# Patient Record
Sex: FEMALE | Race: WHITE | NOT HISPANIC OR LATINO | Employment: OTHER | URBAN - NONMETROPOLITAN AREA
[De-identification: names, ages, dates, MRNs, and addresses within clinical notes are randomized per-mention and may not be internally consistent; named-entity substitution may affect disease eponyms.]

---

## 2017-01-03 ENCOUNTER — HOSPITAL ENCOUNTER (OUTPATIENT)
Dept: PHYSICAL THERAPY | Facility: HOSPITAL | Age: 67
Setting detail: THERAPIES SERIES
End: 2017-01-03
Attending: INTERNAL MEDICINE
Payer: MEDICARE

## 2017-01-03 ENCOUNTER — HOSPITAL ENCOUNTER (OUTPATIENT)
Dept: PHYSICAL THERAPY | Facility: HOSPITAL | Age: 67
Setting detail: THERAPIES SERIES
End: 2017-01-03
Attending: PHYSICIAN ASSISTANT
Payer: MEDICARE

## 2017-01-03 PROCEDURE — 97035 APP MDLTY 1+ULTRASOUND EA 15: CPT | Mod: GP

## 2017-01-03 PROCEDURE — 40000718 ZZHC STATISTIC PT DEPARTMENT ORTHO VISIT

## 2017-01-03 PROCEDURE — 97110 THERAPEUTIC EXERCISES: CPT | Mod: GP

## 2017-01-03 PROCEDURE — 97140 MANUAL THERAPY 1/> REGIONS: CPT | Mod: GP

## 2017-01-03 PROCEDURE — 97010 HOT OR COLD PACKS THERAPY: CPT | Mod: GP

## 2017-01-05 ENCOUNTER — MYC MEDICAL ADVICE (OUTPATIENT)
Dept: FAMILY MEDICINE | Facility: OTHER | Age: 67
End: 2017-01-05

## 2017-01-05 ENCOUNTER — HOSPITAL ENCOUNTER (OUTPATIENT)
Dept: PHYSICAL THERAPY | Facility: HOSPITAL | Age: 67
Setting detail: THERAPIES SERIES
End: 2017-01-05
Attending: PHYSICIAN ASSISTANT
Payer: MEDICARE

## 2017-01-05 DIAGNOSIS — G62.9 PERIPHERAL POLYNEUROPATHY: Primary | ICD-10-CM

## 2017-01-05 PROCEDURE — 97110 THERAPEUTIC EXERCISES: CPT | Mod: GP

## 2017-01-05 PROCEDURE — G8978 MOBILITY CURRENT STATUS: HCPCS | Mod: GP,CI

## 2017-01-05 PROCEDURE — G8980 MOBILITY D/C STATUS: HCPCS | Mod: GP,CI

## 2017-01-05 PROCEDURE — 40000718 ZZHC STATISTIC PT DEPARTMENT ORTHO VISIT

## 2017-01-05 PROCEDURE — G8979 MOBILITY GOAL STATUS: HCPCS | Mod: GP,CI

## 2017-01-06 RX ORDER — GABAPENTIN 300 MG/1
CAPSULE ORAL
Qty: 210 CAPSULE | Refills: 3 | Status: SHIPPED | OUTPATIENT
Start: 2017-01-06 | End: 2017-04-17

## 2017-01-16 DIAGNOSIS — M05.79 RHEUMATOID ARTHRITIS INVOLVING MULTIPLE SITES WITH POSITIVE RHEUMATOID FACTOR (H): Primary | ICD-10-CM

## 2017-01-16 RX ORDER — HYDROCODONE BITARTRATE AND ACETAMINOPHEN 7.5; 325 MG/1; MG/1
TABLET ORAL
Qty: 60 TABLET | Refills: 0 | Status: SHIPPED | OUTPATIENT
Start: 2017-01-16 | End: 2017-09-01

## 2017-01-16 NOTE — TELEPHONE ENCOUNTER
Norco      Last Written Prescription Date:  8/26/2016  Last Fill Quantity: 60,   # refills: 0  Last Office Visit with [facility], [facility] or [facility] prescribing provider: 12/6/2016  Future Office visit:    Next 5 appointments (look out 90 days)     Jan 17, 2017 10:00 AM   (Arrive by 9:45 AM)   Return Visit with Abelardo Dewitt PA-C    ORTHOPEDICS ([facility])    [address] [facility] [address] [phone]                   Routing refill request to provider for review/approval because:  Drug not on the [facility], [facility] or [facility] refill protocol or controlled substance

## 2017-01-17 ENCOUNTER — OFFICE VISIT (OUTPATIENT)
Dept: ORTHOPEDICS | Facility: OTHER | Age: 67
End: 2017-01-17
Attending: PHYSICIAN ASSISTANT
Payer: COMMERCIAL

## 2017-01-17 VITALS
WEIGHT: 210 LBS | BODY MASS INDEX: 33.75 KG/M2 | DIASTOLIC BLOOD PRESSURE: 74 MMHG | HEIGHT: 66 IN | TEMPERATURE: 97.2 F | SYSTOLIC BLOOD PRESSURE: 116 MMHG | HEART RATE: 88 BPM

## 2017-01-17 DIAGNOSIS — M17.11 PRIMARY OSTEOARTHRITIS OF RIGHT KNEE: ICD-10-CM

## 2017-01-17 DIAGNOSIS — M05.79 RHEUMATOID ARTHRITIS INVOLVING MULTIPLE SITES WITH POSITIVE RHEUMATOID FACTOR (H): ICD-10-CM

## 2017-01-17 DIAGNOSIS — M25.561 CHRONIC PAIN OF RIGHT KNEE: Primary | ICD-10-CM

## 2017-01-17 DIAGNOSIS — G89.29 CHRONIC PAIN OF RIGHT KNEE: Primary | ICD-10-CM

## 2017-01-17 PROCEDURE — 99213 OFFICE O/P EST LOW 20 MIN: CPT | Performed by: PHYSICIAN ASSISTANT

## 2017-01-17 PROCEDURE — 99212 OFFICE O/P EST SF 10 MIN: CPT

## 2017-01-17 ASSESSMENT — PAIN SCALES - GENERAL: PAINLEVEL: NO PAIN (0)

## 2017-01-17 NOTE — PROGRESS NOTES
Chief complaint: Recheck right knee    Subjective: Melvi is a 66-year-old female with a history of RA who returns today for recheck of her right knee.  She was seen on 12/6/16 and her history is well-documented in the note from that date.  She decided on conservative measures at her last appointment including physical therapy.  She has underwent several sessions, her most recent almost a couple weeks back now.  She is noting good improvement overall but she continues to have intermittent pain in the right knee that is activity driven.  Her pain is primarily medial and some lateral, worse with prolonged periods of walking such as when she is shopping.  She will get some clicking as well, once her leg gets fatigued which she describes as well.  She otherwise denies mechanical symptoms such as catching or locking.  She will wear a brace on occasion which she feels helps with increased activity.  She otherwise denies new injuries or events.  She denies associated swelling or warmth in the right knee.    Past medical, past surgical, social history, family history, medications, and allergies reviewed and updated as appropriate today in EPIC.    ROS:  See HPI    Current Outpatient Prescriptions   Medication Sig Dispense Refill     HYDROcodone-acetaminophen (NORCO) 7.5-325 MG per tablet TAKE 1 TABLET BY MOUTH EVERY 4 TO 6 HOURS AS NEEDED FOR PAIN 60 tablet 0     gabapentin (NEURONTIN) 300 MG capsule 900mg in the morning, 600mg at noon, and 600mg in the PM by mouth. 210 capsule 3     citalopram (CELEXA) 40 MG tablet TAKE 1 TABLET BY MOUTH DAILY 90 tablet 1     RESTASIS 0.05 % ophthalmic emulsion USE 1 DROP IN BOTH EYES 2 TIMES A DAY 60 each 0     ALLEGRA-D ALLERGY & CONGESTION  MG per tablet TAKE 1 TABLET BY MOUTH TWICE DAILY 30 tablet 0     ALBUTEROL 108 (90 BASE) MCG/ACT inhaler INHALE 2 PUFFS INTO THE LUNGS EVERY 6 HOURS AS NEEDED FOR SHORTNESS OF BREATH OR WHEEZING 8.5 g 5     traZODone (DESYREL) 50 MG tablet  "TAKE 2-3 TABLETS BY MOUTH DAILY AT BEDTIME 270 tablet 2     SM GAS RELIEF ANTIFLATUENT 180 MG CAPS TAKE ONE CAPSULE BY MOUTH AS NEEDED AFTER A MEAL. MAX 2 CAPS/DAY 60 capsule 5     pantoprazole (PROTONIX) 40 MG enteric coated tablet Take 1 tablet (40 mg) by mouth daily 90 tablet 3     DICLOFENAC PO Take 100 mg by mouth       sucralfate (CARAFATE) 1 GM tablet Take by mouth daily       Garlic 400 MG TBEC        polyvinyl alcohol-povidone (REFRESH) 1.4-0.6 % ophthalmic solution 1-2 drops as needed       vitamin E 400 UNIT capsule Take 1 tablet by mouth daily       hydroxychloroquine (PLAQUENIL) 200 MG tablet Take 200 mg by mouth 2 times daily.       folic acid (FOLVITE) 1 MG tablet Take 1 mg by mouth 3 times daily.       METHOTREXATE SODIUM, PF, IJ Inject 0.8 mLs as directed once a week        MULTIPLE VITAMIN PO Take  by mouth 2 times daily.       Ascorbic Acid (VITAMIN C) 500 MG CAPS Take 2 tablets by mouth 2 times daily.       Calcium-Vitamin D-Vitamin K (CALCIUM + D) 500-1000-40 MG-UNT-MCG CHEW Take 3 tablets by mouth daily        ALFALFA PO Take 5 tablets by mouth 2 times daily.       MILK THISTLE PO Take 1,000 mg by mouth daily.       fish oil-omega-3 fatty acids (OMEGA-3) 1000 MG capsule Take 1 g by mouth daily.       Cholecalciferol (VITAMIN D) 1000 UNITS capsule Take 1 capsule by mouth daily.         Objective:   /74 mmHg  Pulse 88  Temp(Src) 97.2  F (36.2  C) (Tympanic)  Ht 5' 6.25\" (1.683 m)  Wt 210 lb (95.255 kg)  BMI 33.63 kg/m2  Healthy-appearing 66-year-old female in no acute distress.  She is alert and oriented ×3.  She is pleasant and cooperative.  She ambulates into the room today with a normal gait.  On inspection, no gross deformity involving the right knee.  Skin remains clean dry and intact throughout the right lower extremity.  No appreciable edema or effusion today.  Skin is normothermic to touch.  On palpation, she is minimally tender along the medial and lateral joint lines today.  " She is otherwise nontender.  No soft tissue masses palpable.  She demonstrates full active extension with excellent quad tone and strength today.  She has bilaterally equal flexion to approximately 125.  No ligamentous laxity or instability with ligamentous stressing today.  Negative Kim's.  Compartments soft and nontender throughout right lower extremity.  Distal neurovascular status grossly intact.    Imaging studies: None new.    Assessment: Intermittent right knee pain, improving with conservative measures including physical therapy.  She has underlying RA as well as radiographic evidence of underlying wear and tear degenerative changes.  She has history of patellar contusion as well.    Plan: I had a discussion with the patient today in regards to her progress thus far as well as future treatment.  She is very happy with her progress made in physical therapy.  She feels there is further benefit to be had with additional physical therapy.  I felt this was reasonable.  A new referral was placed for her to undergo a few more sessions at which time she may transition to a solid home exercise program to perform on a maintenance basis.  We did again discuss corticosteroid injection today however she would like to hold off if at all possible.  We agreed that as long as she continues to make steady progress, she may follow up as needed.  However, should her symptoms persist or worsen, she will return at that time.  She appears to understand and is in agreement with this plan.  All questions were answered.    Abelardo Dewitt PA-C

## 2017-01-17 NOTE — MR AVS SNAPSHOT
After Visit Summary   1/17/2017    Melvi Cleveland    MRN: [id number]           Patient Information     Date Of Birth          1950        Visit Information        Provider Department      1/17/2017 10:00 AM Abelardo Dewitt PA-C  ORTHOPEDICS        Today's Diagnoses     Chronic pain of right knee    -  1     Primary osteoarthritis of right knee         RA (rheumatoid arthritis) (H)           Care Instructions    Continue PT.  Transition to HEP when ready.  Follow up as needed.        Follow-ups after your visit        Additional Services     PHYSICAL THERAPY REFERRAL       *This therapy referral will be filtered to a centralized scheduling office at [facility] and the patient will receive a call to schedule an appointment at a [facility] location most convenient for them. *     [facility] provides Physical Therapy evaluation and treatment and many specialty services across the [facility] system.  If requesting a specialty program, please choose from the list below.    If you have not heard from the scheduling office within 2 business days, please call [phone] for all locations, with the exception of [place], please call [phone].  Treatment: Evaluation & Treatment  Special Instructions/Modalities: Continue current cares.  2 visits/wk x 4 weeks or until PT feels patient ready to transition to HEP.  Call with questions or concerns.  Special Programs: None    Please be aware that coverage of these services is subject to the terms and limitations of your health insurance plan.  Call member services at your health plan with any benefit or coverage questions.      **Note to Provider:  If you are referring outside of [facility] for the therapy appointment, please list the name of the location in the  special instructions  above, print the referral and give to the patient to schedule the appointment.                  Follow-up notes from your care team   "   Return if symptoms worsen or fail to improve.      Who to contact     If you have questions or need follow up information about today's clinic visit or your schedule please contact  ORTHOPED[facility] directly at [phone].  Normal or non-critical lab and imaging results will be communicated to you by MyChart, letter or phone within 4 business days after the clinic has received the results. If you do not hear from us within 7 days, please contact the clinic through MyChart or phone. If you have a critical or abnormal lab result, we will notify you by phone as soon as possible.  Submit refill requests through [employer] or call your pharmacy and they will forward the refill request to us. Please allow 3 business days for your refill to be completed.          Additional Information About Your Visit        [employer] Information     [employer] gives you secure access to your electronic health record. If you see a primary care provider, you can also send messages to your care team and make appointments. If you have questions, please call your primary care clinic.  If you do not have a primary care provider, please call [phone] and they will assist you.        Care EveryWhere ID     This is your Care EveryWhere ID. This could be used by other organizations to access your [place] medical records  [id number]        Your Vitals Were     Pulse Temperature Height BMI (Body Mass Index)          88 97.2  F (36.2  C) (Tympanic) 5' 6.25\" (1.683 m) 33.63 kg/m2         Blood Pressure from Last 3 Encounters:   01/17/17 116/74   12/06/16 124/74   11/23/16 118/62    Weight from Last 3 Encounters:   01/17/17 210 lb (95.255 kg)   12/06/16 208 lb (94.348 kg)   11/23/16 208 lb (94.348 kg)              We Performed the Following     PHYSICAL THERAPY REFERRAL        Primary Care Provider Office Phone # Fax #    Jimbo Martinez [phone]       [facility] [address] [place] [address]        Thank you!     Thank " you for choosing  ORTH[facility]EDICS  for your care. Our goal is always to provide you with excellent care. Hearing back from our patients is one way we can continue to improve our services. Please take a few minutes to complete the written survey that you may receive in the mail after your visit with us. Thank you!             Your Updated Medication List - Protect others around you: Learn how to safely use, store and throw away your medicines at www.disposemymeds.org.          This list is accurate as of: 1/17/17 10:07 AM.  Always use your most recent med list.                   Brand Name Dispense Instructions for use    albuterol 108 (90 BASE) MCG/ACT Inhaler   Generic drug:  albuterol     8.5 g    INHALE 2 PUFFS INTO THE LUNGS EVERY 6 HOURS AS NEEDED FOR SHORTNESS OF BREATH OR WHEEZING       ALFALFA PO      Take 5 tablets by mouth 2 times daily.       ALLEGRA-D ALLERGY & CONGESTION  MG per 12 hr tablet   Generic drug:  fexofenadine-pseudoePHEDrine     30 tablet    TAKE 1 TABLET BY MOUTH TWICE DAILY       CALCIUM + D 500-1000-40 MG-UNT-MCG Chew   Generic drug:  Calcium-Vitamin D-Vitamin K      Take 3 tablets by mouth daily       citalopram 40 MG tablet    celeXA    90 tablet    TAKE 1 TABLET BY MOUTH DAILY       DICLOFENAC PO      Take 100 mg by mouth       fish oil-omega-3 fatty acids 1000 MG capsule      Take 1 g by mouth daily.       folic acid 1 MG tablet    FOLVITE     Take 1 mg by mouth 3 times daily.       gabapentin 300 MG capsule    NEURONTIN    210 capsule    900mg in the morning, 600mg at noon, and 600mg in the PM by mouth.       Garlic 400 MG Tbec          HYDROcodone-acetaminophen 7.5-325 MG per tablet    NORCO    60 tablet    TAKE 1 TABLET BY MOUTH EVERY 4 TO 6 HOURS AS NEEDED FOR PAIN       METHOTREXATE SODIUM (PF) IJ      Inject 0.8 mLs as directed once a week       MILK THISTLE PO      Take 1,000 mg by mouth daily.       MULTIPLE VITAMIN PO      Take  by mouth 2 times daily.       pantoprazole  40 MG EC tablet    PROTONIX    90 tablet    Take 1 tablet (40 mg) by mouth daily       PLAQUENIL 200 MG tablet   Generic drug:  hydroxychloroquine      Take 200 mg by mouth 2 times daily.       REFRESH 1.4-0.6 % ophthalmic solution   Generic drug:  polyvinyl alcohol-povidone      1-2 drops as needed       RESTASIS 0.05 % ophthalmic emulsion   Generic drug:  cycloSPORINE     60 each    USE 1 DROP IN BOTH EYES 2 TIMES A DAY       SM GAS RELIEF ANTIFLATUENT 180 MG Caps   Generic drug:  Simethicone     60 capsule    TAKE ONE CAPSULE BY MOUTH AS NEEDED AFTER A MEAL. MAX 2 CAPS/DAY       sucralfate 1 GM tablet    CARAFATE     Take by mouth daily       traZODone 50 MG tablet    DESYREL    270 tablet    TAKE 2-3 TABLETS BY MOUTH DAILY AT BEDTIME       Vitamin C 500 MG Caps      Take 2 tablets by mouth 2 times daily.       vitamin D 1000 UNITS capsule      Take 1 capsule by mouth daily.       vitamin E 400 UNIT capsule      Take 1 tablet by mouth daily

## 2017-01-19 DIAGNOSIS — R09.81 NASAL CONGESTION: Primary | ICD-10-CM

## 2017-01-20 RX ORDER — FEXOFENADINE HYDROCHLORIDE AND PSEUDOEPHEDRINE HYDROCHLORIDE 60; 120 MG/1; MG/1
TABLET, FILM COATED, EXTENDED RELEASE ORAL
Qty: 30 TABLET | Refills: 0 | Status: SHIPPED | OUTPATIENT
Start: 2017-01-20 | End: 2017-02-23

## 2017-01-24 ENCOUNTER — HOSPITAL ENCOUNTER (OUTPATIENT)
Dept: PHYSICAL THERAPY | Facility: HOSPITAL | Age: 67
Setting detail: THERAPIES SERIES
End: 2017-01-24
Attending: PHYSICIAN ASSISTANT
Payer: MEDICARE

## 2017-01-24 PROCEDURE — 40000718 ZZHC STATISTIC PT DEPARTMENT ORTHO VISIT

## 2017-01-24 PROCEDURE — 97110 THERAPEUTIC EXERCISES: CPT | Mod: GP

## 2017-01-25 ENCOUNTER — OFFICE VISIT (OUTPATIENT)
Dept: FAMILY MEDICINE | Facility: OTHER | Age: 67
End: 2017-01-25
Attending: FAMILY MEDICINE
Payer: COMMERCIAL

## 2017-01-25 VITALS
WEIGHT: 210 LBS | DIASTOLIC BLOOD PRESSURE: 64 MMHG | OXYGEN SATURATION: 93 % | BODY MASS INDEX: 33.63 KG/M2 | TEMPERATURE: 97.8 F | HEART RATE: 68 BPM | SYSTOLIC BLOOD PRESSURE: 118 MMHG

## 2017-01-25 DIAGNOSIS — J01.01 ACUTE RECURRENT MAXILLARY SINUSITIS: Primary | ICD-10-CM

## 2017-01-25 PROCEDURE — 99212 OFFICE O/P EST SF 10 MIN: CPT

## 2017-01-25 PROCEDURE — 99213 OFFICE O/P EST LOW 20 MIN: CPT | Performed by: FAMILY MEDICINE

## 2017-01-25 RX ORDER — CEFPROZIL 500 MG/1
500 TABLET, FILM COATED ORAL 2 TIMES DAILY
Qty: 20 TABLET | Refills: 0 | Status: SHIPPED | OUTPATIENT
Start: 2017-01-25 | End: 2017-05-09

## 2017-01-25 NOTE — PROGRESS NOTES
SUBJECTIVE:  Melvi Cleveland, 66 year old, female is seen with nasal congestion cough. Present over the last week.  She notes bilateral maxillary pain and pressure.    Denies fever, shaking, chills, nausea, vomiting, or diarrhea.  No household contacts are ill.      Current Outpatient Prescriptions   Medication Sig Dispense Refill     ALLEGRA-D ALLERGY & CONGESTION  MG per 12 hr tablet TAKE 1 TABLET BY MOUTH TWICE DAILY 30 tablet 0     HYDROcodone-acetaminophen (NORCO) 7.5-325 MG per tablet TAKE 1 TABLET BY MOUTH EVERY 4 TO 6 HOURS AS NEEDED FOR PAIN 60 tablet 0     gabapentin (NEURONTIN) 300 MG capsule 900mg in the morning, 600mg at noon, and 600mg in the PM by mouth. 210 capsule 3     citalopram (CELEXA) 40 MG tablet TAKE 1 TABLET BY MOUTH DAILY 90 tablet 1     RESTASIS 0.05 % ophthalmic emulsion USE 1 DROP IN BOTH EYES 2 TIMES A DAY 60 each 0     ALBUTEROL 108 (90 BASE) MCG/ACT inhaler INHALE 2 PUFFS INTO THE LUNGS EVERY 6 HOURS AS NEEDED FOR SHORTNESS OF BREATH OR WHEEZING 8.5 g 5     traZODone (DESYREL) 50 MG tablet TAKE 2-3 TABLETS BY MOUTH DAILY AT BEDTIME 270 tablet 2     SM GAS RELIEF ANTIFLATUENT 180 MG CAPS TAKE ONE CAPSULE BY MOUTH AS NEEDED AFTER A MEAL. MAX 2 CAPS/DAY 60 capsule 5     pantoprazole (PROTONIX) 40 MG enteric coated tablet Take 1 tablet (40 mg) by mouth daily 90 tablet 3     DICLOFENAC PO Take 100 mg by mouth       sucralfate (CARAFATE) 1 GM tablet Take by mouth daily       Garlic 400 MG TBEC        polyvinyl alcohol-povidone (REFRESH) 1.4-0.6 % ophthalmic solution 1-2 drops as needed       vitamin E 400 UNIT capsule Take 1 tablet by mouth daily       hydroxychloroquine (PLAQUENIL) 200 MG tablet Take 200 mg by mouth 2 times daily.       folic acid (FOLVITE) 1 MG tablet Take 1 mg by mouth 3 times daily.       METHOTREXATE SODIUM, PF, IJ Inject 0.8 mLs as directed once a week        MULTIPLE VITAMIN PO Take  by mouth 2 times daily.       Ascorbic Acid (VITAMIN C) 500 MG CAPS Take 2  tablets by mouth 2 times daily.       Calcium-Vitamin D-Vitamin K (CALCIUM + D) 500-1000-40 MG-UNT-MCG CHEW Take 3 tablets by mouth daily        ALFALFA PO Take 5 tablets by mouth 2 times daily.       MILK THISTLE PO Take 1,000 mg by mouth daily.       fish oil-omega-3 fatty acids (OMEGA-3) 1000 MG capsule Take 1 g by mouth daily.       Cholecalciferol (VITAMIN D) 1000 UNITS capsule Take 1 capsule by mouth daily.          Allergies   Allergen Reactions     Adhesive Tape      Benzoin Compound      Tin-Co-Javier     Seasonal Allergies        Past Medical History   Diagnosis Date     Urethral stricture 4/30/2008     Rheumatoid arthritis(714.0) 12/13/1999     Chronic/Recurrent Neck Pain 7/5/2005     Chronic/Recurrent Back Pain 12/13/2000     Allergic rhinitis, seasonal 3/1/2011     Fibrocystic Breast Disease 3/1/2011     Crohn's Disease 3/1/2011     Dyslipidemia 3/1/2011     Sjogrens Syndrome 3/1/2011     Peripheral Neuriopathy 3/1/2011     Wilson's neuroma 1/1/2011     CTS (carpal tunnel syndrome) 1/1/2011     Mixed hyperlipidemia 1/1/2011     Parotiditis 5/9/2011     Seborrhea capitis 10/6/2011     Past Surgical History   Procedure Laterality Date     Cholecystectomy  2004     Back surgery  05/1995     back surgery     Biopsy       breast bx X2     Egd with biopsy  2010, 2011     GERD     Colonoscopy  11/15/2004     screening     Laminectomy       L4 L5 laminectomy; back pain     Biopsy breast       LT     Colonoscopy  9/24/2014     Family History   Problem Relation Age of Onset     DIABETES Mother      Rheumatoid Arthritis Mother      Unknown/Adopted Father      cause of death unknown     Rheumatoid Arthritis Father      DIABETES Sister      Myocardial Infarction Sister      cause of death     Lupus Sister      cause of death     C.A.D. Maternal Grandmother      CANCER Maternal Grandfather      CEREBROVASCULAR DISEASE Maternal Grandmother      DIABETES Maternal Grandmother      Other - See Comments Mother       fibromyalgia     Hypertension Brother      Osteoarthritis Mother      Other - See Comments Brother      sleep apnea     Other - See Comments Sister      sleep apnea     Thyroid Disease Mother      thyroid disorder     Thyroid Disease Maternal Grandmother      thyrodi disorder; goitor removed     Social History     Social History     Marital Status:      Spouse Name: N/A     Number of Children: N/A     Years of Education: N/A     Occupational History      [employer]     part time     Social History Main Topics     Smoking status: Former Smoker     Types: Cigarettes     Quit date: 03/28/1997     Smokeless tobacco: Never Used      Comment: no passive exposure     Alcohol Use: No      Comment: former. quit 1984     Drug Use: No     Sexual Activity: Not on file     Other Topics Concern     Parent/Sibling W/ Cabg, Mi Or Angioplasty Before 65f 55m? Yes     Blood Transfusions Yes     Caffeine Concern Yes     chocolate rare     Seat Belt Yes     Social History Narrative         Review Of Systems  Constitutional, HEENT, cardiovascular, pulmonary, gi and gu systems are negative, except as otherwise noted.    OBJECTIVE:  Vitals: B/P: 118/64, T: 97.8, P: 68, R: Data Unavailable    Exam:  Physical Exam   Constitutional: She is oriented to person, place, and time. She appears well-developed and well-nourished. No distress.   HENT:   Head: Normocephalic.   Right Ear: Hearing, tympanic membrane, external ear and ear canal normal.   Left Ear: Hearing, tympanic membrane, external ear and ear canal normal.   Nose: Right sinus exhibits maxillary sinus tenderness. Right sinus exhibits no frontal sinus tenderness. Left sinus exhibits maxillary sinus tenderness. Left sinus exhibits no frontal sinus tenderness.   Mouth/Throat: Uvula is midline and oropharynx is clear and moist. No oropharyngeal exudate or posterior oropharyngeal erythema.   Eyes: Conjunctivae are normal.   Neck: Neck supple.   Pulmonary/Chest: Effort normal and breath  sounds normal.   Lymphadenopathy:     She has no cervical adenopathy.   Neurological: She is alert and oriented to person, place, and time.   Skin: Skin is warm and dry.   Psychiatric: She has a normal mood and affect.     Other exam not repeated    Labs:  Orders Only on 12/23/2016   Component Date Value Ref Range Status     Cyclic Citrullinated Peptide Antib* 12/23/2016 7* <7 U/mL Final    Comment: Equivocal. It is recommended that samples with equivocal results be redrawn   6-8   weeks later and retested with this assay.       Rheumatoid Factor 12/23/2016 <20  <20 IU/mL Final     WBC 12/23/2016 4.9  4.0 - 11.0 10e9/L Final     RBC Count 12/23/2016 4.31  3.8 - 5.2 10e12/L Final     Hemoglobin 12/23/2016 12.6  11.7 - 15.7 g/dL Final     Hematocrit 12/23/2016 38.4  35.0 - 47.0 % Final     MCV 12/23/2016 89  78 - 100 fl Final     MCH 12/23/2016 29.2  26.5 - 33.0 pg Final     MCHC 12/23/2016 32.8  31.5 - 36.5 g/dL Final     RDW 12/23/2016 14.0  10.0 - 15.0 % Final     Platelet Count 12/23/2016 270  150 - 450 10e9/L Final     Diff Method 12/23/2016 Automated Method   Final     % Neutrophils 12/23/2016 49.6   Final     % Lymphocytes 12/23/2016 35.3   Final     % Monocytes 12/23/2016 8.8   Final     % Eosinophils 12/23/2016 5.1   Final     % Basophils 12/23/2016 1.0   Final     % Immature Granulocytes 12/23/2016 0.2   Final     Nucleated RBCs 12/23/2016 0  0 /100 Final     Absolute Neutrophil 12/23/2016 2.4  1.6 - 8.3 10e9/L Final     Absolute Lymphocytes 12/23/2016 1.7  0.8 - 5.3 10e9/L Final     Absolute Monocytes 12/23/2016 0.4  0.0 - 1.3 10e9/L Final     Absolute Eosinophils 12/23/2016 0.3  0.0 - 0.7 10e9/L Final     Absolute Basophils 12/23/2016 0.1  0.0 - 0.2 10e9/L Final     Abs Immature Granulocytes 12/23/2016 0.0  0 - 0.4 10e9/L Final     Absolute Nucleated RBC 12/23/2016 0.0   Final     ALT 12/23/2016 40  0 - 50 U/L Final     Creatinine 12/23/2016 0.91  0.52 - 1.04 mg/dL Final     GFR Estimate 12/23/2016 62  >60  mL/min/1.7m2 Final    Non  GFR Calc     GFR Estimate If Black 12/23/2016 76  >60 mL/min/1.7m2 Final    African American GFR Calc     Albumin 12/23/2016 3.1* 3.4 - 5.0 g/dL Final     Sed Rate 12/23/2016 18  0 - 30 mm/h Final     CRP Inflammation 12/23/2016 3.7  0.0 - 8.0 mg/L Final       ASSESSMENT/PLAN:  Acute recurrent maxillary sinusitis  Cefprozil twice daily.  Continue antihistamines and decongestants.  Saline rinses.  Follow up with persistent symptoms.  - cefPROZIL (CEFZIL) 500 MG tablet; Take 1 tablet (500 mg) by mouth 2 times daily            Jimbo Martinez MD

## 2017-01-25 NOTE — NURSING NOTE
"Chief Complaint   Patient presents with     Sinus Problem     sinus pain and pressure for 1 week, pressure in ears, productive cough. No fevers noted.        Initial /64 mmHg  Pulse 68  Temp(Src) 97.8  F (36.6  C) (Tympanic)  Wt 210 lb (95.255 kg)  SpO2 93% Estimated body mass index is 33.63 kg/(m^2) as calculated from the following:    Height as of 1/17/17: 5' 6.25\" (1.683 m).    Weight as of this encounter: 210 lb (95.255 kg).  BP completed using cuff size: raleigh Schwarz      "

## 2017-02-01 ENCOUNTER — HOSPITAL ENCOUNTER (OUTPATIENT)
Dept: PHYSICAL THERAPY | Facility: HOSPITAL | Age: 67
Setting detail: THERAPIES SERIES
End: 2017-02-01
Attending: FAMILY MEDICINE
Payer: MEDICARE

## 2017-02-01 PROCEDURE — 97140 MANUAL THERAPY 1/> REGIONS: CPT | Mod: GP

## 2017-02-01 PROCEDURE — 40000718 ZZHC STATISTIC PT DEPARTMENT ORTHO VISIT

## 2017-02-01 PROCEDURE — 97110 THERAPEUTIC EXERCISES: CPT | Mod: GP

## 2017-02-07 ENCOUNTER — HOSPITAL ENCOUNTER (OUTPATIENT)
Dept: PHYSICAL THERAPY | Facility: HOSPITAL | Age: 67
Setting detail: THERAPIES SERIES
End: 2017-02-07
Attending: FAMILY MEDICINE
Payer: MEDICARE

## 2017-02-07 PROCEDURE — 97110 THERAPEUTIC EXERCISES: CPT | Mod: GP

## 2017-02-07 PROCEDURE — 40000718 ZZHC STATISTIC PT DEPARTMENT ORTHO VISIT

## 2017-02-09 ENCOUNTER — HOSPITAL ENCOUNTER (OUTPATIENT)
Dept: PHYSICAL THERAPY | Facility: HOSPITAL | Age: 67
Setting detail: THERAPIES SERIES
End: 2017-02-09
Attending: PHYSICIAN ASSISTANT
Payer: MEDICARE

## 2017-02-09 PROCEDURE — 97110 THERAPEUTIC EXERCISES: CPT | Mod: GP

## 2017-02-09 PROCEDURE — 40000718 ZZHC STATISTIC PT DEPARTMENT ORTHO VISIT

## 2017-02-13 DIAGNOSIS — F51.01 PRIMARY INSOMNIA: ICD-10-CM

## 2017-02-13 RX ORDER — TRAZODONE HYDROCHLORIDE 50 MG/1
TABLET, FILM COATED ORAL
Qty: 270 TABLET | Refills: 1 | Status: SHIPPED | OUTPATIENT
Start: 2017-02-13 | End: 2017-08-23

## 2017-02-15 ENCOUNTER — HOSPITAL ENCOUNTER (OUTPATIENT)
Dept: PHYSICAL THERAPY | Facility: HOSPITAL | Age: 67
Setting detail: THERAPIES SERIES
End: 2017-02-15
Attending: FAMILY MEDICINE
Payer: MEDICARE

## 2017-02-15 PROCEDURE — 97110 THERAPEUTIC EXERCISES: CPT | Mod: GP

## 2017-02-15 PROCEDURE — G8980 MOBILITY D/C STATUS: HCPCS | Mod: GP,CI

## 2017-02-15 PROCEDURE — G8978 MOBILITY CURRENT STATUS: HCPCS | Mod: GP,CI

## 2017-02-15 PROCEDURE — 40000718 ZZHC STATISTIC PT DEPARTMENT ORTHO VISIT

## 2017-02-15 PROCEDURE — G8979 MOBILITY GOAL STATUS: HCPCS | Mod: GP,CI

## 2017-02-15 PROCEDURE — 40000268 ZZH STATISTIC NO CHARGES

## 2017-03-10 DIAGNOSIS — F41.0 PANIC DISORDER WITHOUT AGORAPHOBIA: ICD-10-CM

## 2017-03-10 DIAGNOSIS — Z79.899 ENCOUNTER FOR LONG-TERM (CURRENT) USE OF MEDICATIONS: Primary | ICD-10-CM

## 2017-03-10 LAB
ALBUMIN SERPL-MCNC: 3.3 G/DL (ref 3.4–5)
ALT SERPL W P-5'-P-CCNC: 40 U/L (ref 0–50)
BASOPHILS # BLD AUTO: 0 10E9/L (ref 0–0.2)
BASOPHILS NFR BLD AUTO: 0.8 %
CREAT SERPL-MCNC: 1.13 MG/DL (ref 0.52–1.04)
CRP SERPL-MCNC: <2.9 MG/L (ref 0–8)
DIFFERENTIAL METHOD BLD: NORMAL
EOSINOPHIL # BLD AUTO: 0.3 10E9/L (ref 0–0.7)
EOSINOPHIL NFR BLD AUTO: 5.1 %
ERYTHROCYTE [DISTWIDTH] IN BLOOD BY AUTOMATED COUNT: 13.6 % (ref 10–15)
ERYTHROCYTE [SEDIMENTATION RATE] IN BLOOD BY WESTERGREN METHOD: 15 MM/H (ref 0–30)
GFR SERPL CREATININE-BSD FRML MDRD: 48 ML/MIN/1.7M2
HCT VFR BLD AUTO: 37.6 % (ref 35–47)
HGB BLD-MCNC: 12.2 G/DL (ref 11.7–15.7)
IMM GRANULOCYTES # BLD: 0 10E9/L (ref 0–0.4)
IMM GRANULOCYTES NFR BLD: 0 %
LYMPHOCYTES # BLD AUTO: 2.1 10E9/L (ref 0.8–5.3)
LYMPHOCYTES NFR BLD AUTO: 41.4 %
MCH RBC QN AUTO: 28.5 PG (ref 26.5–33)
MCHC RBC AUTO-ENTMCNC: 32.4 G/DL (ref 31.5–36.5)
MCV RBC AUTO: 88 FL (ref 78–100)
MONOCYTES # BLD AUTO: 0.5 10E9/L (ref 0–1.3)
MONOCYTES NFR BLD AUTO: 9.5 %
NEUTROPHILS # BLD AUTO: 2.2 10E9/L (ref 1.6–8.3)
NEUTROPHILS NFR BLD AUTO: 43.2 %
NRBC # BLD AUTO: 0 10*3/UL
NRBC BLD AUTO-RTO: 0 /100
PLATELET # BLD AUTO: 232 10E9/L (ref 150–450)
RBC # BLD AUTO: 4.28 10E12/L (ref 3.8–5.2)
WBC # BLD AUTO: 5.1 10E9/L (ref 4–11)

## 2017-03-10 PROCEDURE — 85025 COMPLETE CBC W/AUTO DIFF WBC: CPT | Performed by: NURSE PRACTITIONER

## 2017-03-10 PROCEDURE — 86140 C-REACTIVE PROTEIN: CPT | Performed by: NURSE PRACTITIONER

## 2017-03-10 PROCEDURE — 84460 ALANINE AMINO (ALT) (SGPT): CPT | Performed by: NURSE PRACTITIONER

## 2017-03-10 PROCEDURE — 82040 ASSAY OF SERUM ALBUMIN: CPT | Performed by: NURSE PRACTITIONER

## 2017-03-10 PROCEDURE — 85652 RBC SED RATE AUTOMATED: CPT | Performed by: NURSE PRACTITIONER

## 2017-03-10 PROCEDURE — 82565 ASSAY OF CREATININE: CPT | Performed by: NURSE PRACTITIONER

## 2017-03-10 PROCEDURE — 36415 COLL VENOUS BLD VENIPUNCTURE: CPT | Performed by: NURSE PRACTITIONER

## 2017-03-13 RX ORDER — CITALOPRAM HYDROBROMIDE 40 MG/1
TABLET ORAL
Qty: 90 TABLET | Refills: 1 | Status: SHIPPED | OUTPATIENT
Start: 2017-03-13 | End: 2017-05-09

## 2017-03-28 DIAGNOSIS — R92.1 BREAST CALCIFICATION, RIGHT: Primary | ICD-10-CM

## 2017-04-06 ENCOUNTER — TRANSFERRED RECORDS (OUTPATIENT)
Dept: HEALTH INFORMATION MANAGEMENT | Facility: HOSPITAL | Age: 67
End: 2017-04-06

## 2017-04-11 DIAGNOSIS — Z12.31 ENCOUNTER FOR SCREENING MAMMOGRAM FOR BREAST CANCER: Primary | ICD-10-CM

## 2017-04-17 DIAGNOSIS — G62.9 PERIPHERAL POLYNEUROPATHY: ICD-10-CM

## 2017-04-17 DIAGNOSIS — R09.81 NASAL CONGESTION: ICD-10-CM

## 2017-04-18 RX ORDER — GABAPENTIN 300 MG/1
CAPSULE ORAL
Qty: 210 CAPSULE | Refills: 0 | Status: SHIPPED | OUTPATIENT
Start: 2017-04-18 | End: 2017-06-26

## 2017-04-18 RX ORDER — FEXOFENADINE HYDROCHLORIDE AND PSEUDOEPHEDRINE HYDROCHLORIDE 60; 120 MG/1; MG/1
TABLET, FILM COATED, EXTENDED RELEASE ORAL
Qty: 30 TABLET | Refills: 0 | Status: SHIPPED | OUTPATIENT
Start: 2017-04-18 | End: 2017-05-31

## 2017-04-18 NOTE — TELEPHONE ENCOUNTER
Last office visit 01/25/17.  Gabapentin last filled 01/05/17 #210 with 3 refills. Medication pended.

## 2017-04-19 DIAGNOSIS — R92.8 ABNORMAL MAMMOGRAM: Primary | ICD-10-CM

## 2017-04-19 PROCEDURE — G0206 DX MAMMO INCL CAD UNI: HCPCS | Mod: TC,RT

## 2017-04-19 PROCEDURE — G0279 TOMOSYNTHESIS, MAMMO: HCPCS | Mod: TC,RT

## 2017-04-20 ENCOUNTER — TELEPHONE (OUTPATIENT)
Dept: MAMMOGRAPHY | Facility: OTHER | Age: 67
End: 2017-04-20

## 2017-04-20 DIAGNOSIS — R92.1 BREAST CALCIFICATION, RIGHT: Primary | ICD-10-CM

## 2017-04-20 NOTE — TELEPHONE ENCOUNTER
Dr. Amin gives the ok to schedule patient for stereotactic breast biopsy.  Patient education performed on what to expect with breast biopsy.  Appointment given of 4/24/17 to arrive at 0800.

## 2017-04-24 ENCOUNTER — HOSPITAL ENCOUNTER (OUTPATIENT)
Dept: MAMMOGRAPHY | Facility: HOSPITAL | Age: 67
Discharge: HOME | End: 2017-04-24
Attending: SURGERY | Admitting: SURGERY
Payer: MEDICARE

## 2017-04-24 ENCOUNTER — RESULTS ONLY (OUTPATIENT)
Dept: SURGERY | Facility: OTHER | Age: 67
End: 2017-04-24

## 2017-04-24 ENCOUNTER — HOSPITAL ENCOUNTER (OUTPATIENT)
Dept: INTERVENTIONAL RADIOLOGY/VASCULAR | Facility: HOSPITAL | Age: 67
End: 2017-04-24
Attending: SURGERY
Payer: MEDICARE

## 2017-04-24 PROCEDURE — 25000125 ZZHC RX 250

## 2017-04-24 PROCEDURE — 88305 TISSUE EXAM BY PATHOLOGIST: CPT | Mod: TC | Performed by: SURGERY

## 2017-04-24 PROCEDURE — 19081 BX BREAST 1ST LESION STRTCTC: CPT | Mod: TC,RT | Performed by: RADIOLOGY

## 2017-04-24 PROCEDURE — 19081 BX BREAST 1ST LESION STRTCTC: CPT | Mod: TC,RT

## 2017-04-24 PROCEDURE — 40000272 ZZH STATISTIC POST PROCEDURE PLACEMENT MAMMOGRAMS: Mod: TC,RT | Performed by: RADIOLOGY

## 2017-04-24 RX ORDER — LIDOCAINE HYDROCHLORIDE AND EPINEPHRINE 10; 10 MG/ML; UG/ML
20 INJECTION, SOLUTION INFILTRATION; PERINEURAL ONCE
Status: COMPLETED | OUTPATIENT
Start: 2017-04-24 | End: 2017-04-24

## 2017-04-24 RX ORDER — LIDOCAINE HYDROCHLORIDE 10 MG/ML
INJECTION, SOLUTION EPIDURAL; INFILTRATION; INTRACAUDAL; PERINEURAL
Status: COMPLETED
Start: 2017-04-24 | End: 2017-04-24

## 2017-04-24 RX ORDER — LIDOCAINE HYDROCHLORIDE 10 MG/ML
20 INJECTION, SOLUTION EPIDURAL; INFILTRATION; INTRACAUDAL; PERINEURAL ONCE
Status: COMPLETED | OUTPATIENT
Start: 2017-04-24 | End: 2017-04-24

## 2017-04-24 RX ORDER — LIDOCAINE HYDROCHLORIDE AND EPINEPHRINE 10; 10 MG/ML; UG/ML
INJECTION, SOLUTION INFILTRATION; PERINEURAL
Status: COMPLETED
Start: 2017-04-24 | End: 2017-04-24

## 2017-04-24 RX ADMIN — LIDOCAINE HYDROCHLORIDE 30 MG: 10 INJECTION, SOLUTION EPIDURAL; INFILTRATION; INTRACAUDAL; PERINEURAL at 09:05

## 2017-04-24 RX ADMIN — LIDOCAINE HYDROCHLORIDE,EPINEPHRINE BITARTRATE 20 ML: 10; .01 INJECTION, SOLUTION INFILTRATION; PERINEURAL at 09:05

## 2017-04-24 RX ADMIN — LIDOCAINE HYDROCHLORIDE AND EPINEPHRINE 20 ML: 10; 10 INJECTION, SOLUTION INFILTRATION; PERINEURAL at 09:05

## 2017-04-24 NOTE — IP AVS SNAPSHOT
MRN:[id number]                      After Visit Summary   4/24/2017    Melvi Cleveland    MRN: [id number]           Visit Information        Provider Department      4/24/2017  8:30 AM Radiologist, Need Interventional [facility]           Review of your medicines      UNREVIEWED medicines. Ask your doctor about these medicines        Dose / Directions    albuterol 108 (90 BASE) MCG/ACT Inhaler   Used for:  Influenza-like symptoms   Generic drug:  albuterol        INHALE 2 PUFFS INTO THE LUNGS EVERY 6 HOURS AS NEEDED FOR SHORTNESS OF BREATH OR WHEEZING   Quantity:  8.5 g   Refills:  5       ALFALFA PO        Dose:  5 tablet   Take 5 tablets by mouth 2 times daily.   Refills:  0       ALLEGRA-D ALLERGY & CONGESTION  MG per 12 hr tablet   Used for:  Nasal congestion   Generic drug:  fexofenadine-pseudoePHEDrine        TAKE 1 TABLET BY MOUTH TWICE DAILY   Quantity:  30 tablet   Refills:  0       CALCIUM + D 500-1000-40 MG-UNT-MCG Chew   Generic drug:  Calcium-Vitamin D-Vitamin K        Dose:  3 tablet   Take 3 tablets by mouth daily   Refills:  0       cefPROZIL 500 MG tablet   Commonly known as:  CEFZIL   Used for:  Acute recurrent maxillary sinusitis        Dose:  500 mg   Take 1 tablet (500 mg) by mouth 2 times daily   Quantity:  20 tablet   Refills:  0       citalopram 40 MG tablet   Commonly known as:  celeXA   Used for:  Panic disorder without agoraphobia        TAKE 1 TABLET BY MOUTH DAILY   Quantity:  90 tablet   Refills:  1       DICLOFENAC PO        Dose:  100 mg   Take 100 mg by mouth   Refills:  0       fish oil-omega-3 fatty acids 1000 MG capsule        Dose:  1 g   Take 1 g by mouth daily.   Refills:  0       folic acid 1 MG tablet   Commonly known as:  FOLVITE        Dose:  1 mg   Take 1 mg by mouth 3 times daily.   Refills:  0       gabapentin 300 MG capsule   Commonly known as:  NEURONTIN   Used for:  Peripheral polyneuropathy (H)        TAKE 3 CAPSULES BY MOUTH  IN THE MORNING, 2 CAPSULES AT NOON AND 2 CAPSULES IN THE EVENING   Quantity:  210 capsule   Refills:  0       Garlic 400 MG Tbec        Refills:  0       HYDROcodone-acetaminophen 7.5-325 MG per tablet   Commonly known as:  NORCO   Used for:  Rheumatoid arthritis involving multiple sites with positive rheumatoid factor (H)        TAKE 1 TABLET BY MOUTH EVERY 4 TO 6 HOURS AS NEEDED FOR PAIN   Quantity:  60 tablet   Refills:  0       METHOTREXATE SODIUM (PF) IJ        Dose:  0.8 mL   Inject 0.8 mLs as directed once a week   Refills:  0       MILK THISTLE PO        Dose:  1000 mg   Take 1,000 mg by mouth daily.   Refills:  0       MULTIPLE VITAMIN PO        Take  by mouth 2 times daily.   Refills:  0       pantoprazole 40 MG EC tablet   Commonly known as:  PROTONIX   Used for:  Diagnosis unknown        Dose:  40 mg   Take 1 tablet (40 mg) by mouth daily   Quantity:  90 tablet   Refills:  3       PLAQUENIL 200 MG tablet   Generic drug:  hydroxychloroquine        Dose:  200 mg   Take 200 mg by mouth 2 times daily.   Refills:  0       REFRESH 1.4-0.6 % ophthalmic solution   Generic drug:  polyvinyl alcohol-povidone        Dose:  1-2 drop   1-2 drops as needed   Refills:  0       RESTASIS 0.05 % ophthalmic emulsion   Used for:  Bilateral dry eyes   Generic drug:  cycloSPORINE        USE 1 DROP IN BOTH EYES 2 TIMES A DAY   Quantity:  60 each   Refills:  0       SM GAS RELIEF ANTIFLATUENT 180 MG Caps   Used for:  Gas   Generic drug:  Simethicone        TAKE ONE CAPSULE BY MOUTH AS NEEDED AFTER A MEAL. MAX 2 CAPS/DAY   Quantity:  60 capsule   Refills:  5       sucralfate 1 GM tablet   Commonly known as:  CARAFATE        Take by mouth daily   Refills:  0       traZODone 50 MG tablet   Commonly known as:  DESYREL   Used for:  Primary insomnia        TAKE 2-3 TABLETS BY MOUTH DAILY AT BEDTIME   Quantity:  270 tablet   Refills:  1       Vitamin C 500 MG Caps        Dose:  2 tablet   Take 2 tablets by mouth 2 times daily.    Refills:  0       vitamin D 1000 UNITS capsule        Dose:  1 capsule   Take 1 capsule by mouth daily.   Refills:  0       vitamin E 400 UNIT capsule        Dose:  1 tablet   Take 1 tablet by mouth daily   Refills:  0                Protect others around you: Learn how to safely use, store and throw away your medicines at www.disposemymeds.org.         Follow-ups after your visit        Your next 10 appointments already scheduled     Apr 24, 2017  8:30 AM CDT   MAMMOGRAM with [facility] STEROEOTACTIC BREAST BIOPSY   [facility] ([facility] )    [address] [place] [address]   [phone]           Do not wear any body powder, lotions, deodorant or perfume the day of the exam. Bring a list of all medications, especially hormones.  If your last mammogram was not done at [facility], please bring your mammogram films. We will need the name of your MD/PA to send a copy of your report.  You may register in either the clinic or hospital, but the mammogram will done at the [facility] located in the Hospital.            Apr 24, 2017  8:30 AM CDT   Radiology with Need Interventional Radiologist   [facility] ([facility] )    [address] [address]   [phone]               Care Instructions        Further instructions from your care team         DISCHARGE INSTRUCTIONS FOR  BREAST BIOPSY    BREAST BIOPSY  A needle was used to locate the breast tissue. Tissue was removed to check the cells and be examined by a Pathologist. This will help your doctor plan any further treatment or follow-up. Your doctor will tell you the results of the tests in 3 to 5 days.    Follow these instructions:    Climb stairs slowly and use the hand rail. Avoid extra trips. No running or jumping.    No pushing, pulling or straining (vacuuming, shoveling, sweeping etc.)    You may shower tomorrow, pat the are dry around the tegaderm dressing.    Wear a bra at all times until your breast  is fully healed.    Apply ice to the breast during the first few hours following the procedure to relieve swelling and bruising.    Steri strips stay in place for 7-10 days or until they fall off. Do not pull them off.    May remove pressure dressing after 24 hours.    Call your doctor if you have:    increased bleeding or drainage from your incision.    swelling, redness or opening of your incision.    foul smell from your incision or dressing.    red streaks from your incision.    chills or fever over 101 degrees (by mouth).    pain not helped by your pain medication.    trouble or pain with breathing.    any new problems or concerns.     Additional Information About Your Visit        [employer] Information     The [employer] gives you secure access to your electronic health record. If you see a primary care provider, you can also send messages to your care team and make appointments. If you have questions, please call your primary care clinic.  If you do not have a primary care provider, please call [phone] and they will assist you.        Care EveryWhere ID     This is your Care EveryWhere ID. This could be used by other organizations to access your [facility] medical records  [id number]         Primary Care Provider Office Phone # Fax #    Jimbo Martinez [phone]      Thank you!     Thank you for choosing [facility] for your care. Our goal is always to provide you with excellent care. Hearing back from our patients is one way we can continue to improve our services. Please take a few minutes to complete the written survey that you may receive in the mail after you visit with us. Thank you!             Medication List: This is a list of all your medications and when to take them. Check marks below indicate your daily home schedule. Keep this list as a reference.      Medications           Morning Afternoon Evening Bedtime As Needed    albuterol 108 (90 BASE) MCG/ACT Inhaler   INHALE 2 PUFFS INTO  THE LUNGS EVERY 6 HOURS AS NEEDED FOR SHORTNESS OF BREATH OR WHEEZING   Generic drug:  albuterol                                ALFALFA PO   Take 5 tablets by mouth 2 times daily.                                ALLEGRA-D ALLERGY & CONGESTION  MG per 12 hr tablet   TAKE 1 TABLET BY MOUTH TWICE DAILY   Generic drug:  fexofenadine-pseudoePHEDrine                                CALCIUM + D 500-1000-40 MG-UNT-MCG Chew   Take 3 tablets by mouth daily   Generic drug:  Calcium-Vitamin D-Vitamin K                                cefPROZIL 500 MG tablet   Commonly known as:  CEFZIL   Take 1 tablet (500 mg) by mouth 2 times daily                                citalopram 40 MG tablet   Commonly known as:  celeXA   TAKE 1 TABLET BY MOUTH DAILY                                DICLOFENAC PO   Take 100 mg by mouth                                fish oil-omega-3 fatty acids 1000 MG capsule   Take 1 g by mouth daily.                                folic acid 1 MG tablet   Commonly known as:  FOLVITE   Take 1 mg by mouth 3 times daily.                                gabapentin 300 MG capsule   Commonly known as:  NEURONTIN   TAKE 3 CAPSULES BY MOUTH IN THE MORNING, 2 CAPSULES AT NOON AND 2 CAPSULES IN THE EVENING                                Garlic 400 MG Tbec                                HYDROcodone-acetaminophen 7.5-325 MG per tablet   Commonly known as:  NORCO   TAKE 1 TABLET BY MOUTH EVERY 4 TO 6 HOURS AS NEEDED FOR PAIN                                METHOTREXATE SODIUM (PF) IJ   Inject 0.8 mLs as directed once a week                                MILK THISTLE PO   Take 1,000 mg by mouth daily.                                MULTIPLE VITAMIN PO   Take  by mouth 2 times daily.                                pantoprazole 40 MG EC tablet   Commonly known as:  PROTONIX   Take 1 tablet (40 mg) by mouth daily                                PLAQUENIL 200 MG tablet   Take 200 mg by mouth 2 times daily.   Generic drug:   hydroxychloroquine                                REFRESH 1.4-0.6 % ophthalmic solution   1-2 drops as needed   Generic drug:  polyvinyl alcohol-povidone                                RESTASIS 0.05 % ophthalmic emulsion   USE 1 DROP IN BOTH EYES 2 TIMES A DAY   Generic drug:  cycloSPORINE                                SM GAS RELIEF ANTIFLATUENT 180 MG Caps   TAKE ONE CAPSULE BY MOUTH AS NEEDED AFTER A MEAL. MAX 2 CAPS/DAY   Generic drug:  Simethicone                                sucralfate 1 GM tablet   Commonly known as:  CARAFATE   Take by mouth daily                                traZODone 50 MG tablet   Commonly known as:  DESYREL   TAKE 2-3 TABLETS BY MOUTH DAILY AT BEDTIME                                Vitamin C 500 MG Caps   Take 2 tablets by mouth 2 times daily.                                vitamin D 1000 UNITS capsule   Take 1 capsule by mouth daily.                                vitamin E 400 UNIT capsule   Take 1 tablet by mouth daily

## 2017-04-24 NOTE — DISCHARGE INSTRUCTIONS
DISCHARGE INSTRUCTIONS FOR  BREAST BIOPSY    BREAST BIOPSY  A needle was used to locate the breast tissue. Tissue was removed to check the cells and be examined by a Pathologist. This will help your doctor plan any further treatment or follow-up. Your doctor will tell you the results of the tests in 3 to 5 days.    Follow these instructions:    Climb stairs slowly and use the hand rail. Avoid extra trips. No running or jumping.    No pushing, pulling or straining (vacuuming, shoveling, sweeping etc.)    You may shower tomorrow, pat the are dry around the tegaderm dressing.    Wear a bra at all times until your breast is fully healed.    Apply ice to the breast during the first few hours following the procedure to relieve swelling and bruising.    Steri strips stay in place for 7-10 days or until they fall off. Do not pull them off.    May remove pressure dressing after 24 hours.    Call your doctor if you have:    increased bleeding or drainage from your incision.    swelling, redness or opening of your incision.    foul smell from your incision or dressing.    red streaks from your incision.    chills or fever over 101 degrees (by mouth).    pain not helped by your pain medication.    trouble or pain with breathing.    any new problems or concerns.

## 2017-04-24 NOTE — PROGRESS NOTES
Patient here for stereotactic right breast biopsy.  Procedure explained by myself and by radiologist and all questions answered.  Consent signed.  Pt to biopsy room.  Time out done and biopsy performed.  Clip placed after biopsy.  Clip lot number [id number] .  Post clip mammogram performed.  Pressure held to site until bleeding completely stopped.  Steristrips and pressure dressing applied to site.  Ice pack with instructions on use given to patient.  Verbal and written discharge instructions given to patient.  Patient states understanding of all discharge instructions.  Pt was discharged to home in stable condition and without evidence of bleeding.  Franny MURPHY

## 2017-04-24 NOTE — IP AVS SNAPSHOT
[facility]    [address] [place] [address]    Phone:  [phone]    Fax:  [phone]                                       After Visit Summary   4/24/2017    Melvi Cleevland    MRN: [id number]           After Visit Summary Signature Page     I have received my discharge instructions, and my questions have been answered. I have discussed any challenges I see with this plan with the nurse or doctor.    ..........................................................................................................................................  Patient/Patient Representative Signature      ..........................................................................................................................................  Patient Representative Print Name and Relationship to Patient    ..................................................               ................................................  Date                                            Time    ..........................................................................................................................................  Reviewed by Signature/Title    ...................................................              ..............................................  Date                                                            Time

## 2017-04-25 ENCOUNTER — TELEPHONE (OUTPATIENT)
Dept: MAMMOGRAPHY | Facility: OTHER | Age: 67
End: 2017-04-25

## 2017-04-25 LAB — COPATH REPORT: NORMAL

## 2017-04-26 ENCOUNTER — TELEPHONE (OUTPATIENT)
Dept: SURGERY | Facility: OTHER | Age: 67
End: 2017-04-26

## 2017-04-26 NOTE — TELEPHONE ENCOUNTER
Dr. Amin gives the ok to tell patient her results are benign, but that she would like to see the patient in clinic to discuss results and follow up care.  Pt notified and told that Dr. Amin's office should be calling on Friday with a clinic date and time.

## 2017-05-09 ENCOUNTER — OFFICE VISIT (OUTPATIENT)
Dept: SURGERY | Facility: OTHER | Age: 67
End: 2017-05-09
Attending: SURGERY
Payer: COMMERCIAL

## 2017-05-09 VITALS
TEMPERATURE: 98.6 F | OXYGEN SATURATION: 97 % | HEART RATE: 81 BPM | WEIGHT: 210 LBS | HEIGHT: 66 IN | BODY MASS INDEX: 33.75 KG/M2 | SYSTOLIC BLOOD PRESSURE: 122 MMHG | DIASTOLIC BLOOD PRESSURE: 70 MMHG

## 2017-05-09 DIAGNOSIS — Z98.890 STATUS POST BREAST BIOPSY: Primary | ICD-10-CM

## 2017-05-09 DIAGNOSIS — N60.91 ATYPICAL DUCTAL HYPERPLASIA OF RIGHT BREAST: ICD-10-CM

## 2017-05-09 PROCEDURE — 99203 OFFICE O/P NEW LOW 30 MIN: CPT | Performed by: SURGERY

## 2017-05-09 PROCEDURE — 99212 OFFICE O/P EST SF 10 MIN: CPT

## 2017-05-09 RX ORDER — FLUTICASONE PROPIONATE 50 MCG
1 SPRAY, SUSPENSION (ML) NASAL
Status: ON HOLD | COMMUNITY
End: 2019-05-28

## 2017-05-09 RX ORDER — CITALOPRAM HYDROBROMIDE 40 MG/1
40 TABLET ORAL
COMMUNITY
End: 2017-09-29

## 2017-05-09 RX ORDER — DICLOFENAC SODIUM 75 MG/1
100 TABLET, DELAYED RELEASE ORAL
COMMUNITY
End: 2017-05-11

## 2017-05-09 RX ORDER — FEXOFENADINE HCL 60 MG/1
60 TABLET, FILM COATED ORAL
COMMUNITY
End: 2017-05-11

## 2017-05-09 ASSESSMENT — PAIN SCALES - GENERAL: PAINLEVEL: NO PAIN (0)

## 2017-05-09 NOTE — MR AVS SNAPSHOT
After Visit Summary   5/9/2017    Melvi Cleveland    MRN: [id number]           Patient Information     Date Of Birth          1950        Visit Information        Provider Department      5/9/2017 1:00 PM [facility]        Today's Diagnoses     Status post breast biopsy    -  1    Atypical ductal hyperplasia of right breast          Care Instructions    Thank you for allowing Dr. Amin and our surgical team to participate in your care.  If you have a scheduling or an appointment question please contact Savannah, our Health Unit Coordinator at her direct line [phone].   ALL nursing questions or concerns can be directed to your surgical nurse at: [phone]-Cara        Follow-ups after your visit        Who to contact     If you have questions or need follow up information about today's clinic visit or your schedule please [facility] at [phone].  Normal or non-critical lab and imaging results will be communicated to you by MyChart, letter or phone within 4 business days after the clinic has received the results. If you do not hear from us within 7 days, please contact the clinic through [employer] or phone. If you have a critical or abnormal lab result, we will notify you by phone as soon as possible.  Submit refill requests through [employer] or call your pharmacy and they will forward the refill request to us. Please allow 3 business days for your refill to be completed.          Additional Information About Your Visit        MyChart Information     [employer] gives you secure access to your electronic health record. If you see a primary care provider, you can also send messages to your care team and make appointments. If you have questions, please call your primary care clinic.  If you do not have a primary care provider, please call [phone] and they will assist you.        Care EveryWhere ID     This is your Care  "EveryWhere ID. This could be used by other organizations to access your [facility] medical records  [id number]        Your Vitals Were     Pulse Temperature Height Pulse Oximetry BMI (Body Mass Index)       81 98.6  F (37  C) (Tympanic) 5' 6\" (1.676 m) 97% 33.89 kg/m2        Blood Pressure from Last 3 Encounters:   05/09/17 122/70   01/25/17 118/64   01/17/17 116/74    Weight from Last 3 Encounters:   05/09/17 210 lb (95.3 kg)   01/25/17 210 lb (95.3 kg)   01/17/17 210 lb (95.3 kg)              Today, you had the following     No orders found for display         Today's Medication Changes          These changes are accurate as of: 5/9/17  2:17 PM.  If you have any questions, ask your nurse or doctor.               These medicines have changed or have updated prescriptions.        Dose/Directions    ALLEGRA-D ALLERGY & CONGESTION  MG per 12 hr tablet   This may have changed:  See the new instructions.   Used for:  Nasal congestion   Generic drug:  fexofenadine-pseudoePHEDrine        TAKE 1 TABLET BY MOUTH TWICE DAILY   Quantity:  30 tablet   Refills:  0         Stop taking these medicines if you haven't already. Please contact your care team if you have questions.     cefPROZIL 500 MG tablet   Commonly known as:  CEFZIL   Stopped by:  Jeni Amin MD                    Primary Care Provider Office Phone # Fax #    Jimbo Martinez [phone]       [address]  [place] [address]        Thank you!     Thank you for choosing [facility]  for your care. Our goal is always to provide you with excellent care. Hearing back from our patients is one way we can continue to improve our services. Please take a few minutes to complete the written survey that you may receive in the mail after your visit with us. Thank you!             Your Updated Medication List - Protect others around you: Learn how to safely use, store and throw away your medicines at " www.disposemymeds.org.          This list is accurate as of: 5/9/17  2:17 PM.  Always use your most recent med list.                   Brand Name Dispense Instructions for use    albuterol 108 (90 BASE) MCG/ACT Inhaler   Generic drug:  albuterol     8.5 g    INHALE 2 PUFFS INTO THE LUNGS EVERY 6 HOURS AS NEEDED FOR SHORTNESS OF BREATH OR WHEEZING       ALFALFA PO      Take 5 tablets by mouth 2 times daily.       ALLEGRA-D ALLERGY & CONGESTION  MG per 12 hr tablet   Generic drug:  fexofenadine-pseudoePHEDrine     30 tablet    TAKE 1 TABLET BY MOUTH TWICE DAILY       CALCIUM + D 500-1000-40 MG-UNT-MCG Chew   Generic drug:  Calcium-Vitamin D-Vitamin K      Take 3 tablets by mouth daily       citalopram 40 MG tablet    celeXA     Take 40 mg by mouth       diclofenac 75 MG EC tablet    VOLTAREN     Take 100 mg by mouth       DICLOFENAC PO      Take 100 mg by mouth       fexofenadine 60 MG tablet    ALLEGRA     Take 60 mg by mouth       fish oil-omega-3 fatty acids 1000 MG capsule      Take 1 g by mouth daily.       fluticasone 50 MCG/ACT spray    FLONASE     1 spray       folic acid 1 MG tablet    FOLVITE     Take 1 mg by mouth 3 times daily.       gabapentin 300 MG capsule    NEURONTIN    210 capsule    TAKE 3 CAPSULES BY MOUTH IN THE MORNING, 2 CAPSULES AT NOON AND 2 CAPSULES IN THE EVENING       Garlic 400 MG Tbec          HYDROcodone-acetaminophen 7.5-325 MG per tablet    NORCO    60 tablet    TAKE 1 TABLET BY MOUTH EVERY 4 TO 6 HOURS AS NEEDED FOR PAIN       ketotifen 0.025 % Soln ophthalmic solution    ZADITOR/REFRESH ANTI-ITCH     1 drop       METHOTREXATE SODIUM (PF) IJ      Inject 0.8 mLs as directed once a week       MILK THISTLE PO      Take 1,000 mg by mouth daily.       MULTIPLE VITAMIN PO      Take  by mouth 2 times daily.       pantoprazole 40 MG EC tablet    PROTONIX    90 tablet    Take 1 tablet (40 mg) by mouth daily       PLAQUENIL 200 MG tablet   Generic drug:  hydroxychloroquine      Take 200  mg by mouth 2 times daily.       REFRESH 1.4-0.6 % ophthalmic solution   Generic drug:  polyvinyl alcohol-povidone      1-2 drops as needed       RESTASIS 0.05 % ophthalmic emulsion   Generic drug:  cycloSPORINE     60 each    USE 1 DROP IN BOTH EYES 2 TIMES A DAY       SM GAS RELIEF ANTIFLATUENT 180 MG Caps   Generic drug:  Simethicone     60 capsule    TAKE ONE CAPSULE BY MOUTH AS NEEDED AFTER A MEAL. MAX 2 CAPS/DAY       sucralfate 1 GM tablet    CARAFATE     Take by mouth daily       traZODone 50 MG tablet    DESYREL    270 tablet    TAKE 2-3 TABLETS BY MOUTH DAILY AT BEDTIME       Vitamin C 500 MG Caps      Take 2 tablets by mouth 2 times daily.       vitamin D 1000 UNITS capsule      Take 1 capsule by mouth daily.

## 2017-05-09 NOTE — PATIENT INSTRUCTIONS
Thank you for allowing Dr. Amin and our surgical team to participate in your care.  If you have a scheduling or an appointment question please contact Savannah, our Health Unit Coordinator at her direct line [phone].   ALL nursing questions or concerns can be directed to your surgical nurse at: [phone]

## 2017-05-09 NOTE — PROGRESS NOTES
[facility] Surgery Consultation    CC:  Atypical ductal hyperplasia, right breast    HPI:  This 67 year old year old female is seen at the request of Dr. Martinez for evaluation and recommendations regarding stereotactic biopsy proven atypical ductal hyperplasia, right breast (4-24-17).  She performs self examination and has noted no change.  She denies change in appearance of the breasts, nipple inversion or discharge.  She has palpated the areas of concern in the left breast; the first biopsy was 40 years ago.  She was told the findings were benign fibrocystic change without atypia.     RISK FACTORS FOR BREAST NEOPLASIA:  Her menarche was at age 14 and menopause at 52.  She has one child age 45 and did not breast feed.  There is no family history of breast ovary pancreas or colon malignancy.   She has had one stereotactic biopsy on the left and three open biopsies on the left with benign findings.    HABITS:    Social History   Substance Use Topics     Smoking status: Former Smoker     Types: Cigarettes     Quit date: 3/28/1997     Smokeless tobacco: Never Used      Comment: no passive exposure     Alcohol use No      Comment: former. quit 1984       Family History   Problem Relation Age of Onset     DIABETES Mother      Rheumatoid Arthritis Mother      Other - See Comments Mother      fibromyalgia     Osteoarthritis Mother      Thyroid Disease Mother      thyroid disorder     Unknown/Adopted Father      cause of death unknown     Rheumatoid Arthritis Father      DIABETES Sister      Myocardial Infarction Sister      cause of death     Lupus Sister      cause of death     C.A.D. Maternal Grandmother      CEREBROVASCULAR DISEASE Maternal Grandmother      DIABETES Maternal Grandmother      Thyroid Disease Maternal Grandmother      thyrodi disorder; goitor removed     CANCER Maternal Grandfather      Hypertension Brother      Other - See Comments Brother      sleep apnea     Other - See Comments Sister      sleep  "apnea       REVIEW OF SYSTEMS:  Ten point review of systems negative except those mentioned in the HPI.     OBJECTIVE:    /70 (BP Location: Left arm, Patient Position: Chair, Cuff Size: Adult Regular)  Pulse 81  Temp 98.6  F (37  C) (Tympanic)  Ht 5' 6\" (1.676 m)  Wt 210 lb (95.3 kg)  SpO2 97%  BMI 33.89 kg/m2    GENERAL: Generally appears well, in no distress with appropriate affect.  HEENT:   Sclerae anicteric - No cervical, supra/infraclavciular lymphadenopathy, no thyroid masses  Respiratory:  Lungs clear to ausculation bilaterally with good air excursion  Cardiovascular:  Regular Rate and Rhythm with no murmurs gallops or rubs, normal   Breast Examination:  In the seated position with the arms elevated, there is no contour distortion, skin change or nipple abnormality.    In the supine position, the right breast shows a homogeneous parenchyma.  Focused examination in all quadrants shows no suggestion of mass, density, region of tenderness or palpable abnormality.  There is no palpable correlate in the region of mammographic suspicion and biopsy proven ADH although post biopsy inflammatory change persists and reduces sensitivity of exam..  The subareolar region, axillary tail and right axilla are similarly without finding.    The left breast shows a similar homogeneous parenchyma devoid of abnormality.   Focused examination in all quadrants shows no region of discrete or suspicious mass, asymmetric density or tenderness.  The subareolar region, axillary tail and left axilla are without finding.      :  deferred  Extremities:  Extremities normal. No deformities, edema, or skin discoloration.  Skin:  no suspicious lesions or rashes  Neurological: grossly intact    Psych:  Alert, oriented, affect appropriate with normal decision making ability.    IMPRESSION:  ADH, right breast - wire localized excision indicated.    The nuances of breast disease were discussed with disease with an exhaustive " "discussion defining the continuum of breast processes from atypical hyperplasia through ductal carcinoma in situ to invasive malignancy.  The association of microcalcifications with DCIS (~20%) was reviewed along with management options.  The need of wire localized biopsy for atypical ductal hyperplasia was discussed with this medical decision making related to complete histopathologic evaluation of the region in question.  Segmental resection \"lumpectomy\" with post operative radiation along with definitive mastectomy in treatment of DCIS with survival outlined.  The technique of wire localized excision was described in detail with risk/benefit and technical aspects outlined along with recuperative and cosmetic expectations reviewed.    PLAN:  Wire localized excision, ADH right breast.  General anesthesia will be required.  She has asthma that is associated with environmental allergies.  Scheduling will be based on patients choice with resolution of post biopsy change required.    Jeni Amin MD, FACS    5/9/2017  1:50 PM    cc:  Dr. Martinez  "

## 2017-05-09 NOTE — NURSING NOTE
"Chief Complaint   Patient presents with     RECHECK     Follow up-Stereotactic right breast biopsy 4/24/17.         Initial /70 (BP Location: Left arm, Patient Position: Chair, Cuff Size: Adult Regular)  Pulse 81  Temp 98.6  F (37  C) (Tympanic)  Ht 5' 6\" (1.676 m)  Wt 210 lb (95.3 kg)  SpO2 97%  BMI 33.89 kg/m2 Estimated body mass index is 33.89 kg/(m^2) as calculated from the following:    Height as of this encounter: 5' 6\" (1.676 m).    Weight as of this encounter: 210 lb (95.3 kg).  Medication Reconciliation: complete   JIGAR BARBOSA      "

## 2017-05-11 ENCOUNTER — OFFICE VISIT (OUTPATIENT)
Dept: FAMILY MEDICINE | Facility: OTHER | Age: 67
End: 2017-05-11
Attending: FAMILY MEDICINE
Payer: COMMERCIAL

## 2017-05-11 VITALS
WEIGHT: 206.2 LBS | SYSTOLIC BLOOD PRESSURE: 112 MMHG | DIASTOLIC BLOOD PRESSURE: 78 MMHG | HEART RATE: 72 BPM | BODY MASS INDEX: 33.28 KG/M2 | TEMPERATURE: 97.6 F | OXYGEN SATURATION: 94 %

## 2017-05-11 DIAGNOSIS — H57.13 EYE PAIN, BILATERAL: Primary | ICD-10-CM

## 2017-05-11 PROCEDURE — 99212 OFFICE O/P EST SF 10 MIN: CPT

## 2017-05-11 PROCEDURE — 99213 OFFICE O/P EST LOW 20 MIN: CPT | Performed by: FAMILY MEDICINE

## 2017-05-11 RX ORDER — DIMENHYDRINATE 50 MG
2 TABLET ORAL DAILY
COMMUNITY

## 2017-05-11 ASSESSMENT — PAIN SCALES - GENERAL: PAINLEVEL: NO PAIN (0)

## 2017-05-11 NOTE — MR AVS SNAPSHOT
After Visit Summary   5/11/2017    Melvi Cleveland    MRN: [id number]           Patient Information     Date Of Birth          1950        Visit Information        Provider Department      5/11/2017 2:45 PM Aishwarya Harrison MD [facility]        Today's Diagnoses     Eye pain, bilateral    -  1      Care Instructions    Continue allergy medications and eye drops.  Continue cool compresses and anti-inflammatory.  Recommend eye exam - slit lamp - to rule out other causes, such as uveitis.        Follow-ups after your visit        Your next 10 appointments already scheduled     Jun 07, 2017  9:00 AM CDT   (Arrive by 8:45 AM)   Return Visit with Jeni Amin MD   [facility] ([facility] [place] [facility])    [address] [address]   [phone]            Jun 07, 2017  9:40 AM CDT   (Arrive by 9:25 AM)   Pre-Op physical with Jimbo Martinez MD   [facility] ([facility] [place] [facility])    [address]  [place] [address]   [phone]              Who to contact     If you have questions or need follow up information about today's clinic visit or your schedule please contact [facility] directly at [phone].  Normal or non-critical lab and imaging results will be communicated to you by MyChart, letter or phone within 4 business days after the clinic has received the results. If you do not hear from us within 7 days, please contact the clinic through [facility] or phone. If you have a critical or abnormal lab result, we will notify you by phone as soon as possible.  Submit refill requests through [employer] or call your pharmacy and they will forward the refill request to us. Please allow 3 business days for your refill to be completed.          Additional Information About Your Visit        [employer] Information     [facility] gives you secure access to your electronic health record. If you see a primary care provider, you can also send messages  to your care team and make appointments. If you have questions, please call your primary care clinic.  If you do not have a primary care provider, please call [phone] and they will assist you.        Care EveryWhere ID     This is your Care EveryWhere ID. This could be used by other organizations to access your [facility] medical records  [id number]        Your Vitals Were     Pulse Temperature Pulse Oximetry BMI (Body Mass Index)          72 97.6  F (36.4  C) 94% 33.28 kg/m2         Blood Pressure from Last 3 Encounters:   05/11/17 112/78   05/09/17 122/70   01/25/17 118/64    Weight from Last 3 Encounters:   05/11/17 206 lb 3.2 oz (93.5 kg)   05/09/17 210 lb (95.3 kg)   01/25/17 210 lb (95.3 kg)              Today, you had the following     No orders found for display         Today's Medication Changes          These changes are accurate as of: 5/11/17  3:03 PM.  If you have any questions, ask your nurse or doctor.               These medicines have changed or have updated prescriptions.        Dose/Directions    ALLEGRA-D ALLERGY & CONGESTION  MG per 12 hr tablet   This may have changed:  See the new instructions.   Used for:  Nasal congestion   Generic drug:  fexofenadine-pseudoePHEDrine        TAKE 1 TABLET BY MOUTH TWICE DAILY   Quantity:  30 tablet   Refills:  0       pantoprazole 40 MG EC tablet   Commonly known as:  PROTONIX   This may have changed:  when to take this   Used for:  Diagnosis unknown        Dose:  40 mg   Take 1 tablet (40 mg) by mouth daily   Quantity:  90 tablet   Refills:  3         Stop taking these medicines if you haven't already. Please contact your care team if you have questions.     diclofenac 75 MG EC tablet   Commonly known as:  VOLTAREN   Stopped by:  Aishwarya Harrison MD           fexofenadine 60 MG tablet   Commonly known as:  ALLEGRA   Stopped by:  Aishwarya Harrison MD           sucralfate 1 GM tablet   Commonly known as:  CARAFATE   Stopped by:  Hunter  Aishwarya BARBOSA MD                    Primary Care Provider Office Phone # Fax #    Jimbo Martinez [phone]       [facility] [address] [place] [address]        Thank you!     Thank you for choosing [facility]  for your care. Our goal is always to provide you with excellent care. Hearing back from our patients is one way we can continue to improve our services. Please take a few minutes to complete the written survey that you may receive in the mail after your visit with us. Thank you!             Your Updated Medication List - Protect others around you: Learn how to safely use, store and throw away your medicines at www.disposemymeds.org.          This list is accurate as of: 5/11/17  3:03 PM.  Always use your most recent med list.                   Brand Name Dispense Instructions for use    albuterol 108 (90 BASE) MCG/ACT Inhaler   Generic drug:  albuterol     8.5 g    INHALE 2 PUFFS INTO THE LUNGS EVERY 6 HOURS AS NEEDED FOR SHORTNESS OF BREATH OR WHEEZING       ALFALFA PO      Take 5 tablets by mouth 2 times daily.       ALLEGRA-D ALLERGY & CONGESTION  MG per 12 hr tablet   Generic drug:  fexofenadine-pseudoePHEDrine     30 tablet    TAKE 1 TABLET BY MOUTH TWICE DAILY       CALCIUM + D 500-1000-40 MG-UNT-MCG Chew   Generic drug:  Calcium-Vitamin D-Vitamin K      Take 3 tablets by mouth daily       citalopram 40 MG tablet    celeXA     Take 40 mg by mouth       DICLOFENAC PO      Take 100 mg by mouth daily       fish oil-omega-3 fatty acids 1000 MG capsule      Take 1 g by mouth daily.       flax seed oil 1000 MG capsule      Take 2 capsules by mouth daily       fluticasone 50 MCG/ACT spray    FLONASE     1 spray Reported on 5/11/2017       folic acid 1 MG tablet    FOLVITE     Take 1 mg by mouth 3 times daily.       gabapentin 300 MG capsule    NEURONTIN    210 capsule    TAKE 3 CAPSULES BY MOUTH IN THE MORNING, 2 CAPSULES AT NOON AND 2 CAPSULES IN THE EVENING        Garlic 400 MG Tbec          HYDROcodone-acetaminophen 7.5-325 MG per tablet    NORCO    60 tablet    TAKE 1 TABLET BY MOUTH EVERY 4 TO 6 HOURS AS NEEDED FOR PAIN       ketotifen 0.025 % Soln ophthalmic solution    ZADITOR/REFRESH ANTI-ITCH     1 drop       METHOTREXATE SODIUM (PF) IJ      Inject 0.8 mLs as directed once a week       MILK THISTLE PO      Take 1,000 mg by mouth daily.       MULTIPLE VITAMIN PO      Take  by mouth 2 times daily.       pantoprazole 40 MG EC tablet    PROTONIX    90 tablet    Take 1 tablet (40 mg) by mouth daily       PLAQUENIL 200 MG tablet   Generic drug:  hydroxychloroquine      Take 200 mg by mouth 2 times daily.       REFRESH 1.4-0.6 % ophthalmic solution   Generic drug:  polyvinyl alcohol-povidone      1-2 drops as needed       RESTASIS 0.05 % ophthalmic emulsion   Generic drug:  cycloSPORINE     60 each    USE 1 DROP IN BOTH EYES 2 TIMES A DAY       SM GAS RELIEF ANTIFLATUENT 180 MG Caps   Generic drug:  Simethicone     60 capsule    TAKE ONE CAPSULE BY MOUTH AS NEEDED AFTER A MEAL. MAX 2 CAPS/DAY       traZODone 50 MG tablet    DESYREL    270 tablet    TAKE 2-3 TABLETS BY MOUTH DAILY AT BEDTIME       Vitamin C 500 MG Caps      Take 2 tablets by mouth 2 times daily.       vitamin D 1000 UNITS capsule      Take 1 capsule by mouth daily.

## 2017-05-11 NOTE — PATIENT INSTRUCTIONS
Continue allergy medications and eye drops.  Continue cool compresses and anti-inflammatory.  Recommend eye exam - slit lamp - to rule out other causes, such as uveitis.

## 2017-05-11 NOTE — NURSING NOTE
"Chief Complaint   Patient presents with     Allergies     states that both of her eyes have been burning since candle sale a week ago.  Has been using 3 different types of eye drops without relief, sinus congestion noted as well.       Initial /78  Pulse 72  Temp 97.6  F (36.4  C)  Wt 206 lb 3.2 oz (93.5 kg)  SpO2 94%  BMI 33.28 kg/m2 Estimated body mass index is 33.28 kg/(m^2) as calculated from the following:    Height as of 5/9/17: 5' 6\" (1.676 m).    Weight as of this encounter: 206 lb 3.2 oz (93.5 kg).  Medication Reconciliation: complete     Shantell Tejeda      "

## 2017-05-12 NOTE — PROGRESS NOTES
SUBJECTIVE:  Melvi is a 67 year old female who comes in today for eye concerns over past week.  Relates that it started after volunteering at a tian with scented candles, essential oils.  Symptoms worse in the evening, burning sensation.  Some light sensitivity.  No discharge, watering, mattering.  Had eye exam 1 month ago for new glasses.  Is on chronic allergy medications - Allegra D daily, Flonase, and has tried 3 different eye drops - Refresh, Alaway, Restasis.  She has been helping out at a home that has some mold issues, other dust, etc.  She does have rheumatoid arthritis.  She is on Plaquenil.    Current Outpatient Prescriptions   Medication     Flaxseed, Linseed, (FLAX SEED OIL) 1000 MG capsule     ketotifen (ZADITOR/REFRESH ANTI-ITCH) 0.025 % SOLN ophthalmic solution     citalopram (CELEXA) 40 MG tablet     ALLEGRA-D ALLERGY & CONGESTION  MG per 12 hr tablet     gabapentin (NEURONTIN) 300 MG capsule     traZODone (DESYREL) 50 MG tablet     HYDROcodone-acetaminophen (NORCO) 7.5-325 MG per tablet     RESTASIS 0.05 % ophthalmic emulsion     ALBUTEROL 108 (90 BASE) MCG/ACT inhaler     SM GAS RELIEF ANTIFLATUENT 180 MG CAPS     pantoprazole (PROTONIX) 40 MG enteric coated tablet     DICLOFENAC PO     Garlic 400 MG TBEC     polyvinyl alcohol-povidone (REFRESH) 1.4-0.6 % ophthalmic solution     hydroxychloroquine (PLAQUENIL) 200 MG tablet     folic acid (FOLVITE) 1 MG tablet     METHOTREXATE SODIUM, PF, IJ     MULTIPLE VITAMIN PO     Ascorbic Acid (VITAMIN C) 500 MG CAPS     Calcium-Vitamin D-Vitamin K (CALCIUM + D) 500-1000-40 MG-UNT-MCG CHEW     ALFALFA PO     MILK THISTLE PO     fish oil-omega-3 fatty acids (OMEGA-3) 1000 MG capsule     Cholecalciferol (VITAMIN D) 1000 UNITS capsule     fluticasone (FLONASE) 50 MCG/ACT spray     No current facility-administered medications for this visit.         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     Parotiditis 5/9/2011     Peripheral Neuriopathy 3/1/2011     Rheumatoid arthritis(714.0) 12/13/1999     Seborrhea capitis 10/6/2011     Sjogrens Syndrome 3/1/2011     Urethral stricture 4/30/2008     Past Surgical History:   Procedure Laterality Date     BACK SURGERY  05/1995    back surgery     BIOPSY      breast bx X2     BIOPSY BREAST      LT     CHOLECYSTECTOMY  2004     COLONOSCOPY  11/15/2004    screening     COLONOSCOPY  9/24/2014     EGD with biopsy  2010, 2011    GERD     laminectomy      L4 L5 laminectomy; back pain     Social History     Social History     Marital status:      Spouse name: N/A     Number of children: N/A     Years of education: N/A     Occupational History      [employer]     part time     Social History Main Topics     Smoking status: Former Smoker     Types: Cigarettes     Quit date: 3/28/1997     Smokeless tobacco: Never Used      Comment: no passive exposure     Alcohol use No      Comment: former. quit 1984     Drug use: No     Sexual activity: Not on file     Other Topics Concern     Parent/Sibling W/ Cabg, Mi Or Angioplasty Before 65f 55m? Yes     Blood Transfusions Yes     Caffeine Concern Yes     chocolate rare     Seat Belt Yes     Social History Narrative       ROS:  General: negative for, fever, chills  Skin: negative for, rash  Eyes: positive for as above, eye pain, photophobia  ENT: positive for history of chronic sinusitis, but no active congestion, sore throat  Resp: No shortness of breath and No cough  CV: negative for and chest pain  Musculoskeletal: positive for  arthritis    OBJECTIVE:  Vitals:    05/11/17 1439   BP: 112/78   Pulse: 72   Temp: 97.6  F (36.4  C)   SpO2: 94%   Weight: 206 lb 3.2 oz (93.5 kg)     GENERAL APPEARANCE: healthy, alert and no distress  EYES: Eyes grossly normal to inspection and no significant edema, redness or rash; PERRL, EOMI; fundoscopic exam unremarkable  HENT: ear canals and TM's normal and nose and mouth without ulcers or lesions  NECK: no adenopathy, no asymmetry, masses, or scars and thyroid normal to palpation  RESP: lungs clear to auscultation - no rales, rhonchi or wheezes  CV: regular rates and rhythm, normal S1 S2, no S3 or S4 and no murmur, click or rub -  SKIN: no suspicious lesions or rashes  PSYCH: mentation appears normal. and affect normal/bright    ASSESSMENT/ORDERS:    ICD-10-CM    1. Eye pain, bilateral H57.13      PLAN:  Unclear etiology.  She is certainly covered with majority of treatment options at this time, including her NSAIDs, eye lubrication, allergy treatments.    Given history of RA, some concern for other condition, such as uveitis.  Recommend an eye exam with ophthalmology - slit lamp.  She will call Dr. Parra's office today.    Patient Instructions   Continue allergy medications and eye drops.  Continue cool compresses and anti-inflammatory.  Recommend eye exam - slit lamp - to rule out other causes, such as uveitis.        Aishwarya Greco

## 2017-05-21 RX ORDER — CEFAZOLIN SODIUM 2 G/100ML
2 INJECTION, SOLUTION INTRAVENOUS
Status: CANCELLED | OUTPATIENT
Start: 2017-05-21

## 2017-05-30 DIAGNOSIS — N60.91 ATYPICAL DUCTAL HYPERPLASIA OF RIGHT BREAST: Primary | ICD-10-CM

## 2017-05-31 ENCOUNTER — OFFICE VISIT (OUTPATIENT)
Dept: SURGERY | Facility: OTHER | Age: 67
End: 2017-05-31
Attending: SURGERY
Payer: COMMERCIAL

## 2017-05-31 VITALS
HEART RATE: 85 BPM | WEIGHT: 210 LBS | TEMPERATURE: 98.1 F | BODY MASS INDEX: 33.75 KG/M2 | HEIGHT: 66 IN | DIASTOLIC BLOOD PRESSURE: 64 MMHG | SYSTOLIC BLOOD PRESSURE: 122 MMHG | OXYGEN SATURATION: 93 %

## 2017-05-31 DIAGNOSIS — R09.81 NASAL CONGESTION: ICD-10-CM

## 2017-05-31 DIAGNOSIS — N60.91 ATYPICAL DUCTAL HYPERPLASIA OF RIGHT BREAST: Primary | ICD-10-CM

## 2017-05-31 PROCEDURE — 99214 OFFICE O/P EST MOD 30 MIN: CPT | Performed by: SURGERY

## 2017-05-31 RX ORDER — CEFAZOLIN SODIUM 2 G/100ML
2 INJECTION, SOLUTION INTRAVENOUS
Status: CANCELLED | OUTPATIENT
Start: 2017-05-31

## 2017-05-31 ASSESSMENT — PAIN SCALES - GENERAL: PAINLEVEL: NO PAIN (0)

## 2017-05-31 NOTE — PROGRESS NOTES
[facility] Surgery Preop    CC:  ADH, Right breast    HPI:  This 67 year old year old female returns in preparation for wire localized excision of ADH, central right breast.  An area of clustered microcalcifications was noted on screening mammography.  Stereotactic biopsy revealed ADH and excision is indicated.  Risk/benefits, cosmetic and recuperative expectations reviewed.  She voices understanding and is prepared to proceed.    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     Parotiditis 5/9/2011     Peripheral Neuriopathy 3/1/2011     Rheumatoid arthritis(714.0) 12/13/1999     Seborrhea capitis 10/6/2011     Sjogrens Syndrome 3/1/2011     Urethral stricture 4/30/2008     Past Surgical History:   Procedure Laterality Date     BACK SURGERY  05/1995    back surgery     BIOPSY      breast bx X2     BIOPSY BREAST      LT     CHOLECYSTECTOMY  2004     COLONOSCOPY  11/15/2004    screening     COLONOSCOPY  9/24/2014     EGD with biopsy  2010, 2011    GERD     laminectomy      L4 L5 laminectomy; back pain     Current Outpatient Prescriptions   Medication     Probiotic Product (SOLUBLE FIBER/PROBIOTICS PO)     Probiotic Product (SOLUBLE FIBER/PROBIOTICS PO)     Flaxseed, Linseed, (FLAX SEED OIL) 1000 MG capsule     fluticasone (FLONASE) 50 MCG/ACT spray     ketotifen (ZADITOR/REFRESH ANTI-ITCH) 0.025 % SOLN ophthalmic solution     citalopram (CELEXA) 40 MG tablet     ALLEGRA-D ALLERGY & CONGESTION  MG per 12 hr tablet     gabapentin (NEURONTIN) 300 MG capsule     traZODone (DESYREL) 50 MG tablet     HYDROcodone-acetaminophen (NORCO) 7.5-325 MG per tablet     RESTASIS 0.05 % ophthalmic emulsion      ALBUTEROL 108 (90 BASE) MCG/ACT inhaler     SM GAS RELIEF ANTIFLATUENT 180 MG CAPS     pantoprazole (PROTONIX) 40 MG enteric coated tablet     DICLOFENAC PO     Garlic 400 MG TBEC     polyvinyl alcohol-povidone (REFRESH) 1.4-0.6 % ophthalmic solution     hydroxychloroquine (PLAQUENIL) 200 MG tablet     folic acid (FOLVITE) 1 MG tablet     METHOTREXATE SODIUM, PF, IJ     MULTIPLE VITAMIN PO     Ascorbic Acid (VITAMIN C) 500 MG CAPS     Calcium-Vitamin D-Vitamin K (CALCIUM + D) 500-1000-40 MG-UNT-MCG CHEW     ALFALFA PO     MILK THISTLE PO     fish oil-omega-3 fatty acids (OMEGA-3) 1000 MG capsule     Cholecalciferol (VITAMIN D) 1000 UNITS capsule     No current facility-administered medications for this visit.      Allergies   Allergen Reactions     Adhesive Tape      Benzoin Compound      Tin-Co-Javier     Seasonal Allergies      HABITS:    Social History   Substance Use Topics     Smoking status: Former Smoker     Types: Cigarettes     Quit date: 3/28/1997     Smokeless tobacco: Never Used      Comment: no passive exposure     Alcohol use No      Comment: former. quit 1984       Family History   Problem Relation Age of Onset     DIABETES Mother      Rheumatoid Arthritis Mother      Other - See Comments Mother      fibromyalgia     Osteoarthritis Mother      Thyroid Disease Mother      thyroid disorder     Unknown/Adopted Father      cause of death unknown     Rheumatoid Arthritis Father      DIABETES Sister      Myocardial Infarction Sister      cause of death     Lupus Sister      cause of death     C.A.D. Maternal Grandmother      CEREBROVASCULAR DISEASE Maternal Grandmother      DIABETES Maternal Grandmother      Thyroid Disease Maternal Grandmother      thyrodi disorder; goitor removed     CANCER Maternal Grandfather      Hypertension Brother      Other - See Comments Brother      sleep apnea     Other - See Comments Sister      sleep apnea       REVIEW OF SYSTEMS:  Ten point review of systems negative except  "those mentioned in the HPI.     OBJECTIVE:  /64 (BP Location: Right arm, Patient Position: Chair, Cuff Size: Adult Regular)  Pulse 85  Temp 98.1  F (36.7  C) (Tympanic)  Ht 5' 6\" (1.676 m)  Wt 210 lb (95.3 kg)  SpO2 93%  BMI 33.89 kg/m2    GENERAL: Generally appears well, in no distress with appropriate affect.  HEENT:   Sclerae anicteric - No cervical, supra/infraclavciular lymphadenopathy, no thyroid masses  Respiratory:  Lungs clear to ausculation bilaterally with good air excursion  Cardiovascular:  Regular Rate and Rhythm with no murmurs gallops or rubs, normal   Right breast - healed biopsy site superior region - lesion central.  Griselda-areolar incision planned.  :  deferred  Extremities:  Extremities normal. No deformities, edema, or skin discoloration.  Skin:  no suspicious lesions or rashes  Neurological: grossly intact    Psych:  Alert, oriented, affect appropriate with normal decision making ability.    IMPRESSION:  ADH, central right breast for excision, wire localized.       PLAN:  Will proceed.    Jeni Amin MD, FACS    5/31/2017  1:34 PM    cc:  Dr. Martinez  "

## 2017-05-31 NOTE — PATIENT INSTRUCTIONS
Thank you for allowing Dr. Amin and our surgical team to participate in your care.  If you have a scheduling or an appointment question please contact our Health Unit Coordinator, Savannah,  at her direct line [phone].   ALL nursing questions or concerns can be directed to your surgical nurse at: [phone] -Cara    You are scheduled for a: Wire localized excision right breast mass  Your procedure date is: June 12, 2017    Pre op - Dr Martinez June 7, 2017   Arrival time is 9:25am    Nothing to eat or drink after midnight on Sunday, June 11, 2017.    HOW TO PREPARE-            You need a friend or family member available to drive you home AND stay with you for 24 hours after you leave the hospital. You will not be allowed to drive yourself. IF you need to take a taxi or the bus you MUST have a responsible person to ride with you. YOUR PROCEDURE WILL BE CANCELLED IF YOU DO NOT HAVE A RESPONSIBLE ADULT TO DRIVE YOU HOME.       You CANNOT have anything to eat or drink after midnight the night before your surgery, ncluding water and coffee. Your stomach needs to be completely empty. Do NOT chew gum, suck on hard candy, or smoke. You can brush your teeth the morning of surgery.       You need to call our Surgery Education Nurses 1-2 weeks prior to your surgery date at  [phone] or toll free [phone]. Please have you medication and allergy lists ready.      Stop your aspirin or other NSAIDs(Ibuprofen, Motrin, Aleve, Celebrex, Naproxen, etc...) 7 days before your surgery.      Hospital admitting will call you the day before your surgery with your arrival time. If you are scheduled on a Monday admitting will call you the Friday before.      Please call your primary care physician if you should become ill within 24 hours of scheduled surgery. (ex.vomiting, diarrhea, fever)          You will need to wash the night before AND the morning of you procedure with the supplied Hibiclens. Wash your Surgical area  with your bare hands, apply friction and rinse. KEEP IT AWAY FROM YOUR EYES, EARS, NOSE AND MOUTH.

## 2017-05-31 NOTE — NURSING NOTE
"Chief Complaint   Patient presents with     RECHECK     Patient here to update her consent for wire localized excision right breast mass scheduled for June 12, 2017.  Pre-op with Dr Martinez is scheduled for 6-7-17.       Initial /64 (BP Location: Right arm, Patient Position: Chair, Cuff Size: Adult Regular)  Pulse 85  Temp 98.1  F (36.7  C) (Tympanic)  Ht 5' 6\" (1.676 m)  Wt 210 lb (95.3 kg)  SpO2 93%  BMI 33.89 kg/m2 Estimated body mass index is 33.89 kg/(m^2) as calculated from the following:    Height as of this encounter: 5' 6\" (1.676 m).    Weight as of this encounter: 210 lb (95.3 kg).  Medication Reconciliation: complete   JIGAR BARBOSA      "

## 2017-05-31 NOTE — MR AVS SNAPSHOT
After Visit Summary   5/31/2017    Melvi Cleveland    MRN: [id number]           Patient Information     Date Of Birth          1950        Visit Information        Provider Department      5/31/2017 1:30 PM Jeni Amin MD [facility]        Today's Diagnoses     Atypical ductal hyperplasia of right breast    -  1      Care Instructions    Thank you for allowing Dr. Amin and our surgical team to participate in your care.  If you have a scheduling or an appointment question please contact our Health Unit Coordinator, Savannah,  at her direct line [phone].   ALL nursing questions or concerns can be directed to your surgical nurse at: [phone] -Cara    You are scheduled for a: Wire localized excision right breast mass  Your procedure date is: June 12, 2017    Pre op - Dr Martinez June 7, 2017   Arrival time is 9:25am    Nothing to eat or drink after midnight on Sunday, June 11, 2017.    HOW TO PREPARE-            You need a friend or family member available to drive you home AND stay with you for 24 hours after you leave the hospital. You will not be allowed to drive yourself. IF you need to take a taxi or the bus you MUST have a responsible person to ride with you. YOUR PROCEDURE WILL BE CANCELLED IF YOU DO NOT HAVE A RESPONSIBLE ADULT TO DRIVE YOU HOME.       You CANNOT have anything to eat or drink after midnight the night before your surgery, ncluding water and coffee. Your stomach needs to be completely empty. Do NOT chew gum, suck on hard candy, or smoke. You can brush your teeth the morning of surgery.       You need to call our Surgery Education Nurses 1-2 weeks prior to your surgery date at  [phone] or toll free [phone]. Please have you medication and allergy lists ready.      Stop your aspirin or other NSAIDs(Ibuprofen, Motrin, Aleve, Celebrex, Naproxen, etc...) 7 days before your surgery.      Hospital admitting will call you the day before your  surgery with your arrival time. If you are scheduled on a Monday admitting will call you the Friday before.      Please call your primary care physician if you should become ill within 24 hours of scheduled surgery. (ex.vomiting, diarrhea, fever)          You will need to wash the night before AND the morning of you procedure with the supplied Hibiclens. Wash your Surgical area with your bare hands, apply friction and rinse. KEEP IT AWAY FROM YOUR EYES, EARS, NOSE AND MOUTH.             Follow-ups after your visit        Your next 10 appointments already scheduled     Jun 07, 2017  9:40 AM CDT   (Arrive by 9:25 AM)   Pre-Op physical with Jimbo Martinez MD   [facility] ([facility])    [address] [address]   [phone]            Jun 12, 2017  8:00 AM CDT   Radiology with Need Interventional Radiologist   [facility] ([facility] )    [address] [address]   [phone]            Jun 12, 2017  8:30 AM CDT   MAMMOGRAM with [facility] STEROEOTACTIC BREAST BIOPSY   [facility] ([facility] )    [address] [address]   [phone]           Do not wear any body powder, lotions, deodorant or perfume the day of the exam. Bring a list of all medications, especially hormones.  If your last mammogram was not done at [facility], please bring your mammogram films. We will need the name of your MD/PA to send a copy of your report.  You may register in either the clinic or hospital, but the mammogram will done at [facility] located in the Hospital.            Jun 12, 2017   Procedure with Jeni Amin MD   [facility] ([facility] )    [address] [id number] [phone]              Who to contact     If you have questions or need follow up information about today's clinic visit or your schedule please contact [facility] directly at [phone].  Normal  "or non-critical lab and imaging results will be communicated to you by [employer], letter or phone within 4 business days after the clinic has received the results. If you do not hear from us within 7 days, please contact the clinic through [employer] or phone. If you have a critical or abnormal lab result, we will notify you by phone as soon as possible.  Submit refill requests through [employer] or call your pharmacy and they will forward the refill request to us. Please allow 3 business days for your refill to be completed.          Additional Information About Your Visit        [employer] Information     [employer] gives you secure access to your electronic health record. If you see a primary care provider, you can also send messages to your care team and make appointments. If you have questions, please call your primary care clinic.  If you do not have a primary care provider, please call [phone] and they will assist you.        Care EveryWhere ID     This is your Care EveryWhere ID. This could be used by other organizations to access your [place] medical records  [id number]        Your Vitals Were     Pulse Temperature Height Pulse Oximetry BMI (Body Mass Index)       85 98.1  F (36.7  C) (Tympanic) 5' 6\" (1.676 m) 93% 33.89 kg/m2        Blood Pressure from Last 3 Encounters:   05/31/17 122/64   05/11/17 112/78   05/09/17 122/70    Weight from Last 3 Encounters:   05/31/17 210 lb (95.3 kg)   05/11/17 206 lb 3.2 oz (93.5 kg)   05/09/17 210 lb (95.3 kg)              Today, you had the following     No orders found for display         Today's Medication Changes          These changes are accurate as of: 5/31/17  1:52 PM.  If you have any questions, ask your nurse or doctor.               These medicines have changed or have updated prescriptions.        Dose/Directions    ALLEGRA-D ALLERGY & CONGESTION  MG per 12 hr tablet   This may have changed:  See the new instructions.   Used for:  Nasal congestion   Generic " drug:  fexofenadine-pseudoePHEDrine        TAKE 1 TABLET BY MOUTH TWICE DAILY   Quantity:  30 tablet   Refills:  0       pantoprazole 40 MG EC tablet   Commonly known as:  PROTONIX   This may have changed:  when to take this   Used for:  Diagnosis unknown        Dose:  40 mg   Take 1 tablet (40 mg) by mouth daily   Quantity:  90 tablet   Refills:  3                Primary Care Provider Office Phone # Fax #    Jimbo Martinez [phone]       [facility] [address]  [place] [address]        Thank you!     Thank you for choosing [facility]  for your care. Our goal is always to provide you with excellent care. Hearing back from our patients is one way we can continue to improve our services. Please take a few minutes to complete the written survey that you may receive in the mail after your visit with us. Thank you!             Your Updated Medication List - Protect others around you: Learn how to safely use, store and throw away your medicines at www.disposemymeds.org.          This list is accurate as of: 5/31/17  1:52 PM.  Always use your most recent med list.                   Brand Name Dispense Instructions for use    albuterol 108 (90 BASE) MCG/ACT Inhaler   Generic drug:  albuterol     8.5 g    INHALE 2 PUFFS INTO THE LUNGS EVERY 6 HOURS AS NEEDED FOR SHORTNESS OF BREATH OR WHEEZING       ALFALFA PO      Take 5 tablets by mouth 2 times daily.       ALLEGRA-D ALLERGY & CONGESTION  MG per 12 hr tablet   Generic drug:  fexofenadine-pseudoePHEDrine     30 tablet    TAKE 1 TABLET BY MOUTH TWICE DAILY       CALCIUM + D 500-1000-40 MG-UNT-MCG Chew   Generic drug:  Calcium-Vitamin D-Vitamin K      Take 3 tablets by mouth daily       citalopram 40 MG tablet    celeXA     Take 40 mg by mouth       DICLOFENAC PO      Take 100 mg by mouth daily       fish oil-omega-3 fatty acids 1000 MG capsule      Take 1 g by mouth daily.       flax seed oil 1000 MG capsule      Take 2  capsules by mouth daily       fluticasone 50 MCG/ACT spray    FLONASE     1 spray Reported on 5/11/2017       folic acid 1 MG tablet    FOLVITE     Take 1 mg by mouth 3 times daily.       gabapentin 300 MG capsule    NEURONTIN    210 capsule    TAKE 3 CAPSULES BY MOUTH IN THE MORNING, 2 CAPSULES AT NOON AND 2 CAPSULES IN THE EVENING       Garlic 400 MG Tbec          HYDROcodone-acetaminophen 7.5-325 MG per tablet    NORCO    60 tablet    TAKE 1 TABLET BY MOUTH EVERY 4 TO 6 HOURS AS NEEDED FOR PAIN       ketotifen 0.025 % Soln ophthalmic solution    ZADITOR/REFRESH ANTI-ITCH     1 drop       METHOTREXATE SODIUM (PF) IJ      Inject 0.8 mLs as directed once a week       MILK THISTLE PO      Take 1,000 mg by mouth daily.       MULTIPLE VITAMIN PO      Take  by mouth 2 times daily.       pantoprazole 40 MG EC tablet    PROTONIX    90 tablet    Take 1 tablet (40 mg) by mouth daily       PLAQUENIL 200 MG tablet   Generic drug:  hydroxychloroquine      Take 200 mg by mouth 2 times daily.       REFRESH 1.4-0.6 % ophthalmic solution   Generic drug:  polyvinyl alcohol-povidone      1-2 drops as needed       RESTASIS 0.05 % ophthalmic emulsion   Generic drug:  cycloSPORINE     60 each    USE 1 DROP IN BOTH EYES 2 TIMES A DAY       SM GAS RELIEF ANTIFLATUENT 180 MG Caps   Generic drug:  Simethicone     60 capsule    TAKE ONE CAPSULE BY MOUTH AS NEEDED AFTER A MEAL. MAX 2 CAPS/DAY       * SOLUBLE FIBER/PROBIOTICS PO      Take 1 tablet by mouth       * SOLUBLE FIBER/PROBIOTICS PO      Take 1 Tablespoonful by mouth daily       traZODone 50 MG tablet    DESYREL    270 tablet    TAKE 2-3 TABLETS BY MOUTH DAILY AT BEDTIME       Vitamin C 500 MG Caps      Take 2 tablets by mouth 2 times daily.       vitamin D 1000 UNITS capsule      Take 1 capsule by mouth daily.       * Notice:  This list has 2 medication(s) that are the same as other medications prescribed for you. Read the directions carefully, and ask your doctor or other care  provider to review them with you.

## 2017-06-01 RX ORDER — FEXOFENADINE HYDROCHLORIDE AND PSEUDOEPHEDRINE HYDROCHLORIDE 60; 120 MG/1; MG/1
TABLET, FILM COATED, EXTENDED RELEASE ORAL
Qty: 30 TABLET | Refills: 0 | Status: SHIPPED | OUTPATIENT
Start: 2017-06-01 | End: 2017-06-26

## 2017-06-03 DIAGNOSIS — H04.123 BILATERAL DRY EYES: ICD-10-CM

## 2017-06-06 RX ORDER — CYCLOSPORINE 0.5 MG/ML
EMULSION OPHTHALMIC
Qty: 60 EACH | Refills: 0 | Status: SHIPPED | OUTPATIENT
Start: 2017-06-06 | End: 2017-10-11

## 2017-06-06 NOTE — TELEPHONE ENCOUNTER
[facility]      Last Written Prescription Date: 12/12/16  Last Fill Quantity: 60,  # refills: 0   Last Office Visit with G, [facility] or [facility] prescribing provider: 1/25/17                                         Next 5 appointments (look out 90 days)     Jun 07, 2017  9:40 AM CDT   (Arrive by 9:25 AM)   Pre-Op physical with Jimbo Martinez MD   [facility] ([facility])    [facility] [place] [address]   [phone]

## 2017-06-07 ENCOUNTER — OFFICE VISIT (OUTPATIENT)
Dept: FAMILY MEDICINE | Facility: OTHER | Age: 67
End: 2017-06-07
Attending: FAMILY MEDICINE
Payer: COMMERCIAL

## 2017-06-07 VITALS
TEMPERATURE: 97 F | DIASTOLIC BLOOD PRESSURE: 64 MMHG | SYSTOLIC BLOOD PRESSURE: 116 MMHG | RESPIRATION RATE: 20 BRPM | WEIGHT: 210 LBS | HEART RATE: 80 BPM | BODY MASS INDEX: 33.89 KG/M2 | OXYGEN SATURATION: 92 %

## 2017-06-07 DIAGNOSIS — Z01.818 PREOP GENERAL PHYSICAL EXAM: Primary | ICD-10-CM

## 2017-06-07 LAB
ANION GAP SERPL CALCULATED.3IONS-SCNC: 5 MMOL/L (ref 3–14)
BASOPHILS # BLD AUTO: 0 10E9/L (ref 0–0.2)
BASOPHILS NFR BLD AUTO: 0.7 %
BUN SERPL-MCNC: 19 MG/DL (ref 7–30)
CALCIUM SERPL-MCNC: 8.6 MG/DL (ref 8.5–10.1)
CHLORIDE SERPL-SCNC: 107 MMOL/L (ref 94–109)
CO2 SERPL-SCNC: 30 MMOL/L (ref 20–32)
CREAT SERPL-MCNC: 1.06 MG/DL (ref 0.52–1.04)
DIFFERENTIAL METHOD BLD: NORMAL
EOSINOPHIL # BLD AUTO: 0.2 10E9/L (ref 0–0.7)
EOSINOPHIL NFR BLD AUTO: 4.2 %
ERYTHROCYTE [DISTWIDTH] IN BLOOD BY AUTOMATED COUNT: 14.5 % (ref 10–15)
GFR SERPL CREATININE-BSD FRML MDRD: 52 ML/MIN/1.7M2
GLUCOSE SERPL-MCNC: 85 MG/DL (ref 70–99)
HCT VFR BLD AUTO: 37.4 % (ref 35–47)
HGB BLD-MCNC: 12.4 G/DL (ref 11.7–15.7)
IMM GRANULOCYTES # BLD: 0 10E9/L (ref 0–0.4)
IMM GRANULOCYTES NFR BLD: 0.2 %
LYMPHOCYTES # BLD AUTO: 1.4 10E9/L (ref 0.8–5.3)
LYMPHOCYTES NFR BLD AUTO: 30.4 %
MCH RBC QN AUTO: 29.1 PG (ref 26.5–33)
MCHC RBC AUTO-ENTMCNC: 33.2 G/DL (ref 31.5–36.5)
MCV RBC AUTO: 88 FL (ref 78–100)
MONOCYTES # BLD AUTO: 0.5 10E9/L (ref 0–1.3)
MONOCYTES NFR BLD AUTO: 11.6 %
NEUTROPHILS # BLD AUTO: 2.4 10E9/L (ref 1.6–8.3)
NEUTROPHILS NFR BLD AUTO: 52.9 %
NRBC # BLD AUTO: 0 10*3/UL
NRBC BLD AUTO-RTO: 0 /100
PLATELET # BLD AUTO: 251 10E9/L (ref 150–450)
POTASSIUM SERPL-SCNC: 4 MMOL/L (ref 3.4–5.3)
RBC # BLD AUTO: 4.26 10E12/L (ref 3.8–5.2)
SODIUM SERPL-SCNC: 142 MMOL/L (ref 133–144)
WBC # BLD AUTO: 4.5 10E9/L (ref 4–11)

## 2017-06-07 PROCEDURE — 99212 OFFICE O/P EST SF 10 MIN: CPT

## 2017-06-07 PROCEDURE — 80048 BASIC METABOLIC PNL TOTAL CA: CPT | Mod: ZL | Performed by: FAMILY MEDICINE

## 2017-06-07 PROCEDURE — 85025 COMPLETE CBC W/AUTO DIFF WBC: CPT | Mod: ZL | Performed by: FAMILY MEDICINE

## 2017-06-07 PROCEDURE — 99214 OFFICE O/P EST MOD 30 MIN: CPT | Mod: 25 | Performed by: FAMILY MEDICINE

## 2017-06-07 PROCEDURE — 93005 ELECTROCARDIOGRAM TRACING: CPT

## 2017-06-07 PROCEDURE — 99213 OFFICE O/P EST LOW 20 MIN: CPT

## 2017-06-07 PROCEDURE — 93010 ELECTROCARDIOGRAM REPORT: CPT | Performed by: INTERNAL MEDICINE

## 2017-06-07 PROCEDURE — 36415 COLL VENOUS BLD VENIPUNCTURE: CPT | Mod: ZL | Performed by: FAMILY MEDICINE

## 2017-06-07 ASSESSMENT — PAIN SCALES - GENERAL: PAINLEVEL: NO PAIN (0)

## 2017-06-07 NOTE — NURSING NOTE
"Chief Complaint   Patient presents with     Pre-Op Exam       Initial /64 (BP Location: Right arm, Patient Position: Chair, Cuff Size: Adult Regular)  Pulse 80  Temp 97  F (36.1  C)  Resp 20  Wt 210 lb (95.3 kg)  SpO2 92%  BMI 33.89 kg/m2 Estimated body mass index is 33.89 kg/(m^2) as calculated from the following:    Height as of 5/31/17: 5' 6\" (1.676 m).    Weight as of this encounter: 210 lb (95.3 kg).  Medication Reconciliation: complete     Adrianna Henriquez      "

## 2017-06-07 NOTE — MR AVS SNAPSHOT
After Visit Summary   6/7/2017    Melvi Cleveland    MRN: [id number]           Patient Information     Date Of Birth          1950        Visit Information        Provider Department      6/7/2017 9:40 AM Jimbo Martinez MD [facility]        Today's Diagnoses     Preop general physical exam    -  1      Care Instructions      Before Your Surgery      Call your surgeon if there is any change in your health. This includes signs of a cold or flu (such as a sore throat, runny nose, cough, rash or fever).    Do not smoke, drink alcohol or take over the counter medicine (unless your surgeon or primary care doctor tells you to) for the 24 hours before and after surgery.    If you take prescribed drugs: Follow your doctor s orders about which medicines to take and which to stop until after surgery.    Eating and drinking prior to surgery: follow the instructions from your surgeon    Take a shower or bath the night before surgery. Use the soap your surgeon gave you to gently clean your skin. If you do not have soap from your surgeon, use your regular soap. Do not shave or scrub the surgery site.  Wear clean pajamas and have clean sheets on your bed.           Follow-ups after your visit        Your next 10 appointments already scheduled     Jun 12, 2017   Procedure with Jeni Amin MD   [facility] ([address] [id number] [phone]              Who to contact     If you have questions or need follow up information about today's clinic visit or your schedule please contact [facility] directly at [phone].  Normal or non-critical lab and imaging results will be communicated to you by MyChart, letter or phone within 4 business days after the clinic has received the results. If you do not hear from us within 7 days, please contact the clinic through MyChart or phone. If you have a critical or abnormal lab  result, we will notify you by phone as soon as possible.  Submit refill requests through [employer] or call your pharmacy and they will forward the refill request to us. Please allow 3 business days for your refill to be completed.          Additional Information About Your Visit        [employer] Information     [employer] gives you secure access to your electronic health record. If you see a primary care provider, you can also send messages to your care team and make appointments. If you have questions, please call your primary care clinic.  If you do not have a primary care provider, please call [phone] and they will assist you.        Care EveryWhere ID     This is your Care EveryWhere ID. This could be used by other organizations to access your [place] medical records  [id number]        Your Vitals Were     Pulse Temperature Respirations Pulse Oximetry BMI (Body Mass Index)       80 97  F (36.1  C) 20 92% 33.89 kg/m2        Blood Pressure from Last 3 Encounters:   06/12/17 115/65   06/07/17 116/64   05/31/17 122/64    Weight from Last 3 Encounters:   06/12/17 210 lb (95.3 kg)   06/07/17 210 lb (95.3 kg)   05/31/17 210 lb (95.3 kg)              We Performed the Following     Basic metabolic panel     CBC with platelets differential     EKG 12-lead complete w/read - (Clinic Performed)     EKG CARDIAC - HIM SCAN          Today's Medication Changes          These changes are accurate as of: 6/7/17 11:59 PM.  If you have any questions, ask your nurse or doctor.               These medicines have changed or have updated prescriptions.        Dose/Directions    pantoprazole 40 MG EC tablet   Commonly known as:  PROTONIX   This may have changed:  when to take this   Used for:  Diagnosis unknown        Dose:  40 mg   Take 1 tablet (40 mg) by mouth daily   Quantity:  90 tablet   Refills:  3       SOLUBLE FIBER/PROBIOTICS PO   This may have changed:  Another medication with the same name was removed. Continue taking this  medication, and follow the directions you see here.   Changed by:  Jeni Amin MD        Dose:  1 tablet   Take 1 tablet by mouth   Refills:  0                Primary Care Provider Office Phone # Fax #    Jimbo Martinez [phone]       [facility] [address] [place] [address]        Thank you!     Thank you for choosing [facility]  for your care. Our goal is always to provide you with excellent care. Hearing back from our patients is one way we can continue to improve our services. Please take a few minutes to complete the written survey that you may receive in the mail after your visit with us. Thank you!             Your Updated Medication List - Protect others around you: Learn how to safely use, store and throw away your medicines at www.disposemymeds.org.          This list is accurate as of: 6/7/17 11:59 PM.  Always use your most recent med list.                   Brand Name Dispense Instructions for use    albuterol 108 (90 BASE) MCG/ACT Inhaler   Generic drug:  albuterol     8.5 g    INHALE 2 PUFFS INTO THE LUNGS EVERY 6 HOURS AS NEEDED FOR SHORTNESS OF BREATH OR WHEEZING       ALFALFA PO      Take 5 tablets by mouth 2 times daily.       ALLEGRA-D ALLERGY & CONGESTION  MG per 12 hr tablet   Generic drug:  fexofenadine-pseudoePHEDrine     30 tablet    TAKE 1 TABLET BY MOUTH TWICE DAILY       CALCIUM + D 500-1000-40 MG-UNT-MCG Chew   Generic drug:  Calcium-Vitamin D-Vitamin K      Take 3 tablets by mouth daily       citalopram 40 MG tablet    celeXA     Take 40 mg by mouth       DICLOFENAC PO      Take 100 mg by mouth daily       fish oil-omega-3 fatty acids 1000 MG capsule      Take 1 g by mouth daily.       flax seed oil 1000 MG capsule      Take 2 capsules by mouth daily       fluticasone 50 MCG/ACT spray    FLONASE     1 spray Reported on 5/11/2017       folic acid 1 MG tablet    FOLVITE     Take 1 mg by mouth 3 times daily.       gabapentin 300 MG  capsule    NEURONTIN    210 capsule    TAKE 3 CAPSULES BY MOUTH IN THE MORNING, 2 CAPSULES AT NOON AND 2 CAPSULES IN THE EVENING       Garlic 400 MG Tbec          HYDROcodone-acetaminophen 7.5-325 MG per tablet    NORCO    60 tablet    TAKE 1 TABLET BY MOUTH EVERY 4 TO 6 HOURS AS NEEDED FOR PAIN       ketotifen 0.025 % Soln ophthalmic solution    ZADITOR/REFRESH ANTI-ITCH     1 drop       METHOTREXATE SODIUM (PF) IJ      Inject 0.8 mLs as directed once a week       MILK THISTLE PO      Take 1,000 mg by mouth daily.       MULTIPLE VITAMIN PO      Take  by mouth 2 times daily.       pantoprazole 40 MG EC tablet    PROTONIX    90 tablet    Take 1 tablet (40 mg) by mouth daily       PLAQUENIL 200 MG tablet   Generic drug:  hydroxychloroquine      Take 200 mg by mouth 2 times daily.       PREBIOTIC PRODUCT PO      Take 1 tablet by mouth daily       REFRESH 1.4-0.6 % ophthalmic solution   Generic drug:  polyvinyl alcohol-povidone      1-2 drops as needed       RESTASIS 0.05 % ophthalmic emulsion   Generic drug:  cycloSPORINE     60 each    USE 1 DROP IN BOTH EYES 2 TIMES A DAY       SM GAS RELIEF ANTIFLATUENT 180 MG Caps   Generic drug:  Simethicone     60 capsule    TAKE ONE CAPSULE BY MOUTH AS NEEDED AFTER A MEAL. MAX 2 CAPS/DAY       SOLUBLE FIBER/PROBIOTICS PO      Take 1 tablet by mouth       traZODone 50 MG tablet    DESYREL    270 tablet    TAKE 2-3 TABLETS BY MOUTH DAILY AT BEDTIME       Vitamin C 500 MG Caps      Take 2 tablets by mouth 2 times daily.       vitamin D 1000 UNITS capsule      Take 1 capsule by mouth daily.

## 2017-06-07 NOTE — PROGRESS NOTES
[facility] [place]  [address]  [place] [address]  [phone]  Dept: [phone]    PRE-OP EVALUATION:  Today's date: 2017    Melvi Cleveland (: 1950) presents for pre-operative evaluation assessment as requested by Dr. Amin.  She requires evaluation and anesthesia risk assessment prior to undergoing surgery/procedure for treatment of breast lump .  Proposed procedure: breast biopsy of right breast    Date of Surgery/ Procedure: 17  Time of Surgery/ Procedure: unknown  Hospital/Surgical Facility: [facility] [place]  Primary Physician: Jimbo Martinez  Type of Anesthesia Anticipated: to be determined    Patient has a Health Care Directive or Living Will:  NO    Preop Questions 2017   1.  Do you have a history of heart attack, stroke, stent, bypass or surgery on an artery in the head, neck, heart or legs? No   2.  Do you ever have any pain or discomfort in your chest? YES    3.  Do you have a history of  Heart Failure? No   4.   Are you troubled by shortness of breath when:  walking on a level surface, or up a slight hill, or at night? YES - not constant   5.  Do you currently have a cold, bronchitis or other respiratory infection? No   6.  Do you have a cough, shortness of breath, or wheezing? No   7.  Do you sometimes get pains in the calves of your legs when you walk? YES-to discuss with provider    8. Do you or anyone in your family have previous history of blood clots? YES - mother   9.  Do you or does anyone in your family have a serious bleeding problem such as prolonged bleeding following surgeries or cuts? No   10. Have you ever had problems with anemia or been told to take iron pills? No   11. Have you had any abnormal blood loss such as black, tarry or bloody stools, or abnormal vaginal bleeding? No   12. Have you ever had a blood transfusion? No   13. Have you or any of your relatives ever had problems with anesthesia? No   14. Do you have sleep apnea, excessive snoring or daytime  drowsiness? YES -  states she snores   15. Do you have any prosthetic heart valves? No   16. Do you have prosthetic joints? No   17. Is there any chance that you may be pregnant? No         HPI:                                                      Brief HPI related to upcoming procedure: Sherrie has a history of abnormal mammogram and has been reviewed by Surgery.  She was seen by Dr Munoz where it was felt the patient would benefit from surgical management.  I was asked to see her for preoperative medical clearance.        See problem list for active medical problems.  Problems all longstanding and stable, except as noted/documented.  See ROS for pertinent symptoms related to these conditions.                                                                                                  .    MEDICAL HISTORY:                                                      Patient Active Problem List    Diagnosis Date Noted     ACP (advance care planning) 09/28/2016     Priority: Medium     Advance Care Planning 9/28/2016: ACP Review of Chart / Resources Provided:  Reviewed chart for advance care plan.  Melvi ASHWIN Cleveland has been provided information and resources to begin or update their advance care plan.  Added by Kathy Galvan             RA (rheumatoid arthritis) (H) 11/25/2015     Priority: Medium     Comprehensive Medical Examination 11/25/2015     Priority: Medium     Seasonal allergic rhinitis 11/25/2015     Priority: Medium     Fibrocystic breast disease (FCBD) 11/25/2015     Priority: Medium     Crohn's disease of large intestine (H) 03/01/2011     Priority: Medium     Dyslipidemia 03/01/2011     Priority: Medium     Sicca syndrome (H) 03/01/2011     Priority: Medium     Peripheral polyneuropathy (H) 03/01/2011     Priority: Medium     Neck pain, chronic 07/05/2005     Priority: Medium     05/2015 MRI:  IMPRESSION:  BROAD-BASED DISK PROTRUSION AT C5-C6 ON THE LEFT, MILDLY  IMPINGING ON THE LEFT C6 NERVE  ROOT       Low back pain, chronic 12/13/2000     Priority: Medium     03/2016 MRI:  MULTILEVEL DEGENERATIVE CHANGES OF THE LUMBAR SPINE,  SIMILAR IN APPEARANCE TO THE PRIOR STUDY.  A RIGHT FORAMINAL DISK  PROTRUSION AT L3-L4 AGAIN IMPINGES THE EXITING RIGHT L3 NERVE ROOT.  CORRELATE FOR A RELATED RADICULOPATHY.        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     Parotiditis 5/9/2011     Peripheral Neuriopathy 3/1/2011     Rheumatoid arthritis(714.0) 12/13/1999     Seborrhea capitis 10/6/2011     Sjogrens Syndrome 3/1/2011     Urethral stricture 4/30/2008     Past Surgical History:   Procedure Laterality Date     BACK SURGERY  05/1995    back surgery     BIOPSY      breast bx X2     BIOPSY BREAST      LT     CHOLECYSTECTOMY  2004     COLONOSCOPY  11/15/2004    screening     COLONOSCOPY  9/24/2014     EGD with biopsy  2010, 2011    GERD     laminectomy      L4 L5 laminectomy; back pain     Current Outpatient Prescriptions   Medication Sig Dispense Refill     PREBIOTIC PRODUCT PO Take 1 tablet by mouth daily       RESTASIS 0.05 % ophthalmic emulsion USE 1 DROP IN BOTH EYES 2 TIMES A DAY 60 each 0     ALLEGRA-D ALLERGY & CONGESTION  MG per 12 hr tablet TAKE 1 TABLET BY MOUTH TWICE DAILY 30 tablet 0     Probiotic Product (SOLUBLE FIBER/PROBIOTICS PO) Take 1 tablet by mouth       Flaxseed, Linseed, (FLAX SEED OIL) 1000 MG capsule Take 2 capsules by mouth daily       fluticasone (FLONASE) 50 MCG/ACT spray 1 spray Reported on 5/11/2017       ketotifen (ZADITOR/REFRESH ANTI-ITCH) 0.025 % SOLN ophthalmic solution 1 drop       citalopram (CELEXA) 40 MG tablet Take 40 mg by mouth       gabapentin  (NEURONTIN) 300 MG capsule TAKE 3 CAPSULES BY MOUTH IN THE MORNING, 2 CAPSULES AT NOON AND 2 CAPSULES IN THE EVENING 210 capsule 0     traZODone (DESYREL) 50 MG tablet TAKE 2-3 TABLETS BY MOUTH DAILY AT BEDTIME 270 tablet 1     HYDROcodone-acetaminophen (NORCO) 7.5-325 MG per tablet TAKE 1 TABLET BY MOUTH EVERY 4 TO 6 HOURS AS NEEDED FOR PAIN 60 tablet 0     ALBUTEROL 108 (90 BASE) MCG/ACT inhaler INHALE 2 PUFFS INTO THE LUNGS EVERY 6 HOURS AS NEEDED FOR SHORTNESS OF BREATH OR WHEEZING 8.5 g 5     SM GAS RELIEF ANTIFLATUENT 180 MG CAPS TAKE ONE CAPSULE BY MOUTH AS NEEDED AFTER A MEAL. MAX 2 CAPS/DAY 60 capsule 5     pantoprazole (PROTONIX) 40 MG enteric coated tablet Take 1 tablet (40 mg) by mouth daily (Patient taking differently: Take 40 mg by mouth 2 times daily ) 90 tablet 3     DICLOFENAC PO Take 100 mg by mouth daily        Garlic 400 MG TBEC        polyvinyl alcohol-povidone (REFRESH) 1.4-0.6 % ophthalmic solution 1-2 drops as needed       hydroxychloroquine (PLAQUENIL) 200 MG tablet Take 200 mg by mouth 2 times daily.       folic acid (FOLVITE) 1 MG tablet Take 1 mg by mouth 3 times daily.       METHOTREXATE SODIUM, PF, IJ Inject 0.8 mLs as directed once a week        MULTIPLE VITAMIN PO Take  by mouth 2 times daily.       Ascorbic Acid (VITAMIN C) 500 MG CAPS Take 2 tablets by mouth 2 times daily.       Calcium-Vitamin D-Vitamin K (CALCIUM + D) 500-1000-40 MG-UNT-MCG CHEW Take 3 tablets by mouth daily        ALFALFA PO Take 5 tablets by mouth 2 times daily.       MILK THISTLE PO Take 1,000 mg by mouth daily.       fish oil-omega-3 fatty acids (OMEGA-3) 1000 MG capsule Take 1 g by mouth daily.       Cholecalciferol (VITAMIN D) 1000 UNITS capsule Take 1 capsule by mouth daily.       OTC products: None, except as noted above    Allergies   Allergen Reactions     Adhesive Tape      Benzoin Compound      Tin-Co-Javier     Seasonal Allergies       Latex Allergy: NO    Social History   Substance Use Topics      Smoking status: Former Smoker     Types: Cigarettes     Quit date: 3/28/1997     Smokeless tobacco: Never Used      Comment: no passive exposure     Alcohol use No      Comment: former. quit 1984     History   Drug Use No       REVIEW OF SYSTEMS:                                                    Constitutional, neuro, ENT, endocrine, pulmonary, cardiac, gastrointestinal, genitourinary, musculoskeletal, integument and psychiatric systems are negative, except as otherwise noted.    EXAM:                                                    /64 (BP Location: Right arm, Patient Position: Chair, Cuff Size: Adult Regular)  Pulse 80  Temp 97  F (36.1  C)  Resp 20  Wt 210 lb (95.3 kg)  SpO2 92%  BMI 33.89 kg/m2    GENERAL APPEARANCE: healthy, alert and no distress     EYES: EOMI, PERRL     HENT: ear canals and TM's normal and nose and mouth without ulcers or lesions     NECK: no adenopathy, no asymmetry, masses, or scars and thyroid normal to palpation     RESP: lungs clear to auscultation - no rales, rhonchi or wheezes     CV: regular rates and rhythm, normal S1 S2, no S3 or S4 and no murmur, click or rub     ABDOMEN:  soft, nontender, no HSM or masses and bowel sounds normal     MS: extremities normal- no gross deformities noted, no evidence of inflammation in joints, FROM in all extremities.     SKIN: no suspicious lesions or rashes     NEURO: Normal strength and tone, sensory exam grossly normal, mentation intact and speech normal     PSYCH: mentation appears normal. and affect normal/bright     LYMPHATICS: No axillary, cervical, or supraclavicular nodes    DIAGNOSTICS:                                                      EKG: appears normal, NSR, normal axis, normal intervals, no acute ST/T changes c/w ischemia, no LVH by voltage criteria, unchanged from previous tracings  Labs Resulted Today:   Results for orders placed or performed in visit on 06/07/17   CBC with platelets differential   Result Value Ref  Range    WBC 4.5 4.0 - 11.0 10e9/L    RBC Count 4.26 3.8 - 5.2 10e12/L    Hemoglobin 12.4 11.7 - 15.7 g/dL    Hematocrit 37.4 35.0 - 47.0 %    MCV 88 78 - 100 fl    MCH 29.1 26.5 - 33.0 pg    MCHC 33.2 31.5 - 36.5 g/dL    RDW 14.5 10.0 - 15.0 %    Platelet Count 251 150 - 450 10e9/L    Diff Method Automated Method     % Neutrophils 52.9 %    % Lymphocytes 30.4 %    % Monocytes 11.6 %    % Eosinophils 4.2 %    % Basophils 0.7 %    % Immature Granulocytes 0.2 %    Nucleated RBCs 0 0 /100    Absolute Neutrophil 2.4 1.6 - 8.3 10e9/L    Absolute Lymphocytes 1.4 0.8 - 5.3 10e9/L    Absolute Monocytes 0.5 0.0 - 1.3 10e9/L    Absolute Eosinophils 0.2 0.0 - 0.7 10e9/L    Absolute Basophils 0.0 0.0 - 0.2 10e9/L    Abs Immature Granulocytes 0.0 0 - 0.4 10e9/L    Absolute Nucleated RBC 0.0    Basic metabolic panel   Result Value Ref Range    Sodium 142 133 - 144 mmol/L    Potassium 4.0 3.4 - 5.3 mmol/L    Chloride 107 94 - 109 mmol/L    Carbon Dioxide 30 20 - 32 mmol/L    Anion Gap 5 3 - 14 mmol/L    Glucose 85 70 - 99 mg/dL    Urea Nitrogen 19 7 - 30 mg/dL    Creatinine 1.06 (H) 0.52 - 1.04 mg/dL    GFR Estimate 52 (L) >60 mL/min/1.7m2    GFR Estimate If Black 63 >60 mL/min/1.7m2    Calcium 8.6 8.5 - 10.1 mg/dL       Recent Labs   Lab Test  03/10/17   1428  12/23/16   1158  11/22/16   0820   11/16/15   0746   HGB  12.2  12.6  12.6   < >  12.9   PLT  232  270  254   < >  234   NA   --    --   143   --   141   POTASSIUM   --    --   4.1   --   4.2   CR  1.13*  0.91  1.05*   < >  1.08*    < > = values in this interval not displayed.        IMPRESSION:                                                    Reason for surgery/procedure: Abnormal mammogram right breast    The proposed surgical procedure is considered LOW risk.    REVISED CARDIAC RISK INDEX  The patient has the following serious cardiovascular risks for perioperative complications such as (MI, PE, VFib and 3  AV Block):  No serious cardiac risks  INTERPRETATION: 0 risks:  Class I (very low risk - 0.4% complication rate)    The patient has the following additional risks for perioperative complications:  No identified additional risks    No diagnosis found.    RECOMMENDATIONS:                                                        APPROVAL GIVEN to proceed with proposed procedure, without further diagnostic evaluation       Signed Electronically by: Jimbo Martinez MD    Copy of this evaluation report is provided to requesting physician.    [facility] Preop Guidelines

## 2017-06-12 ENCOUNTER — HOSPITAL ENCOUNTER (OUTPATIENT)
Dept: MAMMOGRAPHY | Facility: HOSPITAL | Age: 67
End: 2017-06-12
Attending: SURGERY | Admitting: SURGERY
Payer: MEDICARE

## 2017-06-12 ENCOUNTER — ANESTHESIA (OUTPATIENT)
Dept: SURGERY | Facility: HOSPITAL | Age: 67
End: 2017-06-12
Payer: MEDICARE

## 2017-06-12 ENCOUNTER — SURGERY (OUTPATIENT)
Age: 67
End: 2017-06-12

## 2017-06-12 ENCOUNTER — HOSPITAL ENCOUNTER (OUTPATIENT)
Facility: HOSPITAL | Age: 67
Discharge: HOME | End: 2017-06-12
Attending: SURGERY | Admitting: SURGERY
Payer: MEDICARE

## 2017-06-12 ENCOUNTER — APPOINTMENT (OUTPATIENT)
Dept: INTERVENTIONAL RADIOLOGY/VASCULAR | Facility: HOSPITAL | Age: 67
End: 2017-06-12
Attending: FAMILY MEDICINE
Payer: MEDICARE

## 2017-06-12 ENCOUNTER — ANESTHESIA EVENT (OUTPATIENT)
Dept: SURGERY | Facility: HOSPITAL | Age: 67
End: 2017-06-12
Payer: MEDICARE

## 2017-06-12 VITALS
OXYGEN SATURATION: 98 % | BODY MASS INDEX: 33.89 KG/M2 | SYSTOLIC BLOOD PRESSURE: 112 MMHG | TEMPERATURE: 96.9 F | DIASTOLIC BLOOD PRESSURE: 60 MMHG | RESPIRATION RATE: 10 BRPM | WEIGHT: 210 LBS

## 2017-06-12 DIAGNOSIS — D84.9 IMMUNOSUPPRESSION (H): Primary | ICD-10-CM

## 2017-06-12 PROCEDURE — 88307 TISSUE EXAM BY PATHOLOGIST: CPT | Mod: TC | Performed by: SURGERY

## 2017-06-12 PROCEDURE — 36000050 ZZH SURGERY LEVEL 2 1ST 30 MIN: Performed by: SURGERY

## 2017-06-12 PROCEDURE — 25000566 ZZH SEVOFLURANE, EA 15 MIN: Performed by: ANESTHESIOLOGY

## 2017-06-12 PROCEDURE — 25000125 ZZHC RX 250: Performed by: ANESTHESIOLOGY

## 2017-06-12 PROCEDURE — 25000125 ZZHC RX 250: Performed by: NURSE ANESTHETIST, CERTIFIED REGISTERED

## 2017-06-12 PROCEDURE — 71000014 ZZH RECOVERY PHASE 1 LEVEL 2 FIRST HR: Performed by: SURGERY

## 2017-06-12 PROCEDURE — 25000128 H RX IP 250 OP 636: Performed by: SURGERY

## 2017-06-12 PROCEDURE — 19125 EXCISION BREAST LESION: CPT | Performed by: ANESTHESIOLOGY

## 2017-06-12 PROCEDURE — 19125 EXCISION BREAST LESION: CPT | Performed by: NURSE ANESTHETIST, CERTIFIED REGISTERED

## 2017-06-12 PROCEDURE — 25000125 ZZHC RX 250

## 2017-06-12 PROCEDURE — 37000008 ZZH ANESTHESIA TECHNICAL FEE, 1ST 30 MIN: Performed by: SURGERY

## 2017-06-12 PROCEDURE — 36000052 ZZH SURGERY LEVEL 2 EA 15 ADDTL MIN: Performed by: SURGERY

## 2017-06-12 PROCEDURE — 71000015 ZZH RECOVERY PHASE 1 LEVEL 2 EA ADDTL HR: Performed by: SURGERY

## 2017-06-12 PROCEDURE — 19281 PERQ DEVICE BREAST 1ST IMAG: CPT | Mod: TC,RT | Performed by: RADIOLOGY

## 2017-06-12 PROCEDURE — 71000027 ZZH RECOVERY PHASE 2 EACH 15 MINS: Performed by: SURGERY

## 2017-06-12 PROCEDURE — 25000125 ZZHC RX 250: Performed by: SURGERY

## 2017-06-12 PROCEDURE — 19125 EXCISION BREAST LESION: CPT | Mod: RT | Performed by: SURGERY

## 2017-06-12 PROCEDURE — 27110028 ZZH OR GENERAL SUPPLY NON-STERILE: Performed by: SURGERY

## 2017-06-12 PROCEDURE — 25000128 H RX IP 250 OP 636: Performed by: ANESTHESIOLOGY

## 2017-06-12 PROCEDURE — 27210794 ZZH OR GENERAL SUPPLY STERILE: Performed by: SURGERY

## 2017-06-12 PROCEDURE — 25000128 H RX IP 250 OP 636: Performed by: NURSE ANESTHETIST, CERTIFIED REGISTERED

## 2017-06-12 PROCEDURE — 37000009 ZZH ANESTHESIA TECHNICAL FEE, EACH ADDTL 15 MIN: Performed by: SURGERY

## 2017-06-12 PROCEDURE — 27210995 ZZH RX 272: Performed by: SURGERY

## 2017-06-12 PROCEDURE — 40000306 ZZH STATISTIC PRE PROC ASSESS II: Performed by: SURGERY

## 2017-06-12 RX ORDER — PROPOFOL 10 MG/ML
INJECTION, EMULSION INTRAVENOUS PRN
Status: DISCONTINUED | OUTPATIENT
Start: 2017-06-12 | End: 2017-06-12

## 2017-06-12 RX ORDER — SODIUM CHLORIDE, SODIUM LACTATE, POTASSIUM CHLORIDE, CALCIUM CHLORIDE 600; 310; 30; 20 MG/100ML; MG/100ML; MG/100ML; MG/100ML
INJECTION, SOLUTION INTRAVENOUS CONTINUOUS
Status: DISCONTINUED | OUTPATIENT
Start: 2017-06-12 | End: 2017-06-12 | Stop reason: HOSPADM

## 2017-06-12 RX ORDER — PHENYLEPHRINE HCL IN 0.9% NACL 1 MG/10 ML
SYRINGE (ML) INTRAVENOUS PRN
Status: DISCONTINUED | OUTPATIENT
Start: 2017-06-12 | End: 2017-06-12

## 2017-06-12 RX ORDER — SCOLOPAMINE TRANSDERMAL SYSTEM 1 MG/1
1 PATCH, EXTENDED RELEASE TRANSDERMAL ONCE
Status: COMPLETED | OUTPATIENT
Start: 2017-06-12 | End: 2017-06-12

## 2017-06-12 RX ORDER — DEXAMETHASONE SODIUM PHOSPHATE 4 MG/ML
4 INJECTION, SOLUTION INTRA-ARTICULAR; INTRALESIONAL; INTRAMUSCULAR; INTRAVENOUS; SOFT TISSUE EVERY 10 MIN PRN
Status: DISCONTINUED | OUTPATIENT
Start: 2017-06-12 | End: 2017-06-12 | Stop reason: HOSPADM

## 2017-06-12 RX ORDER — MEPERIDINE HYDROCHLORIDE 25 MG/ML
12.5 INJECTION INTRAMUSCULAR; INTRAVENOUS; SUBCUTANEOUS
Status: DISCONTINUED | OUTPATIENT
Start: 2017-06-12 | End: 2017-06-12 | Stop reason: HOSPADM

## 2017-06-12 RX ORDER — CEFAZOLIN SODIUM 2 G/100ML
2 INJECTION, SOLUTION INTRAVENOUS
Status: DISCONTINUED | OUTPATIENT
Start: 2017-06-12 | End: 2017-06-12

## 2017-06-12 RX ORDER — ALBUTEROL SULFATE 0.83 MG/ML
2.5 SOLUTION RESPIRATORY (INHALATION) EVERY 4 HOURS PRN
Status: DISCONTINUED | OUTPATIENT
Start: 2017-06-12 | End: 2017-06-12 | Stop reason: HOSPADM

## 2017-06-12 RX ORDER — LIDOCAINE HYDROCHLORIDE 10 MG/ML
INJECTION, SOLUTION EPIDURAL; INFILTRATION; INTRACAUDAL; PERINEURAL
Status: COMPLETED
Start: 2017-06-12 | End: 2017-06-12

## 2017-06-12 RX ORDER — ONDANSETRON 4 MG/1
4 TABLET, ORALLY DISINTEGRATING ORAL EVERY 30 MIN PRN
Status: DISCONTINUED | OUTPATIENT
Start: 2017-06-12 | End: 2017-06-12 | Stop reason: HOSPADM

## 2017-06-12 RX ORDER — EPHEDRINE SULFATE 50 MG/ML
INJECTION, SOLUTION INTRAMUSCULAR; INTRAVENOUS; SUBCUTANEOUS PRN
Status: DISCONTINUED | OUTPATIENT
Start: 2017-06-12 | End: 2017-06-12

## 2017-06-12 RX ORDER — ONDANSETRON 2 MG/ML
INJECTION INTRAMUSCULAR; INTRAVENOUS PRN
Status: DISCONTINUED | OUTPATIENT
Start: 2017-06-12 | End: 2017-06-12

## 2017-06-12 RX ORDER — HYDRALAZINE HYDROCHLORIDE 20 MG/ML
2.5-5 INJECTION INTRAMUSCULAR; INTRAVENOUS EVERY 10 MIN PRN
Status: DISCONTINUED | OUTPATIENT
Start: 2017-06-12 | End: 2017-06-12 | Stop reason: HOSPADM

## 2017-06-12 RX ORDER — LABETALOL HYDROCHLORIDE 5 MG/ML
10 INJECTION, SOLUTION INTRAVENOUS
Status: DISCONTINUED | OUTPATIENT
Start: 2017-06-12 | End: 2017-06-12 | Stop reason: HOSPADM

## 2017-06-12 RX ORDER — BUPIVACAINE HYDROCHLORIDE AND EPINEPHRINE 2.5; 5 MG/ML; UG/ML
INJECTION, SOLUTION INFILTRATION; PERINEURAL PRN
Status: DISCONTINUED | OUTPATIENT
Start: 2017-06-12 | End: 2017-06-12 | Stop reason: HOSPADM

## 2017-06-12 RX ORDER — ONDANSETRON 2 MG/ML
4 INJECTION INTRAMUSCULAR; INTRAVENOUS EVERY 30 MIN PRN
Status: DISCONTINUED | OUTPATIENT
Start: 2017-06-12 | End: 2017-06-12 | Stop reason: HOSPADM

## 2017-06-12 RX ORDER — LIDOCAINE HYDROCHLORIDE 10 MG/ML
20 INJECTION, SOLUTION EPIDURAL; INFILTRATION; INTRACAUDAL; PERINEURAL ONCE
Status: COMPLETED | OUTPATIENT
Start: 2017-06-12 | End: 2017-06-12

## 2017-06-12 RX ORDER — HYDROMORPHONE HYDROCHLORIDE 1 MG/ML
.3-.5 INJECTION, SOLUTION INTRAMUSCULAR; INTRAVENOUS; SUBCUTANEOUS EVERY 10 MIN PRN
Status: DISCONTINUED | OUTPATIENT
Start: 2017-06-12 | End: 2017-06-12 | Stop reason: HOSPADM

## 2017-06-12 RX ORDER — CEFAZOLIN SODIUM 2 G/100ML
2 INJECTION, SOLUTION INTRAVENOUS
Status: COMPLETED | OUTPATIENT
Start: 2017-06-12 | End: 2017-06-12

## 2017-06-12 RX ORDER — DEXAMETHASONE SODIUM PHOSPHATE 10 MG/ML
INJECTION, SOLUTION INTRAMUSCULAR; INTRAVENOUS PRN
Status: DISCONTINUED | OUTPATIENT
Start: 2017-06-12 | End: 2017-06-12

## 2017-06-12 RX ORDER — NALOXONE HYDROCHLORIDE 0.4 MG/ML
.1-.4 INJECTION, SOLUTION INTRAMUSCULAR; INTRAVENOUS; SUBCUTANEOUS
Status: DISCONTINUED | OUTPATIENT
Start: 2017-06-12 | End: 2017-06-12 | Stop reason: HOSPADM

## 2017-06-12 RX ORDER — LIDOCAINE HYDROCHLORIDE 20 MG/ML
INJECTION, SOLUTION INFILTRATION; PERINEURAL PRN
Status: DISCONTINUED | OUTPATIENT
Start: 2017-06-12 | End: 2017-06-12

## 2017-06-12 RX ORDER — CEFADROXIL 500 MG/1
500 CAPSULE ORAL 2 TIMES DAILY
Qty: 6 CAPSULE | Refills: 0 | Status: SHIPPED | OUTPATIENT
Start: 2017-06-12 | End: 2017-06-20

## 2017-06-12 RX ORDER — FENTANYL CITRATE 50 UG/ML
INJECTION, SOLUTION INTRAMUSCULAR; INTRAVENOUS PRN
Status: DISCONTINUED | OUTPATIENT
Start: 2017-06-12 | End: 2017-06-12

## 2017-06-12 RX ORDER — FENTANYL CITRATE 50 UG/ML
25-50 INJECTION, SOLUTION INTRAMUSCULAR; INTRAVENOUS
Status: DISCONTINUED | OUTPATIENT
Start: 2017-06-12 | End: 2017-06-12 | Stop reason: HOSPADM

## 2017-06-12 RX ORDER — PROMETHAZINE HYDROCHLORIDE 25 MG/ML
12.5 INJECTION, SOLUTION INTRAMUSCULAR; INTRAVENOUS
Status: DISCONTINUED | OUTPATIENT
Start: 2017-06-12 | End: 2017-06-12 | Stop reason: HOSPADM

## 2017-06-12 RX ADMIN — Medication 5 MG: at 09:41

## 2017-06-12 RX ADMIN — SODIUM CHLORIDE, POTASSIUM CHLORIDE, SODIUM LACTATE AND CALCIUM CHLORIDE 1000 ML: 600; 310; 30; 20 INJECTION, SOLUTION INTRAVENOUS at 08:00

## 2017-06-12 RX ADMIN — LIDOCAINE HYDROCHLORIDE 40 MG: 10 INJECTION, SOLUTION EPIDURAL; INFILTRATION; INTRACAUDAL; PERINEURAL at 08:57

## 2017-06-12 RX ADMIN — FENTANYL CITRATE 50 MCG: 50 INJECTION, SOLUTION INTRAMUSCULAR; INTRAVENOUS at 09:19

## 2017-06-12 RX ADMIN — DEXAMETHASONE SODIUM PHOSPHATE 4 MG: 10 INJECTION, SOLUTION INTRAMUSCULAR; INTRAVENOUS at 09:41

## 2017-06-12 RX ADMIN — CEFAZOLIN SODIUM 2 G: 2 INJECTION, SOLUTION INTRAVENOUS at 09:16

## 2017-06-12 RX ADMIN — Medication 50 MCG: at 09:37

## 2017-06-12 RX ADMIN — Medication 100 MCG: at 09:43

## 2017-06-12 RX ADMIN — SCOPALAMINE 1 PATCH: 1 PATCH, EXTENDED RELEASE TRANSDERMAL at 07:58

## 2017-06-12 RX ADMIN — Medication 5 MG: at 09:33

## 2017-06-12 RX ADMIN — Medication 5 MG: at 09:31

## 2017-06-12 RX ADMIN — CEFAZOLIN 500 ML GIVEN: 1 INJECTION, POWDER, FOR SOLUTION INTRAMUSCULAR; INTRAVENOUS at 09:37

## 2017-06-12 RX ADMIN — LIDOCAINE HYDROCHLORIDE 40 MG: 20 INJECTION, SOLUTION INFILTRATION; PERINEURAL at 09:24

## 2017-06-12 RX ADMIN — BUPIVACAINE HYDROCHLORIDE AND EPINEPHRINE BITARTRATE 30 ML: 2.5; .005 INJECTION, SOLUTION INFILTRATION; PERINEURAL at 10:19

## 2017-06-12 RX ADMIN — MIDAZOLAM HYDROCHLORIDE 1 MG: 1 INJECTION, SOLUTION INTRAMUSCULAR; INTRAVENOUS at 09:16

## 2017-06-12 RX ADMIN — ONDANSETRON 4 MG: 2 INJECTION INTRAMUSCULAR; INTRAVENOUS at 09:41

## 2017-06-12 RX ADMIN — PROPOFOL 160 MG: 10 INJECTION, EMULSION INTRAVENOUS at 09:24

## 2017-06-12 RX ADMIN — Medication 5 MG: at 09:38

## 2017-06-12 ASSESSMENT — LIFESTYLE VARIABLES: TOBACCO_USE: 1

## 2017-06-12 NOTE — INTERVAL H&P NOTE
History and physical reviewed. Patient examined. There is no interval change in condition since examined by me 5-31-17.    Jeni Amin MD, FACS    6/12/2017  8:10 AM

## 2017-06-12 NOTE — ANESTHESIA CARE TRANSFER NOTE
Patient: Melvi Cleveland    Procedure(s):  WIRE LOCALIZED EXCISIONAL BIOPSY  RIGHT BREAST MASS - Wound Class: I-Clean    Diagnosis: STATUS POST BREAST CANCER, ATYPICAL DUCTAL HYPERPLASIA/RIGHT  Diagnosis Additional Information: No value filed.    Anesthesia Type:   General, LMA     Note:  Airway :Nasal Cannula  Patient transferred to:PACU        Vitals: (Last set prior to Anesthesia Care Transfer)    CRNA VITALS  6/12/2017 0941 - 6/12/2017 1012      6/12/2017             Resp Rate (set): 8                Electronically Signed By: KENDRA Avalos CRNA  June 12, 2017  10:12 AM

## 2017-06-12 NOTE — OP NOTE
PREOPERATIVE DIAGNOSIS:    Atypical Ductal Hyperplasia, Central right breast.  POSTOPERATIVE DIAGNOSIS:   Atypical Ductal Hyperplasia, Central right breast.  Fibroadenoma, right breast.  PROCEDURE: Wire localized excision, right breast mass.   HISTORY: This 67 year old female developed a right breast mass with abnormal mammographic correlate. An image-guided biopsy showed atypical hyperplasia. Wire localization excision indicated.  Wire placed in diagnostic imaging immediately prior to the procedure.  FINDINGS:  There was a small fibroadenoma that likely accounted for the mammographic finding.  The specimen mammogram confirmed the presence of the clip with at least 1 cm margin.  PROCEDURE: With the patient in the supine position on the operating table, general anesthesia was induced. The right breast, chest wall, axilla and upper arm were prepped and draped sterilely. The requisite timeout pause was then observed during which the patient's correct identity and planned procedure were confirmed and the preoperatively placed ink jp denoting the right side was visualized. With concordance among the operating room staff, the procedure was commenced with a curvilinear incision along the medial border of the areola at the site of introduction of the localization wire. The incision was taken through full thickness skin to subcutaneous tissue and the nipple and areolar complex was elevated off the subcutaneous fat sharply with bleeding again controlled by electrocautery. Dissection was commenced circumferentially anterior to the investing breast fascia to allow complete excision. The wire and surrounding parenchyma was grasped with an Allis clamp and a specimen to and beyond the tip of the wire was excised sharply with bleeding again controlled by electrocautery and suture ligatures of 3-0 Vicryl.  The specimen was removed and margins marked with a short suture superiorly and a long suture laterally. A benign appearing  fibroadenoma was noted close to the margin but its gross appearance was completely benign. Specimen mammogram was obtained which confirmed excision of the region in question and the localization clip previously placed. The wound was irrigated with antibiotic/saline solution and fastidious hemostasis obtained with electrocautery and ties of 3-0 Vicryl. The wound was anesthetized with 0.25% Marcaine with epinephrine to assist in managing postoperative analgesia.   The wound was closed with interrupted 3-0 Vicryl in the subcutaneous tissue, subdermal reapproximation with running 3-0 Vicryl and skin closure with subcuticular 3-0 Vicryl. The patient's allergy to adhesive prompted reinforcement of closure with dermabond.  The estimated blood loss was 1 cc and there were no apparent complications; the procedure was well tolerated and the patient left the operating room in good condition upon confirmation that the final sponge, needle and instrument counts were correct.  The post surgical debriefing was held and acknowledged at completion with the identification and labeling of the specimen reviewed.    SERGIO PAK MD, FACS

## 2017-06-12 NOTE — PROGRESS NOTES
Melvi Cleveland 67 year old    Returned from Breast center  Exam performed: Breast Wire localization right  Tolerated Procedure: well  May resume previous diet: Yes  Pushed to radiologist reading service? Not applicable  Time returned to unit: 0915  Handoff Method: Report given to RN   Was patient held? NO  If yes, by whom? NA and Technologist NAME n/a  6/12/2017 9:18 AM  Paty Howell

## 2017-06-12 NOTE — DISCHARGE INSTRUCTIONS
Thank you for allowing Dr Amin and our surgical team to participate in your care. Please call with any questions or concerns to our direct line at [phone] or [phone]      Leave compression dressing in place x 48 hours.  You may then remove and shower.  Do not directly soap or scrub incision site.  Bra support 24 hours a day for 2-3 weeks will result in the best cosmetic outcome.     Return to clinic in one week for wound check and review of pathology findings.    Your present pain medications should suffice - a short duration post surgical antibiotic course has been given in view of your Methotrexate medication.    Remove the scopolamine patch behind your left ear after 24 hours after application.   After removing the patch, wash your hands and the area behind your ear thoroughly with soap and water.   The patch will still contain some medicine after use.   To avoid accidental contact or ingestion by children or pets, fold the used patch in half with the sticky side together and throw away in the trash out of the reach of children and pets.           Post-Anesthesia Patient Instructions    IMMEDIATELY FOLLOWING SURGERY:  Do not drive or operate machinery for the first twenty four hours after surgery.  Do not make any important decisions for twenty four hours after surgery or while taking narcotic pain medications or sedatives.  If you develop intractable nausea and vomiting or a severe headache please notify your doctor immediately.    FOLLOW-UP:  Please make an appointment with your surgeon as instructed. You do not need to follow up with anesthesia unless specifically instructed to do so.    WOUND CARE INSTRUCTIONS (if applicable):  Keep a dry clean dressing on the anesthesia/puncture wound site if there is drainage.  Once the wound has quit draining you may leave it open to air.  Generally you should leave the bandage intact for twenty four hours unless there is drainage.  If the epidural site drains  for more than 36-48 hours please call the anesthesia department.    QUESTIONS?:  Please feel free to call your physician or the hospital  if you have any questions, and they will be happy to assist you.

## 2017-06-12 NOTE — ANESTHESIA PREPROCEDURE EVALUATION
Anesthesia Evaluation     . Pt has had prior anesthetic.     No history of anesthetic complications          ROS/MED HX    ENT/Pulmonary:     (+)other ENT- Sjogrens/Sicca Syndrome, Parotiditis, tobacco use, Past use Quit 1997 packs/day  asthma , . .    Neurologic:     (+)neuropathy     Cardiovascular:     (+) Dyslipidemia, ----. : . . CASAS, . :. . Previous cardiac testing date:results:date: results:ECG reviewed date:6/7/2017 results:NSR@61, OWN date: results:          METS/Exercise Tolerance:     Hematologic:  - neg hematologic  ROS       Musculoskeletal:   (+) arthritis, , , other musculoskeletal- Cervicalgia w/ C6 Impingement, Chronic LBP s/p L3-5 Laminectomy, Rheumatoid      GI/Hepatic:     (+) GERD Inflammatory bowel disease (Crohn's Disease),       Renal/Genitourinary:     (+) chronic renal disease (Stage 3 (moderate)), type: CRI, Other Renal/ Genitourinary, Urethral stricture      Endo:     (+) Obesity, .      Psychiatric:  - neg psychiatric ROS       Infectious Disease:  - neg infectious disease ROS       Malignancy:   (+) Malignancy History of Breast  Breast CA Active status post.         Other:    (+) H/O Chronic Pain,H/O chronic opiod use ,                    Physical Exam  Normal systems: cardiovascular, pulmonary and dental    Airway   Mallampati: III  TM distance: >3 FB  Neck ROM: full    Dental     Cardiovascular   Rhythm and rate: regular and normal      Pulmonary    breath sounds clear to auscultation                    Anesthesia Plan      History & Physical Review  History and physical reviewed and following examination; no interval change.    ASA Status:  3 .    NPO Status:  > 8 hours    Plan for General and LMA with Intravenous and Propofol induction. Maintenance will be Balanced.    PONV prophylaxis:  Ondansetron (or other 5HT-3), Scopolamine patch and Dexamethasone or Solumedrol       Postoperative Care  Postoperative pain management:  IV analgesics and Oral pain medications.       Consents  Anesthetic plan, risks, benefits and alternatives discussed with:  Patient..                          .

## 2017-06-12 NOTE — H&P (VIEW-ONLY)
[facility] Surgery Preop    CC:  ADH, Right breast    HPI:  This 67 year old year old female returns in preparation for wire localized excision of ADH, central right breast.  An area of clustered microcalcifications was noted on screening mammography.  Stereotactic biopsy revealed ADH and excision is indicated.  Risk/benefits, cosmetic and recuperative expectations reviewed.  She voices understanding and is prepared to proceed.    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     Parotiditis 5/9/2011     Peripheral Neuriopathy 3/1/2011     Rheumatoid arthritis(714.0) 12/13/1999     Seborrhea capitis 10/6/2011     Sjogrens Syndrome 3/1/2011     Urethral stricture 4/30/2008     Past Surgical History:   Procedure Laterality Date     BACK SURGERY  05/1995    back surgery     BIOPSY      breast bx X2     BIOPSY BREAST      LT     CHOLECYSTECTOMY  2004     COLONOSCOPY  11/15/2004    screening     COLONOSCOPY  9/24/2014     EGD with biopsy  2010, 2011    GERD     laminectomy      L4 L5 laminectomy; back pain     Current Outpatient Prescriptions   Medication     Probiotic Product (SOLUBLE FIBER/PROBIOTICS PO)     Probiotic Product (SOLUBLE FIBER/PROBIOTICS PO)     Flaxseed, Linseed, (FLAX SEED OIL) 1000 MG capsule     fluticasone (FLONASE) 50 MCG/ACT spray     ketotifen (ZADITOR/REFRESH ANTI-ITCH) 0.025 % SOLN ophthalmic solution     citalopram (CELEXA) 40 MG tablet     ALLEGRA-D ALLERGY & CONGESTION  MG per 12 hr tablet     gabapentin (NEURONTIN) 300 MG capsule     traZODone (DESYREL) 50 MG tablet     HYDROcodone-acetaminophen (NORCO) 7.5-325 MG per tablet     RESTASIS 0.05 % ophthalmic emulsion      ALBUTEROL 108 (90 BASE) MCG/ACT inhaler     SM GAS RELIEF ANTIFLATUENT 180 MG CAPS     pantoprazole (PROTONIX) 40 MG enteric coated tablet     DICLOFENAC PO     Garlic 400 MG TBEC     polyvinyl alcohol-povidone (REFRESH) 1.4-0.6 % ophthalmic solution     hydroxychloroquine (PLAQUENIL) 200 MG tablet     folic acid (FOLVITE) 1 MG tablet     METHOTREXATE SODIUM, PF, IJ     MULTIPLE VITAMIN PO     Ascorbic Acid (VITAMIN C) 500 MG CAPS     Calcium-Vitamin D-Vitamin K (CALCIUM + D) 500-1000-40 MG-UNT-MCG CHEW     ALFALFA PO     MILK THISTLE PO     fish oil-omega-3 fatty acids (OMEGA-3) 1000 MG capsule     Cholecalciferol (VITAMIN D) 1000 UNITS capsule     No current facility-administered medications for this visit.      Allergies   Allergen Reactions     Adhesive Tape      Benzoin Compound      Tin-Co-Javier     Seasonal Allergies      HABITS:    Social History   Substance Use Topics     Smoking status: Former Smoker     Types: Cigarettes     Quit date: 3/28/1997     Smokeless tobacco: Never Used      Comment: no passive exposure     Alcohol use No      Comment: former. quit 1984       Family History   Problem Relation Age of Onset     DIABETES Mother      Rheumatoid Arthritis Mother      Other - See Comments Mother      fibromyalgia     Osteoarthritis Mother      Thyroid Disease Mother      thyroid disorder     Unknown/Adopted Father      cause of death unknown     Rheumatoid Arthritis Father      DIABETES Sister      Myocardial Infarction Sister      cause of death     Lupus Sister      cause of death     C.A.D. Maternal Grandmother      CEREBROVASCULAR DISEASE Maternal Grandmother      DIABETES Maternal Grandmother      Thyroid Disease Maternal Grandmother      thyrodi disorder; goitor removed     CANCER Maternal Grandfather      Hypertension Brother      Other - See Comments Brother      sleep apnea     Other - See Comments Sister      sleep apnea       REVIEW OF SYSTEMS:  Ten point review of systems negative except  "those mentioned in the HPI.     OBJECTIVE:  /64 (BP Location: Right arm, Patient Position: Chair, Cuff Size: Adult Regular)  Pulse 85  Temp 98.1  F (36.7  C) (Tympanic)  Ht 5' 6\" (1.676 m)  Wt 210 lb (95.3 kg)  SpO2 93%  BMI 33.89 kg/m2    GENERAL: Generally appears well, in no distress with appropriate affect.  HEENT:   Sclerae anicteric - No cervical, supra/infraclavciular lymphadenopathy, no thyroid masses  Respiratory:  Lungs clear to ausculation bilaterally with good air excursion  Cardiovascular:  Regular Rate and Rhythm with no murmurs gallops or rubs, normal   Right breast - healed biopsy site superior region - lesion central.  Griselda-areolar incision planned.  :  deferred  Extremities:  Extremities normal. No deformities, edema, or skin discoloration.  Skin:  no suspicious lesions or rashes  Neurological: grossly intact    Psych:  Alert, oriented, affect appropriate with normal decision making ability.    IMPRESSION:  ADH, central right breast for excision, wire localized.       PLAN:  Will proceed.    Jeni Amin MD, FACS    5/31/2017  1:34 PM    cc:  Dr. Martinez  "

## 2017-06-13 LAB — COPATH REPORT: NORMAL

## 2017-06-13 NOTE — ANESTHESIA POSTPROCEDURE EVALUATION
Patient: Melvi Cleveland    Procedure(s):  WIRE LOCALIZED EXCISIONAL BIOPSY  RIGHT BREAST MASS - Wound Class: I-Clean    Diagnosis:STATUS POST BREAST CANCER, ATYPICAL DUCTAL HYPERPLASIA/RIGHT  Diagnosis Additional Information: No value filed.    Anesthesia Type:  General, LMA    Note:  Anesthesia Post Evaluation    Patient location during evaluation: Phase 2, PACU and Bedside  Patient participation: Able to fully participate in evaluation  Level of consciousness: awake and alert  Pain management: adequate  Airway patency: patent  Cardiovascular status: acceptable  Respiratory status: acceptable  Hydration status: stable  PONV: none     Anesthetic complications: None          Last vitals:  Vitals:    06/12/17 1020 06/12/17 1025 06/12/17 1030   BP: 114/72 117/64 112/60   Resp: 16 (!) 7 (!) 10   Temp:      SpO2: 97% 99% 98%         Electronically Signed By: Jesús Hicks MD  June 13, 2017  10:50 AM

## 2017-06-15 ENCOUNTER — HOSPITAL ENCOUNTER (EMERGENCY)
Facility: HOSPITAL | Age: 67
Discharge: HOME | End: 2017-06-15
Attending: NURSE PRACTITIONER | Admitting: NURSE PRACTITIONER
Payer: MEDICARE

## 2017-06-15 VITALS
SYSTOLIC BLOOD PRESSURE: 150 MMHG | DIASTOLIC BLOOD PRESSURE: 68 MMHG | TEMPERATURE: 97.8 F | HEIGHT: 66 IN | OXYGEN SATURATION: 97 % | RESPIRATION RATE: 16 BRPM

## 2017-06-15 DIAGNOSIS — T78.40XA ALLERGIC REACTION, INITIAL ENCOUNTER: ICD-10-CM

## 2017-06-15 PROCEDURE — 99213 OFFICE O/P EST LOW 20 MIN: CPT | Performed by: NURSE PRACTITIONER

## 2017-06-15 PROCEDURE — 99213 OFFICE O/P EST LOW 20 MIN: CPT

## 2017-06-15 RX ORDER — PREDNISONE 20 MG/1
TABLET ORAL
Qty: 10 TABLET | Refills: 0 | Status: SHIPPED | OUTPATIENT
Start: 2017-06-15 | End: 2017-06-20

## 2017-06-15 ASSESSMENT — ENCOUNTER SYMPTOMS
SHORTNESS OF BREATH: 0
ACTIVITY CHANGE: 0
FATIGUE: 0
COUGH: 0
CHEST TIGHTNESS: 0
NECK PAIN: 0
WHEEZING: 0
STRIDOR: 0
NAUSEA: 0
FEVER: 0
FREQUENCY: 0
CHOKING: 0
DYSURIA: 0
ROS SKIN COMMENTS: RED RASH
CHILLS: 0
DIARRHEA: 0
CONSTIPATION: 0
APPETITE CHANGE: 0

## 2017-06-15 NOTE — ED PROVIDER NOTES
"  History     Chief Complaint   Patient presents with     Rash     \"I think I am allergic to something they used in surgery on Monday\".     The history is provided by the patient. No  was used.     Melvi Cleveland is a 67 year old female who presents with a rash that started on 6/12/17 after a breast biopsy by Dr. Amin. Patient reports that her rash is \"red and itchy\" were she wiped the \"yellow\" stuff off her skin from surgery. She applied cortisone cream to her rash with minimal relief. She reports that her bra has been tight since the breast biopsy.     She reports that she has had breast surgery in the past on her left breast and has never got a skin reaction.     She denies any drainage, redness, or warmth from her surgical incision.     She has not used any new products recently (ie. new shampoo, soaps, laundry detergents). She denies problems with eating or drinking. Denies problems with bowel or bladder. Denies fever, chills or night sweats.     Patient has follow up appointment with Dr. Amin on 6/20/17. Dr. Amin did place her on a course of post op antibiotics (Duricef) patient will be taking the last dose this evening.     I have reviewed the Medications, Allergies, Past Medical and Surgical History, and Social History in the Epic system.      Review of Systems   Constitutional: Negative for activity change, appetite change, chills, fatigue and fever.   Respiratory: Negative for cough, choking, chest tightness, shortness of breath, wheezing and stridor.         Patient states that her throat feels \"horase\" however she denies any problems swallowing.    Cardiovascular: Negative for chest pain.   Gastrointestinal: Negative for constipation, diarrhea and nausea.   Genitourinary: Negative for dysuria and frequency.   Musculoskeletal: Negative for neck pain.   Skin: Positive for rash.        \"Red rash\"       Physical Exam   BP: 138/80  Heart Rate: 69  Temp: 97.8  F (36.6  C)  Resp: " "16  Height: 167.6 cm (5' 6\")  SpO2: 97 %  Physical Exam   Constitutional: She is oriented to person, place, and time. She appears well-developed and well-nourished. No distress.   HENT:   Head: Normocephalic.   Mouth/Throat: Oropharynx is clear and moist. No oropharyngeal exudate.   No swelling to lips, tongue, or oropharynx.    Neck: Normal range of motion. Neck supple.   Cardiovascular: Normal rate, regular rhythm and normal heart sounds.    No murmur heard.  Pulmonary/Chest: Effort normal and breath sounds normal. No respiratory distress. She has no wheezes. She has no rales.   Abdominal: Soft. She exhibits no distension.   Musculoskeletal: Normal range of motion.   Neurological: She is alert and oriented to person, place, and time.   Skin: Skin is warm and dry. Bruising and rash noted. Rash is maculopapular. She is not diaphoretic. There is erythema.        Red maculopapular rash that extends from neck down anterior portion of chest in a diagonal linear format towards the right breast, rash extends around to mid back (see traced margins on the graphical documentation). Patient noted to have incision on right areola, C/D/I, no warmth/redness or drainage. Ecchymosis on right breast at 7 o'clock region.    Psychiatric: She has a normal mood and affect. Her behavior is normal. Judgment and thought content normal.   Nursing note and vitals reviewed.      ED Course     ED Course     Procedures      Assessments & Plan (with Medical Decision Making)     She is currently taking daily Allegra. Will add zantac and prednisone for allergic reaction with report of hoarse voice. She will follow up with any increase in symptoms or concerns.     Discussed plan of care. She verbalized understanding. All questions answered.     I have reviewed the nursing notes.    I have reviewed the findings, diagnosis, plan and need for follow up with the patient.  Patient questions if she had a reaction to the betadine that she was prepped " "with prior to surgery. She reports that her skin was cleansed with a \"yellow\" substance and believes that is what caused this reaction.   Patient discharged in stable condition.     Discharge Medication List as of 6/15/2017  2:15 PM      START taking these medications    Details   predniSONE (DELTASONE) 20 MG tablet Take two tablets (= 40mg) each day for 5 (five) days, Disp-10 tablet, R-0, E-Prescribe             Final diagnoses:   Allergic reaction, initial encounter     Melvi, it seems that you are reacting to a product they placed on your skin for your breast biopsy as it follows that pattern.   Continue Allegra daily.   Take Zantac 150 mg twice a day for 5 days.   Take Prednisone as ordered.   Apply a good lotion to skin daily.  Follow up as scheduled with Dr. Amin. Sooner with any increase in symptoms or concerns.   Return to urgent care or emergency department with any increase in symptoms or concerns.     ZIGGY Phillips  6/15/2017  9:32 AM  URGENT CARE CLINIC       Lisette Kumar NP  06/20/17 0955    "

## 2017-06-15 NOTE — ED NOTES
Pt presents today with significant other for c/o a rash to right breast and surrounding areas since her surgery on Monday, and has been getting worse since then.

## 2017-06-15 NOTE — PROGRESS NOTES
Benign results given, 6 month post biopsy mammogram ordered, DI schedulers notified to call patient in December to schedule.  Patient reports an itchy rash and hives around the biopsy area, under breasts, on neck and portion of upper arm.  No fever or other symptoms.  She noticed the symptoms yesterday and they have worsened over time.  Patient reports blotting the area to clean and using cortisone which has helped with the itching.  She has not used Benadryl.  Dr. Amin notified.  She states patient should see her PCP, but Dr. Martinez is out of the office today, so she said patient is to go to the urgent care.  Patient agreeable with this plan.

## 2017-06-15 NOTE — ED AVS SNAPSHOT
[facility]    [address] [address]    Phone:  [phone]                                       Melvi Cleveland   MRN: [id number]    Department:  [facility]   Date of Visit:  6/15/2017           After Visit Summary Signature Page     I have received my discharge instructions, and my questions have been answered. I have discussed any challenges I see with this plan with the nurse or doctor.    ..........................................................................................................................................  Patient/Patient Representative Signature      ..........................................................................................................................................  Patient Representative Print Name and Relationship to Patient    ..................................................               ................................................  Date                                            Time    ..........................................................................................................................................  Reviewed by Signature/Title    ...................................................              ..............................................  Date                                                            Time

## 2017-06-15 NOTE — ED AVS SNAPSHOT
[facility]    [address]    [facility] [place] [address]    Phone:  [phone]                                       Melvi Cleveland   MRN: [id number]    Department:  [facility]   Date of Visit:  6/15/2017           Patient Information     Date Of Birth          1950        Your diagnoses for this visit were:     Allergic reaction, initial encounter        You were seen by Lisette Kumar NP.      Follow-up Information     Follow up with Jimbo Martinez MD.    Specialty:  Family Practice    Why:  As needed, If symptoms worsen    Contact information:    [facility]  [address]  [place] [address] [phone]          Follow up with [facility].    Specialty:  EMERGENCY MEDICINE    Why:  As needed, If symptoms worsen    Contact information:    [address]  [place] [address] [phone]    Additional information:    From [facility]: Take [address]. Turn left at [address]. Turn left at the first stoplight on [address]. At the first stop sign, take a right onto [address]. Take a left into the parking lot and continue through until you reach the North enterance of the building.       From [place]: Take [address]. Take the [address] ramp towards [place]. Turn left onto [address]. Take a slight right onto [address]. Turn left at the first stoplight on [address]. At the first stop sign, take a right onto [address]. Take a left into the parking lot and continue through until you reach the North enterance of the building.       From [place]: Take [address]. Take a right at [address]. At the first stop sign, take a right onto [address]. Take a left into the parking lot and continue through until you reach the North enterance of the building.         Discharge Instructions       Melvi, it seems that you are reacting to a product they placed on  your skin for your breast biopsy as it follows that pattern.   Continue Allegra daily.   Take Zantac 150 mg twice a day for 5 days.   Take Prednisone as ordered.   Apply a good lotion to skin daily.  Follow up as scheduled with Dr. Amin. Sooner with any increase in symptoms or concerns.   Return to urgent care or emergency department with any increase in symptoms or concerns.         Discharge References/Attachments     ALLERGIC REACTION, OTHER (GENERAL) (ENGLISH)      Future Appointments        Provider Department Dept Phone Center    6/20/2017 11:30 AM Jeni Amin MD [phone] [facility]         Review of your medicines      START taking        Dose / Directions Last dose taken    predniSONE 20 MG tablet   Commonly known as:  DELTASONE   Quantity:  10 tablet        Take two tablets (= 40mg) each day for 5 (five) days   Refills:  0          Our records show that you are taking the medicines listed below. If these are incorrect, please call your family doctor or clinic.        Dose / Directions Last dose taken    albuterol 108 (90 BASE) MCG/ACT Inhaler   Quantity:  8.5 g   Generic drug:  albuterol        INHALE 2 PUFFS INTO THE LUNGS EVERY 6 HOURS AS NEEDED FOR SHORTNESS OF BREATH OR WHEEZING   Refills:  5        ALFALFA PO   Dose:  5 tablet        Take 5 tablets by mouth 2 times daily.   Refills:  0        ALLEGRA-D ALLERGY & CONGESTION  MG per 12 hr tablet   Quantity:  30 tablet   Generic drug:  fexofenadine-pseudoePHEDrine        TAKE 1 TABLET BY MOUTH TWICE DAILY   Refills:  0        CALCIUM + D 500-1000-40 MG-UNT-MCG Chew   Dose:  3 tablet   Generic drug:  Calcium-Vitamin D-Vitamin K        Take 3 tablets by mouth daily   Refills:  0        cefadroxil 500 MG capsule   Commonly known as:  DURICEF   Dose:  500 mg   Quantity:  6 capsule        Take 1 capsule (500 mg) by mouth 2 times daily   Refills:  0        citalopram 40 MG tablet   Commonly known as:  celeXA   Dose:   40 mg        Take 40 mg by mouth   Refills:  0        DICLOFENAC PO   Dose:  100 mg        Take 100 mg by mouth daily   Refills:  0        fish oil-omega-3 fatty acids 1000 MG capsule   Dose:  1 g        Take 1 g by mouth daily.   Refills:  0        flax seed oil 1000 MG capsule   Dose:  2 capsule        Take 2 capsules by mouth daily   Refills:  0        fluticasone 50 MCG/ACT spray   Commonly known as:  FLONASE   Dose:  1 spray        1 spray Reported on 5/11/2017   Refills:  0        folic acid 1 MG tablet   Commonly known as:  FOLVITE   Dose:  1 mg        Take 1 mg by mouth 3 times daily.   Refills:  0        gabapentin 300 MG capsule   Commonly known as:  NEURONTIN   Quantity:  210 capsule        TAKE 3 CAPSULES BY MOUTH IN THE MORNING, 2 CAPSULES AT NOON AND 2 CAPSULES IN THE EVENING   Refills:  0        Garlic 400 MG Tbec        Refills:  0        HYDROcodone-acetaminophen 7.5-325 MG per tablet   Commonly known as:  NORCO   Quantity:  60 tablet        TAKE 1 TABLET BY MOUTH EVERY 4 TO 6 HOURS AS NEEDED FOR PAIN   Refills:  0        ketotifen 0.025 % Soln ophthalmic solution   Commonly known as:  ZADITOR/REFRESH ANTI-ITCH   Dose:  1 drop        1 drop   Refills:  0        METHOTREXATE SODIUM (PF) IJ   Dose:  0.8 mL        Inject 0.8 mLs as directed once a week   Refills:  0        MILK THISTLE PO   Dose:  1000 mg        Take 1,000 mg by mouth daily.   Refills:  0        MULTIPLE VITAMIN PO        Take  by mouth 2 times daily.   Refills:  0        pantoprazole 40 MG EC tablet   Commonly known as:  PROTONIX   Dose:  40 mg   Quantity:  90 tablet        Take 1 tablet (40 mg) by mouth daily   Refills:  3        PLAQUENIL 200 MG tablet   Dose:  200 mg   Generic drug:  hydroxychloroquine        Take 200 mg by mouth 2 times daily.   Refills:  0        PREBIOTIC PRODUCT PO   Dose:  1 tablet        Take 1 tablet by mouth daily   Refills:  0        REFRESH 1.4-0.6 % ophthalmic solution   Dose:  1-2 drop   Generic drug:   polyvinyl alcohol-povidone        1-2 drops as needed   Refills:  0        RESTASIS 0.05 % ophthalmic emulsion   Quantity:  60 each   Generic drug:  cycloSPORINE        USE 1 DROP IN BOTH EYES 2 TIMES A DAY   Refills:  0        SM GAS RELIEF ANTIFLATUENT 180 MG Caps   Quantity:  60 capsule   Generic drug:  Simethicone        TAKE ONE CAPSULE BY MOUTH AS NEEDED AFTER A MEAL. MAX 2 CAPS/DAY   Refills:  5        SOLUBLE FIBER/PROBIOTICS PO   Dose:  1 tablet        Take 1 tablet by mouth   Refills:  0        traZODone 50 MG tablet   Commonly known as:  DESYREL   Quantity:  270 tablet        TAKE 2-3 TABLETS BY MOUTH DAILY AT BEDTIME   Refills:  1        Vitamin C 500 MG Caps   Dose:  2 tablet        Take 2 tablets by mouth 2 times daily.   Refills:  0        vitamin D 1000 UNITS capsule   Dose:  1 capsule        Take 1 capsule by mouth daily.   Refills:  0                Prescriptions were sent or printed at these locations (1 Prescription)                   [facility] [place] - [address]   [address] [place] [address]    Telephone:  [phone]   Fax:  [phone]   Hours:                  E-Prescribed (1 of 1)         predniSONE (DELTASONE) 20 MG tablet                Orders Needing Specimen Collection     None      Pending Results     No orders found from 6/13/2017 to 6/16/2017.            Pending Culture Results     No orders found from 6/13/2017 to 6/16/2017.            Thank you for choosing [facility]       Thank you for choosing [facility] for your care. Our goal is always to provide you with excellent care. Hearing back from our patients is one way we can continue to improve our services. Please take a few minutes to complete the written survey that you may receive in the mail after you visit with us. Thank you!        [facility] Information     [facility] gives you secure access to your electronic health record. If you see a primary care provider, you can also send messages to your care  team and make appointments. If you have questions, please call your primary care clinic.  If you do not have a primary care provider, please call [phone] and they will assist you.        Care EveryWhere ID     This is your Care EveryWhere ID. This could be used by other organizations to access your [facility] medical records  [id number]        After Visit Summary       This is your record. Keep this with you and show to your community pharmacist(s) and doctor(s) at your next visit.

## 2017-06-20 ENCOUNTER — OFFICE VISIT (OUTPATIENT)
Dept: SURGERY | Facility: OTHER | Age: 67
End: 2017-06-20
Attending: SURGERY
Payer: MEDICARE

## 2017-06-20 VITALS
TEMPERATURE: 97.4 F | SYSTOLIC BLOOD PRESSURE: 120 MMHG | DIASTOLIC BLOOD PRESSURE: 60 MMHG | OXYGEN SATURATION: 93 % | HEIGHT: 66 IN | HEART RATE: 64 BPM | WEIGHT: 208 LBS | BODY MASS INDEX: 33.43 KG/M2

## 2017-06-20 DIAGNOSIS — Z98.890 STATUS POST BREAST BIOPSY: Primary | ICD-10-CM

## 2017-06-20 PROCEDURE — 99024 POSTOP FOLLOW-UP VISIT: CPT | Performed by: SURGERY

## 2017-06-20 PROCEDURE — 99213 OFFICE O/P EST LOW 20 MIN: CPT

## 2017-06-20 ASSESSMENT — PAIN SCALES - GENERAL: PAINLEVEL: NO PAIN (0)

## 2017-06-20 NOTE — PROGRESS NOTES
"  HPI:  Returns for first post surgical examination following excisional biopsy, right breast without complaint.  Denies wound complication, fever, chills, nausea or vomiting.  ROS:   10 point ROS neg other than the symptoms noted above in the HPI.    Examination:  /60 (BP Location: Left arm, Patient Position: Chair, Cuff Size: Adult Regular)  Pulse 64  Temp 97.4  F (36.3  C) (Tympanic)  Ht 5' 6\" (1.676 m)  Wt 208 lb (94.3 kg)  SpO2 93%  BMI 33.57 kg/m2    Constitutional: healthy, alert and no distress  HEENT:  No obvious masses, lesions,  or abnormalities  Cardiovascular: negative findings: regular rate and rhythm, S1 normal, S2 normal  Respiratory: Good diaphragmatic excursion. Llungs clear to auscultation    Wound healing satisfactorily - she reacted to the elastic in the ace wrap and has cutaneous erythema where the wrap was placed.  The breast wound is healing satisfactorily without evidence of infection.  Satisfactory cosmesis.      SPECIMEN(S):   Breast biopsy with margins, right, central     FINAL DIAGNOSIS:   Right breast, central, reexcision   - Previous biopsy site   - Fibrocystic change with microcalcification     Impression:  Satisfactory course.  Allergy to  Recommendations:  The technical aspects of the procedure and operative findings were reviewed.  female was again reminding in the need to refrain from heavy lifting and strenuous activity for a total of 6 weeks following this procedure.  Recheck one week due to reaction to ace bandage.  Jeni Amin MD, FACS    6/20/2017  11:19 AM             "

## 2017-06-20 NOTE — NURSING NOTE
"Chief Complaint   Patient presents with     Surgical Followup     Wire localized excision of right breast mass 6/12/17.       Initial /60 (BP Location: Left arm, Patient Position: Chair, Cuff Size: Adult Regular)  Pulse 64  Temp 97.4  F (36.3  C) (Tympanic)  Ht 5' 6\" (1.676 m)  Wt 208 lb (94.3 kg)  SpO2 93%  BMI 33.57 kg/m2 Estimated body mass index is 33.57 kg/(m^2) as calculated from the following:    Height as of this encounter: 5' 6\" (1.676 m).    Weight as of this encounter: 208 lb (94.3 kg).  Medication Reconciliation: complete   REAL MEJIA      "

## 2017-06-20 NOTE — MR AVS SNAPSHOT
After Visit Summary   6/20/2017    Melvi Cleveland    MRN: [id number]           Patient Information     Date Of Birth          1950        Visit Information        Provider Department      6/20/2017 11:30 AM Jeni Amin MD [place] [facility]        Today's Diagnoses     Status post breast biopsy    -  1      Care Instructions    Thank you for allowing Dr. Amin and our surgical team to participate in your care.  If you have a scheduling or an appointment question please contact Savannah, our Health Unit Coordinator at her direct line [phone].   ALL nursing questions or concerns can be directed to your surgical nurse at: [phone]-Cara    Warm compresses 3 x daily.  Recheck one week.              Follow-ups after your visit        Your next 10 appointments already scheduled     Jun 26, 2017  1:30 PM CDT   (Arrive by 1:15 PM)   Post Op with Jeni Amin MD   [facility] ([facility] [place] )    [address] [facility] [place] [address]   [phone]              Who to contact     If you have questions or need follow up information about today's clinic visit or your schedule please [facility] at [phone].  Normal or non-critical lab and imaging results will be communicated to you by MyChart, letter or phone within 4 business days after the clinic has received the results. If you do not hear from us within 7 days, please contact the clinic through MyChart or phone. If you have a critical or abnormal lab result, we will notify you by phone as soon as possible.  Submit refill requests through iContact or call your pharmacy and they will forward the refill request to us. Please allow 3 business days for your refill to be completed.          Additional Information About Your Visit        [email or web address] Information     iContact gives you secure access to your electronic health record. If you see a primary care provider, you can also  "send messages to your care team and make appointments. If you have questions, please call your primary care clinic.  If you do not have a primary care provider, please call [phone] and they will assist you.        Care EveryWhere ID     This is your Care EveryWhere ID. This could be used by other organizations to access your [facility] medical records  [id number]        Your Vitals Were     Pulse Temperature Height Pulse Oximetry BMI (Body Mass Index)       64 97.4  F (36.3  C) (Tympanic) 5' 6\" (1.676 m) 93% 33.57 kg/m2        Blood Pressure from Last 3 Encounters:   06/20/17 120/60   06/15/17 150/68   06/12/17 112/60    Weight from Last 3 Encounters:   06/20/17 208 lb (94.3 kg)   06/12/17 210 lb (95.3 kg)   06/07/17 210 lb (95.3 kg)              Today, you had the following     No orders found for display         Today's Medication Changes          These changes are accurate as of: 6/20/17 11:59 PM.  If you have any questions, ask your nurse or doctor.               These medicines have changed or have updated prescriptions.        Dose/Directions    pantoprazole 40 MG EC tablet   Commonly known as:  PROTONIX   This may have changed:  when to take this   Used for:  Diagnosis unknown        Dose:  40 mg   Take 1 tablet (40 mg) by mouth daily   Quantity:  90 tablet   Refills:  3                Primary Care Provider Office Phone # Fax #    Jimbo Martinez [phone]       [address] [address]        Equal Access to Services     BENITEZ OLIVEROS AH: Hadroberta montejoo Sodex, waaxda luqadaha, qaybta kaalmada gwyn, bill soto. So [facility] [phone].    ATENCIÓN: Si habla español, tiene a garza disposición servicios gratuitos de asistencia lingüística. Llame al [phone].    We comply with applicable federal civil rights laws and [place] laws. We do not discriminate on the basis of race, color, national origin, age, disability sex, " sexual orientation or gender identity.            Thank you!     Thank you for choosing [facility]  for your care. Our goal is always to provide you with excellent care. Hearing back from our patients is one way we can continue to improve our services. Please take a few minutes to complete the written survey that you may receive in the mail after your visit with us. Thank you!             Your Updated Medication List - Protect others around you: Learn how to safely use, store and throw away your medicines at www.disposemymeds.org.          This list is accurate as of: 6/20/17 11:59 PM.  Always use your most recent med list.                   Brand Name Dispense Instructions for use Diagnosis    albuterol 108 (90 BASE) MCG/ACT Inhaler   Generic drug:  albuterol     8.5 g    INHALE 2 PUFFS INTO THE LUNGS EVERY 6 HOURS AS NEEDED FOR SHORTNESS OF BREATH OR WHEEZING    Influenza-like symptoms       ALFALFA PO      Take 5 tablets by mouth 2 times daily.        ALLEGRA-D ALLERGY & CONGESTION  MG per 12 hr tablet   Generic drug:  fexofenadine-pseudoePHEDrine     30 tablet    TAKE 1 TABLET BY MOUTH TWICE DAILY    Nasal congestion       CALCIUM + D 500-1000-40 MG-UNT-MCG Chew   Generic drug:  Calcium-Vitamin D-Vitamin K      Take 3 tablets by mouth daily        citalopram 40 MG tablet    celeXA     Take 40 mg by mouth        DICLOFENAC PO      Take 100 mg by mouth daily        fish oil-omega-3 fatty acids 1000 MG capsule      Take 1 g by mouth daily.        flax seed oil 1000 MG capsule      Take 2 capsules by mouth daily        fluticasone 50 MCG/ACT spray    FLONASE     1 spray Reported on 5/11/2017        folic acid 1 MG tablet    FOLVITE     Take 1 mg by mouth 3 times daily.        gabapentin 300 MG capsule    NEURONTIN    210 capsule    TAKE 3 CAPSULES BY MOUTH IN THE MORNING, 2 CAPSULES AT NOON AND 2 CAPSULES IN THE EVENING    Peripheral polyneuropathy (H)       Garlic 400 MG Tbec            HYDROcodone-acetaminophen 7.5-325 MG per tablet    NORCO    60 tablet    TAKE 1 TABLET BY MOUTH EVERY 4 TO 6 HOURS AS NEEDED FOR PAIN    Rheumatoid arthritis involving multiple sites with positive rheumatoid factor (H)       ketotifen 0.025 % Soln ophthalmic solution    ZADITOR/REFRESH ANTI-ITCH     1 drop        METHOTREXATE SODIUM (PF) IJ      Inject 0.8 mLs as directed once a week        MILK THISTLE PO      Take 1,000 mg by mouth daily.        MULTIPLE VITAMIN PO      Take  by mouth 2 times daily.        pantoprazole 40 MG EC tablet    PROTONIX    90 tablet    Take 1 tablet (40 mg) by mouth daily    Diagnosis unknown       PLAQUENIL 200 MG tablet   Generic drug:  hydroxychloroquine      Take 200 mg by mouth 2 times daily.        PREBIOTIC PRODUCT PO      Take 1 tablet by mouth daily        REFRESH 1.4-0.6 % ophthalmic solution   Generic drug:  polyvinyl alcohol-povidone      1-2 drops as needed        RESTASIS 0.05 % ophthalmic emulsion   Generic drug:  cycloSPORINE     60 each    USE 1 DROP IN BOTH EYES 2 TIMES A DAY    Bilateral dry eyes       SM GAS RELIEF ANTIFLATUENT 180 MG Caps   Generic drug:  Simethicone     60 capsule    TAKE ONE CAPSULE BY MOUTH AS NEEDED AFTER A MEAL. MAX 2 CAPS/DAY    Gas       SOLUBLE FIBER/PROBIOTICS PO      Take 1 tablet by mouth        traZODone 50 MG tablet    DESYREL    270 tablet    TAKE 2-3 TABLETS BY MOUTH DAILY AT BEDTIME    Primary insomnia       Vitamin C 500 MG Caps      Take 2 tablets by mouth 2 times daily.        vitamin D 1000 UNITS capsule      Take 1 capsule by mouth daily.

## 2017-06-20 NOTE — PATIENT INSTRUCTIONS
Thank you for allowing Dr. Amin and our surgical team to participate in your care.  If you have a scheduling or an appointment question please contact Savannah, our Health Unit Coordinator at her direct line [phone].   ALL nursing questions or concerns can be directed to your surgical nurse at: [phone]-Cara    Warm compresses 3 x daily.  Recheck one week.

## 2017-06-20 NOTE — DISCHARGE INSTRUCTIONS
Melvi, it seems that you are reacting to a product they placed on your skin for your breast biopsy as it follows that pattern.   Continue Allegra daily.   Take Zantac 150 mg twice a day for 5 days.   Take Prednisone as ordered.   Apply a good lotion to skin daily.  Follow up as scheduled with Dr. Amin. Sooner with any increase in symptoms or concerns.   Return to urgent care or emergency department with any increase in symptoms or concerns.

## 2017-06-21 DIAGNOSIS — Z79.899 MEDICATION MANAGEMENT: Primary | ICD-10-CM

## 2017-06-21 LAB
ALT SERPL W P-5'-P-CCNC: 28 U/L (ref 0–50)
BASOPHILS # BLD AUTO: 0 10E9/L (ref 0–0.2)
BASOPHILS NFR BLD AUTO: 0.6 %
CREAT SERPL-MCNC: 1.13 MG/DL (ref 0.52–1.04)
CRP SERPL-MCNC: <2.9 MG/L (ref 0–8)
DIFFERENTIAL METHOD BLD: NORMAL
EOSINOPHIL # BLD AUTO: 0.2 10E9/L (ref 0–0.7)
EOSINOPHIL NFR BLD AUTO: 3.5 %
ERYTHROCYTE [DISTWIDTH] IN BLOOD BY AUTOMATED COUNT: 14.9 % (ref 10–15)
ERYTHROCYTE [SEDIMENTATION RATE] IN BLOOD BY WESTERGREN METHOD: 11 MM/H (ref 0–30)
GFR SERPL CREATININE-BSD FRML MDRD: 48 ML/MIN/1.7M2
HCT VFR BLD AUTO: 38.6 % (ref 35–47)
HGB BLD-MCNC: 12.7 G/DL (ref 11.7–15.7)
IMM GRANULOCYTES # BLD: 0 10E9/L (ref 0–0.4)
IMM GRANULOCYTES NFR BLD: 0.3 %
LYMPHOCYTES # BLD AUTO: 2.4 10E9/L (ref 0.8–5.3)
LYMPHOCYTES NFR BLD AUTO: 34.7 %
MCH RBC QN AUTO: 29.5 PG (ref 26.5–33)
MCHC RBC AUTO-ENTMCNC: 32.9 G/DL (ref 31.5–36.5)
MCV RBC AUTO: 90 FL (ref 78–100)
MONOCYTES # BLD AUTO: 0.7 10E9/L (ref 0–1.3)
MONOCYTES NFR BLD AUTO: 9.7 %
NEUTROPHILS # BLD AUTO: 3.5 10E9/L (ref 1.6–8.3)
NEUTROPHILS NFR BLD AUTO: 51.2 %
NRBC # BLD AUTO: 0 10*3/UL
NRBC BLD AUTO-RTO: 0 /100
PLATELET # BLD AUTO: 270 10E9/L (ref 150–450)
RBC # BLD AUTO: 4.3 10E12/L (ref 3.8–5.2)
WBC # BLD AUTO: 6.9 10E9/L (ref 4–11)

## 2017-06-21 PROCEDURE — 84460 ALANINE AMINO (ALT) (SGPT): CPT | Mod: ZL | Performed by: NURSE PRACTITIONER

## 2017-06-21 PROCEDURE — 85025 COMPLETE CBC W/AUTO DIFF WBC: CPT | Mod: ZL | Performed by: NURSE PRACTITIONER

## 2017-06-21 PROCEDURE — 36415 COLL VENOUS BLD VENIPUNCTURE: CPT | Mod: ZL | Performed by: NURSE PRACTITIONER

## 2017-06-21 PROCEDURE — 86140 C-REACTIVE PROTEIN: CPT | Mod: ZL | Performed by: NURSE PRACTITIONER

## 2017-06-21 PROCEDURE — 82565 ASSAY OF CREATININE: CPT | Mod: ZL | Performed by: NURSE PRACTITIONER

## 2017-06-21 PROCEDURE — 85652 RBC SED RATE AUTOMATED: CPT | Mod: ZL | Performed by: NURSE PRACTITIONER

## 2017-06-26 ENCOUNTER — OFFICE VISIT (OUTPATIENT)
Dept: SURGERY | Facility: OTHER | Age: 67
End: 2017-06-26
Attending: SURGERY
Payer: MEDICARE

## 2017-06-26 VITALS
TEMPERATURE: 98.6 F | HEIGHT: 66 IN | HEART RATE: 91 BPM | DIASTOLIC BLOOD PRESSURE: 72 MMHG | BODY MASS INDEX: 33.43 KG/M2 | SYSTOLIC BLOOD PRESSURE: 128 MMHG | OXYGEN SATURATION: 94 % | WEIGHT: 208 LBS

## 2017-06-26 DIAGNOSIS — R09.81 NASAL CONGESTION: ICD-10-CM

## 2017-06-26 DIAGNOSIS — G62.9 PERIPHERAL POLYNEUROPATHY: ICD-10-CM

## 2017-06-26 DIAGNOSIS — Z92.25 HISTORY OF IMMUNOSUPPRESSION THERAPY: ICD-10-CM

## 2017-06-26 DIAGNOSIS — Z98.890 S/P BREAST BIOPSY: Primary | ICD-10-CM

## 2017-06-26 PROCEDURE — 99212 OFFICE O/P EST SF 10 MIN: CPT

## 2017-06-26 PROCEDURE — 99024 POSTOP FOLLOW-UP VISIT: CPT | Performed by: SURGERY

## 2017-06-26 RX ORDER — CEFADROXIL 500 MG/1
500 CAPSULE ORAL 2 TIMES DAILY
Qty: 20 CAPSULE | Refills: 0 | Status: SHIPPED | OUTPATIENT
Start: 2017-06-26 | End: 2017-08-08

## 2017-06-26 NOTE — PROGRESS NOTES
"  HPI:  Returns for  post surgical examination following Excisional biopsy right breast ADH having placed a hot water bottle on incision and \"peeled off the glue\".  Erythema resulted along with irritation of the sub-dermal closure.   She denies fever, chills or drainage.    ROS:   10 point ROS neg other than the symptoms noted above in the HPI.    Examination:  /72 (BP Location: Right arm, Patient Position: Chair, Cuff Size: Adult Regular)  Pulse 91  Temp 98.6  F (37  C) (Tympanic)  Ht 5' 6\" (1.676 m)  Wt 208 lb (94.3 kg)  SpO2 94%  BMI 33.57 kg/m2    Constitutional: healthy, alert and no distress  HEENT:  No obvious masses, lesions,  or abnormalities      Area of residual glue removed.  Erythema along area where \"peel glue off\".  Re-dressed with xeroform gauze and 4 x 4s.  Duricef and hold methotrexate advised with daily dressing change.    Impression:  See above.  Recommendations:   Daily shower, dressing change.  Recheck one week.    Jeni Amin MD, FACS    6/26/2017  4:12 PM             "

## 2017-06-26 NOTE — PATIENT INSTRUCTIONS
Thank you for allowing Dr. Amin and our surgical team to participate in your care.  If you have a scheduling or an appointment question please contact Savannah, our Health Unit Coordinator at her direct line [phone].   ALL nursing questions or concerns can be directed to your surgical nurse at: [phone]-Cara    Hold methotrexate this week.    Daily shower - redress area with SINGLE layer xeroform gauze and cover with 4x4s.    Rx for duricef 500 mg one every 12 hours provided.    Skin glue has caused a reaction and you should advise future surgeons of reactivity to it.

## 2017-06-26 NOTE — MR AVS SNAPSHOT
After Visit Summary   6/26/2017    Melvi Cleveland    MRN: [id number]           Patient Information     Date Of Birth          1950        Visit Information        Provider Department      6/26/2017 3:30 PM Jeni Amin MD [place] [facility]        Today's Diagnoses     S/P breast biopsy    -  1    History of immunosuppression therapy          Care Instructions    Thank you for allowing Dr. Amin and our surgical team to participate in your care.  If you have a scheduling or an appointment question please contact Savannah, our Health Unit Coordinator at her direct line [phone].   ALL nursing questions or concerns can be directed to your surgical nurse at: [phone]-Cara    Hold methotrexate this week.    Daily shower - redress area with SINGLE layer xeroform gauze and cover with 4x4s.    Rx for duricef 500 mg one every 12 hours provided.    Skin glue has caused a reaction and you should advise future surgeons of reactivity to it.          Follow-ups after your visit        Your next 10 appointments already scheduled     Jul 05, 2017 10:30 AM CDT   (Arrive by 10:15 AM)   Post Op with Jeni Amin MD   [facility] ([facility] [place] )    [address]  [place] [address]   [phone]              Who to contact     If you have questions or need follow up information about today's clinic visit or your schedule please contact [facility] directly at [phone].  Normal or non-critical lab and imaging results will be communicated to you by MyChart, letter or phone within 4 business days after the clinic has received the results. If you do not hear from us within 7 days, please contact the clinic through MyChart or phone. If you have a critical or abnormal lab result, we will notify you by phone as soon as possible.  Submit refill requests through YapStone or call your pharmacy and they will forward the refill request to us. Please  "allow 3 business days for your refill to be completed.          Additional Information About Your Visit        [email or web address] Information     Typeform gives you secure access to your electronic health record. If you see a primary care provider, you can also send messages to your care team and make appointments. If you have questions, please call your primary care clinic.  If you do not have a primary care provider, please call [phone] and they will assist you.        Care EveryWhere ID     This is your Care EveryWhere ID. This could be used by other organizations to access your [facility] medical records  [id number]        Your Vitals Were     Pulse Temperature Height Pulse Oximetry BMI (Body Mass Index)       91 98.6  F (37  C) (Tympanic) 5' 6\" (1.676 m) 94% 33.57 kg/m2        Blood Pressure from Last 3 Encounters:   06/26/17 128/72   06/20/17 120/60   06/15/17 150/68    Weight from Last 3 Encounters:   06/26/17 208 lb (94.3 kg)   06/20/17 208 lb (94.3 kg)   06/12/17 210 lb (95.3 kg)              Today, you had the following     No orders found for display         Today's Medication Changes          These changes are accurate as of: 6/26/17  4:16 PM.  If you have any questions, ask your nurse or doctor.               Start taking these medicines.        Dose/Directions    cefadroxil 500 MG capsule   Commonly known as:  DURICEF   Used for:  History of immunosuppression therapy, S/P breast biopsy   Started by:  Jeni Amin MD        Dose:  500 mg   Take 1 capsule (500 mg) by mouth 2 times daily   Quantity:  20 capsule   Refills:  0         These medicines have changed or have updated prescriptions.        Dose/Directions    pantoprazole 40 MG EC tablet   Commonly known as:  PROTONIX   This may have changed:  when to take this   Used for:  Diagnosis unknown        Dose:  40 mg   Take 1 tablet (40 mg) by mouth daily   Quantity:  90 tablet   Refills:  3            Where to get your medicines      These medications " were sent to [facility] - [place], [place] - [address]  [address], [place] [address]     Phone:  [phone]     cefadroxil 500 MG capsule                Primary Care Provider Office Phone # Fax #    Jimbo Martinez [phone]       [facility] [address]  [facility] [place] [address]        Equal Access to Services     RILEY OLIVEROS : Hadii aad ku hadasho Soomaali, waaxda luqadaha, qaybta kaalmada adeegyada, waxay idiin hayaan adeeg kharash laclement . So [facility] [phone].    ATENCIÓN: Si habla español, tiene a garza disposición servicios gratuitos de asistencia lingüística. Freddyame al [phone].    We comply with applicable federal civil rights laws and [place] laws. We do not discriminate on the basis of race, color, national origin, age, disability sex, sexual orientation or gender identity.            Thank you!     Thank you for choosing [facility]  for your care. Our goal is always to provide you with excellent care. Hearing back from our patients is one way we can continue to improve our services. Please take a few minutes to complete the written survey that you may receive in the mail after your visit with us. Thank you!             Your Updated Medication List - Protect others around you: Learn how to safely use, store and throw away your medicines at www.disposemymeds.org.          This list is accurate as of: 6/26/17  4:16 PM.  Always use your most recent med list.                   Brand Name Dispense Instructions for use Diagnosis    albuterol 108 (90 BASE) MCG/ACT Inhaler   Generic drug:  albuterol     8.5 g    INHALE 2 PUFFS INTO THE LUNGS EVERY 6 HOURS AS NEEDED FOR SHORTNESS OF BREATH OR WHEEZING    Influenza-like symptoms       ALFALFA PO      Take 5 tablets by mouth 2 times daily.        ALLEGRA-D ALLERGY & CONGESTION  MG per 12 hr tablet   Generic drug:  fexofenadine-pseudoePHEDrine     30 tablet    TAKE 1 TABLET BY MOUTH TWICE DAILY     Nasal congestion       CALCIUM + D 500-1000-40 MG-UNT-MCG Chew   Generic drug:  Calcium-Vitamin D-Vitamin K      Take 3 tablets by mouth daily        cefadroxil 500 MG capsule    DURICEF    20 capsule    Take 1 capsule (500 mg) by mouth 2 times daily    History of immunosuppression therapy, S/P breast biopsy       citalopram 40 MG tablet    celeXA     Take 40 mg by mouth        DICLOFENAC PO      Take 100 mg by mouth daily        fish oil-omega-3 fatty acids 1000 MG capsule      Take 1 g by mouth daily.        flax seed oil 1000 MG capsule      Take 2 capsules by mouth daily        fluticasone 50 MCG/ACT spray    FLONASE     1 spray Reported on 5/11/2017        folic acid 1 MG tablet    FOLVITE     Take 1 mg by mouth 3 times daily.        gabapentin 300 MG capsule    NEURONTIN    210 capsule    TAKE 3 CAPSULES BY MOUTH IN THE MORNING, 2 CAPSULES AT NOON AND 2 CAPSULES IN THE EVENING    Peripheral polyneuropathy (H)       Garlic 400 MG Tbec           HYDROcodone-acetaminophen 7.5-325 MG per tablet    NORCO    60 tablet    TAKE 1 TABLET BY MOUTH EVERY 4 TO 6 HOURS AS NEEDED FOR PAIN    Rheumatoid arthritis involving multiple sites with positive rheumatoid factor (H)       ketotifen 0.025 % Soln ophthalmic solution    ZADITOR/REFRESH ANTI-ITCH     1 drop        METHOTREXATE SODIUM (PF) IJ      Inject 0.8 mLs as directed once a week        MILK THISTLE PO      Take 1,000 mg by mouth daily.        MULTIPLE VITAMIN PO      Take  by mouth 2 times daily.        pantoprazole 40 MG EC tablet    PROTONIX    90 tablet    Take 1 tablet (40 mg) by mouth daily    Diagnosis unknown       PLAQUENIL 200 MG tablet   Generic drug:  hydroxychloroquine      Take 200 mg by mouth 2 times daily.        PREBIOTIC PRODUCT PO      Take 1 tablet by mouth daily        REFRESH 1.4-0.6 % ophthalmic solution   Generic drug:  polyvinyl alcohol-povidone      1-2 drops as needed        RESTASIS 0.05 % ophthalmic emulsion   Generic drug:  cycloSPORINE      60 each    USE 1 DROP IN BOTH EYES 2 TIMES A DAY    Bilateral dry eyes       SM GAS RELIEF ANTIFLATUENT 180 MG Caps   Generic drug:  Simethicone     60 capsule    TAKE ONE CAPSULE BY MOUTH AS NEEDED AFTER A MEAL. MAX 2 CAPS/DAY    Gas       SOLUBLE FIBER/PROBIOTICS PO      Take 1 tablet by mouth        traZODone 50 MG tablet    DESYREL    270 tablet    TAKE 2-3 TABLETS BY MOUTH DAILY AT BEDTIME    Primary insomnia       Vitamin C 500 MG Caps      Take 2 tablets by mouth 2 times daily.        vitamin D 1000 UNITS capsule      Take 1 capsule by mouth daily.

## 2017-06-26 NOTE — NURSING NOTE
"Chief Complaint   Patient presents with     Surgical Followup     status post wire localized excision of right breast mass 6/12/17.  Draining a little bit.         Initial /72 (BP Location: Right arm, Patient Position: Chair, Cuff Size: Adult Regular)  Pulse 91  Temp 98.6  F (37  C) (Tympanic)  Ht 5' 6\" (1.676 m)  Wt 208 lb (94.3 kg)  SpO2 94%  BMI 33.57 kg/m2 Estimated body mass index is 33.57 kg/(m^2) as calculated from the following:    Height as of this encounter: 5' 6\" (1.676 m).    Weight as of this encounter: 208 lb (94.3 kg).  Medication Reconciliation: selina BARBOSA      "

## 2017-06-27 RX ORDER — GABAPENTIN 300 MG/1
CAPSULE ORAL
Qty: 210 CAPSULE | Refills: 0 | Status: SHIPPED | OUTPATIENT
Start: 2017-06-27 | End: 2017-08-01

## 2017-06-27 RX ORDER — FEXOFENADINE HYDROCHLORIDE AND PSEUDOEPHEDRINE HYDROCHLORIDE 60; 120 MG/1; MG/1
TABLET, FILM COATED, EXTENDED RELEASE ORAL
Qty: 30 TABLET | Refills: 0 | Status: SHIPPED | OUTPATIENT
Start: 2017-06-27 | End: 2017-07-21

## 2017-06-27 NOTE — TELEPHONE ENCOUNTER
Allegra      Last Written Prescription Date: 06/01/2017  Last Fill Quantity: 30,  # refills: 0  Last Office Visit with [facility], [facility] or [facility] prescribing provider: 6/07/2017

## 2017-06-27 NOTE — TELEPHONE ENCOUNTER
Gabapentin      Last Written Prescription Date: 5/24/2017  Last Quantity: 210, # refills: 0  Last Office Visit with [facility], [facility] or [facility] prescribing provider: 6/07/2017       Creatinine   Date Value Ref Range Status   06/21/2017 1.13 (H) 0.52 - 1.04 mg/dL Final     Lab Results   Component Value Date    AST 25 11/22/2016     Lab Results   Component Value Date    ALT 28 06/21/2017     BP Readings from Last 3 Encounters:   06/26/17 128/72   06/20/17 120/60   06/15/17 150/68

## 2017-06-28 ENCOUNTER — OFFICE VISIT (OUTPATIENT)
Dept: SURGERY | Facility: OTHER | Age: 67
End: 2017-06-28
Attending: SURGERY
Payer: MEDICARE

## 2017-06-28 VITALS
SYSTOLIC BLOOD PRESSURE: 130 MMHG | WEIGHT: 208 LBS | HEART RATE: 89 BPM | BODY MASS INDEX: 33.43 KG/M2 | TEMPERATURE: 98.3 F | HEIGHT: 66 IN | DIASTOLIC BLOOD PRESSURE: 72 MMHG | OXYGEN SATURATION: 94 %

## 2017-06-28 DIAGNOSIS — Z98.890 STATUS POST BREAST BIOPSY: Primary | ICD-10-CM

## 2017-06-28 PROCEDURE — 99212 OFFICE O/P EST SF 10 MIN: CPT

## 2017-06-28 PROCEDURE — 99024 POSTOP FOLLOW-UP VISIT: CPT | Performed by: SURGERY

## 2017-06-28 ASSESSMENT — PAIN SCALES - GENERAL: PAINLEVEL: MILD PAIN (2)

## 2017-06-28 NOTE — PROGRESS NOTES
"HPI  Returns following dressing instructions post breast biopsy.  Patient is immunosuppressed with methotrexate    ROS  10 point ROS neg other than the symptoms noted above in the HPI.      Physical Exam    /72 (BP Location: Right arm, Patient Position: Chair, Cuff Size: Adult Regular)  Pulse 89  Temp 98.3  F (36.8  C) (Tympanic)  Ht 5' 6\" (1.676 m)  Wt 208 lb (94.3 kg)  SpO2 94%  BMI 33.57 kg/m2    The region of xeroform suggests sensitivity to this as well.  Will DC and have her simply shower and change dressings daily.      Return one week.  Continue to hold methotrexate until wound healing complete.    Jeni Amin MD, FACS        "

## 2017-06-28 NOTE — MR AVS SNAPSHOT
After Visit Summary   6/28/2017    Melvi Cleveland    MRN: [id number]           Patient Information     Date Of Birth          1950        Visit Information        Provider Department      6/28/2017 1:30 PM Jeni Amin MD [facility]        Today's Diagnoses     Status post breast biopsy    -  1      Care Instructions    Thank you for allowing Dr. Amin and our surgical team to participate in your care.  If you have a scheduling or an appointment question please contact Savannah, our Health Unit Coordinator at her direct line [phone].   ALL nursing questions or concerns can be directed to your surgical nurse at: [phone]-Cara      Discontinue xeroform use -  Warm compress region 20 minutes 3x daily.    If redness, drainage or worsening - Levaquin Rx.  Return 7/5/17          Follow-ups after your visit        Your next 10 appointments already scheduled     Jul 05, 2017 10:30 AM CDT   (Arrive by 10:15 AM)   Post Op with Jeni Amin MD   [facility] ([facility] [place] )    [address]  [place] [address]   [phone]              Who to contact     If you have questions or need follow up information about today's clinic visit or your schedule please [facility] directly at [phone].  Normal or non-critical lab and imaging results will be communicated to you by MyChart, letter or phone within 4 business days after the clinic has received the results. If you do not hear from us within 7 days, please contact the clinic through MyChart or phone. If you have a critical or abnormal lab result, we will notify you by phone as soon as possible.  Submit refill requests through [employer] or call your pharmacy and they will forward the refill request to us. Please allow 3 business days for your refill to be completed.          Additional Information About Your Visit        [employer] Information     [employer] gives you secure  "access to your electronic health record. If you see a primary care provider, you can also send messages to your care team and make appointments. If you have questions, please call your primary care clinic.  If you do not have a primary care provider, please call [phone] and they will assist you.        Care EveryWhere ID     This is your Care EveryWhere ID. This could be used by other organizations to access your [place] medical records  [id number]        Your Vitals Were     Pulse Temperature Height Pulse Oximetry BMI (Body Mass Index)       89 98.3  F (36.8  C) (Tympanic) 5' 6\" (1.676 m) 94% 33.57 kg/m2        Blood Pressure from Last 3 Encounters:   06/28/17 130/72   06/26/17 128/72   06/20/17 120/60    Weight from Last 3 Encounters:   06/28/17 208 lb (94.3 kg)   06/26/17 208 lb (94.3 kg)   06/20/17 208 lb (94.3 kg)              Today, you had the following     No orders found for display         Today's Medication Changes          These changes are accurate as of: 6/28/17  1:52 PM.  If you have any questions, ask your nurse or doctor.               These medicines have changed or have updated prescriptions.        Dose/Directions    pantoprazole 40 MG EC tablet   Commonly known as:  PROTONIX   This may have changed:  when to take this   Used for:  Diagnosis unknown        Dose:  40 mg   Take 1 tablet (40 mg) by mouth daily   Quantity:  90 tablet   Refills:  3                Primary Care Provider Office Phone # Fax #    Jimbo Martinez [phone]       [address] [facility] [address]        Equal Access to Services     [facility] AH: Hadii dawna corley hadashshameka Sodex, waaxda luqadaha, qaybta kaalmabill garcia. So [facility] [phone].    ATENCIÓN: Si habla español, tiene a garza disposición servicios gratuitos de asistencia lingüística. Llame al [phone].    We comply with applicable federal civil rights laws and [place] laws. " We do not discriminate on the basis of race, color, national origin, age, disability sex, sexual orientation or gender identity.            Thank you!     Thank you for choosing [facility]  for your [facility]. Our goal is always to provide you with excellent care. Hearing back from our patients is one way we can continue to improve our services. Please take a few minutes to complete the written survey that you may receive in the mail after your visit with us. Thank you!             Your Updated Medication List - Protect others around you: Learn how to safely use, store and throw away your medicines at www.disposemymeds.org.          This list is accurate as of: 6/28/17  1:52 PM.  Always use your most recent med list.                   Brand Name Dispense Instructions for use Diagnosis    albuterol 108 (90 BASE) MCG/ACT Inhaler   Generic drug:  albuterol     8.5 g    INHALE 2 PUFFS INTO THE LUNGS EVERY 6 HOURS AS NEEDED FOR SHORTNESS OF BREATH OR WHEEZING    Influenza-like symptoms       ALFALFA PO      Take 5 tablets by mouth 2 times daily.        ALLEGRA-D ALLERGY & CONGESTION  MG per 12 hr tablet   Generic drug:  fexofenadine-pseudoePHEDrine     30 tablet    TAKE 1 TABLET BY MOUTH TWICE DAILY    Nasal congestion       CALCIUM + D 500-1000-40 MG-UNT-MCG Chew   Generic drug:  Calcium-Vitamin D-Vitamin K      Take 3 tablets by mouth daily        cefadroxil 500 MG capsule    DURICEF    20 capsule    Take 1 capsule (500 mg) by mouth 2 times daily    History of immunosuppression therapy, S/P breast biopsy       citalopram 40 MG tablet    celeXA     Take 40 mg by mouth        DICLOFENAC PO      Take 100 mg by mouth daily        fish oil-omega-3 fatty acids 1000 MG capsule      Take 1 g by mouth daily.        flax seed oil 1000 MG capsule      Take 2 capsules by mouth daily        fluticasone 50 MCG/ACT spray    FLONASE     1 spray Reported on 5/11/2017        folic acid 1 MG tablet    FOLVITE     Take 1 mg  by mouth 3 times daily.        gabapentin 300 MG capsule    NEURONTIN    210 capsule    TAKE 3 CAPSULES BY MOUTH IN THE MORNING, 2 CAPSULES AT NOON AND 2 CAPSULES IN THE EVENING    Peripheral polyneuropathy (H)       Garlic 400 MG Tbec           HYDROcodone-acetaminophen 7.5-325 MG per tablet    NORCO    60 tablet    TAKE 1 TABLET BY MOUTH EVERY 4 TO 6 HOURS AS NEEDED FOR PAIN    Rheumatoid arthritis involving multiple sites with positive rheumatoid factor (H)       ketotifen 0.025 % Soln ophthalmic solution    ZADITOR/REFRESH ANTI-ITCH     1 drop        METHOTREXATE SODIUM (PF) IJ      Inject 0.8 mLs as directed once a week        MILK THISTLE PO      Take 1,000 mg by mouth daily.        MULTIPLE VITAMIN PO      Take  by mouth 2 times daily.        pantoprazole 40 MG EC tablet    PROTONIX    90 tablet    Take 1 tablet (40 mg) by mouth daily    Diagnosis unknown       PLAQUENIL 200 MG tablet   Generic drug:  hydroxychloroquine      Take 200 mg by mouth 2 times daily.        PREBIOTIC PRODUCT PO      Take 1 tablet by mouth daily        REFRESH 1.4-0.6 % ophthalmic solution   Generic drug:  polyvinyl alcohol-povidone      1-2 drops as needed        RESTASIS 0.05 % ophthalmic emulsion   Generic drug:  cycloSPORINE     60 each    USE 1 DROP IN BOTH EYES 2 TIMES A DAY    Bilateral dry eyes       SM GAS RELIEF ANTIFLATUENT 180 MG Caps   Generic drug:  Simethicone     60 capsule    TAKE ONE CAPSULE BY MOUTH AS NEEDED AFTER A MEAL. MAX 2 CAPS/DAY    Gas       SOLUBLE FIBER/PROBIOTICS PO      Take 1 tablet by mouth        traZODone 50 MG tablet    DESYREL    270 tablet    TAKE 2-3 TABLETS BY MOUTH DAILY AT BEDTIME    Primary insomnia       Vitamin C 500 MG Caps      Take 2 tablets by mouth 2 times daily.        vitamin D 1000 UNITS capsule      Take 1 capsule by mouth daily.

## 2017-06-28 NOTE — NURSING NOTE
"Chief Complaint   Patient presents with     Surgical Followup     s/p-wire localized excision right breast mass 6/12/17-Site became red, swollen and painful, started yesterday, but got worse today.         Initial /72 (BP Location: Right arm, Patient Position: Chair, Cuff Size: Adult Regular)  Pulse 89  Temp 98.3  F (36.8  C) (Tympanic)  Ht 5' 6\" (1.676 m)  Wt 208 lb (94.3 kg)  SpO2 94%  BMI 33.57 kg/m2 Estimated body mass index is 33.57 kg/(m^2) as calculated from the following:    Height as of this encounter: 5' 6\" (1.676 m).    Weight as of this encounter: 208 lb (94.3 kg).  Medication Reconciliation: complete   JIGAR BARBOSA      "

## 2017-06-28 NOTE — PATIENT INSTRUCTIONS
Thank you for allowing Dr. Amin and our surgical team to participate in your care.  If you have a scheduling or an appointment question please contact Savannah, our Health Unit Coordinator at her direct line [phone].   ALL nursing questions or concerns can be directed to your surgical nurse at: [phone]-Cara      Discontinue xeroform use -  Warm compress region 20 minutes 3x daily.    If redness, drainage or worsening - Levaquin Rx.  Return 7/5/17

## 2017-07-05 ENCOUNTER — OFFICE VISIT (OUTPATIENT)
Dept: SURGERY | Facility: OTHER | Age: 67
End: 2017-07-05
Attending: SURGERY
Payer: MEDICARE

## 2017-07-05 VITALS
WEIGHT: 208 LBS | TEMPERATURE: 98.1 F | HEIGHT: 66 IN | SYSTOLIC BLOOD PRESSURE: 102 MMHG | BODY MASS INDEX: 33.43 KG/M2 | HEART RATE: 82 BPM | OXYGEN SATURATION: 93 % | DIASTOLIC BLOOD PRESSURE: 70 MMHG

## 2017-07-05 DIAGNOSIS — Z98.890 S/P BREAST BIOPSY, RIGHT: Primary | ICD-10-CM

## 2017-07-05 PROCEDURE — 99212 OFFICE O/P EST SF 10 MIN: CPT

## 2017-07-05 PROCEDURE — 99024 POSTOP FOLLOW-UP VISIT: CPT | Performed by: SURGERY

## 2017-07-05 ASSESSMENT — PAIN SCALES - GENERAL: PAINLEVEL: MILD PAIN (2)

## 2017-07-05 NOTE — MR AVS SNAPSHOT
After Visit Summary   7/5/2017    Melvi Cleveland    MRN: [id number]           Patient Information     Date Of Birth          1950        Visit Information        Provider Department      7/5/2017 10:30 AM Jeni Amin MD [facility]        Today's Diagnoses     S/P breast biopsy, right    -  1      Care Instructions    Thank you for allowing Dr. Amin and our surgical team to participate in your care. Please call with any scheduling questions or concerns to Savannah at [phone] or for nursing questions  Cara [phone]..      Continue daily showering and dressing as demonstrated.  If you have further questions, concerns or wish to have surgical site further evaluated, please do not hesitate to call.      Plan for your bilateral screening mammogram in October and this will represent a near 6-month post biopsy documentation of scarring.          Follow-ups after your visit        Who to contact     If you have questions or need follow up information about today's clinic visit or your schedule please contact [facility] directly at [phone].  Normal or non-critical lab and imaging results will be communicated to you by AR LLChart, letter or phone within 4 business days after the clinic has received the results. If you do not hear from us within 7 days, please contact the clinic through AR LLChart or phone. If you have a critical or abnormal lab result, we will notify you by phone as soon as possible.  Submit refill requests through [employer] or call your pharmacy and they will forward the refill request to us. Please allow 3 business days for your refill to be completed.          Additional Information About Your Visit        AR LLCharStyl[employer] Media Information     [employer] gives you secure access to your electronic health record. If you see a primary care provider, you can also send messages to your care team and make appointments. If you have questions, please call your primary  "care clinic.  If you do not have a primary care provider, please call [phone] and they will assist you.        Care EveryWhere ID     This is your Care EveryWhere ID. This could be used by other organizations to access your [place] medical records  [id number]        Your Vitals Were     Pulse Temperature Height Pulse Oximetry BMI (Body Mass Index)       82 98.1  F (36.7  C) (Tympanic) 5' 6\" (1.676 m) 93% 33.57 kg/m2        Blood Pressure from Last 3 Encounters:   07/05/17 102/70   06/28/17 130/72   06/26/17 128/72    Weight from Last 3 Encounters:   07/05/17 208 lb (94.3 kg)   06/28/17 208 lb (94.3 kg)   06/26/17 208 lb (94.3 kg)              Today, you had the following     No orders found for display         Today's Medication Changes          These changes are accurate as of: 7/5/17 10:47 AM.  If you have any questions, ask your nurse or doctor.               These medicines have changed or have updated prescriptions.        Dose/Directions    pantoprazole 40 MG EC tablet   Commonly known as:  PROTONIX   This may have changed:  when to take this   Used for:  Diagnosis unknown        Dose:  40 mg   Take 1 tablet (40 mg) by mouth daily   Quantity:  90 tablet   Refills:  3                Primary Care Provider Office Phone # Fax #    Jimbo Martinez [phone]       [address] [address]        Equal Access to Services     RILEY OLIVEROS AH: Kenna montejoo Sodex, waaxda luqadaha, qaybta kaalmada gwyn, bill soto. So [facility] [phone].    ATENCIÓN: Si habla español, tiene a garza disposición servicios gratuitos de asistencia lingüística. Llame al [phone].    We comply with applicable federal civil rights laws and [place] laws. We do not discriminate on the basis of race, color, national origin, age, disability sex, sexual orientation or gender identity.            Thank you!     Thank you for choosing [facility] [place]  for your care. Our goal is always to provide you with excellent care. Hearing back from our patients is one way we can continue to improve our services. Please take a few minutes to complete the written survey that you may receive in the mail after your visit with us. Thank you!             Your Updated Medication List - Protect others around you: Learn how to safely use, store and throw away your medicines at www.disposemymeds.org.          This list is accurate as of: 7/5/17 10:47 AM.  Always use your most recent med list.                   Brand Name Dispense Instructions for use Diagnosis    albuterol 108 (90 BASE) MCG/ACT Inhaler   Generic drug:  albuterol     8.5 g    INHALE 2 PUFFS INTO THE LUNGS EVERY 6 HOURS AS NEEDED FOR SHORTNESS OF BREATH OR WHEEZING    Influenza-like symptoms       ALFALFA PO      Take 5 tablets by mouth 2 times daily.        ALLEGRA-D ALLERGY & CONGESTION  MG per 12 hr tablet   Generic drug:  fexofenadine-pseudoePHEDrine     30 tablet    TAKE 1 TABLET BY MOUTH TWICE DAILY    Nasal congestion       CALCIUM + D 500-1000-40 MG-UNT-MCG Chew   Generic drug:  Calcium-Vitamin D-Vitamin K      Take 3 tablets by mouth daily        cefadroxil 500 MG capsule    DURICEF    20 capsule    Take 1 capsule (500 mg) by mouth 2 times daily    History of immunosuppression therapy, S/P breast biopsy       citalopram 40 MG tablet    celeXA     Take 40 mg by mouth        DICLOFENAC PO      Take 100 mg by mouth daily        fish oil-omega-3 fatty acids 1000 MG capsule      Take 1 g by mouth daily.        flax seed oil 1000 MG capsule      Take 2 capsules by mouth daily        fluticasone 50 MCG/ACT spray    FLONASE     1 spray Reported on 5/11/2017        folic acid 1 MG tablet    FOLVITE     Take 1 mg by mouth 3 times daily.        gabapentin 300 MG capsule    NEURONTIN    210 capsule    TAKE 3 CAPSULES BY MOUTH IN THE MORNING, 2 CAPSULES AT NOON AND 2 CAPSULES IN THE EVENING    Peripheral  polyneuropathy (H)       Garlic 400 MG Tbec           HYDROcodone-acetaminophen 7.5-325 MG per tablet    NORCO    60 tablet    TAKE 1 TABLET BY MOUTH EVERY 4 TO 6 HOURS AS NEEDED FOR PAIN    Rheumatoid arthritis involving multiple sites with positive rheumatoid factor (H)       ketotifen 0.025 % Soln ophthalmic solution    ZADITOR/REFRESH ANTI-ITCH     1 drop        METHOTREXATE SODIUM (PF) IJ      Inject 0.8 mLs as directed once a week        MILK THISTLE PO      Take 1,000 mg by mouth daily.        MULTIPLE VITAMIN PO      Take  by mouth 2 times daily.        pantoprazole 40 MG EC tablet    PROTONIX    90 tablet    Take 1 tablet (40 mg) by mouth daily    Diagnosis unknown       PLAQUENIL 200 MG tablet   Generic drug:  hydroxychloroquine      Take 200 mg by mouth 2 times daily.        PREBIOTIC PRODUCT PO      Take 1 tablet by mouth daily        REFRESH 1.4-0.6 % ophthalmic solution   Generic drug:  polyvinyl alcohol-povidone      1-2 drops as needed        RESTASIS 0.05 % ophthalmic emulsion   Generic drug:  cycloSPORINE     60 each    USE 1 DROP IN BOTH EYES 2 TIMES A DAY    Bilateral dry eyes       SM GAS RELIEF ANTIFLATUENT 180 MG Caps   Generic drug:  Simethicone     60 capsule    TAKE ONE CAPSULE BY MOUTH AS NEEDED AFTER A MEAL. MAX 2 CAPS/DAY    Gas       SOLUBLE FIBER/PROBIOTICS PO      Take 1 tablet by mouth        traZODone 50 MG tablet    DESYREL    270 tablet    TAKE 2-3 TABLETS BY MOUTH DAILY AT BEDTIME    Primary insomnia       Vitamin C 500 MG Caps      Take 2 tablets by mouth 2 times daily.        vitamin D 1000 UNITS capsule      Take 1 capsule by mouth daily.

## 2017-07-05 NOTE — NURSING NOTE
"Chief Complaint   Patient presents with     Surgical Followup     s/p wire localized excision, right breast mass, 6/12/17       Initial /70  Pulse 82  Temp 98.1  F (36.7  C) (Tympanic)  Ht 5' 6\" (1.676 m)  Wt 208 lb (94.3 kg)  SpO2 93%  BMI 33.57 kg/m2 Estimated body mass index is 33.57 kg/(m^2) as calculated from the following:    Height as of this encounter: 5' 6\" (1.676 m).    Weight as of this encounter: 208 lb (94.3 kg).  Medication Reconciliation: complete     Sandie Neff      "

## 2017-07-05 NOTE — PROGRESS NOTES
"  HPI:  Returns for post surgical examination following excision ADH right breast having developed skin reaction from the glue and adhesive tape.  This has been managed conservatively and she notes near resolution. without complaint.  Denies wound complication, fever, chills, nausea or vomiting.  ROS:   10 point ROS neg other than the symptoms noted above in the HPI.    Examination:  /70  Pulse 82  Temp 98.1  F (36.7  C) (Tympanic)  Ht 5' 6\" (1.676 m)  Wt 208 lb (94.3 kg)  SpO2 93%  BMI 33.57 kg/m2    Constitutional: healthy, alert and no distress  HEENT:  No obvious masses, lesions,  or abnormalities  Wound healing nicely with resolution of contact dermatitis from allergy to tape and glue.    Impression:  Satisfactory course.  Recommendations:  Continue dressing until healed which we expect within a matter of days.  Reviewed timing of her mammography.  Yearly screening is due at the end of October and this will serve to document post biopsy scarring.  Jeni Amin MD, FACS    7/5/2017  10:44 AM             "

## 2017-07-05 NOTE — PATIENT INSTRUCTIONS
Thank you for allowing Dr. Amin and our surgical team to participate in your care. Please call with any scheduling questions or concerns to Savannah at [phone] or for nursing questions  Cara [phone]..      Continue daily showering and dressing as demonstrated.  If you have further questions, concerns or wish to have surgical site further evaluated, please do not hesitate to call.      Plan for your bilateral screening mammogram in October and this will represent a near 6-month post biopsy documentation of scarring.

## 2017-07-08 ENCOUNTER — HEALTH MAINTENANCE LETTER (OUTPATIENT)
Age: 67
End: 2017-07-08

## 2017-07-21 DIAGNOSIS — R09.81 NASAL CONGESTION: ICD-10-CM

## 2017-07-25 RX ORDER — FEXOFENADINE HYDROCHLORIDE AND PSEUDOEPHEDRINE HYDROCHLORIDE 60; 120 MG/1; MG/1
TABLET, FILM COATED, EXTENDED RELEASE ORAL
Qty: 30 TABLET | Refills: 0 | Status: SHIPPED | OUTPATIENT
Start: 2017-07-25 | End: 2017-08-23

## 2017-07-25 NOTE — TELEPHONE ENCOUNTER
Lula KIRBY      Last Written Prescription Date: 6/27/2017  Last Fill Quantity: 30,  # refills: 0   Last Office Visit with [facility], [facility] or [facility] prescribing provider: 6/7/2017

## 2017-08-01 DIAGNOSIS — G62.9 PERIPHERAL POLYNEUROPATHY: ICD-10-CM

## 2017-08-02 RX ORDER — GABAPENTIN 300 MG/1
CAPSULE ORAL
Qty: 210 CAPSULE | Refills: 0 | Status: SHIPPED | OUTPATIENT
Start: 2017-08-02 | End: 2017-08-29

## 2017-08-02 NOTE — TELEPHONE ENCOUNTER
Gabapentin      Last Written Prescription Date:  6.27.17  Last Fill Quantity: #210,   # refills: 0  Last Office Visit with [facility], [facility] [place] or  Health prescribing provider: 6.7.17  Future Office visit:       Routing refill request to provider for review/approval because:  Drug not on the [facility], [facility] [place] or  Health refill protocol or controlled substance

## 2017-08-08 ENCOUNTER — OFFICE VISIT (OUTPATIENT)
Dept: SURGERY | Facility: OTHER | Age: 67
End: 2017-08-08
Attending: SURGERY
Payer: MEDICARE

## 2017-08-08 VITALS
WEIGHT: 208 LBS | DIASTOLIC BLOOD PRESSURE: 70 MMHG | SYSTOLIC BLOOD PRESSURE: 118 MMHG | HEIGHT: 66 IN | OXYGEN SATURATION: 94 % | HEART RATE: 77 BPM | BODY MASS INDEX: 33.43 KG/M2

## 2017-08-08 DIAGNOSIS — Z98.890 S/P BREAST BIOPSY: Primary | ICD-10-CM

## 2017-08-08 PROCEDURE — 99212 OFFICE O/P EST SF 10 MIN: CPT

## 2017-08-08 PROCEDURE — 99024 POSTOP FOLLOW-UP VISIT: CPT | Performed by: SURGERY

## 2017-08-08 ASSESSMENT — PAIN SCALES - GENERAL: PAINLEVEL: NO PAIN (0)

## 2017-08-08 NOTE — PATIENT INSTRUCTIONS
Thank you for allowing Dr. Amin and our surgical team to participate in your care.  If you have a scheduling or an appointment question please contact Savannah, our Health Unit Coordinator at her direct line [phone].   ALL nursing questions or concerns can be directed to your surgical nurse at: [phone]-Cara        You are due for yearly mammography and if you schedule late November - this represents 6 month interval from the operation.

## 2017-08-08 NOTE — NURSING NOTE
"Chief Complaint   Patient presents with     Surgical Followup     s/p- wire localized excision right breast mass 6/12/17.  Incision is healed but breast was swelling, hard and painful.  Symptoms have gotten less now.         Initial /70 (BP Location: Right arm, Patient Position: Chair, Cuff Size: Adult Regular)  Pulse 77  Ht 5' 6\" (1.676 m)  Wt 208 lb (94.3 kg)  SpO2 94%  BMI 33.57 kg/m2 Estimated body mass index is 33.57 kg/(m^2) as calculated from the following:    Height as of this encounter: 5' 6\" (1.676 m).    Weight as of this encounter: 208 lb (94.3 kg).  Medication Reconciliation: selina BARBOSA      "

## 2017-08-08 NOTE — PROGRESS NOTES
"HPI  Nearly 2 months following excision ADH right breast.  She has been doing well but noted discomfort and firm-ness in the region.  She restarted her methotrexate for Crohn's and arthritis.  Warm compresses were applied and her discomfort resolved.   She developed concern and presents for evaluation.  At this time, the discomfort has resolved.  She describes the entire breast having a sharp pain and she felt that it was swollen.  This has now resolved.  She is otherwise well and both the Crohn's and hand arthritic changes are improved.    ROS  10 point ROS neg other than the symptoms noted above in the HPI.    Physical Exam    /70 (BP Location: Right arm, Patient Position: Chair, Cuff Size: Adult Regular)  Pulse 77  Ht 5' 6\" (1.676 m)  Wt 208 lb (94.3 kg)  SpO2 94%  BMI 33.57 kg/m2      Healed medially circumareolar incision, right breast.  Some nipple retraction but cosmetics satisfactory.  There is firmness associated with the healing ridge but no suggestion of infection or untoward circumstance.  Contour of the breast is good.    Reassured.  Imaging will be at one year.    Jeni Amin MD, FACS    8/8/2017  10:20 AM      "

## 2017-08-08 NOTE — MR AVS SNAPSHOT
After Visit Summary   8/8/2017    Melvi Cleveland    MRN: [id number]           Patient Information     Date Of Birth          1950        Visit Information        Provider Department      8/8/2017 10:00 AM [facility]        Today's Diagnoses     S/P breast biopsy    -  1      Care Instructions    Thank you for allowing Dr. Amin and our surgical team to participate in your care.  If you have a scheduling or an appointment question please contact Savannah, our Health Unit Coordinator at her direct line [phone].   ALL nursing questions or concerns can be directed to your surgical nurse at: [phone]-heather        You are due for yearly mammography and if you schedule late November - this represents 6 month interval from the operation.              Follow-ups after your visit        Who to contact     If you have questions or need follow up information about today's clinic visit or your schedule please contact [facility] directly at [phone].  Normal or non-critical lab and imaging results will be communicated to you by [employer], letter or phone within 4 business days after the clinic has received the results. If you do not hear from us within 7 days, please contact the clinic through [employer] or phone. If you have a critical or abnormal lab result, we will notify you by phone as soon as possible.  Submit refill requests through [employer] or call your pharmacy and they will forward the refill request to us. Please allow 3 business days for your refill to be completed.          Additional Information About Your Visit        [employer] Information     [employer] gives you secure access to your electronic health record. If you see a primary care provider, you can also send messages to your care team and make appointments. If you have questions, please call your primary care clinic.  If you do not have a primary care provider, please call [phone]  "and they will assist you.        Care EveryWhere ID     This is your Care EveryWhere ID. This could be used by other organizations to access your [place] medical records  [id number]        Your Vitals Were     Pulse Height Pulse Oximetry BMI (Body Mass Index)          77 5' 6\" (1.676 m) 94% 33.57 kg/m2         Blood Pressure from Last 3 Encounters:   08/08/17 118/70   07/05/17 102/70   06/28/17 130/72    Weight from Last 3 Encounters:   08/08/17 208 lb (94.3 kg)   07/05/17 208 lb (94.3 kg)   06/28/17 208 lb (94.3 kg)              Today, you had the following     No orders found for display         Today's Medication Changes          These changes are accurate as of: 8/8/17 11:14 AM.  If you have any questions, ask your nurse or doctor.               These medicines have changed or have updated prescriptions.        Dose/Directions    pantoprazole 40 MG EC tablet   Commonly known as:  PROTONIX   This may have changed:  when to take this   Used for:  Diagnosis unknown        Dose:  40 mg   Take 1 tablet (40 mg) by mouth daily   Quantity:  90 tablet   Refills:  3         Stop taking these medicines if you haven't already. Please contact your care team if you have questions.     cefadroxil 500 MG capsule   Commonly known as:  DURICEF   Stopped by:  Jeni Amin MD                    Primary Care Provider Office Phone # Fax #    Jimbo Martinez [phone]       [address] [address]        Equal Access to Services     [facility] AH: Hadii dawna ku hadasho Soomaali, waaxda luqadaha, qaybta kaalmada erikegyada, waxzoran sheeba estevez . So [facility] [phone].    ATENCIÓN: Si habla español, tiene a garza disposición servicios gratuitos de asistencia lingüística. Llame al [phone].    We comply with applicable federal civil rights laws and [place] laws. We do not discriminate on the basis of race, color, national origin, age, disability sex, sexual " orientation or gender identity.            Thank you!     Thank you for choosing [facility] [place]  for your care. Our goal is always to provide you with excellent care. Hearing back from our patients is one way we can continue to improve our services. Please take a few minutes to complete the written survey that you may receive in the mail after your visit with us. Thank you!             Your Updated Medication List - Protect others around you: Learn how to safely use, store and throw away your medicines at www.disposemymeds.org.          This list is accurate as of: 8/8/17 11:14 AM.  Always use your most recent med list.                   Brand Name Dispense Instructions for use Diagnosis    albuterol 108 (90 BASE) MCG/ACT Inhaler   Generic drug:  albuterol     8.5 g    INHALE 2 PUFFS INTO THE LUNGS EVERY 6 HOURS AS NEEDED FOR SHORTNESS OF BREATH OR WHEEZING    Influenza-like symptoms       ALFALFA PO      Take 5 tablets by mouth 2 times daily.        ALLEGRA-D ALLERGY & CONGESTION  MG per 12 hr tablet   Generic drug:  fexofenadine-pseudoePHEDrine     30 tablet    TAKE 1 TABLET BY MOUTH TWICE DAILY    Nasal congestion       CALCIUM + D 500-1000-40 MG-UNT-MCG Chew   Generic drug:  Calcium-Vitamin D-Vitamin K      Take 3 tablets by mouth daily        citalopram 40 MG tablet    celeXA     Take 40 mg by mouth        DICLOFENAC PO      Take 100 mg by mouth daily        fish oil-omega-3 fatty acids 1000 MG capsule      Take 1 g by mouth daily.        flax seed oil 1000 MG capsule      Take 2 capsules by mouth daily        fluticasone 50 MCG/ACT spray    FLONASE     1 spray Reported on 5/11/2017        folic acid 1 MG tablet    FOLVITE     Take 1 mg by mouth 3 times daily.        gabapentin 300 MG capsule    NEURONTIN    210 capsule    TAKE 3 CAPSULES BY MOUTH IN THE MORNING, 2 CAPSULES AT NOON AND 2 CAPSULES IN THE EVENING    Peripheral polyneuropathy (H)       Garlic 400 MG Tbec            HYDROcodone-acetaminophen 7.5-325 MG per tablet    NORCO    60 tablet    TAKE 1 TABLET BY MOUTH EVERY 4 TO 6 HOURS AS NEEDED FOR PAIN    Rheumatoid arthritis involving multiple sites with positive rheumatoid factor (H)       ketotifen 0.025 % Soln ophthalmic solution    ZADITOR/REFRESH ANTI-ITCH     1 drop        METHOTREXATE SODIUM (PF) IJ      Inject 0.8 mLs as directed once a week        MILK THISTLE PO      Take 1,000 mg by mouth daily.        MULTIPLE VITAMIN PO      Take  by mouth 2 times daily.        pantoprazole 40 MG EC tablet    PROTONIX    90 tablet    Take 1 tablet (40 mg) by mouth daily    Diagnosis unknown       PLAQUENIL 200 MG tablet   Generic drug:  hydroxychloroquine      Take 200 mg by mouth 2 times daily.        PREBIOTIC PRODUCT PO      Take 1 tablet by mouth daily        REFRESH 1.4-0.6 % ophthalmic solution   Generic drug:  polyvinyl alcohol-povidone      1-2 drops as needed        RESTASIS 0.05 % ophthalmic emulsion   Generic drug:  cycloSPORINE     60 each    USE 1 DROP IN BOTH EYES 2 TIMES A DAY    Bilateral dry eyes       SM GAS RELIEF ANTIFLATUENT 180 MG Caps   Generic drug:  Simethicone     60 capsule    TAKE ONE CAPSULE BY MOUTH AS NEEDED AFTER A MEAL. MAX 2 CAPS/DAY    Gas       SOLUBLE FIBER/PROBIOTICS PO      Take 1 tablet by mouth        traZODone 50 MG tablet    DESYREL    270 tablet    TAKE 2-3 TABLETS BY MOUTH DAILY AT BEDTIME    Primary insomnia       Vitamin C 500 MG Caps      Take 2 tablets by mouth 2 times daily.        vitamin D 1000 UNITS capsule      Take 1 capsule by mouth daily.

## 2017-08-23 DIAGNOSIS — R09.81 NASAL CONGESTION: ICD-10-CM

## 2017-08-23 DIAGNOSIS — F51.01 PRIMARY INSOMNIA: ICD-10-CM

## 2017-08-23 RX ORDER — FEXOFENADINE HYDROCHLORIDE AND PSEUDOEPHEDRINE HYDROCHLORIDE 60; 120 MG/1; MG/1
TABLET, FILM COATED, EXTENDED RELEASE ORAL
Qty: 30 TABLET | Refills: 0 | Status: SHIPPED | OUTPATIENT
Start: 2017-08-23 | End: 2017-09-21

## 2017-08-23 RX ORDER — TRAZODONE HYDROCHLORIDE 50 MG/1
TABLET, FILM COATED ORAL
Qty: 270 TABLET | Refills: 1 | Status: SHIPPED | OUTPATIENT
Start: 2017-08-23 | End: 2018-03-02

## 2017-08-29 DIAGNOSIS — G62.9 PERIPHERAL POLYNEUROPATHY: ICD-10-CM

## 2017-08-30 RX ORDER — GABAPENTIN 300 MG/1
CAPSULE ORAL
Qty: 210 CAPSULE | Refills: 0 | Status: SHIPPED | OUTPATIENT
Start: 2017-08-30 | End: 2017-09-29

## 2017-09-01 ENCOUNTER — INFUSION THERAPY VISIT (OUTPATIENT)
Dept: INFUSION THERAPY | Facility: OTHER | Age: 67
End: 2017-09-01
Attending: FAMILY MEDICINE
Payer: MEDICARE

## 2017-09-01 ENCOUNTER — HOSPITAL ENCOUNTER (OUTPATIENT)
Dept: CT IMAGING | Facility: HOSPITAL | Age: 67
Discharge: HOME | End: 2017-09-01
Attending: FAMILY MEDICINE | Admitting: FAMILY MEDICINE
Payer: MEDICARE

## 2017-09-01 ENCOUNTER — TELEPHONE (OUTPATIENT)
Dept: FAMILY MEDICINE | Facility: OTHER | Age: 67
End: 2017-09-01

## 2017-09-01 ENCOUNTER — HOSPITAL ENCOUNTER (INPATIENT)
Facility: HOSPITAL | Age: 67
LOS: 4 days | Discharge: HOME | DRG: 690 | End: 2017-09-06
Attending: INTERNAL MEDICINE | Admitting: INTERNAL MEDICINE
Payer: MEDICARE

## 2017-09-01 ENCOUNTER — OFFICE VISIT (OUTPATIENT)
Dept: FAMILY MEDICINE | Facility: OTHER | Age: 67
End: 2017-09-01
Attending: FAMILY MEDICINE
Payer: MEDICARE

## 2017-09-01 VITALS
RESPIRATION RATE: 19 BRPM | BODY MASS INDEX: 33.43 KG/M2 | SYSTOLIC BLOOD PRESSURE: 108 MMHG | WEIGHT: 208 LBS | TEMPERATURE: 99.1 F | HEIGHT: 66 IN | DIASTOLIC BLOOD PRESSURE: 68 MMHG | OXYGEN SATURATION: 93 % | HEART RATE: 99 BPM

## 2017-09-01 VITALS
TEMPERATURE: 99.1 F | SYSTOLIC BLOOD PRESSURE: 102 MMHG | HEIGHT: 66 IN | OXYGEN SATURATION: 92 % | HEART RATE: 86 BPM | DIASTOLIC BLOOD PRESSURE: 44 MMHG | WEIGHT: 208 LBS | BODY MASS INDEX: 33.43 KG/M2 | RESPIRATION RATE: 20 BRPM

## 2017-09-01 DIAGNOSIS — G44.211 INTRACTABLE EPISODIC TENSION-TYPE HEADACHE: Primary | ICD-10-CM

## 2017-09-01 DIAGNOSIS — M05.79 RHEUMATOID ARTHRITIS INVOLVING MULTIPLE SITES WITH POSITIVE RHEUMATOID FACTOR (H): ICD-10-CM

## 2017-09-01 DIAGNOSIS — N39.0 COMPLICATED UTI (URINARY TRACT INFECTION): ICD-10-CM

## 2017-09-01 DIAGNOSIS — E86.0 DEHYDRATION: Primary | ICD-10-CM

## 2017-09-01 DIAGNOSIS — N10 ACUTE PYELONEPHRITIS: Primary | ICD-10-CM

## 2017-09-01 DIAGNOSIS — E86.0 DEHYDRATION: ICD-10-CM

## 2017-09-01 LAB
ALBUMIN SERPL-MCNC: 2.3 G/DL (ref 3.4–5)
ALBUMIN UR-MCNC: 100 MG/DL
ALP SERPL-CCNC: 85 U/L (ref 40–150)
ALT SERPL W P-5'-P-CCNC: 38 U/L (ref 0–50)
AMYLASE SERPL-CCNC: 53 U/L (ref 30–110)
ANION GAP SERPL CALCULATED.3IONS-SCNC: 8 MMOL/L (ref 3–14)
ANION GAP SERPL CALCULATED.3IONS-SCNC: 8 MMOL/L (ref 3–14)
APPEARANCE UR: ABNORMAL
AST SERPL W P-5'-P-CCNC: 24 U/L (ref 0–45)
BACTERIA #/AREA URNS HPF: ABNORMAL /HPF
BASOPHILS # BLD AUTO: 0 10E9/L (ref 0–0.2)
BASOPHILS # BLD AUTO: 0 10E9/L (ref 0–0.2)
BASOPHILS NFR BLD AUTO: 0.2 %
BASOPHILS NFR BLD AUTO: 0.2 %
BILIRUB SERPL-MCNC: 0.7 MG/DL (ref 0.2–1.3)
BILIRUB UR QL STRIP: NEGATIVE
BUN SERPL-MCNC: 14 MG/DL (ref 7–30)
BUN SERPL-MCNC: 16 MG/DL (ref 7–30)
CALCIUM SERPL-MCNC: 8.2 MG/DL (ref 8.5–10.1)
CALCIUM SERPL-MCNC: 8.8 MG/DL (ref 8.5–10.1)
CHLORIDE SERPL-SCNC: 105 MMOL/L (ref 94–109)
CHLORIDE SERPL-SCNC: 105 MMOL/L (ref 94–109)
CK SERPL-CCNC: 80 U/L (ref 30–225)
CO2 SERPL-SCNC: 25 MMOL/L (ref 20–32)
CO2 SERPL-SCNC: 26 MMOL/L (ref 20–32)
COLOR UR AUTO: YELLOW
CREAT SERPL-MCNC: 1.1 MG/DL (ref 0.52–1.04)
CREAT SERPL-MCNC: 1.14 MG/DL (ref 0.52–1.04)
CRP SERPL-MCNC: 170 MG/L (ref 0–8)
DIFFERENTIAL METHOD BLD: ABNORMAL
DIFFERENTIAL METHOD BLD: ABNORMAL
EOSINOPHIL # BLD AUTO: 0 10E9/L (ref 0–0.7)
EOSINOPHIL # BLD AUTO: 0 10E9/L (ref 0–0.7)
EOSINOPHIL NFR BLD AUTO: 0 %
EOSINOPHIL NFR BLD AUTO: 0.1 %
ERYTHROCYTE [DISTWIDTH] IN BLOOD BY AUTOMATED COUNT: 14 % (ref 10–15)
ERYTHROCYTE [DISTWIDTH] IN BLOOD BY AUTOMATED COUNT: 14.3 % (ref 10–15)
ERYTHROCYTE [SEDIMENTATION RATE] IN BLOOD BY WESTERGREN METHOD: 46 MM/H (ref 0–30)
GFR SERPL CREATININE-BSD FRML MDRD: 48 ML/MIN/1.7M2
GFR SERPL CREATININE-BSD FRML MDRD: 50 ML/MIN/1.7M2
GLUCOSE SERPL-MCNC: 118 MG/DL (ref 70–99)
GLUCOSE SERPL-MCNC: 120 MG/DL (ref 70–99)
GLUCOSE UR STRIP-MCNC: NEGATIVE MG/DL
HCT VFR BLD AUTO: 32.7 % (ref 35–47)
HCT VFR BLD AUTO: 34.6 % (ref 35–47)
HGB BLD-MCNC: 10.8 G/DL (ref 11.7–15.7)
HGB BLD-MCNC: 11.5 G/DL (ref 11.7–15.7)
HGB UR QL STRIP: ABNORMAL
IMM GRANULOCYTES # BLD: 0.2 10E9/L (ref 0–0.4)
IMM GRANULOCYTES # BLD: 0.5 10E9/L (ref 0–0.4)
IMM GRANULOCYTES NFR BLD: 1.4 %
IMM GRANULOCYTES NFR BLD: 2.6 %
KETONES UR STRIP-MCNC: NEGATIVE MG/DL
LACTATE SERPL-SCNC: 1.3 MMOL/L (ref 0.4–2)
LEUKOCYTE ESTERASE UR QL STRIP: ABNORMAL
LIPASE SERPL-CCNC: 44 U/L (ref 73–393)
LYMPHOCYTES # BLD AUTO: 0.3 10E9/L (ref 0.8–5.3)
LYMPHOCYTES # BLD AUTO: 0.6 10E9/L (ref 0.8–5.3)
LYMPHOCYTES NFR BLD AUTO: 2.4 %
LYMPHOCYTES NFR BLD AUTO: 3.5 %
MCH RBC QN AUTO: 28.7 PG (ref 26.5–33)
MCH RBC QN AUTO: 28.9 PG (ref 26.5–33)
MCHC RBC AUTO-ENTMCNC: 33 G/DL (ref 31.5–36.5)
MCHC RBC AUTO-ENTMCNC: 33.2 G/DL (ref 31.5–36.5)
MCV RBC AUTO: 87 FL (ref 78–100)
MCV RBC AUTO: 87 FL (ref 78–100)
MONOCYTES # BLD AUTO: 0.8 10E9/L (ref 0–1.3)
MONOCYTES # BLD AUTO: 1.3 10E9/L (ref 0–1.3)
MONOCYTES NFR BLD AUTO: 5.8 %
MONOCYTES NFR BLD AUTO: 7.4 %
MUCOUS THREADS #/AREA URNS LPF: PRESENT /LPF
NEUTROPHILS # BLD AUTO: 12.1 10E9/L (ref 1.6–8.3)
NEUTROPHILS # BLD AUTO: 15.1 10E9/L (ref 1.6–8.3)
NEUTROPHILS NFR BLD AUTO: 86.3 %
NEUTROPHILS NFR BLD AUTO: 90.1 %
NITRATE UR QL: POSITIVE
NRBC # BLD AUTO: 0 10*3/UL
NRBC # BLD AUTO: 0 10*3/UL
NRBC BLD AUTO-RTO: 0 /100
NRBC BLD AUTO-RTO: 0 /100
PH UR STRIP: 6 PH (ref 4.7–8)
PLATELET # BLD AUTO: 119 10E9/L (ref 150–450)
PLATELET # BLD AUTO: 141 10E9/L (ref 150–450)
POTASSIUM SERPL-SCNC: 3.9 MMOL/L (ref 3.4–5.3)
POTASSIUM SERPL-SCNC: 4.4 MMOL/L (ref 3.4–5.3)
PROT SERPL-MCNC: 6.5 G/DL (ref 6.8–8.8)
RBC # BLD AUTO: 3.76 10E12/L (ref 3.8–5.2)
RBC # BLD AUTO: 3.98 10E12/L (ref 3.8–5.2)
RBC #/AREA URNS AUTO: 28 /HPF (ref 0–2)
SODIUM SERPL-SCNC: 138 MMOL/L (ref 133–144)
SODIUM SERPL-SCNC: 139 MMOL/L (ref 133–144)
SOURCE: ABNORMAL
SP GR UR STRIP: 1.02 (ref 1–1.03)
UROBILINOGEN UR STRIP-MCNC: NORMAL MG/DL (ref 0–2)
WBC # BLD AUTO: 13.5 10E9/L (ref 4–11)
WBC # BLD AUTO: 17.6 10E9/L (ref 4–11)
WBC #/AREA URNS AUTO: >182 /HPF (ref 0–2)
WBC CLUMPS #/AREA URNS HPF: PRESENT /HPF

## 2017-09-01 PROCEDURE — 96361 HYDRATE IV INFUSION ADD-ON: CPT

## 2017-09-01 PROCEDURE — 85025 COMPLETE CBC W/AUTO DIFF WBC: CPT | Performed by: INTERNAL MEDICINE

## 2017-09-01 PROCEDURE — 99212 OFFICE O/P EST SF 10 MIN: CPT

## 2017-09-01 PROCEDURE — 96366 THER/PROPH/DIAG IV INF ADDON: CPT

## 2017-09-01 PROCEDURE — 85025 COMPLETE CBC W/AUTO DIFF WBC: CPT | Mod: ZL | Performed by: FAMILY MEDICINE

## 2017-09-01 PROCEDURE — 99207 ZZC OFFICE-HOSPITAL ADMIT: CPT | Performed by: FAMILY MEDICINE

## 2017-09-01 PROCEDURE — 70450 CT HEAD/BRAIN W/O DYE: CPT | Mod: TC

## 2017-09-01 PROCEDURE — 36415 COLL VENOUS BLD VENIPUNCTURE: CPT | Mod: ZL | Performed by: FAMILY MEDICINE

## 2017-09-01 PROCEDURE — 25000128 H RX IP 250 OP 636: Performed by: FAMILY MEDICINE

## 2017-09-01 PROCEDURE — 25000128 H RX IP 250 OP 636: Performed by: INTERNAL MEDICINE

## 2017-09-01 PROCEDURE — 81001 URINALYSIS AUTO W/SCOPE: CPT | Performed by: INTERNAL MEDICINE

## 2017-09-01 PROCEDURE — 87088 URINE BACTERIA CULTURE: CPT | Performed by: INTERNAL MEDICINE

## 2017-09-01 PROCEDURE — 96365 THER/PROPH/DIAG IV INF INIT: CPT

## 2017-09-01 PROCEDURE — G0378 HOSPITAL OBSERVATION PER HR: HCPCS

## 2017-09-01 PROCEDURE — 87186 SC STD MICRODIL/AGAR DIL: CPT | Performed by: INTERNAL MEDICINE

## 2017-09-01 PROCEDURE — 86140 C-REACTIVE PROTEIN: CPT | Performed by: INTERNAL MEDICINE

## 2017-09-01 PROCEDURE — 96375 TX/PRO/DX INJ NEW DRUG ADDON: CPT

## 2017-09-01 PROCEDURE — 83605 ASSAY OF LACTIC ACID: CPT | Performed by: INTERNAL MEDICINE

## 2017-09-01 PROCEDURE — 36415 COLL VENOUS BLD VENIPUNCTURE: CPT | Performed by: INTERNAL MEDICINE

## 2017-09-01 PROCEDURE — 82550 ASSAY OF CK (CPK): CPT | Performed by: INTERNAL MEDICINE

## 2017-09-01 PROCEDURE — 83690 ASSAY OF LIPASE: CPT | Performed by: INTERNAL MEDICINE

## 2017-09-01 PROCEDURE — 80053 COMPREHEN METABOLIC PANEL: CPT | Performed by: INTERNAL MEDICINE

## 2017-09-01 PROCEDURE — 80048 BASIC METABOLIC PNL TOTAL CA: CPT | Mod: ZL | Performed by: FAMILY MEDICINE

## 2017-09-01 PROCEDURE — 87086 URINE CULTURE/COLONY COUNT: CPT | Performed by: INTERNAL MEDICINE

## 2017-09-01 PROCEDURE — 87077 CULTURE AEROBIC IDENTIFY: CPT | Performed by: INTERNAL MEDICINE

## 2017-09-01 PROCEDURE — 99285 EMERGENCY DEPT VISIT HI MDM: CPT | Performed by: INTERNAL MEDICINE

## 2017-09-01 PROCEDURE — 25000125 ZZHC RX 250: Performed by: INTERNAL MEDICINE

## 2017-09-01 PROCEDURE — 85652 RBC SED RATE AUTOMATED: CPT | Performed by: INTERNAL MEDICINE

## 2017-09-01 PROCEDURE — 71020 ZZHC CHEST TWO VIEWS, FRONT/LAT: CPT | Mod: TC

## 2017-09-01 PROCEDURE — 99285 EMERGENCY DEPT VISIT HI MDM: CPT | Mod: 25

## 2017-09-01 PROCEDURE — S0028 INJECTION, FAMOTIDINE, 20 MG: HCPCS | Performed by: INTERNAL MEDICINE

## 2017-09-01 PROCEDURE — 82150 ASSAY OF AMYLASE: CPT | Performed by: INTERNAL MEDICINE

## 2017-09-01 PROCEDURE — 99223 1ST HOSP IP/OBS HIGH 75: CPT | Mod: AI | Performed by: INTERNAL MEDICINE

## 2017-09-01 PROCEDURE — 87040 BLOOD CULTURE FOR BACTERIA: CPT | Performed by: INTERNAL MEDICINE

## 2017-09-01 RX ORDER — DOCUSATE SODIUM 100 MG/1
100 CAPSULE, LIQUID FILLED ORAL 2 TIMES DAILY
Status: DISCONTINUED | OUTPATIENT
Start: 2017-09-01 | End: 2017-09-06 | Stop reason: HOSPADM

## 2017-09-01 RX ORDER — SODIUM CHLORIDE 9 MG/ML
INJECTION, SOLUTION INTRAVENOUS CONTINUOUS
Status: DISCONTINUED | OUTPATIENT
Start: 2017-09-01 | End: 2017-09-04

## 2017-09-01 RX ORDER — ONDANSETRON 4 MG/1
4-8 TABLET, ORALLY DISINTEGRATING ORAL EVERY 8 HOURS PRN
Qty: 20 TABLET | Refills: 1 | Status: SHIPPED | OUTPATIENT
Start: 2017-09-01 | End: 2017-11-15

## 2017-09-01 RX ORDER — TRAZODONE HYDROCHLORIDE 50 MG/1
100-150 TABLET, FILM COATED ORAL
Status: DISCONTINUED | OUTPATIENT
Start: 2017-09-01 | End: 2017-09-06 | Stop reason: HOSPADM

## 2017-09-01 RX ORDER — PROCHLORPERAZINE 25 MG
12.5 SUPPOSITORY, RECTAL RECTAL EVERY 12 HOURS PRN
Status: DISCONTINUED | OUTPATIENT
Start: 2017-09-01 | End: 2017-09-06 | Stop reason: HOSPADM

## 2017-09-01 RX ORDER — FLUTICASONE PROPIONATE 50 MCG
1 SPRAY, SUSPENSION (ML) NASAL DAILY
Status: DISCONTINUED | OUTPATIENT
Start: 2017-09-02 | End: 2017-09-06 | Stop reason: HOSPADM

## 2017-09-01 RX ORDER — CEFTRIAXONE SODIUM 1 G/50ML
1 INJECTION, SOLUTION INTRAVENOUS EVERY 24 HOURS
Status: DISCONTINUED | OUTPATIENT
Start: 2017-09-02 | End: 2017-09-04

## 2017-09-01 RX ORDER — ONDANSETRON 2 MG/ML
4 INJECTION INTRAMUSCULAR; INTRAVENOUS EVERY 6 HOURS PRN
Status: DISCONTINUED | OUTPATIENT
Start: 2017-09-01 | End: 2017-09-06 | Stop reason: HOSPADM

## 2017-09-01 RX ORDER — GABAPENTIN 300 MG/1
900 CAPSULE ORAL EVERY MORNING
Status: DISCONTINUED | OUTPATIENT
Start: 2017-09-02 | End: 2017-09-06 | Stop reason: HOSPADM

## 2017-09-01 RX ORDER — PROCHLORPERAZINE MALEATE 5 MG
5 TABLET ORAL EVERY 6 HOURS PRN
Status: DISCONTINUED | OUTPATIENT
Start: 2017-09-01 | End: 2017-09-06 | Stop reason: HOSPADM

## 2017-09-01 RX ORDER — HYDROMORPHONE HYDROCHLORIDE 1 MG/ML
.5-1 INJECTION, SOLUTION INTRAMUSCULAR; INTRAVENOUS; SUBCUTANEOUS
Status: DISCONTINUED | OUTPATIENT
Start: 2017-09-01 | End: 2017-09-06 | Stop reason: HOSPADM

## 2017-09-01 RX ORDER — HYDROCODONE BITARTRATE AND ACETAMINOPHEN 7.5; 325 MG/1; MG/1
1-2 TABLET ORAL EVERY 4 HOURS PRN
Status: DISCONTINUED | OUTPATIENT
Start: 2017-09-01 | End: 2017-09-06 | Stop reason: HOSPADM

## 2017-09-01 RX ORDER — ALBUTEROL SULFATE 90 UG/1
2 AEROSOL, METERED RESPIRATORY (INHALATION) EVERY 4 HOURS PRN
Status: DISCONTINUED | OUTPATIENT
Start: 2017-09-01 | End: 2017-09-06 | Stop reason: HOSPADM

## 2017-09-01 RX ORDER — FOLIC ACID 1 MG/1
1 TABLET ORAL 3 TIMES DAILY
Status: DISCONTINUED | OUTPATIENT
Start: 2017-09-02 | End: 2017-09-06 | Stop reason: HOSPADM

## 2017-09-01 RX ORDER — ONDANSETRON 4 MG/1
4 TABLET, ORALLY DISINTEGRATING ORAL EVERY 6 HOURS PRN
Status: DISCONTINUED | OUTPATIENT
Start: 2017-09-01 | End: 2017-09-06 | Stop reason: HOSPADM

## 2017-09-01 RX ORDER — CITALOPRAM HYDROBROMIDE 40 MG/1
40 TABLET ORAL DAILY
Status: DISCONTINUED | OUTPATIENT
Start: 2017-09-02 | End: 2017-09-06 | Stop reason: HOSPADM

## 2017-09-01 RX ORDER — CEFTRIAXONE SODIUM 1 G/50ML
1 INJECTION, SOLUTION INTRAVENOUS ONCE
Status: COMPLETED | OUTPATIENT
Start: 2017-09-01 | End: 2017-09-01

## 2017-09-01 RX ORDER — METOCLOPRAMIDE HYDROCHLORIDE 5 MG/ML
5 INJECTION INTRAMUSCULAR; INTRAVENOUS ONCE
Status: COMPLETED | OUTPATIENT
Start: 2017-09-01 | End: 2017-09-01

## 2017-09-01 RX ORDER — KETOROLAC TROMETHAMINE 15 MG/ML
15 INJECTION, SOLUTION INTRAMUSCULAR; INTRAVENOUS ONCE
Status: COMPLETED | OUTPATIENT
Start: 2017-09-01 | End: 2017-09-01

## 2017-09-01 RX ORDER — NALOXONE HYDROCHLORIDE 0.4 MG/ML
.1-.4 INJECTION, SOLUTION INTRAMUSCULAR; INTRAVENOUS; SUBCUTANEOUS
Status: DISCONTINUED | OUTPATIENT
Start: 2017-09-01 | End: 2017-09-06 | Stop reason: HOSPADM

## 2017-09-01 RX ORDER — BISACODYL 10 MG
10 SUPPOSITORY, RECTAL RECTAL DAILY PRN
Status: DISCONTINUED | OUTPATIENT
Start: 2017-09-01 | End: 2017-09-06 | Stop reason: HOSPADM

## 2017-09-01 RX ORDER — AMOXICILLIN 250 MG
1-2 CAPSULE ORAL 2 TIMES DAILY PRN
Status: DISCONTINUED | OUTPATIENT
Start: 2017-09-01 | End: 2017-09-06 | Stop reason: HOSPADM

## 2017-09-01 RX ORDER — PANTOPRAZOLE SODIUM 40 MG/1
40 TABLET, DELAYED RELEASE ORAL 2 TIMES DAILY
Status: DISCONTINUED | OUTPATIENT
Start: 2017-09-01 | End: 2017-09-06 | Stop reason: HOSPADM

## 2017-09-01 RX ORDER — CYCLOSPORINE 0.5 MG/ML
1 EMULSION OPHTHALMIC 2 TIMES DAILY
Status: DISCONTINUED | OUTPATIENT
Start: 2017-09-01 | End: 2017-09-04 | Stop reason: CLARIF

## 2017-09-01 RX ORDER — HYDROCODONE BITARTRATE AND ACETAMINOPHEN 7.5; 325 MG/1; MG/1
TABLET ORAL
Qty: 60 TABLET | Refills: 0 | Status: SHIPPED | OUTPATIENT
Start: 2017-09-01 | End: 2017-11-15

## 2017-09-01 RX ORDER — HYDROMORPHONE HYDROCHLORIDE 1 MG/ML
0.5 INJECTION, SOLUTION INTRAMUSCULAR; INTRAVENOUS; SUBCUTANEOUS ONCE
Status: COMPLETED | OUTPATIENT
Start: 2017-09-01 | End: 2017-09-01

## 2017-09-01 RX ORDER — GABAPENTIN 100 MG/1
600 CAPSULE ORAL 2 TIMES DAILY
Status: DISCONTINUED | OUTPATIENT
Start: 2017-09-01 | End: 2017-09-06 | Stop reason: HOSPADM

## 2017-09-01 RX ADMIN — CEFTRIAXONE SODIUM 1 G: 1 INJECTION, SOLUTION INTRAVENOUS at 22:32

## 2017-09-01 RX ADMIN — METOCLOPRAMIDE 5 MG: 5 INJECTION, SOLUTION INTRAMUSCULAR; INTRAVENOUS at 20:45

## 2017-09-01 RX ADMIN — SODIUM CHLORIDE 1000 ML: 9 INJECTION, SOLUTION INTRAVENOUS at 22:32

## 2017-09-01 RX ADMIN — ONDANSETRON 4 MG: 2 INJECTION, SOLUTION INTRAMUSCULAR; INTRAVENOUS at 23:44

## 2017-09-01 RX ADMIN — DEXTROSE AND SODIUM CHLORIDE: 5; 900 INJECTION, SOLUTION INTRAVENOUS at 12:43

## 2017-09-01 RX ADMIN — SODIUM CHLORIDE 1000 ML: 9 INJECTION, SOLUTION INTRAVENOUS at 20:46

## 2017-09-01 RX ADMIN — FAMOTIDINE 20 MG: 10 INJECTION, SOLUTION INTRAVENOUS at 20:45

## 2017-09-01 RX ADMIN — HYDROMORPHONE HYDROCHLORIDE 0.5 MG: 1 INJECTION, SOLUTION INTRAMUSCULAR; INTRAVENOUS; SUBCUTANEOUS at 21:49

## 2017-09-01 RX ADMIN — KETOROLAC TROMETHAMINE 15 MG: 15 INJECTION, SOLUTION INTRAMUSCULAR; INTRAVENOUS at 20:45

## 2017-09-01 ASSESSMENT — ANXIETY QUESTIONNAIRES
7. FEELING AFRAID AS IF SOMETHING AWFUL MIGHT HAPPEN: NOT AT ALL
GAD7 TOTAL SCORE: 2
6. BECOMING EASILY ANNOYED OR IRRITABLE: SEVERAL DAYS
1. FEELING NERVOUS, ANXIOUS, OR ON EDGE: NOT AT ALL
5. BEING SO RESTLESS THAT IT IS HARD TO SIT STILL: NOT AT ALL
2. NOT BEING ABLE TO STOP OR CONTROL WORRYING: NOT AT ALL
IF YOU CHECKED OFF ANY PROBLEMS ON THIS QUESTIONNAIRE, HOW DIFFICULT HAVE THESE PROBLEMS MADE IT FOR YOU TO DO YOUR WORK, TAKE CARE OF THINGS AT HOME, OR GET ALONG WITH OTHER PEOPLE: NOT DIFFICULT AT ALL
3. WORRYING TOO MUCH ABOUT DIFFERENT THINGS: NOT AT ALL

## 2017-09-01 ASSESSMENT — ENCOUNTER SYMPTOMS
NECK PAIN: 0
SHORTNESS OF BREATH: 0
HEADACHES: 0
BACK PAIN: 0
NUMBNESS: 0
FLANK PAIN: 0
SORE THROAT: 0
ABDOMINAL PAIN: 0
NAUSEA: 1
ANAL BLEEDING: 0
LIGHT-HEADEDNESS: 0
DYSURIA: 1
VOMITING: 0
APPETITE CHANGE: 1
ARTHRALGIAS: 0
DIAPHORESIS: 0
HEMATURIA: 0
COUGH: 1
DIZZINESS: 0
ABDOMINAL DISTENTION: 0
NECK STIFFNESS: 0
CHEST TIGHTNESS: 0
BLOOD IN STOOL: 0
WOUND: 0
EYE PAIN: 0
FEVER: 1
COLOR CHANGE: 0
CHILLS: 1
CONFUSION: 0
STRIDOR: 0
VOICE CHANGE: 0
MYALGIAS: 0
DIARRHEA: 0
PALPITATIONS: 0
TROUBLE SWALLOWING: 0
WHEEZING: 0

## 2017-09-01 ASSESSMENT — PATIENT HEALTH QUESTIONNAIRE - PHQ9
SUM OF ALL RESPONSES TO PHQ QUESTIONS 1-9: 15
5. POOR APPETITE OR OVEREATING: SEVERAL DAYS

## 2017-09-01 ASSESSMENT — PAIN DESCRIPTION - DESCRIPTORS: DESCRIPTORS: ACHING

## 2017-09-01 ASSESSMENT — PAIN SCALES - GENERAL: PAINLEVEL: MODERATE PAIN (4)

## 2017-09-01 NOTE — NURSING NOTE
"Chief Complaint   Patient presents with     Flu Symptoms     vomiting for the last 2 days, did have fever last night.        Initial /68  Pulse 99  Temp 99.1  F (37.3  C) (Tympanic)  Resp 19  Ht 5' 6\" (1.676 m)  Wt 208 lb (94.3 kg)  SpO2 93%  BMI 33.57 kg/m2 Estimated body mass index is 33.57 kg/(m^2) as calculated from the following:    Height as of this encounter: 5' 6\" (1.676 m).    Weight as of this encounter: 208 lb (94.3 kg).  Medication Reconciliation: complete   Courtney Schwarz      "

## 2017-09-01 NOTE — PROGRESS NOTES
GIRBAN Valdez LPN per Dr. Martinez for patient to receive 1,000mL of D5W and sodium chloride. Order entered.

## 2017-09-01 NOTE — MR AVS SNAPSHOT
After Visit Summary   9/1/2017    Melvi Cleveland    MRN: [id number]           Patient Information     Date Of Birth          1950        Visit Information        Provider Department      9/1/2017 9:20 AM Jimbo Martinez MD [facility]        Today's Diagnoses     Intractable episodic tension-type headache    -  1    Dehydration        Rheumatoid arthritis involving multiple sites with positive rheumatoid factor (H)          Care Instructions    Take zofran for nausea          Follow-ups after your visit        Follow-up notes from your care team     Return if symptoms worsen or fail to improve.      Who to contact     If you have questions or need follow up information about today's clinic visit or your schedule please contact [facility] directly at [phone].  Normal or non-critical lab and imaging results will be communicated to you by MyChart, letter or phone within 4 business days after the clinic has received the results. If you do not hear from us within 7 days, please contact the clinic through [employer] or phone. If you have a critical or abnormal lab result, we will notify you by phone as soon as possible.  Submit refill requests through [employer] or call your pharmacy and they will forward the refill request to us. Please allow 3 business days for your refill to be completed.          Additional Information About Your Visit        MyChart Information     [employer] gives you secure access to your electronic health record. If you see a primary care provider, you can also send messages to your care team and make appointments. If you have questions, please call your primary care clinic.  If you do not have a primary care provider, please call [phone] and they will assist you.        Care EveryWhere ID     This is your Care EveryWhere ID. This could be used by other organizations to access your [place] medical records  [id number]        Your Vitals Were      "Pulse Temperature Respirations Height Pulse Oximetry BMI (Body Mass Index)    99 99.1  F (37.3  C) (Tympanic) 19 5' 6\" (1.676 m) 93% 33.57 kg/m2       Blood Pressure from Last 3 Encounters:   09/04/17 124/58   09/01/17 102/44   09/01/17 108/68    Weight from Last 3 Encounters:   09/02/17 221 lb 5.5 oz (100.4 kg)   09/01/17 208 lb (94.3 kg)   09/01/17 208 lb (94.3 kg)              We Performed the Following     Basic metabolic panel     CBC with platelets differential     CT Head w/o Contrast          Today's Medication Changes          These changes are accurate as of: 9/1/17  8:11 PM.  If you have any questions, ask your nurse or doctor.               Start taking these medicines.        Dose/Directions    ondansetron 4 MG ODT tab   Commonly known as:  ZOFRAN ODT   Used for:  Intractable episodic tension-type headache   Started by:  Jimbo Martinez MD        Dose:  4-8 mg   Take 1-2 tablets (4-8 mg) by mouth every 8 hours as needed for nausea   Quantity:  20 tablet   Refills:  1         These medicines have changed or have updated prescriptions.        Dose/Directions    HYDROcodone-acetaminophen 7.5-325 MG per tablet   Commonly known as:  NORCO   This may have changed:  See the new instructions.   Used for:  Rheumatoid arthritis involving multiple sites with positive rheumatoid factor (H)   Changed by:  Jimbo Martinez MD        TAKE 1 TABLET BY MOUTH EVERY 4 TO 6 HOURS AS NEEDED FOR PAIN   Quantity:  60 tablet   Refills:  0       pantoprazole 40 MG EC tablet   Commonly known as:  PROTONIX   This may have changed:  when to take this   Used for:  Diagnosis unknown        Dose:  40 mg   Take 1 tablet (40 mg) by mouth daily   Quantity:  90 tablet   Refills:  3            Where to get your medicines      These medications were sent to [facility] [place] - [address]  [address] [address]     Phone:  [phone]     ondansetron 4 MG ODT tab         Some of these will need a " paper prescription and others can be bought over the counter.  Ask your nurse if you have questions.     Bring a paper prescription for each of these medications     HYDROcodone-acetaminophen 7.5-325 MG per tablet                Primary Care Provider Office Phone # Fax #    Jimbo Martinez [phone]       [facility] [address] [place] [address]        Equal Access to Services     [facility] DOMO : Hadii aad ku hadasho Soomaali, waaxda luqadaha, qaybta kaalmada adeegyada, waxay idiin hayaan adeeg kharash laclement ah. So [facility] [phone].    ATENCIÓN: Si habla español, tiene a garza disposición servicios gratuitos de asistencia lingüística. [facility] [phone].    We comply with applicable federal civil rights laws and [place] laws. We do not discriminate on the basis of race, color, national origin, age, disability sex, sexual orientation or gender identity.            Thank you!     Thank you for choosing [facility]  for your care. Our goal is always to provide you with excellent care. Hearing back from our patients is one way we can continue to improve our services. Please take a few minutes to complete the written survey that you may receive in the mail after your visit with us. Thank you!             Your Updated Medication List - Protect others around you: Learn how to safely use, store and throw away your medicines at www.disposemymeds.org.          This list is accurate as of: 9/1/17  8:11 PM.  Always use your most recent med list.                   Brand Name Dispense Instructions for use Diagnosis    ALFALFA PO      Take 5 tablets by mouth 2 times daily.        ALLEGRA-D ALLERGY & CONGESTION  MG per 12 hr tablet   Generic drug:  fexofenadine-pseudoePHEDrine     30 tablet    TAKE 1 TABLET BY MOUTH TWICE DAILY    Nasal congestion       CALCIUM + D 500-1000-40 MG-UNT-MCG Chew   Generic drug:  Calcium-Vitamin D-Vitamin K      Take 3 tablets by mouth daily         citalopram 40 MG tablet    celeXA     Take 40 mg by mouth        DICLOFENAC PO      Take 100 mg by mouth daily        fish oil-omega-3 fatty acids 1000 MG capsule      Take 1 g by mouth daily.        flax seed oil 1000 MG capsule      Take 2 capsules by mouth daily        fluticasone 50 MCG/ACT spray    FLONASE     1 spray Reported on 5/11/2017        folic acid 1 MG tablet    FOLVITE     Take 1 mg by mouth 3 times daily.        gabapentin 300 MG capsule    NEURONTIN    210 capsule    TAKE 3 CAPSULES BY MOUTH IN THE MORNING, 2 CAPSULES AT NOON AND 2 CAPSULES IN THE EVENING    Peripheral polyneuropathy (H)       Garlic 400 MG Tbec           HYDROcodone-acetaminophen 7.5-325 MG per tablet    NORCO    60 tablet    TAKE 1 TABLET BY MOUTH EVERY 4 TO 6 HOURS AS NEEDED FOR PAIN    Rheumatoid arthritis involving multiple sites with positive rheumatoid factor (H)       ketotifen 0.025 % Soln ophthalmic solution    ZADITOR/REFRESH ANTI-ITCH     1 drop        METHOTREXATE SODIUM (PF) IJ      Inject 0.8 mLs as directed once a week        MILK THISTLE PO      Take 1,000 mg by mouth daily.        MULTIPLE VITAMIN PO      Take  by mouth 2 times daily.        ondansetron 4 MG ODT tab    ZOFRAN ODT    20 tablet    Take 1-2 tablets (4-8 mg) by mouth every 8 hours as needed for nausea    Intractable episodic tension-type headache       pantoprazole 40 MG EC tablet    PROTONIX    90 tablet    Take 1 tablet (40 mg) by mouth daily    Diagnosis unknown       PLAQUENIL 200 MG tablet   Generic drug:  hydroxychloroquine      Take 200 mg by mouth 2 times daily.        PREBIOTIC PRODUCT PO      Take 1 tablet by mouth daily        PROAIR  (90 BASE) MCG/ACT Inhaler   Generic drug:  albuterol     8.5 g    INHALE 2 PUFFS INTO THE LUNGS EVERY 6 HOURS AS NEEDED FOR SHORTNESS OF BREATH OR WHEEZING    Influenza-like symptoms       REFRESH 1.4-0.6 % ophthalmic solution   Generic drug:  polyvinyl alcohol-povidone      1-2 drops as needed         RESTASIS 0.05 % ophthalmic emulsion   Generic drug:  cycloSPORINE     60 each    USE 1 DROP IN BOTH EYES 2 TIMES A DAY    Bilateral dry eyes       SM GAS RELIEF ANTIFLATUENT 180 MG Caps   Generic drug:  Simethicone     60 capsule    TAKE ONE CAPSULE BY MOUTH AS NEEDED AFTER A MEAL. MAX 2 CAPS/DAY    Gas       SOLUBLE FIBER/PROBIOTICS PO      Take 1 tablet by mouth        traZODone 50 MG tablet    DESYREL    270 tablet    TAKE 2-3 TABLETS BY MOUTH DAILY AT BEDTIME    Primary insomnia       Vitamin C 500 MG Caps      Take 2 tablets by mouth 2 times daily.        vitamin D 1000 UNITS capsule      Take 1 capsule by mouth daily.

## 2017-09-01 NOTE — MR AVS SNAPSHOT
"              After Visit Summary   9/1/2017    Melvi Cleveland    MRN: [id number]           Patient Information     Date Of Birth          1950        Visit Information        Provider Department      9/1/2017 12:00 PM HC [facility]        Today's Diagnoses     Dehydration    -  1      Care Instructions    If symptoms persist seek medical attention; call [facility] office or go the ED/Urgent Care.          Follow-ups after your visit        Who to contact     If you have questions or need follow up information about today's clinic visit or your schedule please [facility] at [phone].  Normal or non-critical lab and imaging results will be communicated to you by [employer]hart, letter or phone within 4 business days after the clinic has received the results. If you do not hear from us within 7 days, please contact the clinic through [employer]hart or phone. If you have a critical or abnormal lab result, we will notify you by phone as soon as possible.  Submit refill requests through [employer] or call your pharmacy and they will forward the refill request to us. Please allow 3 business days for your refill to be completed.          Additional Information About Your Visit        MyChart Information     [employer] gives you secure access to your electronic health record. If you see a primary care provider, you can also send messages to your care team and make appointments. If you have questions, please call your primary care clinic.  If you do not have a primary care provider, please call [phone] and they will assist you.        Care EveryWhere ID     This is your Care EveryWhere ID. This could be used by other organizations to access your [place] medical records  [id number]        Your Vitals Were     Pulse Temperature Respirations Height Pulse Oximetry BMI (Body Mass Index)    86 99.1  F (37.3  C) (Tympanic) 20 1.676 m (5' 5.98\") 92% 33.59 kg/m2       Blood " Pressure from Last 3 Encounters:   09/01/17 102/44   09/01/17 108/68   08/08/17 118/70    Weight from Last 3 Encounters:   09/01/17 94.3 kg (208 lb)   09/01/17 94.3 kg (208 lb)   08/08/17 94.3 kg (208 lb)              Today, you had the following     No orders found for display         Today's Medication Changes          These changes are accurate as of: 9/1/17  3:42 PM.  If you have any questions, ask your nurse or doctor.               Start taking these medicines.        Dose/Directions    ondansetron 4 MG ODT tab   Commonly known as:  ZOFRAN ODT   Used for:  Intractable episodic tension-type headache   Started by:  Jimbo Martinez MD        Dose:  4-8 mg   Take 1-2 tablets (4-8 mg) by mouth every 8 hours as needed for nausea   Quantity:  20 tablet   Refills:  1         These medicines have changed or have updated prescriptions.        Dose/Directions    HYDROcodone-acetaminophen 7.5-325 MG per tablet   Commonly known as:  NORCO   This may have changed:  See the new instructions.   Used for:  Rheumatoid arthritis involving multiple sites with positive rheumatoid factor (H)   Changed by:  Jimbo Martinez MD        TAKE 1 TABLET BY MOUTH EVERY 4 TO 6 HOURS AS NEEDED FOR PAIN   Quantity:  60 tablet   Refills:  0       pantoprazole 40 MG EC tablet   Commonly known as:  PROTONIX   This may have changed:  when to take this   Used for:  Diagnosis unknown        Dose:  40 mg   Take 1 tablet (40 mg) by mouth daily   Quantity:  90 tablet   Refills:  3            Where to get your medicines      These medications were sent to [facility] [place] - [address]  [address] [address]     Phone:  [phone]     ondansetron 4 MG ODT tab         Some of these will need a paper prescription and others can be bought over the counter.  Ask your nurse if you have questions.     Bring a paper prescription for each of these medications     HYDROcodone-acetaminophen 7.5-325 MG per tablet                 Primary Care Provider Office Phone # Fax #    Jimbo Martinez [phone]       [facility] [address] [facility]  [facility] [address]        Equal Access to Services     MARVINBENITEZ DOMO : Hadii dawna corley gustaboo Sochocoali, waaxda luqadaha, qaybta kaalmada gwyn, bill robledo eriksaad massey laMilanashli soto. So [facility] [phone].    ATENCIÓN: Si habla español, tiene a garza disposición servicios gratuitos de asistencia lingüística. Llame al [phone].    We comply with applicable federal civil rights laws and [place] laws. We do not discriminate on the basis of race, color, national origin, age, disability sex, sexual orientation or gender identity.            Thank you!     Thank you for choosing [facility]  for your care. Our goal is always to provide you with excellent care. Hearing back from our patients is one way we can continue to improve our services. Please take a few minutes to complete the written survey that you may receive in the mail after your visit with us. Thank you!             Your Updated Medication List - Protect others around you: Learn how to safely use, store and throw away your medicines at www.disposemymeds.org.          This list is accurate as of: 9/1/17  3:42 PM.  Always use your most recent med list.                   Brand Name Dispense Instructions for use Diagnosis    ALFALFA PO      Take 5 tablets by mouth 2 times daily.        ALLEGRA-D ALLERGY & CONGESTION  MG per 12 hr tablet   Generic drug:  fexofenadine-pseudoePHEDrine     30 tablet    TAKE 1 TABLET BY MOUTH TWICE DAILY    Nasal congestion       CALCIUM + D 500-1000-40 MG-UNT-MCG Chew   Generic drug:  Calcium-Vitamin D-Vitamin K      Take 3 tablets by mouth daily        citalopram 40 MG tablet    celeXA     Take 40 mg by mouth        DICLOFENAC PO      Take 100 mg by mouth daily        fish oil-omega-3 fatty acids 1000 MG capsule      Take 1 g by mouth daily.        flax seed oil 1000 MG  capsule      Take 2 capsules by mouth daily        fluticasone 50 MCG/ACT spray    FLONASE     1 spray Reported on 5/11/2017        folic acid 1 MG tablet    FOLVITE     Take 1 mg by mouth 3 times daily.        gabapentin 300 MG capsule    NEURONTIN    210 capsule    TAKE 3 CAPSULES BY MOUTH IN THE MORNING, 2 CAPSULES AT NOON AND 2 CAPSULES IN THE EVENING    Peripheral polyneuropathy (H)       Garlic 400 MG Tbec           HYDROcodone-acetaminophen 7.5-325 MG per tablet    NORCO    60 tablet    TAKE 1 TABLET BY MOUTH EVERY 4 TO 6 HOURS AS NEEDED FOR PAIN    Rheumatoid arthritis involving multiple sites with positive rheumatoid factor (H)       ketotifen 0.025 % Soln ophthalmic solution    ZADITOR/REFRESH ANTI-ITCH     1 drop        METHOTREXATE SODIUM (PF) IJ      Inject 0.8 mLs as directed once a week        MILK THISTLE PO      Take 1,000 mg by mouth daily.        MULTIPLE VITAMIN PO      Take  by mouth 2 times daily.        ondansetron 4 MG ODT tab    ZOFRAN ODT    20 tablet    Take 1-2 tablets (4-8 mg) by mouth every 8 hours as needed for nausea    Intractable episodic tension-type headache       pantoprazole 40 MG EC tablet    PROTONIX    90 tablet    Take 1 tablet (40 mg) by mouth daily    Diagnosis unknown       PLAQUENIL 200 MG tablet   Generic drug:  hydroxychloroquine      Take 200 mg by mouth 2 times daily.        PREBIOTIC PRODUCT PO      Take 1 tablet by mouth daily        PROAIR  (90 BASE) MCG/ACT Inhaler   Generic drug:  albuterol     8.5 g    INHALE 2 PUFFS INTO THE LUNGS EVERY 6 HOURS AS NEEDED FOR SHORTNESS OF BREATH OR WHEEZING    Influenza-like symptoms       REFRESH 1.4-0.6 % ophthalmic solution   Generic drug:  polyvinyl alcohol-povidone      1-2 drops as needed        RESTASIS 0.05 % ophthalmic emulsion   Generic drug:  cycloSPORINE     60 each    USE 1 DROP IN BOTH EYES 2 TIMES A DAY    Bilateral dry eyes       SM GAS RELIEF ANTIFLATUENT 180 MG Caps   Generic drug:  Simethicone     60  capsule    TAKE ONE CAPSULE BY MOUTH AS NEEDED AFTER A MEAL. MAX 2 CAPS/DAY    Gas       SOLUBLE FIBER/PROBIOTICS PO      Take 1 tablet by mouth        traZODone 50 MG tablet    DESYREL    270 tablet    TAKE 2-3 TABLETS BY MOUTH DAILY AT BEDTIME    Primary insomnia       Vitamin C 500 MG Caps      Take 2 tablets by mouth 2 times daily.        vitamin D 1000 UNITS capsule      Take 1 capsule by mouth daily.

## 2017-09-01 NOTE — IP AVS SNAPSHOT
[facility]    [address] [place] [address]    Phone:  [phone]    Fax:  [phone]                                       After Visit Summary   9/1/2017    Melvi Cleveland    MRN: [id number]           After Visit Summary Signature Page     I have received my discharge instructions, and my questions have been answered. I have discussed any challenges I see with this plan with the nurse or doctor.    ..........................................................................................................................................  Patient/Patient Representative Signature      ..........................................................................................................................................  Patient Representative Print Name and Relationship to Patient    ..................................................               ................................................  Date                                            Time    ..........................................................................................................................................  Reviewed by Signature/Title    ...................................................              ..............................................  Date                                                            Time

## 2017-09-01 NOTE — PROGRESS NOTES
Patient is a 68 y/o here accompanied by  today for infusion of D5/NS 1000mL per order of Dr. Martinez. Patient c/o intermittent N/V/D for the past two days, stating she is unable to eat or drink anything. Patient also c/o H/A for the past 5 days. VSS. Patient meets parameters for today's infusion. Patient identified with two identifiers, order verified, and verbal consent for today's infusion obtained from patient.      24 gauge angio cath inserted into left hand. Immediate blood return noted. IV secured with sterile, transparent dressing and tape. Patient tolerated well, denies pain or discomfort at this time.  Flushes easily without resistance, no signs or symptoms of infiltration or infection.   Patient denies questions or concerns regarding infusion and/or medication(s) being administered.      IV pump verified with dose, drug, and rate of administration with MAR. Infusion administered per protocol.  Patient tolerated infusion well, no signs or symptoms of adverse reaction noted.  Patient denies pain nor discomfort. Patient had one emesis during infusion,  gave 2 tabs of Zofran prescribed by Michelle.     IV removed, catheter intact.  Site clean, dry and intact.  No signs or symptoms of infiltration or infection.  Covered with a sterile bandage, slight pressure applied for 30 seconds.  Pt instructed to leave bandage intact for a minimum of one hour, and to call with questions or concerns.  Copy of appointments, discharge instructions, and after visit summary (AVS) provided to patient.  Patient states understanding, discharged ambulatory.

## 2017-09-01 NOTE — IP AVS SNAPSHOT
MRN:[id number]                      After Visit Summary   9/1/2017    Melvi Cleveland    MRN: [id number]           Thank you!     Thank you for choosing [facility] for your care. Our goal is always to provide you with excellent care. Hearing back from our patients is one way we can continue to improve our services. Please take a few minutes to complete the written survey that you may receive in the mail after you visit with us. Thank you!        Patient Information     Date Of Birth          1950        Designated Caregiver       Most Recent Value    Caregiver    Will someone help with your care after discharge? yes    Name of designated caregiver Issa    Phone number of caregiver [phone]    Caregiver address [address]      About your hospital stay     You were admitted on:  September 1, 2017 You last received care in the:  [facility]    You were discharged on:  September 6, 2017       Who to Call     For medical emergencies, please call 911.  For non-urgent questions about your medical care, please call your primary care provider or clinic, [phone]          Attending Provider     Provider Specialty    John Gar MD Emergency Medicine    Beulah Colunga MD Internal Medicine    Venkata Teresa MD Internal Medicine       Primary Care Provider Office Phone # Fax #    Jimbo Martinez [phone]      After Care Instructions     Activity       Your activity upon discharge: activity as tolerated            Diet       Follow this diet upon discharge: Orders Placed This Encounter      Regular Diet Adult            Discharge Instructions       Don't take the methotrexate or plaquenil until sees Dr Martinez in follow up.                  Follow-up Appointments     Follow-up and recommended labs and tests        Follow up with primary care provider, Jimbo Martinez, within 7 days for hospital follow- up.  No follow up labs or test are needed.                   Further instructions from your care team       Dr. Martinez's office will contact you to schedule an appointment within 7 days.  If you have not heard back from them by 9/7/2017 please call the clinic at [phone] to schedule an appointment.          What to expect when you have contrast    During your exam, we will inject  contrast  into your vein or artery. (Contrast is a clear liquid with iodine in it. It shows up on X-rays.)    You may feel warm or hot. You may have a metal taste in your mouth and a slight upset stomach. You may also feel pressure near the kidneys and bladder. These effects will last about 1 to 3 minutes.    Please tell us if you have:    Sneezing     Itching    Hives     Swelling in the face    A hoarse voice    Breathing problems    Other new symptoms    Serious problems are rare.  They may include:    Irregular heartbeat     Seizures    Kidney failure              Tissue damage    Shock      Death    If you have any problems during the exam, we  will treat them right away.    When you get home    Call your hospital if you have any new symptoms in the next 2 days, like hives or swelling. (Phone numbers are at the bottom of this page.) Or call your family doctor.     If you have wheezing or trouble breathing, call 911.    Self-care  -Drink at least 4 extra glasses of water today.   This reduces the stress on your kidneys.  -Keep taking your regular medicines.    The contrast will pass out of your body in your  Urine(pee). This will happen in the next 24 hours. You  will not feel this. Your urine will not  change color.    If you have kidney problems or take metformin    Drink 4 to 8 large glasses of water for the next  2 days, if you are not on a fluid restriction.    ?If you take metformin (Glucophage or Glucovance) for diabetes, keep taking it.      ?Your kidney function tests are abnormal.  If you take Metformin, do not take it for 48 hours. Please go to your clinic for a blood test  "within 3 days after your exam before the restarting this medicine.     (Note to provider:please give patient prescription for lab tests.)    ?Special instructions: Drink at least 4 extra glasses of water today.   This reduces the stress on your kidneys.    I have read and understand the above information.    Patient Sign Here:______________________________________Date:________Time:______    Staff Sign Here:________________________________________Date:_______Time:______      Radiology Departments:     ?[facility]: [phone] ?[facility]: [phone]     ?[facility]: [phone] ?[facility]:[phone]      ?[facility]: [phone]  ?[facility]: [phone]  ?[facility]:[phone]    ?[facility] [place] [facility]:[phone]  ?[facility] [place]:[phone]    Pending Results     Date and Time Order Name Status Description    9/1/2017 2028 Blood culture Preliminary             Statement of Approval     Ordered          09/06/17 1142  I have reviewed and agree with all the recommendations and orders detailed in this document.  EFFECTIVE NOW     Approved and electronically signed by:  Venkata Teresa MD             Admission Information     Date & Time Provider Department Dept. Phone    9/1/2017 Venkata Teresa MD [facility] [phone]      Your Vitals Were     Blood Pressure Pulse Temperature Respirations Height Weight    146/63 (BP Location: Left arm) 78 98.6  F (37  C) (Tympanic) 20 1.676 m (5' 6\") 100.4 kg (221 lb 5.5 oz)    Pulse Oximetry BMI (Body Mass Index)                94% 35.73 kg/m2          MyChart Information     [facility] gives you secure access to your electronic health record. If you see a primary care provider, you can also send messages to your care team and make appointments. If you have questions, please call your primary care clinic.  If you do not have a primary care provider, please call [phone] and they will assist you.        Care EveryWhere ID     This is your Care EveryWhere ID. This could be " used by other organizations to access your [place] medical records  [id number]        Equal Access to Services     RILEY OLIVEROS : Hadii dawna Holland, carmela palma, yomijorge luis habeverlylexy pascal, bill mathisannachang soto. So [facility] [phone].    ATENCIÓN: Si habla español, tiene a garza disposición servicios gratuitos de asistencia lingüística. Llame al [phone].    We comply with applicable federal civil rights laws and [place] laws. We do not discriminate on the basis of race, color, national origin, age, disability sex, sexual orientation or gender identity.               Review of your medicines      START taking        Dose / Directions    ciprofloxacin 500 MG tablet   Commonly known as:  CIPRO   Indication:  Pyelonephritis        Dose:  500 mg   Take 1 tablet (500 mg) by mouth 2 times daily for 5 days   Quantity:  10 tablet   Refills:  0         CONTINUE these medicines which may have CHANGED, or have new prescriptions. If we are uncertain of the size of tablets/capsules you have at home, strength may be listed as something that might have changed.        Dose / Directions    pantoprazole 40 MG EC tablet   Commonly known as:  PROTONIX   This may have changed:  when to take this   Used for:  Diagnosis unknown        Dose:  40 mg   Take 1 tablet (40 mg) by mouth daily   Quantity:  90 tablet   Refills:  3         CONTINUE these medicines which have NOT CHANGED        Dose / Directions    ALFALFA PO        Dose:  5 tablet   Take 5 tablets by mouth 2 times daily.   Refills:  0       ALLEGRA-D ALLERGY & CONGESTION  MG per 12 hr tablet   Used for:  Nasal congestion   Generic drug:  fexofenadine-pseudoePHEDrine        TAKE 1 TABLET BY MOUTH TWICE DAILY   Quantity:  30 tablet   Refills:  0       CALCIUM + D 500-1000-40 MG-UNT-MCG Chew   Generic drug:  Calcium-Vitamin D-Vitamin K        Dose:  3 tablet   Take 3 tablets by mouth daily   Refills:  0       citalopram 40 MG tablet    Commonly known as:  celeXA        Dose:  40 mg   Take 40 mg by mouth   Refills:  0       DICLOFENAC PO        Dose:  100 mg   Take 100 mg by mouth daily   Refills:  0       fish oil-omega-3 fatty acids 1000 MG capsule        Dose:  1 g   Take 1 g by mouth daily.   Refills:  0       flax seed oil 1000 MG capsule        Dose:  2 capsule   Take 2 capsules by mouth daily   Refills:  0       fluticasone 50 MCG/ACT spray   Commonly known as:  FLONASE        Dose:  1 spray   1 spray Reported on 5/11/2017   Refills:  0       folic acid 1 MG tablet   Commonly known as:  FOLVITE        Dose:  1 mg   Take 1 mg by mouth 3 times daily.   Refills:  0       gabapentin 300 MG capsule   Commonly known as:  NEURONTIN   Used for:  Peripheral polyneuropathy (H)        TAKE 3 CAPSULES BY MOUTH IN THE MORNING, 2 CAPSULES AT NOON AND 2 CAPSULES IN THE EVENING   Quantity:  210 capsule   Refills:  0       Garlic 400 MG Tbec        Refills:  0       HYDROcodone-acetaminophen 7.5-325 MG per tablet   Commonly known as:  NORCO   Used for:  Rheumatoid arthritis involving multiple sites with positive rheumatoid factor (H)        TAKE 1 TABLET BY MOUTH EVERY 4 TO 6 HOURS AS NEEDED FOR PAIN   Quantity:  60 tablet   Refills:  0       ketotifen 0.025 % Soln ophthalmic solution   Commonly known as:  ZADITOR/REFRESH ANTI-ITCH        Dose:  1 drop   1 drop   Refills:  0       MILK THISTLE PO        Dose:  1000 mg   Take 1,000 mg by mouth daily.   Refills:  0       MULTIPLE VITAMIN PO        Take  by mouth 2 times daily.   Refills:  0       ondansetron 4 MG ODT tab   Commonly known as:  ZOFRAN ODT   Used for:  Intractable episodic tension-type headache        Dose:  4-8 mg   Take 1-2 tablets (4-8 mg) by mouth every 8 hours as needed for nausea   Quantity:  20 tablet   Refills:  1       PREBIOTIC PRODUCT PO        Dose:  1 tablet   Take 1 tablet by mouth daily   Refills:  0       PROAIR  (90 BASE) MCG/ACT Inhaler   Used for:  Influenza-like  symptoms   Generic drug:  albuterol        INHALE 2 PUFFS INTO THE LUNGS EVERY 6 HOURS AS NEEDED FOR SHORTNESS OF BREATH OR WHEEZING   Quantity:  8.5 g   Refills:  5       REFRESH 1.4-0.6 % ophthalmic solution   Generic drug:  polyvinyl alcohol-povidone        Dose:  1-2 drop   1-2 drops as needed   Refills:  0       RESTASIS 0.05 % ophthalmic emulsion   Used for:  Bilateral dry eyes   Generic drug:  cycloSPORINE        USE 1 DROP IN BOTH EYES 2 TIMES A DAY   Quantity:  60 each   Refills:  0       SM GAS RELIEF ANTIFLATUENT 180 MG Caps   Used for:  Gas   Generic drug:  Simethicone        TAKE ONE CAPSULE BY MOUTH AS NEEDED AFTER A MEAL. MAX 2 CAPS/DAY   Quantity:  60 capsule   Refills:  5       SOLUBLE FIBER/PROBIOTICS PO        Dose:  1 tablet   Take 1 tablet by mouth   Refills:  0       traZODone 50 MG tablet   Commonly known as:  DESYREL   Used for:  Primary insomnia        TAKE 2-3 TABLETS BY MOUTH DAILY AT BEDTIME   Quantity:  270 tablet   Refills:  1       Vitamin C 500 MG Caps        Dose:  2 tablet   Take 2 tablets by mouth 2 times daily.   Refills:  0       vitamin D 1000 UNITS capsule        Dose:  1 capsule   Take 1 capsule by mouth daily.   Refills:  0         STOP taking     METHOTREXATE SODIUM (PF) IJ           PLAQUENIL 200 MG tablet   Generic drug:  hydroxychloroquine                Where to get your medicines      These medications were sent to [facility] [place] - [address]  [address] [address]     Phone:  [phone]     ciprofloxacin 500 MG tablet                Protect others around you: Learn how to safely use, store and throw away your medicines at www.disposemymeds.org.             Medication List: This is a list of all your medications and when to take them. Check marks below indicate your daily home schedule. Keep this list as a reference.      Medications           Morning Afternoon Evening Bedtime As Needed    ALFALFA PO   Take 5 tablets by mouth  2 times daily.                                ALLEGRA-D ALLERGY & CONGESTION  MG per 12 hr tablet   TAKE 1 TABLET BY MOUTH TWICE DAILY   Generic drug:  fexofenadine-pseudoePHEDrine                                CALCIUM + D 500-1000-40 MG-UNT-MCG Chew   Take 3 tablets by mouth daily   Generic drug:  Calcium-Vitamin D-Vitamin K                                ciprofloxacin 500 MG tablet   Commonly known as:  CIPRO   Take 1 tablet (500 mg) by mouth 2 times daily for 5 days   Last time this was given:  500 mg on 9/6/2017  9:23 AM                                citalopram 40 MG tablet   Commonly known as:  celeXA   Take 40 mg by mouth   Last time this was given:  40 mg on 9/6/2017  9:17 AM                                DICLOFENAC PO   Take 100 mg by mouth daily                                fish oil-omega-3 fatty acids 1000 MG capsule   Take 1 g by mouth daily.                                flax seed oil 1000 MG capsule   Take 2 capsules by mouth daily                                fluticasone 50 MCG/ACT spray   Commonly known as:  FLONASE   1 spray Reported on 5/11/2017   Last time this was given:  1 spray on 9/6/2017  9:16 AM                                folic acid 1 MG tablet   Commonly known as:  FOLVITE   Take 1 mg by mouth 3 times daily.   Last time this was given:  1 mg on 9/6/2017  9:17 AM                                gabapentin 300 MG capsule   Commonly known as:  NEURONTIN   TAKE 3 CAPSULES BY MOUTH IN THE MORNING, 2 CAPSULES AT NOON AND 2 CAPSULES IN THE EVENING   Last time this was given:  900 mg on 9/6/2017  9:17 AM                                Garlic 400 MG Tbec                                HYDROcodone-acetaminophen 7.5-325 MG per tablet   Commonly known as:  NORCO   TAKE 1 TABLET BY MOUTH EVERY 4 TO 6 HOURS AS NEEDED FOR PAIN   Last time this was given:  2 tablets on 9/6/2017  9:17 AM                                ketotifen 0.025 % Soln ophthalmic solution   Commonly known as:   ZADITOR/REFRESH ANTI-ITCH   1 drop                                MILK THISTLE PO   Take 1,000 mg by mouth daily.                                MULTIPLE VITAMIN PO   Take  by mouth 2 times daily.                                ondansetron 4 MG ODT tab   Commonly known as:  ZOFRAN ODT   Take 1-2 tablets (4-8 mg) by mouth every 8 hours as needed for nausea                                pantoprazole 40 MG EC tablet   Commonly known as:  PROTONIX   Take 1 tablet (40 mg) by mouth daily   Last time this was given:  40 mg on 9/6/2017  6:43 AM                                PREBIOTIC PRODUCT PO   Take 1 tablet by mouth daily                                PROAIR  (90 BASE) MCG/ACT Inhaler   INHALE 2 PUFFS INTO THE LUNGS EVERY 6 HOURS AS NEEDED FOR SHORTNESS OF BREATH OR WHEEZING   Generic drug:  albuterol                                REFRESH 1.4-0.6 % ophthalmic solution   1-2 drops as needed   Generic drug:  polyvinyl alcohol-povidone                                RESTASIS 0.05 % ophthalmic emulsion   USE 1 DROP IN BOTH EYES 2 TIMES A DAY   Generic drug:  cycloSPORINE                                SM GAS RELIEF ANTIFLATUENT 180 MG Caps   TAKE ONE CAPSULE BY MOUTH AS NEEDED AFTER A MEAL. MAX 2 CAPS/DAY   Generic drug:  Simethicone                                SOLUBLE FIBER/PROBIOTICS PO   Take 1 tablet by mouth                                traZODone 50 MG tablet   Commonly known as:  DESYREL   TAKE 2-3 TABLETS BY MOUTH DAILY AT BEDTIME   Last time this was given:  150 mg on 9/2/2017 12:04 AM                                Vitamin C 500 MG Caps   Take 2 tablets by mouth 2 times daily.                                vitamin D 1000 UNITS capsule   Take 1 capsule by mouth daily.                                          More Information        Urinary Tract Infections in Women    Urinary tract infections (UTIs) are most often caused by bacteria (germs). These bacteria enter the urinary tract. The bacteria may  come from outside the body. Or they may travel from the skin outside the rectum or vagina into the urethra. Female anatomy makes it easy for bacteria from the bowel to enter a woman s urinary tract, which is the most common source of UTI. This means women develop UTIs more often than men. Pain in or around the urinary tract is a common UTI symptom. But the only way to know for sure if you have a UTI for the healthcare provider to test your urine. The two tests that may be done are the urinalysis and urine culture.  Types of UTIs    Cystitis: A bladder infection (cystitis) is the most common UTI in women. You may have urgent or frequent urination. You may also have pain, burning when you urinate, and bloody urine.    Urethritis: This is an inflamed urethra, which is the tube that carries urine from the bladder to outside the body. You may have lower stomach or back pain. You may also have urgent or frequent urination.    Pyelonephritis: This is a kidney infection. If not treated, it can be serious and damage your kidneys. In severe cases, you may be hospitalized. You may have a fever and lower back pain.  Medicines to treat a UTI  Most UTIs are treated with antibiotics. These kill the bacteria. The length of time you need to take them depends on the type of infection. It may be as short as 3 days. If you have repeated UTIs, a low-dose antibiotic may be needed for several months. Take antibiotics exactly as directed. Don t stop taking them until all of the medicine is gone. If you stop taking the antibiotic too soon, the infection may not go away, and you may develop a resistance to the antibiotic. This can make it much harder to treat.  Lifestyle changes to treat and prevent UTIs  The lifestyle changes below will help get rid of your UTI. They may also help prevent future UTIs.    Drink plenty of fluids. This includes water, juice, or other caffeine-free drinks. Fluids help flush bacteria out of your body.    Empty  your bladder. Always empty your bladder when you feel the urge to urinate. And always urinate before going to sleep. Urine that stays in your bladder can lead to infection. Try to urinate before and after sex as well.    Practice good personal hygiene. Wipe yourself from front to back after using the toilet. This helps keep bacteria from getting into the urethra.    Use condoms during sex. These help prevent UTIs caused by sexually transmitted bacteria. Also, avoid using spermicides during sex. These can increase the risk of UTIs. Choose other forms of birth control instead. For women who tend to get UTIs after sex, a low-dose of a preventive antibiotic may be used. Be sure to discuss this option with your healthcare provider.    Follow up with your healthcare provider as directed. He or she may test to make sure the infection has cleared. If needed, more treatment may be started.  Date Last Reviewed: 1/1/2017 2000-2017 [employer] [facility]. [address] [place] [address]. All rights reserved. This information is not intended as a substitute for professional medical care. Always follow your healthcare professional's instructions.                Ciprofloxacin tablets  What is this medicine?  CIPROFLOXACIN (sip mindi FLOX a sin) is a quinolone antibiotic. It is used to treat certain kinds of bacterial infections. It will not work for colds, flu, or other viral infections.  How should I use this medicine?  Take this medicine by mouth with a glass of water. Follow the directions on the prescription label. Take your medicine at regular intervals. Do not take your medicine more often than directed. Take all of your medicine as directed even if you think your are better. Do not skip doses or stop your medicine early.  You can take this medicine with food or on an empty stomach. It can be taken with a meal that contains dairy or calcium, but do not take it alone with a dairy product, like milk or yogurt or  calcium-fortified juice.  A special MedGuide will be given to you by the pharmacist with each prescription and refill. Be sure to read this information carefully each time.  Talk to your pediatrician regarding the use of this medicine in children. Special care may be needed.  What side effects may I notice from receiving this medicine?  Side effects that you should report to your doctor or health care professional as soon as possible:    allergic reactions like skin rash or hives, swelling of the face, lips, or tongue    anxious    confusion    depressed mood    diarrhea    fast, irregular heartbeat    hallucination, loss of contact with reality    joint, muscle, or tendon pain or swelling    pain, tingling, numbness in the hands or feet    suicidal thoughts or other mood changes    sunburn    unusually weak or tired  Side effects that usually do not require medical attention (report to your doctor or health care professional if they continue or are bothersome):    dry mouth    headache    nausea    trouble sleeping  What may interact with this medicine?  Do not take this medicine with any of the following medications:    cisapride    droperidol    terfenadine    tizanidine  This medicine may also interact with the following medications:    antacids    birth control pills    caffeine    cyclosporin    didanosine (ddI) buffered tablets or powder    medicines for diabetes    medicines for inflammation like ibuprofen, naproxen    methotrexate    multivitamins    omeprazole    phenytoin    probenecid    sucralfate    theophylline    warfarin  What if I miss a dose?  If you miss a dose, take it as soon as you can. If it is almost time for your next dose, take only that dose. Do not take double or extra doses.  Where should I keep my medicine?  Keep out of the reach of children.  Store at room temperature below 30 degrees C (86 degrees F). Keep container tightly closed. Throw away any unused medicine after the expiration  date.  What should I tell my health care provider before I take this medicine?  They need to know if you have any of these conditions:    bone problems    cerebral disease    history of low levels of potassium in the blood    joint problems    irregular heartbeat    kidney disease    myasthenia gravis    seizures    tendon problems    tingling of the fingers or toes, or other nerve disorder    an unusual or allergic reaction to ciprofloxacin, other antibiotics or medicines, foods, dyes, or preservatives    pregnant or trying to get pregnant    breast-feeding  What should I watch for while using this medicine?  Tell your doctor or health care professional if your symptoms do not improve.  Do not treat diarrhea with over the counter products. Contact your doctor if you have diarrhea that lasts more than 2 days or if it is severe and watery.  You may get drowsy or dizzy. Do not drive, use machinery, or do anything that needs mental alertness until you know how this medicine affects you. Do not stand or sit up quickly, especially if you are an older patient. This reduces the risk of dizzy or fainting spells.  This medicine can make you more sensitive to the sun. Keep out of the sun. If you cannot avoid being in the sun, wear protective clothing and use sunscreen. Do not use sun lamps or tanning beds/booths.  Avoid antacids, aluminum, calcium, iron, magnesium, and zinc products for 6 hours before and 2 hours after taking a dose of this medicine.  NOTE:This sheet is a summary. It may not cover all possible information. If you have questions about this medicine, talk to your doctor, pharmacist, or health care provider. Copyright  2017 [employer]

## 2017-09-02 LAB
ANION GAP SERPL CALCULATED.3IONS-SCNC: 7 MMOL/L (ref 3–14)
BASOPHILS # BLD AUTO: 0 10E9/L (ref 0–0.2)
BASOPHILS NFR BLD AUTO: 0.2 %
BUN SERPL-MCNC: 18 MG/DL (ref 7–30)
CALCIUM SERPL-MCNC: 7.6 MG/DL (ref 8.5–10.1)
CHLORIDE SERPL-SCNC: 105 MMOL/L (ref 94–109)
CO2 SERPL-SCNC: 25 MMOL/L (ref 20–32)
CREAT SERPL-MCNC: 1.34 MG/DL (ref 0.52–1.04)
DIFFERENTIAL METHOD BLD: ABNORMAL
EOSINOPHIL # BLD AUTO: 0 10E9/L (ref 0–0.7)
EOSINOPHIL NFR BLD AUTO: 0.3 %
ERYTHROCYTE [DISTWIDTH] IN BLOOD BY AUTOMATED COUNT: 14.5 % (ref 10–15)
GFR SERPL CREATININE-BSD FRML MDRD: 40 ML/MIN/1.7M2
GLUCOSE SERPL-MCNC: 115 MG/DL (ref 70–99)
HCT VFR BLD AUTO: 30.7 % (ref 35–47)
HGB BLD-MCNC: 9.8 G/DL (ref 11.7–15.7)
IMM GRANULOCYTES # BLD: 0.2 10E9/L (ref 0–0.4)
IMM GRANULOCYTES NFR BLD: 2 %
LYMPHOCYTES # BLD AUTO: 1.4 10E9/L (ref 0.8–5.3)
LYMPHOCYTES NFR BLD AUTO: 11.7 %
MCH RBC QN AUTO: 28.5 PG (ref 26.5–33)
MCHC RBC AUTO-ENTMCNC: 31.9 G/DL (ref 31.5–36.5)
MCV RBC AUTO: 89 FL (ref 78–100)
MONOCYTES # BLD AUTO: 0.6 10E9/L (ref 0–1.3)
MONOCYTES NFR BLD AUTO: 5.1 %
NEUTROPHILS # BLD AUTO: 9.6 10E9/L (ref 1.6–8.3)
NEUTROPHILS NFR BLD AUTO: 80.7 %
NRBC # BLD AUTO: 0 10*3/UL
NRBC BLD AUTO-RTO: 0 /100
PLATELET # BLD AUTO: 99 10E9/L (ref 150–450)
POTASSIUM SERPL-SCNC: 3.9 MMOL/L (ref 3.4–5.3)
RBC # BLD AUTO: 3.44 10E12/L (ref 3.8–5.2)
SODIUM SERPL-SCNC: 137 MMOL/L (ref 133–144)
WBC # BLD AUTO: 11.9 10E9/L (ref 4–11)

## 2017-09-02 PROCEDURE — 74176 CT ABD & PELVIS W/O CONTRAST: CPT | Mod: TC

## 2017-09-02 PROCEDURE — G0378 HOSPITAL OBSERVATION PER HR: HCPCS

## 2017-09-02 PROCEDURE — A9270 NON-COVERED ITEM OR SERVICE: HCPCS | Mod: GY | Performed by: INTERNAL MEDICINE

## 2017-09-02 PROCEDURE — 99232 SBSQ HOSP IP/OBS MODERATE 35: CPT | Performed by: INTERNAL MEDICINE

## 2017-09-02 PROCEDURE — 71020 ZZHC CHEST TWO VIEWS, FRONT/LAT: CPT | Mod: TC

## 2017-09-02 PROCEDURE — 25000132 ZZH RX MED GY IP 250 OP 250 PS 637: Mod: GY | Performed by: INTERNAL MEDICINE

## 2017-09-02 PROCEDURE — 36415 COLL VENOUS BLD VENIPUNCTURE: CPT | Performed by: INTERNAL MEDICINE

## 2017-09-02 PROCEDURE — 25000128 H RX IP 250 OP 636: Performed by: INTERNAL MEDICINE

## 2017-09-02 PROCEDURE — 40000786 ZZHCL STATISTIC ACTIVE MRSA SURVEILLANCE CULTURE: Performed by: INTERNAL MEDICINE

## 2017-09-02 PROCEDURE — 12000000 ZZH R&B MED SURG/OB

## 2017-09-02 PROCEDURE — 80048 BASIC METABOLIC PNL TOTAL CA: CPT | Performed by: INTERNAL MEDICINE

## 2017-09-02 PROCEDURE — 40000275 ZZH STATISTIC RCP TIME EA 10 MIN

## 2017-09-02 PROCEDURE — 85025 COMPLETE CBC W/AUTO DIFF WBC: CPT | Performed by: INTERNAL MEDICINE

## 2017-09-02 RX ORDER — CIPROFLOXACIN 2 MG/ML
400 INJECTION, SOLUTION INTRAVENOUS EVERY 12 HOURS
Status: DISCONTINUED | OUTPATIENT
Start: 2017-09-02 | End: 2017-09-05

## 2017-09-02 RX ADMIN — PANTOPRAZOLE SODIUM 40 MG: 40 TABLET, DELAYED RELEASE ORAL at 08:54

## 2017-09-02 RX ADMIN — FOLIC ACID 1 MG: 1 TABLET ORAL at 12:52

## 2017-09-02 RX ADMIN — DOCUSATE SODIUM 100 MG: 100 CAPSULE, LIQUID FILLED ORAL at 00:04

## 2017-09-02 RX ADMIN — DOCUSATE SODIUM 100 MG: 100 CAPSULE, LIQUID FILLED ORAL at 08:54

## 2017-09-02 RX ADMIN — GABAPENTIN 600 MG: 100 CAPSULE ORAL at 20:50

## 2017-09-02 RX ADMIN — SODIUM CHLORIDE, PRESERVATIVE FREE: 5 INJECTION INTRAVENOUS at 00:24

## 2017-09-02 RX ADMIN — CEFTRIAXONE SODIUM 1 G: 1 INJECTION, SOLUTION INTRAVENOUS at 21:55

## 2017-09-02 RX ADMIN — PANTOPRAZOLE SODIUM 40 MG: 40 TABLET, DELAYED RELEASE ORAL at 20:50

## 2017-09-02 RX ADMIN — HYDROCODONE BITARTRATE AND ACETAMINOPHEN 2 TABLET: 7.5; 325 TABLET ORAL at 15:43

## 2017-09-02 RX ADMIN — CIPROFLOXACIN 400 MG: 2 INJECTION, SOLUTION INTRAVENOUS at 20:48

## 2017-09-02 RX ADMIN — TRAZODONE HYDROCHLORIDE 150 MG: 50 TABLET ORAL at 00:04

## 2017-09-02 RX ADMIN — ONDANSETRON 4 MG: 2 INJECTION, SOLUTION INTRAMUSCULAR; INTRAVENOUS at 19:43

## 2017-09-02 RX ADMIN — GABAPENTIN 900 MG: 300 CAPSULE ORAL at 08:53

## 2017-09-02 RX ADMIN — SODIUM CHLORIDE, PRESERVATIVE FREE: 5 INJECTION INTRAVENOUS at 19:41

## 2017-09-02 RX ADMIN — HYDROMORPHONE HYDROCHLORIDE 0.5 MG: 1 INJECTION, SOLUTION INTRAMUSCULAR; INTRAVENOUS; SUBCUTANEOUS at 00:04

## 2017-09-02 RX ADMIN — HYDROCODONE BITARTRATE AND ACETAMINOPHEN 2 TABLET: 7.5; 325 TABLET ORAL at 19:42

## 2017-09-02 RX ADMIN — CITALOPRAM HYDROBROMIDE 40 MG: 40 TABLET ORAL at 08:54

## 2017-09-02 RX ADMIN — GABAPENTIN 600 MG: 100 CAPSULE ORAL at 12:50

## 2017-09-02 RX ADMIN — GABAPENTIN 600 MG: 100 CAPSULE ORAL at 00:03

## 2017-09-02 RX ADMIN — PANTOPRAZOLE SODIUM 40 MG: 40 TABLET, DELAYED RELEASE ORAL at 00:04

## 2017-09-02 RX ADMIN — HYDROCODONE BITARTRATE AND ACETAMINOPHEN 1 TABLET: 7.5; 325 TABLET ORAL at 08:58

## 2017-09-02 RX ADMIN — FOLIC ACID 1 MG: 1 TABLET ORAL at 20:50

## 2017-09-02 RX ADMIN — FOLIC ACID 1 MG: 1 TABLET ORAL at 08:53

## 2017-09-02 RX ADMIN — DOCUSATE SODIUM 100 MG: 100 CAPSULE, LIQUID FILLED ORAL at 20:50

## 2017-09-02 RX ADMIN — CIPROFLOXACIN 400 MG: 2 INJECTION, SOLUTION INTRAVENOUS at 09:43

## 2017-09-02 RX ADMIN — HYDROCODONE BITARTRATE AND ACETAMINOPHEN 1 TABLET: 7.5; 325 TABLET ORAL at 07:38

## 2017-09-02 ASSESSMENT — ANXIETY QUESTIONNAIRES: GAD7 TOTAL SCORE: 2

## 2017-09-02 ASSESSMENT — PAIN DESCRIPTION - DESCRIPTORS
DESCRIPTORS: ACHING
DESCRIPTORS: ACHING
DESCRIPTORS: SHARP
DESCRIPTORS: DULL;SHARP

## 2017-09-02 NOTE — PLAN OF CARE
"[facility] Observation Note:  Patient is registered to observation and is in 3116 /3116-1 at approximately 2320 via bed accompanied by transport tech from emergency room . Report received from Kaylan in SBAR format at 2256 via telephone. Patient transferred to bed via self.. Patient is alert and oriented X 3, reports pain; rates at 6 on 0-10 scale.  Patient oriented to room, unit, hourly rounding, and plan of care. Explained the admission packet including \"What is Observation Status\" and patient handbook with patient bill of rights brochure. Will continue to monitor and document as needed.  Nursing Observation criteria listed below was met:    Health care directives status obtained and documented: Yes    Care Everywhere authorization completed No      MRSA swab completed for patient 65 years and older: Yes      If initial lactic acid >2.0, repeat lactic acid drawn within one hour of arrival to unit: NA. If no, state reason:       Patient identifies a surrogate decision maker: Yes If yes,    Vaccination assessment and education completed: Yes   Vaccinations received prior to hospitalization: Pneumovax yes  Influenza(seasonal)  NO   Vaccination(s) ordered: patient declines      Skin issues/needs documented:No    Isolation Patient: no Education given and documented, correct sign in place and documentation row added to PCS:  No      Fall Prevention: Observation fall risk completed:  Yes Education given and documented, sticker and magnet in place: Yes      General Care Plan initiated with observation goal(s): Yes    Education (including assessment) Documented: Yes    New medication information given to patient and documented: Yes    Patient elects to use own medications from home during hospitalization:  No If yes, a MD order was obtained to use Medications from Home:  No and home medications were sent to Pharmacy for verification for use during hospitalization: No    Patient has discharge needs (If yes, please " explain): No

## 2017-09-02 NOTE — PLAN OF CARE
"Problem: Goal Outcome Summary  Goal: Goal Outcome Summary  -diagnostic tests and consults completed and resulted   -vital signs normal or at patient baseline   Nurse to notify provider when observation goals have been met and patient is ready for discharge.   Outcome: No Change  Reason for hospital stay:  UTI     Most recent vitals: BP (!) 104/48 (BP Location: Left arm)  Pulse 72  Temp 100.8  F (38.2  C) (Tympanic)  Resp 16  Ht 1.676 m (5' 6\")  Wt 100.4 kg (221 lb 5.5 oz)  SpO2 92%  BMI 35.73 kg/m2  Pain Management:  C/o pain from headache, neck, back, groin, and leg on the right side. Administered Norco 1 tab w/out relief, gave 1 more tab per order with decrease following sleep  Orientation:  A&O  Cardiac:  HR reg, BP initally 108/53, now soft 104/48 with report of dizziness that does not clear  Respiratory:  LS clear, denies sob, infrequent nonproductive cough. O2 sats 95% RA, following ambulation to BR O2 sats 84%, placed on 2LPM with sats increased to 92%  GI:  Urinates w/out difficulty, denies pain and no blood in urine noted this shift  :  BS active. Denied nausea most of shift, following afternoon meal of yogurt c/o nausea that resolved w/out medication  Diet: Reg, encouraging oral intake  Skin Issues:  Intact, Hx of BLE numbness reported  IVF:  NS at 100mL/hr  ABX:  Cipro this shift and Rocephin q24  Mobility:  Stand by assist with dizziness. Ambulated to BR. Planned to ambulate in hallway with Pt c/o nausea  Safety:  Call light with in reach and bed alarm active     Comments: Low grade temp 99.7-100.8, cool cloth and limited blankets used. CT of the abdomin/pelvis preformed this shift with no findings of any kidney stones     Face to face report given with opportunity to observe patient.    Report given to KATHERINE Delgado   9/2/2017  3:04 PM                 "

## 2017-09-02 NOTE — ED NOTES
"Called to pt room to assist with repositioning.  Pt reports \"back pain is too bad lying on cot\".   "

## 2017-09-02 NOTE — H&P
"[facility]    History and Physical  Hospitalist       Date of Admission:  9/1/2017  Date of Service (when I saw the patient): 09/01/17    Assessment & Plan   Melvi Cleveland is a 67 year old female who presents with intermittent fever, dysuria and \"pain all over.\"    1. UTI- Pain isn't reminiscent of RA flare. Rx with IV Rocephin empirically. Supportive mgmt.   2. Dehydration- Clinically. IVFs.   3. CKD- Cr fluctuates and is more or less at baseline. Recheck in AM.   4. Leucocytosis- Due to UTI. Follow in AM.   5. Chronic neck and back pain- Continue outpt regimen.   6. RA- Hold any immunosupressant meds. At baseline.   7. GERD- Outpt PPI.     Principal Problem:    Urinary tract infection  Active Problems:    Neck pain, chronic    Low back pain, chronic    Crohn's disease of large intestine (H)    Dyslipidemia    Sicca syndrome (H)    Peripheral polyneuropathy (H)    RA (rheumatoid arthritis) (H)    DVT Prophylaxis: Heparin SQ  Code Status: Full Code    Disposition: Expected discharge in 1-2 days once WBC normal, afebrile and clinically at baseline.    Beulah Dozierokhar    Primary Care Physician   Jimbo Martinez    Chief Complaint   Melvi Cleveland is a 67 year old female who presents with intermittent fever, dysuria and \"pain all over.\"    History is obtained from the patient    History of Present Illness   Melvi Cleveland is a 67 year old female who presents with intermittent fever, dysuria and \"pain all over.\" She's had these symptoms for a week. In the last two days she hasn't been able to take anything PO on account of nausea and emesis. She went to her clinic doctor who obtained a CTH which was unremarkable and with no resolution of symptoms, she came to the ED. She was diagnosed with a UTI and given Ceftriaxone. Pain controlled with IV meds.     Her current pain \"all over\" is more of a full body ache not like her RA flare which is limited to her joints. When I saw her, after the  Pain med, she was " feeling a lot better. No other complaints except feeling weak.     Past Medical History    I have reviewed this patient's medical history and updated it with pertinent information if needed.   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     Parotiditis 5/9/2011     Peripheral Neuriopathy 3/1/2011     Rheumatoid arthritis(714.0) 12/13/1999     Seborrhea capitis 10/6/2011     Sjogrens Syndrome 3/1/2011     Urethral stricture 4/30/2008       Past Surgical History   I have reviewed this patient's surgical history and updated it with pertinent information if needed.  Past Surgical History:   Procedure Laterality Date     BACK SURGERY  05/1995    back surgery     BIOPSY      breast bx X2     BIOPSY BREAST      LT     BIOPSY BREAST NEEDLE LOCALIZATION Right 6/12/2017    Procedure: BIOPSY BREAST NEEDLE LOCALIZATION;  WIRE LOCALIZED EXCISIONAL BIOPSY  RIGHT BREAST MASS;  Surgeon: Jeni Amin MD;  Location: [facility] OR     CHOLECYSTECTOMY  2004     COLONOSCOPY  11/15/2004    screening     COLONOSCOPY  9/24/2014     EGD with biopsy  2010, 2011    GERD     laminectomy      L4 L5 laminectomy; back pain       Prior to Admission Medications   Prior to Admission Medications   Prescriptions Last Dose Informant Patient Reported? Taking?   ALBUTEROL 108 (90 BASE) MCG/ACT inhaler Past Week at Unknown time  No Yes   Sig: INHALE 2 PUFFS INTO THE LUNGS EVERY 6 HOURS AS NEEDED FOR SHORTNESS OF BREATH OR WHEEZING   ALFALFA PO   Yes No   Sig: Take 5 tablets by mouth 2 times daily.   ALLEGRA-D ALLERGY & CONGESTION  MG per 12 hr tablet 9/1/2017 at Unknown time  No Yes   Sig: TAKE 1 TABLET BY MOUTH TWICE DAILY   Ascorbic  Acid (VITAMIN C) 500 MG CAPS 2017 at Unknown time  Yes Yes   Sig: Take 2 tablets by mouth 2 times daily.   Calcium-Vitamin D-Vitamin K (CALCIUM + D) 500-1000-40 MG-UNT-MCG CHEW 2017 at Unknown time  Yes Yes   Sig: Take 3 tablets by mouth daily    Cholecalciferol (VITAMIN D) 1000 UNITS capsule 2017 at Unknown time  Yes Yes   Sig: Take 1 capsule by mouth daily.   DICLOFENAC PO 2017 at Unknown time  Yes Yes   Sig: Take 100 mg by mouth daily    Flaxseed, Linseed, (FLAX SEED OIL) 1000 MG capsule 2017 at Unknown time  Yes Yes   Sig: Take 2 capsules by mouth daily   Garlic 400 MG TBEC 2017 at Unknown time  Yes Yes   HYDROcodone-acetaminophen (NORCO) 7.5-325 MG per tablet 2017 at Unknown time  No Yes   Sig: TAKE 1 TABLET BY MOUTH EVERY 4 TO 6 HOURS AS NEEDED FOR PAIN   METHOTREXATE SODIUM, PF, IJ Past Week at Unknown time  Yes Yes   Sig: Inject 0.8 mLs as directed once a week    MILK THISTLE PO 2017 at Unknown time  Yes Yes   Sig: Take 1,000 mg by mouth daily.   MULTIPLE VITAMIN PO 2017 at Unknown time  Yes Yes   Sig: Take  by mouth 2 times daily.   PREBIOTIC PRODUCT PO 2017 at Unknown time  Yes Yes   Sig: Take 1 tablet by mouth daily   Probiotic Product (SOLUBLE FIBER/PROBIOTICS PO) 2017 at Unknown time  Yes Yes   Sig: Take 1 tablet by mouth   RESTASIS 0.05 % ophthalmic emulsion 2017 at Unknown time  No Yes   Sig: USE 1 DROP IN BOTH EYES 2 TIMES A DAY   SM GAS RELIEF ANTIFLATUENT 180 MG CAPS 2017 at Unknown time  No Yes   Sig: TAKE ONE CAPSULE BY MOUTH AS NEEDED AFTER A MEAL. MAX 2 CAPS/DAY   citalopram (CELEXA) 40 MG tablet 2017 at Unknown time  Yes Yes   Sig: Take 40 mg by mouth   fish oil-omega-3 fatty acids (OMEGA-3) 1000 MG capsule 2017 at Unknown time  Yes Yes   Sig: Take 1 g by mouth daily.   fluticasone (FLONASE) 50 MCG/ACT spray 2017 at Unknown time  Yes Yes   Si spray Reported on 2017   folic acid (FOLVITE) 1 MG tablet 2017 at Unknown  time  Yes Yes   Sig: Take 1 mg by mouth 3 times daily.   gabapentin (NEURONTIN) 300 MG capsule 2017 at Unknown time  No Yes   Sig: TAKE 3 CAPSULES BY MOUTH IN THE MORNING, 2 CAPSULES AT NOON AND 2 CAPSULES IN THE EVENING   hydroxychloroquine (PLAQUENIL) 200 MG tablet 2017 at Unknown time  Yes Yes   Sig: Take 200 mg by mouth 2 times daily.   ketotifen (ZADITOR/REFRESH ANTI-ITCH) 0.025 % SOLN ophthalmic solution Unknown at Unknown time  Yes No   Si drop   ondansetron (ZOFRAN ODT) 4 MG ODT tab Past Week at Unknown time  No Yes   Sig: Take 1-2 tablets (4-8 mg) by mouth every 8 hours as needed for nausea   pantoprazole (PROTONIX) 40 MG enteric coated tablet 2017 at Unknown time  No Yes   Sig: Take 1 tablet (40 mg) by mouth daily   Patient taking differently: Take 40 mg by mouth 2 times daily    polyvinyl alcohol-povidone (REFRESH) 1.4-0.6 % ophthalmic solution 2017 at Unknown time  Yes Yes   Si-2 drops as needed   traZODone (DESYREL) 50 MG tablet 2017 at Unknown time  No Yes   Sig: TAKE 2-3 TABLETS BY MOUTH DAILY AT BEDTIME      Facility-Administered Medications: None     Allergies   Allergies   Allergen Reactions     Adhesive Tape      Benzoin Compound      Tin-Co-Javier     Seasonal Allergies        Social History   I have reviewed this patient's social history and updated it with pertinent information if needed. Melvi CHRISTOPHER [place]  reports that she quit smoking about 20 years ago. Her smoking use included Cigarettes. She has never used smokeless tobacco. She reports that she does not drink alcohol or use illicit drugs.    Family History   I have reviewed this patient's family history and updated it with pertinent information if needed.   Family History   Problem Relation Age of Onset     DIABETES Mother      Rheumatoid Arthritis Mother      Other - See Comments Mother      fibromyalgia     Osteoarthritis Mother      Thyroid Disease Mother      thyroid disorder     Unknown/Adopted Father       cause of death unknown     Rheumatoid Arthritis Father      DIABETES Sister      Myocardial Infarction Sister      cause of death     Lupus Sister      cause of death     C.A.D. Maternal Grandmother      CEREBROVASCULAR DISEASE Maternal Grandmother      DIABETES Maternal Grandmother      Thyroid Disease Maternal Grandmother      thyrodi disorder; goitor removed     CANCER Maternal Grandfather      Hypertension Brother      Other - See Comments Brother      sleep apnea     Other - See Comments Sister      sleep apnea       Review of Systems   The 10 point Review of Systems is negative other than noted in the HPI or here.     Physical Exam   Temp: 99.4  F (37.4  C) Temp src: Tympanic BP: 105/64 Pulse: 91 Heart Rate: 82 Resp: 18 SpO2: 95 % O2 Device: None (Room air)    Vital Signs with Ranges  Temp:  [99.1  F (37.3  C)-99.4  F (37.4  C)] 99.4  F (37.4  C)  Pulse:  [] 91  Heart Rate:  [74-96] 82  Resp:  [18-20] 18  BP: ()/(44-69) 105/64  SpO2:  [91 %-96 %] 95 %  212 lbs 0 oz    Constitutional: In NAD  Eyes: Unremarkable.  HEENT: Dry MM.  Respiratory: CTA B.  Cardiovascular: S1 + S2.   GI: NT, ND, no VM, BS +  Skin: No rashes or lesions.   Neurologic: Grossly non-focal.  Psychiatric: Normal affect.     Data   Data reviewed today:  I personally reviewed all labs.    Recent Labs  Lab 09/01/17 2046 09/01/17  1000   WBC 17.6* 13.5*   HGB 10.8* 11.5*   MCV 87 87   * 141*    139   POTASSIUM 4.4 3.9   CHLORIDE 105 105   CO2 25 26   BUN 16 14   CR 1.14* 1.10*   ANIONGAP 8 8   KATHLEEN 8.2* 8.8   * 118*   ALBUMIN 2.3*  --    PROTTOTAL 6.5*  --    BILITOTAL 0.7  --    ALKPHOS 85  --    ALT 38  --    AST 24  --    LIPASE 44*  --        Lactic Acid   Date Value Ref Range Status   09/01/2017 1.3 0.4 - 2.0 mmol/L Final       Recent Results (from the past 24 hour(s))   CT Head w/o Contrast    Narrative    CT SCAN OF BRAIN WITHOUT CONTRAST    REPORT:  The ventricular system is normal in size. There are  no  masses, ventricular shifts, or extracerebral collections.  Brainstem  and cerebellum appear normal. Paranasal sinuses are clear.    IMPRESSION: NORMAL BRAIN.  Exam Date: Sep 01, 2017 11:22:00 AM  Author: SUE CANCINO  This report is final and signed       Beulah Colunga MD

## 2017-09-02 NOTE — PLAN OF CARE
1500: pt awakens to name. Vitals and assessment as charted on flowsheet. md notified of changes to assessment ( see provider notification note). Pt rates pain 4-5/10 headache. Declines meds at this time.  1543: pt states pain is 5-6/10 headache. Medicated pt with norco. Pt stated one pill didn't help and requested 2 to be given. Denies nausea at this time but did take dionisio crackers with medication.  at bedside. Pt encouaged to try and ambulate tonight if she is able. Pt verbalized understanding. Did talk with pt about the importance of taking deep breaths and coughing to help open airways. Encouraged 10 breathes every hour while awake. Pt stated she will try and ambulate when she gets up to bathroom.  1700: pt up in chair for dinner. Visiting with family  1800: ambulated hallway approx 250 feet. With staff. Stated got nauseated. But at rest nausea relieved.  1940: vitals as charted on flowsheet. Pt rates pain 6/10 headache and nausea. Medicated with pain meds and zofran see mar.  2100: pt states headache only a twinge. Rates 1/10. And nausea gone. Pt then up to ambulate to bathroom to void and then brushed teeth then back to bed offers no complaints at this time.  2200:  Pt asleep resp even and unlabored.

## 2017-09-02 NOTE — PROVIDER NOTIFICATION
Dr. dickerson notified of pt complaints of dizziness with sitting and standing. orthos done ( see flowsheet)-md aware. md also aware of crackles in lungs and 1-2+ edema. And pt now requiring oxygen at 2 lpm via n.c.. No new orders at this time.

## 2017-09-02 NOTE — ED PROVIDER NOTES
History     Chief Complaint   Patient presents with     Headache     for one week     Neck Pain     was seen in pcp today, CT and blood work was done, IV fluids given. pt was told to come back in if she is feeling worse     Nausea & Vomiting     The history is provided by the patient.     Melvi Cleveland is a 67 year old female who came for generalized body ache, including head, neck and back for 1 week, subjective feeling of fever off and on , chills, nausea vomiting and also some mild urinary symptoms including discomfort during urination. Was in PMD office this AM, CT head and labs was done , discharged home.     I have reviewed the Medications, Allergies, Past Medical and Surgical History, and Social History in the Epic system.        Review of Systems   Constitutional: Positive for appetite change, chills and fever. Negative for diaphoresis.   HENT: Negative for ear pain, sore throat, trouble swallowing and voice change.    Eyes: Negative for pain and visual disturbance.   Respiratory: Positive for cough. Negative for chest tightness, shortness of breath, wheezing and stridor.    Cardiovascular: Negative for chest pain, palpitations and leg swelling.   Gastrointestinal: Positive for nausea. Negative for abdominal distention, abdominal pain, anal bleeding, blood in stool, diarrhea and vomiting.   Genitourinary: Positive for dysuria. Negative for decreased urine volume, flank pain and hematuria.   Musculoskeletal: Negative for arthralgias, back pain, gait problem, myalgias, neck pain and neck stiffness.   Skin: Negative for color change, pallor, rash and wound.   Neurological: Negative for dizziness, syncope, light-headedness, numbness and headaches.   Psychiatric/Behavioral: Negative for confusion and suicidal ideas.       Physical Exam   BP: 99/53  Pulse: 91  Temp: 99.4  F (37.4  C)  Resp: 18  Weight: 96.2 kg (212 lb)  SpO2: 93 %  Physical Exam   Constitutional: She is oriented to person, place, and time.  She appears well-developed and well-nourished.   HENT:   Head: Normocephalic and atraumatic.   Mouth/Throat: No oropharyngeal exudate.   Eyes: Conjunctivae are normal. Pupils are equal, round, and reactive to light.   Neck: Normal range of motion. Neck supple. No JVD present. No tracheal deviation present. No thyromegaly present.   Cardiovascular: Normal rate, regular rhythm, normal heart sounds and intact distal pulses.  Exam reveals no gallop and no friction rub.    No murmur heard.  Pulmonary/Chest: Effort normal and breath sounds normal. No stridor. No respiratory distress. She has no wheezes. She has no rales. She exhibits no tenderness.   Abdominal: Soft. Bowel sounds are normal. She exhibits no distension and no mass. There is no tenderness. There is no rebound and no guarding.   Musculoskeletal: Normal range of motion. She exhibits no edema or tenderness.   Lymphadenopathy:     She has no cervical adenopathy.   Neurological: She is alert and oriented to person, place, and time.   Skin: Skin is warm and dry. No rash noted. No erythema. No pallor.   Psychiatric: Her behavior is normal.   Nursing note and vitals reviewed.      ED Course     ED Course     Procedures                 Labs Ordered and Resulted from Time of ED Arrival Up to the Time of Departure from the ED   ROUTINE UA WITH MICROSCOPIC REFLEX TO CULTURE - Abnormal; Notable for the following:        Result Value    Blood Urine Moderate (*)     Protein Albumin Urine 100 (*)     Nitrite Urine Positive (*)     Leukocyte Esterase Urine Large (*)     WBC Urine >182 (*)     RBC Urine 28 (*)     WBC Clumps Present (*)     Bacteria Urine None (*)     Mucous Urine Present (*)     All other components within normal limits   CBC WITH PLATELETS DIFFERENTIAL - Abnormal; Notable for the following:     WBC 17.6 (*)     RBC Count 3.76 (*)     Hemoglobin 10.8 (*)     Hematocrit 32.7 (*)     Platelet Count 119 (*)     Absolute Neutrophil 15.1 (*)     Absolute  Lymphocytes 0.6 (*)     Abs Immature Granulocytes 0.5 (*)     All other components within normal limits   LIPASE - Abnormal; Notable for the following:     Lipase 44 (*)     All other components within normal limits   CRP INFLAMMATION - Abnormal; Notable for the following:     CRP Inflammation 170.0 (*)     All other components within normal limits   ERYTHROCYTE SEDIMENTATION RATE AUTO - Abnormal; Notable for the following:     Sed Rate 46 (*)     All other components within normal limits   COMPREHENSIVE METABOLIC PANEL - Abnormal; Notable for the following:     Glucose 120 (*)     Creatinine 1.14 (*)     GFR Estimate 48 (*)     GFR Estimate If Black 58 (*)     Calcium 8.2 (*)     Albumin 2.3 (*)     Protein Total 6.5 (*)     All other components within normal limits   CK TOTAL   AMYLASE   LACTIC ACID   URINE CULTURE AEROBIC BACTERIAL   BLOOD CULTURE   BLOOD CULTURE   URINE CULTURE AEROBIC BACTERIAL       Assessments & Plan (with Medical Decision Making)   Generalized bodyache, nause vomiting, fever  Dysuria + suprapubic discomfort  Sepsis work up done  WBC, CRP ,  is elevating compared to AM  UA : positive for inflammator and infectious parameters  1 gr ceftriaxone give  Complicated UTI in potentially immunocompromised patient( on methotrexate for RA)  Need admission for IVF, IV Abx  Spoke to Dr Chambers, accepted for admission  I have reviewed the nursing notes.    I have reviewed the findings, diagnosis, plan and need for follow up with the patient.      New Prescriptions    No medications on file       Final diagnoses:   Complicated UTI (urinary tract infection)       9/1/2017   [facility]     John Gar MD  09/01/17 3961

## 2017-09-02 NOTE — ED NOTES
Pt up to bathroom, voided small amount.  Specimen sent.  Pt had small amount of blood noted on toilet paper when wiping.

## 2017-09-02 NOTE — ED NOTES
"Pt comes in with  today after week long illness of headache, n/v, back pain, and fevers.  Pt was in to see primary MD today with complete work up of symptoms.  Pt had lab work along with head CT.  Pt also received 1000 cc of d5/ns today. Pt via wheelchair to room 5, pt assisted to cot with assist of 2 staff.  Pt had recent \"breast surgery\".  Pt has lump removed, no cancer.  Pt reports chronic back pain.  Pt did take 2 norco prior to arrival with no change in back or headache pain.    "

## 2017-09-02 NOTE — PLAN OF CARE
Problem: Goal Outcome Summary  Goal: Goal Outcome Summary  -diagnostic tests and consults completed and resulted   -vital signs normal or at patient baseline   Nurse to notify provider when observation goals have been met and patient is ready for discharge.   Outcome: No Change  Patient alert/oriented, c/o back/generalized pain requesting IV dilaudid,Prn medication  effective. C/o nausea prn IV zofran given with effect. Up to bathroom SBA. IFV infusing without difficulty. o2 sats low to mid 90's on RA.

## 2017-09-02 NOTE — ED NOTES
Pt to be admitted to acute care unit, pt and family verbalized understanding and in agreement with plan.

## 2017-09-02 NOTE — PHARMACY
[facility]    Pharmacy      Antimicrobial Stewardship Note     Current antimicrobial therapy:  Anti-infectives (Future)    Start     Dose/Rate Route Frequency Ordered Stop    09/02/17 2200  cefTRIAXone in d5w (ROCEPHIN) intermittent infusion 1 g      1 g  over 30 Minutes Intravenous EVERY 24 HOURS 09/01/17 2322 09/02/17 0915  ciprofloxacin (CIPRO) infusion 400 mg      400 mg  200 mL/hr over 1 Hours Intravenous EVERY 12 HOURS 09/02/17 0834            Indication: UTI     Pertinent labs:  Creatinine   Creatinine   Date Value Ref Range Status   09/02/2017 1.34 (H) 0.52 - 1.04 mg/dL Final   09/01/2017 1.14 (H) 0.52 - 1.04 mg/dL Final   09/01/2017 1.10 (H) 0.52 - 1.04 mg/dL Final     WBC   WBC   Date Value Ref Range Status   09/02/2017 11.9 (H) 4.0 - 11.0 10e9/L Final   09/01/2017 17.6 (H) 4.0 - 11.0 10e9/L Final   09/01/2017 13.5 (H) 4.0 - 11.0 10e9/L Final     ANC No components found for: ANC     Culture Results: Gram negative rods. Sensitivities pending.     Recommendations/Interventions:  1. Ciprofloxacin added to Rocephin today.  Monitor response and consider discontinuing the Rocephin based on response.    Idalia Henriquez [facility]  September 2, 2017

## 2017-09-02 NOTE — PROVIDER NOTIFICATION
DATE:  9/2/2017   TIME OF RECEIPT FROM LAB:  3403  LAB TEST:  Blood Culture  LAB VALUE:  1 bottle positive for Gram Negative Rods  RESULTS GIVEN WITH READ-BACK TO (PROVIDER):  Dr. Teresa  TIME LAB VALUE REPORTED TO PROVIDER:   0912

## 2017-09-03 LAB
ALBUMIN SERPL-MCNC: 2.1 G/DL (ref 3.4–5)
ALP SERPL-CCNC: 76 U/L (ref 40–150)
ALT SERPL W P-5'-P-CCNC: 27 U/L (ref 0–50)
ANION GAP SERPL CALCULATED.3IONS-SCNC: 6 MMOL/L (ref 3–14)
AST SERPL W P-5'-P-CCNC: 15 U/L (ref 0–45)
BACTERIA SPEC CULT: ABNORMAL
BACTERIA SPEC CULT: NORMAL
BASOPHILS # BLD AUTO: 0 10E9/L (ref 0–0.2)
BASOPHILS NFR BLD AUTO: 0.2 %
BILIRUB SERPL-MCNC: 0.3 MG/DL (ref 0.2–1.3)
BUN SERPL-MCNC: 15 MG/DL (ref 7–30)
CALCIUM SERPL-MCNC: 8 MG/DL (ref 8.5–10.1)
CHLORIDE SERPL-SCNC: 107 MMOL/L (ref 94–109)
CO2 SERPL-SCNC: 24 MMOL/L (ref 20–32)
CREAT SERPL-MCNC: 1.16 MG/DL (ref 0.52–1.04)
D DIMER PPP DDU-MCNC: 405 NG/ML D-DU (ref 0–300)
DIFFERENTIAL METHOD BLD: ABNORMAL
EOSINOPHIL # BLD AUTO: 0.2 10E9/L (ref 0–0.7)
EOSINOPHIL NFR BLD AUTO: 1.7 %
ERYTHROCYTE [DISTWIDTH] IN BLOOD BY AUTOMATED COUNT: 14.4 % (ref 10–15)
GFR SERPL CREATININE-BSD FRML MDRD: 47 ML/MIN/1.7M2
GLUCOSE SERPL-MCNC: 100 MG/DL (ref 70–99)
HCT VFR BLD AUTO: 30.7 % (ref 35–47)
HGB BLD-MCNC: 9.9 G/DL (ref 11.7–15.7)
IMM GRANULOCYTES # BLD: 0.2 10E9/L (ref 0–0.4)
IMM GRANULOCYTES NFR BLD: 1.4 %
LYMPHOCYTES # BLD AUTO: 1.1 10E9/L (ref 0.8–5.3)
LYMPHOCYTES NFR BLD AUTO: 8.5 %
MCH RBC QN AUTO: 28.4 PG (ref 26.5–33)
MCHC RBC AUTO-ENTMCNC: 32.2 G/DL (ref 31.5–36.5)
MCV RBC AUTO: 88 FL (ref 78–100)
MONOCYTES # BLD AUTO: 1.5 10E9/L (ref 0–1.3)
MONOCYTES NFR BLD AUTO: 11.1 %
NEUTROPHILS # BLD AUTO: 10.3 10E9/L (ref 1.6–8.3)
NEUTROPHILS NFR BLD AUTO: 77.1 %
NRBC # BLD AUTO: 0 10*3/UL
NRBC BLD AUTO-RTO: 0 /100
PLATELET # BLD AUTO: 116 10E9/L (ref 150–450)
POTASSIUM SERPL-SCNC: 4.4 MMOL/L (ref 3.4–5.3)
PROT SERPL-MCNC: 6.2 G/DL (ref 6.8–8.8)
RBC # BLD AUTO: 3.49 10E12/L (ref 3.8–5.2)
SODIUM SERPL-SCNC: 137 MMOL/L (ref 133–144)
SPECIMEN SOURCE: ABNORMAL
SPECIMEN SOURCE: NORMAL
WBC # BLD AUTO: 13.3 10E9/L (ref 4–11)

## 2017-09-03 PROCEDURE — 93970 EXTREMITY STUDY: CPT | Mod: TC

## 2017-09-03 PROCEDURE — 40000275 ZZH STATISTIC RCP TIME EA 10 MIN

## 2017-09-03 PROCEDURE — 36415 COLL VENOUS BLD VENIPUNCTURE: CPT | Performed by: INTERNAL MEDICINE

## 2017-09-03 PROCEDURE — 85025 COMPLETE CBC W/AUTO DIFF WBC: CPT | Performed by: INTERNAL MEDICINE

## 2017-09-03 PROCEDURE — 99232 SBSQ HOSP IP/OBS MODERATE 35: CPT | Performed by: INTERNAL MEDICINE

## 2017-09-03 PROCEDURE — 25000132 ZZH RX MED GY IP 250 OP 250 PS 637: Mod: GY | Performed by: INTERNAL MEDICINE

## 2017-09-03 PROCEDURE — 25000128 H RX IP 250 OP 636: Performed by: INTERNAL MEDICINE

## 2017-09-03 PROCEDURE — 80053 COMPREHEN METABOLIC PANEL: CPT | Performed by: INTERNAL MEDICINE

## 2017-09-03 PROCEDURE — 85379 FIBRIN DEGRADATION QUANT: CPT | Performed by: INTERNAL MEDICINE

## 2017-09-03 PROCEDURE — A9270 NON-COVERED ITEM OR SERVICE: HCPCS | Mod: GY | Performed by: INTERNAL MEDICINE

## 2017-09-03 PROCEDURE — 12000000 ZZH R&B MED SURG/OB

## 2017-09-03 RX ADMIN — GABAPENTIN 600 MG: 100 CAPSULE ORAL at 20:20

## 2017-09-03 RX ADMIN — HYDROCODONE BITARTRATE AND ACETAMINOPHEN 2 TABLET: 7.5; 325 TABLET ORAL at 20:21

## 2017-09-03 RX ADMIN — DOCUSATE SODIUM 100 MG: 100 CAPSULE, LIQUID FILLED ORAL at 09:21

## 2017-09-03 RX ADMIN — PANTOPRAZOLE SODIUM 40 MG: 40 TABLET, DELAYED RELEASE ORAL at 09:21

## 2017-09-03 RX ADMIN — HYDROCODONE BITARTRATE AND ACETAMINOPHEN 2 TABLET: 7.5; 325 TABLET ORAL at 06:15

## 2017-09-03 RX ADMIN — CIPROFLOXACIN 400 MG: 2 INJECTION, SOLUTION INTRAVENOUS at 21:21

## 2017-09-03 RX ADMIN — GABAPENTIN 600 MG: 100 CAPSULE ORAL at 13:39

## 2017-09-03 RX ADMIN — CITALOPRAM HYDROBROMIDE 40 MG: 40 TABLET ORAL at 09:21

## 2017-09-03 RX ADMIN — FLUTICASONE PROPIONATE 1 SPRAY: 50 SPRAY, METERED NASAL at 09:20

## 2017-09-03 RX ADMIN — HYDROCODONE BITARTRATE AND ACETAMINOPHEN 2 TABLET: 7.5; 325 TABLET ORAL at 13:41

## 2017-09-03 RX ADMIN — PANTOPRAZOLE SODIUM 40 MG: 40 TABLET, DELAYED RELEASE ORAL at 20:21

## 2017-09-03 RX ADMIN — SODIUM CHLORIDE, PRESERVATIVE FREE: 5 INJECTION INTRAVENOUS at 06:16

## 2017-09-03 RX ADMIN — FOLIC ACID 1 MG: 1 TABLET ORAL at 09:21

## 2017-09-03 RX ADMIN — FOLIC ACID 1 MG: 1 TABLET ORAL at 20:21

## 2017-09-03 RX ADMIN — FOLIC ACID 1 MG: 1 TABLET ORAL at 13:39

## 2017-09-03 RX ADMIN — DOCUSATE SODIUM 100 MG: 100 CAPSULE, LIQUID FILLED ORAL at 20:20

## 2017-09-03 RX ADMIN — CEFTRIAXONE SODIUM 1 G: 1 INJECTION, SOLUTION INTRAVENOUS at 22:23

## 2017-09-03 RX ADMIN — CIPROFLOXACIN 400 MG: 2 INJECTION, SOLUTION INTRAVENOUS at 09:27

## 2017-09-03 RX ADMIN — DEXTRAN 70, AND HYPROMELLOSE 2910 2 DROP: 1; 3 SOLUTION/ DROPS OPHTHALMIC at 09:16

## 2017-09-03 RX ADMIN — GABAPENTIN 900 MG: 300 CAPSULE ORAL at 09:21

## 2017-09-03 ASSESSMENT — PAIN DESCRIPTION - DESCRIPTORS: DESCRIPTORS: HEADACHE

## 2017-09-03 NOTE — PLAN OF CARE
Problem: Goal Outcome Summary  Goal: Goal Outcome Summary  -diagnostic tests and consults completed and resulted   -vital signs normal or at patient baseline   Nurse to notify provider when observation goals have been met and patient is ready for discharge.   Outcome: No Change  Patient alert/oriented, up to bathroom SBA. o2 sats low to mid 90's on 2 LPM nasal cannula. o2 sats drop to low 80's on RA, patient having CASAS, lung sounds having faint crackles in the bases. Using IS reaching approx 1000. IFV infusing without difficulty. C/o headache declining pain meds at this time.     Problem: Urinary Tract Infection (Adult)  Goal: Signs and Symptoms of Listed Potential Problems Will be Absent or Manageable (Urinary Tract Infection)  Signs and symptoms of listed potential problems will be absent or manageable by discharge/transition of care (reference Urinary Tract Infection (Adult) CPG).   Outcome: No Change    09/03/17 4558   Urinary Tract Infection   Problems Assessed (Urinary Tract Infection) pain
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
## 2017-09-03 NOTE — PLAN OF CARE
Problem: Patient Goal: Social Work Focus  Goal: 1. Patient Goal: Social Work Focus  CM will remain available for discharge planning.

## 2017-09-03 NOTE — PLAN OF CARE
Patient A&O. States she is tired but generally feels better today. VSS. Afebrile. HR 80s. /53. RR 20, non labored at rest, CASAS noted. O2 sats 95% on 2L at rest, weaned to 1L, sats 88-91% on 1L. Pt ambulated to and from restroom on room air, O2 sats 77% after returning to bed, sats recover quickly with rest and oxygen replacement. Lung sounds diminished through out left lung, clear to upper right, fine crackle to right base. Infrequent dry cough. Pt reports headache continues, rated 5/10, treated with PRN Norco with adequate relief per pt report. NS infusing at 50ml/hr. Continues to receive IV antibiotics. Up to void often, voiding adequate amounts of clear yellow urine. Ambulated in hallway x2 this shift and tolerated well.  at bedside. Venous US of bilateral lower extremities performed this evening.

## 2017-09-03 NOTE — PROGRESS NOTES
[facility]    Hospitalist Progress Note    Date of Service (when I saw the patient): 09/03/2017    Assessment & Plan   Melvi Cleveland is a 67 year old female who was admitted on 9/1/2017.      1. UTI:  Patient with definite UTI with pyuria and leukocytosis no fevers,  lower back discomfort.  Was given ceftriaxone in ER.      Urine culture so far is growing non lactose fermenting Gm- rods   Nonlactose-fermenting organisms include Pseudomonas, Proteus, Salmonella, Shigella, and Citrobacter.  Will add cipro until cultures come back with ID/Sens     Reviewed CT scan and radiology report shows no stones. Right kidney with some perinephric stranding and mild enlargement of ureter.   Could have passed stone also consistent with possible pyelonephritis.   Her blood cultures are also growing out Gm- rods also .   No real fevers.  WBC about same.          2. Dehydration/CKD: She had nausea vomiting for a while pre hospital.  Has gotten IVF.  Her creatinine had bumped up a bit from admission. With IVF her BP is okay running about 100 systolic.  No signs of sepsis at this time.  Some nausea still, but taking in liquids well, solids not so much. Urine output has picked up a lot overnight.  Her repeat labs show  Bun Cr  16/1.14.     3.  Anemia/thrombocytopenia:  Both did drop down but are stable today. No signs of bleeding  likely due to infection issue.     4. Crohn's Disease:  Stable currently  She says has been acting up a bit lately but nothing really bad for her.     5. Chronic neck and back pain: Her back pain is better now and is back to what her usual pain is like.     6. RA:  Hold any immunosupressant meds. At baseline.      7. GERD- Continue Protonix, no real symptoms at this time.     8.  Peripheral polyneuropathy:  Continue her gabapentin. States if doesn't take this she gets into trouble with pain.    9.  Hypoxia:  Patient fine on 2 liters but does drop down significantly on room air.  Some crackle at  base which is constant  Chest X-rays are unchanged some  bibasilar infiltrates.  Not coughing much due to her headache.  Started IS and told her needs to cough and deep breath. Able to ambulate fair.  Always concern re possible PE  Has chron edema and calf pain  Not phylicia different than baseline per patient.  Might all just be atelectasis.  Will check D dimer knowing that with infection jeison be up likely as is acute phase reactant also jeison check ultrasound of legs  If a negative and renal function improve the might  do CTA to completely r/o.    D dimer was at 402,  Mild elevation,  Waiting for the venous ultrasound.  Her JVD is up a bit on exam today.  Cutting back on her IVF  For now.    DVT Prophylaxis: compression  Code Status: Full Code    Disposition: Expected discharge in 1-3 days once infection is under control.    Venkata Teresa    Interval History   Feels somewhat better.  Still headache  Especially when coughs.  When up gets dizzy no vertigo able to go for walks  passing some flatus  Voiding a lot more.  No bleeding .  No fevers  BP stable sats are okay on 2 liters but drop to mid 80's on room air.  No real cough   Some SOB with exertion none at rest.  Back pain is better      -Data reviewed today: I reviewed all new labs and imaging results over the last 24 hours. I personally reviewed no images or EKG's today.    Physical Exam   Temp: 98.3  F (36.8  C) Temp src: Tympanic BP: 107/53 Pulse: 88 Heart Rate: 78 Resp: 16 SpO2: 92 % O2 Device: Nasal cannula Oxygen Delivery: 2 LPM  Vitals:    09/01/17 2002 09/02/17 0008   Weight: 96.2 kg (212 lb) 100.4 kg (221 lb 5.5 oz)     Vital Signs with Ranges  Temp:  [98.3  F (36.8  C)-100.8  F (38.2  C)] 98.3  F (36.8  C)  Pulse:  [72-88] 88  Heart Rate:  [] 78  Resp:  [16-18] 16  BP: (101-118)/(47-53) 107/53  SpO2:  [84 %-95 %] 92 %  I/O last 3 completed shifts:  In: 3202 [P.O.:840; I.V.:2362]  Out: 575 [Urine:575]    Peripheral IV 09/01/17 Right (Active)   Site  Assessment WDL 9/3/2017  4:31 AM   Line Status Infusing;Checked every 1-2 hour 9/3/2017  4:31 AM   Phlebitis Scale 0-->no symptoms 9/3/2017  4:31 AM   Infiltration Scale 0 9/3/2017  4:31 AM   Number of days:2       Incision/Surgical Site 06/12/17 Right Breast (Active)   Number of days:83     No line/device    Constitutional: Alert and oriented x3. No distress    HEENT: NC/AT, PERRL, EOMI, mouth moist, neck supple, no LN.     Cardiovascular: RRR. no Murmur, no  rubs, or gallops.  JVD seems up to 5 cm.  Bruits no.  Pulses 2+    Respiratory:   At bases has crackles and some mild decreased Breath sounds. No wheezes.    Abdomen: Soft, NTND, no organomegaly. Bowel sounds present    Extremities: Warm/dry. 2+ edema same as on admit.     Neuro:   Non focal, cranial nerves intact, Moves all extremities.    Medications     NaCl 100 mL/hr at 09/03/17 0616       ciprofloxacin  400 mg Intravenous Q12H     citalopram  40 mg Oral Daily     fluticasone  1 spray Both Nostrils Daily     folic acid  1 mg Oral TID     gabapentin  600 mg Oral BID     pantoprazole  40 mg Oral BID     cycloSPORINE  1 drop Both Eyes BID     cefTRIAXone  1 g Intravenous Q24H     docusate sodium  100 mg Oral BID     gabapentin  900 mg Oral QAM       Data     Recent Labs  Lab 09/03/17  0556 09/02/17  0547 09/01/17  2046   WBC 13.3* 11.9* 17.6*   HGB 9.9* 9.8* 10.8*   MCV 88 89 87   * 99* 119*    137 138   POTASSIUM 4.4 3.9 4.4   CHLORIDE 107 105 105   CO2 24 25 25   BUN 15 18 16   CR 1.16* 1.34* 1.14*   ANIONGAP 6 7 8   KATHLEEN 8.0* 7.6* 8.2*   * 115* 120*   ALBUMIN 2.1*  --  2.3*   PROTTOTAL 6.2*  --  6.5*   BILITOTAL 0.3  --  0.7   ALKPHOS 76  --  85   ALT 27  --  38   AST 15  --  24   LIPASE  --   --  44*     Lactic Acid   Date Value Ref Range Status   09/01/2017 1.3 0.4 - 2.0 mmol/L Final       Recent Results (from the past 24 hour(s))   CT Abdomen Pelvis w/o Contrast    Narrative    ABDOMEN AND PELVIS CT    HISTORY:  Pain, evaluate for  renal stones.    COMPARISON:  None.    TECHNIQUE:  CT images of the abdomen and pelvis were obtained without  IV contrast.  Sagittal and coronal reformatted images were reviewed.    FINDINGS:  There are small pleural effusions and airspace opacities in  the lung bases bilaterally.    The noncontrast appearance of the liver, spleen, pancreas and adrenal  glands is intact.  There has been prior cholecystectomy.  There is  mild prominence of the right renal collecting system compared to the  left.  There is, however, no obstructing stone.  There is mild  perinephric stranding, right greater than left.  The kidneys are  otherwise intact.    The bowel is normal in caliber.  No bowel wall thickening.  The  appendix is normal.    No abdominal aortic aneurysm.  Mild scattered atherosclerotic plaque  is present.  No lymphadenopathy or free air.    There are degenerative changes in the spine.  No suspicious osseous  lesions.        Continued on page 2...            IMPRESSION:  1.  NO RENAL CALCULI.  THERE IS MILD RIGHT PERINEPHRIC STRANDING AND  PROMINENCE OF THE RIGHT RENAL COLLECTING SYSTEM WHEN COMPARED TO THE  LEFT.  THESE FINDINGS MAY BE DUE TO A RECENTLY PASSED STONE OR  PYELONEPHRITIS.  SUGGEST CORRELATION WITH PHYSICAL EXAMINATION AND  LABORATORY FINDINGS.    2.  SMALL PLEURAL EFFUSIONS WITH ASSOCIATED ATELECTASIS OR INFILTRATE  AT THE LUNG BASES.  Exam Date: Sep 02, 2017 08:34:19 AM  Author: ENRIQUE BORGES  This report is preliminary and transcribed

## 2017-09-03 NOTE — PLAN OF CARE
Face to face report given with opportunity to observe patient.  Report given to Joie ESCOBAR RN.    Leoncio Molina  9/3/2017, 7:02 AM

## 2017-09-03 NOTE — PLAN OF CARE
Problem: Goal Outcome Summary  Goal: Goal Outcome Summary  -diagnostic tests and consults completed and resulted   -vital signs normal or at patient baseline   Nurse to notify provider when observation goals have been met and patient is ready for discharge.   Outcome: No Change  1500: pt awakens to name. Vitals and assessment as charted on flowsheet. md notified of changes to assessment ( see provider notification note). Pt rates pain 4-5/10 headache. Declines meds at this time.  1543: pt states pain is 5-6/10 headache. Medicated pt with norco. Pt stated one pill didn't help and requested 2 to be given. Denies nausea at this time but did take dionisio crackers with medication.  at bedside. Pt encouaged to try and ambulate tonight if she is able. Pt verbalized understanding. Did talk with pt about the importance of taking deep breaths and coughing to help open airways. Encouraged 10 breathes every hour while awake. Pt stated she will try and ambulate when she gets up to bathroom.  1700: pt up in chair for dinner. Visiting with family  1800: ambulated hallway approx 250 feet. With staff. Stated got nauseated. But at rest nausea relieved.  1940: vitals as charted on flowsheet. Pt rates pain 6/10 headache and nausea. Medicated with pain meds and zofran see mar.  2100: pt states headache only a twinge. Rates 1/10. And nausea gone. Pt then up to ambulate to bathroom to void and then brushed teeth then back to bed offers no complaints at this time.  2200:  Pt asleep resp even and unlabored.   Face to face report given with opportunity to observe patient.    Report given to liliya hays   9/2/2017  11:30 PM

## 2017-09-03 NOTE — PROVIDER NOTIFICATION
DATE:  9/2/2017   TIME OF RECEIPT FROM LAB:  6253  LAB TEST:  Blood culture bottle 2  LAB VALUE:  Gram - rods  RESULTS GIVEN WITH READ-BACK TO (PROVIDER):  Dr. Colunga  TIME LAB VALUE REPORTED TO PROVIDER:   9574

## 2017-09-03 NOTE — PHARMACY
[facility]    Pharmacy      Antimicrobial Stewardship Note     Current antimicrobial therapy:  Anti-infectives (Future)    Start     Dose/Rate Route Frequency Ordered Stop    09/02/17 2200  cefTRIAXone in d5w (ROCEPHIN) intermittent infusion 1 g      1 g  over 30 Minutes Intravenous EVERY 24 HOURS 09/01/17 2322 09/02/17 0915  ciprofloxacin (CIPRO) infusion 400 mg      400 mg  200 mL/hr over 1 Hours Intravenous EVERY 12 HOURS 09/02/17 0834            Indication: UTI, sepsis     Pertinent labs:  Creatinine   Creatinine   Date Value Ref Range Status   09/03/2017 1.16 (H) 0.52 - 1.04 mg/dL Final   09/02/2017 1.34 (H) 0.52 - 1.04 mg/dL Final   09/01/2017 1.14 (H) 0.52 - 1.04 mg/dL Final     WBC   WBC   Date Value Ref Range Status   09/03/2017 13.3 (H) 4.0 - 11.0 10e9/L Final   09/02/2017 11.9 (H) 4.0 - 11.0 10e9/L Final   09/01/2017 17.6 (H) 4.0 - 11.0 10e9/L Final     ANC No components found for: ANC     Culture Results: E. Coli pansensitive     Recommendations/Interventions:  1. Still on both Rocephin and Ciprofloxacin. Consider discontinuing one. WBC increased today, so reasonable to even switch to a different agent all together.    Idalia Henriquez [facility]  September 3, 2017

## 2017-09-03 NOTE — PLAN OF CARE
Pt states she is feeling better today. Pt more awake and conversive. Dizziness better. Pt up to bathroom then ambulated hallway with staff. Oxygen at 2 lpm. Pt then down for ultrasound and back to room, now eating dinner.  1600: pt up ambulated hallway with oxygen. Tolerated well stated she felt better.  1730: pt ate dinner offers no complaints.  at bedside  2000: vitals as charted on flowsheet. Pt states headache is coming back rates 3/10. Pt in bed without oxygen on ( forgot to put back on after last run to bathroom. Oxygen 85-88% on room air. Oxygen placed at 2 lpm and now 92%. Medicated pt with morco ( see mar) pt resting in bed at this time.  2100: headache still there but getting better. Pt up to bathroom to void. Pt states she has some dizziness getting up . Steady on feet. Brushed teeth in bathroom. Hasn't had a bowel movement, senna or mom offered but refused by patient. Passing flatus.  2230pt headache better at a 2/10. Up to b athroom to void offers no compltinas. pneumoboots have been on while in bed

## 2017-09-03 NOTE — PLAN OF CARE
Problem: Patient Goal: Social Work Focus  Goal: 1. Patient Goal: Social Work Focus  Assessment completed see flow sheet.     Sherrie is sitting up in bed.  Her PCP is Dr Martinez, she goes to the [facility] for her dental work and sees Dr Parra for eye care. She also sees Opal Meneses for her RA and Dr Jiménez at [facility] for Gastro. She does not have a POA or a healthcare directive, she has information at home already. She uses [facility] at [place]. She is not a .     Sherrie lives at home with her , cat and grandson, before school and after school. She performs her own self cares, manages her medication and appointment schedules. Most recently she has been using a walker for ambulation at home. She states she feels safe at home and her home is well maintained. She does the shopping and the food prep, she drives and has reliable transportation. She works 2 hr/day at the [employer] in the [employer] program.     Pain does wake her up from her sleep, she states that she will take a nap after school to catch up and feels she gets adequate rest. She has a diagnosis of depression, takes medication which is helpful. She quit smoking 20 years ago, she does not consume alcohol. Her taqueria is important to her, she does not want taqueria support at this time. Her support system is her , her daughter in law Suzanne and her work friends. Stressors/worries include her granddaughter being in a bad relationship with a 1 year old child involved also. Her granddaughter currently sees a counselor. She enjoys working and volunteering.    Sherrie plans on going home at discharge. No needs currently identified. Will remain available.    LOC: alert     Others present: Patient     Dx: UTI     Lives with: Lives With: spouse     Living at:  home     Equipment used:  walker     Support System:  , children, friends      Primary PCP: Jimbo Martinez     POA/Guardian: no     Health Care Directive: NO     Pharmacy:  [facility]      :  none     Homecare/County Services:   none      Adequate Resources for needs (housing, utilities, food/med): YES     Meds and appointments management: YES     Work: YES     Transportation: YES       ADLs: independent     Falls: No     Able to Return to Prior Living Arrangements: YES     : NO      Goals: To go home     Barriers: none     Needs: Demonstrates Competency     JESUS MANUEL: Average     ED Visits: 1

## 2017-09-03 NOTE — PLAN OF CARE
Face to face report given with opportunity to observe patient.    Report given to Holly L., RN Annelyse J. Stromberg   9/3/2017  3:45 PM

## 2017-09-04 LAB
ANION GAP SERPL CALCULATED.3IONS-SCNC: 6 MMOL/L (ref 3–14)
BACTERIA SPEC CULT: ABNORMAL
BASOPHILS # BLD AUTO: 0 10E9/L (ref 0–0.2)
BASOPHILS NFR BLD AUTO: 0.2 %
BUN SERPL-MCNC: 11 MG/DL (ref 7–30)
CALCIUM SERPL-MCNC: 8.1 MG/DL (ref 8.5–10.1)
CHLORIDE SERPL-SCNC: 108 MMOL/L (ref 94–109)
CO2 SERPL-SCNC: 25 MMOL/L (ref 20–32)
CREAT SERPL-MCNC: 1.06 MG/DL (ref 0.52–1.04)
DIFFERENTIAL METHOD BLD: ABNORMAL
EOSINOPHIL # BLD AUTO: 0.2 10E9/L (ref 0–0.7)
EOSINOPHIL NFR BLD AUTO: 2 %
ERYTHROCYTE [DISTWIDTH] IN BLOOD BY AUTOMATED COUNT: 13.9 % (ref 10–15)
GFR SERPL CREATININE-BSD FRML MDRD: 52 ML/MIN/1.7M2
GLUCOSE SERPL-MCNC: 101 MG/DL (ref 70–99)
HCT VFR BLD AUTO: 29.2 % (ref 35–47)
HGB BLD-MCNC: 9.5 G/DL (ref 11.7–15.7)
IMM GRANULOCYTES # BLD: 0.1 10E9/L (ref 0–0.4)
IMM GRANULOCYTES NFR BLD: 0.8 %
LYMPHOCYTES # BLD AUTO: 0.8 10E9/L (ref 0.8–5.3)
LYMPHOCYTES NFR BLD AUTO: 8 %
Lab: ABNORMAL
MCH RBC QN AUTO: 28.4 PG (ref 26.5–33)
MCHC RBC AUTO-ENTMCNC: 32.5 G/DL (ref 31.5–36.5)
MCV RBC AUTO: 87 FL (ref 78–100)
MONOCYTES # BLD AUTO: 1 10E9/L (ref 0–1.3)
MONOCYTES NFR BLD AUTO: 10 %
NEUTROPHILS # BLD AUTO: 7.7 10E9/L (ref 1.6–8.3)
NEUTROPHILS NFR BLD AUTO: 79 %
NRBC # BLD AUTO: 0 10*3/UL
NRBC BLD AUTO-RTO: 0 /100
PLATELET # BLD AUTO: 125 10E9/L (ref 150–450)
POTASSIUM SERPL-SCNC: 4 MMOL/L (ref 3.4–5.3)
RBC # BLD AUTO: 3.35 10E12/L (ref 3.8–5.2)
SODIUM SERPL-SCNC: 139 MMOL/L (ref 133–144)
SPECIMEN SOURCE: ABNORMAL
WBC # BLD AUTO: 9.8 10E9/L (ref 4–11)

## 2017-09-04 PROCEDURE — 25000125 ZZHC RX 250: Performed by: INTERNAL MEDICINE

## 2017-09-04 PROCEDURE — 12000000 ZZH R&B MED SURG/OB

## 2017-09-04 PROCEDURE — 25000132 ZZH RX MED GY IP 250 OP 250 PS 637: Mod: GY | Performed by: INTERNAL MEDICINE

## 2017-09-04 PROCEDURE — 71275 CT ANGIOGRAPHY CHEST: CPT | Mod: TC

## 2017-09-04 PROCEDURE — 94640 AIRWAY INHALATION TREATMENT: CPT | Mod: 76

## 2017-09-04 PROCEDURE — 80048 BASIC METABOLIC PNL TOTAL CA: CPT | Performed by: INTERNAL MEDICINE

## 2017-09-04 PROCEDURE — 99232 SBSQ HOSP IP/OBS MODERATE 35: CPT | Performed by: INTERNAL MEDICINE

## 2017-09-04 PROCEDURE — A9270 NON-COVERED ITEM OR SERVICE: HCPCS | Mod: GY | Performed by: INTERNAL MEDICINE

## 2017-09-04 PROCEDURE — 85025 COMPLETE CBC W/AUTO DIFF WBC: CPT | Performed by: INTERNAL MEDICINE

## 2017-09-04 PROCEDURE — 94640 AIRWAY INHALATION TREATMENT: CPT

## 2017-09-04 PROCEDURE — 36415 COLL VENOUS BLD VENIPUNCTURE: CPT | Performed by: INTERNAL MEDICINE

## 2017-09-04 PROCEDURE — 40000275 ZZH STATISTIC RCP TIME EA 10 MIN

## 2017-09-04 PROCEDURE — 25000128 H RX IP 250 OP 636: Performed by: INTERNAL MEDICINE

## 2017-09-04 RX ORDER — IOPAMIDOL 755 MG/ML
75 INJECTION, SOLUTION INTRAVASCULAR ONCE
Status: COMPLETED | OUTPATIENT
Start: 2017-09-04 | End: 2017-09-04

## 2017-09-04 RX ORDER — ACETAMINOPHEN 325 MG/1
650 TABLET ORAL EVERY 4 HOURS PRN
Status: DISCONTINUED | OUTPATIENT
Start: 2017-09-04 | End: 2017-09-06 | Stop reason: HOSPADM

## 2017-09-04 RX ORDER — IPRATROPIUM BROMIDE AND ALBUTEROL SULFATE 2.5; .5 MG/3ML; MG/3ML
3 SOLUTION RESPIRATORY (INHALATION)
Status: DISCONTINUED | OUTPATIENT
Start: 2017-09-04 | End: 2017-09-06 | Stop reason: HOSPADM

## 2017-09-04 RX ADMIN — FOLIC ACID 1 MG: 1 TABLET ORAL at 21:15

## 2017-09-04 RX ADMIN — PANTOPRAZOLE SODIUM 40 MG: 40 TABLET, DELAYED RELEASE ORAL at 07:51

## 2017-09-04 RX ADMIN — GABAPENTIN 600 MG: 100 CAPSULE ORAL at 12:16

## 2017-09-04 RX ADMIN — GABAPENTIN 900 MG: 300 CAPSULE ORAL at 09:00

## 2017-09-04 RX ADMIN — ACETAMINOPHEN 650 MG: 325 TABLET, FILM COATED ORAL at 10:45

## 2017-09-04 RX ADMIN — DOCUSATE SODIUM 100 MG: 100 CAPSULE, LIQUID FILLED ORAL at 09:00

## 2017-09-04 RX ADMIN — FOLIC ACID 1 MG: 1 TABLET ORAL at 09:00

## 2017-09-04 RX ADMIN — IPRATROPIUM BROMIDE AND ALBUTEROL SULFATE 3 ML: .5; 3 SOLUTION RESPIRATORY (INHALATION) at 09:03

## 2017-09-04 RX ADMIN — IOPAMIDOL 75 ML: 755 INJECTION, SOLUTION INTRAVASCULAR at 16:56

## 2017-09-04 RX ADMIN — IPRATROPIUM BROMIDE AND ALBUTEROL SULFATE 3 ML: .5; 3 SOLUTION RESPIRATORY (INHALATION) at 19:49

## 2017-09-04 RX ADMIN — DOCUSATE SODIUM 100 MG: 100 CAPSULE, LIQUID FILLED ORAL at 21:15

## 2017-09-04 RX ADMIN — HYDROCODONE BITARTRATE AND ACETAMINOPHEN 2 TABLET: 7.5; 325 TABLET ORAL at 05:32

## 2017-09-04 RX ADMIN — CIPROFLOXACIN 400 MG: 2 INJECTION, SOLUTION INTRAVENOUS at 21:15

## 2017-09-04 RX ADMIN — IPRATROPIUM BROMIDE AND ALBUTEROL SULFATE 3 ML: .5; 3 SOLUTION RESPIRATORY (INHALATION) at 11:50

## 2017-09-04 RX ADMIN — GABAPENTIN 600 MG: 100 CAPSULE ORAL at 21:15

## 2017-09-04 RX ADMIN — CITALOPRAM HYDROBROMIDE 40 MG: 40 TABLET ORAL at 09:00

## 2017-09-04 RX ADMIN — PANTOPRAZOLE SODIUM 40 MG: 40 TABLET, DELAYED RELEASE ORAL at 21:15

## 2017-09-04 RX ADMIN — DEXTRAN 70, AND HYPROMELLOSE 2910 1 DROP: 1; 3 SOLUTION/ DROPS OPHTHALMIC at 09:02

## 2017-09-04 RX ADMIN — CIPROFLOXACIN 400 MG: 2 INJECTION, SOLUTION INTRAVENOUS at 09:03

## 2017-09-04 RX ADMIN — IPRATROPIUM BROMIDE AND ALBUTEROL SULFATE 3 ML: .5; 3 SOLUTION RESPIRATORY (INHALATION) at 15:51

## 2017-09-04 RX ADMIN — FOLIC ACID 1 MG: 1 TABLET ORAL at 14:30

## 2017-09-04 RX ADMIN — HYDROCODONE BITARTRATE AND ACETAMINOPHEN 1 TABLET: 7.5; 325 TABLET ORAL at 21:21

## 2017-09-04 ASSESSMENT — PAIN DESCRIPTION - DESCRIPTORS
DESCRIPTORS: HEADACHE

## 2017-09-04 NOTE — PLAN OF CARE
Problem: Goal Outcome Summary  Goal: Goal Outcome Summary  -diagnostic tests and consults completed and resulted   -vital signs normal or at patient baseline   Nurse to notify provider when observation goals have been met and patient is ready for discharge.   Outcome: No Change  Patient A&O, makes needs known. SBA to bathroom. Patient remains on 2 LPM O2 via NC with Sats 93-95%. Patient ambulated in hallways once this shift. Patient was removing O2 to ambulate to the bathroom and putting O2 back on after, checked Sats after patient ambulated to bathroom and they were 79-82% RA, O2 back on 2 LPM via NC and Sats returned to 92-93%. Patient reports headaches off and on throughout the shift. PRN Tylenol 650 mg given once instead of Norco per patient request and patient reported decrease in pain. Patient also napped and had relief from headache afterwards. Patient reported headaches after ambulating and doing Incentive Spirometer. Lungs diminished with fine crackles in the bases. IV Saline locked. SCD's on when patient in bed, except at end of shift patient requested them off for a while. Patient was up in chair for meals. Call light within reach, makes needs known.      Face to face report given with opportunity to observe patient.     Report given to Dalila Garnett   9/4/2017  3:20 PM

## 2017-09-04 NOTE — PLAN OF CARE
Problem: Patient Goal: Social Work Focus  Goal: 1. Patient Goal: Social Work Focus  Met briefly with Sherrie, feeling better. CM will remain available for discharge planning.

## 2017-09-04 NOTE — PHARMACY
"The following home medications were NOT continued on inpatient admission per \"Discontinuation of nonessential home medications during hospitalization\" policy: Restasis    If a therapeutic holiday is deemed inappropriate per the prescriber, please notify the pharmacist regarding the medication order.    The pharmacist is available to answer any questions and/or concerns the patient may have regarding discontinuation of non-essential medications.    Please ensure that these medications are restarted as needed upon discharge via the medication reconciliation discharge process and included on the discharge medication reconciliation report.    Thank you,  Idalia Henriquez    "

## 2017-09-04 NOTE — PROGRESS NOTES
[facility]    Hospitalist Progress Note    Date of Service (when I saw the patient): 09/04/2017    Assessment & Plan   Melvi Cleveland is a 67 year old female who was admitted on 9/1/2017.         1. UTI:  Patient with definite UTI with pyuria and leukocytosis no fevers,  lower back discomfort.  Was given ceftriaxone in ER.       Urine culture so far is growing non lactose fermenting Gm- rods   Nonlactose-fermenting organisms include Pseudomonas, Proteus, Salmonella, Shigella, and Citrobacter.  Will add cipro until cultures come back with ID/Sens      Reviewed CT scan and radiology report shows no stones. Right kidney with some perinephric stranding and mild enlargement of ureter.   Could have passed stone also consistent with possible pyelonephritis.   Her blood cultures are also growing out Gm- rods also .   No real fevers.  WBC about same.       Her cultures are growing e Coli and is sensitive to everything.   Will stop the ceftriaxone and use just Cipro  Can change over to oral at DC. No fevers  WBC is down further.  Feel that infection is under control.          2. Dehydration/CKD: She had nausea vomiting for a while pre hospital.  Has gotten IVF.  Her creatinine had bumped up a bit from admission. With IVF her BP is okay running about 100 systolic.  No signs of sepsis at this time.  Some nausea still, but taking in liquids well, solids better today . Urine output has picked up a lot overnight.  Her repeat labs show  Bun Cr  11/1.06.  Resolved will iv lock her at this time.      3.  Anemia/thrombocytopenia:   No signs of bleeding  likely due to infection issue. Remain stable  Plts climbing up .     4. Crohn's Disease:  Stable currently  She says has been acting up a bit lately but nothing really bad for her.  No stools here  Is passing flatus.     5. Chronic neck and back pain: Her back pain is better now and is back to what her usual pain is like.  Continues with headache.   Has had these before   No signs of any neuro issues with this  and did have CT of head earlier.      6. RA:  Hold any immunosupressant meds. At baseline.       7. GERD- Continue Protonix, no real symptoms at this time.      8.  Peripheral polyneuropathy:  Continue her gabapentin. States if doesn't take this she gets into trouble with pain.     9.  Hypoxia:  Patient fine on 2 liters but does drop down significantly on room air.  Some crackle at bases which is constant  Chest X-rays are unchanged some  bibasilar infiltrates.  Not coughing much due to her headache.  Started IS and told her needs to cough and deep breath. Able to ambulate fair.  Always concern re possible PE  Has chronic edema and calf pain  Not any different than baseline per patient.  Might all just be atelectasis.  D dimer was minimally elevated.  Legs negative for DVT.  Is smoker in past and has an inhaler at [facility] for sob. Uses sit occasionally  Will try some nebs  Feel need to get up and move more and work on pulmonary toilet. Will do this today she states hope to get off of oxygen soon.  Humidity to O2 as has very dry nose    DVT Prophylaxis: Pneumatic Compression Devices  Code Status: Full Code    Disposition: Expected discharge in 1-2 days once pulmonary status stable.    Venkata Teresa    Interval History    Feels an looks better this am  Only persistent pain is her headache which is what she has had in past nothing new with it.  breathing Okay c/o of very dry nose  Some bleeding  With O2  No humidity currently.  Some cough  Not up much   passing flatus no stool      -Data reviewed today: I reviewed all new labs and imaging results over the last 24 hours. I personally reviewed no images or EKG's today.    Physical Exam   Temp: 97.9  F (36.6  C) Temp src: Tympanic BP: 117/51 Pulse: 78 Heart Rate: 67 Resp: 16 SpO2: 94 % O2 Device: Nasal cannula Oxygen Delivery: 2 LPM  Vitals:    09/01/17 2002 09/02/17 0008   Weight: 96.2 kg (212 lb) 100.4 kg (221 lb 5.5 oz)     Vital  Signs with Ranges  Temp:  [97.9  F (36.6  C)-100  F (37.8  C)] 97.9  F (36.6  C)  Pulse:  [78] 78  Heart Rate:  [67-84] 67  Resp:  [16-20] 16  BP: (106-124)/(48-58) 117/51  SpO2:  [77 %-95 %] 94 %  I/O last 3 completed shifts:  In: 2313 [P.O.:480; I.V.:1833]  Out: 4425 [Urine:4425]    Peripheral IV 09/01/17 Right (Active)   Site Assessment WDL 9/4/2017  7:59 AM   Line Status Infusing 9/4/2017  7:59 AM   Phlebitis Scale 0-->no symptoms 9/4/2017  7:59 AM   Infiltration Scale 0 9/4/2017  7:59 AM   Number of days:3       Incision/Surgical Site 06/12/17 Right Breast (Active)   Number of days:84     No line/device    Constitutional: Alert and oriented x3. No distress    HEENT: NC/AT, PERRL, EOMI, mouth moist, neck supple, no LN.     Cardiovascular: RRR. no Murmur, no  rubs, or gallops.  JVD 4-5.  Bruits no.  Pulses 2+    Respiratory:  Has bibasilar crackles      Abdomen: Soft, NTND, no organomegaly. Bowel sounds present    Extremities: Warm/dry. 1-2+  edema    Neuro:   Non focal, cranial nerves intact, Moves all extremities.    Medications        ipratropium - albuterol 0.5 mg/2.5 mg/3 mL  3 mL Nebulization 4x daily     ciprofloxacin  400 mg Intravenous Q12H     citalopram  40 mg Oral Daily     fluticasone  1 spray Both Nostrils Daily     folic acid  1 mg Oral TID     gabapentin  600 mg Oral BID     pantoprazole  40 mg Oral BID     cycloSPORINE  1 drop Both Eyes BID     docusate sodium  100 mg Oral BID     gabapentin  900 mg Oral QAM       Data     Recent Labs  Lab 09/04/17  0549 09/03/17  0556 09/02/17  0547 09/01/17 2046   WBC 9.8 13.3* 11.9* 17.6*   HGB 9.5* 9.9* 9.8* 10.8*   MCV 87 88 89 87   * 116* 99* 119*    137 137 138   POTASSIUM 4.0 4.4 3.9 4.4   CHLORIDE 108 107 105 105   CO2 25 24 25 25   BUN 11 15 18 16   CR 1.06* 1.16* 1.34* 1.14*   ANIONGAP 6 6 7 8   KATHLEEN 8.1* 8.0* 7.6* 8.2*   * 100* 115* 120*   ALBUMIN  --  2.1*  --  2.3*   PROTTOTAL  --  6.2*  --  6.5*   BILITOTAL  --  0.3  --  0.7    ALKPHOS  --  76  --  85   ALT  --  27  --  38   AST  --  15  --  24   LIPASE  --   --   --  44*     Lactic Acid   Date Value Ref Range Status   09/01/2017 1.3 0.4 - 2.0 mmol/L Final       No results found for this or any previous visit (from the past 24 hour(s)).

## 2017-09-04 NOTE — PLAN OF CARE
Problem: Goal Outcome Summary  Goal: Goal Outcome Summary  -diagnostic tests and consults completed and resulted   -vital signs normal or at patient baseline   Nurse to notify provider when observation goals have been met and patient is ready for discharge.   Outcome: No Change  Patient c/o headache, Prn norco given with effect at this time. Up to bathroom SBA. o2 sats low 90's on 2 LPM nasal cannula, having CASAS. Lung sounds diminished. IFV infusing without difficulty.     Problem: Urinary Tract Infection (Adult)  Goal: Signs and Symptoms of Listed Potential Problems Will be Absent or Manageable (Urinary Tract Infection)  Signs and symptoms of listed potential problems will be absent or manageable by discharge/transition of care (reference Urinary Tract Infection (Adult) CPG).   Outcome: No Change    09/04/17 0443   Urinary Tract Infection   Problems Assessed (Urinary Tract Infection) fluid and electrolyte imbalance;pain

## 2017-09-04 NOTE — PHARMACY
[facility]    Pharmacy      Antimicrobial Stewardship Note     Current antimicrobial therapy:  Anti-infectives (Future)    Start     Dose/Rate Route Frequency Ordered Stop    09/02/17 0915  ciprofloxacin (CIPRO) infusion 400 mg      400 mg  200 mL/hr over 1 Hours Intravenous EVERY 12 HOURS 09/02/17 0834            Indication: UTI, sepsis     Pertinent labs:  Creatinine   Creatinine   Date Value Ref Range Status   09/04/2017 1.06 (H) 0.52 - 1.04 mg/dL Final   09/03/2017 1.16 (H) 0.52 - 1.04 mg/dL Final   09/02/2017 1.34 (H) 0.52 - 1.04 mg/dL Final     WBC   WBC   Date Value Ref Range Status   09/04/2017 9.8 4.0 - 11.0 10e9/L Final   09/03/2017 13.3 (H) 4.0 - 11.0 10e9/L Final   09/02/2017 11.9 (H) 4.0 - 11.0 10e9/L Final     ANC No components found for: ANC     Culture Results:   Component Results      Component Collected Lab     Specimen Description 09/01/2017  9:49 PM HI     Midstream Urine     Culture Micro (Abnormal) 09/01/2017  9:49 PM HI     >100,000 colonies/mL   Escherichia coli          Culture & Susceptibility      ESCHERICHIA COLI      Antibiotic Interpretation Sensitivity Unit Method Status     AMPICILLIN Sensitive <=2 ug/mL EZEKIEL Final     AMPICILLIN/SULBACTAM Sensitive <=2 ug/mL EZEKIEL Final     Amoxicillin/Clav Sensitive <=2 ug/mL EZEKIEL Final     CEFAZOLIN Sensitive <=4 ug/mL EZEKIEL Final     Comment: Cefazolin EZEKIEL breakpoints are for the treatment of uncomplicated urinary tract   infections.  For the treatment of systemic infections, please contact the   laboratory for additional testing.     CEFEPIME Sensitive <=1 ug/mL EZEKIEL Final     CEFTAZIDIME Sensitive <=1 ug/mL EZEKIEL Final     CEFTRIAXONE Sensitive <=1 ug/mL EZEKIEL Final     CIPROFLOXACIN Sensitive <=0.25 ug/mL EZEKIEL Final     GENTAMICIN Sensitive <=1 ug/mL EZEKIEL Final     LEVOFLOXACIN Sensitive <=0.12 ug/mL EZEKIEL Final     NITROFURANTOIN Sensitive <=16 ug/mL EZEKIEL Final     Piperacillin/Tazo Sensitive <=4 ug/mL EZEKIEL Final     TOBRAMYCIN Sensitive <=1 ug/mL  EZEKIEL Final     Trimethoprim/Sulfa Sensitive <=1/19 ug/mL EZEKIEL Final             Exam Information      Exam Date Exam Time Accession # Results      9/1/17  8:46 PM [id number]        Component Results      Component Collected Lab     Specimen Description 09/01/2017  8:46 PM HI     Blood     Special Requests 09/01/2017  8:46 PM HI     Left Arm     Culture Micro (Abnormal) 09/01/2017  8:46 PM HI     2 of 2 bottles   Escherichia coli        Culture Micro 09/01/2017  8:46 PM HI     Critical Value called to and read back by   AnnaLee Stromberg at 1615 on 9/2/2017 by Ernie Marie   Gram stain bottle one called        Culture Micro 09/01/2017  8:46 PM HI     Critical Value called to and read back by   Leoncio Molina RN at 2222 on 9/2/2017 by Ernie Marie   2nd bottle called        Culture Micro 09/01/2017  8:46 PM HI     Gram stain review consistent with reported results.   Delio Orville 09/03/17 0610          Culture & Susceptibility      ESCHERICHIA COLI      Antibiotic Interpretation Sensitivity Unit Method Status     AMPICILLIN Sensitive <=2 ug/mL EZEKIEL Final     AMPICILLIN/SULBACTAM Sensitive <=2 ug/mL EZEKIEL Final     CEFEPIME Sensitive <=1 ug/mL EZEKIEL Final     CEFTAZIDIME Sensitive <=1 ug/mL EZEKIEL Final     CEFTRIAXONE Sensitive <=1 ug/mL EZEKIEL Final     CIPROFLOXACIN Sensitive <=0.25 ug/mL EZEKIEL Final     GENTAMICIN Sensitive <=1 ug/mL EZEKIEL Final     IMIPENEM Sensitive <=0.25 ug/mL EZEKIEL Final     LEVOFLOXACIN Sensitive <=0.12 ug/mL EZEKIEL Final     Piperacillin/Tazo Sensitive <=4 ug/mL EZEKIEL Final     TOBRAMYCIN Sensitive <=1 ug/mL EZEKIEL Final     Trimethoprim/Sulfa Sensitive <=1/19 ug/mL EZEKIEL Final                        Recommendations/Interventions:  1. None.  Patient on ciprofloxacin, which will cover E. Coli in both blood and urine. Consider switching to oral ciprofloxacin.    Idalia Henriquez RPH  September 4, 2017

## 2017-09-04 NOTE — PLAN OF CARE
Problem: Goal Outcome Summary  Goal: Goal Outcome Summary  -diagnostic tests and consults completed and resulted   -vital signs normal or at patient baseline   Nurse to notify provider when observation goals have been met and patient is ready for discharge.   Outcome: No Change  Pt states she is feeling better today. Pt more awake and conversive. Dizziness better. Pt up to bathroom then ambulated hallway with staff. Oxygen at 2 lpm. Pt then down for ultrasound and back to room, now eating dinner.  1600: pt up ambulated hallway with oxygen. Tolerated well stated she felt better.  1730: pt ate dinner offers no complaints.  at bedside  2000: vitals as charted on flowsheet. Pt states headache is coming back rates 3/10. Pt in bed without oxygen on ( forgot to put back on after last run to bathroom. Oxygen 85-88% on room air. Oxygen placed at 2 lpm and now 92%. Medicated pt with morco ( see mar) pt resting in bed at this time.  2100: headache still there but getting better. Pt up to bathroom to void. Pt states she has some dizziness getting up . Steady on feet. Brushed teeth in bathroom. Hasn't had a bowel movement, senna or mom offered but refused by patient. Passing flatus.  2230pt headache better at a 2/10. Up to b athroom to void offers no compltinas. pneumoboots have been on while in bed                Problem: Urinary Tract Infection (Adult)  Goal: Signs and Symptoms of Listed Potential Problems Will be Absent or Manageable (Urinary Tract Infection)  Signs and symptoms of listed potential problems will be absent or manageable by discharge/transition of care (reference Urinary Tract Infection (Adult) CPG).     09/03/17 1600   Urinary Tract Infection   Problems Assessed (Urinary Tract Infection) pain   Problems Present (Urinary Tract Infection) infection progression         Face to face report given with opportunity to observe patient.    Report given to liliya hays   9/3/2017  10:48 PM

## 2017-09-04 NOTE — PLAN OF CARE
Face to face report given with opportunity to observe patient.  Report given to KATHERINE Harley.    Leoncio Molina  9/4/2017, 7:08 AM

## 2017-09-05 LAB
ANION GAP SERPL CALCULATED.3IONS-SCNC: 6 MMOL/L (ref 3–14)
BASOPHILS # BLD AUTO: 0 10E9/L (ref 0–0.2)
BASOPHILS NFR BLD AUTO: 0.1 %
BUN SERPL-MCNC: 8 MG/DL (ref 7–30)
CALCIUM SERPL-MCNC: 8.2 MG/DL (ref 8.5–10.1)
CHLORIDE SERPL-SCNC: 107 MMOL/L (ref 94–109)
CO2 SERPL-SCNC: 27 MMOL/L (ref 20–32)
CREAT SERPL-MCNC: 1 MG/DL (ref 0.52–1.04)
DIFFERENTIAL METHOD BLD: ABNORMAL
EOSINOPHIL # BLD AUTO: 0.2 10E9/L (ref 0–0.7)
EOSINOPHIL NFR BLD AUTO: 2.9 %
ERYTHROCYTE [DISTWIDTH] IN BLOOD BY AUTOMATED COUNT: 14.2 % (ref 10–15)
GFR SERPL CREATININE-BSD FRML MDRD: 56 ML/MIN/1.7M2
GLUCOSE SERPL-MCNC: 103 MG/DL (ref 70–99)
HCT VFR BLD AUTO: 28.4 % (ref 35–47)
HGB BLD-MCNC: 9.3 G/DL (ref 11.7–15.7)
IMM GRANULOCYTES # BLD: 0.1 10E9/L (ref 0–0.4)
IMM GRANULOCYTES NFR BLD: 0.9 %
LYMPHOCYTES # BLD AUTO: 1 10E9/L (ref 0.8–5.3)
LYMPHOCYTES NFR BLD AUTO: 14.9 %
MCH RBC QN AUTO: 28.4 PG (ref 26.5–33)
MCHC RBC AUTO-ENTMCNC: 32.7 G/DL (ref 31.5–36.5)
MCV RBC AUTO: 87 FL (ref 78–100)
MONOCYTES # BLD AUTO: 0.9 10E9/L (ref 0–1.3)
MONOCYTES NFR BLD AUTO: 12.4 %
NEUTROPHILS # BLD AUTO: 4.8 10E9/L (ref 1.6–8.3)
NEUTROPHILS NFR BLD AUTO: 68.8 %
NRBC # BLD AUTO: 0 10*3/UL
NRBC BLD AUTO-RTO: 0 /100
PLATELET # BLD AUTO: 142 10E9/L (ref 150–450)
POTASSIUM SERPL-SCNC: 3.9 MMOL/L (ref 3.4–5.3)
RBC # BLD AUTO: 3.28 10E12/L (ref 3.8–5.2)
SODIUM SERPL-SCNC: 140 MMOL/L (ref 133–144)
WBC # BLD AUTO: 6.9 10E9/L (ref 4–11)

## 2017-09-05 PROCEDURE — 25000132 ZZH RX MED GY IP 250 OP 250 PS 637: Mod: GY | Performed by: INTERNAL MEDICINE

## 2017-09-05 PROCEDURE — 94640 AIRWAY INHALATION TREATMENT: CPT

## 2017-09-05 PROCEDURE — 12000000 ZZH R&B MED SURG/OB

## 2017-09-05 PROCEDURE — 25000125 ZZHC RX 250: Performed by: INTERNAL MEDICINE

## 2017-09-05 PROCEDURE — 85025 COMPLETE CBC W/AUTO DIFF WBC: CPT | Performed by: INTERNAL MEDICINE

## 2017-09-05 PROCEDURE — 80048 BASIC METABOLIC PNL TOTAL CA: CPT | Performed by: INTERNAL MEDICINE

## 2017-09-05 PROCEDURE — 36415 COLL VENOUS BLD VENIPUNCTURE: CPT | Performed by: INTERNAL MEDICINE

## 2017-09-05 PROCEDURE — 99232 SBSQ HOSP IP/OBS MODERATE 35: CPT | Performed by: INTERNAL MEDICINE

## 2017-09-05 PROCEDURE — A9270 NON-COVERED ITEM OR SERVICE: HCPCS | Mod: GY | Performed by: INTERNAL MEDICINE

## 2017-09-05 PROCEDURE — 94640 AIRWAY INHALATION TREATMENT: CPT | Mod: 76

## 2017-09-05 RX ORDER — CIPROFLOXACIN 500 MG/1
500 TABLET, FILM COATED ORAL EVERY 12 HOURS SCHEDULED
Status: DISCONTINUED | OUTPATIENT
Start: 2017-09-05 | End: 2017-09-06 | Stop reason: HOSPADM

## 2017-09-05 RX ADMIN — IPRATROPIUM BROMIDE AND ALBUTEROL SULFATE 3 ML: .5; 3 SOLUTION RESPIRATORY (INHALATION) at 15:38

## 2017-09-05 RX ADMIN — HYDROCODONE BITARTRATE AND ACETAMINOPHEN 2 TABLET: 7.5; 325 TABLET ORAL at 10:07

## 2017-09-05 RX ADMIN — FOLIC ACID 1 MG: 1 TABLET ORAL at 08:41

## 2017-09-05 RX ADMIN — HYDROCODONE BITARTRATE AND ACETAMINOPHEN 2 TABLET: 7.5; 325 TABLET ORAL at 20:35

## 2017-09-05 RX ADMIN — PANTOPRAZOLE SODIUM 40 MG: 40 TABLET, DELAYED RELEASE ORAL at 20:34

## 2017-09-05 RX ADMIN — CIPROFLOXACIN HYDROCHLORIDE 500 MG: 500 TABLET, FILM COATED ORAL at 20:34

## 2017-09-05 RX ADMIN — IPRATROPIUM BROMIDE AND ALBUTEROL SULFATE 3 ML: .5; 3 SOLUTION RESPIRATORY (INHALATION) at 19:24

## 2017-09-05 RX ADMIN — GABAPENTIN 600 MG: 100 CAPSULE ORAL at 20:33

## 2017-09-05 RX ADMIN — FLUTICASONE PROPIONATE 1 SPRAY: 50 SPRAY, METERED NASAL at 08:40

## 2017-09-05 RX ADMIN — IPRATROPIUM BROMIDE AND ALBUTEROL SULFATE 3 ML: .5; 3 SOLUTION RESPIRATORY (INHALATION) at 12:07

## 2017-09-05 RX ADMIN — CITALOPRAM HYDROBROMIDE 40 MG: 40 TABLET ORAL at 08:40

## 2017-09-05 RX ADMIN — CIPROFLOXACIN HYDROCHLORIDE 500 MG: 500 TABLET, FILM COATED ORAL at 10:04

## 2017-09-05 RX ADMIN — FOLIC ACID 1 MG: 1 TABLET ORAL at 20:34

## 2017-09-05 RX ADMIN — GABAPENTIN 900 MG: 300 CAPSULE ORAL at 08:40

## 2017-09-05 RX ADMIN — GABAPENTIN 600 MG: 100 CAPSULE ORAL at 13:41

## 2017-09-05 RX ADMIN — FOLIC ACID 1 MG: 1 TABLET ORAL at 13:41

## 2017-09-05 RX ADMIN — PANTOPRAZOLE SODIUM 40 MG: 40 TABLET, DELAYED RELEASE ORAL at 06:34

## 2017-09-05 RX ADMIN — IPRATROPIUM BROMIDE AND ALBUTEROL SULFATE 3 ML: .5; 3 SOLUTION RESPIRATORY (INHALATION) at 08:21

## 2017-09-05 ASSESSMENT — PAIN DESCRIPTION - DESCRIPTORS
DESCRIPTORS: HEADACHE
DESCRIPTORS: HEADACHE

## 2017-09-05 NOTE — PLAN OF CARE
Problem: Goal Outcome Summary  Goal: Goal Outcome Summary  -diagnostic tests and consults completed and resulted   -vital signs normal or at patient baseline   Nurse to notify provider when observation goals have been met and patient is ready for discharge.   Outcome: No Change  Patient is alert & oriented, makes needs known. IV flushes without difficulty. Vital signs stable, low grade temp.  Does have fine crackles in bilateral lower lung bases.  Eating and sleeping with no problem today. Requested and received 2 Norco at 1007 for complaints of headache, with good result. Plan is to wean from oxygen today for potential discharge tomorrow,  Did take shower today and states felt great after it.  IV antibiotics switched to oral today.  Spouse present in room most of the day.  Pleasant and cooperative with all cares.    Face to face report given with opportunity to observe patient.    Report given to KATHERINE Martins   9/5/2017  3:05 PM

## 2017-09-05 NOTE — PLAN OF CARE
Problem: Patient Goal: Rt Focus  Goal: 1. Patient Goal: RT Focus  Pt given nebs as ordered this shift.  Pt remains on 1 lpm n/c.  BS clear /diminished.

## 2017-09-05 NOTE — PLAN OF CARE
Problem: Goal Outcome Summary  Goal: Goal Outcome Summary  -diagnostic tests and consults completed and resulted   -vital signs normal or at patient baseline   Nurse to notify provider when observation goals have been met and patient is ready for discharge.   Outcome: Improving  VSS. Afebrile. O2 at 1l NC. Continues on IV Cipro. Up to BR with SBA. Steady on feet. Req and Med X1 this shift with Lortab for headache. To xray at 1650 for diagnostic.     Face to face report given with opportunity to observe patient.    Report given to Pj Sylvester   9/4/2017  11:09 PM

## 2017-09-05 NOTE — PROGRESS NOTES
[facility]    Hospitalist Progress Note    Date of Service (when I saw the patient): 09/05/2017    Assessment & Plan   Melvi Cleveland is a 67 year old female who was admitted on 9/1/2017.      1. UTI:  Patient with definite UTI with pyuria and leukocytosis no fevers,  lower back discomfort.  Was given ceftriaxone in ER.       Urine culture so far is growing non lactose fermenting Gm- rods   Nonlactose-fermenting organisms include Pseudomonas, Proteus, Salmonella, Shigella, and Citrobacter.  Will add cipro until cultures come back with ID/Sens      Reviewed CT scan and radiology report shows no stones. Right kidney with some perinephric stranding and mild enlargement of ureter.   Could have passed stone also consistent with possible pyelonephritis.   Her blood cultures are also growing out Gm- rods also .   No real fevers.  WBC about same.       Her cultures are growing e Coli and is sensitive to everything.   Will stop the ceftriaxone and use just Cipro  Can change over to oral at DC. No fevers  WBC is down further.  Feel that infection is under control. Continues to do well will change to oral Cipro at this time.       2. Dehydration/CKD: She had nausea vomiting for a while pre hospital.  Has gotten IVF.  Her creatinine had bumped up a bit from admission. With IVF her BP is okay running about 100 systolic.  No signs of sepsis at this time.  Some nausea still, but taking in liquids well, solids better today . Urine output has picked up a lot overnight.  Her repeat labs show  Bun Cr  8/1.0.  Resolved  iv locked  at this time.      3.  Anemia/thrombocytopenia:   No signs of bleeding  likely due to infection issue. Remain stable at 9.3  Plts  up to 124K.      4. Crohn's Disease:  Stable currently  She says has been acting up a bit lately but nothing really bad for her. Had stool.      5. Chronic neck and back pain: Her back pain is better now and is back to what her usual pain is like.  Continues with  headache though not as bad.   Has had these before  No signs of any neuro issues with this  and did have CT of head earlier.      6. RA:  Hold any immunosupressant meds. At baseline.       7. GERD- Continue Protonix, no real symptoms at this time.      8.  Peripheral polyneuropathy:  Continue her gabapentin. States if doesn't take this she gets into trouble with pain.      9.  Hypoxia:  Is down to 1 liter at this time but still drops down on Room air.  Did CTA and has no  PE.  No major infiltrates  Some interstitial issues.  Will continue to push her on pulmonary toilet ambulation.  She might very well have some underlying lung disease  Was smoker for 30 years.  Have empirically given her nebs with some improvement.    DVT Prophylaxis: Pneumatic Compression Devices  Code Status: Full Code    Disposition: Expected discharge in 1-2 days once pulmonary status stable.    Venkata Teresa    Interval History   Patient is alert  Feeling better  still with minor headache  Tired  still somewhat dyspneic with exertion.  No fevers  Did have a stool.    -Data reviewed today: I reviewed all new labs and imaging results over the last 24 hours. I personally reviewed no images or EKG's today.    Physical Exam   Temp: 99.5  F (37.5  C) Temp src: Tympanic BP: 130/61   Heart Rate: 77 Resp: 18 SpO2: 95 % O2 Device: Nasal cannula Oxygen Delivery: 1/2 LPM  Vitals:    09/01/17 2002 09/02/17 0008   Weight: 96.2 kg (212 lb) 100.4 kg (221 lb 5.5 oz)     Vital Signs with Ranges  Temp:  [98.8  F (37.1  C)-100.2  F (37.9  C)] 99.5  F (37.5  C)  Heart Rate:  [76-92] 77  Resp:  [16-18] 18  BP: (122-130)/(56-61) 130/61  SpO2:  [80 %-96 %] 95 %  I/O last 3 completed shifts:  In: 1636 [P.O.:1280; I.V.:356]  Out: 3625 [Urine:3625]    Peripheral IV 09/01/17 Right (Active)   Site Assessment WDL 9/5/2017 12:00 AM   Line Status Saline locked 9/5/2017 12:00 AM   Phlebitis Scale 0-->no symptoms 9/5/2017 12:00 AM   Infiltration Scale 0 9/5/2017 12:00 AM    Number of days:4       Incision/Surgical Site 06/12/17 Right Breast (Active)   Number of days:85     No line/device    Constitutional: Alert and oriented x3. No distress    HEENT: NC/AT, PERRL, EOMI, mouth moist, neck supple, no LN.     Cardiovascular: RRR. no Murmur, no  rubs, or gallops.  JVD 4-5.  Bruits no.  Pulses 2+    Respiratory: Clear to auscultation anterior.  Some crackles at abscess but less   No wheezing.    Abdomen: Soft, NTND, no organomegaly. Bowel sounds present    Extremities: Warm/dry. 1+ edema less than yesterday    Neuro:   Non focal, cranial nerves intact, Moves all extremities.    Medications        ciprofloxacin  500 mg Oral Q12H MIKE     ipratropium - albuterol 0.5 mg/2.5 mg/3 mL  3 mL Nebulization 4x daily     sodium chloride (PF)  3 mL Intracatheter Q8H     citalopram  40 mg Oral Daily     fluticasone  1 spray Both Nostrils Daily     folic acid  1 mg Oral TID     gabapentin  600 mg Oral BID     pantoprazole  40 mg Oral BID     docusate sodium  100 mg Oral BID     gabapentin  900 mg Oral QAM       Data     Recent Labs  Lab 09/05/17  0534 09/04/17  0549 09/03/17  0556  09/01/17  2046   WBC 6.9 9.8 13.3*  < > 17.6*   HGB 9.3* 9.5* 9.9*  < > 10.8*   MCV 87 87 88  < > 87   * 125* 116*  < > 119*    139 137  < > 138   POTASSIUM 3.9 4.0 4.4  < > 4.4   CHLORIDE 107 108 107  < > 105   CO2 27 25 24  < > 25   BUN 8 11 15  < > 16   CR 1.00 1.06* 1.16*  < > 1.14*   ANIONGAP 6 6 6  < > 8   KATHLEEN 8.2* 8.1* 8.0*  < > 8.2*   * 101* 100*  < > 120*   ALBUMIN  --   --  2.1*  --  2.3*   PROTTOTAL  --   --  6.2*  --  6.5*   BILITOTAL  --   --  0.3  --  0.7   ALKPHOS  --   --  76  --  85   ALT  --   --  27  --  38   AST  --   --  15  --  24   LIPASE  --   --   --   --  44*   < > = values in this interval not displayed.  Lactic Acid   Date Value Ref Range Status   09/01/2017 1.3 0.4 - 2.0 mmol/L Final       Recent Results (from the past 24 hour(s))   CTA Angiogram Chest w/o & w Contrast     Narrative    CT ANGIOGRAM OF CHEST    REPORT:  CT angiogram of the chest was performed with sagittal and  coronal reconstructions.   There are no pulmonary emboli seen. There  is volume loss seen in both lung bases.  There is a mild mosaic  attenuation pattern.  This is non-specific.  In light of the fact that  the heart is borderline enlarged, it is possible this represents mild  pulmonary edema.  Atelectasis or early pneumonia could produce this  appearance.  There is no pleural effusion. There is fatty infiltration  of the liver.  The spleen and pancreas appear normal.    IMPRESSION:  1.  NO PULMONARY EMBOLI.    2.  BORDERLINE HEART SIZE.    3.  MOSAIC ATTENUATION PATTERN IN THE LUNGS POSSIBLY ON THE BASIS OF  ATELECTASIS, HOWEVER, PULMONARY EDEMA COULD PRODUCE THIS APPEARANCE.  Exam Date: Sep 04, 2017 05:03:00 PM  Author: SUE CANCINO  This report is preliminary and transcribed

## 2017-09-05 NOTE — PLAN OF CARE
Problem: Goal Outcome Summary  Goal: Goal Outcome Summary  -diagnostic tests and consults completed and resulted   -vital signs normal or at patient baseline   Nurse to notify provider when observation goals have been met and patient is ready for discharge.   Outcome: Improving  VSS, pt remains on 1L of O2, O2 sat remain 94-95% however pt desats to 78-82% almost immediately after removing O2 with any little movement, and is very out of breath after going to the restroom. Pt refuses gait belt or walker in the room, and at least once during shift refused to wait for extra O2 tubing so that she wouldn't have to remove O2 to go to restroom. Pt has NOT used the call light this shift before getting up, she just gets up and the bed alarm or chair alarm goes off and we get here prior to her moving. At the beginning of my shift pt was found sitting in the dark in the reclined, shoes and pants on, pt stated she wanted to go for a walk, and when asked why she was up she stated she couldn't sleep and figured to get going, I explained to the pt that she needs to call, and she stated that she thought since her alarm wasn't on that she didn't need to call anymore. Pt has complained of a headache 2/10 all shift across the forehead right to left.     Problem: Urinary Tract Infection (Adult)  Goal: Signs and Symptoms of Listed Potential Problems Will be Absent or Manageable (Urinary Tract Infection)  Signs and symptoms of listed potential problems will be absent or manageable by discharge/transition of care (reference Urinary Tract Infection (Adult) CPG).   Outcome: Improving  Pt states she is still urinating much more frequently than normal, but not as bad as when she first came in.     Face to face report given with opportunity to observe patient.    Report given to KATHERINE Guido RN  9/5/2017  7:36 AM

## 2017-09-05 NOTE — PHARMACY
[facility]    Pharmacy      Antimicrobial Stewardship Note     Current antimicrobial therapy:  Anti-infectives (Future)    Start     Dose/Rate Route Frequency Ordered Stop    09/05/17 0945  ciprofloxacin (CIPRO) tablet 500 mg      500 mg Oral EVERY 12 HOURS SCHEDULED 09/05/17 0939            Indication: complicated UTI     Pertinent labs:  Creatinine   Creatinine   Date Value Ref Range Status   09/05/2017 1.00 0.52 - 1.04 mg/dL Final   09/04/2017 1.06 (H) 0.52 - 1.04 mg/dL Final   09/03/2017 1.16 (H) 0.52 - 1.04 mg/dL Final     WBC   WBC   Date Value Ref Range Status   09/05/2017 6.9 4.0 - 11.0 10e9/L Final   09/04/2017 9.8 4.0 - 11.0 10e9/L Final   09/03/2017 13.3 (H) 4.0 - 11.0 10e9/L Final     ANC No components found for: ANC     Culture Results: see previous note     Recommendations/Interventions:  1. Ciprofloxacin changed to oral, therapy appropriate    Corazon Pizano RPH  September 5, 2017

## 2017-09-06 ENCOUNTER — TELEPHONE (OUTPATIENT)
Dept: FAMILY MEDICINE | Facility: OTHER | Age: 67
End: 2017-09-06

## 2017-09-06 VITALS
HEIGHT: 66 IN | OXYGEN SATURATION: 94 % | HEART RATE: 78 BPM | BODY MASS INDEX: 35.57 KG/M2 | TEMPERATURE: 98.6 F | DIASTOLIC BLOOD PRESSURE: 63 MMHG | WEIGHT: 221.34 LBS | RESPIRATION RATE: 20 BRPM | SYSTOLIC BLOOD PRESSURE: 146 MMHG

## 2017-09-06 PROBLEM — N10 ACUTE PYELONEPHRITIS: Status: ACTIVE | Noted: 2017-09-06

## 2017-09-06 PROBLEM — B96.20 BACTEREMIA DUE TO ESCHERICHIA COLI: Status: ACTIVE | Noted: 2017-09-06

## 2017-09-06 PROBLEM — R78.81 BACTEREMIA DUE TO ESCHERICHIA COLI: Status: ACTIVE | Noted: 2017-09-06

## 2017-09-06 LAB
ANION GAP SERPL CALCULATED.3IONS-SCNC: 7 MMOL/L (ref 3–14)
BASOPHILS # BLD AUTO: 0 10E9/L (ref 0–0.2)
BASOPHILS NFR BLD AUTO: 0.5 %
BUN SERPL-MCNC: 9 MG/DL (ref 7–30)
CALCIUM SERPL-MCNC: 8.3 MG/DL (ref 8.5–10.1)
CHLORIDE SERPL-SCNC: 104 MMOL/L (ref 94–109)
CO2 SERPL-SCNC: 29 MMOL/L (ref 20–32)
CREAT SERPL-MCNC: 0.94 MG/DL (ref 0.52–1.04)
DIFFERENTIAL METHOD BLD: ABNORMAL
EOSINOPHIL # BLD AUTO: 0.4 10E9/L (ref 0–0.7)
EOSINOPHIL NFR BLD AUTO: 5.2 %
ERYTHROCYTE [DISTWIDTH] IN BLOOD BY AUTOMATED COUNT: 14.1 % (ref 10–15)
GFR SERPL CREATININE-BSD FRML MDRD: 60 ML/MIN/1.7M2
GLUCOSE SERPL-MCNC: 94 MG/DL (ref 70–99)
HCT VFR BLD AUTO: 30.3 % (ref 35–47)
HGB BLD-MCNC: 10.1 G/DL (ref 11.7–15.7)
IMM GRANULOCYTES # BLD: 0.1 10E9/L (ref 0–0.4)
IMM GRANULOCYTES NFR BLD: 1.4 %
LYMPHOCYTES # BLD AUTO: 1.6 10E9/L (ref 0.8–5.3)
LYMPHOCYTES NFR BLD AUTO: 20.3 %
MCH RBC QN AUTO: 28.5 PG (ref 26.5–33)
MCHC RBC AUTO-ENTMCNC: 33.3 G/DL (ref 31.5–36.5)
MCV RBC AUTO: 85 FL (ref 78–100)
MONOCYTES # BLD AUTO: 0.9 10E9/L (ref 0–1.3)
MONOCYTES NFR BLD AUTO: 11.1 %
NEUTROPHILS # BLD AUTO: 4.8 10E9/L (ref 1.6–8.3)
NEUTROPHILS NFR BLD AUTO: 61.5 %
NRBC # BLD AUTO: 0 10*3/UL
NRBC BLD AUTO-RTO: 0 /100
PLATELET # BLD AUTO: 224 10E9/L (ref 150–450)
POTASSIUM SERPL-SCNC: 3.7 MMOL/L (ref 3.4–5.3)
RBC # BLD AUTO: 3.55 10E12/L (ref 3.8–5.2)
SODIUM SERPL-SCNC: 140 MMOL/L (ref 133–144)
WBC # BLD AUTO: 7.7 10E9/L (ref 4–11)

## 2017-09-06 PROCEDURE — 99238 HOSP IP/OBS DSCHRG MGMT 30/<: CPT | Performed by: INTERNAL MEDICINE

## 2017-09-06 PROCEDURE — 25000132 ZZH RX MED GY IP 250 OP 250 PS 637: Mod: GY | Performed by: INTERNAL MEDICINE

## 2017-09-06 PROCEDURE — 36415 COLL VENOUS BLD VENIPUNCTURE: CPT | Performed by: INTERNAL MEDICINE

## 2017-09-06 PROCEDURE — 85025 COMPLETE CBC W/AUTO DIFF WBC: CPT | Performed by: INTERNAL MEDICINE

## 2017-09-06 PROCEDURE — 40000275 ZZH STATISTIC RCP TIME EA 10 MIN

## 2017-09-06 PROCEDURE — A9270 NON-COVERED ITEM OR SERVICE: HCPCS | Mod: GY | Performed by: INTERNAL MEDICINE

## 2017-09-06 PROCEDURE — 80048 BASIC METABOLIC PNL TOTAL CA: CPT | Performed by: INTERNAL MEDICINE

## 2017-09-06 RX ORDER — CIPROFLOXACIN 500 MG/1
500 TABLET, FILM COATED ORAL 2 TIMES DAILY
Qty: 10 TABLET | Refills: 0 | Status: SHIPPED | OUTPATIENT
Start: 2017-09-06 | End: 2017-09-11

## 2017-09-06 RX ADMIN — HYDROCODONE BITARTRATE AND ACETAMINOPHEN 2 TABLET: 7.5; 325 TABLET ORAL at 09:17

## 2017-09-06 RX ADMIN — FOLIC ACID 1 MG: 1 TABLET ORAL at 09:17

## 2017-09-06 RX ADMIN — CIPROFLOXACIN HYDROCHLORIDE 500 MG: 500 TABLET, FILM COATED ORAL at 09:23

## 2017-09-06 RX ADMIN — FLUTICASONE PROPIONATE 1 SPRAY: 50 SPRAY, METERED NASAL at 09:16

## 2017-09-06 RX ADMIN — GABAPENTIN 900 MG: 300 CAPSULE ORAL at 09:17

## 2017-09-06 RX ADMIN — CITALOPRAM HYDROBROMIDE 40 MG: 40 TABLET ORAL at 09:17

## 2017-09-06 RX ADMIN — PANTOPRAZOLE SODIUM 40 MG: 40 TABLET, DELAYED RELEASE ORAL at 06:43

## 2017-09-06 ASSESSMENT — PAIN DESCRIPTION - DESCRIPTORS: DESCRIPTORS: BURNING

## 2017-09-06 NOTE — TELEPHONE ENCOUNTER
11:56 AM    Reason for Call: OVERBOOK    Patient is having the following symptoms: Hospital follow up UTI for 1 weeks.    The patient is requesting an appointment for Needs with in 7 days with Michelle.    Was an appointment offered for this call? No  If yes : Appointment type              Date    Preferred method for responding to this message: Telephone Call  Please call the pt to schedule thank you  What is your phone number ?[phone]    If we cannot reach you directly, may we leave a detailed response at the number you provided? Yes    Can this message wait until your PCP/provider returns, if unavailable today?     Princess Vera

## 2017-09-06 NOTE — PHARMACY
[facility]    Pharmacy      Antimicrobial Stewardship Note     Current antimicrobial therapy:  Anti-infectives (Future)    Start     Dose/Rate Route Frequency Ordered Stop    09/06/17 0000  ciprofloxacin (CIPRO) 500 MG tablet      500 mg Oral 2 TIMES DAILY 09/06/17 1021 09/11/17 2359    09/05/17 0945  ciprofloxacin (CIPRO) tablet 500 mg      500 mg Oral EVERY 12 HOURS SCHEDULED 09/05/17 0939            Indication: pyelonephritis     Pertinent labs:  Creatinine   Creatinine   Date Value Ref Range Status   09/06/2017 0.94 0.52 - 1.04 mg/dL Final   09/05/2017 1.00 0.52 - 1.04 mg/dL Final   09/04/2017 1.06 (H) 0.52 - 1.04 mg/dL Final     WBC   WBC   Date Value Ref Range Status   09/06/2017 7.7 4.0 - 11.0 10e9/L Final   09/05/2017 6.9 4.0 - 11.0 10e9/L Final   09/04/2017 9.8 4.0 - 11.0 10e9/L Final     ANC No components found for: ANC     Culture Results:   Procedure Component Value Units Date/Time        Blood culture [[id number]] Collected: 09/01/17 2043       Order Status: Completed Specimen: Blood Updated: 09/06/17 0616        Specimen Description Blood        Special Requests Right Arm        Culture Micro No growth after 5 days       Blood culture [[id number]] (Abnormal)  Collected: 09/01/17 2046       Order Status: Completed Specimen: Blood Updated: 09/04/17 0637        Specimen Description Blood        Special Requests Left Arm        Culture Micro 2 of 2 bottles   Escherichia coli    (A)         Critical Value called to and read back by   AnnaLee Stromberg at 1615 on 9/2/2017 by Ernie Marie   Gram stain bottle one called            Critical Value called to and read back by   Leoncio Molina RN at 2222 on 9/2/2017 by Ernie Marie   2nd bottle called            Gram stain review consistent with reported results.   Delio Jacinto 09/03/17 0610          Urine Culture Aerobic Bacterial [[id number]] (Abnormal)  Collected: 09/01/17 2149       Order Status: Completed Specimen: Midstream Urine Updated: 09/03/17  0750        Specimen Description Midstream Urine        Culture Micro >100,000 colonies/mL   Escherichia coli    (A)       Active MRSA Surveillance Culture [[id number]] Collected: 09/02/17 0505       Order Status: Completed Specimen: Nares from Nose Updated: 09/03/17 0648        Specimen Description Nares        Culture Micro No MRSA isolated       Urine Culture Aerobic Bacterial [[id number]] Collected: 09/01/17 2223       Order Status: Sent Specimen: Urine         Recommendations/Interventions:  1. No recommendations at this time. Current antimicrobial therapy is appropriate.     Sandrita Parrish  September 6, 2017

## 2017-09-06 NOTE — TELEPHONE ENCOUNTER
Please schedule patient for date/time: Dr johansen is full next Monday, then out the rest of the week next week. Can see on 9/19/17 any spot, he has openings.     Have patient go to ER/Urgent Care Center. Urgent Care hours are 9:30 am to 8 pm, open 7 days a week. No.    Provider will call patient.No.    Other:

## 2017-09-06 NOTE — DISCHARGE INSTRUCTIONS
Dr. Martinez's office will contact you to schedule an appointment within 7 days.  If you have not heard back from them by 9/7/2017 please call the clinic at [phone] to schedule an appointment.          What to expect when you have contrast    During your exam, we will inject  contrast  into your vein or artery. (Contrast is a clear liquid with iodine in it. It shows up on X-rays.)    You may feel warm or hot. You may have a metal taste in your mouth and a slight upset stomach. You may also feel pressure near the kidneys and bladder. These effects will last about 1 to 3 minutes.    Please tell us if you have:    Sneezing     Itching    Hives     Swelling in the face    A hoarse voice    Breathing problems    Other new symptoms    Serious problems are rare.  They may include:    Irregular heartbeat     Seizures    Kidney failure              Tissue damage    Shock      Death    If you have any problems during the exam, we  will treat them right away.    When you get home    Call your hospital if you have any new symptoms in the next 2 days, like hives or swelling. (Phone numbers are at the bottom of this page.) Or call your family doctor.     If you have wheezing or trouble breathing, call 911.    Self-care  -Drink at least 4 extra glasses of water today.   This reduces the stress on your kidneys.  -Keep taking your regular medicines.    The contrast will pass out of your body in your  Urine(pee). This will happen in the next 24 hours. You  will not feel this. Your urine will not  change color.    If you have kidney problems or take metformin    Drink 4 to 8 large glasses of water for the next  2 days, if you are not on a fluid restriction.    ?If you take metformin (Glucophage or Glucovance) for diabetes, keep taking it.      ?Your kidney function tests are abnormal.  If you take Metformin, do not take it for 48 hours. Please go to your clinic for a blood test within 3 days after your exam before the restarting  this medicine.     (Note to provider:please give patient prescription for lab tests.)    ?Special instructions: Drink at least 4 extra glasses of water today.   This reduces the stress on your kidneys.    I have read and understand the above information.    Patient Sign Here:______________________________________Date:________Time:______    Staff Sign Here:________________________________________Date:_______Time:______      Radiology Departments:     ?[facility]: [phone] ?[place]: [phone]     ?[place]: [phone] ?[facility]:[phone]      ?[facility]: [phone]  ?[place]: [phone]  ?[place]:[phone]    ?[facility]:[phone]  ?[facility]:[phone]

## 2017-09-06 NOTE — PLAN OF CARE
Problem: Goal Outcome Summary  Goal: Goal Outcome Summary  -diagnostic tests and consults completed and resulted   -vital signs normal or at patient baseline   Nurse to notify provider when observation goals have been met and patient is ready for discharge.   Outcome: Improving  Pt alert and orientated.  Ate well for supper. Walk in hallway with stand by assist.  Refused colace- had loose bm's today.  Pain med given for H/A and pain back of neck at HS.  Resting in bed. Uses call light appropriately.   bed alarm on.    Problem: Urinary Tract Infection (Adult)  Goal: Signs and Symptoms of Listed Potential Problems Will be Absent or Manageable (Urinary Tract Infection)  Signs and symptoms of listed potential problems will be absent or manageable by discharge/transition of care (reference Urinary Tract Infection (Adult) CPG).   Outcome: Improving  Temp-99.0, voiding OK

## 2017-09-06 NOTE — PLAN OF CARE
Face to face report given with opportunity to observe patient.    Report given to elena Lane   9/5/2017  11:52 PM

## 2017-09-06 NOTE — PLAN OF CARE
Problem: Patient Goal: Rt Focus  Goal: 1. Patient Goal: RT Focus  Pt given nebs as ordered.  BS clear.  Pt remains on RA with SpO2 92%.

## 2017-09-06 NOTE — PLAN OF CARE
Problem: Discharge Planning  Goal: Discharge Planning (Adult, OB, Behavioral, Peds)  Outcome: Adequate for Discharge Date Met:  09/06/17  Patient discharged at 1:00 PM via wheel chair accompanied by spouse and staff. Prescriptions sent to patients preferred pharmacy. All belongings sent with patient.      Discharge instructions reviewed with pt and spouse. Listed belongings gathered and returned to patient.      Patient discharged to home.   Report called to NA     Core Measures and Vaccines  Core Measures applicable during stay: No.   Pneumonia and Influenza given prior to discharge, if indicated: N/A     Surgical Patient   Surgical Procedures during stay: no  Did patient receive discharge instruction on wound care and recognition of infection symptoms? N/A     MISC  Follow up appointment made:  No  Home and hospital aquired medications returned to patient: Yes  Patient reports pain was well managed at discharge: Yes

## 2017-09-06 NOTE — PLAN OF CARE
Problem: Patient Goal: Social Work Focus  Goal: 1. Patient Goal: Social Work Focus  Outcome: Adequate for Discharge Date Met:  09/06/17  Name: Melvi Cleveland     MRN#: [id number]     Reason for Hospitalization: Complicated UTI (urinary tract infection) [N39.0]     JESUS MANUEL: average     Discharge Date: 9/6/2017     Patient / Family response to discharge plan: agreeable     Follow-Up Appt: No future appointments.     Other Providers (Care Coordinator, County Services, PCA services etc): No     Discharge Disposition: home; checked in with Sherrie and her , Issa.  Issa to bring Sherrie home.  She denies questions or concerns.       Cara Francis

## 2017-09-06 NOTE — PLAN OF CARE
"Problem: Goal Outcome Summary  Goal: Goal Outcome Summary  -diagnostic tests and consults completed and resulted   -vital signs normal or at patient baseline   Nurse to notify provider when observation goals have been met and patient is ready for discharge.   Outcome: Improving  VSS, pt remains without O2, O2 sat have remained above 92% on RA throughout my shift. Pt calls when needed, makes needs known. Pt did however mention to this writer that yesterday morning, did not elaborate on what part of the morning and would not when questioned, that she had a \"halucination\" pt stated that she felt like she was in a giant ball, and swirling all around her was events and things she had seen previously to her admission, things from tv and real life, and she couldn't move during this episode. Pt has stated that she has had very little sleep during her stay, and that she thinks it possibly could have been a dream. Pt is alert and orientated x 4, steady on her feet, stand by assist. Lungs have crackles in the bases and are diminished at the apexes.         Face to face report given with opportunity to observe patient.     Report given to KATHERINE Moreno RN   9/6/2017  8:05 AM             "

## 2017-09-07 ENCOUNTER — MYC MEDICAL ADVICE (OUTPATIENT)
Dept: FAMILY MEDICINE | Facility: OTHER | Age: 67
End: 2017-09-07

## 2017-09-07 ENCOUNTER — TELEPHONE (OUTPATIENT)
Dept: CASE MANAGEMENT | Facility: HOSPITAL | Age: 67
End: 2017-09-07

## 2017-09-07 LAB
BACTERIA SPEC CULT: NORMAL
Lab: NORMAL
SPECIMEN SOURCE: NORMAL

## 2017-09-07 NOTE — DISCHARGE SUMMARY
DATE OF ADMISSION:  09/01/2017.      DATE OF DISCHARGE:   09/06/2017.      DISCHARGE DIAGNOSES:   1.  Pyelonephritis.   2.  Bacteremia E. coli   3.  Mild dehydration.   4.  Chronic neck and lower back pain.   5.  Rheumatoid arthritis.   6.  GERD.   7.  Transient hypoxia due to atelectasis.      PROCEDURES:  None.      COMPLICATIONS:  None.      HOSPITAL COURSE:  Melvi Cleveland is a 67-year-old female who has a history of some rheumatoid arthritis and Crohn's disease.  She has been on methotrexate and Plaquenil.  Recently had some breast biopsy performed, had a little wound infection and had been offered methotrexate and Plaquenil for about a month, but has been back on it for about a month or so.  She came into the emergency room with complaints of some intermittent fevers at home, some mild dysuria just kind of aching all over, headache and back pain.  She had been seen by her clinic doctor prior who had done a CT of her head which was unremarkable.  Her symptoms continued and she came to the ER for evaluation.  In the ER, she had a low-grade fever of 99, was hemodynamically stable, pressure is 105.  Sats 95% on room air.  Her urine did show significant pyuria with greater than 182 white cells, 28 red cells, large amount of leukocyte esterase.  Bacteria, none were seen.  Blood cultures were also performed.  She was placed on IV Rocephin and admitted to the hospital.  The issues were dealt with as follows:   1.  Pyelonephritis:  The patient complained of this vague kind of lower back pain and dysuria.  She definitely had pyuria.  She was given Rocephin in the ER.  Blood cultures performed.  It turned out her cultures both in her blood and her urine did grow out E. coli.  She had Cipro, actually had it, as there was some question initially that it was going to be a non-lactose fermenting organism but that just panned out to be just the E. coli.  We kept her on both then switched over to just IV Cipro and she did very  well slowly.  Her lower back discomfort gradually improved.  Her labs showed her white count gradually came down from a value of 12 down to a value of 7.7 at the time of her discharge.  Her dysuria resolved also.  She was switched over to oral ciprofloxacin.  We did do a CT scan just to assess and make sure she did not have any perinephric abscess, stones, etc.  There were no stones.  There was perhaps some mildly enlarged renal collecting system on the right and perhaps a little perinephric stranding consistent with possible pyelonephritis which would make sense given her current presentation and lab evaluation.  So probably had pyelonephritis, this with subsequent E. coli bacteremia also.  At the time of her discharge, she feels markedly better.  Our plans are to continue with the Cipro for another 5 days.   2.  Renal insufficiency:  She came in a little on the dry side.  Creatinine was 1.34.  By the time of her discharge creatinine was down to 0.94 with a BUN of 9, markedly improved.     3.  Headache:  She did have a headache which she chronically has and it just seemed to be worse with her cough with her fever, etc., and it gradually is back down to her current baseline.  She had a negative CT prior to her admission:   4.  Hypoxia:  She did develop fair amount of hypoxia during her hospital stay.  We were concerned about various issues, maybe some pneumonia, but never really had any purulent sputum.  We did end up doing a CT scan of her chest which showed actually no PE and nothing significant.  She did have some previous bibasilar atelectasis, but we worked on her hard with some nebs and incentive spirometry and getting her up and coughing and deep breathing and by the time of her discharge, she is now down to room air, sats were 93-95%.   5.  Rheumatoid arthritis and Crohn's:  This has been stable.  We told she needs to stay off the methotrexate and the Plaquenil until she sees Dr. Martinez, then with  consideration of restarting those agents.      DISCHARGE LABORATORY DATA:  So at discharge, her labs are sodium 140, potassium 3.7, chloride 104, CO2 is 29, BUN is 9, creatinine is 0.94, glucose 94.  White count 7700, hemoglobin is 10.1, platelet count is 224,000.        Her vital signs at discharge show her blood pressure was 146/60, temperature is 98.6.  Sats are 94% on room air, heart rate 64 and regular.        DISCHARGE MEDICATIONS:   1.  Cipro 500 mg p.o. b.i.d. for 5 days.   2.  Protonix 40 mg daily.   3.  She takes multiple over-the-counter medications, alfalfa, Calcium and vitamin D, fish oil, flaxseed.     4.  She will continue Citalopram 40 mg daily.   5.  Fluticasone spray 1 spray in each nostril daily.   6.  Folic acid 1 mg 3 times a day.   7.  Gabapentin 300 mg 3 capsules in the morning, 2 capsules at noon and 2 in the evening.   8.  Multivitamin daily.   9.  Milk thistle, garlic.     10.  Zofran ODT 4 mg every 8 hours p.r.n. nausea.   11.  She takes a probiotic.     12.  She has a ProAir inhaler 2 puffs every 6 hours p.r.n. shortness of breath.    13.  Restasis eyedrops.    14.  Trazodone 50 mg 2-3 at bedtime as needed.      She is going to hold her methotrexate and her Plaquenil as noted above until she sees Dr. Martinez.  She will follow up with Dr. Martinez within the next 7-10 days.      ACTIVITY:  As tolerated.      DIET:  As tolerated.         FRANSISCA DONALD MD             D: 2017 15:26   T: 2017 20:52   MT:       Name:     TK URIAS   MRN:      [id number]        Account:        [id number]   :      1950           Admit Date:                                       Discharge Date: 2017      Document: [id number]       cc: Jimbo Martinez MD

## 2017-09-07 NOTE — TELEPHONE ENCOUNTER
Melvi Cleveland, was discharged to home on 9/6/17   from [facility]. I spoke today with Melvi  Regarding her  discharge.   She  indicates she  receive(d) sufficient information upon discharge. Medications were reviewed in full on discharge, including: Medications to be started; medications to be stopped; medications to be continued from preadmission and any side effects. Prescriptions sent to preferred pharmacy  to be filled. Medications are being taken as prescribed.   She  indicates she has  an appointment scheduled for Sept 19  and has  transportation available. She was  able to confirm her  appointment time and has  it written down.  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 I referred her to her PCP.  Per their request, the following employee (s) can be recognized for their outstanding services delivered:  Care was wonderful. People were amazing. Loved Dr. Teresa  Suggestions to improve service: nothing indicated   She was  was informed she  may receive a survey in the mail from the hospital. Asked if she  would kindly complete the survey in order for [facility] to know if services fully met patient needs.

## 2017-09-07 NOTE — LETTER
[facility] [place]  [address] [facility] [address]  Phone: [phone]    September 11, 2017        Melvi Cleveland  [address] [address]          To whom it may concern:    RE: Melvi Cleveland    Patient was hospitalized due for urinary tract infection and possible sepsis.  She was discharged and have recommended that she not work through 30 Sep 2017    Please contact me for questions or concerns.      Sincerely,        Jimbo Martinez MD

## 2017-09-13 ENCOUNTER — OFFICE VISIT (OUTPATIENT)
Dept: FAMILY MEDICINE | Facility: OTHER | Age: 67
End: 2017-09-13
Attending: FAMILY MEDICINE
Payer: MEDICARE

## 2017-09-13 VITALS
HEART RATE: 70 BPM | TEMPERATURE: 97.5 F | OXYGEN SATURATION: 98 % | WEIGHT: 210 LBS | SYSTOLIC BLOOD PRESSURE: 116 MMHG | RESPIRATION RATE: 18 BRPM | HEIGHT: 66 IN | BODY MASS INDEX: 33.75 KG/M2 | DIASTOLIC BLOOD PRESSURE: 64 MMHG

## 2017-09-13 DIAGNOSIS — R30.0 DYSURIA: Primary | ICD-10-CM

## 2017-09-13 LAB
ALBUMIN UR-MCNC: NEGATIVE MG/DL
APPEARANCE UR: CLEAR
BACTERIA #/AREA URNS HPF: ABNORMAL /HPF
BASOPHILS # BLD AUTO: 0 10E9/L (ref 0–0.2)
BASOPHILS NFR BLD AUTO: 0.4 %
BILIRUB UR QL STRIP: NEGATIVE
COLOR UR AUTO: YELLOW
DIFFERENTIAL METHOD BLD: ABNORMAL
EOSINOPHIL # BLD AUTO: 0.2 10E9/L (ref 0–0.7)
EOSINOPHIL NFR BLD AUTO: 1.9 %
ERYTHROCYTE [DISTWIDTH] IN BLOOD BY AUTOMATED COUNT: 14.3 % (ref 10–15)
GLUCOSE UR STRIP-MCNC: NEGATIVE MG/DL
HCT VFR BLD AUTO: 35.4 % (ref 35–47)
HGB BLD-MCNC: 11.5 G/DL (ref 11.7–15.7)
HGB UR QL STRIP: NEGATIVE
HYALINE CASTS #/AREA URNS LPF: 1 /LPF
IMM GRANULOCYTES # BLD: 0.1 10E9/L (ref 0–0.4)
IMM GRANULOCYTES NFR BLD: 0.6 %
KETONES UR STRIP-MCNC: NEGATIVE MG/DL
LEUKOCYTE ESTERASE UR QL STRIP: ABNORMAL
LYMPHOCYTES # BLD AUTO: 1.8 10E9/L (ref 0.8–5.3)
LYMPHOCYTES NFR BLD AUTO: 20.3 %
MCH RBC QN AUTO: 27.8 PG (ref 26.5–33)
MCHC RBC AUTO-ENTMCNC: 32.5 G/DL (ref 31.5–36.5)
MCV RBC AUTO: 86 FL (ref 78–100)
MONOCYTES # BLD AUTO: 0.8 10E9/L (ref 0–1.3)
MONOCYTES NFR BLD AUTO: 8.5 %
MUCOUS THREADS #/AREA URNS LPF: PRESENT /LPF
NEUTROPHILS # BLD AUTO: 6.1 10E9/L (ref 1.6–8.3)
NEUTROPHILS NFR BLD AUTO: 68.3 %
NITRATE UR QL: NEGATIVE
NRBC # BLD AUTO: 0 10*3/UL
NRBC BLD AUTO-RTO: 0 /100
PH UR STRIP: 6 PH (ref 4.7–8)
PLATELET # BLD AUTO: 417 10E9/L (ref 150–450)
RBC # BLD AUTO: 4.13 10E12/L (ref 3.8–5.2)
RBC #/AREA URNS AUTO: 2 /HPF (ref 0–2)
SOURCE: ABNORMAL
SP GR UR STRIP: 1.01 (ref 1–1.03)
TRANS CELLS #/AREA URNS HPF: <1 /HPF (ref 0–1)
UROBILINOGEN UR STRIP-MCNC: NORMAL MG/DL (ref 0–2)
WBC # BLD AUTO: 8.9 10E9/L (ref 4–11)
WBC #/AREA URNS AUTO: 7 /HPF (ref 0–2)

## 2017-09-13 PROCEDURE — 81001 URINALYSIS AUTO W/SCOPE: CPT | Mod: ZL | Performed by: NURSE PRACTITIONER

## 2017-09-13 PROCEDURE — 85025 COMPLETE CBC W/AUTO DIFF WBC: CPT | Mod: ZL | Performed by: NURSE PRACTITIONER

## 2017-09-13 PROCEDURE — 99213 OFFICE O/P EST LOW 20 MIN: CPT | Performed by: NURSE PRACTITIONER

## 2017-09-13 PROCEDURE — 99212 OFFICE O/P EST SF 10 MIN: CPT

## 2017-09-13 PROCEDURE — 36415 COLL VENOUS BLD VENIPUNCTURE: CPT | Mod: ZL | Performed by: NURSE PRACTITIONER

## 2017-09-13 ASSESSMENT — ANXIETY QUESTIONNAIRES
4. TROUBLE RELAXING: SEVERAL DAYS
3. WORRYING TOO MUCH ABOUT DIFFERENT THINGS: NOT AT ALL
7. FEELING AFRAID AS IF SOMETHING AWFUL MIGHT HAPPEN: SEVERAL DAYS
6. BECOMING EASILY ANNOYED OR IRRITABLE: NOT AT ALL
IF YOU CHECKED OFF ANY PROBLEMS ON THIS QUESTIONNAIRE, HOW DIFFICULT HAVE THESE PROBLEMS MADE IT FOR YOU TO DO YOUR WORK, TAKE CARE OF THINGS AT HOME, OR GET ALONG WITH OTHER PEOPLE: NOT DIFFICULT AT ALL
5. BEING SO RESTLESS THAT IT IS HARD TO SIT STILL: NOT AT ALL
GAD7 TOTAL SCORE: 3
1. FEELING NERVOUS, ANXIOUS, OR ON EDGE: NOT AT ALL
2. NOT BEING ABLE TO STOP OR CONTROL WORRYING: SEVERAL DAYS

## 2017-09-13 ASSESSMENT — PATIENT HEALTH QUESTIONNAIRE - PHQ9: SUM OF ALL RESPONSES TO PHQ QUESTIONS 1-9: 12

## 2017-09-13 ASSESSMENT — PAIN SCALES - GENERAL: PAINLEVEL: EXTREME PAIN (8)

## 2017-09-13 NOTE — PROGRESS NOTES
SUBJECTIVE:   Melvi Cleveland is a 67 year old female who presents to clinic today for the following health issues:      URINARY TRACT SYMPTOMS      Duration: 2 weeks    Description  frequency and abdominal pain    Intensity:  severe    Accompanying signs and symptoms:  Fever/chills: YES  Flank pain YES  Nausea and vomiting: YES  Vaginal symptoms: none  Abdominal/Pelvic Pain: YES    History  History of frequent UTI's: yes  History of kidney stones: no   Sexually Active: YES  Possibility of pregnancy: No    Precipitating or alleviating factors: None    Therapies tried and outcome: course of antibiotics - finished and still not better, increase fluid intake, ibuprofen and Tylenol   Outcome: unchanged, no improvement            Hospital Follow-up Visit:    Hospital/Nursing Home/IP Rehab Facility: [facility]  Date of Admission: 9/1/2017  Date of Discharge: 9/6/2017  Reason(s) for Admission: UTI            Problems taking medications regularly:  None       Medication changes since discharge: completed cipro        Problems adhering to non-medication therapy:  None    Summary of hospitalization:  [facility] discharge summary reviewed  Diagnostic Tests/Treatments reviewed.  Follow up needed: none  Other Healthcare Providers Involved in Patient s Care:         None  Update since discharge: feels the same as when she discharged from the hosptial - but starting to eat food today, she is able to sleep, doing more     Post Discharge Medication Reconciliation: discharge medications reconciled, continue medications without change.  Plan of care communicated with patient and family     Coding guidelines for this visit:  Type of Medical   Decision Making Face-to-Face Visit       within 7 Days of discharge Face-to-Face Visit        within 14 days of discharge   Moderate Complexity [id number]   High Complexity [id number]          Abdominal pain - consistent since 9/1/2017 - history is vague due to recent  illness - she cannot remember all events. Melvi has had frequent nausea and vomiting last while in the hospital. She continues to have nausea and gas at times. Melvi has a history of Crohn's disease and follow with GI at [facility] Dr Jiménez and his associate Sandie. Plan is for a colonoscopy in the near future, but it has not been scheduled at this time. CT abdomen and pelvis done while in the hospital    IMPRESSION:  1.  NO RENAL CALCULI.  THERE IS MILD RIGHT PERINEPHRIC STRANDING AND  PROMINENCE OF THE RIGHT RENAL COLLECTING SYSTEM WHEN COMPARED TO THE  LEFT.  THESE FINDINGS MAY BE DUE TO A RECENTLY PASSED STONE OR  PYELONEPHRITIS.  SUGGEST CORRELATION WITH PHYSICAL EXAMINATION AND  LABORATORY FINDINGS.     2.  SMALL PLEURAL EFFUSIONS WITH ASSOCIATED ATELECTASIS OR INFILTRATE  AT THE LUNG BASES.  Exam Date: Sep 02, 2017 08:34:19 AM  Author: ENRIQUE BORGES        Problem list and histories reviewed & adjusted, as indicated.  Additional history: as documented    Patient Active Problem List   Diagnosis     Neck pain, chronic     Low back pain, chronic     Crohn's disease of large intestine (H)     Dyslipidemia     Sicca syndrome (H)     Peripheral polyneuropathy (H)     RA (rheumatoid arthritis) (H)     Comprehensive Medical Examination     Seasonal allergic rhinitis     Fibrocystic breast disease (FCBD)     ACP (advance care planning)     Urinary tract infection     UTI (urinary tract infection)     Acute pyelonephritis     Bacteremia due to Escherichia coli     Past Surgical History:   Procedure Laterality Date     BACK SURGERY  05/1995    back surgery     BIOPSY      breast bx X2     BIOPSY BREAST      LT     BIOPSY BREAST NEEDLE LOCALIZATION Right 6/12/2017    Procedure: BIOPSY BREAST NEEDLE LOCALIZATION;  WIRE LOCALIZED EXCISIONAL BIOPSY  RIGHT BREAST MASS;  Surgeon: Jeni Amin MD;  Location: [facility] OR     CHOLECYSTECTOMY  2004     COLONOSCOPY  11/15/2004    screening     COLONOSCOPY  9/24/2014     EGD  with biopsy  2010, 2011    GERD     laminectomy      L4 L5 laminectomy; back pain       Social History   Substance Use Topics     Smoking status: Former Smoker     Types: Cigarettes     Quit date: 3/28/1997     Smokeless tobacco: Never Used      Comment: no passive exposure     Alcohol use No      Comment: former. quit 1984     Family History   Problem Relation Age of Onset     DIABETES Mother      Rheumatoid Arthritis Mother      Other - See Comments Mother      fibromyalgia     Osteoarthritis Mother      Thyroid Disease Mother      thyroid disorder     Unknown/Adopted Father      cause of death unknown     Rheumatoid Arthritis Father      DIABETES Sister      Myocardial Infarction Sister      cause of death     Lupus Sister      cause of death     C.A.D. Maternal Grandmother      CEREBROVASCULAR DISEASE Maternal Grandmother      DIABETES Maternal Grandmother      Thyroid Disease Maternal Grandmother      thyrodi disorder; goitor removed     CANCER Maternal Grandfather      Hypertension Brother      Other - See Comments Brother      sleep apnea     Other - See Comments Sister      sleep apnea         Current Outpatient Prescriptions   Medication Sig Dispense Refill     ondansetron (ZOFRAN ODT) 4 MG ODT tab Take 1-2 tablets (4-8 mg) by mouth every 8 hours as needed for nausea 20 tablet 1     HYDROcodone-acetaminophen (NORCO) 7.5-325 MG per tablet TAKE 1 TABLET BY MOUTH EVERY 4 TO 6 HOURS AS NEEDED FOR PAIN 60 tablet 0     gabapentin (NEURONTIN) 300 MG capsule TAKE 3 CAPSULES BY MOUTH IN THE MORNING, 2 CAPSULES AT NOON AND 2 CAPSULES IN THE EVENING 210 capsule 0     traZODone (DESYREL) 50 MG tablet TAKE 2-3 TABLETS BY MOUTH DAILY AT BEDTIME 270 tablet 1     ALLEGRA-D ALLERGY & CONGESTION  MG per 12 hr tablet TAKE 1 TABLET BY MOUTH TWICE DAILY 30 tablet 0     PREBIOTIC PRODUCT PO Take 1 tablet by mouth daily       RESTASIS 0.05 % ophthalmic emulsion USE 1 DROP IN BOTH EYES 2 TIMES A DAY 60 each 0     Probiotic  Product (SOLUBLE FIBER/PROBIOTICS PO) Take 1 tablet by mouth       Flaxseed, Linseed, (FLAX SEED OIL) 1000 MG capsule Take 2 capsules by mouth daily       fluticasone (FLONASE) 50 MCG/ACT spray 1 spray Reported on 5/11/2017       ketotifen (ZADITOR/REFRESH ANTI-ITCH) 0.025 % SOLN ophthalmic solution 1 drop       citalopram (CELEXA) 40 MG tablet Take 40 mg by mouth       ALBUTEROL 108 (90 BASE) MCG/ACT inhaler INHALE 2 PUFFS INTO THE LUNGS EVERY 6 HOURS AS NEEDED FOR SHORTNESS OF BREATH OR WHEEZING 8.5 g 5     SM GAS RELIEF ANTIFLATUENT 180 MG CAPS TAKE ONE CAPSULE BY MOUTH AS NEEDED AFTER A MEAL. MAX 2 CAPS/DAY 60 capsule 5     pantoprazole (PROTONIX) 40 MG enteric coated tablet Take 1 tablet (40 mg) by mouth daily (Patient taking differently: Take 40 mg by mouth 2 times daily ) 90 tablet 3     DICLOFENAC PO Take 100 mg by mouth daily        Garlic 400 MG TBEC        polyvinyl alcohol-povidone (REFRESH) 1.4-0.6 % ophthalmic solution 1-2 drops as needed       folic acid (FOLVITE) 1 MG tablet Take 1 mg by mouth 3 times daily.       MULTIPLE VITAMIN PO Take  by mouth 2 times daily.       Ascorbic Acid (VITAMIN C) 500 MG CAPS Take 2 tablets by mouth 2 times daily.       Calcium-Vitamin D-Vitamin K (CALCIUM + D) 500-1000-40 MG-UNT-MCG CHEW Take 3 tablets by mouth daily        ALFALFA PO Take 5 tablets by mouth 2 times daily.       MILK THISTLE PO Take 1,000 mg by mouth daily.       fish oil-omega-3 fatty acids (OMEGA-3) 1000 MG capsule Take 1 g by mouth daily.       Cholecalciferol (VITAMIN D) 1000 UNITS capsule Take 1 capsule by mouth daily.       Allergies   Allergen Reactions     Adhesive Tape      Benzoin Compound      Tin-Co-Javier     Seasonal Allergies          Reviewed and updated as needed this visit by clinical staffTobacco  Allergies  Meds  Med Hx  Surg Hx  Fam Hx  Soc Hx      Reviewed and updated as needed this visit by Provider         ROS:  C: NEGATIVE for fever, chills, change in  "weight  INTEGUMENTARY/SKIN: NEGATIVE for worrisome rashes, moles or lesions  RESP:cough and shortness of breath from medications per her normal - slightly more short of breath, but is able to increase physical activity   CV: NEGATIVE for chest pain, palpitations or peripheral edema  GI: abdominal pain across the top of the abdomen, diarrhea, gas or bloating, Hx IBD and nausea  : denies dysuria     OBJECTIVE:     /64 (BP Location: Left arm, Patient Position: Sitting, Cuff Size: Adult Large)  Pulse 70  Temp 97.5  F (36.4  C) (Tympanic)  Resp 18  Ht 5' 6\" (1.676 m)  Wt 210 lb (95.3 kg)  SpO2 98%  BMI 33.89 kg/m2  Body mass index is 33.89 kg/(m^2).   GENERAL: healthy, alert and no distress  HENT: normal cephalic/atraumatic, nose and mouth without ulcers or lesions, oropharynx clear and oral mucous membranes moist  NECK: no adenopathy, no asymmetry, masses, or scars and thyroid normal to palpation  RESP: lungs clear to auscultation - no rales, rhonchi or wheezes  CV: regular rate and rhythm, normal S1 S2, no S3 or S4, no murmur, click or rub, no peripheral edema and peripheral pulses strong  ABDOMEN: soft, nontender, no hepatosplenomegaly, no masses and bowel sounds normal  SKIN: no suspicious lesions or rashes  PSYCH: affect normal/bright and mild forgetfulness of some events since she 9/1/17  LYMPH: normal ant/post cervical, supraclavicular nodes    Diagnostic Test Results:  Results for orders placed or performed in visit on 09/13/17 (from the past 24 hour(s))   UA reflex to Microscopic and Culture - [place]   Result Value Ref Range    Color Urine Yellow     Appearance Urine Clear     Glucose Urine Negative NEG^Negative mg/dL    Bilirubin Urine Negative NEG^Negative    Ketones Urine Negative NEG^Negative mg/dL    Specific Gravity Urine 1.007 1.003 - 1.035    Blood Urine Negative NEG^Negative    pH Urine 6.0 4.7 - 8.0 pH    Protein Albumin Urine Negative NEG^Negative mg/dL    Urobilinogen mg/dL Normal 0.0 " - 2.0 mg/dL    Nitrite Urine Negative NEG^Negative    Leukocyte Esterase Urine Small (A) NEG^Negative    Source Midstream Urine     RBC Urine 2 0 - 2 /HPF    WBC Urine 7 (H) 0 - 2 /HPF    Bacteria Urine Few (A) NEG^Negative /HPF    Transitional Epi <1 0 - 1 /HPF    Mucous Urine Present (A) NEG^Negative /LPF    Hyaline Casts 1 (A) OTO2^O - 2 /LPF   CBC with platelets and differential   Result Value Ref Range    WBC 8.9 4.0 - 11.0 10e9/L    RBC Count 4.13 3.8 - 5.2 10e12/L    Hemoglobin 11.5 (L) 11.7 - 15.7 g/dL    Hematocrit 35.4 35.0 - 47.0 %    MCV 86 78 - 100 fl    MCH 27.8 26.5 - 33.0 pg    MCHC 32.5 31.5 - 36.5 g/dL    RDW 14.3 10.0 - 15.0 %    Platelet Count 417 150 - 450 10e9/L    Diff Method Automated Method     % Neutrophils 68.3 %    % Lymphocytes 20.3 %    % Monocytes 8.5 %    % Eosinophils 1.9 %    % Basophils 0.4 %    % Immature Granulocytes 0.6 %    Nucleated RBCs 0 0 /100    Absolute Neutrophil 6.1 1.6 - 8.3 10e9/L    Absolute Lymphocytes 1.8 0.8 - 5.3 10e9/L    Absolute Monocytes 0.8 0.0 - 1.3 10e9/L    Absolute Eosinophils 0.2 0.0 - 0.7 10e9/L    Absolute Basophils 0.0 0.0 - 0.2 10e9/L    Abs Immature Granulocytes 0.1 0 - 0.4 10e9/L    Absolute Nucleated RBC 0.0        ASSESSMENT:       PLAN:   ASSESSMENT / PLAN:  (R30.0) Dysuria  (primary encounter diagnosis)  Comment:   Plan:  UA reflex to Microscopic and Culture - [place],         CBC with platelets and differential  Melvi presented today stating she did not think she was doing any better than when she left the hospital. She is actually more active, starting to eat food and having decreased symptoms. A work note was provided for Melvi to not return to work until cleared by her doctor.     She is complaining of epigastric or upper Abdominal discomfort. CT scan was done while she was hospitalized. She states she sees Dr Jiménez Gastroenterologist and his associate Sandie NP/PA at [facility] in [place]. She is supposed to be having a colonoscopy  for her crohn's disease. At this time she is waiting for a return call from there. Melvi does not have any abdominal pain on palpation and normal bowel sounds. No further test or treatments done at this time. She will continue with the twice a day Protonix and eat a bland diet for now.              Keep scheduled appointment with Dr Martinez           See Patient Instructions    KENDRA Browning [facility]

## 2017-09-13 NOTE — NURSING NOTE
"Chief Complaint   Patient presents with     Hospital F/U       Initial There were no vitals taken for this visit. Estimated body mass index is 35.73 kg/(m^2) as calculated from the following:    Height as of 9/2/17: 5' 6\" (1.676 m).    Weight as of 9/2/17: 221 lb 5.5 oz (100.4 kg).  Medication Reconciliation: complete   Susy Moss  Chief Complaint   Patient presents with     Hospital F/U       Initial /64 (BP Location: Left arm, Patient Position: Sitting, Cuff Size: Adult Large)  Pulse 70  Temp 97.5  F (36.4  C) (Tympanic)  Resp 18  Ht 5' 6\" (1.676 m)  Wt 210 lb (95.3 kg)  SpO2 98%  BMI 33.89 kg/m2 Estimated body mass index is 33.89 kg/(m^2) as calculated from the following:    Height as of this encounter: 5' 6\" (1.676 m).    Weight as of this encounter: 210 lb (95.3 kg).  Medication Reconciliation: complete   Susy Moss      "

## 2017-09-13 NOTE — LETTER
[facility] [place]  [address] [facility] [address] [facility] [address]  Phone: [phone]    September 13, 2017        Melvi Cleveland  [address] [address]          To whom it may concern:    RE: Melvi Ogden was in the hospital starting 9/1/17.  She was seen in the Clinic today and should not return to work until cleared by her Doctor.    Please contact me for questions or concerns.      Sincerely,        KENDRA Browning CNP

## 2017-09-13 NOTE — MR AVS SNAPSHOT
"              After Visit Summary   9/13/2017    Melvi Cleveland    MRN: [id number]           Patient Information     Date Of Birth          1950        Visit Information        Provider Department      9/13/2017 2:40 PM Susana Aldridge APRN [facility]        Today's Diagnoses     Dysuria    -  1      Care Instructions      [place] Diet  Your healthcare provider may recommend a bland diet if you have an upset stomach. It consists of foods that are mild and easy to digest. It is better to eat small frequent meals rather than 3 large meals a day.    Beverages  OK: Fruit juices, non-caffeinated teas and coffee, non-carbonated frances  Avoid: Carbonated beverage, caffeinated tea and coffee, all alcoholic beverages  Bread  OK: Refined white, wheat or rye bread, dionisio or soda crackers, Jayne toast, plain rolls, bagels  Avoid: Whole-grain bread  Cereal  OK: Refined cereals: cooked or ready to eat  Avoid: Whole-grain cereals and granola, or those containing bran, seeds or nuts  Desserts  OK: Peanut butter and all others except those to \"avoid\"  Avoid: Chocolate, cocoa, coconut, popcorn, nuts, seeds, jam, marmalade  Fruits  OK: Canned, cooked, frozen or fresh fruits without seeds or tough skin  Avoid: Olives, skin and seeds of fruit  Meats  OK: All fresh or preserved meat, fish and fowl  Avoid: Any that are prepared with those spices to \"avoid\"  Cheese and eggs  OK: Eggs, cottage cheese, cream cheese, other cheeses  Avoid: All cheeses made with those spices to \"avoid\"  Potatoes and pasta  OK: Potato, rice, macaroni, noodles, spaghetti  Avoid: None  Soups  OK: All soups without heavy seasoning  Avoid: Soups made with those spices to \"avoid\"  Vegetables  OK: Canned, cooked, fresh or frozen mildly flavored vegetables without seeds, skins or coarse fiber  Avoid: Vegetables prepared with those spices to \"avoid\"; skin and seeds of vegetables and those with coarse fiber  Spices  OK: Salt, lemon and " lime juice, vinegar, all extracts, allison, cinnamon, thyme, mace, allspice, paprika  Avoid: Chili powder, cloves, pepper, seed spices, garlic, gravy pickles, highly seasoned salad dressings  Date Last Reviewed: 11/20/2015 2000-2017 [employer]. [address] [place] [address]. All rights reserved. This information is not intended as a substitute for professional medical care. Always follow your healthcare professional's instructions.    [place] diet  Stay hydrated  Follow up with your GI specialist                 Follow-ups after your visit        Follow-up notes from your care team     Return for keep appointment with Dr Kincaid.      Your next 10 appointments already scheduled     Sep 19, 2017 10:40 AM CDT   (Arrive by 10:25 AM)   SHORT with Jimbo Martinez MD   [facility] ([facility] )    [address]  [facility] [address]   [phone]              Who to contact     If you have questions or need follow up information about today's clinic visit or your schedule please contact [facility] directly at [phone].  Normal or non-critical lab and imaging results will be communicated to you by MyChart, letter or phone within 4 business days after the clinic has received the results. If you do not hear from us within 7 days, please contact the clinic through MyChart or phone. If you have a critical or abnormal lab result, we will notify you by phone as soon as possible.  Submit refill requests through [employer] or call your pharmacy and they will forward the refill request to us. Please allow 3 business days for your refill to be completed.          Additional Information About Your Visit        MyChart Information     [employer] gives you secure access to your electronic health record. If you see a primary care provider, you can also send messages to your care team and make appointments. If you have questions, please call your primary care  "clinic.  If you do not have a primary care provider, please call [phone] and they will assist you.        Care EveryWhere ID     This is your Care EveryWhere ID. This could be used by other organizations to access your [place] medical records  [id number]        Your Vitals Were     Pulse Temperature Respirations Height Pulse Oximetry BMI (Body Mass Index)    70 97.5  F (36.4  C) (Tympanic) 18 5' 6\" (1.676 m) 98% 33.89 kg/m2       Blood Pressure from Last 3 Encounters:   09/13/17 116/64   09/06/17 146/63   09/01/17 102/44    Weight from Last 3 Encounters:   09/13/17 210 lb (95.3 kg)   09/02/17 221 lb 5.5 oz (100.4 kg)   09/01/17 208 lb (94.3 kg)              We Performed the Following     CBC with platelets and differential     UA reflex to Microscopic and Culture - [place]          Today's Medication Changes          These changes are accurate as of: 9/13/17  3:39 PM.  If you have any questions, ask your nurse or doctor.               These medicines have changed or have updated prescriptions.        Dose/Directions    pantoprazole 40 MG EC tablet   Commonly known as:  PROTONIX   This may have changed:  when to take this   Used for:  Diagnosis unknown        Dose:  40 mg   Take 1 tablet (40 mg) by mouth daily   Quantity:  90 tablet   Refills:  3                Primary Care Provider Office Phone # Fax #    Jimbo Martinez [phone]       [facility] [address]  [place] [address]        Equal Access to Services     [facility] DOMO AH: Hadii dawna ku hadasho Sochocoali, waaxda luqadaha, qaybta kaalmada erikegyada, bill soto. So [facility] [phone].    ATENCIÓN: Si habla fatmataañol, tiene a garza disposición servicios gratuitos de asistencia lingüística. Llame al [phone].    We comply with applicable federal civil rights laws and [place] laws. We do not discriminate on the basis of race, color, national origin, age, disability sex, sexual orientation or gender " identity.            Thank you!     Thank you for choosing [facility]  for your care. Our goal is always to provide you with excellent care. Hearing back from our patients is one way we can continue to improve our services. Please take a few minutes to complete the written survey that you may receive in the mail after your visit with us. Thank you!             Your Updated Medication List - Protect others around you: Learn how to safely use, store and throw away your medicines at www.disposemymeds.org.          This list is accurate as of: 9/13/17  3:39 PM.  Always use your most recent med list.                   Brand Name Dispense Instructions for use Diagnosis    ALFALFA PO      Take 5 tablets by mouth 2 times daily.        ALLEGRA-D ALLERGY & CONGESTION  MG per 12 hr tablet   Generic drug:  fexofenadine-pseudoePHEDrine     30 tablet    TAKE 1 TABLET BY MOUTH TWICE DAILY    Nasal congestion       CALCIUM + D 500-1000-40 MG-UNT-MCG Chew   Generic drug:  Calcium-Vitamin D-Vitamin K      Take 3 tablets by mouth daily        citalopram 40 MG tablet    celeXA     Take 40 mg by mouth        DICLOFENAC PO      Take 100 mg by mouth daily        fish oil-omega-3 fatty acids 1000 MG capsule      Take 1 g by mouth daily.        flax seed oil 1000 MG capsule      Take 2 capsules by mouth daily        fluticasone 50 MCG/ACT spray    FLONASE     1 spray Reported on 5/11/2017        folic acid 1 MG tablet    FOLVITE     Take 1 mg by mouth 3 times daily.        gabapentin 300 MG capsule    NEURONTIN    210 capsule    TAKE 3 CAPSULES BY MOUTH IN THE MORNING, 2 CAPSULES AT NOON AND 2 CAPSULES IN THE EVENING    Peripheral polyneuropathy (H)       Garlic 400 MG Tbec           HYDROcodone-acetaminophen 7.5-325 MG per tablet    NORCO    60 tablet    TAKE 1 TABLET BY MOUTH EVERY 4 TO 6 HOURS AS NEEDED FOR PAIN    Rheumatoid arthritis involving multiple sites with positive rheumatoid factor (H)       ketotifen 0.025  % Soln ophthalmic solution    ZADITOR/REFRESH ANTI-ITCH     1 drop        MILK THISTLE PO      Take 1,000 mg by mouth daily.        MULTIPLE VITAMIN PO      Take  by mouth 2 times daily.        ondansetron 4 MG ODT tab    ZOFRAN ODT    20 tablet    Take 1-2 tablets (4-8 mg) by mouth every 8 hours as needed for nausea    Intractable episodic tension-type headache       pantoprazole 40 MG EC tablet    PROTONIX    90 tablet    Take 1 tablet (40 mg) by mouth daily    Diagnosis unknown       PREBIOTIC PRODUCT PO      Take 1 tablet by mouth daily        PROAIR  (90 BASE) MCG/ACT Inhaler   Generic drug:  albuterol     8.5 g    INHALE 2 PUFFS INTO THE LUNGS EVERY 6 HOURS AS NEEDED FOR SHORTNESS OF BREATH OR WHEEZING    Influenza-like symptoms       REFRESH 1.4-0.6 % ophthalmic solution   Generic drug:  polyvinyl alcohol-povidone      1-2 drops as needed        RESTASIS 0.05 % ophthalmic emulsion   Generic drug:  cycloSPORINE     60 each    USE 1 DROP IN BOTH EYES 2 TIMES A DAY    Bilateral dry eyes       SM GAS RELIEF ANTIFLATUENT 180 MG Caps   Generic drug:  Simethicone     60 capsule    TAKE ONE CAPSULE BY MOUTH AS NEEDED AFTER A MEAL. MAX 2 CAPS/DAY    Gas       SOLUBLE FIBER/PROBIOTICS PO      Take 1 tablet by mouth        traZODone 50 MG tablet    DESYREL    270 tablet    TAKE 2-3 TABLETS BY MOUTH DAILY AT BEDTIME    Primary insomnia       Vitamin C 500 MG Caps      Take 2 tablets by mouth 2 times daily.        vitamin D 1000 UNITS capsule      Take 1 capsule by mouth daily.

## 2017-09-13 NOTE — PATIENT INSTRUCTIONS
"  [place] Diet  Your healthcare provider may recommend a bland diet if you have an upset stomach. It consists of foods that are mild and easy to digest. It is better to eat small frequent meals rather than 3 large meals a day.    Beverages  OK: Fruit juices, non-caffeinated teas and coffee, non-carbonated frances  Avoid: Carbonated beverage, caffeinated tea and coffee, all alcoholic beverages  Bread  OK: Refined white, wheat or rye bread, dionisio or soda crackers, Jayne toast, plain rolls, bagels  Avoid: Whole-grain bread  Cereal  OK: Refined cereals: cooked or ready to eat  Avoid: Whole-grain cereals and granola, or those containing bran, seeds or nuts  Desserts  OK: Peanut butter and all others except those to \"avoid\"  Avoid: Chocolate, cocoa, coconut, popcorn, nuts, seeds, jam, marmalade  Fruits  OK: Canned, cooked, frozen or fresh fruits without seeds or tough skin  Avoid: Olives, skin and seeds of fruit  Meats  OK: All fresh or preserved meat, fish and fowl  Avoid: Any that are prepared with those spices to \"avoid\"  Cheese and eggs  OK: Eggs, cottage cheese, cream cheese, other cheeses  Avoid: All cheeses made with those spices to \"avoid\"  Potatoes and pasta  OK: Potato, rice, macaroni, noodles, spaghetti  Avoid: None  Soups  OK: All soups without heavy seasoning  Avoid: Soups made with those spices to \"avoid\"  Vegetables  OK: Canned, cooked, fresh or frozen mildly flavored vegetables without seeds, skins or coarse fiber  Avoid: Vegetables prepared with those spices to \"avoid\"; skin and seeds of vegetables and those with coarse fiber  Spices  OK: Salt, lemon and lime juice, vinegar, all extracts, allison, cinnamon, thyme, mace, allspice, paprika  Avoid: Chili powder, cloves, pepper, seed spices, garlic, gravy pickles, highly seasoned salad dressings  Date Last Reviewed: 11/20/2015 2000-2017 [employer]. [address] [place] [address], [place] [address]. All rights reserved. This information is not intended as " a substitute for professional medical care. Always follow your healthcare professional's instructions.    [place] diet  Stay hydrated  Follow up with your GI specialist

## 2017-09-14 ASSESSMENT — ANXIETY QUESTIONNAIRES: GAD7 TOTAL SCORE: 3

## 2017-09-19 ENCOUNTER — OFFICE VISIT (OUTPATIENT)
Dept: FAMILY MEDICINE | Facility: OTHER | Age: 67
End: 2017-09-19
Attending: FAMILY MEDICINE
Payer: COMMERCIAL

## 2017-09-19 VITALS
BODY MASS INDEX: 32.95 KG/M2 | DIASTOLIC BLOOD PRESSURE: 72 MMHG | HEART RATE: 86 BPM | RESPIRATION RATE: 18 BRPM | WEIGHT: 205 LBS | OXYGEN SATURATION: 94 % | HEIGHT: 66 IN | SYSTOLIC BLOOD PRESSURE: 126 MMHG | TEMPERATURE: 98.4 F

## 2017-09-19 DIAGNOSIS — N10 ACUTE PYELONEPHRITIS: ICD-10-CM

## 2017-09-19 DIAGNOSIS — Z23 NEED FOR PROPHYLACTIC VACCINATION AND INOCULATION AGAINST INFLUENZA: Primary | ICD-10-CM

## 2017-09-19 PROCEDURE — 99212 OFFICE O/P EST SF 10 MIN: CPT | Mod: 25

## 2017-09-19 PROCEDURE — G0008 ADMIN INFLUENZA VIRUS VAC: HCPCS | Performed by: FAMILY MEDICINE

## 2017-09-19 PROCEDURE — 99213 OFFICE O/P EST LOW 20 MIN: CPT | Performed by: FAMILY MEDICINE

## 2017-09-19 ASSESSMENT — ANXIETY QUESTIONNAIRES
1. FEELING NERVOUS, ANXIOUS, OR ON EDGE: NOT AT ALL
7. FEELING AFRAID AS IF SOMETHING AWFUL MIGHT HAPPEN: NOT AT ALL
4. TROUBLE RELAXING: NOT AT ALL
5. BEING SO RESTLESS THAT IT IS HARD TO SIT STILL: NOT AT ALL
3. WORRYING TOO MUCH ABOUT DIFFERENT THINGS: NOT AT ALL
6. BECOMING EASILY ANNOYED OR IRRITABLE: NOT AT ALL
2. NOT BEING ABLE TO STOP OR CONTROL WORRYING: NOT AT ALL
GAD7 TOTAL SCORE: 0

## 2017-09-19 ASSESSMENT — PAIN SCALES - GENERAL: PAINLEVEL: NO PAIN (0)

## 2017-09-19 ASSESSMENT — PATIENT HEALTH QUESTIONNAIRE - PHQ9: SUM OF ALL RESPONSES TO PHQ QUESTIONS 1-9: 7

## 2017-09-19 NOTE — NURSING NOTE
"Chief Complaint   Patient presents with     Hospital F/U     Flu Shot       Initial /72 (BP Location: Left arm, Patient Position: Sitting, Cuff Size: Adult Regular)  Pulse 86  Temp 98.4  F (36.9  C) (Tympanic)  Resp 18  Ht 5' 6\" (1.676 m)  Wt 205 lb (93 kg)  SpO2 94%  BMI 33.09 kg/m2 Estimated body mass index is 33.09 kg/(m^2) as calculated from the following:    Height as of this encounter: 5' 6\" (1.676 m).    Weight as of this encounter: 205 lb (93 kg).  Medication Reconciliation: complete   Devika Gutierres LPN          "

## 2017-09-19 NOTE — MR AVS SNAPSHOT
After Visit Summary   9/19/2017    Melvi Cleveland    MRN: [id number]           Patient Information     Date Of Birth          1950        Visit Information        Provider Department      9/19/2017 10:40 AM Jimbo Martinez MD [facility]        Today's Diagnoses     Need for prophylactic vaccination and inoculation against influenza    -  1       Follow-ups after your visit        Your next 10 appointments already scheduled     Sep 29, 2017 10:40 AM CDT   (Arrive by 10:25 AM)   SHORT with Jimbo Martinez MD   [facility] [place] ([facility] [place] )    [address]  [place] [address]   [phone]            Nov 15, 2017  9:00 AM CST   (Arrive by 8:45 AM)   PHYSICAL with Jimbo Martinez MD   [facility] [place] ([facility] [place] )    [address]  [place] [address]   [phone]              Who to contact     If you have questions or need follow up information about today's clinic visit or your schedule please contact [facility] directly at [phone].  Normal or non-critical lab and imaging results will be communicated to you by [employer], letter or phone within 4 business days after the clinic has received the results. If you do not hear from us within 7 days, please contact the clinic through [employer] or phone. If you have a critical or abnormal lab result, we will notify you by phone as soon as possible.  Submit refill requests through [employer] or call your pharmacy and they will forward the refill request to us. Please allow 3 business days for your refill to be completed.          Additional Information About Your Visit        [employer] Information     [employer] gives you secure access to your electronic health record. If you see a primary care provider, you can also send messages to your care team and make appointments. If you have questions, please call your primary care clinic.  If you do not have a primary care  "provider, please call [phone] and they will assist you.        Care EveryWhere ID     This is your Care EveryWhere ID. This could be used by other organizations to access your [facility] medical records  [id number]        Your Vitals Were     Pulse Temperature Respirations Height Pulse Oximetry BMI (Body Mass Index)    86 98.4  F (36.9  C) (Tympanic) 18 5' 6\" (1.676 m) 94% 33.09 kg/m2       Blood Pressure from Last 3 Encounters:   09/19/17 126/72   09/13/17 116/64   09/06/17 146/63    Weight from Last 3 Encounters:   09/19/17 205 lb (93 kg)   09/13/17 210 lb (95.3 kg)   09/02/17 221 lb 5.5 oz (100.4 kg)              Today, you had the following     No orders found for display         Today's Medication Changes          These changes are accurate as of: 9/19/17 11:27 AM.  If you have any questions, ask your nurse or doctor.               These medicines have changed or have updated prescriptions.        Dose/Directions    pantoprazole 40 MG EC tablet   Commonly known as:  PROTONIX   This may have changed:  when to take this   Used for:  Diagnosis unknown        Dose:  40 mg   Take 1 tablet (40 mg) by mouth daily   Quantity:  90 tablet   Refills:  3                Primary Care Provider Office Phone # Fax #    Jimbo Martinez [phone]       [facility] [address] [place] [address]        Equal Access to Services     [facility] DOMO AH: Hadii dawna corley hadbrisao Sochocoali, waaxda luqadaha, qaybta kaalmada gwyn, bill soto. So [facility] [phone].    ATENCIÓN: Si habla español, tiene a garza disposición servicios gratuitos de asistencia lingüística. Llame al [phone].    We comply with applicable federal civil rights laws and [place] laws. We do not discriminate on the basis of race, color, national origin, age, disability sex, sexual orientation or gender identity.            Thank you!     Thank you for choosing [facility]]  for your care. Our " goal is always to provide you with excellent care. Hearing back from our patients is one way we can continue to improve our services. Please take a few minutes to complete the written survey that you may receive in the mail after your visit with us. Thank you!             Your Updated Medication List - Protect others around you: Learn how to safely use, store and throw away your medicines at www.disposemymeds.org.          This list is accurate as of: 9/19/17 11:27 AM.  Always use your most recent med list.                   Brand Name Dispense Instructions for use Diagnosis    ALFALFA PO      Take 5 tablets by mouth 2 times daily.        ALLEGRA-D ALLERGY & CONGESTION  MG per 12 hr tablet   Generic drug:  fexofenadine-pseudoePHEDrine     30 tablet    TAKE 1 TABLET BY MOUTH TWICE DAILY    Nasal congestion       CALCIUM + D 500-1000-40 MG-UNT-MCG Chew   Generic drug:  Calcium-Vitamin D-Vitamin K      Take 3 tablets by mouth daily        citalopram 40 MG tablet    celeXA     Take 40 mg by mouth        DICLOFENAC PO      Take 100 mg by mouth daily        fish oil-omega-3 fatty acids 1000 MG capsule      Take 1 g by mouth daily.        flax seed oil 1000 MG capsule      Take 2 capsules by mouth daily        fluticasone 50 MCG/ACT spray    FLONASE     1 spray Reported on 5/11/2017        folic acid 1 MG tablet    FOLVITE     Take 1 mg by mouth 3 times daily.        gabapentin 300 MG capsule    NEURONTIN    210 capsule    TAKE 3 CAPSULES BY MOUTH IN THE MORNING, 2 CAPSULES AT NOON AND 2 CAPSULES IN THE EVENING    Peripheral polyneuropathy (H)       Garlic 400 MG Tbec           HYDROcodone-acetaminophen 7.5-325 MG per tablet    NORCO    60 tablet    TAKE 1 TABLET BY MOUTH EVERY 4 TO 6 HOURS AS NEEDED FOR PAIN    Rheumatoid arthritis involving multiple sites with positive rheumatoid factor (H)       ketotifen 0.025 % Soln ophthalmic solution    ZADITOR/REFRESH ANTI-ITCH     1 drop        MILK THISTLE PO      Take  1,000 mg by mouth daily.        MULTIPLE VITAMIN PO      Take  by mouth 2 times daily.        ondansetron 4 MG ODT tab    ZOFRAN ODT    20 tablet    Take 1-2 tablets (4-8 mg) by mouth every 8 hours as needed for nausea    Intractable episodic tension-type headache       pantoprazole 40 MG EC tablet    PROTONIX    90 tablet    Take 1 tablet (40 mg) by mouth daily    Diagnosis unknown       PREBIOTIC PRODUCT PO      Take 1 tablet by mouth daily        PROAIR  (90 BASE) MCG/ACT Inhaler   Generic drug:  albuterol     8.5 g    INHALE 2 PUFFS INTO THE LUNGS EVERY 6 HOURS AS NEEDED FOR SHORTNESS OF BREATH OR WHEEZING    Influenza-like symptoms       REFRESH 1.4-0.6 % ophthalmic solution   Generic drug:  polyvinyl alcohol-povidone      1-2 drops as needed        RESTASIS 0.05 % ophthalmic emulsion   Generic drug:  cycloSPORINE     60 each    USE 1 DROP IN BOTH EYES 2 TIMES A DAY    Bilateral dry eyes       SM GAS RELIEF ANTIFLATUENT 180 MG Caps   Generic drug:  Simethicone     60 capsule    TAKE ONE CAPSULE BY MOUTH AS NEEDED AFTER A MEAL. MAX 2 CAPS/DAY    Gas       SOLUBLE FIBER/PROBIOTICS PO      Take 1 tablet by mouth        traZODone 50 MG tablet    DESYREL    270 tablet    TAKE 2-3 TABLETS BY MOUTH DAILY AT BEDTIME    Primary insomnia       Vitamin C 500 MG Caps      Take 2 tablets by mouth 2 times daily.        vitamin D 1000 UNITS capsule      Take 1 capsule by mouth daily.

## 2017-09-19 NOTE — PROGRESS NOTES
SUBJECTIVE:  Melvi Cleveland, 67 year old, female is seen in hospital follow up. She was admitted after developing fever, headache, nausea, and vomiting and was found to have UTI as well as pyelonephritis. This was complicated by bacteremia with E coli and mild dehydration.  Sherrie developed hypoxia which was felt to be secondary to atelectasis which resolved spontaneously.  She ws treated with IV ciprofloxacin as well as rocephin and her symptoms gradually improved.  Sherrie was discharged on 5 days of ciprofloxacin which she has completed.  She notes persistent weakness and fatigue.    No fever, flank pain, nausea, vomiting, dysuria, hematuria, pneumaturia, nocturia, frequency, urgency, or incontinence.      Current Outpatient Prescriptions   Medication Sig Dispense Refill     ondansetron (ZOFRAN ODT) 4 MG ODT tab Take 1-2 tablets (4-8 mg) by mouth every 8 hours as needed for nausea 20 tablet 1     HYDROcodone-acetaminophen (NORCO) 7.5-325 MG per tablet TAKE 1 TABLET BY MOUTH EVERY 4 TO 6 HOURS AS NEEDED FOR PAIN 60 tablet 0     gabapentin (NEURONTIN) 300 MG capsule TAKE 3 CAPSULES BY MOUTH IN THE MORNING, 2 CAPSULES AT NOON AND 2 CAPSULES IN THE EVENING 210 capsule 0     traZODone (DESYREL) 50 MG tablet TAKE 2-3 TABLETS BY MOUTH DAILY AT BEDTIME 270 tablet 1     ALLEGRA-D ALLERGY & CONGESTION  MG per 12 hr tablet TAKE 1 TABLET BY MOUTH TWICE DAILY 30 tablet 0     PREBIOTIC PRODUCT PO Take 1 tablet by mouth daily       RESTASIS 0.05 % ophthalmic emulsion USE 1 DROP IN BOTH EYES 2 TIMES A DAY 60 each 0     Probiotic Product (SOLUBLE FIBER/PROBIOTICS PO) Take 1 tablet by mouth       Flaxseed, Linseed, (FLAX SEED OIL) 1000 MG capsule Take 2 capsules by mouth daily       fluticasone (FLONASE) 50 MCG/ACT spray 1 spray Reported on 5/11/2017       ketotifen (ZADITOR/REFRESH ANTI-ITCH) 0.025 % SOLN ophthalmic solution 1 drop       citalopram (CELEXA) 40 MG tablet Take 40 mg by mouth       ALBUTEROL 108 (90 BASE) MCG/ACT  inhaler INHALE 2 PUFFS INTO THE LUNGS EVERY 6 HOURS AS NEEDED FOR SHORTNESS OF BREATH OR WHEEZING 8.5 g 5     SM GAS RELIEF ANTIFLATUENT 180 MG CAPS TAKE ONE CAPSULE BY MOUTH AS NEEDED AFTER A MEAL. MAX 2 CAPS/DAY 60 capsule 5     pantoprazole (PROTONIX) 40 MG enteric coated tablet Take 1 tablet (40 mg) by mouth daily (Patient taking differently: Take 40 mg by mouth 2 times daily ) 90 tablet 3     DICLOFENAC PO Take 100 mg by mouth daily        Garlic 400 MG TBEC        polyvinyl alcohol-povidone (REFRESH) 1.4-0.6 % ophthalmic solution 1-2 drops as needed       folic acid (FOLVITE) 1 MG tablet Take 1 mg by mouth 3 times daily.       MULTIPLE VITAMIN PO Take  by mouth 2 times daily.       Ascorbic Acid (VITAMIN C) 500 MG CAPS Take 2 tablets by mouth 2 times daily.       Calcium-Vitamin D-Vitamin K (CALCIUM + D) 500-1000-40 MG-UNT-MCG CHEW Take 3 tablets by mouth daily        ALFALFA PO Take 5 tablets by mouth 2 times daily.       MILK THISTLE PO Take 1,000 mg by mouth daily.       fish oil-omega-3 fatty acids (OMEGA-3) 1000 MG capsule Take 1 g by mouth daily.       Cholecalciferol (VITAMIN D) 1000 UNITS capsule Take 1 capsule by mouth daily.          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     Parotiditis 5/9/2011     Peripheral Neuriopathy 3/1/2011     Rheumatoid arthritis(714.0) 12/13/1999     Seborrhea capitis 10/6/2011     Sjogrens Syndrome 3/1/2011     Urethral stricture 4/30/2008     Past Surgical History:   Procedure Laterality Date     BACK SURGERY  05/1995    back surgery      BIOPSY      breast bx X2     BIOPSY BREAST      LT     BIOPSY BREAST NEEDLE LOCALIZATION Right 6/12/2017    Procedure: BIOPSY BREAST NEEDLE LOCALIZATION;  WIRE LOCALIZED EXCISIONAL BIOPSY  RIGHT BREAST MASS;  Surgeon: Jeni Amin MD;  Location: [facility]     CHOLECYSTECTOMY  2004     COLONOSCOPY  11/15/2004    screening     COLONOSCOPY  9/24/2014     EGD with biopsy  2010, 2011    GERD     laminectomy      L4 L5 laminectomy; back pain     Family History   Problem Relation Age of Onset     DIABETES Mother      Rheumatoid Arthritis Mother      Other - See Comments Mother      fibromyalgia     Osteoarthritis Mother      Thyroid Disease Mother      thyroid disorder     Unknown/Adopted Father      cause of death unknown     Rheumatoid Arthritis Father      DIABETES Sister      Myocardial Infarction Sister      cause of death     Lupus Sister      cause of death     C.A.D. Maternal Grandmother      CEREBROVASCULAR DISEASE Maternal Grandmother      DIABETES Maternal Grandmother      Thyroid Disease Maternal Grandmother      thyrodi disorder; goitor removed     CANCER Maternal Grandfather      Hypertension Brother      Other - See Comments Brother      sleep apnea     Other - See Comments Sister      sleep apnea     Social History     Social History     Marital status:      Spouse name: N/A     Number of children: N/A     Years of education: N/A     Occupational History      [employer]     part time     Social History Main Topics     Smoking status: Former Smoker     Types: Cigarettes     Quit date: 3/28/1997     Smokeless tobacco: Never Used      Comment: no passive exposure     Alcohol use No      Comment: former. quit 1984     Drug use: No     Sexual activity: Not on file     Other Topics Concern     Parent/Sibling W/ Cabg, Mi Or Angioplasty Before 65f 55m? Yes     Blood Transfusions Yes     Caffeine Concern Yes     chocolate rare     Seat Belt Yes     Social History Narrative         Review Of  "Systems  Constitutional, HEENT, cardiovascular, pulmonary, gi and gu systems are negative, except as otherwise noted.      OBJECTIVE:/72 (BP Location: Left arm, Patient Position: Sitting, Cuff Size: Adult Regular)  Pulse 86  Temp 98.4  F (36.9  C) (Tympanic)  Resp 18  Ht 5' 6\" (1.676 m)  Wt 205 lb (93 kg)  SpO2 94%  BMI 33.09 kg/m2      Exam:  Physical Exam   Constitutional: She is oriented to person, place, and time. She appears well-developed and well-nourished. No distress.   HENT:   Head: Atraumatic.   Right Ear: External ear normal.   Left Ear: External ear normal.   Oropharynx somewhat dry   Eyes: Conjunctivae are normal. Pupils are equal, round, and reactive to light.   Neck: Neck supple.   Abdominal: Soft. Bowel sounds are normal. She exhibits no distension. There is no tenderness.   Lymphadenopathy:     She has no cervical adenopathy.   Neurological: She is alert and oriented to person, place, and time.   Skin: Skin is warm and dry. No rash noted.   Psychiatric: She has a normal mood and affect.     Other exam not repeated    Labs:  Office Visit on 09/13/2017   Component Date Value Ref Range Status     Color Urine 09/13/2017 Yellow   Final     Appearance Urine 09/13/2017 Clear   Final     Glucose Urine 09/13/2017 Negative  NEG^Negative mg/dL Final     Bilirubin Urine 09/13/2017 Negative  NEG^Negative Final     Ketones Urine 09/13/2017 Negative  NEG^Negative mg/dL Final     Specific Gravity Urine 09/13/2017 1.007  1.003 - 1.035 Final     Blood Urine 09/13/2017 Negative  NEG^Negative Final     pH Urine 09/13/2017 6.0  4.7 - 8.0 pH Final     Protein Albumin Urine 09/13/2017 Negative  NEG^Negative mg/dL Final     Urobilinogen mg/dL 09/13/2017 Normal  0.0 - 2.0 mg/dL Final     Nitrite Urine 09/13/2017 Negative  NEG^Negative Final     Leukocyte Esterase Urine 09/13/2017 Small* NEG^Negative Final     Source 09/13/2017 Midstream Urine   Final     RBC Urine 09/13/2017 2  0 - 2 /HPF Final     WBC Urine " 09/13/2017 7* 0 - 2 /HPF Final     Bacteria Urine 09/13/2017 Few* NEG^Negative /HPF Final     Transitional Epi 09/13/2017 <1  0 - 1 /HPF Final     Mucous Urine 09/13/2017 Present* NEG^Negative /LPF Final     Hyaline Casts 09/13/2017 1* OTO2^O - 2 /LPF Final     WBC 09/13/2017 8.9  4.0 - 11.0 10e9/L Final     RBC Count 09/13/2017 4.13  3.8 - 5.2 10e12/L Final     Hemoglobin 09/13/2017 11.5* 11.7 - 15.7 g/dL Final     Hematocrit 09/13/2017 35.4  35.0 - 47.0 % Final     MCV 09/13/2017 86  78 - 100 fl Final     MCH 09/13/2017 27.8  26.5 - 33.0 pg Final     MCHC 09/13/2017 32.5  31.5 - 36.5 g/dL Final     RDW 09/13/2017 14.3  10.0 - 15.0 % Final     Platelet Count 09/13/2017 417  150 - 450 10e9/L Final     Diff Method 09/13/2017 Automated Method   Final     % Neutrophils 09/13/2017 68.3  % Final     % Lymphocytes 09/13/2017 20.3  % Final     % Monocytes 09/13/2017 8.5  % Final     % Eosinophils 09/13/2017 1.9  % Final     % Basophils 09/13/2017 0.4  % Final     % Immature Granulocytes 09/13/2017 0.6  % Final     Nucleated RBCs 09/13/2017 0  0 /100 Final     Absolute Neutrophil 09/13/2017 6.1  1.6 - 8.3 10e9/L Final     Absolute Lymphocytes 09/13/2017 1.8  0.8 - 5.3 10e9/L Final     Absolute Monocytes 09/13/2017 0.8  0.0 - 1.3 10e9/L Final     Absolute Eosinophils 09/13/2017 0.2  0.0 - 0.7 10e9/L Final     Absolute Basophils 09/13/2017 0.0  0.0 - 0.2 10e9/L Final     Abs Immature Granulocytes 09/13/2017 0.1  0 - 0.4 10e9/L Final     Absolute Nucleated RBC 09/13/2017 0.0   Final       ASSESSMENT/PLAN:  Acute pyelonephritis  Hospital records reviewed in detail.  Natural history discussed.  She will continue fluids and other medications.  Follow up 10 days.    Need for prophylactic vaccination and inoculation against influenza  Vaccine given.  - HC FLU VACCINE, INCREASED ANTIGEN, PRESV FREE  - Vaccine Administration, Initial [[id number]]            Jimbo Martinez MD

## 2017-09-19 NOTE — PROGRESS NOTES
Injectable Influenza Immunization Documentation    1.  Is the person to be vaccinated sick today? no    2. Does the person to be vaccinated have an allergy to a component   of the vaccine? no    3. Has the person to be vaccinated ever had a serious reaction   to influenza vaccine in the past? no    4. Has the person to be vaccinated ever had Guillain-Barré syndrome? no    Form completed by Devika Gutierres LPN

## 2017-09-20 ASSESSMENT — ANXIETY QUESTIONNAIRES: GAD7 TOTAL SCORE: 0

## 2017-09-21 DIAGNOSIS — R09.81 NASAL CONGESTION: ICD-10-CM

## 2017-09-21 RX ORDER — FEXOFENADINE HYDROCHLORIDE AND PSEUDOEPHEDRINE HYDROCHLORIDE 60; 120 MG/1; MG/1
TABLET, FILM COATED, EXTENDED RELEASE ORAL
Qty: 30 TABLET | Refills: 0 | Status: SHIPPED | OUTPATIENT
Start: 2017-09-21 | End: 2017-11-27

## 2017-09-23 PROCEDURE — 90662 IIV NO PRSV INCREASED AG IM: CPT | Performed by: FAMILY MEDICINE

## 2017-09-27 DIAGNOSIS — F41.0 PANIC DISORDER WITHOUT AGORAPHOBIA: ICD-10-CM

## 2017-09-27 DIAGNOSIS — G62.9 PERIPHERAL POLYNEUROPATHY: ICD-10-CM

## 2017-09-28 RX ORDER — CITALOPRAM HYDROBROMIDE 40 MG/1
TABLET ORAL
Qty: 90 TABLET | Refills: 0 | Status: SHIPPED | OUTPATIENT
Start: 2017-09-28 | End: 2018-03-26

## 2017-09-28 RX ORDER — GABAPENTIN 300 MG/1
CAPSULE ORAL
Qty: 210 CAPSULE | Refills: 0 | Status: SHIPPED | OUTPATIENT
Start: 2017-09-28 | End: 2017-10-30

## 2017-09-28 NOTE — TELEPHONE ENCOUNTER
Gabapentin  Last office visit: 9/19/17  Last refill: 8/30/17 #210, 0 R.  PCP [facility]  Medications pended.  Thank you.

## 2017-09-29 ENCOUNTER — OFFICE VISIT (OUTPATIENT)
Dept: FAMILY MEDICINE | Facility: OTHER | Age: 67
End: 2017-09-29
Attending: FAMILY MEDICINE
Payer: MEDICARE

## 2017-09-29 VITALS
HEIGHT: 66 IN | OXYGEN SATURATION: 93 % | SYSTOLIC BLOOD PRESSURE: 112 MMHG | DIASTOLIC BLOOD PRESSURE: 70 MMHG | TEMPERATURE: 97.8 F | BODY MASS INDEX: 32.95 KG/M2 | HEART RATE: 86 BPM | WEIGHT: 205 LBS

## 2017-09-29 DIAGNOSIS — R78.81 BACTEREMIA DUE TO ESCHERICHIA COLI: ICD-10-CM

## 2017-09-29 DIAGNOSIS — Z79.899 MEDICATION MANAGEMENT: Primary | ICD-10-CM

## 2017-09-29 DIAGNOSIS — Z23 NEED FOR PROPHYLACTIC VACCINATION AND INOCULATION AGAINST INFLUENZA: Primary | ICD-10-CM

## 2017-09-29 DIAGNOSIS — N10 ACUTE PYELONEPHRITIS: ICD-10-CM

## 2017-09-29 DIAGNOSIS — M85.80 OSTEOPENIA, UNSPECIFIED LOCATION: ICD-10-CM

## 2017-09-29 DIAGNOSIS — Z78.0 ASYMPTOMATIC MENOPAUSAL STATE: ICD-10-CM

## 2017-09-29 DIAGNOSIS — B96.20 BACTEREMIA DUE TO ESCHERICHIA COLI: ICD-10-CM

## 2017-09-29 LAB
ALBUMIN SERPL-MCNC: 3.1 G/DL (ref 3.4–5)
ALT SERPL W P-5'-P-CCNC: 28 U/L (ref 0–50)
BASOPHILS # BLD AUTO: 0 10E9/L (ref 0–0.2)
BASOPHILS NFR BLD AUTO: 0.8 %
CREAT SERPL-MCNC: 1.06 MG/DL (ref 0.52–1.04)
CRP SERPL-MCNC: 3 MG/L (ref 0–8)
DIFFERENTIAL METHOD BLD: NORMAL
EOSINOPHIL # BLD AUTO: 0.4 10E9/L (ref 0–0.7)
EOSINOPHIL NFR BLD AUTO: 7.2 %
ERYTHROCYTE [DISTWIDTH] IN BLOOD BY AUTOMATED COUNT: 13.6 % (ref 10–15)
ERYTHROCYTE [SEDIMENTATION RATE] IN BLOOD BY WESTERGREN METHOD: 21 MM/H (ref 0–30)
GFR SERPL CREATININE-BSD FRML MDRD: 52 ML/MIN/1.7M2
HCT VFR BLD AUTO: 37.9 % (ref 35–47)
HGB BLD-MCNC: 12.2 G/DL (ref 11.7–15.7)
IMM GRANULOCYTES # BLD: 0 10E9/L (ref 0–0.4)
IMM GRANULOCYTES NFR BLD: 0 %
LYMPHOCYTES # BLD AUTO: 1.9 10E9/L (ref 0.8–5.3)
LYMPHOCYTES NFR BLD AUTO: 38.5 %
MCH RBC QN AUTO: 27.6 PG (ref 26.5–33)
MCHC RBC AUTO-ENTMCNC: 32.2 G/DL (ref 31.5–36.5)
MCV RBC AUTO: 86 FL (ref 78–100)
MONOCYTES # BLD AUTO: 0.4 10E9/L (ref 0–1.3)
MONOCYTES NFR BLD AUTO: 7.4 %
NEUTROPHILS # BLD AUTO: 2.3 10E9/L (ref 1.6–8.3)
NEUTROPHILS NFR BLD AUTO: 46.1 %
NRBC # BLD AUTO: 0 10*3/UL
NRBC BLD AUTO-RTO: 0 /100
PLATELET # BLD AUTO: 200 10E9/L (ref 150–450)
RBC # BLD AUTO: 4.42 10E12/L (ref 3.8–5.2)
WBC # BLD AUTO: 4.9 10E9/L (ref 4–11)

## 2017-09-29 PROCEDURE — 82040 ASSAY OF SERUM ALBUMIN: CPT | Mod: ZL | Performed by: NURSE PRACTITIONER

## 2017-09-29 PROCEDURE — 85025 COMPLETE CBC W/AUTO DIFF WBC: CPT | Mod: ZL | Performed by: NURSE PRACTITIONER

## 2017-09-29 PROCEDURE — 99212 OFFICE O/P EST SF 10 MIN: CPT

## 2017-09-29 PROCEDURE — 86140 C-REACTIVE PROTEIN: CPT | Mod: ZL | Performed by: NURSE PRACTITIONER

## 2017-09-29 PROCEDURE — 36415 COLL VENOUS BLD VENIPUNCTURE: CPT | Mod: ZL | Performed by: NURSE PRACTITIONER

## 2017-09-29 PROCEDURE — 90662 IIV NO PRSV INCREASED AG IM: CPT | Performed by: FAMILY MEDICINE

## 2017-09-29 PROCEDURE — 82565 ASSAY OF CREATININE: CPT | Mod: ZL | Performed by: NURSE PRACTITIONER

## 2017-09-29 PROCEDURE — 90471 IMMUNIZATION ADMIN: CPT | Performed by: FAMILY MEDICINE

## 2017-09-29 PROCEDURE — 99214 OFFICE O/P EST MOD 30 MIN: CPT | Performed by: FAMILY MEDICINE

## 2017-09-29 PROCEDURE — 85652 RBC SED RATE AUTOMATED: CPT | Mod: ZL | Performed by: NURSE PRACTITIONER

## 2017-09-29 PROCEDURE — 84460 ALANINE AMINO (ALT) (SGPT): CPT | Mod: ZL | Performed by: NURSE PRACTITIONER

## 2017-09-29 ASSESSMENT — ANXIETY QUESTIONNAIRES
4. TROUBLE RELAXING: NOT AT ALL
2. NOT BEING ABLE TO STOP OR CONTROL WORRYING: NOT AT ALL
5. BEING SO RESTLESS THAT IT IS HARD TO SIT STILL: NOT AT ALL
7. FEELING AFRAID AS IF SOMETHING AWFUL MIGHT HAPPEN: NOT AT ALL
6. BECOMING EASILY ANNOYED OR IRRITABLE: NOT AT ALL
3. WORRYING TOO MUCH ABOUT DIFFERENT THINGS: NOT AT ALL
GAD7 TOTAL SCORE: 0
1. FEELING NERVOUS, ANXIOUS, OR ON EDGE: NOT AT ALL

## 2017-09-29 ASSESSMENT — PAIN SCALES - GENERAL: PAINLEVEL: NO PAIN (0)

## 2017-09-29 ASSESSMENT — PATIENT HEALTH QUESTIONNAIRE - PHQ9: SUM OF ALL RESPONSES TO PHQ QUESTIONS 1-9: 3

## 2017-09-29 NOTE — PROGRESS NOTES
SUBJECTIVE:  Melvi Cleveland, 67 year old, female is seen with the following medical problems.    Pyelonephritis.  She was hospitalized with fever and was found to have pyelonephritis. She was treated with IV ciprofloxacin as well rocephin.  Her symptoms are improved.    Sepsis.  Sherrie was noted to have E coli bacteremia.  Cultures are reviewed.    Osteopenia.  She had previous heel ultrasound which showed a T score -1.0.  She has a history of rheumatoid arthritis and is postmenopausal.  Discussed DEXA scanning.    No fever, flank pain, nausea, vomiting, dysuria, hematuria, pneumaturia, nocturia, frequency, urgency, or incontinence.    Current Outpatient Prescriptions   Medication Sig Dispense Refill     gabapentin (NEURONTIN) 300 MG capsule TAKE 3 CAPSULES BY MOUTH IN THE MORNING, 2 CAPSULES AT NOON AND 2 CAPSULES IN THE EVENING 210 capsule 0     citalopram (CELEXA) 40 MG tablet TAKE 1 TABLET BY MOUTH DAILY 90 tablet 0     ALLEGRA-D ALLERGY & CONGESTION  MG per 12 hr tablet TAKE 1 TABLET BY MOUTH TWICE DAILY 30 tablet 0     HYDROcodone-acetaminophen (NORCO) 7.5-325 MG per tablet TAKE 1 TABLET BY MOUTH EVERY 4 TO 6 HOURS AS NEEDED FOR PAIN 60 tablet 0     traZODone (DESYREL) 50 MG tablet TAKE 2-3 TABLETS BY MOUTH DAILY AT BEDTIME 270 tablet 1     PREBIOTIC PRODUCT PO Take 1 tablet by mouth daily       RESTASIS 0.05 % ophthalmic emulsion USE 1 DROP IN BOTH EYES 2 TIMES A DAY 60 each 0     Probiotic Product (SOLUBLE FIBER/PROBIOTICS PO) Take 1 tablet by mouth       Flaxseed, Linseed, (FLAX SEED OIL) 1000 MG capsule Take 2 capsules by mouth daily       fluticasone (FLONASE) 50 MCG/ACT spray 1 spray Reported on 5/11/2017       ketotifen (ZADITOR/REFRESH ANTI-ITCH) 0.025 % SOLN ophthalmic solution 1 drop       ALBUTEROL 108 (90 BASE) MCG/ACT inhaler INHALE 2 PUFFS INTO THE LUNGS EVERY 6 HOURS AS NEEDED FOR SHORTNESS OF BREATH OR WHEEZING 8.5 g 5     SM GAS RELIEF ANTIFLATUENT 180 MG CAPS TAKE ONE CAPSULE BY MOUTH  AS NEEDED AFTER A MEAL. MAX 2 CAPS/DAY 60 capsule 5     pantoprazole (PROTONIX) 40 MG enteric coated tablet Take 1 tablet (40 mg) by mouth daily (Patient taking differently: Take 40 mg by mouth 2 times daily ) 90 tablet 3     DICLOFENAC PO Take 100 mg by mouth daily        Garlic 400 MG TBEC        polyvinyl alcohol-povidone (REFRESH) 1.4-0.6 % ophthalmic solution 1-2 drops as needed       folic acid (FOLVITE) 1 MG tablet Take 1 mg by mouth 3 times daily.       MULTIPLE VITAMIN PO Take  by mouth 2 times daily.       Ascorbic Acid (VITAMIN C) 500 MG CAPS Take 2 tablets by mouth 2 times daily.       Calcium-Vitamin D-Vitamin K (CALCIUM + D) 500-1000-40 MG-UNT-MCG CHEW Take 3 tablets by mouth daily        ALFALFA PO Take 5 tablets by mouth 2 times daily.       MILK THISTLE PO Take 1,000 mg by mouth daily.       fish oil-omega-3 fatty acids (OMEGA-3) 1000 MG capsule Take 1 g by mouth daily.       Cholecalciferol (VITAMIN D) 1000 UNITS capsule Take 1 capsule by mouth daily.       ondansetron (ZOFRAN ODT) 4 MG ODT tab Take 1-2 tablets (4-8 mg) by mouth every 8 hours as needed for nausea (Patient not taking: Reported on 9/29/2017) 20 tablet 1     [DISCONTINUED] gabapentin (NEURONTIN) 300 MG capsule TAKE 3 CAPSULES BY MOUTH IN THE MORNING, 2 CAPSULES AT NOON AND 2 CAPSULES IN THE EVENING 210 capsule 0     [DISCONTINUED] citalopram (CELEXA) 40 MG tablet Take 40 mg by mouth          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     Parotiditis 5/9/2011     Peripheral  Neuriopathy 3/1/2011     Rheumatoid arthritis(714.0) 12/13/1999     Seborrhea capitis 10/6/2011     Sjogrens Syndrome 3/1/2011     Urethral stricture 4/30/2008     Past Surgical History:   Procedure Laterality Date     BACK SURGERY  05/1995    back surgery     BIOPSY      breast bx X2     BIOPSY BREAST      LT     BIOPSY BREAST NEEDLE LOCALIZATION Right 6/12/2017    Procedure: BIOPSY BREAST NEEDLE LOCALIZATION;  WIRE LOCALIZED EXCISIONAL BIOPSY  RIGHT BREAST MASS;  Surgeon: Jeni Amin MD;  Location: [facility]     CHOLECYSTECTOMY  2004     COLONOSCOPY  11/15/2004    screening     COLONOSCOPY  9/24/2014     EGD with biopsy  2010, 2011    GERD     laminectomy      L4 L5 laminectomy; back pain     Family History   Problem Relation Age of Onset     DIABETES Mother      Rheumatoid Arthritis Mother      Other - See Comments Mother      fibromyalgia     Osteoarthritis Mother      Thyroid Disease Mother      thyroid disorder     Unknown/Adopted Father      cause of death unknown     Rheumatoid Arthritis Father      DIABETES Sister      Myocardial Infarction Sister      cause of death     Lupus Sister      cause of death     C.A.D. Maternal Grandmother      CEREBROVASCULAR DISEASE Maternal Grandmother      DIABETES Maternal Grandmother      Thyroid Disease Maternal Grandmother      thyrodi disorder; goitor removed     CANCER Maternal Grandfather      Hypertension Brother      Other - See Comments Brother      sleep apnea     Other - See Comments Sister      sleep apnea     Social History     Social History     Marital status:      Spouse name: N/A     Number of children: N/A     Years of education: N/A     Occupational History      [employer]     part time     Social History Main Topics     Smoking status: Former Smoker     Types: Cigarettes     Quit date: 3/28/1997     Smokeless tobacco: Never Used      Comment: no passive exposure     Alcohol use No      Comment: former. quit 1984     Drug use: No     Sexual  "activity: Not on file     Other Topics Concern     Parent/Sibling W/ Cabg, Mi Or Angioplasty Before 65f 55m? Yes     Blood Transfusions Yes     Caffeine Concern Yes     chocolate rare     Seat Belt Yes     Social History Narrative         Review Of Systems  Constitutional, HEENT, cardiovascular, pulmonary, gi and gu systems are negative, except as otherwise noted.      OBJECTIVE:/70 (BP Location: Left arm, Patient Position: Sitting, Cuff Size: Adult Regular)  Pulse 86  Temp 97.8  F (36.6  C) (Tympanic)  Ht 5' 6\" (1.676 m)  Wt 205 lb (93 kg)  SpO2 93%  BMI 33.09 kg/m2      Exam:  Physical Exam   Constitutional: She is oriented to person, place, and time. She appears well-developed and well-nourished. No distress.   Abdominal: Soft. Bowel sounds are normal.   Neurological: She is alert and oriented to person, place, and time.   Psychiatric: She has a normal mood and affect.     Other exam not repeated    Labs:  Orders Only on 09/29/2017   Component Date Value Ref Range Status     WBC 09/29/2017 4.9  4.0 - 11.0 10e9/L Final     RBC Count 09/29/2017 4.42  3.8 - 5.2 10e12/L Final     Hemoglobin 09/29/2017 12.2  11.7 - 15.7 g/dL Final     Hematocrit 09/29/2017 37.9  35.0 - 47.0 % Final     MCV 09/29/2017 86  78 - 100 fl Final     MCH 09/29/2017 27.6  26.5 - 33.0 pg Final     MCHC 09/29/2017 32.2  31.5 - 36.5 g/dL Final     RDW 09/29/2017 13.6  10.0 - 15.0 % Final     Platelet Count 09/29/2017 200  150 - 450 10e9/L Final     Diff Method 09/29/2017 Automated Method   Final     % Neutrophils 09/29/2017 46.1  % Final     % Lymphocytes 09/29/2017 38.5  % Final     % Monocytes 09/29/2017 7.4  % Final     % Eosinophils 09/29/2017 7.2  % Final     % Basophils 09/29/2017 0.8  % Final     % Immature Granulocytes 09/29/2017 0.0  % Final     Nucleated RBCs 09/29/2017 0  0 /100 Final     Absolute Neutrophil 09/29/2017 2.3  1.6 - 8.3 10e9/L Final     Absolute Lymphocytes 09/29/2017 1.9  0.8 - 5.3 10e9/L Final     Absolute " Monocytes 09/29/2017 0.4  0.0 - 1.3 10e9/L Final     Absolute Eosinophils 09/29/2017 0.4  0.0 - 0.7 10e9/L Final     Absolute Basophils 09/29/2017 0.0  0.0 - 0.2 10e9/L Final     Abs Immature Granulocytes 09/29/2017 0.0  0 - 0.4 10e9/L Final     Absolute Nucleated RBC 09/29/2017 0.0   Final     ALT 09/29/2017 28  0 - 50 U/L Final     Creatinine 09/29/2017 1.06* 0.52 - 1.04 mg/dL Final     GFR Estimate 09/29/2017 52* >60 mL/min/1.7m2 Final    Non  GFR Calc     GFR Estimate If Black 09/29/2017 63  >60 mL/min/1.7m2 Final    African American GFR Calc     Albumin 09/29/2017 3.1* 3.4 - 5.0 g/dL Final     Sed Rate 09/29/2017 21  0 - 30 mm/h Final     CRP Inflammation 09/29/2017 3.0  0.0 - 8.0 mg/L Final       ASSESSMENT/PLAN:  Acute pyelonephritis  Resolved.  Return to work given.    Bacteremia due to Escherichia coli  As above.    Osteopenia, unspecified location  Previous heel ultrasound is reviewed.  Discussed risk factors.    Asymptomatic menopausal state  DEXA scheduled  - DX Hip/Pelvis/Spine    Need for prophylactic vaccination and inoculation against influenza  Vaccine given.  - HC FLU VACCINE, INCREASED ANTIGEN, PRESV FREE  - Vaccine Administration, Initial [[id number]]            Jimbo Martinez MD

## 2017-09-29 NOTE — NURSING NOTE
"Chief Complaint   Patient presents with     RECHECK     has been sick and need ok to go back to work     Flu Shot       Initial /70 (BP Location: Left arm, Patient Position: Sitting, Cuff Size: Adult Regular)  Pulse 86  Temp 97.8  F (36.6  C) (Tympanic)  Ht 5' 6\" (1.676 m)  Wt 205 lb (93 kg)  SpO2 93%  BMI 33.09 kg/m2 Estimated body mass index is 33.09 kg/(m^2) as calculated from the following:    Height as of this encounter: 5' 6\" (1.676 m).    Weight as of this encounter: 205 lb (93 kg).  Medication Reconciliation: complete   Devika Gutierres LPN      "

## 2017-09-29 NOTE — MR AVS SNAPSHOT
After Visit Summary   9/29/2017    Melvi Cleveland    MRN: [id number]           Patient Information     Date Of Birth          1950        Visit Information        Provider Department      9/29/2017 10:40 AM Jimbo Martinez MD [facility]        Today's Diagnoses     Need for prophylactic vaccination and inoculation against influenza    -  1    Acute pyelonephritis        Bacteremia due to Escherichia coli        Osteopenia, unspecified location        Asymptomatic menopausal state          Care Instructions    Return to work given.  Radiology will call to schedule DEXA          Follow-ups after your visit        Follow-up notes from your care team     Return if symptoms worsen or fail to improve.      Your next 10 appointments already scheduled     Nov 15, 2017  9:00 AM CST   (Arrive by 8:45 AM)   PHYSICAL with Jimbo Martinez MD   [facility] ([facility] )    [address]  [place] [address]   [phone]              Who to contact     If you have questions or need follow up information about today's clinic visit or your schedule please contact [facility] directly at [phone].  Normal or non-critical lab and imaging results will be communicated to you by MyChart, letter or phone within 4 business days after the clinic has received the results. If you do not hear from us within 7 days, please contact the clinic through MyChart or phone. If you have a critical or abnormal lab result, we will notify you by phone as soon as possible.  Submit refill requests through TwtBks or call your pharmacy and they will forward the refill request to us. Please allow 3 business days for your refill to be completed.          Additional Information About Your Visit        MyChart Information     TwtBks gives you secure access to your electronic health record. If you see a primary care provider, you can also send messages to your care team  "and make appointments. If you have questions, please call your primary care clinic.  If you do not have a primary care provider, please call [phone] and they will assist you.        Care EveryWhere ID     This is your Care EveryWhere ID. This could be used by other organizations to access your [place] medical records  [id number]        Your Vitals Were     Pulse Temperature Height Pulse Oximetry BMI (Body Mass Index)       86 97.8  F (36.6  C) (Tympanic) 5' 6\" (1.676 m) 93% 33.09 kg/m2        Blood Pressure from Last 3 Encounters:   09/29/17 112/70   09/19/17 126/72   09/13/17 116/64    Weight from Last 3 Encounters:   09/29/17 205 lb (93 kg)   09/19/17 205 lb (93 kg)   09/13/17 210 lb (95.3 kg)              We Performed the Following     DX Hip/Pelvis/Spine     HC FLU VACCINE, INCREASED ANTIGEN, PRESV FREE     Vaccine Administration, Initial [91601]          Today's Medication Changes          These changes are accurate as of: 9/29/17 11:59 PM.  If you have any questions, ask your nurse or doctor.               These medicines have changed or have updated prescriptions.        Dose/Directions    pantoprazole 40 MG EC tablet   Commonly known as:  PROTONIX   This may have changed:  when to take this   Used for:  Diagnosis unknown        Dose:  40 mg   Take 1 tablet (40 mg) by mouth daily   Quantity:  90 tablet   Refills:  3                Primary Care Provider Office Phone # Fax #    Jimbo Martinez [phone]       [address] [address]        Equal Access to Services     [facility] AH: Hadii dawna corley hadbyron Sodex, waaxda luqadaha, qaybta kaalmada bill pascal. So [facility] [phone].    ATENCIÓN: Si habla español, tiene a garza disposición servicios gratuitos de asistencia lingüística. Llame al [phone].    We comply with applicable federal civil rights laws and [place] laws. We do not discriminate on the basis of " race, color, national origin, age, disability, sex, sexual orientation, or gender identity.            Thank you!     [facility] you for choosing [facility]  for your [facility]. Our goal is always to provide you with excellent care. Hearing back from our patients is one way we can continue to improve our services. Please take a few minutes to complete the written survey that you may receive in the mail after your visit with us. Thank you!             Your Updated Medication List - Protect others around you: Learn how to safely use, store and throw away your medicines at www.disposemymeds.org.          This list is accurate as of: 9/29/17 11:59 PM.  Always use your most recent med list.                   Brand Name Dispense Instructions for use Diagnosis    ALFALFA PO      Take 5 tablets by mouth 2 times daily.        ALLEGRA-D ALLERGY & CONGESTION  MG per 12 hr tablet   Generic drug:  fexofenadine-pseudoePHEDrine     30 tablet    TAKE 1 TABLET BY MOUTH TWICE DAILY    Nasal congestion       CALCIUM + D 500-1000-40 MG-UNT-MCG Chew   Generic drug:  Calcium-Vitamin D-Vitamin K      Take 3 tablets by mouth daily        citalopram 40 MG tablet    celeXA    90 tablet    TAKE 1 TABLET BY MOUTH DAILY    Panic disorder without agoraphobia       DICLOFENAC PO      Take 100 mg by mouth daily        fish oil-omega-3 fatty acids 1000 MG capsule      Take 1 g by mouth daily.        flax seed oil 1000 MG capsule      Take 2 capsules by mouth daily        fluticasone 50 MCG/ACT spray    FLONASE     1 spray Reported on 5/11/2017        folic acid 1 MG tablet    FOLVITE     Take 1 mg by mouth 3 times daily.        gabapentin 300 MG capsule    NEURONTIN    210 capsule    TAKE 3 CAPSULES BY MOUTH IN THE MORNING, 2 CAPSULES AT NOON AND 2 CAPSULES IN THE EVENING    Peripheral polyneuropathy (H)       Garlic 400 MG Tbec           HYDROcodone-acetaminophen 7.5-325 MG per tablet    NORCO    60 tablet    TAKE 1 TABLET BY MOUTH EVERY  4 TO 6 HOURS AS NEEDED FOR PAIN    Rheumatoid arthritis involving multiple sites with positive rheumatoid factor (H)       ketotifen 0.025 % Soln ophthalmic solution    ZADITOR/REFRESH ANTI-ITCH     1 drop        MILK THISTLE PO      Take 1,000 mg by mouth daily.        MULTIPLE VITAMIN PO      Take  by mouth 2 times daily.        ondansetron 4 MG ODT tab    ZOFRAN ODT    20 tablet    Take 1-2 tablets (4-8 mg) by mouth every 8 hours as needed for nausea    Intractable episodic tension-type headache       pantoprazole 40 MG EC tablet    PROTONIX    90 tablet    Take 1 tablet (40 mg) by mouth daily    Diagnosis unknown       PREBIOTIC PRODUCT PO      Take 1 tablet by mouth daily        PROAIR  (90 BASE) MCG/ACT Inhaler   Generic drug:  albuterol     8.5 g    INHALE 2 PUFFS INTO THE LUNGS EVERY 6 HOURS AS NEEDED FOR SHORTNESS OF BREATH OR WHEEZING    Influenza-like symptoms       REFRESH 1.4-0.6 % ophthalmic solution   Generic drug:  polyvinyl alcohol-povidone      1-2 drops as needed        RESTASIS 0.05 % ophthalmic emulsion   Generic drug:  cycloSPORINE     60 each    USE 1 DROP IN BOTH EYES 2 TIMES A DAY    Bilateral dry eyes       SM GAS RELIEF ANTIFLATUENT 180 MG Caps   Generic drug:  Simethicone     60 capsule    TAKE ONE CAPSULE BY MOUTH AS NEEDED AFTER A MEAL. MAX 2 CAPS/DAY    Gas       SOLUBLE FIBER/PROBIOTICS PO      Take 1 tablet by mouth        traZODone 50 MG tablet    DESYREL    270 tablet    TAKE 2-3 TABLETS BY MOUTH DAILY AT BEDTIME    Primary insomnia       Vitamin C 500 MG Caps      Take 2 tablets by mouth 2 times daily.        vitamin D 1000 UNITS capsule      Take 1 capsule by mouth daily.

## 2017-09-29 NOTE — LETTER
[facility] [place]  [address]  [facility] [address]  Phone: [phone]    September 29, 2017        Melvi Cleveland  [address] [address]          To whom it may concern:    RE: Melvi Cleveland    Patient was seen and treated today at our clinic.  Sherrie was previously hospitalized with pyelonephritis.  She may return to work 2 October 2017 without restrictions.    Please contact me for questions or concerns.      Sincerely,        Jimbo Martinez MD

## 2017-09-30 ASSESSMENT — ANXIETY QUESTIONNAIRES: GAD7 TOTAL SCORE: 0

## 2017-10-11 ENCOUNTER — MYC MEDICAL ADVICE (OUTPATIENT)
Dept: FAMILY MEDICINE | Facility: OTHER | Age: 67
End: 2017-10-11

## 2017-10-11 DIAGNOSIS — H04.123 BILATERAL DRY EYES: ICD-10-CM

## 2017-10-11 NOTE — TELEPHONE ENCOUNTER
Pt called back and was given message below. I called over to radiology and they said her last bone scan was 12/28/16 and she can't get one until Dec. Pt was confused as she thought provider ordered a different kind of bone scan? Please call her back at [phone]

## 2017-10-11 NOTE — TELEPHONE ENCOUNTER
All I see was a dexa scan ordered for hips, pelvis and spine. Is this a different one from the norm? Please advise

## 2017-10-11 NOTE — TELEPHONE ENCOUNTER
Patient called back and I explained that she had a scan in Dec of 2016 and she does not recall this one. After I explained the same scan had been ordered and she would be called in Dec for this she understood.  Devika Gutierres

## 2017-10-13 RX ORDER — CYCLOSPORINE 0.5 MG/ML
EMULSION OPHTHALMIC
Qty: 60 EACH | Refills: 0 | Status: SHIPPED | OUTPATIENT
Start: 2017-10-13 | End: 2017-11-15

## 2017-10-15 ENCOUNTER — MYC MEDICAL ADVICE (OUTPATIENT)
Dept: FAMILY MEDICINE | Facility: OTHER | Age: 67
End: 2017-10-15

## 2017-10-18 ENCOUNTER — MEDICAL CORRESPONDENCE (OUTPATIENT)
Dept: HEALTH INFORMATION MANAGEMENT | Facility: HOSPITAL | Age: 67
End: 2017-10-18

## 2017-10-18 DIAGNOSIS — Z79.899 HIGH RISK MEDICATIONS (NOT ANTICOAGULANTS) LONG-TERM USE: Primary | ICD-10-CM

## 2017-10-18 DIAGNOSIS — Z79.899 DRUG THERAPY: ICD-10-CM

## 2017-10-18 LAB
ALT SERPL W P-5'-P-CCNC: 33 U/L (ref 0–50)
CREAT SERPL-MCNC: 1.04 MG/DL (ref 0.52–1.04)
GFR SERPL CREATININE-BSD FRML MDRD: 53 ML/MIN/1.7M2

## 2017-10-18 PROCEDURE — 82040 ASSAY OF SERUM ALBUMIN: CPT | Mod: ZL | Performed by: NURSE PRACTITIONER

## 2017-10-18 PROCEDURE — 36415 COLL VENOUS BLD VENIPUNCTURE: CPT | Mod: ZL | Performed by: NURSE PRACTITIONER

## 2017-10-18 PROCEDURE — 82565 ASSAY OF CREATININE: CPT | Mod: ZL | Performed by: NURSE PRACTITIONER

## 2017-10-18 PROCEDURE — 84460 ALANINE AMINO (ALT) (SGPT): CPT | Mod: ZL | Performed by: NURSE PRACTITIONER

## 2017-10-20 LAB — ALBUMIN SERPL-MCNC: 3.4 G/DL (ref 3.4–5)

## 2017-10-30 DIAGNOSIS — G62.9 PERIPHERAL POLYNEUROPATHY: ICD-10-CM

## 2017-10-30 NOTE — TELEPHONE ENCOUNTER
Gabapentin      Last Written Prescription Date: 09/28/17  Last Quantity: 210, # refills: 0  Last Office Visit with G, P or [facility] prescribing provider: 09/29/17  Next 5 appointments (look out 90 days)     Nov 15, 2017  9:00 AM CST   (Arrive by 8:45 AM)   PHYSICAL with Jimbo Martinez MD   [facility] ([facility] )    [address]  [place] [address]   [phone]                   Creatinine   Date Value Ref Range Status   10/18/2017 1.04 0.52 - 1.04 mg/dL Final     Lab Results   Component Value Date    AST 15 09/03/2017     Lab Results   Component Value Date    ALT 33 10/18/2017     BP Readings from Last 3 Encounters:   09/29/17 112/70   09/19/17 126/72   09/13/17 116/64

## 2017-10-31 RX ORDER — GABAPENTIN 300 MG/1
CAPSULE ORAL
Qty: 210 CAPSULE | Refills: 0 | Status: SHIPPED | OUTPATIENT
Start: 2017-10-31 | End: 2017-11-15

## 2017-11-01 ENCOUNTER — TRANSFERRED RECORDS (OUTPATIENT)
Dept: HEALTH INFORMATION MANAGEMENT | Facility: HOSPITAL | Age: 67
End: 2017-11-01

## 2017-11-15 ENCOUNTER — OFFICE VISIT (OUTPATIENT)
Dept: FAMILY MEDICINE | Facility: OTHER | Age: 67
End: 2017-11-15
Attending: FAMILY MEDICINE
Payer: COMMERCIAL

## 2017-11-15 VITALS
WEIGHT: 203.5 LBS | TEMPERATURE: 97.1 F | RESPIRATION RATE: 18 BRPM | BODY MASS INDEX: 32.71 KG/M2 | DIASTOLIC BLOOD PRESSURE: 64 MMHG | HEIGHT: 66 IN | SYSTOLIC BLOOD PRESSURE: 118 MMHG | OXYGEN SATURATION: 94 % | HEART RATE: 82 BPM

## 2017-11-15 DIAGNOSIS — G62.9 PERIPHERAL POLYNEUROPATHY: ICD-10-CM

## 2017-11-15 DIAGNOSIS — Z00.00 ROUTINE GENERAL MEDICAL EXAMINATION AT A HEALTH CARE FACILITY: Primary | ICD-10-CM

## 2017-11-15 DIAGNOSIS — H04.123 BILATERAL DRY EYES: ICD-10-CM

## 2017-11-15 DIAGNOSIS — E78.2 MIXED HYPERLIPIDEMIA: ICD-10-CM

## 2017-11-15 DIAGNOSIS — M05.79 RHEUMATOID ARTHRITIS INVOLVING MULTIPLE SITES WITH POSITIVE RHEUMATOID FACTOR (H): ICD-10-CM

## 2017-11-15 LAB
ALBUMIN SERPL-MCNC: 3.2 G/DL (ref 3.4–5)
ALBUMIN UR-MCNC: NEGATIVE MG/DL
ALP SERPL-CCNC: 66 U/L (ref 40–150)
ALT SERPL W P-5'-P-CCNC: 35 U/L (ref 0–50)
ANION GAP SERPL CALCULATED.3IONS-SCNC: 6 MMOL/L (ref 3–14)
APPEARANCE UR: CLEAR
AST SERPL W P-5'-P-CCNC: 27 U/L (ref 0–45)
BACTERIA #/AREA URNS HPF: ABNORMAL /HPF
BASOPHILS # BLD AUTO: 0 10E9/L (ref 0–0.2)
BASOPHILS NFR BLD AUTO: 0.9 %
BILIRUB SERPL-MCNC: 0.4 MG/DL (ref 0.2–1.3)
BILIRUB UR QL STRIP: NEGATIVE
BUN SERPL-MCNC: 14 MG/DL (ref 7–30)
CALCIUM SERPL-MCNC: 9.1 MG/DL (ref 8.5–10.1)
CHLORIDE SERPL-SCNC: 104 MMOL/L (ref 94–109)
CHOLEST SERPL-MCNC: 203 MG/DL
CO2 SERPL-SCNC: 29 MMOL/L (ref 20–32)
COLOR UR AUTO: ABNORMAL
CREAT SERPL-MCNC: 1.11 MG/DL (ref 0.52–1.04)
DIFFERENTIAL METHOD BLD: NORMAL
EOSINOPHIL # BLD AUTO: 0.2 10E9/L (ref 0–0.7)
EOSINOPHIL NFR BLD AUTO: 3.5 %
ERYTHROCYTE [DISTWIDTH] IN BLOOD BY AUTOMATED COUNT: 14.6 % (ref 10–15)
GFR SERPL CREATININE-BSD FRML MDRD: 49 ML/MIN/1.7M2
GLUCOSE SERPL-MCNC: 91 MG/DL (ref 70–99)
GLUCOSE UR STRIP-MCNC: NEGATIVE MG/DL
HCT VFR BLD AUTO: 38 % (ref 35–47)
HDLC SERPL-MCNC: 71 MG/DL
HGB BLD-MCNC: 12.5 G/DL (ref 11.7–15.7)
HGB UR QL STRIP: ABNORMAL
IMM GRANULOCYTES # BLD: 0 10E9/L (ref 0–0.4)
IMM GRANULOCYTES NFR BLD: 0.2 %
KETONES UR STRIP-MCNC: NEGATIVE MG/DL
LDLC SERPL CALC-MCNC: 107 MG/DL
LEUKOCYTE ESTERASE UR QL STRIP: NEGATIVE
LYMPHOCYTES # BLD AUTO: 1.7 10E9/L (ref 0.8–5.3)
LYMPHOCYTES NFR BLD AUTO: 35.9 %
MCH RBC QN AUTO: 28 PG (ref 26.5–33)
MCHC RBC AUTO-ENTMCNC: 32.9 G/DL (ref 31.5–36.5)
MCV RBC AUTO: 85 FL (ref 78–100)
MONOCYTES # BLD AUTO: 0.4 10E9/L (ref 0–1.3)
MONOCYTES NFR BLD AUTO: 8.9 %
MUCOUS THREADS #/AREA URNS LPF: PRESENT /LPF
NEUTROPHILS # BLD AUTO: 2.4 10E9/L (ref 1.6–8.3)
NEUTROPHILS NFR BLD AUTO: 50.6 %
NITRATE UR QL: NEGATIVE
NONHDLC SERPL-MCNC: 132 MG/DL
NRBC # BLD AUTO: 0 10*3/UL
NRBC BLD AUTO-RTO: 0 /100
PH UR STRIP: 8 PH (ref 4.7–8)
PLATELET # BLD AUTO: 257 10E9/L (ref 150–450)
POTASSIUM SERPL-SCNC: 4.3 MMOL/L (ref 3.4–5.3)
PROT SERPL-MCNC: 7.3 G/DL (ref 6.8–8.8)
RBC # BLD AUTO: 4.46 10E12/L (ref 3.8–5.2)
RBC #/AREA URNS AUTO: 81 /HPF (ref 0–2)
SODIUM SERPL-SCNC: 139 MMOL/L (ref 133–144)
SOURCE: ABNORMAL
SP GR UR STRIP: 1.01 (ref 1–1.03)
TRIGL SERPL-MCNC: 125 MG/DL
TSH SERPL DL<=0.005 MIU/L-ACNC: 2.23 MU/L (ref 0.4–4)
UROBILINOGEN UR STRIP-MCNC: NORMAL MG/DL (ref 0–2)
WBC # BLD AUTO: 4.6 10E9/L (ref 4–11)
WBC #/AREA URNS AUTO: <1 /HPF (ref 0–2)

## 2017-11-15 PROCEDURE — 99397 PER PM REEVAL EST PAT 65+ YR: CPT | Performed by: FAMILY MEDICINE

## 2017-11-15 PROCEDURE — 80061 LIPID PANEL: CPT | Mod: ZL | Performed by: FAMILY MEDICINE

## 2017-11-15 PROCEDURE — 36415 COLL VENOUS BLD VENIPUNCTURE: CPT | Mod: ZL | Performed by: FAMILY MEDICINE

## 2017-11-15 PROCEDURE — 85025 COMPLETE CBC W/AUTO DIFF WBC: CPT | Mod: ZL | Performed by: FAMILY MEDICINE

## 2017-11-15 PROCEDURE — 84443 ASSAY THYROID STIM HORMONE: CPT | Mod: ZL | Performed by: FAMILY MEDICINE

## 2017-11-15 PROCEDURE — 81001 URINALYSIS AUTO W/SCOPE: CPT | Mod: ZL | Performed by: FAMILY MEDICINE

## 2017-11-15 PROCEDURE — 80053 COMPREHEN METABOLIC PANEL: CPT | Mod: ZL | Performed by: FAMILY MEDICINE

## 2017-11-15 RX ORDER — HYDROCODONE BITARTRATE AND ACETAMINOPHEN 7.5; 325 MG/1; MG/1
TABLET ORAL
Qty: 60 TABLET | Refills: 0 | Status: SHIPPED | OUTPATIENT
Start: 2017-11-15 | End: 2018-03-26

## 2017-11-15 ASSESSMENT — ANXIETY QUESTIONNAIRES
5. BEING SO RESTLESS THAT IT IS HARD TO SIT STILL: NOT AT ALL
6. BECOMING EASILY ANNOYED OR IRRITABLE: SEVERAL DAYS
IF YOU CHECKED OFF ANY PROBLEMS ON THIS QUESTIONNAIRE, HOW DIFFICULT HAVE THESE PROBLEMS MADE IT FOR YOU TO DO YOUR WORK, TAKE CARE OF THINGS AT HOME, OR GET ALONG WITH OTHER PEOPLE: SOMEWHAT DIFFICULT
1. FEELING NERVOUS, ANXIOUS, OR ON EDGE: NOT AT ALL
7. FEELING AFRAID AS IF SOMETHING AWFUL MIGHT HAPPEN: MORE THAN HALF THE DAYS
4. TROUBLE RELAXING: SEVERAL DAYS
GAD7 TOTAL SCORE: 8
2. NOT BEING ABLE TO STOP OR CONTROL WORRYING: MORE THAN HALF THE DAYS
3. WORRYING TOO MUCH ABOUT DIFFERENT THINGS: MORE THAN HALF THE DAYS

## 2017-11-15 ASSESSMENT — PAIN SCALES - GENERAL: PAINLEVEL: NO PAIN (0)

## 2017-11-15 ASSESSMENT — PATIENT HEALTH QUESTIONNAIRE - PHQ9: SUM OF ALL RESPONSES TO PHQ QUESTIONS 1-9: 8

## 2017-11-15 NOTE — MR AVS SNAPSHOT
After Visit Summary   11/15/2017    Melvi Cleveland    MRN: [id number]           Patient Information     Date Of Birth          1950        Visit Information        Provider Department      11/15/2017 9:00 AM Jimbo Martinez MD [facility]        Today's Diagnoses     Comprehensive Medical Examination    -  1    Dyslipidemia        Rheumatoid arthritis involving multiple sites with positive rheumatoid factor (H)        Peripheral polyneuropathy          Care Instructions      Preventive Health Recommendations  Female Ages 65 +    Yearly exam:     See your health care provider every year in order to  o Review health changes.   o Discuss preventive care.    o Review your medicines if your doctor has prescribed any.      You no longer need a yearly Pap test unless you've had an abnormal Pap test in the past 10 years. If you have vaginal symptoms, such as bleeding or discharge, be sure to talk with your provider about a Pap test.      Every 1 to 2 years, have a mammogram.  If you are over 69, talk with your health care provider about whether or not you want to continue having screening mammograms.      Every 10 years, have a colonoscopy. Or, have a yearly FIT test (stool test). These exams will check for colon cancer.       Have a cholesterol test every 5 years, or more often if your doctor advises it.       Have a diabetes test (fasting glucose) every three years. If you are at risk for diabetes, you should have this test more often.       At age 65, have a bone density scan (DEXA) to check for osteoporosis (brittle bone disease).    Shots:    Get a flu shot each year.    Get a tetanus shot every 10 years.    Talk to your doctor about your pneumonia vaccines. There are now two you should receive - Pneumovax (PPSV 23) and Prevnar (PCV 13).    Talk to your doctor about the shingles vaccine.    Talk to your doctor about the hepatitis B vaccine.    Nutrition:     Eat at least 5 servings of  fruits and vegetables each day.      Eat whole-grain bread, whole-wheat pasta and brown rice instead of white grains and rice.      Talk to your provider about Calcium and Vitamin D.     Lifestyle    Exercise at least 150 minutes a week (30 minutes a day, 5 days a week). This will help you control your weight and prevent disease.      Limit alcohol to one drink per day.      No smoking.       Wear sunscreen to prevent skin cancer.       See your dentist twice a year for an exam and cleaning.      See your eye doctor every 1 to 2 years to screen for conditions such as glaucoma, macular degeneration, cataracts, etc           Follow-ups after your visit        Follow-up notes from your care team     Return in about 6 months (around 5/15/2018) for Follow Up Visit.      Who to contact     If you have questions or need follow up information about today's clinic visit or your schedule please contact [facility] directly at [phone].  Normal or non-critical lab and imaging results will be communicated to you by [facility] [employer], letter or phone within 4 business days after the clinic has received the results. If you do not hear from us within 7 days, please contact the clinic through [facility] [employer] or phone. If you have a critical or abnormal lab result, we will notify you by phone as soon as possible.  Submit refill requests through BitWine or call your pharmacy and they will forward the refill request to us. Please allow 3 business days for your refill to be completed.          Additional Information About Your Visit        [employer] Information     BitWine gives you secure access to your electronic health record. If you see a primary care provider, you can also send messages to your care team and make appointments. If you have questions, please call your primary care clinic.  If you do not have a primary care provider, please call [phone] and they will assist you.        Care EveryWhere ID     This is your Care  "EveryWhere ID. This could be used by other organizations to access your [place] medical records  [id number]        Your Vitals Were     Pulse Temperature Respirations Height Pulse Oximetry BMI (Body Mass Index)    82 97.1  F (36.2  C) (Tympanic) 18 5' 6\" (1.676 m) 94% 32.85 kg/m2       Blood Pressure from Last 3 Encounters:   11/20/17 128/64   11/15/17 118/64   09/29/17 112/70    Weight from Last 3 Encounters:   11/20/17 204 lb (92.5 kg)   11/15/17 203 lb 8 oz (92.3 kg)   09/29/17 205 lb (93 kg)              We Performed the Following     CBC with platelets differential     Comprehensive metabolic panel     Lipid Profile     TSH with free T4 reflex     UA reflex to Microscopic and Culture          Today's Medication Changes          These changes are accurate as of: 11/15/17 11:59 PM.  If you have any questions, ask your nurse or doctor.               These medicines have changed or have updated prescriptions.        Dose/Directions    pantoprazole 40 MG EC tablet   Commonly known as:  PROTONIX   This may have changed:  when to take this   Used for:  Diagnosis unknown        Dose:  40 mg   Take 1 tablet (40 mg) by mouth daily   Quantity:  90 tablet   Refills:  3            Where to get your medicines      [facility] [place] - [address]  [address] [address]     Phone:  [phone]     gabapentin 300 MG capsule    RESTASIS 0.05 % ophthalmic emulsion         Some of these will need a paper prescription and others can be bought over the counter.  Ask your nurse if you have questions.     Bring a paper prescription for each of these medications     HYDROcodone-acetaminophen 7.5-325 MG per tablet                Primary Care Provider Office Phone # Fax #    Jimbo Martinez [phone]       [facility] [address] [address]        Equal Access to Services     RILEY OLIVEROS AH: carmela Walters " powercyn bill august. So [facility] [phone].    ATENCIÓN: Si enrique fan, tiene a garza disposición servicios gratuitos de asistencia lingüística. Freddy al [phone].    We comply with applicable federal civil rights laws and [place] laws. We do not discriminate on the basis of race, color, national origin, age, disability, sex, sexual orientation, or gender identity.            Thank you!     Thank you for choosing [facility]  for your care. Our goal is always to provide you with excellent care. Hearing back from our patients is one way we can continue to improve our services. Please take a few minutes to complete the written survey that you may receive in the mail after your visit with us. Thank you!             Your Updated Medication List - Protect others around you: Learn how to safely use, store and throw away your medicines at www.disposemymeds.org.          This list is accurate as of: 11/15/17 11:59 PM.  Always use your most recent med list.                   Brand Name Dispense Instructions for use Diagnosis    ALFALFA PO      Take 5 tablets by mouth 2 times daily.        ALLEGRA-D ALLERGY & CONGESTION  MG per 12 hr tablet   Generic drug:  fexofenadine-pseudoePHEDrine     30 tablet    TAKE 1 TABLET BY MOUTH TWICE DAILY    Nasal congestion       CALCIUM + D 500-1000-40 MG-UNT-MCG Chew   Generic drug:  Calcium-Vitamin D-Vitamin K      Take 3 tablets by mouth daily        citalopram 40 MG tablet    celeXA    90 tablet    TAKE 1 TABLET BY MOUTH DAILY    Panic disorder without agoraphobia       DICLOFENAC PO      Take 100 mg by mouth daily        fish oil-omega-3 fatty acids 1000 MG capsule      Take 1 g by mouth daily.        flax seed oil 1000 MG capsule      Take 2 capsules by mouth daily        fluticasone 50 MCG/ACT spray    FLONASE     1 spray Reported on 5/11/2017        folic acid 1 MG tablet    FOLVITE     Take 1 mg by  mouth 3 times daily.        gabapentin 300 MG capsule    NEURONTIN    210 capsule    TAKE 3 CAPSULES BY MOUTH IN THE MORNING, 2 CAPSULES AT NOON AND 2 CAPSULES IN THE EVENING    Peripheral polyneuropathy       Garlic 400 MG Tbec           HYDROcodone-acetaminophen 7.5-325 MG per tablet    NORCO    60 tablet    TAKE 1 TABLET BY MOUTH EVERY 4 TO 6 HOURS AS NEEDED FOR PAIN    Rheumatoid arthritis involving multiple sites with positive rheumatoid factor (H)       ketotifen 0.025 % Soln ophthalmic solution    ZADITOR/REFRESH ANTI-ITCH     1 drop        MILK THISTLE PO      Take 1,000 mg by mouth daily.        MULTIPLE VITAMIN PO      Take  by mouth 2 times daily.        pantoprazole 40 MG EC tablet    PROTONIX    90 tablet    Take 1 tablet (40 mg) by mouth daily    Diagnosis unknown       PREBIOTIC PRODUCT PO      Take 1 tablet by mouth daily        PROAIR  (90 BASE) MCG/ACT Inhaler   Generic drug:  albuterol     8.5 g    INHALE 2 PUFFS INTO THE LUNGS EVERY 6 HOURS AS NEEDED FOR SHORTNESS OF BREATH OR WHEEZING    Influenza-like symptoms       REFRESH 1.4-0.6 % ophthalmic solution   Generic drug:  polyvinyl alcohol-povidone      1-2 drops as needed        RESTASIS 0.05 % ophthalmic emulsion   Generic drug:  cycloSPORINE     60 each    USE 1 DROP IN BOTH EYES 2 TIMES A DAY    Bilateral dry eyes       SM GAS RELIEF ANTIFLATUENT 180 MG Caps   Generic drug:  Simethicone     60 capsule    TAKE ONE CAPSULE BY MOUTH AS NEEDED AFTER A MEAL. MAX 2 CAPS/DAY    Gas       SOLUBLE FIBER/PROBIOTICS PO      Take 1 tablet by mouth        traZODone 50 MG tablet    DESYREL    270 tablet    TAKE 2-3 TABLETS BY MOUTH DAILY AT BEDTIME    Primary insomnia       Vitamin C 500 MG Caps      Take 2 tablets by mouth 2 times daily.        vitamin D 1000 UNITS capsule      Take 1 capsule by mouth daily.

## 2017-11-15 NOTE — NURSING NOTE
"Chief Complaint   Patient presents with     Physical       Initial /64 (BP Location: Left arm, Patient Position: Sitting, Cuff Size: Adult Regular)  Pulse 82  Temp 97.1  F (36.2  C) (Tympanic)  Resp 18  Ht 5' 6\" (1.676 m)  Wt 203 lb 8 oz (92.3 kg)  SpO2 94%  BMI 32.85 kg/m2 Estimated body mass index is 32.85 kg/(m^2) as calculated from the following:    Height as of this encounter: 5' 6\" (1.676 m).    Weight as of this encounter: 203 lb 8 oz (92.3 kg).  Medication Reconciliation: complete   Devika Gutierres LPN      "

## 2017-11-15 NOTE — PROGRESS NOTES
SUBJECTIVE:   Melvi Cleveland is a 67 year old female who presents for Preventive Visit.    Are you in the first 12 months of your Medicare Part B coverage?  No    Healthy Habits:    Do you get at least three servings of calcium containing foods daily (dairy, green leafy vegetables, etc.)? no, taking calcium and/or vitamin D supplement: yes - calcium and vitamin d    Amount of exercise or daily activities, outside of work: 5 day(s) per week    Problems taking medications regularly No    Medication side effects: No    Have you had an eye exam in the past two years? yes    Do you see a dentist twice per year? yes    Do you have sleep apnea, excessive snoring or daytime drowsiness?yes, some daytime drowsiness    COGNITIVE SCREEN  1) Repeat 3 items (Banana, Sunrise, Chair)      2) Clock draw:   NORMAL  3) 3 item recall: Recalls 3 objects  Results: 3 items recalled: COGNITIVE IMPAIRMENT LESS LIKELY    Mini-CogTM Copyright S Kajal. Licensed by the author for use in [facility]; reprinted with permission (jc@[email or web address]). All rights reserved.          Reviewed and updated as needed this visit by clinical staffTobacco  Allergies  Meds  Med Hx  Surg Hx  Fam Hx  Soc Hx        Reviewed and updated as needed this visit by Provider        Social History   Substance Use Topics     Smoking status: Former Smoker     Types: Cigarettes     Quit date: 3/28/1997     Smokeless tobacco: Never Used      Comment: no passive exposure     Alcohol use No      Comment: former. quit 1984       The patient does not drink >3 drinks per day nor >7 drinks per week.     Today's PHQ-2 Score:   PHQ-2 ( 1999 [employer]) 11/12/2013 3/28/2013   Q1: Little interest or pleasure in doing things 0 0   Q2: Feeling down, depressed or hopeless 0 0   PHQ-2 Score 0 0         Do you feel safe in your environment - Yes    Do you have a Health Care Directive?: Yes: Advance Directive has been received and scanned.    Current providers sharing in care  for this patient include: Patient Care Team:  Jimbo Martinez MD as PCP - General      Hearing impairment: No    Ability to successfully perform activities of daily living: Yes, no assistance needed     Fall risk:  Fallen 2 or more times in the past year?: No  Any fall with injury in the past year?: No      Home safety:  none identified  click delete button to remove this line now    The following health maintenance items are reviewed in Epic and correct as of today:Health Maintenance   Topic Date Due     HEPATITIS C SCREENING  03/02/1970     PAP Q3 YR  11/08/2014     PNEUMOCOCCAL (1 of 2 - PCV13) 03/02/2017     HEATH QUESTIONNAIRE 1 MONTH  10/29/2017     PHQ-9 Q1 MONTH  10/29/2017     FALL RISK ASSESSMENT  09/29/2018     MAMMO SCREEN Q2 YR (SYSTEM ASSIGNED)  04/19/2019     TETANUS IMMUNIZATION (SYSTEM ASSIGNED)  11/04/2019     ADVANCE DIRECTIVE PLANNING Q5 YRS  09/28/2021     LIPID SCREEN Q5 YR FEMALE (SYSTEM ASSIGNED)  11/22/2021     COLON CANCER SCREEN (SYSTEM ASSIGNED)  09/24/2024     INFLUENZA VACCINE (SYSTEM ASSIGNED)  Completed     DEXA SCAN SCREENING (SYSTEM ASSIGNED)  Completed     Patient Active Problem List   Diagnosis     Neck pain, chronic     Low back pain, chronic     Crohn's disease of large intestine (H)     Dyslipidemia     Sicca syndrome (H)     Peripheral polyneuropathy     RA (rheumatoid arthritis) (H)     Comprehensive Medical Examination     Seasonal allergic rhinitis     Fibrocystic breast disease (FCBD)     ACP (advance care planning)     Urinary tract infection     UTI (urinary tract infection)     Acute pyelonephritis     Bacteremia due to Escherichia coli     Past Surgical History:   Procedure Laterality Date     BACK SURGERY  05/1995    back surgery     BIOPSY      breast bx X2     BIOPSY BREAST      LT     BIOPSY BREAST NEEDLE LOCALIZATION Right 6/12/2017    Procedure: BIOPSY BREAST NEEDLE LOCALIZATION;  WIRE LOCALIZED EXCISIONAL BIOPSY  RIGHT BREAST MASS;  Surgeon: Jeni Amin,  MD;  Location: [facility]     CHOLECYSTECTOMY  2004     COLONOSCOPY  11/15/2004    screening     COLONOSCOPY  9/24/2014     EGD with biopsy  2010, 2011    GERD     laminectomy      L4 L5 laminectomy; back pain       Social History   Substance Use Topics     Smoking status: Former Smoker     Types: Cigarettes     Quit date: 3/28/1997     Smokeless tobacco: Never Used      Comment: no passive exposure     Alcohol use No      Comment: former. quit 1984     Family History   Problem Relation Age of Onset     DIABETES Mother      Rheumatoid Arthritis Mother      Other - See Comments Mother      fibromyalgia     Osteoarthritis Mother      Thyroid Disease Mother      thyroid disorder     Unknown/Adopted Father      cause of death unknown     Rheumatoid Arthritis Father      DIABETES Sister      Myocardial Infarction Sister      cause of death     Lupus Sister      cause of death     C.A.D. Maternal Grandmother      CEREBROVASCULAR DISEASE Maternal Grandmother      DIABETES Maternal Grandmother      Thyroid Disease Maternal Grandmother      thyrodi disorder; goitor removed     CANCER Maternal Grandfather      Hypertension Brother      Other - See Comments Brother      sleep apnea     Other - See Comments Sister      sleep apnea         Current Outpatient Prescriptions   Medication Sig Dispense Refill     gabapentin (NEURONTIN) 300 MG capsule TAKE 3 CAPSULES BY MOUTH IN THE MORNING, 2 CAPSULES AT NOON AND 2 CAPSULES IN THE EVENING 210 capsule 0     RESTASIS 0.05 % ophthalmic emulsion USE 1 DROP IN BOTH EYES 2 TIMES A DAY 60 each 0     citalopram (CELEXA) 40 MG tablet TAKE 1 TABLET BY MOUTH DAILY 90 tablet 0     ALLEGRA-D ALLERGY & CONGESTION  MG per 12 hr tablet TAKE 1 TABLET BY MOUTH TWICE DAILY 30 tablet 0     HYDROcodone-acetaminophen (NORCO) 7.5-325 MG per tablet TAKE 1 TABLET BY MOUTH EVERY 4 TO 6 HOURS AS NEEDED FOR PAIN 60 tablet 0     traZODone (DESYREL) 50 MG tablet TAKE 2-3 TABLETS BY MOUTH DAILY AT BEDTIME 270  tablet 1     PREBIOTIC PRODUCT PO Take 1 tablet by mouth daily       Probiotic Product (SOLUBLE FIBER/PROBIOTICS PO) Take 1 tablet by mouth       Flaxseed, Linseed, (FLAX SEED OIL) 1000 MG capsule Take 2 capsules by mouth daily       fluticasone (FLONASE) 50 MCG/ACT spray 1 spray Reported on 5/11/2017       ketotifen (ZADITOR/REFRESH ANTI-ITCH) 0.025 % SOLN ophthalmic solution 1 drop       ALBUTEROL 108 (90 BASE) MCG/ACT inhaler INHALE 2 PUFFS INTO THE LUNGS EVERY 6 HOURS AS NEEDED FOR SHORTNESS OF BREATH OR WHEEZING 8.5 g 5     SM GAS RELIEF ANTIFLATUENT 180 MG CAPS TAKE ONE CAPSULE BY MOUTH AS NEEDED AFTER A MEAL. MAX 2 CAPS/DAY 60 capsule 5     pantoprazole (PROTONIX) 40 MG enteric coated tablet Take 1 tablet (40 mg) by mouth daily (Patient taking differently: Take 40 mg by mouth 2 times daily ) 90 tablet 3     DICLOFENAC PO Take 100 mg by mouth daily        Garlic 400 MG TBEC        polyvinyl alcohol-povidone (REFRESH) 1.4-0.6 % ophthalmic solution 1-2 drops as needed       folic acid (FOLVITE) 1 MG tablet Take 1 mg by mouth 3 times daily.       MULTIPLE VITAMIN PO Take  by mouth 2 times daily.       Ascorbic Acid (VITAMIN C) 500 MG CAPS Take 2 tablets by mouth 2 times daily.       Calcium-Vitamin D-Vitamin K (CALCIUM + D) 500-1000-40 MG-UNT-MCG CHEW Take 3 tablets by mouth daily        ALFALFA PO Take 5 tablets by mouth 2 times daily.       MILK THISTLE PO Take 1,000 mg by mouth daily.       fish oil-omega-3 fatty acids (OMEGA-3) 1000 MG capsule Take 1 g by mouth daily.       Cholecalciferol (VITAMIN D) 1000 UNITS capsule Take 1 capsule by mouth daily.       Allergies   Allergen Reactions     Adhesive Tape      Glue and nicotine patches     Benzoin Compound      Tin-Co-Javier     Seasonal Allergies          Mammogram Screening: Patient over age 50, mutual decision to screen reflected in health maintenance.      ROS:  Constitutional, HEENT, cardiovascular, pulmonary, GI, , musculoskeletal, neuro, skin, endocrine  "and psych systems are negative, except as otherwise noted.      OBJECTIVE:   /64 (BP Location: Left arm, Patient Position: Sitting, Cuff Size: Adult Regular)  Pulse 82  Temp 97.1  F (36.2  C) (Tympanic)  Resp 18  Ht 5' 6\" (1.676 m)  Wt 203 lb 8 oz (92.3 kg)  SpO2 94%  BMI 32.85 kg/m2 Estimated body mass index is 32.85 kg/(m^2) as calculated from the following:    Height as of this encounter: 5' 6\" (1.676 m).    Weight as of this encounter: 203 lb 8 oz (92.3 kg).  EXAM:   GENERAL APPEARANCE: healthy, alert and no distress  EYES: Eyes grossly normal to inspection, PERRL and conjunctivae and sclerae normal  HENT: ear canals and TM's normal, nose and mouth without ulcers or lesions, oropharynx clear and oral mucous membranes moist  NECK: no adenopathy, no asymmetry, masses, or scars and thyroid normal to palpation  RESP: lungs clear to auscultation - no rales, rhonchi or wheezes  BREAST: normal without masses, tenderness or nipple discharge and no palpable axillary masses or adenopathy  CV: regular rate and rhythm, normal S1 S2, no S3 or S4, no murmur, click or rub, no peripheral edema and peripheral pulses strong  ABDOMEN: soft, nontender, no hepatosplenomegaly, no masses and bowel sounds normal  MS: no musculoskeletal defects are noted and gait is age appropriate without ataxia  SKIN: no suspicious lesions or rashes  NEURO: Normal strength and tone, sensory exam grossly normal, mentation intact and speech normal  PSYCH: mentation appears normal and affect normal/bright    ASSESSMENT / PLAN:   1. Comprehensive Medical Examination  General medical exam completed.  Breast exam performed.  Pap sent. Mammogram reviewed. Screening labs drawn.  Colonoscopy and immunizations reviewed.  Discussed the importance of monthly breast self exam, aspirin use, and osteoporosis prevention . Follow up 1 year.    - CBC with platelets differential  - Lipid Profile  - Comprehensive metabolic panel  - TSH with free T4 " "reflex    2. Dyslipidemia  Fasting labs reviewed.  Goals discussed.  Discussed the importance of diet, exercise, and weight reduction.  Follow up 12 months.      3. Rheumatoid arthritis involving multiple sites with positive rheumatoid factor (H)  Continue pain control  - HYDROcodone-acetaminophen (NORCO) 7.5-325 MG per tablet; TAKE 1 TABLET BY MOUTH EVERY 4 TO 6 HOURS AS NEEDED FOR PAIN  Dispense: 60 tablet; Refill: 0    4. Peripheral polyneuropathy  Stable      End of Life Planning:  Patient currently has an advanced directive: Yes.  Practitioner is supportive of decision.    COUNSELING:  Reviewed preventive health counseling, as reflected in patient instructions  Special attention given to:       Regular exercise       Healthy diet/nutrition      Estimated body mass index is 32.85 kg/(m^2) as calculated from the following:    Height as of this encounter: 5' 6\" (1.676 m).    Weight as of this encounter: 203 lb 8 oz (92.3 kg).  Weight management plan: Discussed healthy diet and exercise guidelines and patient will follow up in 12 months in clinic to re-evaluate.   reports that she quit smoking about 20 years ago. Her smoking use included Cigarettes. She has never used smokeless tobacco.        Appropriate preventive services were discussed with this patient, including applicable screening as appropriate for cardiovascular disease, diabetes, osteopenia/osteoporosis, and glaucoma.  As appropriate for age/gender, discussed screening for colorectal cancer, prostate cancer, breast cancer, and cervical cancer. Checklist reviewing preventive services available has been given to the patient.    Reviewed patients plan of care and provided an AVS. The Basic Care Plan (routine screening as documented in Health Maintenance) for Melvi meets the Care Plan requirement. This Care Plan has been established and reviewed with the Patient.    Counseling Resources:  ATP IV Guidelines  Pooled Cohorts Equation Calculator  Breast Cancer " Risk Calculator  FRAX Risk Assessment  ICSI Preventive Guidelines  Dietary Guidelines for Americans, 2010  IVFXPERT's MyPlate  ASA Prophylaxis  Lung CA Screening    Jimbo Martinez MD  [facility]

## 2017-11-16 ASSESSMENT — ANXIETY QUESTIONNAIRES: GAD7 TOTAL SCORE: 8

## 2017-11-16 NOTE — TELEPHONE ENCOUNTER
Gabapentin      Last Written Prescription Date: 10/31/2017  Last Fill Quantity: 210,  # refills: 0   Last Office Visit with [facility], [facility] or [facility] prescribing provider: 11/15/2017              RESTASIS 0.05% EYE EMULSION  Last Written Prescription Date: 10/13/2017  Last Fill Quantity: 60,  # refills: 0   Last Office Visit with [facility], [facility] or [facility] prescribing provider: 11/15/2017                          Next 5 appointments (look out 90 days)     Nov 20, 2017  8:40 AM CST   (Arrive by 8:25 AM)   SHORT with Jimbo Martinez MD   [facility] [place] ([facility] [place] )    [address] [place] [address]   [phone]

## 2017-11-17 RX ORDER — CYCLOSPORINE 0.5 MG/ML
EMULSION OPHTHALMIC
Qty: 60 EACH | Refills: 0 | Status: SHIPPED | OUTPATIENT
Start: 2017-11-17 | End: 2018-09-25

## 2017-11-17 RX ORDER — GABAPENTIN 300 MG/1
CAPSULE ORAL
Qty: 210 CAPSULE | Refills: 0 | Status: SHIPPED | OUTPATIENT
Start: 2017-11-17 | End: 2018-01-04

## 2017-11-20 ENCOUNTER — OFFICE VISIT (OUTPATIENT)
Dept: FAMILY MEDICINE | Facility: OTHER | Age: 67
End: 2017-11-20
Attending: FAMILY MEDICINE
Payer: COMMERCIAL

## 2017-11-20 VITALS
TEMPERATURE: 97.5 F | HEIGHT: 66 IN | BODY MASS INDEX: 32.78 KG/M2 | DIASTOLIC BLOOD PRESSURE: 64 MMHG | OXYGEN SATURATION: 90 % | HEART RATE: 91 BPM | WEIGHT: 204 LBS | SYSTOLIC BLOOD PRESSURE: 128 MMHG

## 2017-11-20 DIAGNOSIS — R31.21 ASYMPTOMATIC MICROSCOPIC HEMATURIA: Primary | ICD-10-CM

## 2017-11-20 LAB
ALBUMIN UR-MCNC: NEGATIVE MG/DL
APPEARANCE UR: CLEAR
BILIRUB UR QL STRIP: NEGATIVE
COLOR UR AUTO: NORMAL
GLUCOSE UR STRIP-MCNC: NEGATIVE MG/DL
HGB UR QL STRIP: NEGATIVE
KETONES UR STRIP-MCNC: NEGATIVE MG/DL
LEUKOCYTE ESTERASE UR QL STRIP: NEGATIVE
NITRATE UR QL: NEGATIVE
PH UR STRIP: 6.5 PH (ref 4.7–8)
SOURCE: NORMAL
SP GR UR STRIP: 1 (ref 1–1.03)
UROBILINOGEN UR STRIP-MCNC: NORMAL MG/DL (ref 0–2)

## 2017-11-20 PROCEDURE — 99212 OFFICE O/P EST SF 10 MIN: CPT | Performed by: FAMILY MEDICINE

## 2017-11-20 PROCEDURE — 99212 OFFICE O/P EST SF 10 MIN: CPT

## 2017-11-20 PROCEDURE — 81003 URINALYSIS AUTO W/O SCOPE: CPT | Mod: ZL | Performed by: FAMILY MEDICINE

## 2017-11-20 ASSESSMENT — ANXIETY QUESTIONNAIRES
6. BECOMING EASILY ANNOYED OR IRRITABLE: NOT AT ALL
GAD7 TOTAL SCORE: 2
7. FEELING AFRAID AS IF SOMETHING AWFUL MIGHT HAPPEN: NOT AT ALL
5. BEING SO RESTLESS THAT IT IS HARD TO SIT STILL: NOT AT ALL
2. NOT BEING ABLE TO STOP OR CONTROL WORRYING: SEVERAL DAYS
1. FEELING NERVOUS, ANXIOUS, OR ON EDGE: NOT AT ALL
IF YOU CHECKED OFF ANY PROBLEMS ON THIS QUESTIONNAIRE, HOW DIFFICULT HAVE THESE PROBLEMS MADE IT FOR YOU TO DO YOUR WORK, TAKE CARE OF THINGS AT HOME, OR GET ALONG WITH OTHER PEOPLE: NOT DIFFICULT AT ALL
3. WORRYING TOO MUCH ABOUT DIFFERENT THINGS: SEVERAL DAYS

## 2017-11-20 ASSESSMENT — PAIN SCALES - GENERAL: PAINLEVEL: NO PAIN (0)

## 2017-11-20 ASSESSMENT — PATIENT HEALTH QUESTIONNAIRE - PHQ9
5. POOR APPETITE OR OVEREATING: NOT AT ALL
SUM OF ALL RESPONSES TO PHQ QUESTIONS 1-9: 3

## 2017-11-20 NOTE — PROGRESS NOTES
SUBJECTIVE:  Melvi Cleveland, 67 year old, female is seen with the following medical problems.    Microscopic hematuria. Sherrie has a history of microhematuria on 2 different occasions.  This was associated with using the clinic supplied wipes.  She notes no symptoms.  Her UA is to be repeated with her own wipes.    Current Outpatient Prescriptions   Medication Sig Dispense Refill     gabapentin (NEURONTIN) 300 MG capsule TAKE 3 CAPSULES BY MOUTH IN THE MORNING, 2 CAPSULES AT NOON AND 2 CAPSULES IN THE EVENING 210 capsule 0     RESTASIS 0.05 % ophthalmic emulsion USE 1 DROP IN BOTH EYES 2 TIMES A DAY 60 each 0     HYDROcodone-acetaminophen (NORCO) 7.5-325 MG per tablet TAKE 1 TABLET BY MOUTH EVERY 4 TO 6 HOURS AS NEEDED FOR PAIN 60 tablet 0     citalopram (CELEXA) 40 MG tablet TAKE 1 TABLET BY MOUTH DAILY 90 tablet 0     ALLEGRA-D ALLERGY & CONGESTION  MG per 12 hr tablet TAKE 1 TABLET BY MOUTH TWICE DAILY 30 tablet 0     traZODone (DESYREL) 50 MG tablet TAKE 2-3 TABLETS BY MOUTH DAILY AT BEDTIME 270 tablet 1     PREBIOTIC PRODUCT PO Take 1 tablet by mouth daily       Probiotic Product (SOLUBLE FIBER/PROBIOTICS PO) Take 1 tablet by mouth       Flaxseed, Linseed, (FLAX SEED OIL) 1000 MG capsule Take 2 capsules by mouth daily       fluticasone (FLONASE) 50 MCG/ACT spray 1 spray Reported on 5/11/2017       ketotifen (ZADITOR/REFRESH ANTI-ITCH) 0.025 % SOLN ophthalmic solution 1 drop       ALBUTEROL 108 (90 BASE) MCG/ACT inhaler INHALE 2 PUFFS INTO THE LUNGS EVERY 6 HOURS AS NEEDED FOR SHORTNESS OF BREATH OR WHEEZING 8.5 g 5     SM GAS RELIEF ANTIFLATUENT 180 MG CAPS TAKE ONE CAPSULE BY MOUTH AS NEEDED AFTER A MEAL. MAX 2 CAPS/DAY 60 capsule 5     pantoprazole (PROTONIX) 40 MG enteric coated tablet Take 1 tablet (40 mg) by mouth daily (Patient taking differently: Take 40 mg by mouth 2 times daily ) 90 tablet 3     DICLOFENAC PO Take 100 mg by mouth daily        Garlic 400 MG TBEC        polyvinyl alcohol-povidone  (REFRESH) 1.4-0.6 % ophthalmic solution 1-2 drops as needed       folic acid (FOLVITE) 1 MG tablet Take 1 mg by mouth 3 times daily.       MULTIPLE VITAMIN PO Take  by mouth 2 times daily.       Ascorbic Acid (VITAMIN C) 500 MG CAPS Take 2 tablets by mouth 2 times daily.       Calcium-Vitamin D-Vitamin K (CALCIUM + D) 500-1000-40 MG-UNT-MCG CHEW Take 3 tablets by mouth daily        ALFALFA PO Take 5 tablets by mouth 2 times daily.       MILK THISTLE PO Take 1,000 mg by mouth daily.       fish oil-omega-3 fatty acids (OMEGA-3) 1000 MG capsule Take 1 g by mouth daily.       Cholecalciferol (VITAMIN D) 1000 UNITS capsule Take 1 capsule by mouth daily.          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     Parotiditis 5/9/2011     Peripheral Neuriopathy 3/1/2011     Rheumatoid arthritis(714.0) 12/13/1999     Seborrhea capitis 10/6/2011     Sjogrens Syndrome 3/1/2011     Urethral stricture 4/30/2008     Past Surgical History:   Procedure Laterality Date     BACK SURGERY  05/1995    back surgery     BIOPSY      breast bx X2     BIOPSY BREAST      LT     BIOPSY BREAST NEEDLE LOCALIZATION Right 6/12/2017    Procedure: BIOPSY BREAST NEEDLE LOCALIZATION;  WIRE LOCALIZED EXCISIONAL BIOPSY  RIGHT BREAST MASS;  Surgeon: Jeni Amin MD;  Location: [facility]     CHOLECYSTECTOMY  2004     COLONOSCOPY  11/15/2004    screening     COLONOSCOPY  9/24/2014     EGD with biopsy  2010, 2011    GERD     laminectomy      L4 L5 laminectomy; back pain     Family History   Problem  "Relation Age of Onset     DIABETES Mother      Rheumatoid Arthritis Mother      Other - See Comments Mother      fibromyalgia     Osteoarthritis Mother      Thyroid Disease Mother      thyroid disorder     Unknown/Adopted Father      cause of death unknown     Rheumatoid Arthritis Father      DIABETES Sister      Myocardial Infarction Sister      cause of death     Lupus Sister      cause of death     C.A.D. Maternal Grandmother      CEREBROVASCULAR DISEASE Maternal Grandmother      DIABETES Maternal Grandmother      Thyroid Disease Maternal Grandmother      thyrodi disorder; goitor removed     CANCER Maternal Grandfather      Hypertension Brother      Other - See Comments Brother      sleep apnea     Other - See Comments Sister      sleep apnea     Social History     Social History     Marital status:      Spouse name: N/A     Number of children: N/A     Years of education: N/A     Occupational History      [employer]     part time     Social History Main Topics     Smoking status: Former Smoker     Types: Cigarettes     Quit date: 3/28/1997     Smokeless tobacco: Never Used      Comment: no passive exposure     Alcohol use No      Comment: former. quit 1984     Drug use: No     Sexual activity: Not on file     Other Topics Concern     Parent/Sibling W/ Cabg, Mi Or Angioplasty Before 65f 55m? Yes     Blood Transfusions Yes     Caffeine Concern Yes     chocolate rare     Seat Belt Yes     Social History Narrative         Review Of Systems  Constitutional, HEENT, cardiovascular, pulmonary, gi and gu systems are negative, except as otherwise noted.      OBJECTIVE:/64  Pulse 91  Temp 97.5  F (36.4  C)  Ht 5' 6\" (1.676 m)  Wt 204 lb (92.5 kg)  SpO2 90%  BMI 32.93 kg/m2      Exam:  Physical Exam   Constitutional: She is oriented to person, place, and time. She appears well-developed and well-nourished. No distress.   Neurological: She is alert and oriented to person, place, and time.   Psychiatric: She " has a normal mood and affect.     Other exam not repeated    Labs:  Office Visit on 11/15/2017   Component Date Value Ref Range Status     WBC 11/15/2017 4.6  4.0 - 11.0 10e9/L Final     RBC Count 11/15/2017 4.46  3.8 - 5.2 10e12/L Final     Hemoglobin 11/15/2017 12.5  11.7 - 15.7 g/dL Final     Hematocrit 11/15/2017 38.0  35.0 - 47.0 % Final     MCV 11/15/2017 85  78 - 100 fl Final     MCH 11/15/2017 28.0  26.5 - 33.0 pg Final     MCHC 11/15/2017 32.9  31.5 - 36.5 g/dL Final     RDW 11/15/2017 14.6  10.0 - 15.0 % Final     Platelet Count 11/15/2017 257  150 - 450 10e9/L Final     Diff Method 11/15/2017 Automated Method   Final     % Neutrophils 11/15/2017 50.6  % Final     % Lymphocytes 11/15/2017 35.9  % Final     % Monocytes 11/15/2017 8.9  % Final     % Eosinophils 11/15/2017 3.5  % Final     % Basophils 11/15/2017 0.9  % Final     % Immature Granulocytes 11/15/2017 0.2  % Final     Nucleated RBCs 11/15/2017 0  0 /100 Final     Absolute Neutrophil 11/15/2017 2.4  1.6 - 8.3 10e9/L Final     Absolute Lymphocytes 11/15/2017 1.7  0.8 - 5.3 10e9/L Final     Absolute Monocytes 11/15/2017 0.4  0.0 - 1.3 10e9/L Final     Absolute Eosinophils 11/15/2017 0.2  0.0 - 0.7 10e9/L Final     Absolute Basophils 11/15/2017 0.0  0.0 - 0.2 10e9/L Final     Abs Immature Granulocytes 11/15/2017 0.0  0 - 0.4 10e9/L Final     Absolute Nucleated RBC 11/15/2017 0.0   Final     Cholesterol 11/15/2017 203* <200 mg/dL Final    Desirable:       <200 mg/dl     Triglycerides 11/15/2017 125  <150 mg/dL Final    Fasting specimen     HDL Cholesterol 11/15/2017 71  >49 mg/dL Final     LDL Cholesterol Calculated 11/15/2017 107* <100 mg/dL Final    Comment: Above desirable:  100-129 mg/dl  Borderline High:  130-159 mg/dL  High:             160-189 mg/dL  Very high:       >189 mg/dl       Non HDL Cholesterol 11/15/2017 132* <130 mg/dL Final    Comment: Above Desirable:  130-159 mg/dl  Borderline high:  160-189 mg/dl  High:             190-219  mg/dl  Very high:       >219 mg/dl       Sodium 11/15/2017 139  133 - 144 mmol/L Final     Potassium 11/15/2017 4.3  3.4 - 5.3 mmol/L Final     Chloride 11/15/2017 104  94 - 109 mmol/L Final     Carbon Dioxide 11/15/2017 29  20 - 32 mmol/L Final     Anion Gap 11/15/2017 6  3 - 14 mmol/L Final     Glucose 11/15/2017 91  70 - 99 mg/dL Final    Fasting specimen     Urea Nitrogen 11/15/2017 14  7 - 30 mg/dL Final     Creatinine 11/15/2017 1.11* 0.52 - 1.04 mg/dL Final     GFR Estimate 11/15/2017 49* >60 mL/min/1.7m2 Final    Non  GFR Calc     GFR Estimate If Black 11/15/2017 60* >60 mL/min/1.7m2 Final    African American GFR Calc     Calcium 11/15/2017 9.1  8.5 - 10.1 mg/dL Final     Bilirubin Total 11/15/2017 0.4  0.2 - 1.3 mg/dL Final     Albumin 11/15/2017 3.2* 3.4 - 5.0 g/dL Final     Protein Total 11/15/2017 7.3  6.8 - 8.8 g/dL Final     Alkaline Phosphatase 11/15/2017 66  40 - 150 U/L Final     ALT 11/15/2017 35  0 - 50 U/L Final     AST 11/15/2017 27  0 - 45 U/L Final     TSH 11/15/2017 2.23  0.40 - 4.00 mU/L Final     Color Urine 11/15/2017 Light Yellow   Final     Appearance Urine 11/15/2017 Clear   Final     Glucose Urine 11/15/2017 Negative  NEG^Negative mg/dL Final     Bilirubin Urine 11/15/2017 Negative  NEG^Negative Final     Ketones Urine 11/15/2017 Negative  NEG^Negative mg/dL Final     Specific Gravity Urine 11/15/2017 1.007  1.003 - 1.035 Final     Blood Urine 11/15/2017 Moderate* NEG^Negative Final     pH Urine 11/15/2017 8.0  4.7 - 8.0 pH Final     Protein Albumin Urine 11/15/2017 Negative  NEG^Negative mg/dL Final     Urobilinogen mg/dL 11/15/2017 Normal  0.0 - 2.0 mg/dL Final     Nitrite Urine 11/15/2017 Negative  NEG^Negative Final     Leukocyte Esterase Urine 11/15/2017 Negative  NEG^Negative Final     Source 11/15/2017 Midstream Urine   Final     RBC Urine 11/15/2017 81* 0 - 2 /HPF Final     WBC Urine 11/15/2017 <1  0 - 2 /HPF Final     Bacteria Urine 11/15/2017 None*  NEG^Negative /HPF Final     Mucous Urine 11/15/2017 Present* NEG^Negative /LPF Final       ASSESSMENT/PLAN:  Asymptomatic microscopic hematuria  Repeat UA taken and negative.  Suspect irritant wipe.  Sherrie is reassured.  Will follow.  - UA reflex to Microscopic and Culture; Future  - UA reflex to Microscopic and Culture            Jimbo Martinez MD

## 2017-11-20 NOTE — NURSING NOTE
"Chief Complaint   Patient presents with     Abnormal Labs       Initial /64  Pulse 91  Temp 97.5  F (36.4  C)  Ht 5' 6\" (1.676 m)  Wt 204 lb (92.5 kg)  SpO2 90%  BMI 32.93 kg/m2 Estimated body mass index is 32.93 kg/(m^2) as calculated from the following:    Height as of this encounter: 5' 6\" (1.676 m).    Weight as of this encounter: 204 lb (92.5 kg).  Medication Reconciliation: complete   MISAEL MAZARIEGOS LPN  "

## 2017-11-21 ASSESSMENT — ANXIETY QUESTIONNAIRES: GAD7 TOTAL SCORE: 2

## 2017-11-22 ENCOUNTER — TRANSFERRED RECORDS (OUTPATIENT)
Dept: HEALTH INFORMATION MANAGEMENT | Facility: HOSPITAL | Age: 67
End: 2017-11-22

## 2017-11-22 PROBLEM — R78.81 BACTEREMIA DUE TO ESCHERICHIA COLI: Status: RESOLVED | Noted: 2017-09-06 | Resolved: 2017-11-22

## 2017-11-22 PROBLEM — N39.0 URINARY TRACT INFECTION: Status: RESOLVED | Noted: 2017-09-01 | Resolved: 2017-11-22

## 2017-11-22 PROBLEM — N10 ACUTE PYELONEPHRITIS: Status: RESOLVED | Noted: 2017-09-06 | Resolved: 2017-11-22

## 2017-11-22 PROBLEM — N39.0 UTI (URINARY TRACT INFECTION): Status: RESOLVED | Noted: 2017-09-01 | Resolved: 2017-11-22

## 2017-11-22 PROBLEM — B96.20 BACTEREMIA DUE TO ESCHERICHIA COLI: Status: RESOLVED | Noted: 2017-09-06 | Resolved: 2017-11-22

## 2017-11-27 DIAGNOSIS — R09.81 NASAL CONGESTION: ICD-10-CM

## 2017-11-29 NOTE — TELEPHONE ENCOUNTER
Allegra-D 12 HR      Last Written Prescription Date: 09/21/17  Last Fill Quantity: 30,  # refills: 0   Last Office Visit with [facility], [facility] or [facility] prescribing provider: 11/20/17

## 2017-11-30 RX ORDER — FEXOFENADINE HYDROCHLORIDE AND PSEUDOEPHEDRINE HYDROCHLORIDE 60; 120 MG/1; MG/1
TABLET, FILM COATED, EXTENDED RELEASE ORAL
Qty: 30 TABLET | Refills: 0 | Status: SHIPPED | OUTPATIENT
Start: 2017-11-30 | End: 2018-01-25

## 2017-12-12 ENCOUNTER — RADIANT APPOINTMENT (OUTPATIENT)
Dept: MAMMOGRAPHY | Facility: OTHER | Age: 67
End: 2017-12-12
Attending: SURGERY
Payer: MEDICARE

## 2017-12-12 DIAGNOSIS — Z98.890 H/O RIGHT BREAST BIOPSY: ICD-10-CM

## 2017-12-12 DIAGNOSIS — R92.1 BREAST CALCIFICATION, RIGHT: ICD-10-CM

## 2017-12-12 PROCEDURE — G0204 DX MAMMO INCL CAD BI: HCPCS | Mod: TC

## 2018-01-02 ENCOUNTER — TRANSFERRED RECORDS (OUTPATIENT)
Dept: HEALTH INFORMATION MANAGEMENT | Facility: HOSPITAL | Age: 68
End: 2018-01-02

## 2018-01-04 DIAGNOSIS — G62.9 PERIPHERAL POLYNEUROPATHY: ICD-10-CM

## 2018-01-04 NOTE — TELEPHONE ENCOUNTER
gabapentin      Last Written Prescription Date: 11/17/17  Last Fill Quantity: 210,  # refills: 0   Last Office Visit with [facility], [facility] or [facility] prescribing provider: 11/20/17                                         Next 5 appointments (look out 90 days)     Jan 08, 2018 10:20 AM CST   (Arrive by 10:05 AM)   Pre-Op physical with Jimbo Martinez MD   [facility] [place] ([facility] [place] )    [address] [place] [address]   [phone]

## 2018-01-08 ENCOUNTER — OFFICE VISIT (OUTPATIENT)
Dept: FAMILY MEDICINE | Facility: OTHER | Age: 68
End: 2018-01-08
Attending: FAMILY MEDICINE
Payer: MEDICARE

## 2018-01-08 VITALS
WEIGHT: 203 LBS | RESPIRATION RATE: 18 BRPM | SYSTOLIC BLOOD PRESSURE: 122 MMHG | TEMPERATURE: 97.8 F | HEART RATE: 87 BPM | BODY MASS INDEX: 32.62 KG/M2 | OXYGEN SATURATION: 93 % | DIASTOLIC BLOOD PRESSURE: 70 MMHG | HEIGHT: 66 IN

## 2018-01-08 DIAGNOSIS — Z79.899 MEDICATION MANAGEMENT: Primary | ICD-10-CM

## 2018-01-08 DIAGNOSIS — Z01.818 PREOP GENERAL PHYSICAL EXAM: Primary | ICD-10-CM

## 2018-01-08 LAB
ALBUMIN SERPL-MCNC: 3.2 G/DL (ref 3.4–5)
ALT SERPL W P-5'-P-CCNC: 29 U/L (ref 0–50)
BASOPHILS # BLD AUTO: 0.1 10E9/L (ref 0–0.2)
BASOPHILS NFR BLD AUTO: 0.9 %
CREAT SERPL-MCNC: 1.02 MG/DL (ref 0.52–1.04)
CRP SERPL-MCNC: <2.9 MG/L (ref 0–8)
DIFFERENTIAL METHOD BLD: NORMAL
EOSINOPHIL # BLD AUTO: 0.2 10E9/L (ref 0–0.7)
EOSINOPHIL NFR BLD AUTO: 3.9 %
ERYTHROCYTE [DISTWIDTH] IN BLOOD BY AUTOMATED COUNT: 14.9 % (ref 10–15)
ERYTHROCYTE [SEDIMENTATION RATE] IN BLOOD BY WESTERGREN METHOD: 18 MM/H (ref 0–30)
GFR SERPL CREATININE-BSD FRML MDRD: 54 ML/MIN/1.7M2
HCT VFR BLD AUTO: 37.4 % (ref 35–47)
HGB BLD-MCNC: 12.2 G/DL (ref 11.7–15.7)
IMM GRANULOCYTES # BLD: 0 10E9/L (ref 0–0.4)
IMM GRANULOCYTES NFR BLD: 0.2 %
LYMPHOCYTES # BLD AUTO: 1.8 10E9/L (ref 0.8–5.3)
LYMPHOCYTES NFR BLD AUTO: 31.8 %
MCH RBC QN AUTO: 28 PG (ref 26.5–33)
MCHC RBC AUTO-ENTMCNC: 32.6 G/DL (ref 31.5–36.5)
MCV RBC AUTO: 86 FL (ref 78–100)
MONOCYTES # BLD AUTO: 0.4 10E9/L (ref 0–1.3)
MONOCYTES NFR BLD AUTO: 7.7 %
NEUTROPHILS # BLD AUTO: 3.1 10E9/L (ref 1.6–8.3)
NEUTROPHILS NFR BLD AUTO: 55.5 %
NRBC # BLD AUTO: 0 10*3/UL
NRBC BLD AUTO-RTO: 0 /100
PLATELET # BLD AUTO: 247 10E9/L (ref 150–450)
RBC # BLD AUTO: 4.35 10E12/L (ref 3.8–5.2)
WBC # BLD AUTO: 5.6 10E9/L (ref 4–11)

## 2018-01-08 PROCEDURE — 85652 RBC SED RATE AUTOMATED: CPT | Mod: ZL | Performed by: NURSE PRACTITIONER

## 2018-01-08 PROCEDURE — G0463 HOSPITAL OUTPT CLINIC VISIT: HCPCS

## 2018-01-08 PROCEDURE — 93005 ELECTROCARDIOGRAM TRACING: CPT

## 2018-01-08 PROCEDURE — 82040 ASSAY OF SERUM ALBUMIN: CPT | Mod: ZL | Performed by: NURSE PRACTITIONER

## 2018-01-08 PROCEDURE — 36415 COLL VENOUS BLD VENIPUNCTURE: CPT | Mod: ZL | Performed by: NURSE PRACTITIONER

## 2018-01-08 PROCEDURE — 84460 ALANINE AMINO (ALT) (SGPT): CPT | Mod: ZL | Performed by: NURSE PRACTITIONER

## 2018-01-08 PROCEDURE — 86140 C-REACTIVE PROTEIN: CPT | Mod: ZL | Performed by: NURSE PRACTITIONER

## 2018-01-08 PROCEDURE — 85025 COMPLETE CBC W/AUTO DIFF WBC: CPT | Mod: ZL | Performed by: NURSE PRACTITIONER

## 2018-01-08 PROCEDURE — 99214 OFFICE O/P EST MOD 30 MIN: CPT | Mod: 25 | Performed by: FAMILY MEDICINE

## 2018-01-08 PROCEDURE — 93010 ELECTROCARDIOGRAM REPORT: CPT | Performed by: INTERNAL MEDICINE

## 2018-01-08 PROCEDURE — 82565 ASSAY OF CREATININE: CPT | Mod: ZL | Performed by: NURSE PRACTITIONER

## 2018-01-08 RX ORDER — GABAPENTIN 300 MG/1
CAPSULE ORAL
Qty: 210 CAPSULE | Refills: 0 | Status: SHIPPED | OUTPATIENT
Start: 2018-01-08 | End: 2018-01-31

## 2018-01-08 ASSESSMENT — ANXIETY QUESTIONNAIRES
3. WORRYING TOO MUCH ABOUT DIFFERENT THINGS: NOT AT ALL
1. FEELING NERVOUS, ANXIOUS, OR ON EDGE: NOT AT ALL
5. BEING SO RESTLESS THAT IT IS HARD TO SIT STILL: NOT AT ALL
4. TROUBLE RELAXING: SEVERAL DAYS
2. NOT BEING ABLE TO STOP OR CONTROL WORRYING: SEVERAL DAYS
GAD7 TOTAL SCORE: 2
IF YOU CHECKED OFF ANY PROBLEMS ON THIS QUESTIONNAIRE, HOW DIFFICULT HAVE THESE PROBLEMS MADE IT FOR YOU TO DO YOUR WORK, TAKE CARE OF THINGS AT HOME, OR GET ALONG WITH OTHER PEOPLE: NOT DIFFICULT AT ALL
7. FEELING AFRAID AS IF SOMETHING AWFUL MIGHT HAPPEN: NOT AT ALL
6. BECOMING EASILY ANNOYED OR IRRITABLE: NOT AT ALL

## 2018-01-08 ASSESSMENT — PATIENT HEALTH QUESTIONNAIRE - PHQ9: SUM OF ALL RESPONSES TO PHQ QUESTIONS 1-9: 3

## 2018-01-08 ASSESSMENT — PAIN SCALES - GENERAL: PAINLEVEL: NO PAIN (0)

## 2018-01-08 NOTE — MR AVS SNAPSHOT
After Visit Summary   1/8/2018    Melvi Cleveland    MRN: [id number]           Patient Information     Date Of Birth          1950        Visit Information        Provider Department      1/8/2018 10:20 AM Jimbo Martinez MD [facility]        Today's Diagnoses     Preop general physical exam    -  1      Care Instructions      Before Your Surgery      Call your surgeon if there is any change in your health. This includes signs of a cold or flu (such as a sore throat, runny nose, cough, rash or fever).    Do not smoke, drink alcohol or take over the counter medicine (unless your surgeon or primary care doctor tells you to) for the 24 hours before and after surgery.    If you take prescribed drugs: Follow your doctor s orders about which medicines to take and which to stop until after surgery.    Eating and drinking prior to surgery: follow the instructions from your surgeon    Take a shower or bath the night before surgery. Use the soap your surgeon gave you to gently clean your skin. If you do not have soap from your surgeon, use your regular soap. Do not shave or scrub the surgery site.  Wear clean pajamas and have clean sheets on your bed.           Follow-ups after your visit        Your next 10 appointments already scheduled     Jan 16, 2018   Procedure with Haider Carlos MD   [facility] ([address] [address] [phone]              Who to contact     If you have questions or need follow up information about today's clinic visit or your schedule please contact [facility] directly at [phone].  Normal or non-critical lab and imaging results will be communicated to you by MyChart, letter or phone within 4 business days after the clinic has received the results. If you do not hear from us within 7 days, please contact the clinic through MyChart or phone. If you have a critical or abnormal lab result,  "we will notify you by phone as soon as possible.  Submit refill requests through [employer] [facility] or call your pharmacy and they will forward the refill request to us. Please allow 3 business days for your refill to be completed.          Additional Information About Your Visit        emo2 Inchart Information     [employer] [facility] gives you secure access to your electronic health record. If you see a primary care provider, you can also send messages to your care team and make appointments. If you have questions, please call your primary care clinic.  If you do not have a primary care provider, please call [phone] and they will assist you.        Care EveryWhere ID     This is your Care EveryWhere ID. This could be used by other organizations to access your [place] medical records  [id number]        Your Vitals Were     Pulse Temperature Respirations Height Pulse Oximetry BMI (Body Mass Index)    87 97.8  F (36.6  C) (Tympanic) 18 5' 6\" (1.676 m) 93% 32.77 kg/m2       Blood Pressure from Last 3 Encounters:   01/08/18 122/70   11/20/17 128/64   11/15/17 118/64    Weight from Last 3 Encounters:   01/08/18 203 lb (92.1 kg)   11/20/17 204 lb (92.5 kg)   11/15/17 203 lb 8 oz (92.3 kg)              We Performed the Following     EKG 12-lead complete w/read - (Clinic Performed)          Today's Medication Changes          These changes are accurate as of: 1/8/18 11:59 PM.  If you have any questions, ask your nurse or doctor.               These medicines have changed or have updated prescriptions.        Dose/Directions    pantoprazole 40 MG EC tablet   Commonly known as:  PROTONIX   This may have changed:  when to take this   Used for:  Diagnosis unknown        Dose:  40 mg   Take 1 tablet (40 mg) by mouth daily   Quantity:  90 tablet   Refills:  3                Primary Care Provider Office Phone # Fax #    Jimbo Martinez [phone]       [facility] [address] [place] [address]        Equal Access to " Services     [facility]: Hadii dawna corley gustaboshameka Sonnyali, waaxda luqadaha, qaybta kaalmada erikchristinelexy, bill estevez . So [facility] [phone].    ATENCIÓN: Si minalla meng, tiene a garza disposición servicios gratuitos de asistencia lingüística. Llame al [phone].    We comply with applicable federal civil rights laws and [place] laws. We do not discriminate on the basis of race, color, national origin, age, disability, sex, sexual orientation, or gender identity.            Thank you!     [facility]  [facility]. Our goal is always to provide you with excellent care. Hearing back from our patients is one way we can continue to improve our services. Please take a few minutes to complete the written survey that you may receive in the mail after your visit with us. Thank you!             Your Updated Medication List - Protect others around you: Learn how to safely use, store and throw away your medicines at www.disposemymeds.org.          This list is accurate as of: 1/8/18 11:59 PM.  Always use your most recent med list.                   Brand Name Dispense Instructions for use Diagnosis    ALFALFA PO      Take 5 tablets by mouth 2 times daily.        ALLEGRA-D ALLERGY & CONGESTION  MG per 12 hr tablet   Generic drug:  fexofenadine-pseudoePHEDrine     30 tablet    TAKE 1 TABLET BY MOUTH TWICE DAILY    Nasal congestion       CALCIUM + D 500-1000-40 MG-UNT-MCG Chew   Generic drug:  Calcium-Vitamin D-Vitamin K      Take 3 tablets by mouth daily        citalopram 40 MG tablet    celeXA    90 tablet    TAKE 1 TABLET BY MOUTH DAILY    Panic disorder without agoraphobia       DICLOFENAC PO      Take 100 mg by mouth daily        fish oil-omega-3 fatty acids 1000 MG capsule      Take 1 g by mouth daily.        flax seed oil 1000 MG capsule      Take 2 capsules by mouth daily        fluticasone 50 MCG/ACT spray    FLONASE     1 spray Reported on  5/11/2017        folic acid 1 MG tablet    FOLVITE     Take 1 mg by mouth 3 times daily.        gabapentin 300 MG capsule    NEURONTIN    210 capsule    TAKE 3 CAPSULES BY MOUTH IN THE MORNING, 2 CAPSULES AT NOON AND 2 CAPSULES IN THE EVENING    Peripheral polyneuropathy       Garlic 400 MG Tbec           HYDROcodone-acetaminophen 7.5-325 MG per tablet    NORCO    60 tablet    TAKE 1 TABLET BY MOUTH EVERY 4 TO 6 HOURS AS NEEDED FOR PAIN    Rheumatoid arthritis involving multiple sites with positive rheumatoid factor (H)       ketotifen 0.025 % Soln ophthalmic solution    ZADITOR/REFRESH ANTI-ITCH     1 drop        MILK THISTLE PO      Take 1,000 mg by mouth daily.        MULTIPLE VITAMIN PO      Take  by mouth 2 times daily.        pantoprazole 40 MG EC tablet    PROTONIX    90 tablet    Take 1 tablet (40 mg) by mouth daily    Diagnosis unknown       PREBIOTIC PRODUCT PO      Take 1 tablet by mouth daily        PROAIR  (90 BASE) MCG/ACT Inhaler   Generic drug:  albuterol     8.5 g    INHALE 2 PUFFS INTO THE LUNGS EVERY 6 HOURS AS NEEDED FOR SHORTNESS OF BREATH OR WHEEZING    Influenza-like symptoms       REFRESH 1.4-0.6 % ophthalmic solution   Generic drug:  polyvinyl alcohol-povidone      1-2 drops as needed        RESTASIS 0.05 % ophthalmic emulsion   Generic drug:  cycloSPORINE     60 each    USE 1 DROP IN BOTH EYES 2 TIMES A DAY    Bilateral dry eyes       SM GAS RELIEF ANTIFLATUENT 180 MG Caps   Generic drug:  Simethicone     60 capsule    TAKE ONE CAPSULE BY MOUTH AS NEEDED AFTER A MEAL. MAX 2 CAPS/DAY    Gas       SOLUBLE FIBER/PROBIOTICS PO      Take 1 tablet by mouth        traZODone 50 MG tablet    DESYREL    270 tablet    TAKE 2-3 TABLETS BY MOUTH DAILY AT BEDTIME    Primary insomnia       Vitamin C 500 MG Caps      Take 2 tablets by mouth 2 times daily.        vitamin D 1000 UNITS capsule      Take 1 capsule by mouth daily.

## 2018-01-08 NOTE — NURSING NOTE
"Chief Complaint   Patient presents with     Pre-Op Exam       Initial /70 (BP Location: Right arm, Patient Position: Sitting, Cuff Size: Adult Regular)  Pulse 87  Temp 97.8  F (36.6  C) (Tympanic)  Resp 18  Ht 5' 6\" (1.676 m)  Wt 203 lb (92.1 kg)  SpO2 93%  BMI 32.77 kg/m2 Estimated body mass index is 32.77 kg/(m^2) as calculated from the following:    Height as of this encounter: 5' 6\" (1.676 m).    Weight as of this encounter: 203 lb (92.1 kg).  Medication Reconciliation: complete   Devika Gutierres LPN      "

## 2018-01-08 NOTE — PROGRESS NOTES
[facility]  [address]  [place] [address]  [phone]  Dept: [phone]    PRE-OP EVALUATION:  Today's date: 2018    Melvi Cleveland (: 1950) presents for pre-operative evaluation assessment as requested by Dr. Carlos.  She requires evaluation and anesthesia risk assessment prior to undergoing surgery/procedure for treatment of wrist pain .  Proposed procedure: right carpal tunnel release    Date of Surgery/ Procedure: 2018  Time of Surgery/ Procedure: [facility]  Hospital/Surgical Facility: [facility]  Primary Physician: Jimbo Martinez  Type of Anesthesia Anticipated: to be determined    Patient has a Health Care Directive or Living Will:  NO    1. NO - Do you have a history of heart attack, stroke, stent, bypass or surgery on an artery in the head, neck, heart or legs?  2. YES - DO YOU EVER HAVE ANY PAIN OR DISCOMFORT IN YOUR CHEST?   3. NO - Do you have a history of  Heart Failure?  4. YES - ARE YOUR TROUBLED BY SHORTNESS OF BREATH WHEN WALKING ON THE LEVEL, UP A SLIGHT HILL OR AT NIGHT? When walking up a slight hill  5. NO - Do you currently have a cold, bronchitis or other respiratory infection?  6. YES - DO YOU HAVE A COUGH, SHORTNESS OF BREATH OR WHEEZING?  Has shortness of breath all the time  7. YES - DO YOU SOMETIMES GET PAINS IN THE CALVES OF YOUR LEGS WHEN YOU WALK? RA  8. YES - DO YOU OR ANYONE IN YOUR FAMILY HAVE PREVIOUS HISTORY OF BLOOD CLOTS? Mother   9. NO - Do you or does anyone in your family have a serious bleeding problem such as prolonged bleeding following surgeries or cuts?  10. NO - Have you ever had problems with anemia or been told to take iron pills?  11. NO - Have you had any abnormal blood loss such as black, tarry or bloody stools, or abnormal vaginal bleeding?  12. NO - Have you ever had a blood transfusion?  13. NO - Have you or any of your relatives ever had problems with anesthesia?  14. YES - DO YOU HAVE SLEEP APNEA, EXCESSIVE SNORING OR DAYTIME  DROWSINESS? Has daytime drowsiness  15. NO - Do you have any prosthetic heart valves?  16. NO - Do you have prosthetic joints?  17. NO - Is there any chance that you may be pregnant?        HPI:                                                      Brief HPI related to upcoming procedure: Sherrie has a history of right carpal tunnel syndrome and has failed conservative management.  She was seen by Orthopedics where it was felt the patient would benefit from surgical management.  I was asked to see her for preoperative medical clearance.        See problem list for active medical problems.  Problems all longstanding and stable, except as noted/documented.  See ROS for pertinent symptoms related to these conditions.                                                                                                  .    MEDICAL HISTORY:                                                    Patient Active Problem List    Diagnosis Date Noted     ACP (advance care planning) 09/28/2016     Priority: Medium     Advance Care Planning 9/28/2016: ACP Review of Chart / Resources Provided:  Reviewed chart for advance care plan.  Melvi ASHWIN Riveraon has been provided information and resources to begin or update their advance care plan.  Added by Kathy Galvan             RA (rheumatoid arthritis) (H) 11/25/2015     Priority: Medium     Comprehensive Medical Examination 11/25/2015     Priority: Medium     Seasonal allergic rhinitis 11/25/2015     Priority: Medium     Fibrocystic breast disease (FCBD) 11/25/2015     Priority: Medium     Crohn's disease of large intestine (H) 03/01/2011     Priority: Medium     Dyslipidemia 03/01/2011     Priority: Medium     Sicca syndrome (H) 03/01/2011     Priority: Medium     Peripheral polyneuropathy 03/01/2011     Priority: Medium     Neck pain, chronic 07/05/2005     Priority: Medium     05/2015 MRI:  IMPRESSION:  BROAD-BASED DISK PROTRUSION AT C5-C6 ON THE LEFT, MILDLY  IMPINGING ON THE LEFT C6  NERVE ROOT       Low back pain, chronic 12/13/2000     Priority: Medium     03/2016 MRI:  MULTILEVEL DEGENERATIVE CHANGES OF THE LUMBAR SPINE,  SIMILAR IN APPEARANCE TO THE PRIOR STUDY.  A RIGHT FORAMINAL DISK  PROTRUSION AT L3-L4 AGAIN IMPINGES THE EXITING RIGHT L3 NERVE ROOT.  CORRELATE FOR A RELATED RADICULOPATHY.        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     Parotiditis 5/9/2011     Peripheral Neuriopathy 3/1/2011     Rheumatoid arthritis(714.0) 12/13/1999     Seborrhea capitis 10/6/2011     Sjogrens Syndrome 3/1/2011     Urethral stricture 4/30/2008     Past Surgical History:   Procedure Laterality Date     BACK SURGERY  05/1995    back surgery     BIOPSY      breast bx X2     BIOPSY BREAST      LT     BIOPSY BREAST NEEDLE LOCALIZATION Right 6/12/2017    Procedure: BIOPSY BREAST NEEDLE LOCALIZATION;  WIRE LOCALIZED EXCISIONAL BIOPSY  RIGHT BREAST MASS;  Surgeon: Jeni Amin MD;  Location: [facility]     CHOLECYSTECTOMY  2004     COLONOSCOPY  11/15/2004    screening     COLONOSCOPY  9/24/2014     EGD with biopsy  2010, 2011    GERD     laminectomy      L4 L5 laminectomy; back pain     Current Outpatient Prescriptions   Medication Sig Dispense Refill     gabapentin (NEURONTIN) 300 MG capsule TAKE 3 CAPSULES BY MOUTH IN THE MORNING, 2 CAPSULES AT NOON AND 2 CAPSULES IN THE EVENING 210 capsule 0     ALLEGRA-D ALLERGY & CONGESTION  MG per 12 hr tablet TAKE 1 TABLET BY MOUTH TWICE DAILY 30 tablet 0     RESTASIS 0.05 % ophthalmic emulsion USE 1 DROP IN BOTH EYES 2 TIMES A DAY 60 each 0     HYDROcodone-acetaminophen (NORCO) 7.5-325 MG per tablet TAKE 1 TABLET BY MOUTH EVERY 4 TO 6 HOURS  AS NEEDED FOR PAIN 60 tablet 0     citalopram (CELEXA) 40 MG tablet TAKE 1 TABLET BY MOUTH DAILY 90 tablet 0     traZODone (DESYREL) 50 MG tablet TAKE 2-3 TABLETS BY MOUTH DAILY AT BEDTIME 270 tablet 1     PREBIOTIC PRODUCT PO Take 1 tablet by mouth daily       Probiotic Product (SOLUBLE FIBER/PROBIOTICS PO) Take 1 tablet by mouth       Flaxseed, Linseed, (FLAX SEED OIL) 1000 MG capsule Take 2 capsules by mouth daily       fluticasone (FLONASE) 50 MCG/ACT spray 1 spray Reported on 5/11/2017       ketotifen (ZADITOR/REFRESH ANTI-ITCH) 0.025 % SOLN ophthalmic solution 1 drop       ALBUTEROL 108 (90 BASE) MCG/ACT inhaler INHALE 2 PUFFS INTO THE LUNGS EVERY 6 HOURS AS NEEDED FOR SHORTNESS OF BREATH OR WHEEZING 8.5 g 5     SM GAS RELIEF ANTIFLATUENT 180 MG CAPS TAKE ONE CAPSULE BY MOUTH AS NEEDED AFTER A MEAL. MAX 2 CAPS/DAY 60 capsule 5     pantoprazole (PROTONIX) 40 MG enteric coated tablet Take 1 tablet (40 mg) by mouth daily (Patient taking differently: Take 40 mg by mouth 2 times daily ) 90 tablet 3     DICLOFENAC PO Take 100 mg by mouth daily        Garlic 400 MG TBEC        polyvinyl alcohol-povidone (REFRESH) 1.4-0.6 % ophthalmic solution 1-2 drops as needed       folic acid (FOLVITE) 1 MG tablet Take 1 mg by mouth 3 times daily.       MULTIPLE VITAMIN PO Take  by mouth 2 times daily.       Ascorbic Acid (VITAMIN C) 500 MG CAPS Take 2 tablets by mouth 2 times daily.       Calcium-Vitamin D-Vitamin K (CALCIUM + D) 500-1000-40 MG-UNT-MCG CHEW Take 3 tablets by mouth daily        ALFALFA PO Take 5 tablets by mouth 2 times daily.       MILK THISTLE PO Take 1,000 mg by mouth daily.       fish oil-omega-3 fatty acids (OMEGA-3) 1000 MG capsule Take 1 g by mouth daily.       Cholecalciferol (VITAMIN D) 1000 UNITS capsule Take 1 capsule by mouth daily.       OTC products: None, except as noted above    Allergies   Allergen Reactions     Adhesive Tape      Glue and nicotine patches     Benzoin Compound      Tin-Co-Javier      "Seasonal Allergies       Latex Allergy: NO    Social History   Substance Use Topics     Smoking status: Former Smoker     Types: Cigarettes     Quit date: 3/28/1997     Smokeless tobacco: Never Used      Comment: no passive exposure     Alcohol use No      Comment: former. quit 1984     History   Drug Use No       REVIEW OF SYSTEMS:                                                    Constitutional, neuro, ENT, endocrine, pulmonary, cardiac, gastrointestinal, genitourinary, musculoskeletal, integument and psychiatric systems are negative, except as otherwise noted.      EXAM:                                                    /70 (BP Location: Right arm, Patient Position: Sitting, Cuff Size: Adult Regular)  Pulse 87  Temp 97.8  F (36.6  C) (Tympanic)  Resp 18  Ht 5' 6\" (1.676 m)  Wt 203 lb (92.1 kg)  SpO2 93%  BMI 32.77 kg/m2    GENERAL APPEARANCE: healthy, alert and no distress     EYES: EOMI, PERRL     HENT: ear canals and TM's normal and nose and mouth without ulcers or lesions     NECK: no adenopathy, no asymmetry, masses, or scars and thyroid normal to palpation     RESP: lungs clear to auscultation - no rales, rhonchi or wheezes     CV: regular rates and rhythm, normal S1 S2, no S3 or S4 and no murmur, click or rub     ABDOMEN:  soft, nontender, no HSM or masses and bowel sounds normal     MS: extremities normal- no gross deformities noted, no evidence of inflammation in joints, FROM in all extremities.     SKIN: no suspicious lesions or rashes     NEURO: Normal strength and tone, sensory exam grossly normal, mentation intact and speech normal     PSYCH: mentation appears normal. and affect normal/bright     LYMPHATICS: No axillary, cervical, or supraclavicular nodes    DIAGNOSTICS:                                                      EKG: appears normal, NSR, normal axis, normal intervals, no acute ST/T changes c/w ischemia, no LVH by voltage criteria, unchanged from previous tracings  Labs " Resulted Today:   Results for orders placed or performed in visit on 01/08/18   ALT   Result Value Ref Range    ALT 29 0 - 50 U/L   Albumin level   Result Value Ref Range    Albumin 3.2 (L) 3.4 - 5.0 g/dL   CRP, inflammation   Result Value Ref Range    CRP Inflammation <2.9 0.0 - 8.0 mg/L   CBC with platelets and differential   Result Value Ref Range    WBC 5.6 4.0 - 11.0 10e9/L    RBC Count 4.35 3.8 - 5.2 10e12/L    Hemoglobin 12.2 11.7 - 15.7 g/dL    Hematocrit 37.4 35.0 - 47.0 %    MCV 86 78 - 100 fl    MCH 28.0 26.5 - 33.0 pg    MCHC 32.6 31.5 - 36.5 g/dL    RDW 14.9 10.0 - 15.0 %    Platelet Count 247 150 - 450 10e9/L    Diff Method Automated Method     % Neutrophils 55.5 %    % Lymphocytes 31.8 %    % Monocytes 7.7 %    % Eosinophils 3.9 %    % Basophils 0.9 %    % Immature Granulocytes 0.2 %    Nucleated RBCs 0 0 /100    Absolute Neutrophil 3.1 1.6 - 8.3 10e9/L    Absolute Lymphocytes 1.8 0.8 - 5.3 10e9/L    Absolute Monocytes 0.4 0.0 - 1.3 10e9/L    Absolute Eosinophils 0.2 0.0 - 0.7 10e9/L    Absolute Basophils 0.1 0.0 - 0.2 10e9/L    Abs Immature Granulocytes 0.0 0 - 0.4 10e9/L    Absolute Nucleated RBC 0.0    Creatinine   Result Value Ref Range    Creatinine 1.02 0.52 - 1.04 mg/dL    GFR Estimate 54 (L) >60 mL/min/1.7m2    GFR Estimate If Black 66 >60 mL/min/1.7m2   ESR: Erythrocyte sedimentation rate   Result Value Ref Range    Sed Rate 18 0 - 30 mm/h       Recent Labs   Lab Test  01/08/18   1005  11/15/17   0912   09/06/17   0600   HGB  12.2  12.5   < >  10.1*   PLT  247  257   < >  224   NA   --   139   --   140   POTASSIUM   --   4.3   --   3.7   CR  1.02  1.11*   < >  0.94    < > = values in this interval not displayed.        IMPRESSION:                                                    Reason for surgery/procedure: Right carpal tunnel syndrome    The proposed surgical procedure is considered INTERMEDIATE risk.    REVISED CARDIAC RISK INDEX  The patient has the following serious cardiovascular  risks for perioperative complications such as (MI, PE, VFib and 3  AV Block):  No serious cardiac risks  INTERPRETATION: 0 risks: Class I (very low risk - 0.4% complication rate)    The patient has the following additional risks for perioperative complications:  No identified additional risks    No diagnosis found.    RECOMMENDATIONS:                                                        APPROVAL GIVEN to proceed with proposed procedure, without further diagnostic evaluation       Signed Electronically by: Jimbo Martinez MD    Copy of this evaluation report is provided to requesting physician.    [facility] Preop Guidelines

## 2018-01-09 ASSESSMENT — ANXIETY QUESTIONNAIRES: GAD7 TOTAL SCORE: 2

## 2018-01-11 ENCOUNTER — TELEPHONE (OUTPATIENT)
Dept: FAMILY MEDICINE | Facility: OTHER | Age: 68
End: 2018-01-11

## 2018-01-12 NOTE — H&P (VIEW-ONLY)
[facility] [place]  [address]  [place] [address]  [phone]  Dept: [phone]    PRE-OP EVALUATION:  Today's date: 2018    Melvi Cleveland (: 1950) presents for pre-operative evaluation assessment as requested by Dr. Carlos.  She requires evaluation and anesthesia risk assessment prior to undergoing surgery/procedure for treatment of wrist pain .  Proposed procedure: right carpal tunnel release    Date of Surgery/ Procedure: 2018  Time of Surgery/ Procedure: [facility]  Hospital[place]Surgical [place] [facility] [place]  Primary Physician: Jimbo Martinez  Type of Anesthesia Anticipated: to be determined    Patient has a Health Care Directive or Living Will:  NO    1. NO - Do you have a history of heart attack, stroke, stent, bypass or surgery on an artery in the head, neck, heart or legs?  2. YES - DO YOU EVER HAVE ANY PAIN OR DISCOMFORT IN YOUR CHEST?   3. NO - Do you have a history of  Heart Failure?  4. YES - ARE YOUR TROUBLED BY SHORTNESS OF BREATH WHEN WALKING ON THE LEVEL, UP A SLIGHT HILL OR AT NIGHT? When walking up a slight hill  5. NO - Do you currently have a cold, bronchitis or other respiratory infection?  6. YES - DO YOU HAVE A COUGH, SHORTNESS OF BREATH OR WHEEZING?  Has shortness of breath all the time  7. YES - DO YOU SOMETIMES GET PAINS IN THE CALVES OF YOUR LEGS WHEN YOU WALK? RA  8. YES - DO YOU OR ANYONE IN YOUR FAMILY HAVE PREVIOUS HISTORY OF BLOOD CLOTS? Mother   9. NO - Do you or does anyone in your family have a serious bleeding problem such as prolonged bleeding following surgeries or cuts?  10. NO - Have you ever had problems with anemia or been told to take iron pills?  11. NO - Have you had any abnormal blood loss such as black, tarry or bloody stools, or abnormal vaginal bleeding?  12. NO - Have you ever had a blood transfusion?  13. NO - Have you or any of your relatives ever had problems with anesthesia?  14. YES - DO YOU HAVE SLEEP APNEA, EXCESSIVE SNORING OR DAYTIME  DROWSINESS? Has daytime drowsiness  15. NO - Do you have any prosthetic heart valves?  16. NO - Do you have prosthetic joints?  17. NO - Is there any chance that you may be pregnant?        HPI:                                                      Brief HPI related to upcoming procedure: Sherrie has a history of right carpal tunnel syndrome and has failed conservative management.  She was seen by Orthopedics where it was felt the patient would benefit from surgical management.  I was asked to see her for preoperative medical clearance.        See problem list for active medical problems.  Problems all longstanding and stable, except as noted/documented.  See ROS for pertinent symptoms related to these conditions.                                                                                                  .    MEDICAL HISTORY:                                                    Patient Active Problem List    Diagnosis Date Noted     ACP (advance care planning) 09/28/2016     Priority: Medium     Advance Care Planning 9/28/2016: ACP Review of Chart / Resources Provided:  Reviewed chart for advance care plan.  Melvi ASHWIN Riveraon has been provided information and resources to begin or update their advance care plan.  Added by Kathy Galvan             RA (rheumatoid arthritis) (H) 11/25/2015     Priority: Medium     Comprehensive Medical Examination 11/25/2015     Priority: Medium     Seasonal allergic rhinitis 11/25/2015     Priority: Medium     Fibrocystic breast disease (FCBD) 11/25/2015     Priority: Medium     Crohn's disease of large intestine (H) 03/01/2011     Priority: Medium     Dyslipidemia 03/01/2011     Priority: Medium     Sicca syndrome (H) 03/01/2011     Priority: Medium     Peripheral polyneuropathy 03/01/2011     Priority: Medium     Neck pain, chronic 07/05/2005     Priority: Medium     05/2015 MRI:  IMPRESSION:  BROAD-BASED DISK PROTRUSION AT C5-C6 ON THE LEFT, MILDLY  IMPINGING ON THE LEFT C6  NERVE ROOT       Low back pain, chronic 12/13/2000     Priority: Medium     03/2016 MRI:  MULTILEVEL DEGENERATIVE CHANGES OF THE LUMBAR SPINE,  SIMILAR IN APPEARANCE TO THE PRIOR STUDY.  A RIGHT FORAMINAL DISK  PROTRUSION AT L3-L4 AGAIN IMPINGES THE EXITING RIGHT L3 NERVE ROOT.  CORRELATE FOR A RELATED RADICULOPATHY.        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     Parotiditis 5/9/2011     Peripheral Neuriopathy 3/1/2011     Rheumatoid arthritis(714.0) 12/13/1999     Seborrhea capitis 10/6/2011     Sjogrens Syndrome 3/1/2011     Urethral stricture 4/30/2008     Past Surgical History:   Procedure Laterality Date     BACK SURGERY  05/1995    back surgery     BIOPSY      breast bx X2     BIOPSY BREAST      LT     BIOPSY BREAST NEEDLE LOCALIZATION Right 6/12/2017    Procedure: BIOPSY BREAST NEEDLE LOCALIZATION;  WIRE LOCALIZED EXCISIONAL BIOPSY  RIGHT BREAST MASS;  Surgeon: Jeni Amin MD;  Location: [facility] OR     CHOLECYSTECTOMY  2004     COLONOSCOPY  11/15/2004    screening     COLONOSCOPY  9/24/2014     EGD with biopsy  2010, 2011    GERD     laminectomy      L4 L5 laminectomy; back pain     Current Outpatient Prescriptions   Medication Sig Dispense Refill     gabapentin (NEURONTIN) 300 MG capsule TAKE 3 CAPSULES BY MOUTH IN THE MORNING, 2 CAPSULES AT NOON AND 2 CAPSULES IN THE EVENING 210 capsule 0     ALLEGRA-D ALLERGY & CONGESTION  MG per 12 hr tablet TAKE 1 TABLET BY MOUTH TWICE DAILY 30 tablet 0     RESTASIS 0.05 % ophthalmic emulsion USE 1 DROP IN BOTH EYES 2 TIMES A DAY 60 each 0     HYDROcodone-acetaminophen (NORCO) 7.5-325 MG per tablet TAKE 1 TABLET BY MOUTH EVERY 4 TO 6 HOURS  AS NEEDED FOR PAIN 60 tablet 0     citalopram (CELEXA) 40 MG tablet TAKE 1 TABLET BY MOUTH DAILY 90 tablet 0     traZODone (DESYREL) 50 MG tablet TAKE 2-3 TABLETS BY MOUTH DAILY AT BEDTIME 270 tablet 1     PREBIOTIC PRODUCT PO Take 1 tablet by mouth daily       Probiotic Product (SOLUBLE FIBER/PROBIOTICS PO) Take 1 tablet by mouth       Flaxseed, Linseed, (FLAX SEED OIL) 1000 MG capsule Take 2 capsules by mouth daily       fluticasone (FLONASE) 50 MCG/ACT spray 1 spray Reported on 5/11/2017       ketotifen (ZADITOR/REFRESH ANTI-ITCH) 0.025 % SOLN ophthalmic solution 1 drop       ALBUTEROL 108 (90 BASE) MCG/ACT inhaler INHALE 2 PUFFS INTO THE LUNGS EVERY 6 HOURS AS NEEDED FOR SHORTNESS OF BREATH OR WHEEZING 8.5 g 5     SM GAS RELIEF ANTIFLATUENT 180 MG CAPS TAKE ONE CAPSULE BY MOUTH AS NEEDED AFTER A MEAL. MAX 2 CAPS/DAY 60 capsule 5     pantoprazole (PROTONIX) 40 MG enteric coated tablet Take 1 tablet (40 mg) by mouth daily (Patient taking differently: Take 40 mg by mouth 2 times daily ) 90 tablet 3     DICLOFENAC PO Take 100 mg by mouth daily        Garlic 400 MG TBEC        polyvinyl alcohol-povidone (REFRESH) 1.4-0.6 % ophthalmic solution 1-2 drops as needed       folic acid (FOLVITE) 1 MG tablet Take 1 mg by mouth 3 times daily.       MULTIPLE VITAMIN PO Take  by mouth 2 times daily.       Ascorbic Acid (VITAMIN C) 500 MG CAPS Take 2 tablets by mouth 2 times daily.       Calcium-Vitamin D-Vitamin K (CALCIUM + D) 500-1000-40 MG-UNT-MCG CHEW Take 3 tablets by mouth daily        ALFALFA PO Take 5 tablets by mouth 2 times daily.       MILK THISTLE PO Take 1,000 mg by mouth daily.       fish oil-omega-3 fatty acids (OMEGA-3) 1000 MG capsule Take 1 g by mouth daily.       Cholecalciferol (VITAMIN D) 1000 UNITS capsule Take 1 capsule by mouth daily.       OTC products: None, except as noted above    Allergies   Allergen Reactions     Adhesive Tape      Glue and nicotine patches     Benzoin Compound      Tin-Co-Javier      "Seasonal Allergies       Latex Allergy: NO    Social History   Substance Use Topics     Smoking status: Former Smoker     Types: Cigarettes     Quit date: 3/28/1997     Smokeless tobacco: Never Used      Comment: no passive exposure     Alcohol use No      Comment: former. quit 1984     History   Drug Use No       REVIEW OF SYSTEMS:                                                    Constitutional, neuro, ENT, endocrine, pulmonary, cardiac, gastrointestinal, genitourinary, musculoskeletal, integument and psychiatric systems are negative, except as otherwise noted.      EXAM:                                                    /70 (BP Location: Right arm, Patient Position: Sitting, Cuff Size: Adult Regular)  Pulse 87  Temp 97.8  F (36.6  C) (Tympanic)  Resp 18  Ht 5' 6\" (1.676 m)  Wt 203 lb (92.1 kg)  SpO2 93%  BMI 32.77 kg/m2    GENERAL APPEARANCE: healthy, alert and no distress     EYES: EOMI, PERRL     HENT: ear canals and TM's normal and nose and mouth without ulcers or lesions     NECK: no adenopathy, no asymmetry, masses, or scars and thyroid normal to palpation     RESP: lungs clear to auscultation - no rales, rhonchi or wheezes     CV: regular rates and rhythm, normal S1 S2, no S3 or S4 and no murmur, click or rub     ABDOMEN:  soft, nontender, no HSM or masses and bowel sounds normal     MS: extremities normal- no gross deformities noted, no evidence of inflammation in joints, FROM in all extremities.     SKIN: no suspicious lesions or rashes     NEURO: Normal strength and tone, sensory exam grossly normal, mentation intact and speech normal     PSYCH: mentation appears normal. and affect normal/bright     LYMPHATICS: No axillary, cervical, or supraclavicular nodes    DIAGNOSTICS:                                                      EKG: appears normal, NSR, normal axis, normal intervals, no acute ST/T changes c/w ischemia, no LVH by voltage criteria, unchanged from previous tracings  Labs " Resulted Today:   Results for orders placed or performed in visit on 01/08/18   ALT   Result Value Ref [facility]    ALT 29 0 - 50 U/L   Albumin level   Result Value Ref Range    Albumin 3.2 (L) 3.4 - 5.0 g/dL   CRP, inflammation   Result Value Ref Range    CRP Inflammation <2.9 0.0 - 8.0 mg/L   CBC with platelets and differential   Result Value Ref Range    WBC 5.6 4.0 - 11.0 10e9/L    RBC Count 4.35 3.8 - 5.2 10e12/L    Hemoglobin 12.2 11.7 - 15.7 g/dL    Hematocrit 37.4 35.0 - 47.0 %    MCV 86 78 - 100 fl    MCH 28.0 26.5 - 33.0 pg    MCHC 32.6 31.5 - 36.5 g/dL    RDW 14.9 10.0 - 15.0 %    Platelet Count 247 150 - 450 10e9/L    Diff Method Automated Method     % Neutrophils 55.5 %    % Lymphocytes 31.8 %    % Monocytes 7.7 %    % Eosinophils 3.9 %    % Basophils 0.9 %    % Immature Granulocytes 0.2 %    Nucleated RBCs 0 0 /100    Absolute Neutrophil 3.1 1.6 - 8.3 10e9/L    Absolute Lymphocytes 1.8 0.8 - 5.3 10e9/L    Absolute Monocytes 0.4 0.0 - 1.3 10e9/L    Absolute Eosinophils 0.2 0.0 - 0.7 10e9/L    Absolute Basophils 0.1 0.0 - 0.2 10e9/L    Abs Immature Granulocytes 0.0 0 - 0.4 10e9/L    Absolute Nucleated RBC 0.0    Creatinine   Result Value Ref Range    Creatinine 1.02 0.52 - 1.04 mg/dL    GFR Estimate 54 (L) >60 mL/min/1.7m2    GFR Estimate If Black 66 >60 mL/min/1.7m2   ESR: Erythrocyte sedimentation rate   Result Value Ref Range    Sed Rate 18 0 - 30 mm/h       Recent Labs   Lab Test  01/08/18   1005  11/15/17   0912   09/06/17   0600   HGB  12.2  12.5   < >  10.1*   PLT  247  257   < >  224   NA   --   139   --   140   POTASSIUM   --   4.3   --   3.7   CR  1.02  1.11*   < >  0.94    < > = values in this interval not displayed.        IMPRESSION:                                                    Reason for surgery/procedure: Right carpal tunnel syndrome    The proposed surgical procedure is considered INTERMEDIATE risk.    REVISED CARDIAC RISK INDEX  The patient has the following serious cardiovascular  risks for perioperative complications such as (MI, PE, VFib and 3  AV Block):  No serious cardiac risks  INTERPRETATION: 0 risks: Class I (very low risk - 0.4% complication rate)    The patient has the following additional risks for perioperative complications:  No identified additional risks    No diagnosis found.    RECOMMENDATIONS:                                                        APPROVAL GIVEN to proceed with proposed procedure, without further diagnostic evaluation       Signed Electronically by: Jimbo Martinez MD    Copy of this evaluation report is provided to requesting physician.    [place] Preop Guidelines

## 2018-01-15 ENCOUNTER — ANESTHESIA EVENT (OUTPATIENT)
Dept: SURGERY | Facility: HOSPITAL | Age: 68
End: 2018-01-15
Payer: MEDICARE

## 2018-01-15 NOTE — ANESTHESIA PREPROCEDURE EVALUATION
Anesthesia Evaluation     . Pt has had prior anesthetic. Type: General    No history of anesthetic complications          ROS/MED HX    ENT/Pulmonary: Comment: Sjogrens Syndrome/Sicca syndrome    (+)RON risk factors snores loudly, daytime somnolence, allergic rhinitis, tobacco use (quit 26 years ago), Past use , recent URI resolved last had symptoms over a week ago: . .    Neurologic: Comment: Peripheral neuniopathy    (+)neuropathy - bilateral hands and feet, migraines (several years since last migraine),     Cardiovascular:     (+) Dyslipidemia, ----. : . . . :. . Previous cardiac testing date:results:date: results:ECG reviewed date:1- results:appears normal, NSR, normal axis, normal intervals, no acute ST/T changes c/w ischemia, no LVH by voltage criteria, unchanged from previous tracings date: results:          METS/Exercise Tolerance: Comment: SOB when walking up a slight hill >4 METS   Hematologic:  - neg hematologic  ROS       Musculoskeletal: Comment: Chronic recurrent neck pain  Wilson's neuroma  (+) arthritis (RA and OA), , , -       GI/Hepatic: Comment: Chrohn's Disease    (+) GERD Asymptomatic on medication,       Renal/Genitourinary:     (+) Other Renal/ Genitourinary, urethral stricture      Endo:     (+) Obesity, .      Psychiatric:     (+) psychiatric history depression      Infectious Disease:  - neg infectious disease ROS       Malignancy:   (+)   Fibrocystic breat disease        Other: Comment: Norco, 3 a day for the last week   (+) H/O Chronic Pain,H/O chronic opiod use ,                    Physical Exam  Normal systems: cardiovascular, pulmonary and dental    Airway   Mallampati: III  TM distance: >3 FB  Neck ROM: full    Dental     Cardiovascular   Rhythm and rate: regular and normal      Pulmonary    breath sounds clear to auscultation(+) decreased breath sounds (in bases)                       Anesthesia Plan      History & Physical Review  History and physical reviewed and following  examination; no interval change.    ASA Status:  3 .    NPO Status:  > 8 hours    Plan for MAC with Intravenous and Propofol induction. Maintenance will be TIVA.  Reason for MAC:  Difficulty with conscious sedation (QS) and Extreme anxiety (QS)  PONV prophylaxis:  Ondansetron (or other 5HT-3) and Dexamethasone or Solumedrol  Risks and benefits of MAC anesthetic discussed including dental damage, aspiration, loss of airway, conversion to general anesthetic, CV complications, MI, stroke, death. Pt wishes to proceed.       Postoperative Care  Postoperative pain management:  IV analgesics and Oral pain medications.      Consents  Anesthetic plan, risks, benefits and alternatives discussed with:  Patient.  Use of blood products discussed: Yes.   Use of blood products discussed with Patient.  Consented to blood products.  .                          .

## 2018-01-16 ENCOUNTER — SURGERY (OUTPATIENT)
Age: 68
End: 2018-01-16

## 2018-01-16 ENCOUNTER — ANESTHESIA (OUTPATIENT)
Dept: SURGERY | Facility: HOSPITAL | Age: 68
End: 2018-01-16
Payer: MEDICARE

## 2018-01-16 ENCOUNTER — HOSPITAL ENCOUNTER (OUTPATIENT)
Facility: HOSPITAL | Age: 68
Discharge: HOME | End: 2018-01-16
Attending: ORTHOPAEDIC SURGERY | Admitting: ORTHOPAEDIC SURGERY
Payer: MEDICARE

## 2018-01-16 VITALS
HEIGHT: 66 IN | OXYGEN SATURATION: 92 % | RESPIRATION RATE: 16 BRPM | SYSTOLIC BLOOD PRESSURE: 94 MMHG | DIASTOLIC BLOOD PRESSURE: 53 MMHG | BODY MASS INDEX: 33.11 KG/M2 | TEMPERATURE: 96.9 F | WEIGHT: 206 LBS

## 2018-01-16 DIAGNOSIS — G56.01 CARPAL TUNNEL SYNDROME ON RIGHT: Primary | ICD-10-CM

## 2018-01-16 PROCEDURE — 64721 CARPAL TUNNEL SURGERY: CPT | Performed by: NURSE ANESTHETIST, CERTIFIED REGISTERED

## 2018-01-16 PROCEDURE — 27210794 ZZH OR GENERAL SUPPLY STERILE: Performed by: ORTHOPAEDIC SURGERY

## 2018-01-16 PROCEDURE — 25000128 H RX IP 250 OP 636: Performed by: NURSE ANESTHETIST, CERTIFIED REGISTERED

## 2018-01-16 PROCEDURE — 25000128 H RX IP 250 OP 636: Performed by: ORTHOPAEDIC SURGERY

## 2018-01-16 PROCEDURE — 25000125 ZZHC RX 250: Performed by: ORTHOPAEDIC SURGERY

## 2018-01-16 PROCEDURE — 36000050 ZZH SURGERY LEVEL 2 1ST 30 MIN: Performed by: ORTHOPAEDIC SURGERY

## 2018-01-16 PROCEDURE — 40000305 ZZH STATISTIC PRE PROC ASSESS I: Performed by: ORTHOPAEDIC SURGERY

## 2018-01-16 PROCEDURE — 25000125 ZZHC RX 250: Performed by: NURSE ANESTHETIST, CERTIFIED REGISTERED

## 2018-01-16 PROCEDURE — 37000008 ZZH ANESTHESIA TECHNICAL FEE, 1ST 30 MIN: Performed by: ORTHOPAEDIC SURGERY

## 2018-01-16 PROCEDURE — 71000027 ZZH RECOVERY PHASE 2 EACH 15 MINS: Performed by: ORTHOPAEDIC SURGERY

## 2018-01-16 RX ORDER — MEPERIDINE HYDROCHLORIDE 25 MG/ML
12.5 INJECTION INTRAMUSCULAR; INTRAVENOUS; SUBCUTANEOUS
Status: DISCONTINUED | OUTPATIENT
Start: 2018-01-16 | End: 2018-01-16 | Stop reason: HOSPADM

## 2018-01-16 RX ORDER — HYDROCODONE BITARTRATE AND ACETAMINOPHEN 5; 325 MG/1; MG/1
1 TABLET ORAL
Status: DISCONTINUED | OUTPATIENT
Start: 2018-01-16 | End: 2018-01-16 | Stop reason: HOSPADM

## 2018-01-16 RX ORDER — HYDROCODONE BITARTRATE AND ACETAMINOPHEN 5; 325 MG/1; MG/1
1-2 TABLET ORAL EVERY 4 HOURS PRN
Qty: 10 TABLET | Refills: 0 | Status: SHIPPED | OUTPATIENT
Start: 2018-01-16 | End: 2018-02-26

## 2018-01-16 RX ORDER — ONDANSETRON 2 MG/ML
4 INJECTION INTRAMUSCULAR; INTRAVENOUS EVERY 30 MIN PRN
Status: DISCONTINUED | OUTPATIENT
Start: 2018-01-16 | End: 2018-01-16 | Stop reason: HOSPADM

## 2018-01-16 RX ORDER — ONDANSETRON 4 MG/1
4 TABLET, ORALLY DISINTEGRATING ORAL EVERY 30 MIN PRN
Status: DISCONTINUED | OUTPATIENT
Start: 2018-01-16 | End: 2018-01-16 | Stop reason: HOSPADM

## 2018-01-16 RX ORDER — SODIUM CHLORIDE, SODIUM LACTATE, POTASSIUM CHLORIDE, CALCIUM CHLORIDE 600; 310; 30; 20 MG/100ML; MG/100ML; MG/100ML; MG/100ML
INJECTION, SOLUTION INTRAVENOUS CONTINUOUS
Status: DISCONTINUED | OUTPATIENT
Start: 2018-01-16 | End: 2018-01-16 | Stop reason: HOSPADM

## 2018-01-16 RX ORDER — FENTANYL CITRATE 50 UG/ML
25-50 INJECTION, SOLUTION INTRAMUSCULAR; INTRAVENOUS EVERY 5 MIN PRN
Status: DISCONTINUED | OUTPATIENT
Start: 2018-01-16 | End: 2018-01-16 | Stop reason: HOSPADM

## 2018-01-16 RX ORDER — PROPOFOL 10 MG/ML
INJECTION, EMULSION INTRAVENOUS PRN
Status: DISCONTINUED | OUTPATIENT
Start: 2018-01-16 | End: 2018-01-16

## 2018-01-16 RX ORDER — LABETALOL HYDROCHLORIDE 5 MG/ML
10 INJECTION, SOLUTION INTRAVENOUS
Status: DISCONTINUED | OUTPATIENT
Start: 2018-01-16 | End: 2018-01-16 | Stop reason: HOSPADM

## 2018-01-16 RX ORDER — CEFAZOLIN SODIUM 2 G/100ML
2 INJECTION, SOLUTION INTRAVENOUS
Status: COMPLETED | OUTPATIENT
Start: 2018-01-16 | End: 2018-01-16

## 2018-01-16 RX ORDER — NALOXONE HYDROCHLORIDE 0.4 MG/ML
.1-.4 INJECTION, SOLUTION INTRAMUSCULAR; INTRAVENOUS; SUBCUTANEOUS
Status: DISCONTINUED | OUTPATIENT
Start: 2018-01-16 | End: 2018-01-16 | Stop reason: HOSPADM

## 2018-01-16 RX ORDER — FENTANYL CITRATE 50 UG/ML
INJECTION, SOLUTION INTRAMUSCULAR; INTRAVENOUS PRN
Status: DISCONTINUED | OUTPATIENT
Start: 2018-01-16 | End: 2018-01-16

## 2018-01-16 RX ORDER — LIDOCAINE HYDROCHLORIDE 20 MG/ML
INJECTION, SOLUTION INFILTRATION; PERINEURAL PRN
Status: DISCONTINUED | OUTPATIENT
Start: 2018-01-16 | End: 2018-01-16

## 2018-01-16 RX ADMIN — PROPOFOL 20 MG: 10 INJECTION, EMULSION INTRAVENOUS at 08:14

## 2018-01-16 RX ADMIN — SODIUM CHLORIDE, POTASSIUM CHLORIDE, SODIUM LACTATE AND CALCIUM CHLORIDE: 600; 310; 30; 20 INJECTION, SOLUTION INTRAVENOUS at 07:33

## 2018-01-16 RX ADMIN — LIDOCAINE HYDROCHLORIDE 8 ML: 10 INJECTION, SOLUTION EPIDURAL; INFILTRATION; INTRACAUDAL; PERINEURAL at 08:33

## 2018-01-16 RX ADMIN — PROPOFOL 20 MG: 10 INJECTION, EMULSION INTRAVENOUS at 08:21

## 2018-01-16 RX ADMIN — FENTANYL CITRATE 50 MCG: 50 INJECTION, SOLUTION INTRAMUSCULAR; INTRAVENOUS at 08:14

## 2018-01-16 RX ADMIN — CEFAZOLIN SODIUM 2 G: 2 INJECTION, SOLUTION INTRAVENOUS at 08:11

## 2018-01-16 RX ADMIN — MIDAZOLAM 1 MG: 1 INJECTION INTRAMUSCULAR; INTRAVENOUS at 08:11

## 2018-01-16 RX ADMIN — LIDOCAINE HYDROCHLORIDE 40 MG: 20 INJECTION, SOLUTION INFILTRATION; PERINEURAL at 08:14

## 2018-01-16 ASSESSMENT — LIFESTYLE VARIABLES: TOBACCO_USE: 1

## 2018-01-16 NOTE — DISCHARGE INSTRUCTIONS
POST OPERATIVE PATIENT INFORMATION  Carpal Tunnel Surgery / Median Never Decompression    DIET  No restrictions.  If you become nauseated, try fluids such as tea, lemon-lime pop, or soup.  CARE OF OPERATIVE SITE  Keep the bandage over your incision dry and in place until you see your doctor.  To minimize swelling during the 24-48 hours after surgery, keep the hand elevated in a sling when you are up and around or on a pillow when you are in bed or sitting in a chair.  Check your fingers for circulation, sensation and motion every hour the day of surgery and every 4 hours the second day after surgery.  Fingers should be pink, warm, and have  feeling .  HAND EXERCISES  To prevent stiffness, make a fist and release is slowly several times every hour for the first 48 hours after surgery.  Touch your little finger to your thumb.  Do NOT stretch the palm of your hand.  DISCOMFORT  Pain and tingling may disappear almost immediately after surgery or diminish gradually for several weeks or months.  Your doctor may give you a prescription for pain or you may use aspirin, Tylenol, or the pain medication you took before surgery.  Normal sensation returns slowly - often taking up to 1 year.  In some people, the middle finger remains numb even longer.  ACTIVITY  Rest and relax for at least 12 hours after surgery.  Avoid lifting heavy objects for at least 3 weeks.  Your doctor will give you specific instructions and time frame.  Wear a sling when you work or play.  Carry small objects in a shoulder bag instead of in your hand.  CONTACT YOUR DOCTOR  Contact your doctor if any of the following conditions occur:    Your fingers become cold, blue, or swollen.    Your pain pills do not relieve the pain.    You develop a fever of 100 degrees or higher. (A low grade fever below 100 degrees may occur as a normal body response to the surgical procedure.)    If you are uncertain about any of the above items or have any questions,  please contact the [facility] at [phone].  After hours, please contact the Emergency Room at [phone].     Post-Anesthesia Patient Instructions    IMMEDIATELY FOLLOWING SURGERY:  Do not drive or operate machinery for the first twenty four hours after surgery.  Do not make any important decisions for twenty four hours after surgery or while taking narcotic pain medications or sedatives.  If you develop intractable nausea and vomiting or a severe headache please notify your doctor immediately.    FOLLOW-UP:  Please make an appointment with your surgeon as instructed. You do not need to follow up with anesthesia unless specifically instructed to do so.    WOUND CARE INSTRUCTIONS (if applicable):  Keep a dry clean dressing on the anesthesia/puncture wound site if there is drainage.  Once the wound has quit draining you may leave it open to air.  Generally you should leave the bandage intact for twenty four hours unless there is drainage.  If the epidural site drains for more than 36-48 hours please call the anesthesia department.    QUESTIONS?:  Please feel free to call your physician or the hospital  if you have any questions, and they will be happy to assist you.

## 2018-01-16 NOTE — PHARMACY
[facility]    Pharmacy      Antimicrobial Stewardship Note     Current antimicrobial therapy:  Anti-infectives (Future)    Start     Dose/Rate Route Frequency Ordered Stop    01/16/18 0722  ceFAZolin (ANCEF) intermittent infusion 2 g in 100 mL dextrose PRE-MIX      2 g  over 30 Minutes Intravenous PRE-OP/PRE-PROCEDURE 01/16/18 0722            Indication: Perioperative Pharmacoprophylaxis     Pertinent labs:  Creatinine   Creatinine   Date Value Ref Range Status   01/08/2018 1.02 0.52 - 1.04 mg/dL Final   11/15/2017 1.11 (H) 0.52 - 1.04 mg/dL Final   10/18/2017 1.04 0.52 - 1.04 mg/dL Final     WBC   WBC   Date Value Ref Range Status   01/08/2018 5.6 4.0 - 11.0 10e9/L Final   11/15/2017 4.6 4.0 - 11.0 10e9/L Final   09/29/2017 4.9 4.0 - 11.0 10e9/L Final     Procalcitonin No results found for: PCAL  CRP   CRP Inflammation   Date Value Ref Range Status   01/08/2018 <2.9 0.0 - 8.0 mg/L Final   09/29/2017 3.0 0.0 - 8.0 mg/L Final   09/01/2017 170.0 (H) 0.0 - 8.0 mg/L Final       Culture Results: N/A     Recommendations/Interventions:  1. No recommendations at this time.    Romeo Arellano [facility]  January 16, 2018

## 2018-01-16 NOTE — OR NURSING
Discharge instructions discussed with Pt. Eating, drinking, and tolerating well. Denies pain. Some numbness present in operative hand due to block. Dressed with assistance of . Pain medication filled at [facility] and sent home with Pt. Escorted to ER entrance via wheelchair. Claire 20.

## 2018-01-16 NOTE — BRIEF OP NOTE
[facility]  Orthopedics Brief Operative Note    Pre-operative diagnosis: RIGHT CARPAL TUNNEL SYNDROME   Post-operative diagnosis Same as Above   Procedure: Procedure(s):  RIGHT CARPAL TUNNEL RELEASE - Wound Class: I-Clean   Surgeon: Haider Carlos MD, MD   Assistants(s): None   Anesthesia: Combined MAC with Local    Estimated blood loss: Minimal    Total IV fluids: (See anesthesia record)   Blood transfusion: No transfusion was given during surgery   Total urine output: (See anesthesia record)   Drains: None   Specimens: None   Implants: None   Findings: Thickened TCL; satisfactory CTR   Complications: None   Condition: Stable   Comments: See dictated operative report for full details      Dictation Number: [id number]

## 2018-01-16 NOTE — ANESTHESIA CARE TRANSFER NOTE
Patient: Melvi Cleveland    Procedure(s):  RIGHT CARPAL TUNNEL RELEASE - Wound Class: I-Clean    Diagnosis: RIGHT CARPAL TUNNEL SYNDROME  Diagnosis Additional Information: No value filed.    Anesthesia Type:   MAC     Note:  Airway :Nasal Cannula  Patient transferred to:Phase II  Handoff Report: Identifed the Patient, Identified the Reponsible Provider, Reviewed the pertinent medical history, Discussed the surgical course, Reviewed Intra-OP anesthesia mangement and issues during anesthesia, Set expectations for post-procedure period and Allowed opportunity for questions and acknowledgement of understanding      Vitals: (Last set prior to Anesthesia Care Transfer)    CRNA VITALS  1/16/2018 0805 - 1/16/2018 0838      1/16/2018             Resp Rate (observed): (!)  1    Resp Rate (set): 8                Electronically Signed By: KENDRA Avalos CRNA  January 16, 2018  8:38 AM

## 2018-01-16 NOTE — OP NOTE
PREOPERATIVE DIAGNOSIS:  Right carpal tunnel syndrome.      POSTOPERATIVE DIAGNOSIS:  Right carpal tunnel syndrome.      PROCEDURE:  Right open carpal tunnel release.      SURGEON:  Haider Carlos MD      ASSISTANT:  None.      FINDINGS:   1.  Thickened transverse carpal ligament.   2.  Satisfactory open carpal tunnel release with incontinuity of median nerve and no carpal tunnel lesions or masses.      ESTIMATED BLOOD LOSS:  2 mL      SPECIMENS:  None.      DRAINS:  None.      COMPLICATIONS:  None.      ANESTHESIA:  Sedation with local.      IMPLANTS:  None.      INDICATIONS:  The patient is a 67-year-old female with EMG and clinical evidence of carpal tunnel syndrome.  She elected to proceed with right open carpal tunnel release.  We discussed the risks and benefits of the procedure.  She obtained preoperative clearance from her primary care physician.      DESCRIPTION OF PROCEDURE:  The patient was seen in the preoperative area.  Surgical site was marked.  Questions were answered.  She was taken to the operating room, placed under sedation.  Her right upper extremity was prepped and draped in a sterile fashion with ChloraPrep.  A timeout was called identifying correct patient and surgical site.  I then anesthetized the skin and subcutaneous tissue of her right carpal tunnel.  I then exsanguinated the limb and inflated the tourniquet to 250 mmHg.  I made a 2 cm incision overlying the carpal tunnel.  Dissection was carried down to the palmar fascia, to the transverse carpal ligament.  It was incised in the mid portion.  I then carried the dissection distally and then proximally until complete release was obtained.  Tourniquet was deflated.  Hemostasis was achieved.  There were no carpal tunnel lesions or masses.  The wound was irrigated and closed with 4-0 nylon suture in interrupted fashion.  Sterile dressing was applied.  She was awakened and transferred to PACU in stable condition.      POSTOPERATIVE PLAN:  She  will be in her dressing for 4 days.  She can wash her hand.  She will follow up in about a week for wound check, suture removal and assessment of her symptoms.  I gave her Norco for pain control.         DALY ASIF MD             D: 2018 08:39   T: 2018 09:22   MT: VICTOR HUGO      Name:     TK URIAS   MRN:      [id number]        Account:        [id number]   :      1950           Procedure Date: 2018      Document: [id number]

## 2018-01-16 NOTE — IP AVS SNAPSHOT
MRN:[id number]                      After Visit Summary   1/16/2018    Melvi Cleveland    MRN: [id number]           Thank you!     Thank you for choosing [facility] for your care. Our goal is always to provide you with excellent care. Hearing back from our patients is one way we can continue to improve our services. Please take a few minutes to complete the written survey that you may receive in the mail after you visit with us. Thank you!        Patient Information     Date Of Birth          1950        About your hospital stay     You were admitted on:  January 16, 2018 You last received care in the:  [facility] Preop/Phase II    You were discharged on:  January 16, 2018       Who to Call     For medical emergencies, please call 911.  For non-urgent questions about your medical care, please call your primary care provider or clinic, [phone]  For questions related to your surgery, please call your surgery clinic        Attending Provider     Provider Specialty    Haider Carlos MD Orthopedics       Primary Care Provider Office Phone # Fax #    Jimbo Martinez [phone]      After Care Instructions     Diet Instructions       Resume pre-procedure diet            Discharge Instructions       Patient to follow up with appointment in 1 weeks            Do not - immerse incision in water until sutures removed       Do not immerse incision in water until sutures removed            Dressing       Keep dressing clean and dry.  Dressing / incisional care as instructed by RN and or Surgeon  Remove POD #5            No lifting        No lifting over 2 lbs and no strenuous physical activity for 1 weeks            Shower       No shower for 24 hours post procedure. May shower Postoperative Day (POD)  5                  Further instructions from your care team             POST OPERATIVE PATIENT INFORMATION  Carpal Tunnel Surgery / Median Never Decompression    DIET  No restrictions.  If  you become nauseated, try fluids such as tea, lemon-lime pop, or soup.  CARE OF OPERATIVE SITE  Keep the bandage over your incision dry and in place until you see your doctor.  To minimize swelling during the 24-48 hours after surgery, keep the hand elevated in a sling when you are up and around or on a pillow when you are in bed or sitting in a chair.  Check your fingers for circulation, sensation and motion every hour the day of surgery and every 4 hours the second day after surgery.  Fingers should be pink, warm, and have  feeling .  HAND EXERCISES  To prevent stiffness, make a fist and release is slowly several times every hour for the first 48 hours after surgery.  Touch your little finger to your thumb.  Do NOT stretch the palm of your hand.  DISCOMFORT  Pain and tingling may disappear almost immediately after surgery or diminish gradually for several weeks or months.  Your doctor may give you a prescription for pain or you may use aspirin, Tylenol, or the pain medication you took before surgery.  Normal sensation returns slowly - often taking up to 1 year.  In some people, the middle finger remains numb even longer.  ACTIVITY  Rest and relax for at least 12 hours after surgery.  Avoid lifting heavy objects for at least 3 weeks.  Your doctor will give you specific instructions and time frame.  Wear a sling when you work or play.  Carry small objects in a shoulder bag instead of in your hand.  CONTACT YOUR DOCTOR  Contact your doctor if any of the following conditions occur:    Your fingers become cold, blue, or swollen.    Your pain pills do not relieve the pain.    You develop a fever of 100 degrees or higher. (A low grade fever below 100 degrees may occur as a normal body response to the surgical procedure.)    If you are uncertain about any of the above items or have any questions, please contact the [facility] at [phone].  After hours, please contact the Emergency Room at [phone].  "    Post-Anesthesia Patient Instructions    IMMEDIATELY FOLLOWING SURGERY:  Do not drive or operate machinery for the first twenty four hours after surgery.  Do not make any important decisions for twenty four hours after surgery or while taking narcotic pain medications or sedatives.  If you develop intractable nausea and vomiting or a severe headache please notify your doctor immediately.    FOLLOW-UP:  Please make an appointment with your surgeon as instructed. You do not need to follow up with anesthesia unless specifically instructed to do so.    WOUND CARE INSTRUCTIONS (if applicable):  Keep a dry clean dressing on the anesthesia/puncture wound site if there is drainage.  Once the wound has quit draining you may leave it open to air.  Generally you should leave the bandage intact for twenty four hours unless there is drainage.  If the epidural site drains for more than 36-48 hours please call the anesthesia department.    QUESTIONS?:  Please feel free to call your physician or the hospital  if you have any questions, and they will be happy to assist you.       Pending Results     No orders found from 1/14/2018 to 1/17/2018.            Admission Information     Date & Time Provider Department Dept. Phone    1/16/2018 Haider Carlos MD [facility] Preop/Phase [phone]      Your Vitals Were     Blood Pressure Temperature Respirations Height Weight Pulse Oximetry    99/62 96.9  F (36.1  C) (Oral) 16 1.676 m (5' 6\") 93.4 kg (206 lb) 92%    BMI (Body Mass Index)                   33.25 kg/m2           MyChart Information     [employer] gives you secure access to your electronic health record. If you see a primary care provider, you can also send messages to your care team and make appointments. If you have questions, please call your primary care clinic.  If you do not have a primary care provider, please call [phone] and they will assist you.        Care EveryWhere ID     This is your Care EveryWhere ID. " This could be used by other organizations to access your [facility] medical records  [id number]        Equal Access to Services     RILEY OLIVEROS : Hadii dawna Holland, carmela palma, jerelroxanna martinmalexy pascal, bill mathisannachang soto. So [facility] [phone].    ATENCIÓN: Si habla español, tiene a garza disposición servicios gratuitos de asistencia lingüística. Llame al [phone].    We comply with applicable federal civil rights laws and [place] laws. We do not discriminate on the basis of race, color, national origin, age, disability, sex, sexual orientation, or gender identity.               Review of your medicines      CONTINUE these medicines which may have CHANGED, or have new prescriptions. If we are uncertain of the size of tablets/capsules you have at home, strength may be listed as something that might have changed.        Dose / Directions    * HYDROcodone-acetaminophen 7.5-325 MG per tablet   Commonly known as:  NORCO   This may have changed:  Another medication with the same name was added. Make sure you understand how and when to take each.   Used for:  Rheumatoid arthritis involving multiple sites with positive rheumatoid factor (H)        TAKE 1 TABLET BY MOUTH EVERY 4 TO 6 HOURS AS NEEDED FOR PAIN   Quantity:  60 tablet   Refills:  0       * HYDROcodone-acetaminophen 5-325 MG per tablet   Commonly known as:  NORCO   This may have changed:  You were already taking a medication with the same name, and this prescription was added. Make sure you understand how and when to take each.   Used for:  Carpal tunnel syndrome on right        Dose:  1-2 tablet   Take 1-2 tablets by mouth every 4 hours as needed for other (Moderate to Severe Pain)   Quantity:  10 tablet   Refills:  0       pantoprazole 40 MG EC tablet   Commonly known as:  PROTONIX   This may have changed:  when to take this   Used for:  Diagnosis unknown        Dose:  40 mg   Take 1 tablet (40 mg) by mouth daily    Quantity:  90 tablet   Refills:  3       * Notice:  This list has 2 medication(s) that are the same as other medications prescribed for you. Read the directions carefully, and ask your doctor or other care provider to review them with you.      CONTINUE these medicines which have NOT CHANGED        Dose / Directions    ALFALFA PO        Dose:  5 tablet   Take 5 tablets by mouth 2 times daily.   Refills:  0       ALLEGRA-D ALLERGY & CONGESTION  MG per 12 hr tablet   Used for:  Nasal congestion   Generic drug:  fexofenadine-pseudoePHEDrine        TAKE 1 TABLET BY MOUTH TWICE DAILY   Quantity:  30 tablet   Refills:  0       CALCIUM + D 500-1000-40 MG-UNT-MCG Chew   Generic drug:  Calcium-Vitamin D-Vitamin K        Dose:  3 tablet   Take 3 tablets by mouth daily   Refills:  0       citalopram 40 MG tablet   Commonly known as:  celeXA   Used for:  Panic disorder without agoraphobia        TAKE 1 TABLET BY MOUTH DAILY   Quantity:  90 tablet   Refills:  0       DICLOFENAC PO        Dose:  100 mg   Take 100 mg by mouth daily   Refills:  0       fish oil-omega-3 fatty acids 1000 MG capsule        Dose:  1 g   Take 1 g by mouth daily.   Refills:  0       flax seed oil 1000 MG capsule        Dose:  2 capsule   Take 2 capsules by mouth daily   Refills:  0       fluticasone 50 MCG/ACT spray   Commonly known as:  FLONASE        Dose:  1 spray   1 spray Reported on 5/11/2017   Refills:  0       folic acid 1 MG tablet   Commonly known as:  FOLVITE        Dose:  1 mg   Take 1 mg by mouth 3 times daily.   Refills:  0       gabapentin 300 MG capsule   Commonly known as:  NEURONTIN   Used for:  Peripheral polyneuropathy        TAKE 3 CAPSULES BY MOUTH IN THE MORNING, 2 CAPSULES AT NOON AND 2 CAPSULES IN THE EVENING   Quantity:  210 capsule   Refills:  0       Garlic 400 MG Tbec        Refills:  0       ketotifen 0.025 % Soln ophthalmic solution   Commonly known as:  ZADITOR/REFRESH ANTI-ITCH        Dose:  1 drop   1 drop    Refills:  0       MILK THISTLE PO        Dose:  1000 mg   Take 1,000 mg by mouth daily.   Refills:  0       MULTIPLE VITAMIN PO        Take  by mouth 2 times daily.   Refills:  0       PREBIOTIC PRODUCT PO        Dose:  1 tablet   Take 1 tablet by mouth daily   Refills:  0       PROAIR  (90 BASE) MCG/ACT Inhaler   Used for:  Influenza-like symptoms   Generic drug:  albuterol        INHALE 2 PUFFS INTO THE LUNGS EVERY 6 HOURS AS NEEDED FOR SHORTNESS OF BREATH OR WHEEZING   Quantity:  8.5 g   Refills:  5       REFRESH 1.4-0.6 % ophthalmic solution   Generic drug:  polyvinyl alcohol-povidone        Dose:  1-2 drop   1-2 drops as needed   Refills:  0       RESTASIS 0.05 % ophthalmic emulsion   Used for:  Bilateral dry eyes   Generic drug:  cycloSPORINE        USE 1 DROP IN BOTH EYES 2 TIMES A DAY   Quantity:  60 each   Refills:  0       SM GAS RELIEF ANTIFLATUENT 180 MG Caps   Used for:  Gas   Generic drug:  Simethicone        TAKE ONE CAPSULE BY MOUTH AS NEEDED AFTER A MEAL. MAX 2 CAPS/DAY   Quantity:  60 capsule   Refills:  5       SOLUBLE FIBER/PROBIOTICS PO        Dose:  1 tablet   Take 1 tablet by mouth   Refills:  0       traZODone 50 MG tablet   Commonly known as:  DESYREL   Used for:  Primary insomnia        TAKE 2-3 TABLETS BY MOUTH DAILY AT BEDTIME   Quantity:  270 tablet   Refills:  1       Vitamin C 500 MG Caps        Dose:  2 tablet   Take 2 tablets by mouth 2 times daily.   Refills:  0       vitamin D 1000 UNITS capsule        Dose:  1 capsule   Take 1 capsule by mouth daily.   Refills:  0            Where to get your medicines      Some of these will need a paper prescription and others can be bought over the counter. Ask your nurse if you have questions.     Bring a paper prescription for each of these medications     HYDROcodone-acetaminophen 5-325 MG per tablet                Protect others around you: Learn how to safely use, store and throw away your medicines at www.disposemymeds.org.              Medication List: This is a list of all your medications and when to take them. Check marks below indicate your daily home schedule. Keep this list as a reference.      Medications           Morning Afternoon Evening Bedtime As Needed    ALFALFA PO   Take 5 tablets by mouth 2 times daily.                                ALLEGRA-D ALLERGY & CONGESTION  MG per 12 hr tablet   TAKE 1 TABLET BY MOUTH TWICE DAILY   Generic drug:  fexofenadine-pseudoePHEDrine                                CALCIUM + D 500-1000-40 MG-UNT-MCG Chew   Take 3 tablets by mouth daily   Generic drug:  Calcium-Vitamin D-Vitamin K                                citalopram 40 MG tablet   Commonly known as:  celeXA   TAKE 1 TABLET BY MOUTH DAILY                                DICLOFENAC PO   Take 100 mg by mouth daily                                fish oil-omega-3 fatty acids 1000 MG capsule   Take 1 g by mouth daily.                                flax seed oil 1000 MG capsule   Take 2 capsules by mouth daily                                fluticasone 50 MCG/ACT spray   Commonly known as:  FLONASE   1 spray Reported on 5/11/2017                                folic acid 1 MG tablet   Commonly known as:  FOLVITE   Take 1 mg by mouth 3 times daily.                                gabapentin 300 MG capsule   Commonly known as:  NEURONTIN   TAKE 3 CAPSULES BY MOUTH IN THE MORNING, 2 CAPSULES AT NOON AND 2 CAPSULES IN THE EVENING                                Garlic 400 MG Tbec                                * HYDROcodone-acetaminophen 7.5-325 MG per tablet   Commonly known as:  NORCO   TAKE 1 TABLET BY MOUTH EVERY 4 TO 6 HOURS AS NEEDED FOR PAIN                                * HYDROcodone-acetaminophen 5-325 MG per tablet   Commonly known as:  NORCO   Take 1-2 tablets by mouth every 4 hours as needed for other (Moderate to Severe Pain)                                ketotifen 0.025 % Soln ophthalmic solution   Commonly known as:   ZADITOR/REFRESH ANTI-ITCH   1 drop                                MILK THISTLE PO   Take 1,000 mg by mouth daily.                                MULTIPLE VITAMIN PO   Take  by mouth 2 times daily.                                pantoprazole 40 MG EC tablet   Commonly known as:  PROTONIX   Take 1 tablet (40 mg) by mouth daily                                PREBIOTIC PRODUCT PO   Take 1 tablet by mouth daily                                PROAIR  (90 BASE) MCG/ACT Inhaler   INHALE 2 PUFFS INTO THE LUNGS EVERY 6 HOURS AS NEEDED FOR SHORTNESS OF BREATH OR WHEEZING   Generic drug:  albuterol                                REFRESH 1.4-0.6 % ophthalmic solution   1-2 drops as needed   Generic drug:  polyvinyl alcohol-povidone                                RESTASIS 0.05 % ophthalmic emulsion   USE 1 DROP IN BOTH EYES 2 TIMES A DAY   Generic drug:  cycloSPORINE                                SM GAS RELIEF ANTIFLATUENT 180 MG Caps   TAKE ONE CAPSULE BY MOUTH AS NEEDED AFTER A MEAL. MAX 2 CAPS/DAY   Generic drug:  Simethicone                                SOLUBLE FIBER/PROBIOTICS PO   Take 1 tablet by mouth                                traZODone 50 MG tablet   Commonly known as:  DESYREL   TAKE 2-3 TABLETS BY MOUTH DAILY AT BEDTIME                                Vitamin C 500 MG Caps   Take 2 tablets by mouth 2 times daily.                                vitamin D 1000 UNITS capsule   Take 1 capsule by mouth daily.                                * Notice:  This list has 2 medication(s) that are the same as other medications prescribed for you. Read the directions carefully, and ask your doctor or other care provider to review them with you.

## 2018-01-24 ENCOUNTER — TRANSFERRED RECORDS (OUTPATIENT)
Dept: HEALTH INFORMATION MANAGEMENT | Facility: HOSPITAL | Age: 68
End: 2018-01-24

## 2018-01-25 DIAGNOSIS — R09.81 NASAL CONGESTION: ICD-10-CM

## 2018-01-25 RX ORDER — FEXOFENADINE HYDROCHLORIDE AND PSEUDOEPHEDRINE HYDROCHLORIDE 60; 120 MG/1; MG/1
TABLET, FILM COATED, EXTENDED RELEASE ORAL
Qty: 30 TABLET | Refills: 0 | Status: SHIPPED | OUTPATIENT
Start: 2018-01-25 | End: 2018-02-21

## 2018-01-31 DIAGNOSIS — G62.9 PERIPHERAL POLYNEUROPATHY: ICD-10-CM

## 2018-01-31 RX ORDER — GABAPENTIN 300 MG/1
CAPSULE ORAL
Qty: 210 CAPSULE | Refills: 0 | Status: SHIPPED | OUTPATIENT
Start: 2018-01-31 | End: 2018-03-02

## 2018-02-21 DIAGNOSIS — R09.81 NASAL CONGESTION: ICD-10-CM

## 2018-02-21 RX ORDER — FEXOFENADINE HYDROCHLORIDE AND PSEUDOEPHEDRINE HYDROCHLORIDE 60; 120 MG/1; MG/1
TABLET, FILM COATED, EXTENDED RELEASE ORAL
Qty: 30 TABLET | Refills: 0 | Status: SHIPPED | OUTPATIENT
Start: 2018-02-21 | End: 2018-03-26

## 2018-02-21 NOTE — TELEPHONE ENCOUNTER
Allegra  - D      Future Office visit:   none    Routing refill request to provider for review/approval because:  Drug not on the [facility], P or  Health refill protocol or controlled substance.  Thank you, Tania Martinez, TAYLOR for addressing this refill for Dr. Jennifer Guerra who is out of the office this afternoon.

## 2018-02-26 ENCOUNTER — OFFICE VISIT (OUTPATIENT)
Dept: FAMILY MEDICINE | Facility: OTHER | Age: 68
End: 2018-02-26
Attending: FAMILY MEDICINE
Payer: COMMERCIAL

## 2018-02-26 VITALS
WEIGHT: 205 LBS | SYSTOLIC BLOOD PRESSURE: 110 MMHG | TEMPERATURE: 99 F | RESPIRATION RATE: 20 BRPM | OXYGEN SATURATION: 96 % | HEART RATE: 89 BPM | HEIGHT: 66 IN | BODY MASS INDEX: 32.95 KG/M2 | DIASTOLIC BLOOD PRESSURE: 68 MMHG

## 2018-02-26 DIAGNOSIS — J01.01 ACUTE RECURRENT MAXILLARY SINUSITIS: Primary | ICD-10-CM

## 2018-02-26 PROCEDURE — 99213 OFFICE O/P EST LOW 20 MIN: CPT | Performed by: FAMILY MEDICINE

## 2018-02-26 PROCEDURE — G0463 HOSPITAL OUTPT CLINIC VISIT: HCPCS

## 2018-02-26 RX ORDER — LEVOFLOXACIN 500 MG/1
500 TABLET, FILM COATED ORAL DAILY
Qty: 10 TABLET | Refills: 0 | Status: SHIPPED | OUTPATIENT
Start: 2018-02-26 | End: 2018-06-12

## 2018-02-26 ASSESSMENT — ANXIETY QUESTIONNAIRES
GAD7 TOTAL SCORE: 0
6. BECOMING EASILY ANNOYED OR IRRITABLE: NOT AT ALL
7. FEELING AFRAID AS IF SOMETHING AWFUL MIGHT HAPPEN: NOT AT ALL
4. TROUBLE RELAXING: NOT AT ALL
1. FEELING NERVOUS, ANXIOUS, OR ON EDGE: NOT AT ALL
2. NOT BEING ABLE TO STOP OR CONTROL WORRYING: NOT AT ALL
5. BEING SO RESTLESS THAT IT IS HARD TO SIT STILL: NOT AT ALL
3. WORRYING TOO MUCH ABOUT DIFFERENT THINGS: NOT AT ALL

## 2018-02-26 ASSESSMENT — PAIN SCALES - GENERAL: PAINLEVEL: MILD PAIN (3)

## 2018-02-26 NOTE — MR AVS SNAPSHOT
After Visit Summary   2/26/2018    Melvi Cleveland    MRN: [id number]           Patient Information     Date Of Birth          1950        Visit Information        Provider Department      2/26/2018 10:00 AM Jimbo Martinez MD [facility]        Today's Diagnoses     Acute recurrent maxillary sinusitis    -  1      Care Instructions    Take levofloxacin once daily          Follow-ups after your visit        Follow-up notes from your care team     Return if symptoms worsen or fail to improve.      Who to contact     If you have questions or need follow up information about today's clinic visit or your schedule please contact [facility] directly at [phone].  Normal or non-critical lab and imaging results will be communicated to you by MyChart, letter or phone within 4 business days after the clinic has received the results. If you do not hear from us within 7 days, please contact the clinic through [employer] or phone. If you have a critical or abnormal lab result, we will notify you by phone as soon as possible.  Submit refill requests through [employer] or call your pharmacy and they will forward the refill request to us. Please allow 3 business days for your refill to be completed.          Additional Information About Your Visit        MyChart Information     [employer] gives you secure access to your electronic health record. If you see a primary care provider, you can also send messages to your care team and make appointments. If you have questions, please call your primary care clinic.  If you do not have a primary care provider, please call [phone] and they will assist you.        Care EveryWhere ID     This is your Care EveryWhere ID. This could be used by other organizations to access your [place] medical records  [id number]        Your Vitals Were     Pulse Temperature Respirations Height Pulse Oximetry BMI (Body Mass Index)    89 99  F (37.2  C)  "(Tympanic) 20 5' 6\" (1.676 m) 96% 33.09 kg/m2       Blood Pressure from Last 3 Encounters:   02/26/18 110/68   01/16/18 94/53   01/08/18 122/70    Weight from Last 3 Encounters:   02/26/18 205 lb (93 kg)   01/16/18 206 lb (93.4 kg)   01/08/18 203 lb (92.1 kg)              Today, you had the following     No orders found for display         Today's Medication Changes          These changes are accurate as of 2/26/18 11:59 PM.  If you have any questions, ask your nurse or doctor.               Start taking these medicines.        Dose/Directions    levofloxacin 500 MG tablet   Commonly known as:  LEVAQUIN   Used for:  Acute recurrent maxillary sinusitis   Started by:  Jimbo Martinez MD        Dose:  500 mg   Take 1 tablet (500 mg) by mouth daily   Quantity:  10 tablet   Refills:  0         These medicines have changed or have updated prescriptions.        Dose/Directions    pantoprazole 40 MG EC tablet   Commonly known as:  PROTONIX   This may have changed:  when to take this   Used for:  Diagnosis unknown        Dose:  40 mg   Take 1 tablet (40 mg) by mouth daily   Quantity:  90 tablet   Refills:  3            Where to get your medicines      These medications were sent to [facility] [place] [id number] [facility]  [address] [place] [address]     Phone:  [phone]     levofloxacin 500 MG tablet                Primary Care Provider Office Phone # Fax #    Jimbo Martinez [phone] [address]  [place] [address]        Equal Access to Services     [facility] DOMO AH: Hadii dawna corley hadbrisao Sodex, waaxda luqadaha, qaybta kaalmada adeegyada, bill soto. So [facility] [phone].    ATENCIÓN: Si habla español, tiene a garza disposición servicios gratuitos de asistencia lingüística. Llame al [phone].    We comply with applicable federal civil rights laws and [place] laws. We do not discriminate on the basis of race, color, national " origin, age, disability, sex, sexual orientation, or gender identity.            Thank you!     Thank you for choosing [facility]  for your care. Our goal is always to provide you with excellent care. Hearing back from our patients is one way we can continue to improve our services. Please take a few minutes to complete the written survey that you may receive in the mail after your visit with us. Thank you!             Your Updated Medication List - Protect others around you: Learn how to safely use, store and throw away your medicines at www.disposemymeds.org.          This list is accurate as of 2/26/18 11:59 PM.  Always use your most recent med list.                   Brand Name Dispense Instructions for use Diagnosis    ALFALFA PO      Take 5 tablets by mouth 2 times daily.        ALLEGRA-D ALLERGY & CONGESTION  MG per 12 hr tablet   Generic drug:  fexofenadine-pseudoePHEDrine     30 tablet    TAKE 1 TABLET BY MOUTH TWICE DAILY    Nasal congestion       CALCIUM + D 500-1000-40 MG-UNT-MCG Chew   Generic drug:  Calcium-Vitamin D-Vitamin K      Take 3 tablets by mouth daily        citalopram 40 MG tablet    celeXA    90 tablet    TAKE 1 TABLET BY MOUTH DAILY    Panic disorder without agoraphobia       DICLOFENAC PO      Take 100 mg by mouth daily        fish oil-omega-3 fatty acids 1000 MG capsule      Take 1 g by mouth daily.        flax seed oil 1000 MG capsule      Take 2 capsules by mouth daily        fluticasone 50 MCG/ACT spray    FLONASE     1 spray Reported on 5/11/2017        folic acid 1 MG tablet    FOLVITE     Take 1 mg by mouth 3 times daily.        gabapentin 300 MG capsule    NEURONTIN    210 capsule    TAKE 3 CAPSULES BY MOUTH IN THE MORNING, 2 CAPSULES AT NOON AND 2 CAPSULES IN THE EVENING    Peripheral polyneuropathy       Garlic 400 MG Tbec           HYDROcodone-acetaminophen 7.5-325 MG per tablet    NORCO    60 tablet    TAKE 1 TABLET BY MOUTH EVERY 4 TO 6 HOURS AS NEEDED FOR  PAIN    Rheumatoid arthritis involving multiple sites with positive rheumatoid factor (H)       ketotifen 0.025 % Soln ophthalmic solution    ZADITOR/REFRESH ANTI-ITCH     1 drop        levofloxacin 500 MG tablet    LEVAQUIN    10 tablet    Take 1 tablet (500 mg) by mouth daily    Acute recurrent maxillary sinusitis       MILK THISTLE PO      Take 1,000 mg by mouth daily.        MULTIPLE VITAMIN PO      Take  by mouth 2 times daily.        pantoprazole 40 MG EC tablet    PROTONIX    90 tablet    Take 1 tablet (40 mg) by mouth daily    Diagnosis unknown       PREBIOTIC PRODUCT PO      Take 1 tablet by mouth daily        PROAIR  (90 BASE) MCG/ACT Inhaler   Generic drug:  albuterol     8.5 g    INHALE 2 PUFFS INTO THE LUNGS EVERY 6 HOURS AS NEEDED FOR SHORTNESS OF BREATH OR WHEEZING    Influenza-like symptoms       REFRESH 1.4-0.6 % ophthalmic solution   Generic drug:  polyvinyl alcohol-povidone      1-2 drops as needed        RESTASIS 0.05 % ophthalmic emulsion   Generic drug:  cycloSPORINE     60 each    USE 1 DROP IN BOTH EYES 2 TIMES A DAY    Bilateral dry eyes       SM GAS RELIEF ANTIFLATUENT 180 MG Caps   Generic drug:  Simethicone     60 capsule    TAKE ONE CAPSULE BY MOUTH AS NEEDED AFTER A MEAL. MAX 2 CAPS/DAY    Gas       SOLUBLE FIBER/PROBIOTICS PO      Take 1 tablet by mouth        traZODone 50 MG tablet    DESYREL    270 tablet    TAKE 2-3 TABLETS BY MOUTH DAILY AT BEDTIME    Primary insomnia       Vitamin C 500 MG Caps      Take 2 tablets by mouth 2 times daily.        vitamin D 1000 UNITS capsule      Take 1 capsule by mouth daily.

## 2018-02-26 NOTE — NURSING NOTE
"Chief Complaint   Patient presents with     Sinus Problem       Initial /68 (BP Location: Left arm, Patient Position: Sitting, Cuff Size: Adult Regular)  Pulse 89  Temp 99  F (37.2  C) (Tympanic)  Resp 20  Ht 5' 6\" (1.676 m)  Wt 205 lb (93 kg)  SpO2 96%  BMI 33.09 kg/m2 Estimated body mass index is 33.09 kg/(m^2) as calculated from the following:    Height as of this encounter: 5' 6\" (1.676 m).    Weight as of this encounter: 205 lb (93 kg).  Medication Reconciliation: complete   Devika Gutierres LPN      "

## 2018-02-26 NOTE — PROGRESS NOTES
SUBJECTIVE:  Melvi Cleveland, 67 year old, female is seen with the following medical problems.    URI.  Sherrie has had upper respiratory congestion, cough, and sinus pain and pressure.  Some chest tightness.  She also notes bilateral ear pain.    Denies fever, shaking, chills, nausea, vomiting, or diarrhea.  No household contacts are ill.    Current Outpatient Prescriptions   Medication Sig Dispense Refill     ALLEGRA-D ALLERGY & CONGESTION  MG per 12 hr tablet TAKE 1 TABLET BY MOUTH TWICE DAILY 30 tablet 0     gabapentin (NEURONTIN) 300 MG capsule TAKE 3 CAPSULES BY MOUTH IN THE MORNING, 2 CAPSULES AT NOON AND 2 CAPSULES IN THE EVENING 210 capsule 0     RESTASIS 0.05 % ophthalmic emulsion USE 1 DROP IN BOTH EYES 2 TIMES A DAY 60 each 0     HYDROcodone-acetaminophen (NORCO) 7.5-325 MG per tablet TAKE 1 TABLET BY MOUTH EVERY 4 TO 6 HOURS AS NEEDED FOR PAIN 60 tablet 0     citalopram (CELEXA) 40 MG tablet TAKE 1 TABLET BY MOUTH DAILY 90 tablet 0     traZODone (DESYREL) 50 MG tablet TAKE 2-3 TABLETS BY MOUTH DAILY AT BEDTIME 270 tablet 1     PREBIOTIC PRODUCT PO Take 1 tablet by mouth daily       Probiotic Product (SOLUBLE FIBER/PROBIOTICS PO) Take 1 tablet by mouth       Flaxseed, Linseed, (FLAX SEED OIL) 1000 MG capsule Take 2 capsules by mouth daily       fluticasone (FLONASE) 50 MCG/ACT spray 1 spray Reported on 5/11/2017       ketotifen (ZADITOR/REFRESH ANTI-ITCH) 0.025 % SOLN ophthalmic solution 1 drop       ALBUTEROL 108 (90 BASE) MCG/ACT inhaler INHALE 2 PUFFS INTO THE LUNGS EVERY 6 HOURS AS NEEDED FOR SHORTNESS OF BREATH OR WHEEZING 8.5 g 5     SM GAS RELIEF ANTIFLATUENT 180 MG CAPS TAKE ONE CAPSULE BY MOUTH AS NEEDED AFTER A MEAL. MAX 2 CAPS/DAY 60 capsule 5     pantoprazole (PROTONIX) 40 MG enteric coated tablet Take 1 tablet (40 mg) by mouth daily (Patient taking differently: Take 40 mg by mouth 2 times daily ) 90 tablet 3     DICLOFENAC PO Take 100 mg by mouth daily        Garlic 400 MG TBEC         polyvinyl alcohol-povidone (REFRESH) 1.4-0.6 % ophthalmic solution 1-2 drops as needed       folic acid (FOLVITE) 1 MG tablet Take 1 mg by mouth 3 times daily.       MULTIPLE VITAMIN PO Take  by mouth 2 times daily.       Ascorbic Acid (VITAMIN C) 500 MG CAPS Take 2 tablets by mouth 2 times daily.       Calcium-Vitamin D-Vitamin K (CALCIUM + D) 500-1000-40 MG-UNT-MCG CHEW Take 3 tablets by mouth daily        ALFALFA PO Take 5 tablets by mouth 2 times daily.       MILK THISTLE PO Take 1,000 mg by mouth daily.       fish oil-omega-3 fatty acids (OMEGA-3) 1000 MG capsule Take 1 g by mouth daily.       Cholecalciferol (VITAMIN D) 1000 UNITS capsule Take 1 capsule by mouth daily.          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     Parotiditis 5/9/2011     Peripheral Neuriopathy 3/1/2011     Rheumatoid arthritis(714.0) 12/13/1999     Seborrhea capitis 10/6/2011     Sjogrens Syndrome 3/1/2011     Urethral stricture 4/30/2008     Past Surgical History:   Procedure Laterality Date     BACK SURGERY  05/1995    back surgery     BIOPSY      breast bx X2     BIOPSY BREAST      LT     BIOPSY BREAST NEEDLE LOCALIZATION Right 6/12/2017    Procedure: BIOPSY BREAST NEEDLE LOCALIZATION;  WIRE LOCALIZED EXCISIONAL BIOPSY  RIGHT BREAST MASS;  Surgeon: Jeni Amin MD;  Location: [facility] OR     CHOLECYSTECTOMY  2004     COLONOSCOPY  11/15/2004    screening     COLONOSCOPY  9/24/2014     EGD with biopsy  2010, 2011    GERD     laminectomy      L4 L5 laminectomy; back pain  "    RELEASE CARPAL TUNNEL Right 1/16/2018    Procedure: RELEASE CARPAL TUNNEL;  RIGHT CARPAL TUNNEL RELEASE;  Surgeon: Haider Carlos MD;  Location: [facility] OR     Family History   Problem Relation Age of Onset     DIABETES Mother      Rheumatoid Arthritis Mother      Other - See Comments Mother      fibromyalgia     Osteoarthritis Mother      Thyroid Disease Mother      thyroid disorder     Unknown/Adopted Father      cause of death unknown     Rheumatoid Arthritis Father      DIABETES Sister      Myocardial Infarction Sister      cause of death     Lupus Sister      cause of death     C.A.D. Maternal Grandmother      CEREBROVASCULAR DISEASE Maternal Grandmother      DIABETES Maternal Grandmother      Thyroid Disease Maternal Grandmother      thyrodi disorder; goitor removed     CANCER Maternal Grandfather      Hypertension Brother      Other - See Comments Brother      sleep apnea     Other - See Comments Sister      sleep apnea     Social History     Social History     Marital status:      Spouse name: N/A     Number of children: N/A     Years of education: N/A     Occupational History      [employer]     part time     Social History Main Topics     Smoking status: Former Smoker     Types: Cigarettes     Quit date: 3/28/1997     Smokeless tobacco: Never Used      Comment: no passive exposure     Alcohol use No      Comment: former. quit 1984     Drug use: No     Sexual activity: Not on file     Other Topics Concern     Parent/Sibling W/ Cabg, Mi Or Angioplasty Before 65f 55m? Yes     Blood Transfusions Yes     Caffeine Concern Yes     chocolate rare     Seat Belt Yes     Social History Narrative         Review Of Systems  Constitutional, HEENT, cardiovascular, pulmonary, gi and gu systems are negative, except as otherwise noted.    OBJECTIVE:  /68 (BP Location: Left arm, Patient Position: Sitting, Cuff Size: Adult Regular)  Pulse 89  Temp 99  F (37.2  C) (Tympanic)  Resp 20  Ht 5' 6\" (1.676 m)  Wt 205 " lb (93 kg)  SpO2 96%  BMI 33.09 kg/m2      Exam:  Physical Exam   Constitutional: She is oriented to person, place, and time. She appears well-developed and well-nourished. No distress.   HENT:   Head: Normocephalic.   Right Ear: Hearing, tympanic membrane, external ear and ear canal normal.   Left Ear: Hearing, tympanic membrane, external ear and ear canal normal.   Nose: Right sinus exhibits maxillary sinus tenderness. Right sinus exhibits no frontal sinus tenderness. Left sinus exhibits maxillary sinus tenderness. Left sinus exhibits no frontal sinus tenderness.   Mouth/Throat: Uvula is midline and oropharynx is clear and moist. No oropharyngeal exudate or posterior oropharyngeal erythema.   Eyes: Conjunctivae are normal.   Neck: Neck supple.   Pulmonary/Chest: Effort normal and breath sounds normal.   Lymphadenopathy:     She has no cervical adenopathy.   Neurological: She is alert and oriented to person, place, and time.   Skin: Skin is warm and dry.   Psychiatric: She has a normal mood and affect.     Other exam not repeated    Labs:  Orders Only on 01/08/2018   Component Date Value Ref Range Status     ALT 01/08/2018 29  0 - 50 U/L Final     Albumin 01/08/2018 3.2* 3.4 - 5.0 g/dL Final     CRP Inflammation 01/08/2018 <2.9  0.0 - 8.0 mg/L Final     WBC 01/08/2018 5.6  4.0 - 11.0 10e9/L Final     RBC Count 01/08/2018 4.35  3.8 - 5.2 10e12/L Final     Hemoglobin 01/08/2018 12.2  11.7 - 15.7 g/dL Final     Hematocrit 01/08/2018 37.4  35.0 - 47.0 % Final     MCV 01/08/2018 86  78 - 100 fl Final     MCH 01/08/2018 28.0  26.5 - 33.0 pg Final     MCHC 01/08/2018 32.6  31.5 - 36.5 g/dL Final     RDW 01/08/2018 14.9  10.0 - 15.0 % Final     Platelet Count 01/08/2018 247  150 - 450 10e9/L Final     Diff Method 01/08/2018 Automated Method   Final     % Neutrophils 01/08/2018 55.5  % Final     % Lymphocytes 01/08/2018 31.8  % Final     % Monocytes 01/08/2018 7.7  % Final     % Eosinophils 01/08/2018 3.9  % Final     %  Basophils 01/08/2018 0.9  % Final     % Immature Granulocytes 01/08/2018 0.2  % Final     Nucleated RBCs 01/08/2018 0  0 /100 Final     Absolute Neutrophil 01/08/2018 3.1  1.6 - 8.3 10e9/L Final     Absolute Lymphocytes 01/08/2018 1.8  0.8 - 5.3 10e9/L Final     Absolute Monocytes 01/08/2018 0.4  0.0 - 1.3 10e9/L Final     Absolute Eosinophils 01/08/2018 0.2  0.0 - 0.7 10e9/L Final     Absolute Basophils 01/08/2018 0.1  0.0 - 0.2 10e9/L Final     Abs Immature Granulocytes 01/08/2018 0.0  0 - 0.4 10e9/L Final     Absolute Nucleated RBC 01/08/2018 0.0   Final     Creatinine 01/08/2018 1.02  0.52 - 1.04 mg/dL Final     GFR Estimate 01/08/2018 54* >60 mL/min/1.7m2 Final    Non  GFR Calc     GFR Estimate If Black 01/08/2018 66  >60 mL/min/1.7m2 Final    African American GFR Calc     Sed Rate 01/08/2018 18  0 - 30 mm/h Final       ASSESSMENT/PLAN:  Acute recurrent maxillary sinusitis  Levaquin as written.  Continue antihistamines and nasal saline.  Follow up with persistent symptoms.  - levofloxacin (LEVAQUIN) 500 MG tablet; Take 1 tablet (500 mg) by mouth daily            Jimbo Martinez MD

## 2018-02-27 ASSESSMENT — PATIENT HEALTH QUESTIONNAIRE - PHQ9: SUM OF ALL RESPONSES TO PHQ QUESTIONS 1-9: 4

## 2018-02-27 ASSESSMENT — ANXIETY QUESTIONNAIRES: GAD7 TOTAL SCORE: 0

## 2018-03-02 DIAGNOSIS — F51.01 PRIMARY INSOMNIA: ICD-10-CM

## 2018-03-02 DIAGNOSIS — G62.9 PERIPHERAL POLYNEUROPATHY: ICD-10-CM

## 2018-03-02 RX ORDER — GABAPENTIN 300 MG/1
CAPSULE ORAL
Qty: 210 CAPSULE | Refills: 0 | Status: SHIPPED | OUTPATIENT
Start: 2018-03-02 | End: 2018-04-02

## 2018-03-02 NOTE — TELEPHONE ENCOUNTER
Gabapentin      Last Written Prescription Date:  1/31/18  Last Fill Quantity: 210,   # refills: 0  Last Office Visit: 2/26/18  Future Office visit:       Routing refill request to provider for review/approval because:  Drug not on the [facility] or [facility] refill protocol or controlled substance

## 2018-03-05 RX ORDER — TRAZODONE HYDROCHLORIDE 50 MG/1
TABLET, FILM COATED ORAL
Qty: 270 TABLET | Refills: 1 | Status: SHIPPED | OUTPATIENT
Start: 2018-03-05 | End: 2018-11-29

## 2018-03-05 NOTE — TELEPHONE ENCOUNTER
Trazodone       Last Written Prescription Date:  8/23/2017  Last Fill Quantity: 270,   # refills: 1  Last Office Visit: 2/26/2018  Future Office visit:

## 2018-03-26 DIAGNOSIS — M05.79 RHEUMATOID ARTHRITIS INVOLVING MULTIPLE SITES WITH POSITIVE RHEUMATOID FACTOR (H): ICD-10-CM

## 2018-03-26 DIAGNOSIS — R09.81 NASAL CONGESTION: ICD-10-CM

## 2018-03-26 DIAGNOSIS — F41.0 PANIC DISORDER WITHOUT AGORAPHOBIA: ICD-10-CM

## 2018-03-27 RX ORDER — FEXOFENADINE HYDROCHLORIDE AND PSEUDOEPHEDRINE HYDROCHLORIDE 60; 120 MG/1; MG/1
TABLET, FILM COATED, EXTENDED RELEASE ORAL
Qty: 30 TABLET | Refills: 0 | Status: SHIPPED | OUTPATIENT
Start: 2018-03-27 | End: 2018-04-26

## 2018-03-27 RX ORDER — CITALOPRAM HYDROBROMIDE 40 MG/1
TABLET ORAL
Qty: 90 TABLET | Refills: 1 | Status: SHIPPED | OUTPATIENT
Start: 2018-03-27 | End: 2018-09-25

## 2018-03-27 NOTE — TELEPHONE ENCOUNTER
allegra      Last Written Prescription Date:  2/21/18  Last Fill Quantity: 30,   # refills: 0  Last Office Visit: 2/26/18  Future Office visit: none

## 2018-03-27 NOTE — TELEPHONE ENCOUNTER
Controlled Substance Refill Request for Norco  Problem List Complete:  No     PROVIDER TO CONSIDER COMPLETION OF PROBLEM LIST AND OVERVIEW/CONTROLLED SUBSTANCE AGREEMENT    Last Written Prescription Date:  11.15.17  Last Fill Quantity: 60,   # refills: 0    Last Office Visit with [facility] primary care provider: 2.26.18    Future Office visit:     Controlled substance agreement on file: No.     Processing:  Staff will hand deliver Rx to on-site pharmacy

## 2018-03-28 RX ORDER — HYDROCODONE BITARTRATE AND ACETAMINOPHEN 7.5; 325 MG/1; MG/1
TABLET ORAL
Qty: 60 TABLET | Refills: 0 | Status: SHIPPED | OUTPATIENT
Start: 2018-03-28 | End: 2018-08-13

## 2018-04-02 DIAGNOSIS — G62.9 PERIPHERAL POLYNEUROPATHY: ICD-10-CM

## 2018-04-03 RX ORDER — GABAPENTIN 300 MG/1
CAPSULE ORAL
Qty: 210 CAPSULE | Refills: 0 | Status: SHIPPED | OUTPATIENT
Start: 2018-04-03 | End: 2018-05-05

## 2018-04-12 DIAGNOSIS — Z79.899 ENCOUNTER FOR LONG-TERM (CURRENT) USE OF MEDICATIONS: Primary | ICD-10-CM

## 2018-04-12 LAB
ALBUMIN SERPL-MCNC: 3.2 G/DL (ref 3.4–5)
ALT SERPL W P-5'-P-CCNC: 35 U/L (ref 0–50)
BASOPHILS # BLD AUTO: 0 10E9/L (ref 0–0.2)
BASOPHILS NFR BLD AUTO: 0.9 %
CREAT SERPL-MCNC: 1.24 MG/DL (ref 0.52–1.04)
CRP SERPL-MCNC: <2.9 MG/L (ref 0–8)
DIFFERENTIAL METHOD BLD: NORMAL
EOSINOPHIL # BLD AUTO: 0.2 10E9/L (ref 0–0.7)
EOSINOPHIL NFR BLD AUTO: 4.2 %
ERYTHROCYTE [DISTWIDTH] IN BLOOD BY AUTOMATED COUNT: 14.3 % (ref 10–15)
ERYTHROCYTE [SEDIMENTATION RATE] IN BLOOD BY WESTERGREN METHOD: 14 MM/H (ref 0–30)
GFR SERPL CREATININE-BSD FRML MDRD: 43 ML/MIN/1.7M2
HCT VFR BLD AUTO: 36.9 % (ref 35–47)
HGB BLD-MCNC: 12.1 G/DL (ref 11.7–15.7)
IMM GRANULOCYTES # BLD: 0 10E9/L (ref 0–0.4)
IMM GRANULOCYTES NFR BLD: 0 %
LYMPHOCYTES # BLD AUTO: 1.8 10E9/L (ref 0.8–5.3)
LYMPHOCYTES NFR BLD AUTO: 40.8 %
MCH RBC QN AUTO: 28.8 PG (ref 26.5–33)
MCHC RBC AUTO-ENTMCNC: 32.8 G/DL (ref 31.5–36.5)
MCV RBC AUTO: 88 FL (ref 78–100)
MONOCYTES # BLD AUTO: 0.4 10E9/L (ref 0–1.3)
MONOCYTES NFR BLD AUTO: 10 %
NEUTROPHILS # BLD AUTO: 1.9 10E9/L (ref 1.6–8.3)
NEUTROPHILS NFR BLD AUTO: 44.1 %
NRBC # BLD AUTO: 0 10*3/UL
NRBC BLD AUTO-RTO: 0 /100
PLATELET # BLD AUTO: 212 10E9/L (ref 150–450)
RBC # BLD AUTO: 4.2 10E12/L (ref 3.8–5.2)
WBC # BLD AUTO: 4.3 10E9/L (ref 4–11)

## 2018-04-12 PROCEDURE — 36415 COLL VENOUS BLD VENIPUNCTURE: CPT | Mod: ZL | Performed by: NURSE PRACTITIONER

## 2018-04-12 PROCEDURE — 82565 ASSAY OF CREATININE: CPT | Mod: ZL | Performed by: NURSE PRACTITIONER

## 2018-04-12 PROCEDURE — 86140 C-REACTIVE PROTEIN: CPT | Mod: ZL | Performed by: NURSE PRACTITIONER

## 2018-04-12 PROCEDURE — 84460 ALANINE AMINO (ALT) (SGPT): CPT | Mod: ZL | Performed by: NURSE PRACTITIONER

## 2018-04-12 PROCEDURE — 85652 RBC SED RATE AUTOMATED: CPT | Mod: ZL | Performed by: NURSE PRACTITIONER

## 2018-04-12 PROCEDURE — 82040 ASSAY OF SERUM ALBUMIN: CPT | Mod: ZL | Performed by: NURSE PRACTITIONER

## 2018-04-12 PROCEDURE — 85025 COMPLETE CBC W/AUTO DIFF WBC: CPT | Mod: ZL | Performed by: NURSE PRACTITIONER

## 2018-04-26 DIAGNOSIS — R09.81 NASAL CONGESTION: ICD-10-CM

## 2018-04-26 RX ORDER — FEXOFENADINE HYDROCHLORIDE AND PSEUDOEPHEDRINE HYDROCHLORIDE 60; 120 MG/1; MG/1
TABLET, FILM COATED, EXTENDED RELEASE ORAL
Qty: 30 TABLET | Refills: 0 | Status: SHIPPED | OUTPATIENT
Start: 2018-04-26 | End: 2018-05-08

## 2018-04-26 NOTE — TELEPHONE ENCOUNTER
ALLEGRA-D ALLERGY & CONGESTION  MG per 12 hr tablet  Last Written Prescription Date:  3/27/18  Last Fill Quantity: 30,   # refills: 0  Last Office Visit: 2/26/18  Future Office visit:

## 2018-05-05 DIAGNOSIS — G62.9 PERIPHERAL POLYNEUROPATHY: ICD-10-CM

## 2018-05-07 RX ORDER — GABAPENTIN 300 MG/1
CAPSULE ORAL
Qty: 210 CAPSULE | Refills: 0 | Status: SHIPPED | OUTPATIENT
Start: 2018-05-07 | End: 2018-05-30

## 2018-05-07 NOTE — TELEPHONE ENCOUNTER
Gabapentin  Last Written Prescription Date:  4/3/18  Last Fill Qty:  210, # Refills:  0  Last Office Visit:  2/26/18

## 2018-05-08 ENCOUNTER — TELEPHONE (OUTPATIENT)
Dept: FAMILY MEDICINE | Facility: OTHER | Age: 68
End: 2018-05-08

## 2018-05-08 DIAGNOSIS — R09.81 NASAL CONGESTION: ICD-10-CM

## 2018-05-08 RX ORDER — FEXOFENADINE HYDROCHLORIDE AND PSEUDOEPHEDRINE HYDROCHLORIDE 60; 120 MG/1; MG/1
TABLET, FILM COATED, EXTENDED RELEASE ORAL
Qty: 30 TABLET | Refills: 0 | Status: SHIPPED | OUTPATIENT
Start: 2018-05-08 | End: 2018-05-30

## 2018-05-08 NOTE — TELEPHONE ENCOUNTER
12:22 PM    Reason for Call: OVERBOOK    Patient is having the following symptoms: upper respritory for 1 weeks.    The patient is requesting an appointment for 05/09/18 with .    Was an appointment offered for this call? No  If yes : Appointment type              Date    Preferred method for responding to this message: Telephone Call  What is your phone number ?[phone]    If we cannot reach you directly, may we leave a detailed response at the number you provided? Yes    Can this message wait until your PCP/provider returns, if unavailable today? Not applicable, PCP is in     [facility]

## 2018-05-08 NOTE — TELEPHONE ENCOUNTER
Lula KIRBY      Last Written Prescription Date:  4/26/18  Last Fill Quantity: 30,   # refills: 0  Last Office Visit: 2/26/18 with Dr. Martinez  Future Office visit:    Next 5 appointments (look out 90 days)     May 09, 2018 10:00 AM CDT   (Arrive by 9:45 AM)   SHORT with Jimbo Martinez MD   [facility]    [address]  [place] [address]   [phone]                   Routing refill request to provider for review/approval because:  Drug not on the [facility], [facility] or [facility] refill protocol or controlled substance

## 2018-05-08 NOTE — TELEPHONE ENCOUNTER
Please schedule patient for date/time: Please call and schedule for 10 am 05/09    Have patient go to ER/Urgent Care Center. Urgent Care hours are 9:30 am to 8 pm, open 7 days a week. No.    Provider will call patient.No.    Other:

## 2018-05-09 ENCOUNTER — OFFICE VISIT (OUTPATIENT)
Dept: FAMILY MEDICINE | Facility: OTHER | Age: 68
End: 2018-05-09
Attending: FAMILY MEDICINE
Payer: COMMERCIAL

## 2018-05-09 VITALS
DIASTOLIC BLOOD PRESSURE: 70 MMHG | BODY MASS INDEX: 32.95 KG/M2 | TEMPERATURE: 97.1 F | OXYGEN SATURATION: 94 % | WEIGHT: 205 LBS | HEART RATE: 83 BPM | HEIGHT: 66 IN | SYSTOLIC BLOOD PRESSURE: 120 MMHG

## 2018-05-09 DIAGNOSIS — J20.9 ACUTE BRONCHITIS, UNSPECIFIED ORGANISM: Primary | ICD-10-CM

## 2018-05-09 PROCEDURE — G0463 HOSPITAL OUTPT CLINIC VISIT: HCPCS

## 2018-05-09 PROCEDURE — 99213 OFFICE O/P EST LOW 20 MIN: CPT | Performed by: FAMILY MEDICINE

## 2018-05-09 RX ORDER — AZITHROMYCIN 250 MG/1
TABLET, FILM COATED ORAL
Qty: 6 TABLET | Refills: 0 | Status: SHIPPED | OUTPATIENT
Start: 2018-05-09 | End: 2018-06-12

## 2018-05-09 RX ORDER — PREDNISONE 20 MG/1
20 TABLET ORAL 2 TIMES DAILY
Qty: 10 TABLET | Refills: 0 | Status: SHIPPED | OUTPATIENT
Start: 2018-05-09 | End: 2018-06-12

## 2018-05-09 ASSESSMENT — ANXIETY QUESTIONNAIRES
3. WORRYING TOO MUCH ABOUT DIFFERENT THINGS: NOT AT ALL
6. BECOMING EASILY ANNOYED OR IRRITABLE: SEVERAL DAYS
GAD7 TOTAL SCORE: 2
5. BEING SO RESTLESS THAT IT IS HARD TO SIT STILL: SEVERAL DAYS
2. NOT BEING ABLE TO STOP OR CONTROL WORRYING: NOT AT ALL
IF YOU CHECKED OFF ANY PROBLEMS ON THIS QUESTIONNAIRE, HOW DIFFICULT HAVE THESE PROBLEMS MADE IT FOR YOU TO DO YOUR WORK, TAKE CARE OF THINGS AT HOME, OR GET ALONG WITH OTHER PEOPLE: NOT DIFFICULT AT ALL
4. TROUBLE RELAXING: NOT AT ALL
1. FEELING NERVOUS, ANXIOUS, OR ON EDGE: NOT AT ALL
7. FEELING AFRAID AS IF SOMETHING AWFUL MIGHT HAPPEN: NOT AT ALL

## 2018-05-09 ASSESSMENT — PAIN SCALES - GENERAL: PAINLEVEL: MODERATE PAIN (5)

## 2018-05-09 NOTE — PROGRESS NOTES
SUBJECTIVE:   Melvi Cleveland is a 68 year old female who presents to clinic today for the following health issues:      RESPIRATORY SYMPTOMS      Duration: Onset Saturday    Description  nasal congestion, rhinorrhea, sore throat, facial pain/pressure, cough, wheezing, ear pain bilateral, headache, fatigue/malaise, hoarse voice, conjunctival irritation and diarrhea    Severity: moderate    Accompanying signs and symptoms: Blowing nose yellow in color,coughing    And can't bring phlegm up    History (predisposing factors):  Works at school    Precipitating or alleviating factors: None    Therapies tried and outcome:  nasal rinse,allergy med,mucinex and not any better.    Problem list and histories reviewed & adjusted, as indicated.  Additional history: as documented    Patient Active Problem List   Diagnosis     Neck pain, chronic     Low back pain, chronic     Crohn's disease of large intestine (H)     Dyslipidemia     Sicca syndrome (H)     Peripheral polyneuropathy     RA (rheumatoid arthritis) (H)     Comprehensive Medical Examination     Seasonal allergic rhinitis     Fibrocystic breast disease (FCBD)     ACP (advance care planning)     Past Surgical History:   Procedure Laterality Date     BACK SURGERY  05/1995    back surgery     BIOPSY      breast bx X2     BIOPSY BREAST      LT     BIOPSY BREAST NEEDLE LOCALIZATION Right 6/12/2017    Procedure: BIOPSY BREAST NEEDLE LOCALIZATION;  WIRE LOCALIZED EXCISIONAL BIOPSY  RIGHT BREAST MASS;  Surgeon: Jeni Amin MD;  Location: [facility]     CHOLECYSTECTOMY  2004     COLONOSCOPY  11/15/2004    screening     COLONOSCOPY  9/24/2014     EGD with biopsy  2010, 2011    GERD     laminectomy      L4 L5 laminectomy; back pain     RELEASE CARPAL TUNNEL Right 1/16/2018    Procedure: RELEASE CARPAL TUNNEL;  RIGHT CARPAL TUNNEL RELEASE;  Surgeon: Haider Carlos MD;  Location: [facility] OR       Social History   Substance Use Topics     Smoking status: Former Smoker     Types:  Cigarettes     Quit date: 3/28/1997     Smokeless tobacco: Never Used      Comment: no passive exposure     Alcohol use No      Comment: former. quit 1984     Family History   Problem Relation Age of Onset     DIABETES Mother      Rheumatoid Arthritis Mother      Other - See Comments Mother      fibromyalgia     Osteoarthritis Mother      Thyroid Disease Mother      thyroid disorder     Unknown/Adopted Father      cause of death unknown     Rheumatoid Arthritis Father      DIABETES Sister      Myocardial Infarction Sister      cause of death     Lupus Sister      cause of death     C.A.D. Maternal Grandmother      CEREBROVASCULAR DISEASE Maternal Grandmother      DIABETES Maternal Grandmother      Thyroid Disease Maternal Grandmother      thyrodi disorder; goitor removed     CANCER Maternal Grandfather      Hypertension Brother      Other - See Comments Brother      sleep apnea     Other - See Comments Sister      sleep apnea         Current Outpatient Prescriptions   Medication Sig Dispense Refill     ALBUTEROL 108 (90 BASE) MCG/ACT inhaler INHALE 2 PUFFS INTO THE LUNGS EVERY 6 HOURS AS NEEDED FOR SHORTNESS OF BREATH OR WHEEZING 8.5 g 5     ALFALFA PO Take 5 tablets by mouth 2 times daily.       ALLEGRA-D ALLERGY & CONGESTION  MG per 12 hr tablet TAKE 1 TABLET BY MOUTH TWICE DAILY 30 tablet 0     Ascorbic Acid (VITAMIN C) 500 MG CAPS Take 2 tablets by mouth 2 times daily.       Calcium-Vitamin D-Vitamin K (CALCIUM + D) 500-1000-40 MG-UNT-MCG CHEW Take 3 tablets by mouth daily        Cholecalciferol (VITAMIN D) 1000 UNITS capsule Take 1 capsule by mouth daily.       citalopram (CELEXA) 40 MG tablet TAKE 1 TABLET BY MOUTH DAILY 90 tablet 1     DICLOFENAC PO Take 100 mg by mouth daily        fish oil-omega-3 fatty acids (OMEGA-3) 1000 MG capsule Take 1 g by mouth daily.       Flaxseed, Linseed, (FLAX SEED OIL) 1000 MG capsule Take 2 capsules by mouth daily       fluticasone (FLONASE) 50 MCG/ACT spray 1 spray  Reported on 5/11/2017       folic acid (FOLVITE) 1 MG tablet Take 1 mg by mouth 3 times daily.       gabapentin (NEURONTIN) 300 MG capsule TAKE 3 CAPSULES BY MOUTH IN THE MORNING, 2 CAPSULES AT NOON AND 2 CAPSULES IN THE EVENING 210 capsule 0     Garlic 400 MG TBEC        HYDROcodone-acetaminophen (NORCO) 7.5-325 MG per tablet TAKE 1 TABLET BY MOUTH EVERY 4 TO 6 HOURS AS NEEDED FOR PAIN 60 tablet 0     ketotifen (ZADITOR/REFRESH ANTI-ITCH) 0.025 % SOLN ophthalmic solution 1 drop       levofloxacin (LEVAQUIN) 500 MG tablet Take 1 tablet (500 mg) by mouth daily (Patient not taking: Reported on 5/9/2018) 10 tablet 0     MILK THISTLE PO Take 1,000 mg by mouth daily.       MULTIPLE VITAMIN PO Take  by mouth 2 times daily.       pantoprazole (PROTONIX) 40 MG enteric coated tablet Take 1 tablet (40 mg) by mouth daily (Patient taking differently: Take 40 mg by mouth 2 times daily ) 90 tablet 3     polyvinyl alcohol-povidone (REFRESH) 1.4-0.6 % ophthalmic solution 1-2 drops as needed       PREBIOTIC PRODUCT PO Take 1 tablet by mouth daily       Probiotic Product (SOLUBLE FIBER/PROBIOTICS PO) Take 1 tablet by mouth       RESTASIS 0.05 % ophthalmic emulsion USE 1 DROP IN BOTH EYES 2 TIMES A DAY 60 each 0     SM GAS RELIEF ANTIFLATUENT 180 MG CAPS TAKE ONE CAPSULE BY MOUTH AS NEEDED AFTER A MEAL. MAX 2 CAPS/DAY 60 capsule 5     traZODone (DESYREL) 50 MG tablet TAKE 2-3 TABLETS BY MOUTH DAILY AT BEDTIME 270 tablet 1     Allergies   Allergen Reactions     Adhesive Tape      Glue and nicotine patches     Benzoin Compound      Tin-Co-Javier     Seasonal Allergies        Reviewed and updated as needed this visit by clinical staff  Tobacco  Allergies  Meds  Med Hx  Surg Hx  Fam Hx  Soc Hx      Reviewed and updated as needed this visit by Provider         ROS:  Constitutional, HEENT, cardiovascular, pulmonary, gi and gu systems are negative, except as otherwise noted.    OBJECTIVE:     /70  Pulse 83  Temp 97.1  F (36.2  C)  " Ht 5' 6\" (1.676 m)  Wt 205 lb (93 kg)  SpO2 94%  BMI 33.09 kg/m2  Body mass index is 33.09 kg/(m^2).  Physical Exam   Constitutional: She is oriented to person, place, and time. She appears well-developed and well-nourished. No distress.   HENT:   Head: Normocephalic.   Right Ear: Hearing, tympanic membrane, external ear and ear canal normal.   Left Ear: Hearing, tympanic membrane, external ear and ear canal normal.   Nose: Nose normal. Right sinus exhibits no maxillary sinus tenderness and no frontal sinus tenderness. Left sinus exhibits no maxillary sinus tenderness and no frontal sinus tenderness.   Mouth/Throat: Uvula is midline and oropharynx is clear and moist. No oropharyngeal exudate or posterior oropharyngeal erythema.   Eyes: Conjunctivae are normal.   Neck: Neck supple.   Pulmonary/Chest: Effort normal and breath sounds normal.   Lymphadenopathy:     She has no cervical adenopathy.   Neurological: She is alert and oriented to person, place, and time.   Skin: Skin is warm and dry.   Psychiatric: She has a normal mood and affect.         Diagnostic Test Results:  none     ASSESSMENT/PLAN:     Acute bronchitis, unspecified organism  Associated bronchospasm.  Azithromycin (Zithromax) as written.  Prednisone burst.  Continue metered dose inhaler.  Follow up with persistent symptoms.  - azithromycin (ZITHROMAX) 250 MG tablet; Two tablets first day, then one tablet daily for four days.  - predniSONE (DELTASONE) 20 MG tablet; Take 1 tablet (20 mg) by mouth 2 times daily      Jimbo Martinez MD  [facility] [place]  "

## 2018-05-09 NOTE — NURSING NOTE
"Chief Complaint   Patient presents with     URI       Initial /70  Pulse 83  Temp 97.1  F (36.2  C)  Ht 5' 6\" (1.676 m)  Wt 205 lb (93 kg)  SpO2 94%  BMI 33.09 kg/m2 Estimated body mass index is 33.09 kg/(m^2) as calculated from the following:    Height as of this encounter: 5' 6\" (1.676 m).    Weight as of this encounter: 205 lb (93 kg).  Medication Reconciliation: complete    TRELL Miranda    "

## 2018-05-09 NOTE — MR AVS SNAPSHOT
After Visit Summary   5/9/2018    Melvi Cleveland    MRN: [id number]           Patient Information     Date Of Birth          1950        Visit Information        Provider Department      5/9/2018 10:00 AM Jimbo Martinez MD [facility]        Today's Diagnoses     Acute bronchitis, unspecified organism    -  1      Care Instructions    Take azithromycin (Zithromax) as written          Follow-ups after your visit        Follow-up notes from your care team     Return if symptoms worsen or fail to improve.      Who to contact     If you have questions or need follow up information about today's clinic visit or your schedule please contact [facility] directly at [phone].  Normal or non-critical lab and imaging results will be communicated to you by MyChart, letter or phone within 4 business days after the clinic has received the results. If you do not hear from us within 7 days, please contact the clinic through [employer] or phone. If you have a critical or abnormal lab result, we will notify you by phone as soon as possible.  Submit refill requests through [employer] or call your pharmacy and they will forward the refill request to us. Please allow 3 business days for your refill to be completed.          Additional Information About Your Visit        MyChart Information     [employer] gives you secure access to your electronic health record. If you see a primary care provider, you can also send messages to your care team and make appointments. If you have questions, please call your primary care clinic.  If you do not have a primary care provider, please call [phone] and they will assist you.        Care EveryWhere ID     This is your Care EveryWhere ID. This could be used by other organizations to access your [facility] medical records  [id number]        Your Vitals Were     Pulse Temperature Height Pulse Oximetry BMI (Body Mass Index)       83 97.1  F (36.2  C) 5'  "6\" (1.676 m) 94% 33.09 kg/m2        Blood Pressure from Last 3 Encounters:   05/09/18 120/70   02/26/18 110/68   01/16/18 94/53    Weight from Last 3 Encounters:   05/09/18 205 lb (93 kg)   02/26/18 205 lb (93 kg)   01/16/18 206 lb (93.4 kg)              Today, you had the following     No orders found for display         Today's Medication Changes          These changes are accurate as of 5/9/18 11:59 PM.  If you have any questions, ask your nurse or doctor.               Start taking these medicines.        Dose/Directions    azithromycin 250 MG tablet   Commonly known as:  ZITHROMAX   Used for:  Acute bronchitis, unspecified organism   Started by:  Jimbo Martinez MD        Two tablets first day, then one tablet daily for four days.   Quantity:  6 tablet   Refills:  0       predniSONE 20 MG tablet   Commonly known as:  DELTASONE   Used for:  Acute bronchitis, unspecified organism   Started by:  Jimbo Martinez MD        Dose:  20 mg   Take 1 tablet (20 mg) by mouth 2 times daily   Quantity:  10 tablet   Refills:  0         These medicines have changed or have updated prescriptions.        Dose/Directions    pantoprazole 40 MG EC tablet   Commonly known as:  PROTONIX   This may have changed:  when to take this   Used for:  Diagnosis unknown        Dose:  40 mg   Take 1 tablet (40 mg) by mouth daily   Quantity:  90 tablet   Refills:  3            Where to get your medicines      These medications were sent to [facility] [address] [facility] [place] [address]     Phone:  [phone]     azithromycin 250 MG tablet    predniSONE 20 MG tablet                Primary Care Provider Office Phone # Fax #    Jimbo Martinez [phone]       [address] [facility]  [place] [address]        Equal Access to Services     RILEY OLIVEROS AH: Kenna Holland, carmela palma, dulce pascal, bill soto. So velia " [phone].    ATENCIÓN: Si enrique fan, tiene a garza disposición servicios gratuitos de asistencia lingüística. Freddy falcon [phone].    We comply with applicable federal civil rights laws and [place] laws. We do not discriminate on the basis of race, color, national origin, age, disability, sex, sexual orientation, or gender identity.            Thank you!     Thank you for choosing [facility]  for your care. Our goal is always to provide you with excellent care. Hearing back from our patients is one way we can continue to improve our services. Please take a few minutes to complete the written survey that you may receive in the mail after your visit with us. Thank you!             Your Updated Medication List - Protect others around you: Learn how to safely use, store and throw away your medicines at www.disposemymeds.org.          This list is accurate as of 5/9/18 11:59 PM.  Always use your most recent med list.                   Brand Name Dispense Instructions for use Diagnosis    ALFALFA PO      Take 5 tablets by mouth 2 times daily.        ALLEGRA-D ALLERGY & CONGESTION  MG per 12 hr tablet   Generic drug:  fexofenadine-pseudoePHEDrine     30 tablet    TAKE 1 TABLET BY MOUTH TWICE DAILY    Nasal congestion       azithromycin 250 MG tablet    ZITHROMAX    6 tablet    Two tablets first day, then one tablet daily for four days.    Acute bronchitis, unspecified organism       CALCIUM + D 500-1000-40 MG-UNT-MCG Chew   Generic drug:  Calcium-Vitamin D-Vitamin K      Take 3 tablets by mouth daily        citalopram 40 MG tablet    celeXA    90 tablet    TAKE 1 TABLET BY MOUTH DAILY    Panic disorder without agoraphobia       DICLOFENAC PO      Take 100 mg by mouth daily        fish oil-omega-3 fatty acids 1000 MG capsule      Take 1 g by mouth daily.        flax seed oil 1000 MG capsule      Take 2 capsules by mouth daily        fluticasone 50 MCG/ACT spray    FLONASE     1 spray Reported on  5/11/2017        folic acid 1 MG tablet    FOLVITE     Take 1 mg by mouth 3 times daily.        gabapentin 300 MG capsule    NEURONTIN    210 capsule    TAKE 3 CAPSULES BY MOUTH IN THE MORNING, 2 CAPSULES AT NOON AND 2 CAPSULES IN THE EVENING    Peripheral polyneuropathy       Garlic 400 MG Tbec           HYDROcodone-acetaminophen 7.5-325 MG per tablet    NORCO    60 tablet    TAKE 1 TABLET BY MOUTH EVERY 4 TO 6 HOURS AS NEEDED FOR PAIN    Rheumatoid arthritis involving multiple sites with positive rheumatoid factor (H)       ketotifen 0.025 % Soln ophthalmic solution    ZADITOR/REFRESH ANTI-ITCH     1 drop        levofloxacin 500 MG tablet    LEVAQUIN    10 tablet    Take 1 tablet (500 mg) by mouth daily    Acute recurrent maxillary sinusitis       MILK THISTLE PO      Take 1,000 mg by mouth daily.        MULTIPLE VITAMIN PO      Take  by mouth 2 times daily.        pantoprazole 40 MG EC tablet    PROTONIX    90 tablet    Take 1 tablet (40 mg) by mouth daily    Diagnosis unknown       PREBIOTIC PRODUCT PO      Take 1 tablet by mouth daily        predniSONE 20 MG tablet    DELTASONE    10 tablet    Take 1 tablet (20 mg) by mouth 2 times daily    Acute bronchitis, unspecified organism       PROAIR  (90 Base) MCG/ACT Inhaler   Generic drug:  albuterol     8.5 g    INHALE 2 PUFFS INTO THE LUNGS EVERY 6 HOURS AS NEEDED FOR SHORTNESS OF BREATH OR WHEEZING    Influenza-like symptoms       REFRESH 1.4-0.6 % ophthalmic solution   Generic drug:  polyvinyl alcohol-povidone      1-2 drops as needed        RESTASIS 0.05 % ophthalmic emulsion   Generic drug:  cycloSPORINE     60 each    USE 1 DROP IN BOTH EYES 2 TIMES A DAY    Bilateral dry eyes       SM GAS RELIEF ANTIFLATUENT 180 MG Caps   Generic drug:  Simethicone     60 capsule    TAKE ONE CAPSULE BY MOUTH AS NEEDED AFTER A MEAL. MAX 2 CAPS/DAY    Gas       SOLUBLE FIBER/PROBIOTICS PO      Take 1 tablet by mouth        traZODone 50 MG tablet    DESYREL    270 tablet     TAKE 2-3 TABLETS BY MOUTH DAILY AT BEDTIME    Primary insomnia       Vitamin C 500 MG Caps      Take 2 tablets by mouth 2 times daily.        vitamin D 1000 units capsule      Take 1 capsule by mouth daily.

## 2018-05-10 ASSESSMENT — PATIENT HEALTH QUESTIONNAIRE - PHQ9: SUM OF ALL RESPONSES TO PHQ QUESTIONS 1-9: 6

## 2018-05-10 ASSESSMENT — ANXIETY QUESTIONNAIRES: GAD7 TOTAL SCORE: 2

## 2018-05-30 DIAGNOSIS — G62.9 PERIPHERAL POLYNEUROPATHY: ICD-10-CM

## 2018-05-30 DIAGNOSIS — R09.81 NASAL CONGESTION: ICD-10-CM

## 2018-06-01 RX ORDER — FEXOFENADINE HYDROCHLORIDE AND PSEUDOEPHEDRINE HYDROCHLORIDE 60; 120 MG/1; MG/1
TABLET, FILM COATED, EXTENDED RELEASE ORAL
Qty: 30 TABLET | Refills: 0 | Status: SHIPPED | OUTPATIENT
Start: 2018-06-01 | End: 2018-06-25

## 2018-06-01 RX ORDER — GABAPENTIN 300 MG/1
CAPSULE ORAL
Qty: 210 CAPSULE | Refills: 0 | Status: SHIPPED | OUTPATIENT
Start: 2018-06-01 | End: 2018-07-06

## 2018-06-01 NOTE — TELEPHONE ENCOUNTER
[facility]  Last visit: 5/9/18  Last refill: 5/8/18 #30 R-0    gabapentin  Last refill: 5/7/18 #210 R-0

## 2018-06-11 ENCOUNTER — TELEPHONE (OUTPATIENT)
Dept: FAMILY MEDICINE | Facility: OTHER | Age: 68
End: 2018-06-11

## 2018-06-11 NOTE — TELEPHONE ENCOUNTER
9:51 AM    Reason for Call: OVERBOOK    Patient is having the following symptoms: Sinus infection / ears for 4 days    The patient is requesting an appointment for Tuesday with her grandson Sim @ 4:00 with  Dr. Martinez    Was an appointment offered for this call?  No    Preferred method for responding to this message: [phone]    If we cannot reach you directly, may we leave a detailed response at the number you provided?  Yes      ePbbles Mckeon

## 2018-06-12 ENCOUNTER — OFFICE VISIT (OUTPATIENT)
Dept: FAMILY MEDICINE | Facility: OTHER | Age: 68
End: 2018-06-12
Attending: FAMILY MEDICINE
Payer: COMMERCIAL

## 2018-06-12 VITALS
HEART RATE: 79 BPM | DIASTOLIC BLOOD PRESSURE: 63 MMHG | TEMPERATURE: 96.9 F | WEIGHT: 205 LBS | BODY MASS INDEX: 32.95 KG/M2 | HEIGHT: 66 IN | SYSTOLIC BLOOD PRESSURE: 112 MMHG | OXYGEN SATURATION: 94 % | RESPIRATION RATE: 18 BRPM

## 2018-06-12 DIAGNOSIS — J20.9 ACUTE BRONCHITIS, UNSPECIFIED ORGANISM: Primary | ICD-10-CM

## 2018-06-12 PROCEDURE — G0463 HOSPITAL OUTPT CLINIC VISIT: HCPCS

## 2018-06-12 PROCEDURE — 99213 OFFICE O/P EST LOW 20 MIN: CPT | Performed by: FAMILY MEDICINE

## 2018-06-12 RX ORDER — PREDNISONE 20 MG/1
20 TABLET ORAL 2 TIMES DAILY
Qty: 10 TABLET | Refills: 0 | Status: SHIPPED | OUTPATIENT
Start: 2018-06-12 | End: 2018-06-19

## 2018-06-12 RX ORDER — LEVOFLOXACIN 500 MG/1
500 TABLET, FILM COATED ORAL DAILY
Qty: 10 TABLET | Refills: 0 | Status: SHIPPED | OUTPATIENT
Start: 2018-06-12 | End: 2018-08-07

## 2018-06-12 ASSESSMENT — ANXIETY QUESTIONNAIRES
IF YOU CHECKED OFF ANY PROBLEMS ON THIS QUESTIONNAIRE, HOW DIFFICULT HAVE THESE PROBLEMS MADE IT FOR YOU TO DO YOUR WORK, TAKE CARE OF THINGS AT HOME, OR GET ALONG WITH OTHER PEOPLE: NOT DIFFICULT AT ALL
1. FEELING NERVOUS, ANXIOUS, OR ON EDGE: SEVERAL DAYS
6. BECOMING EASILY ANNOYED OR IRRITABLE: SEVERAL DAYS
2. NOT BEING ABLE TO STOP OR CONTROL WORRYING: SEVERAL DAYS
GAD7 TOTAL SCORE: 5
3. WORRYING TOO MUCH ABOUT DIFFERENT THINGS: SEVERAL DAYS
5. BEING SO RESTLESS THAT IT IS HARD TO SIT STILL: NOT AT ALL
7. FEELING AFRAID AS IF SOMETHING AWFUL MIGHT HAPPEN: NOT AT ALL
4. TROUBLE RELAXING: SEVERAL DAYS

## 2018-06-12 ASSESSMENT — PAIN SCALES - GENERAL: PAINLEVEL: MILD PAIN (2)

## 2018-06-12 NOTE — NURSING NOTE
"Chief Complaint   Patient presents with     Sinus Problem       Initial /63 (BP Location: Left arm, Patient Position: Sitting, Cuff Size: Adult Regular)  Pulse 79  Temp 96.9  F (36.1  C) (Tympanic)  Resp 18  Ht 5' 6\" (1.676 m)  Wt 205 lb (93 kg)  SpO2 94%  BMI 33.09 kg/m2 Estimated body mass index is 33.09 kg/(m^2) as calculated from the following:    Height as of this encounter: 5' 6\" (1.676 m).    Weight as of this encounter: 205 lb (93 kg).  Medication Reconciliation: complete    Devika Gutierres LPN    "

## 2018-06-12 NOTE — MR AVS SNAPSHOT
After Visit Summary   6/12/2018    Melvi Cleveland    MRN: [id number]           Patient Information     Date Of Birth          1950        Visit Information        Provider Department      6/12/2018 4:00 PM [facility]        Today's Diagnoses     Acute bronchitis, unspecified organism    -  1      Care Instructions    Take levofloxacin once daily          Follow-ups after your visit        Follow-up notes from your care team     Return if symptoms worsen or fail to improve.      Who to contact     If you have questions or need follow up information about today's clinic visit or your schedule please contact [facility] directly at [phone].  Normal or non-critical lab and imaging results will be communicated to you by MyChart, letter or phone within 4 business days after the clinic has received the results. If you do not hear from us within 7 days, please contact the clinic through [employer] or phone. If you have a critical or abnormal lab result, we will notify you by phone as soon as possible.  Submit refill requests through [employer] or call your pharmacy and they will forward the refill request to us. Please allow 3 business days for your refill to be completed.          Additional Information About Your Visit        MyChart Information     [employer] gives you secure access to your electronic health record. If you see a primary care provider, you can also send messages to your care team and make appointments. If you have questions, please call your primary care clinic.  If you do not have a primary care provider, please call [phone] and they will assist you.        Care EveryWhere ID     This is your Care EveryWhere ID. This could be used by other organizations to access your [facility] medical records  [id number]        Your Vitals Were     Pulse Temperature Respirations Height Pulse Oximetry BMI (Body Mass Index)    79 96.9  F (36.1  C)  "(Tympanic) 18 5' 6\" (1.676 m) 94% 33.09 kg/m2       Blood Pressure from Last 3 Encounters:   06/12/18 112/63   05/09/18 120/70   02/26/18 110/68    Weight from Last 3 Encounters:   06/12/18 205 lb (93 kg)   05/09/18 205 lb (93 kg)   02/26/18 205 lb (93 kg)              Today, you had the following     No orders found for display         Today's Medication Changes          These changes are accurate as of 6/12/18 11:59 PM.  If you have any questions, ask your nurse or doctor.               Start taking these medicines.        Dose/Directions    levofloxacin 500 MG tablet   Commonly known as:  LEVAQUIN   Used for:  Acute bronchitis, unspecified organism   Started by:  Jimbo Martinez MD        Dose:  500 mg   Take 1 tablet (500 mg) by mouth daily   Quantity:  10 tablet   Refills:  0       predniSONE 20 MG tablet   Commonly known as:  DELTASONE   Used for:  Acute bronchitis, unspecified organism   Started by:  Jimbo Martinez MD        Dose:  20 mg   Take 1 tablet (20 mg) by mouth 2 times daily   Quantity:  10 tablet   Refills:  0         These medicines have changed or have updated prescriptions.        Dose/Directions    pantoprazole 40 MG EC tablet   Commonly known as:  PROTONIX   This may have changed:  when to take this   Used for:  Diagnosis unknown        Dose:  40 mg   Take 1 tablet (40 mg) by mouth daily   Quantity:  90 tablet   Refills:  3            Where to get your medicines      These medications were sent to [facility]  [address]  [address] [address]     Phone:  [phone]     levofloxacin 500 MG tablet    predniSONE 20 MG tablet                Primary Care Provider Office Phone # Fax #    Jimbo Martinez [phone]       [address] [facility]  [place] [address]        Equal Access to Services     RILEY OLIVEROS AH: Kenna Holland, waissada luqadaha, qaybta kaalmada gwyn, bill soto. So " [facility] [phone].    ATENCIÓN: Si enrique fan, tiene a garza disposición servicios gratuitos de asistencia lingüística. Freddy falcon [phone].    We comply with applicable federal civil rights laws and [place] laws. We do not discriminate on the basis of race, color, national origin, age, disability, sex, sexual orientation, or gender identity.            Thank you!     Thank you for choosing [facility]  for your care. Our goal is always to provide you with excellent care. Hearing back from our patients is one way we can continue to improve our services. Please take a few minutes to complete the written survey that you may receive in the mail after your visit with us. Thank you!             Your Updated Medication List - Protect others around you: Learn how to safely use, store and throw away your medicines at www.disposemymeds.org.          This list is accurate as of 6/12/18 11:59 PM.  Always use your most recent med list.                   Brand Name Dispense Instructions for use Diagnosis    ALFALFA PO      Take 5 tablets by mouth 2 times daily.        ALLEGRA-D ALLERGY & CONGESTION  MG per 12 hr tablet   Generic drug:  fexofenadine-pseudoePHEDrine     30 tablet    TAKE 1 TABLET BY MOUTH TWICE DAILY    Nasal congestion       CALCIUM + D 500-1000-40 MG-UNT-MCG Chew   Generic drug:  Calcium-Vitamin D-Vitamin K      Take 3 tablets by mouth daily        citalopram 40 MG tablet    celeXA    90 tablet    TAKE 1 TABLET BY MOUTH DAILY    Panic disorder without agoraphobia       DICLOFENAC PO      Take 100 mg by mouth daily        fish oil-omega-3 fatty acids 1000 MG capsule      Take 1 g by mouth daily.        flax seed oil 1000 MG capsule      Take 2 capsules by mouth daily        fluticasone 50 MCG/ACT spray    FLONASE     1 spray Reported on 5/11/2017        folic acid 1 MG tablet    FOLVITE     Take 1 mg by mouth 3 times daily.        gabapentin 300 MG capsule    NEURONTIN    210 capsule    TAKE  3 CAPSULES BY MOUTH IN THE MORNING, 2 CAPSULES AT NOON AND 2 CAPSULES IN THE EVENING    Peripheral polyneuropathy       Garlic 400 MG Tbec           HYDROcodone-acetaminophen 7.5-325 MG per tablet    NORCO    60 tablet    TAKE 1 TABLET BY MOUTH EVERY 4 TO 6 HOURS AS NEEDED FOR PAIN    Rheumatoid arthritis involving multiple sites with positive rheumatoid factor (H)       ketotifen 0.025 % Soln ophthalmic solution    ZADITOR/REFRESH ANTI-ITCH     1 drop        levofloxacin 500 MG tablet    LEVAQUIN    10 tablet    Take 1 tablet (500 mg) by mouth daily    Acute bronchitis, unspecified organism       MILK THISTLE PO      Take 1,000 mg by mouth daily.        MULTIPLE VITAMIN PO      Take  by mouth 2 times daily.        pantoprazole 40 MG EC tablet    PROTONIX    90 tablet    Take 1 tablet (40 mg) by mouth daily    Diagnosis unknown       PREBIOTIC PRODUCT PO      Take 1 tablet by mouth daily        predniSONE 20 MG tablet    DELTASONE    10 tablet    Take 1 tablet (20 mg) by mouth 2 times daily    Acute bronchitis, unspecified organism       PROAIR  (90 Base) MCG/ACT Inhaler   Generic drug:  albuterol     8.5 g    INHALE 2 PUFFS INTO THE LUNGS EVERY 6 HOURS AS NEEDED FOR SHORTNESS OF BREATH OR WHEEZING    Influenza-like symptoms       REFRESH 1.4-0.6 % ophthalmic solution   Generic drug:  polyvinyl alcohol-povidone      1-2 drops as needed        RESTASIS 0.05 % ophthalmic emulsion   Generic drug:  cycloSPORINE     60 each    USE 1 DROP IN BOTH EYES 2 TIMES A DAY    Bilateral dry eyes       SM GAS RELIEF ANTIFLATUENT 180 MG Caps   Generic drug:  Simethicone     60 capsule    TAKE ONE CAPSULE BY MOUTH AS NEEDED AFTER A MEAL. MAX 2 CAPS/DAY    Gas       SOLUBLE FIBER/PROBIOTICS PO      Take 1 tablet by mouth        traZODone 50 MG tablet    DESYREL    270 tablet    TAKE 2-3 TABLETS BY MOUTH DAILY AT BEDTIME    Primary insomnia       Vitamin C 500 MG Caps      Take 2 tablets by mouth 2 times daily.        vitamin D  1000 units capsule      Take 1 capsule by mouth daily.

## 2018-06-12 NOTE — PROGRESS NOTES
SUBJECTIVE:   Melvi Cleveland is a 68 year old female who presents to clinic today for the following health issues:        Sinus Problem      Duration: over 1 month    Description (location/character/radiation): congestion, runny nose, cough, ear pain.    Intensity:  mild    Accompanying signs and symptoms: see above    History (similar episodes/previous evaluation): seen last month for same issie    Precipitating or alleviating factors: None    Therapies tried and outcome: tylenol and mucinex. Was previously on zpack and prednisone.         Problem list and histories reviewed & adjusted, as indicated.  Additional history: as documented    Patient Active Problem List   Diagnosis     Neck pain, chronic     Low back pain, chronic     Crohn's disease of large intestine (H)     Dyslipidemia     Sicca syndrome (H)     Peripheral polyneuropathy     RA (rheumatoid arthritis) (H)     Comprehensive Medical Examination     Seasonal allergic rhinitis     Fibrocystic breast disease (FCBD)     ACP (advance care planning)     Past Surgical History:   Procedure Laterality Date     BACK SURGERY  05/1995    back surgery     BIOPSY      breast bx X2     BIOPSY BREAST      LT     BIOPSY BREAST NEEDLE LOCALIZATION Right 6/12/2017    Procedure: BIOPSY BREAST NEEDLE LOCALIZATION;  WIRE LOCALIZED EXCISIONAL BIOPSY  RIGHT BREAST MASS;  Surgeon: Jeni Amin MD;  Location: [facility] OR     CHOLECYSTECTOMY  2004     COLONOSCOPY  11/15/2004    screening     COLONOSCOPY  9/24/2014     EGD with biopsy  2010, 2011    GERD     laminectomy      L4 L5 laminectomy; back pain     RELEASE CARPAL TUNNEL Right 1/16/2018    Procedure: RELEASE CARPAL TUNNEL;  RIGHT CARPAL TUNNEL RELEASE;  Surgeon: Haider Carlos MD;  Location: [facility] OR       Social History   Substance Use Topics     Smoking status: Former Smoker     Types: Cigarettes     Quit date: 3/28/1997     Smokeless tobacco: Never Used      Comment: no passive exposure     Alcohol use No      Comment:  former. quit 1984     Family History   Problem Relation Age of Onset     DIABETES Mother      Rheumatoid Arthritis Mother      Other - See Comments Mother      fibromyalgia     Osteoarthritis Mother      Thyroid Disease Mother      thyroid disorder     Unknown/Adopted Father      cause of death unknown     Rheumatoid Arthritis Father      DIABETES Sister      Myocardial Infarction Sister      cause of death     Lupus Sister      cause of death     C.A.D. Maternal Grandmother      CEREBROVASCULAR DISEASE Maternal Grandmother      DIABETES Maternal Grandmother      Thyroid Disease Maternal Grandmother      thyrodi disorder; goitor removed     CANCER Maternal Grandfather      Hypertension Brother      Other - See Comments Brother      sleep apnea     Other - See Comments Sister      sleep apnea         Current Outpatient Prescriptions   Medication Sig Dispense Refill     levofloxacin (LEVAQUIN) 500 MG tablet Take 1 tablet (500 mg) by mouth daily 10 tablet 0     predniSONE (DELTASONE) 20 MG tablet Take 1 tablet (20 mg) by mouth 2 times daily 10 tablet 0     ALBUTEROL 108 (90 BASE) MCG/ACT inhaler INHALE 2 PUFFS INTO THE LUNGS EVERY 6 HOURS AS NEEDED FOR SHORTNESS OF BREATH OR WHEEZING 8.5 g 5     ALFALFA PO Take 5 tablets by mouth 2 times daily.       ALLEGRA-D ALLERGY & CONGESTION  MG per 12 hr tablet TAKE 1 TABLET BY MOUTH TWICE DAILY 30 tablet 0     Ascorbic Acid (VITAMIN C) 500 MG CAPS Take 2 tablets by mouth 2 times daily.       Calcium-Vitamin D-Vitamin K (CALCIUM + D) 500-1000-40 MG-UNT-MCG CHEW Take 3 tablets by mouth daily        Cholecalciferol (VITAMIN D) 1000 UNITS capsule Take 1 capsule by mouth daily.       citalopram (CELEXA) 40 MG tablet TAKE 1 TABLET BY MOUTH DAILY 90 tablet 1     DICLOFENAC PO Take 100 mg by mouth daily        fish oil-omega-3 fatty acids (OMEGA-3) 1000 MG capsule Take 1 g by mouth daily.       Flaxseed, Linseed, (FLAX SEED OIL) 1000 MG capsule Take 2 capsules by mouth daily        fluticasone (FLONASE) 50 MCG/ACT spray 1 spray Reported on 5/11/2017       folic acid (FOLVITE) 1 MG tablet Take 1 mg by mouth 3 times daily.       gabapentin (NEURONTIN) 300 MG capsule TAKE 3 CAPSULES BY MOUTH IN THE MORNING, 2 CAPSULES AT NOON AND 2 CAPSULES IN THE EVENING 210 capsule 0     Garlic 400 MG TBEC        HYDROcodone-acetaminophen (NORCO) 7.5-325 MG per tablet TAKE 1 TABLET BY MOUTH EVERY 4 TO 6 HOURS AS NEEDED FOR PAIN 60 tablet 0     ketotifen (ZADITOR/REFRESH ANTI-ITCH) 0.025 % SOLN ophthalmic solution 1 drop       MILK THISTLE PO Take 1,000 mg by mouth daily.       MULTIPLE VITAMIN PO Take  by mouth 2 times daily.       pantoprazole (PROTONIX) 40 MG enteric coated tablet Take 1 tablet (40 mg) by mouth daily (Patient taking differently: Take 40 mg by mouth 2 times daily ) 90 tablet 3     polyvinyl alcohol-povidone (REFRESH) 1.4-0.6 % ophthalmic solution 1-2 drops as needed       PREBIOTIC PRODUCT PO Take 1 tablet by mouth daily       Probiotic Product (SOLUBLE FIBER/PROBIOTICS PO) Take 1 tablet by mouth       RESTASIS 0.05 % ophthalmic emulsion USE 1 DROP IN BOTH EYES 2 TIMES A DAY 60 each 0     SM GAS RELIEF ANTIFLATUENT 180 MG CAPS TAKE ONE CAPSULE BY MOUTH AS NEEDED AFTER A MEAL. MAX 2 CAPS/DAY 60 capsule 5     traZODone (DESYREL) 50 MG tablet TAKE 2-3 TABLETS BY MOUTH DAILY AT BEDTIME 270 tablet 1     Allergies   Allergen Reactions     Adhesive Tape      Glue and nicotine patches     Benzoin Compound      Tin-Co-Javier     Mold      Seasonal Allergies        Reviewed and updated as needed this visit by clinical staff       Reviewed and updated as needed this visit by Provider         ROS:  Constitutional, HEENT, cardiovascular, pulmonary, gi and gu systems are negative, except as otherwise noted.    OBJECTIVE:     There were no vitals taken for this visit.  There is no height or weight on file to calculate BMI.  Physical Exam   Constitutional: She is oriented to person, place, and time. She  appears well-developed and well-nourished. No distress.   HENT:   Head: Normocephalic.   Right Ear: Hearing, tympanic membrane, external ear and ear canal normal.   Left Ear: Hearing, tympanic membrane, external ear and ear canal normal.   Nose: Right sinus exhibits no maxillary sinus tenderness and no frontal sinus tenderness. Left sinus exhibits maxillary sinus tenderness and frontal sinus tenderness.   Mouth/Throat: Uvula is midline and oropharynx is clear and moist. No oropharyngeal exudate or posterior oropharyngeal erythema.   Eyes: Conjunctivae are normal.   Neck: Neck supple.   Pulmonary/Chest: Effort normal and breath sounds normal.   Lymphadenopathy:     She has no cervical adenopathy.   Neurological: She is alert and oriented to person, place, and time.   Skin: Skin is warm and dry.   Psychiatric: She has a normal mood and affect.         Diagnostic Test Results:  none     ASSESSMENT/PLAN:     Acute bronchitis, unspecified organism  Continue metered dose inhaler.  Prednisone burst as written.  Levofloxacin once daily.  Follow up with persistent symptoms.  - predniSONE (DELTASONE) 20 MG tablet; Take 1 tablet (20 mg) by mouth 2 times daily  - levofloxacin (LEVAQUIN) 500 MG tablet; Take 1 tablet (500 mg) by mouth daily          Jimbo Martinez MD  [facility]

## 2018-06-13 ASSESSMENT — PATIENT HEALTH QUESTIONNAIRE - PHQ9: SUM OF ALL RESPONSES TO PHQ QUESTIONS 1-9: 3

## 2018-06-13 ASSESSMENT — ANXIETY QUESTIONNAIRES: GAD7 TOTAL SCORE: 5

## 2018-06-19 ENCOUNTER — OFFICE VISIT (OUTPATIENT)
Dept: FAMILY MEDICINE | Facility: OTHER | Age: 68
End: 2018-06-19
Attending: NURSE PRACTITIONER
Payer: COMMERCIAL

## 2018-06-19 ENCOUNTER — TELEPHONE (OUTPATIENT)
Dept: FAMILY MEDICINE | Facility: OTHER | Age: 68
End: 2018-06-19

## 2018-06-19 ENCOUNTER — RADIANT APPOINTMENT (OUTPATIENT)
Dept: GENERAL RADIOLOGY | Facility: OTHER | Age: 68
End: 2018-06-19
Attending: NURSE PRACTITIONER
Payer: MEDICARE

## 2018-06-19 VITALS
DIASTOLIC BLOOD PRESSURE: 58 MMHG | OXYGEN SATURATION: 94 % | SYSTOLIC BLOOD PRESSURE: 102 MMHG | TEMPERATURE: 97.8 F | HEART RATE: 79 BPM | BODY MASS INDEX: 33.09 KG/M2 | WEIGHT: 205 LBS

## 2018-06-19 DIAGNOSIS — R30.0 DYSURIA: Primary | ICD-10-CM

## 2018-06-19 DIAGNOSIS — R05.9 COUGH: ICD-10-CM

## 2018-06-19 LAB
ALBUMIN UR-MCNC: NEGATIVE MG/DL
APPEARANCE UR: CLEAR
BASOPHILS # BLD AUTO: 0 10E9/L (ref 0–0.2)
BASOPHILS NFR BLD AUTO: 0.5 %
BILIRUB UR QL STRIP: NEGATIVE
COLOR UR AUTO: NORMAL
DIFFERENTIAL METHOD BLD: NORMAL
EOSINOPHIL # BLD AUTO: 0.3 10E9/L (ref 0–0.7)
EOSINOPHIL NFR BLD AUTO: 3 %
ERYTHROCYTE [DISTWIDTH] IN BLOOD BY AUTOMATED COUNT: 14.5 % (ref 10–15)
GLUCOSE UR STRIP-MCNC: NEGATIVE MG/DL
HCT VFR BLD AUTO: 39.3 % (ref 35–47)
HGB BLD-MCNC: 12.9 G/DL (ref 11.7–15.7)
HGB UR QL STRIP: NEGATIVE
IMM GRANULOCYTES # BLD: 0.1 10E9/L (ref 0–0.4)
IMM GRANULOCYTES NFR BLD: 1 %
KETONES UR STRIP-MCNC: NEGATIVE MG/DL
LEUKOCYTE ESTERASE UR QL STRIP: NEGATIVE
LYMPHOCYTES # BLD AUTO: 2.8 10E9/L (ref 0.8–5.3)
LYMPHOCYTES NFR BLD AUTO: 33.7 %
MCH RBC QN AUTO: 29.4 PG (ref 26.5–33)
MCHC RBC AUTO-ENTMCNC: 32.8 G/DL (ref 31.5–36.5)
MCV RBC AUTO: 90 FL (ref 78–100)
MONOCYTES # BLD AUTO: 0.7 10E9/L (ref 0–1.3)
MONOCYTES NFR BLD AUTO: 8.6 %
NEUTROPHILS # BLD AUTO: 4.4 10E9/L (ref 1.6–8.3)
NEUTROPHILS NFR BLD AUTO: 53.2 %
NITRATE UR QL: NEGATIVE
NRBC # BLD AUTO: 0 10*3/UL
NRBC BLD AUTO-RTO: 0 /100
PH UR STRIP: 7 PH (ref 4.7–8)
PLATELET # BLD AUTO: 259 10E9/L (ref 150–450)
RBC # BLD AUTO: 4.39 10E12/L (ref 3.8–5.2)
SOURCE: NORMAL
SP GR UR STRIP: 1 (ref 1–1.03)
UROBILINOGEN UR STRIP-MCNC: NORMAL MG/DL (ref 0–2)
WBC # BLD AUTO: 8.3 10E9/L (ref 4–11)

## 2018-06-19 PROCEDURE — G0463 HOSPITAL OUTPT CLINIC VISIT: HCPCS | Mod: 25

## 2018-06-19 PROCEDURE — G0463 HOSPITAL OUTPT CLINIC VISIT: HCPCS

## 2018-06-19 PROCEDURE — 99214 OFFICE O/P EST MOD 30 MIN: CPT | Performed by: NURSE PRACTITIONER

## 2018-06-19 PROCEDURE — 36415 COLL VENOUS BLD VENIPUNCTURE: CPT | Mod: ZL | Performed by: NURSE PRACTITIONER

## 2018-06-19 PROCEDURE — 71046 X-RAY EXAM CHEST 2 VIEWS: CPT | Mod: TC

## 2018-06-19 PROCEDURE — 81003 URINALYSIS AUTO W/O SCOPE: CPT | Mod: ZL | Performed by: NURSE PRACTITIONER

## 2018-06-19 PROCEDURE — 85025 COMPLETE CBC W/AUTO DIFF WBC: CPT | Mod: ZL | Performed by: NURSE PRACTITIONER

## 2018-06-19 RX ORDER — ALBUTEROL SULFATE 0.83 MG/ML
2.5 SOLUTION RESPIRATORY (INHALATION) EVERY 6 HOURS PRN
Qty: 25 VIAL | Refills: 0 | Status: SHIPPED | OUTPATIENT
Start: 2018-06-19 | End: 2018-11-19

## 2018-06-19 RX ORDER — BENZONATATE 100 MG/1
100 CAPSULE ORAL 3 TIMES DAILY PRN
Qty: 42 CAPSULE | Refills: 0 | Status: SHIPPED | OUTPATIENT
Start: 2018-06-19 | End: 2018-12-01

## 2018-06-19 RX ORDER — PREDNISONE 20 MG/1
TABLET ORAL
COMMUNITY
Start: 2018-06-12 | End: 2018-08-07

## 2018-06-19 ASSESSMENT — ANXIETY QUESTIONNAIRES
GAD7 TOTAL SCORE: 3
4. TROUBLE RELAXING: SEVERAL DAYS
5. BEING SO RESTLESS THAT IT IS HARD TO SIT STILL: NOT AT ALL
3. WORRYING TOO MUCH ABOUT DIFFERENT THINGS: NOT AT ALL
IF YOU CHECKED OFF ANY PROBLEMS ON THIS QUESTIONNAIRE, HOW DIFFICULT HAVE THESE PROBLEMS MADE IT FOR YOU TO DO YOUR WORK, TAKE CARE OF THINGS AT HOME, OR GET ALONG WITH OTHER PEOPLE: NOT DIFFICULT AT ALL
1. FEELING NERVOUS, ANXIOUS, OR ON EDGE: SEVERAL DAYS
7. FEELING AFRAID AS IF SOMETHING AWFUL MIGHT HAPPEN: NOT AT ALL
2. NOT BEING ABLE TO STOP OR CONTROL WORRYING: SEVERAL DAYS
6. BECOMING EASILY ANNOYED OR IRRITABLE: NOT AT ALL

## 2018-06-19 ASSESSMENT — PAIN SCALES - GENERAL: PAINLEVEL: MILD PAIN (3)

## 2018-06-19 NOTE — PROGRESS NOTES
SUBJECTIVE:                                                    Melvi Cleveland is a 68 year old female who presents to clinic today for the following health issues:      URINARY TRACT SYMPTOMS      Duration: a couple of weeks    Description  urgency and back pain    Intensity:  moderate    Accompanying signs and symptoms:  Fever/chills: no  Flank pain YES  Nausea and vomiting: no   Vaginal symptoms: none  Abdominal/Pelvic Pain: no     History  History of frequent UTI's: no only had one  History of kidney stones: no   Sexually Active: no   Possibility of pregnancy: No    Precipitating or alleviating factors: None    Therapies tried and outcome: course of antibiotics - last week for something else otherwise no   Outcome: not helpful      RESPIRATORY SYMPTOMS      Duration: was seen recently last Monday by haily.     Description  nasal congestion, facial pain/pressure, cough, wheezing and ear pain bilateral pressure in ears and jaws    Severity: moderate    Accompanying signs and symptoms: SOB and dizzy    History (predisposing factors):  Completing second round of antibiotic tomorrow    Precipitating or alleviating factors: None    Therapies tried and outcome:  rest and fluids prednisone and levaquin also using Proair inhaler 4 times a day      Problem list and histories reviewed & adjusted, as indicated.  Additional history: as documented    Patient Active Problem List   Diagnosis     Neck pain, chronic     Low back pain, chronic     Crohn's disease of large intestine (H)     Dyslipidemia     Sicca syndrome (H)     Peripheral polyneuropathy     RA (rheumatoid arthritis) (H)     Comprehensive Medical Examination     Seasonal allergic rhinitis     Fibrocystic breast disease (FCBD)     ACP (advance care planning)     Past Surgical History:   Procedure Laterality Date     BACK SURGERY  05/1995    back surgery     BIOPSY      breast bx X2     BIOPSY BREAST      LT     BIOPSY BREAST NEEDLE LOCALIZATION Right 6/12/2017     Procedure: BIOPSY BREAST NEEDLE LOCALIZATION;  WIRE LOCALIZED EXCISIONAL BIOPSY  RIGHT BREAST MASS;  Surgeon: Jeni Amin MD;  Location: [facility]     CHOLECYSTECTOMY  2004     COLONOSCOPY  11/15/2004    screening     COLONOSCOPY  9/24/2014     EGD with biopsy  2010, 2011    GERD     laminectomy      L4 L5 laminectomy; back pain     RELEASE CARPAL TUNNEL Right 1/16/2018    Procedure: RELEASE CARPAL TUNNEL;  RIGHT CARPAL TUNNEL RELEASE;  Surgeon: Haider Carlos MD;  Location: [facility]       Social History   Substance Use Topics     Smoking status: Former Smoker     Types: Cigarettes     Quit date: 3/28/1997     Smokeless tobacco: Never Used      Comment: no passive exposure     Alcohol use No      Comment: former. quit 1984     Family History   Problem Relation Age of Onset     Diabetes Mother      Rheumatoid Arthritis Mother      Other - See Comments Mother      fibromyalgia     Osteoarthritis Mother      Thyroid Disease Mother      thyroid disorder     Unknown/Adopted Father      cause of death unknown     Rheumatoid Arthritis Father      Diabetes Sister      Myocardial Infarction Sister      cause of death     Lupus Sister      cause of death     C.A.D. Maternal Grandmother      Cerebrovascular Disease Maternal Grandmother      Diabetes Maternal Grandmother      Thyroid Disease Maternal Grandmother      thyrodi disorder; goitor removed     Cancer Maternal Grandfather      Hypertension Brother      Other - See Comments Brother      sleep apnea     Other - See Comments Sister      sleep apnea         Current Outpatient Prescriptions   Medication Sig Dispense Refill     ALBUTEROL 108 (90 BASE) MCG/ACT inhaler INHALE 2 PUFFS INTO THE LUNGS EVERY 6 HOURS AS NEEDED FOR SHORTNESS OF BREATH OR WHEEZING 8.5 g 5     ALFALFA PO Take 5 tablets by mouth 2 times daily.       ALLEGRA-D ALLERGY & CONGESTION  MG per 12 hr tablet TAKE 1 TABLET BY MOUTH TWICE DAILY 30 tablet 0     Ascorbic Acid (VITAMIN C) 500 MG CAPS Take 2  tablets by mouth 2 times daily.       Calcium-Vitamin D-Vitamin K (CALCIUM + D) 500-1000-40 MG-UNT-MCG CHEW Take 3 tablets by mouth daily        Cholecalciferol (VITAMIN D) 1000 UNITS capsule Take 1 capsule by mouth daily.       citalopram (CELEXA) 40 MG tablet TAKE 1 TABLET BY MOUTH DAILY 90 tablet 1     DICLOFENAC PO Take 100 mg by mouth daily        fish oil-omega-3 fatty acids (OMEGA-3) 1000 MG capsule Take 1 g by mouth daily.       Flaxseed, Linseed, (FLAX SEED OIL) 1000 MG capsule Take 2 capsules by mouth daily       fluticasone (FLONASE) 50 MCG/ACT spray 1 spray Reported on 5/11/2017       folic acid (FOLVITE) 1 MG tablet Take 1 mg by mouth 3 times daily.       gabapentin (NEURONTIN) 300 MG capsule TAKE 3 CAPSULES BY MOUTH IN THE MORNING, 2 CAPSULES AT NOON AND 2 CAPSULES IN THE EVENING 210 capsule 0     Garlic 400 MG TBEC        HYDROcodone-acetaminophen (NORCO) 7.5-325 MG per tablet TAKE 1 TABLET BY MOUTH EVERY 4 TO 6 HOURS AS NEEDED FOR PAIN 60 tablet 0     ketotifen (ZADITOR/REFRESH ANTI-ITCH) 0.025 % SOLN ophthalmic solution 1 drop       levofloxacin (LEVAQUIN) 500 MG tablet Take 1 tablet (500 mg) by mouth daily 10 tablet 0     MILK THISTLE PO Take 1,000 mg by mouth daily.       MULTIPLE VITAMIN PO Take  by mouth 2 times daily.       pantoprazole (PROTONIX) 40 MG enteric coated tablet Take 1 tablet (40 mg) by mouth daily (Patient taking differently: Take 40 mg by mouth 2 times daily ) 90 tablet 3     polyvinyl alcohol-povidone (REFRESH) 1.4-0.6 % ophthalmic solution 1-2 drops as needed       PREBIOTIC PRODUCT PO Take 1 tablet by mouth daily       Probiotic Product (SOLUBLE FIBER/PROBIOTICS PO) Take 1 tablet by mouth       RESTASIS 0.05 % ophthalmic emulsion USE 1 DROP IN BOTH EYES 2 TIMES A DAY 60 each 0     SM GAS RELIEF ANTIFLATUENT 180 MG CAPS TAKE ONE CAPSULE BY MOUTH AS NEEDED AFTER A MEAL. MAX 2 CAPS/DAY 60 capsule 5     traZODone (DESYREL) 50 MG tablet TAKE 2-3 TABLETS BY MOUTH DAILY AT BEDTIME  270 tablet 1     Allergies   Allergen Reactions     Adhesive Tape      Glue and nicotine patches     Benzoin Compound      Tin-Co-Javier     Mold      Seasonal Allergies        ROS:  CONSTITUTIONAL:occasional sweating  INTEGUMENTARY/SKIN: NEGATIVE for worrisome rashes, moles or lesions  EYES: NEGATIVE for vision changes or irritation  ENT/MOUTH: pressure around ears and lower face, nasal congestion, postnasal drainage and sinus pressure  RESP:producitve cough clear, Short of breath  CV: chest pain and pressure across chest no change in the past month since symptoms started  GI: NEGATIVE for nausea, abdominal pain, heartburn, or change in bowel habits   female: urinary urgency with small amount of urine  MUSCULOSKELETAL:chronic back pain, but having pain across back which is new felt similar to UTI in the past  NEURO: dizziness/lightheadedness  ENDOCRINE: NEGATIVE for temperature intolerance, skin/hair changes  PSYCHIATRIC: NEGATIVE for changes in mood or affect    OBJECTIVE:     /58  Pulse 79  Temp 97.8  F (36.6  C) (Tympanic)  Wt 205 lb (93 kg)  SpO2 94%  BMI 33.09 kg/m2  Body mass index is 33.09 kg/(m^2).   GENERAL: alert and no distress  HENT: ear canals and TM's normal, nose and mouth without ulcers or lesions  NECK: no adenopathy, no asymmetry, masses, or scars and thyroid normal to palpation  RESP: lungs clear to auscultation - no rales, rhonchi or wheezes POSITIVE for harsh cough  CV: regular rate and rhythm, normal S1 S2, no S3 or S4, no murmur, click or rub, no peripheral edema and peripheral pulses strong  ABDOMEN: soft, nontender, no hepatosplenomegaly, no masses and bowel sounds normal  SKIN: no suspicious lesions or rashes  PSYCH: mentation appears normal, affect normal/bright  LYMPH: normal ant/post cervical, supraclavicular nodes    Diagnostic Test Results:  Results for orders placed or performed in visit on 06/19/18 (from the past 24 hour(s))   UA reflex to Microscopic and Culture - [place]    Result Value Ref Range    Color Urine Light Yellow     Appearance Urine Clear     Glucose Urine Negative NEG^Negative mg/dL    Bilirubin Urine Negative NEG^Negative    Ketones Urine Negative NEG^Negative mg/dL    Specific Gravity Urine 1.005 1.003 - 1.035    Blood Urine Negative NEG^Negative    pH Urine 7.0 4.7 - 8.0 pH    Protein Albumin Urine Negative NEG^Negative mg/dL    Urobilinogen mg/dL Normal 0.0 - 2.0 mg/dL    Nitrite Urine Negative NEG^Negative    Leukocyte Esterase Urine Negative NEG^Negative    Source Midstream Urine    CBC with platelets and differential   Result Value Ref Range    WBC 8.3 4.0 - 11.0 10e9/L    RBC Count 4.39 3.8 - 5.2 10e12/L    Hemoglobin 12.9 11.7 - 15.7 g/dL    Hematocrit 39.3 35.0 - 47.0 %    MCV 90 78 - 100 fl    MCH 29.4 26.5 - 33.0 pg    MCHC 32.8 31.5 - 36.5 g/dL    RDW 14.5 10.0 - 15.0 %    Platelet Count 259 150 - 450 10e9/L    Diff Method Automated Method     % Neutrophils 53.2 %    % Lymphocytes 33.7 %    % Monocytes 8.6 %    % Eosinophils 3.0 %    % Basophils 0.5 %    % Immature Granulocytes 1.0 %    Nucleated RBCs 0 0 /100    Absolute Neutrophil 4.4 1.6 - 8.3 10e9/L    Absolute Lymphocytes 2.8 0.8 - 5.3 10e9/L    Absolute Monocytes 0.7 0.0 - 1.3 10e9/L    Absolute Eosinophils 0.3 0.0 - 0.7 10e9/L    Absolute Basophils 0.0 0.0 - 0.2 10e9/L    Abs Immature Granulocytes 0.1 0 - 0.4 10e9/L    Absolute Nucleated RBC 0.0      XR CHEST 2 VW  HISTORY: 68 years Female ; Cough  COMPARISON: 9/2/2017  TECHNIQUE: 2 views of the chest were obtained.     FINDINGS: Two views of the chest were obtained. Heart size and  pulmonary vascularity are within normal limits, lungs are clear both  views. No consolidating air space opacities are present.         IMPRESSION: Clear chest.     JACKIE ZAMORA MD    ASSESSMENT/PLAN:     1. Dysuria  Discussed results of UA. Encouraged hydration and follow up if symptoms continue  - UA reflex to Microscopic and Culture - [place]    2. Cough  Possible  allergy related vs bronchitis. Plan is to finish antibiotic. She continue with her cough and SOB, but states her sputum is now clear. She had a dry harsh cough while in clinic today. Trial of nebulizer while home and can use inhaler when out. Melvi should follow up if no improvement or worsening symptoms. She should go to the ER if symptom worsen. Melvi agrees with plan today.   - CBC with platelets and differential  - XR CHEST 2 VW (Clinic Performed); Future  - benzonatate (TESSALON) 100 MG capsule; Take 1 capsule (100 mg) by mouth 3 times daily as needed for cough  Dispense: 42 capsule; Refill: 0  - order for DME; Equipment being ordered: Nebulizer  Dispense: 1 Device; Refill: 0  - albuterol (2.5 MG/3ML) 0.083% neb solution; Take 1 vial (2.5 mg) by nebulization every 6 hours as needed for shortness of breath / dyspnea or wheezing  Dispense: 25 vial; Refill: 0        See Patient Instructions    KENDRA Browning [facility] AIDE

## 2018-06-19 NOTE — TELEPHONE ENCOUNTER
Spoke with pharmacy and coding is updated for neb sol.with [employer] and [facility] is faxed update also for her machine.  Patient notified of same.>Susy Moss

## 2018-06-19 NOTE — TELEPHONE ENCOUNTER
Looks like last time patient had nebs was facility ordered for shortness of breathe/dyspnea R68.89, new order was cough R05.   Instruction for new order do say SOB, that that be added to order?  Susy Moss

## 2018-06-19 NOTE — TELEPHONE ENCOUNTER
1:41 PM    Reason for Call: Phone Call    Description: Pt called and states that she would like to see if she could get her nebulizer prescription diagnosis changed because the way it is written out now her insurance will not cover it      Was an appointment offered for this call? No  If yes : Appointment type              Date    Preferred method for responding to this message: Telephone Call  What is your phone number ?[phone]    If we cannot reach you directly, may we leave a detailed response at the number you provided? Yes    Can this message wait until your PCP/provider returns, if available today? N/A, PCP is in     [facility]

## 2018-06-19 NOTE — PATIENT INSTRUCTIONS

## 2018-06-19 NOTE — MR AVS SNAPSHOT
After Visit Summary   6/19/2018    Melvi Cleveland    MRN: [id number]           Patient Information     Date Of Birth          1950        Visit Information        Provider Department      6/19/2018 8:20 AM Susana Aldridge APRN [facility]        Today's Diagnoses     Dysuria    -  1    Cough          Care Instructions        Bronchitis, Antibiotic Treatment (Adult)    Bronchitis is an infection of the air passages (bronchial tubes) in your lungs. It often occurs when you have a cold. This illness is contagious during the first few days and is spread through the air by coughing and sneezing, or by direct contact (touching the sick person and then touching your own eyes, nose, or mouth).  Symptoms of bronchitis include cough with mucus (phlegm) and low-grade fever. Bronchitis usually lasts 7 to 14 days. Mild cases can be treated with simple home remedies. More severe infection is treated with an antibiotic.  Home care  Follow these guidelines when caring for yourself at home:    If your symptoms are severe, rest at home for the first 2 to 3 days. When you go back to your usual activities, don't let yourself get too tired.    Do not smoke. Also avoid being exposed to secondhand smoke.    You may use over-the-counter medicines to control fever or pain, unless another medicine was prescribed. If you have chronic liver or kidney disease or have ever had a stomach ulcer or gastrointestinal bleeding, talk with your healthcare provider before using these medicines. Also talk to your provider if you are taking medicine to prevent blood clots. Aspirin should never be given to anyone younger than 18 years of age who is ill with a viral infection or fever. It may cause severe liver or brain damage.    Your appetite may be poor, so a light diet is fine. Avoid dehydration by drinking 6 to 8 glasses of fluids per day (such as water, soft drinks, sports drinks, juices, tea, or soup).  Extra fluids will help loosen secretions in the nose and lungs.    Over-the-counter cough, cold, and sore-throat medicines will not shorten the length of the illness, but they may be helpful to reduce symptoms. (Note: Do not use decongestants if you have high blood pressure.)    Finish all antibiotic medicine. Do this even if you are feeling better after only a few days.  Follow-up care  Follow up with your healthcare provider, or as advised. If you had an X-ray or ECG (electrocardiogram), a specialist will review it. You will be notified of any new findings that may affect your care.  If you are age 65 or older, or if you have a chronic lung disease or condition that affects your immune system, or you smoke, ask your healthcare provider about getting a pneumococcal vaccine and a yearly flu shot (influenza vaccine).  When to seek medical advice  Call your healthcare provider right away if any of these occur:    Fever of 100.4 F (38 C) or higher, or as directed by your healthcare provider    Coughing up increased amounts of colored sputum    Weakness, drowsiness, headache, facial pain, ear pain, or a stiff neck  Call 911  Call 911 if any of these occur.    Coughing up blood    Worsening weakness, drowsiness, headache, or stiff neck    Trouble breathing, wheezing, or pain with breathing  Date Last Reviewed: 9/13/2015 2000-2017 [employer]. [address] [place] [address]. All rights reserved. This information is not intended as a substitute for professional medical care. Always follow your healthcare professional's instructions.                Follow-ups after your visit        Follow-up notes from your care team     Return if symptoms worsen or fail to improve.      Who to contact     If you have questions or need follow up information about today's clinic visit or your schedule please [facility] at [phone].  Normal or non-critical lab and imaging results  will be communicated to you by T[employer]mahart, letter or phone within 4 business days after the clinic has received the results. If you do not hear from us within 7 days, please contact the clinic through [employer] or phone. If you have a critical or abnormal lab result, we will notify you by phone as soon as possible.  Submit refill requests through Canva or call your pharmacy and they will forward the refill request to us. Please allow 3 business days for your refill to be completed.          Additional Information About Your Visit        Canva Information     Canva gives you secure access to your electronic health record. If you see a primary care provider, you can also send messages to your care team and make appointments. If you have questions, please call your primary care clinic.  If you do not have a primary care provider, please call [phone] and they will assist you.        Care EveryWhere ID     This is your Care EveryWhere ID. This could be used by other organizations to access your [facility] medical records  [id number]        Your Vitals Were     Pulse Temperature Pulse Oximetry BMI (Body Mass Index)          79 97.8  F (36.6  C) (Tympanic) 94% 33.09 kg/m2         Blood Pressure from Last 3 Encounters:   06/19/18 102/58   06/12/18 112/63   05/09/18 120/70    Weight from Last 3 Encounters:   06/19/18 205 lb (93 kg)   06/12/18 205 lb (93 kg)   05/09/18 205 lb (93 kg)              We Performed the Following     CBC with platelets and differential     UA reflex to Microscopic and Culture - [place]          Today's Medication Changes          These changes are accurate as of 6/19/18 10:06 AM.  If you have any questions, ask your nurse or doctor.               Start taking these medicines.        Dose/Directions    benzonatate 100 MG capsule   Commonly known as:  TESSALON   Used for:  Cough   Started by:  Susana Aldridge APRN CNP        Dose:  100 mg   Take 1 capsule (100 mg) by mouth 3 times daily  as needed for cough   Quantity:  42 capsule   Refills:  0       order for DME   Used for:  Cough   Started by:  Susana Aldridge APRN CNP        Equipment being ordered: Nebulizer   Quantity:  1 Device   Refills:  0         These medicines have changed or have updated prescriptions.        Dose/Directions    pantoprazole 40 MG EC tablet   Commonly known as:  PROTONIX   This may have changed:  when to take this   Used for:  Diagnosis unknown        Dose:  40 mg   Take 1 tablet (40 mg) by mouth daily   Quantity:  90 tablet   Refills:  3       * PROAIR  (90 Base) MCG/ACT Inhaler   This may have changed:  Another medication with the same name was added. Make sure you understand how and when to take each.   Used for:  Influenza-like symptoms   Generic drug:  albuterol   Changed by:  Susana Aldridge APRN CNP        INHALE 2 PUFFS INTO THE LUNGS EVERY 6 HOURS AS NEEDED FOR SHORTNESS OF BREATH OR WHEEZING   Quantity:  8.5 g   Refills:  5       * albuterol (2.5 MG/3ML) 0.083% neb solution   This may have changed:  You were already taking a medication with the same name, and this prescription was added. Make sure you understand how and when to take each.   Used for:  Cough   Changed by:  Susana Aldridge APRN CNP        Dose:  2.5 mg   Take 1 vial (2.5 mg) by nebulization every 6 hours as needed for shortness of breath / dyspnea or wheezing   Quantity:  25 vial   Refills:  0       * Notice:  This list has 2 medication(s) that are the same as other medications prescribed for you. Read the directions carefully, and ask your doctor or other care provider to review them with you.         Where to get your medicines      These medications were sent to [facility] [address]  [address] [address]     Phone:  [phone]     albuterol (2.5 MG/3ML) 0.083% neb solution    benzonatate 100 MG capsule         Some of these will need a paper prescription and  others can be bought over the counter.  Ask your nurse if you have questions.     Bring a paper prescription for each of these medications     order for DME                Primary Care Provider Office Phone # Fax #    Jimbo Martinez [phone] [address] [facility] [address]        Equal Access to Services     RILEY OLIVEROS : Hadii dawna ku hadasho Soomaali, waaxda luqadaha, qaybta kaalmada adesaadyada, bill mathisannachang soto. So [facility] [phone].    ATENCIÓN: Si habla español, tiene a garza disposición servicios gratuitos de asistencia lingüística. [facility] [phone].    We comply with applicable federal civil rights laws and [place] laws. We do not discriminate on the basis of race, color, national origin, age, disability, sex, sexual orientation, or gender identity.            Thank you!     Thank you for choosing [facility]  for your care. Our goal is always to provide you with excellent care. Hearing back from our patients is one way we can continue to improve our services. Please take a few minutes to complete the written survey that you may receive in the mail after your visit with us. Thank you!             Your Updated Medication List - Protect others around you: Learn how to safely use, store and throw away your medicines at www.disposemymeds.org.          This list is accurate as of 6/19/18 10:06 AM.  Always use your most recent med list.                   Brand Name Dispense Instructions for use Diagnosis    ALFALFA PO      Take 5 tablets by mouth 2 times daily.        ALLEGRA-D ALLERGY & CONGESTION  MG per 12 hr tablet   Generic drug:  fexofenadine-pseudoePHEDrine     30 tablet    TAKE 1 TABLET BY MOUTH TWICE DAILY    Nasal congestion       benzonatate 100 MG capsule    TESSALON    42 capsule    Take 1 capsule (100 mg) by mouth 3 times daily as needed for cough    Cough       CALCIUM + D 500-1000-40 MG-UNT-MCG Chew   Generic drug:   Calcium-Vitamin D-Vitamin K      Take 3 tablets by mouth daily        citalopram 40 MG tablet    celeXA    90 tablet    TAKE 1 TABLET BY MOUTH DAILY    Panic disorder without agoraphobia       DICLOFENAC PO      Take 100 mg by mouth daily        fish oil-omega-3 fatty acids 1000 MG capsule      Take 1 g by mouth daily.        flax seed oil 1000 MG capsule      Take 2 capsules by mouth daily        fluticasone 50 MCG/ACT spray    FLONASE     1 spray Reported on 5/11/2017        folic acid 1 MG tablet    FOLVITE     Take 1 mg by mouth 3 times daily.        gabapentin 300 MG capsule    NEURONTIN    210 capsule    TAKE 3 CAPSULES BY MOUTH IN THE MORNING, 2 CAPSULES AT NOON AND 2 CAPSULES IN THE EVENING    Peripheral polyneuropathy       Garlic 400 MG Tbec           HYDROcodone-acetaminophen 7.5-325 MG per tablet    NORCO    60 tablet    TAKE 1 TABLET BY MOUTH EVERY 4 TO 6 HOURS AS NEEDED FOR PAIN    Rheumatoid arthritis involving multiple sites with positive rheumatoid factor (H)       ketotifen 0.025 % Soln ophthalmic solution    ZADITOR/REFRESH ANTI-ITCH     1 drop        levofloxacin 500 MG tablet    LEVAQUIN    10 tablet    Take 1 tablet (500 mg) by mouth daily    Acute bronchitis, unspecified organism       MILK THISTLE PO      Take 1,000 mg by mouth daily.        MULTIPLE VITAMIN PO      Take  by mouth 2 times daily.        order for DME     1 Device    Equipment being ordered: Nebulizer    Cough       pantoprazole 40 MG EC tablet    PROTONIX    90 tablet    Take 1 tablet (40 mg) by mouth daily    Diagnosis unknown       PREBIOTIC PRODUCT PO      Take 1 tablet by mouth daily        predniSONE 20 MG tablet    DELTASONE          * PROAIR  (90 Base) MCG/ACT Inhaler   Generic drug:  albuterol     8.5 g    INHALE 2 PUFFS INTO THE LUNGS EVERY 6 HOURS AS NEEDED FOR SHORTNESS OF BREATH OR WHEEZING    Influenza-like symptoms       * albuterol (2.5 MG/3ML) 0.083% neb solution     25 vial    Take 1 vial (2.5 mg) by  nebulization every 6 hours as needed for shortness of breath / dyspnea or wheezing    Cough       REFRESH 1.4-0.6 % ophthalmic solution   Generic drug:  polyvinyl alcohol-povidone      1-2 drops as needed        RESTASIS 0.05 % ophthalmic emulsion   Generic drug:  cycloSPORINE     60 each    USE 1 DROP IN BOTH EYES 2 TIMES A DAY    Bilateral dry eyes       SM GAS RELIEF ANTIFLATUENT 180 MG Caps   Generic drug:  Simethicone     60 capsule    TAKE ONE CAPSULE BY MOUTH AS NEEDED AFTER A MEAL. MAX 2 CAPS/DAY    Gas       SOLUBLE FIBER/PROBIOTICS PO      Take 1 tablet by mouth        traZODone 50 MG tablet    DESYREL    270 tablet    TAKE 2-3 TABLETS BY MOUTH DAILY AT BEDTIME    Primary insomnia       Vitamin C 500 MG Caps      Take 2 tablets by mouth 2 times daily.        vitamin D 1000 units capsule      Take 1 capsule by mouth daily.        * Notice:  This list has 2 medication(s) that are the same as other medications prescribed for you. Read the directions carefully, and ask your doctor or other care provider to review them with you.

## 2018-06-19 NOTE — NURSING NOTE
"Chief Complaint   Patient presents with     UTI       Initial /58  Pulse 79  Temp 97.8  F (36.6  C) (Tympanic)  Wt 205 lb (93 kg)  SpO2 94%  BMI 33.09 kg/m2 Estimated body mass index is 33.09 kg/(m^2) as calculated from the following:    Height as of 6/12/18: 5' 6\" (1.676 m).    Weight as of this encounter: 205 lb (93 kg).  Medication Reconciliation: complete    Diana Jackson MA    "

## 2018-06-20 ASSESSMENT — ANXIETY QUESTIONNAIRES: GAD7 TOTAL SCORE: 3

## 2018-06-20 ASSESSMENT — PATIENT HEALTH QUESTIONNAIRE - PHQ9: SUM OF ALL RESPONSES TO PHQ QUESTIONS 1-9: 4

## 2018-06-25 DIAGNOSIS — R09.81 NASAL CONGESTION: ICD-10-CM

## 2018-06-26 RX ORDER — FEXOFENADINE HYDROCHLORIDE AND PSEUDOEPHEDRINE HYDROCHLORIDE 60; 120 MG/1; MG/1
TABLET, FILM COATED, EXTENDED RELEASE ORAL
Qty: 30 TABLET | Refills: 0 | Status: SHIPPED | OUTPATIENT
Start: 2018-06-26 | End: 2018-08-13

## 2018-06-26 NOTE — TELEPHONE ENCOUNTER
Allegra-D  Last Written Prescription Date:  6/1/18  Last Fill Qty:  30, # Refills:  0  Last Office Visit:  6/19/18 (with Oly)

## 2018-07-06 DIAGNOSIS — G62.9 PERIPHERAL POLYNEUROPATHY: ICD-10-CM

## 2018-07-06 RX ORDER — GABAPENTIN 300 MG/1
CAPSULE ORAL
Qty: 210 CAPSULE | Refills: 0 | Status: SHIPPED | OUTPATIENT
Start: 2018-07-06 | End: 2018-08-13

## 2018-07-06 NOTE — TELEPHONE ENCOUNTER
GABAPENTIN 300 MG CAPSULE    Last Written Prescription Date:  6/1/18  Last Fill Quantity: 210,   # refills: 0  Last Office Visit: 6/12/18  Future Office visit:

## 2018-08-07 ENCOUNTER — OFFICE VISIT (OUTPATIENT)
Dept: FAMILY MEDICINE | Facility: OTHER | Age: 68
End: 2018-08-07
Attending: FAMILY MEDICINE
Payer: COMMERCIAL

## 2018-08-07 VITALS
WEIGHT: 205 LBS | TEMPERATURE: 97.7 F | RESPIRATION RATE: 16 BRPM | BODY MASS INDEX: 32.95 KG/M2 | HEART RATE: 75 BPM | OXYGEN SATURATION: 95 % | HEIGHT: 66 IN | DIASTOLIC BLOOD PRESSURE: 60 MMHG | SYSTOLIC BLOOD PRESSURE: 116 MMHG

## 2018-08-07 DIAGNOSIS — G89.29 CHRONIC MIDLINE THORACIC BACK PAIN: ICD-10-CM

## 2018-08-07 DIAGNOSIS — M54.42 BILATERAL LOW BACK PAIN WITH BILATERAL SCIATICA, UNSPECIFIED CHRONICITY: ICD-10-CM

## 2018-08-07 DIAGNOSIS — M54.2 CERVICALGIA: Primary | ICD-10-CM

## 2018-08-07 DIAGNOSIS — M54.41 BILATERAL LOW BACK PAIN WITH BILATERAL SCIATICA, UNSPECIFIED CHRONICITY: ICD-10-CM

## 2018-08-07 DIAGNOSIS — M54.6 CHRONIC MIDLINE THORACIC BACK PAIN: ICD-10-CM

## 2018-08-07 PROCEDURE — G0463 HOSPITAL OUTPT CLINIC VISIT: HCPCS

## 2018-08-07 PROCEDURE — 99213 OFFICE O/P EST LOW 20 MIN: CPT | Performed by: FAMILY MEDICINE

## 2018-08-07 ASSESSMENT — ANXIETY QUESTIONNAIRES
6. BECOMING EASILY ANNOYED OR IRRITABLE: NOT AT ALL
IF YOU CHECKED OFF ANY PROBLEMS ON THIS QUESTIONNAIRE, HOW DIFFICULT HAVE THESE PROBLEMS MADE IT FOR YOU TO DO YOUR WORK, TAKE CARE OF THINGS AT HOME, OR GET ALONG WITH OTHER PEOPLE: NOT DIFFICULT AT ALL
7. FEELING AFRAID AS IF SOMETHING AWFUL MIGHT HAPPEN: NOT AT ALL
3. WORRYING TOO MUCH ABOUT DIFFERENT THINGS: SEVERAL DAYS
5. BEING SO RESTLESS THAT IT IS HARD TO SIT STILL: NOT AT ALL
1. FEELING NERVOUS, ANXIOUS, OR ON EDGE: NOT AT ALL
GAD7 TOTAL SCORE: 2
2. NOT BEING ABLE TO STOP OR CONTROL WORRYING: SEVERAL DAYS
4. TROUBLE RELAXING: NOT AT ALL

## 2018-08-07 ASSESSMENT — PAIN SCALES - GENERAL: PAINLEVEL: MILD PAIN (3)

## 2018-08-07 NOTE — MR AVS SNAPSHOT
After Visit Summary   8/7/2018    Melvi Cleveland    MRN: [id number]           Patient Information     Date Of Birth          1950        Visit Information        Provider Department      8/7/2018 10:00 AM Jimbo Martinez MD [facility]        Today's Diagnoses     Neck pain, chronic    -  1    Low back pain, chronic        Chronic midline thoracic back pain          Care Instructions    Radiology will call to schedule MRI cervical, thoracic, and lumbar spine.            Follow-ups after your visit        Follow-up notes from your care team     Return if symptoms worsen or fail to improve.      Your next 10 appointments already scheduled     Aug 22, 2018  1:00 PM CDT   (Arrive by 12:45 PM)   MR LUMBAR SPINE W/O CONTRAST with [address] MRI ([facility] )    [address] [place] [id number] [phone]           Take your medicines as usual, unless your doctor tells you not to. Bring a list of your current medicines to your exam (including vitamins, minerals and over-the-counter drugs). Also bring the results of similar scans you may have had.  Please remove any body piercings and hair extensions before you arrive.  Follow your doctor s orders. If you do not, we may have to postpone your exam.  You may or may not receive IV contrast for this exam pending the discretion of the Radiologist.  You do not need to do anything special to prepare.  The MRI machine uses a strong magnet. Please wear clothes without metal (snaps, zippers). A sweatsuit works well, or we may give you a hospital gown.   **IMPORTANT** THE INSTRUCTIONS BELOW ARE ONLY FOR THOSE PATIENTS WHO HAVE BEEN PRESCRIBED SEDATION OR GENERAL ANESTHESIA DURING THEIR MRI PROCEDURE:  IF YOUR DOCTOR PRESCRIBED ORAL SEDATION (take medicine to help you relax during your exam):   You must get the medicine from your doctor (oral medication) before you arrive. Bring the medicine to the exam. Do not take it at  home. You ll be told when to take it upon arriving for your exam.   Arrive one hour early. Bring someone who can take you home after the test. Your medicine will make you sleepy. After the exam, you may not drive, take a bus or take a taxi by yourself.  IF YOUR DOCTOR PRESCRIBED IV SEDATION:   Arrive one hour early. Bring someone who can take you home after the test. Your medicine will make you sleepy. After the exam, you may not drive, take a bus or take a taxi by yourself.   No eating 6 hours before your exam. You may have clear liquids up until 4 hours before your exam. (Clear liquids include water, clear tea, black coffee and fruit juice without pulp.)  IF YOUR DOCTOR PRESCRIBED ANESTHESIA (be asleep for your exam):   Arrive 1 1/2 hours early. Bring someone who can take you home after the test. You may not drive, take a bus or take a taxi by yourself.   No eating 8 hours before your exam. You may have clear liquids up until 4 hours before your exam. (Clear liquids include water, clear tea, black coffee and fruit juice without pulp.)   You will spend four to five hours in the recovery room.  Please call the Imaging Department at your exam site with any questions.            Aug 22, 2018  1:30 PM CDT   (Arrive by 1:15 PM)   MR THORACIC SPINE W/O CONTRAST with [address] MRI ([facility] )    [address] [place] [id number] [phone]           Take your medicines as usual, unless your doctor tells you not to. Bring a list of your current medicines to your exam (including vitamins, minerals and over-the-counter drugs). Also bring the results of similar scans you may have had.  Please remove any body piercings and hair extensions before you arrive.  Follow your doctor s orders. If you do not, we may have to postpone your exam.  You may or may not receive IV contrast for this exam pending the discretion of the Radiologist.  You do not need to do anything special to prepare.  The MRI machine  uses a strong magnet. Please wear clothes without metal (snaps, zippers). A sweatsuit works well, or we may give you a hospital gown.   **IMPORTANT** THE INSTRUCTIONS BELOW ARE ONLY FOR THOSE PATIENTS WHO HAVE BEEN PRESCRIBED SEDATION OR GENERAL ANESTHESIA DURING THEIR MRI PROCEDURE:  IF YOUR DOCTOR PRESCRIBED ORAL SEDATION (take medicine to help you relax during your exam):   You must get the medicine from your doctor (oral medication) before you arrive. Bring the medicine to the exam. Do not take it at home. You ll be told when to take it upon arriving for your exam.   Arrive one hour early. Bring someone who can take you home after the test. Your medicine will make you sleepy. After the exam, you may not drive, take a bus or take a taxi by yourself.  IF YOUR DOCTOR PRESCRIBED IV SEDATION:   Arrive one hour early. Bring someone who can take you home after the test. Your medicine will make you sleepy. After the exam, you may not drive, take a bus or take a taxi by yourself.   No eating 6 hours before your exam. You may have clear liquids up until 4 hours before your exam. (Clear liquids include water, clear tea, black coffee and fruit juice without pulp.)  IF YOUR DOCTOR PRESCRIBED ANESTHESIA (be asleep for your exam):   Arrive 1 1/2 hours early. Bring someone who can take you home after the test. You may not drive, take a bus or take a taxi by yourself.   No eating 8 hours before your exam. You may have clear liquids up until 4 hours before your exam. (Clear liquids include water, clear tea, black coffee and fruit juice without pulp.)   You will spend four to five hours in the recovery room.  Please call the Imaging Department at your exam site with any questions.            Aug 22, 2018  2:00 PM CDT   (Arrive by 1:45 PM)   MR CERVICAL SPINE W/O CONTRAST with HIMR1   [facility] ([facility] )    [address] [place] [id number] [phone]           Take your medicines as usual, unless your  doctor tells you not to. Bring a list of your current medicines to your exam (including vitamins, minerals and over-the-counter drugs). Also bring the results of similar scans you may have had.  Please remove any body piercings and hair extensions before you arrive.  Follow your doctor s orders. If you do not, we may have to postpone your exam.  You may or may not receive IV contrast for this exam pending the discretion of the Radiologist.  You do not need to do anything special to prepare.  The MRI machine uses a strong magnet. Please wear clothes without metal (snaps, zippers). A sweatsuit works well, or we may give you a hospital gown.   **IMPORTANT** THE INSTRUCTIONS BELOW ARE ONLY FOR THOSE PATIENTS WHO HAVE BEEN PRESCRIBED SEDATION OR GENERAL ANESTHESIA DURING THEIR MRI PROCEDURE:  IF YOUR DOCTOR PRESCRIBED ORAL SEDATION (take medicine to help you relax during your exam):   You must get the medicine from your doctor (oral medication) before you arrive. Bring the medicine to the exam. Do not take it at home. You ll be told when to take it upon arriving for your exam.   Arrive one hour early. Bring someone who can take you home after the test. Your medicine will make you sleepy. After the exam, you may not drive, take a bus or take a taxi by yourself.  IF YOUR DOCTOR PRESCRIBED IV SEDATION:   Arrive one hour early. Bring someone who can take you home after the test. Your medicine will make you sleepy. After the exam, you may not drive, take a bus or take a taxi by yourself.   No eating 6 hours before your exam. You may have clear liquids up until 4 hours before your exam. (Clear liquids include water, clear tea, black coffee and fruit juice without pulp.)  IF YOUR DOCTOR PRESCRIBED ANESTHESIA (be asleep for your exam):   Arrive 1 1/2 hours early. Bring someone who can take you home after the test. You may not drive, take a bus or take a taxi by yourself.   No eating 8 hours before your exam. You may have clear  "liquids up until 4 hours before your exam. (Clear liquids include water, clear tea, black coffee and fruit juice without pulp.)   You will spend four to five hours in the recovery room.  Please call the Imaging Department at your exam site with any questions.            Aug 22, 2018  2:30 PM CDT   (Arrive by 2:15 PM)   IMAGING CONSULTATION with HIIR1, HIIRRAD   [facility] ([facility] )    [address] [place] [id number] [phone]              Who to contact     If you have questions or need follow up information about today's clinic visit or your schedule please contact [facility] directly at [phone].  Normal or non-critical lab and imaging results will be communicated to you by MyChart, letter or phone within 4 business days after the clinic has received the results. If you do not hear from us within 7 days, please contact the clinic through Savv[employer] or phone. If you have a critical or abnormal lab result, we will notify you by phone as soon as possible.  Submit refill requests through Help/Systems or call your pharmacy and they will forward the refill request to us. Please allow 3 business days for your refill to be completed.          Additional Information About Your Visit        Help/Systems Information     Help/Systems gives you secure access to your electronic health record. If you see a primary care provider, you can also send messages to your care team and make appointments. If you have questions, please call your primary care clinic.  If you do not have a primary care provider, please call [phone] and they will assist you.        Care EveryWhere ID     This is your Care EveryWhere ID. This could be used by other organizations to access your [facility] medical records  [id number]        Your Vitals Were     Pulse Temperature Respirations Height Pulse Oximetry BMI (Body Mass Index)    75 97.7  F (36.5  C) (Tympanic) 16 5' 6\" (1.676 m) 95% 33.09 kg/m2       " Blood Pressure from Last 3 Encounters:   08/07/18 116/60   06/19/18 102/58   06/12/18 112/63    Weight from Last 3 Encounters:   08/07/18 205 lb (93 kg)   06/19/18 205 lb (93 kg)   06/12/18 205 lb (93 kg)                 Today's Medication Changes          These changes are accurate as of 8/7/18 11:59 PM.  If you have any questions, ask your nurse or doctor.               These medicines have changed or have updated prescriptions.        Dose/Directions    pantoprazole 40 MG EC tablet   Commonly known as:  PROTONIX   This may have changed:  when to take this   Used for:  Diagnosis unknown        Dose:  40 mg   Take 1 tablet (40 mg) by mouth daily   Quantity:  90 tablet   Refills:  3         Stop taking these medicines if you haven't already. Please contact your care team if you have questions.     levofloxacin 500 MG tablet   Commonly known as:  LEVAQUIN   Stopped by:  Jimbo Martinez MD           predniSONE 20 MG tablet   Commonly known as:  DELTASONE   Stopped by:  Jimbo Martinez MD                    Primary Care Provider Office Phone # Fax #    Jimbo Martinez [phone] [address] [phone]        Equal Access to Services     [facility] : Hadii dawna corley hadasho Sodex, waaxda luqadaha, qaybta kaalmada adesaadyada, bill soto. [facility] [phone].    ATENCIÓN: Si habla español, tiene a garza disposición servicios gratuitos de asistencia lingüística. [facility] [phone].    We comply with applicable federal civil rights laws and [place] laws. We do not discriminate on the basis of race, color, national origin, age, disability, sex, sexual orientation, or gender identity.            Thank you!     Thank you for choosing [facility]  for your care. Our goal is always to provide you with excellent care. Hearing back from our patients is one way we can continue to improve our services. Please take a few minutes to complete  the written survey that you may receive in the mail after your visit with us. Thank you!             Your Updated Medication List - Protect others around you: Learn how to safely use, store and throw away your medicines at www.disposemymeds.org.          This list is accurate as of 8/7/18 11:59 PM.  Always use your most recent med list.                   Brand Name Dispense Instructions for use Diagnosis    ALFALFA PO      Take 5 tablets by mouth 2 times daily.        ALLEGRA-D ALLERGY & CONGESTION  MG per 12 hr tablet   Generic drug:  fexofenadine-pseudoePHEDrine     30 tablet    TAKE 1 TABLET BY MOUTH TWICE DAILY    Nasal congestion       benzonatate 100 MG capsule    TESSALON    42 capsule    Take 1 capsule (100 mg) by mouth 3 times daily as needed for cough    Cough       CALCIUM + D 500-1000-40 MG-UNT-MCG Chew   Generic drug:  Calcium-Vitamin D-Vitamin K      Take 3 tablets by mouth daily        citalopram 40 MG tablet    celeXA    90 tablet    TAKE 1 TABLET BY MOUTH DAILY    Panic disorder without agoraphobia       DICLOFENAC PO      Take 100 mg by mouth daily        fish oil-omega-3 fatty acids 1000 MG capsule      Take 1 g by mouth daily.        flax seed oil 1000 MG capsule      Take 2 capsules by mouth daily        fluticasone 50 MCG/ACT spray    FLONASE     1 spray Reported on 5/11/2017        folic acid 1 MG tablet    FOLVITE     Take 1 mg by mouth 3 times daily.        gabapentin 300 MG capsule    NEURONTIN    210 capsule    TAKE 3 CAPSULES BY MOUTH IN THE MORNING, 2 CAPSULES AT NOON AND 2 CAPSULES IN THE EVENING    Peripheral polyneuropathy       Garlic 400 MG Tbec           HYDROcodone-acetaminophen 7.5-325 MG per tablet    NORCO    60 tablet    TAKE 1 TABLET BY MOUTH EVERY 4 TO 6 HOURS AS NEEDED FOR PAIN    Rheumatoid arthritis involving multiple sites with positive rheumatoid factor (H)       ketotifen 0.025 % Soln ophthalmic solution    ZADITOR/REFRESH ANTI-ITCH     1 drop        MILK THISTLE  PO      Take 1,000 mg by mouth daily.        MULTIPLE VITAMIN PO      Take  by mouth 2 times daily.        order for DME     1 Device    Equipment being ordered: Nebulizer    Cough       pantoprazole 40 MG EC tablet    PROTONIX    90 tablet    Take 1 tablet (40 mg) by mouth daily    Diagnosis unknown       PREBIOTIC PRODUCT PO      Take 1 tablet by mouth daily        * PROAIR  (90 Base) MCG/ACT Inhaler   Generic drug:  albuterol     8.5 g    INHALE 2 PUFFS INTO THE LUNGS EVERY 6 HOURS AS NEEDED FOR SHORTNESS OF BREATH OR WHEEZING    Influenza-like symptoms       * albuterol (2.5 MG/3ML) 0.083% neb solution     25 vial    Take 1 vial (2.5 mg) by nebulization every 6 hours as needed for shortness of breath / dyspnea or wheezing    Cough       REFRESH 1.4-0.6 % ophthalmic solution   Generic drug:  polyvinyl alcohol-povidone      1-2 drops as needed        RESTASIS 0.05 % ophthalmic emulsion   Generic drug:  cycloSPORINE     60 each    USE 1 DROP IN BOTH EYES 2 TIMES A DAY    Bilateral dry eyes       SM GAS RELIEF ANTIFLATUENT 180 MG Caps   Generic drug:  Simethicone     60 capsule    TAKE ONE CAPSULE BY MOUTH AS NEEDED AFTER A MEAL. MAX 2 CAPS/DAY    Gas       SOLUBLE FIBER/PROBIOTICS PO      Take 1 tablet by mouth        traZODone 50 MG tablet    DESYREL    270 tablet    TAKE 2-3 TABLETS BY MOUTH DAILY AT BEDTIME    Primary insomnia       Vitamin C 500 MG Caps      Take 2 tablets by mouth 2 times daily.        vitamin D 1000 units capsule      Take 1 capsule by mouth daily.        * Notice:  This list has 2 medication(s) that are the same as other medications prescribed for you. Read the directions carefully, and ask your doctor or other care provider to review them with you.

## 2018-08-07 NOTE — PROGRESS NOTES
SUBJECTIVE:   Melvi Cleveland is a 68 year old female who presents to clinic today for the following health issues:    Back Pain       Duration: years        Specific cause: none    Description:   Location of pain: low back bilateral, middle of back bilateral, neck bilateral and shoulders bilateral  Character of pain: sharp and stabbing  Pain radiation:radiates into the right buttocks, radiates into the right leg, radiates into the right foot and radiates into the left buttocks  New numbness or weakness in legs, not attributed to pain:  YES    Intensity: Currently 3/10, At its worst 8/9/10    History:   Pain interferes with job: Not applicable,   History of back problems: yes has been going on for years  Any previous MRI or X-rays: Yes- at [place].  Date she isn't sure  Sees a specialist for back pain:  Just when she had surgery, and saw a provider after a year after her surgery.    Therapies tried without relief: chiropractor, cold, heat, NSAIDs, opioids, Physical Therapy and steroid injection    Alleviating factors:   Improved by: patient doesn't want to take pain meds as she forgets memory things and it bothers her.      Precipitating factors:  Worsened by: Lifting, Bending, Standing, Sitting, Lying Flat, Walking and Coughing    Functional and Psychosocial Screen (Britni STarT Back):      Not performed today        Accompanying Signs & Symptoms:  Risk of Fracture:  None  Risk of Cauda Equina:  None  Risk of Infection:  None  Risk of Cancer:  None  Risk of Ankylosing Spondylitis:  Onset at age <35, male, AND morning back stiffness. no        Sherrie also notes persistent neck pain with radiation to the right shoulder.  Some associated numbness.     Problem list and histories reviewed & adjusted, as indicated.  Additional history: as documented    Patient Active Problem List   Diagnosis     Neck pain, chronic     Low back pain, chronic     Crohn's disease of large intestine (H)     Dyslipidemia     Sicca syndrome  (H)     Peripheral polyneuropathy     RA (rheumatoid arthritis) (H)     Comprehensive Medical Examination     Seasonal allergic rhinitis     Fibrocystic breast disease (FCBD)     ACP (advance care planning)     Past Surgical History:   Procedure Laterality Date     BACK SURGERY  05/1995    back surgery     BIOPSY      breast bx X2     BIOPSY BREAST      LT     BIOPSY BREAST NEEDLE LOCALIZATION Right 6/12/2017    Procedure: BIOPSY BREAST NEEDLE LOCALIZATION;  WIRE LOCALIZED EXCISIONAL BIOPSY  RIGHT BREAST MASS;  Surgeon: Jeni Amin MD;  Location: [facility]     CHOLECYSTECTOMY  2004     COLONOSCOPY  11/15/2004    screening     COLONOSCOPY  9/24/2014     EGD with biopsy  2010, 2011    GERD     laminectomy      L4 L5 laminectomy; back pain     RELEASE CARPAL TUNNEL Right 1/16/2018    Procedure: RELEASE CARPAL TUNNEL;  RIGHT CARPAL TUNNEL RELEASE;  Surgeon: Haider Carlos MD;  Location: [facility] OR       Social History   Substance Use Topics     Smoking status: Former Smoker     Types: Cigarettes     Quit date: 3/28/1997     Smokeless tobacco: Never Used      Comment: no passive exposure     Alcohol use No      Comment: former. quit 1984     Family History   Problem Relation Age of Onset     Diabetes Mother      Rheumatoid Arthritis Mother      Other - See Comments Mother      fibromyalgia     Osteoarthritis Mother      Thyroid Disease Mother      thyroid disorder     Unknown/Adopted Father      cause of death unknown     Rheumatoid Arthritis Father      Diabetes Sister      Myocardial Infarction Sister      cause of death     Lupus Sister      cause of death     C.A.D. Maternal Grandmother      Cerebrovascular Disease Maternal Grandmother      Diabetes Maternal Grandmother      Thyroid Disease Maternal Grandmother      thyrodi disorder; goitor removed     Cancer Maternal Grandfather      Hypertension Brother      Other - See Comments Brother      sleep apnea     Other - See Comments Sister      sleep apnea         Current  Outpatient Prescriptions   Medication Sig Dispense Refill     albuterol (2.5 MG/3ML) 0.083% neb solution Take 1 vial (2.5 mg) by nebulization every 6 hours as needed for shortness of breath / dyspnea or wheezing 25 vial 0     ALBUTEROL 108 (90 BASE) MCG/ACT inhaler INHALE 2 PUFFS INTO THE LUNGS EVERY 6 HOURS AS NEEDED FOR SHORTNESS OF BREATH OR WHEEZING 8.5 g 5     ALFALFA PO Take 5 tablets by mouth 2 times daily.       ALLEGRA-D ALLERGY & CONGESTION  MG per 12 hr tablet TAKE 1 TABLET BY MOUTH TWICE DAILY 30 tablet 0     Ascorbic Acid (VITAMIN C) 500 MG CAPS Take 2 tablets by mouth 2 times daily.       benzonatate (TESSALON) 100 MG capsule Take 1 capsule (100 mg) by mouth 3 times daily as needed for cough 42 capsule 0     Calcium-Vitamin D-Vitamin K (CALCIUM + D) 500-1000-40 MG-UNT-MCG CHEW Take 3 tablets by mouth daily        Cholecalciferol (VITAMIN D) 1000 UNITS capsule Take 1 capsule by mouth daily.       citalopram (CELEXA) 40 MG tablet TAKE 1 TABLET BY MOUTH DAILY 90 tablet 1     DICLOFENAC PO Take 100 mg by mouth daily        fish oil-omega-3 fatty acids (OMEGA-3) 1000 MG capsule Take 1 g by mouth daily.       Flaxseed, Linseed, (FLAX SEED OIL) 1000 MG capsule Take 2 capsules by mouth daily       fluticasone (FLONASE) 50 MCG/ACT spray 1 spray Reported on 5/11/2017       folic acid (FOLVITE) 1 MG tablet Take 1 mg by mouth 3 times daily.       gabapentin (NEURONTIN) 300 MG capsule TAKE 3 CAPSULES BY MOUTH IN THE MORNING, 2 CAPSULES AT NOON AND 2 CAPSULES IN THE EVENING 210 capsule 0     Garlic 400 MG TBEC        HYDROcodone-acetaminophen (NORCO) 7.5-325 MG per tablet TAKE 1 TABLET BY MOUTH EVERY 4 TO 6 HOURS AS NEEDED FOR PAIN 60 tablet 0     ketotifen (ZADITOR/REFRESH ANTI-ITCH) 0.025 % SOLN ophthalmic solution 1 drop       levofloxacin (LEVAQUIN) 500 MG tablet Take 1 tablet (500 mg) by mouth daily 10 tablet 0     MILK THISTLE PO Take 1,000 mg by mouth daily.       MULTIPLE VITAMIN PO Take  by mouth 2  times daily.       order for DME Equipment being ordered: Nebulizer 1 Device 0     pantoprazole (PROTONIX) 40 MG enteric coated tablet Take 1 tablet (40 mg) by mouth daily (Patient taking differently: Take 40 mg by mouth 2 times daily ) 90 tablet 3     polyvinyl alcohol-povidone (REFRESH) 1.4-0.6 % ophthalmic solution 1-2 drops as needed       PREBIOTIC PRODUCT PO Take 1 tablet by mouth daily       predniSONE (DELTASONE) 20 MG tablet        Probiotic Product (SOLUBLE FIBER/PROBIOTICS PO) Take 1 tablet by mouth       RESTASIS 0.05 % ophthalmic emulsion USE 1 DROP IN BOTH EYES 2 TIMES A DAY 60 each 0     SM GAS RELIEF ANTIFLATUENT 180 MG CAPS TAKE ONE CAPSULE BY MOUTH AS NEEDED AFTER A MEAL. MAX 2 CAPS/DAY 60 capsule 5     traZODone (DESYREL) 50 MG tablet TAKE 2-3 TABLETS BY MOUTH DAILY AT BEDTIME 270 tablet 1     Allergies   Allergen Reactions     Adhesive Tape      Glue and nicotine patches     Benzoin Compound      Tin-Co-Javier     Mold      Seasonal Allergies        Reviewed and updated as needed this visit by clinical staff       Reviewed and updated as needed this visit by Provider         ROS:  Constitutional, HEENT, cardiovascular, pulmonary, gi and gu systems are negative, except as otherwise noted.    OBJECTIVE:     There were no vitals taken for this visit.  There is no height or weight on file to calculate BMI.  Physical Exam   Constitutional: She is oriented to person, place, and time. She appears well-developed and well-nourished. No distress.   Musculoskeletal: Normal range of motion. She exhibits no edema.   Tenderness noted over the paralumbar musculature.  Straight leg raising negative.   Neurological: She is alert and oriented to person, place, and time. She has normal strength. She displays no atrophy. No sensory deficit.   Reflex Scores:       Patellar reflexes are 2+ on the right side and 2+ on the left side.       Achilles reflexes are 2+ on the right side and 2+ on the left side.  Skin: Skin is  warm and dry.   Psychiatric: She has a normal mood and affect.       Other exam not repeated.  Diagnostic Test Results:  none     ASSESSMENT/PLAN:     Neck pain, chronic  MRI scheduled.  Interventional Radiology consultation  - MR Cervical Spine w/o Contrast; Future  - Imaging Consultation; Future    Low back pain, chronic  MRI scheduled.    - MR Lumbar Spine w/o Contrast; Future  - Imaging Consultation; Future    Chronic midline thoracic back pain  MRI scheduled  - MR Thoracic Spine w/o Contrast; Future  - Imaging Consultation; Future            Jimbo Martinez MD  [facility]

## 2018-08-08 ASSESSMENT — PATIENT HEALTH QUESTIONNAIRE - PHQ9: SUM OF ALL RESPONSES TO PHQ QUESTIONS 1-9: 5

## 2018-08-08 ASSESSMENT — ANXIETY QUESTIONNAIRES: GAD7 TOTAL SCORE: 2

## 2018-08-13 DIAGNOSIS — G62.9 PERIPHERAL POLYNEUROPATHY: ICD-10-CM

## 2018-08-13 DIAGNOSIS — Z79.899 ENCOUNTER FOR LONG-TERM (CURRENT) USE OF MEDICATIONS: ICD-10-CM

## 2018-08-13 DIAGNOSIS — M05.79 RHEUMATOID ARTHRITIS INVOLVING MULTIPLE SITES WITH POSITIVE RHEUMATOID FACTOR (H): ICD-10-CM

## 2018-08-13 DIAGNOSIS — R09.81 NASAL CONGESTION: ICD-10-CM

## 2018-08-13 LAB
ALBUMIN SERPL-MCNC: 3.3 G/DL (ref 3.4–5)
ALT SERPL W P-5'-P-CCNC: 32 U/L (ref 0–50)
BASOPHILS # BLD AUTO: 0 10E9/L (ref 0–0.2)
BASOPHILS NFR BLD AUTO: 0.6 %
CREAT SERPL-MCNC: 1.09 MG/DL (ref 0.52–1.04)
CRP SERPL-MCNC: <2.9 MG/L (ref 0–8)
DIFFERENTIAL METHOD BLD: NORMAL
EOSINOPHIL # BLD AUTO: 0.2 10E9/L (ref 0–0.7)
EOSINOPHIL NFR BLD AUTO: 3.6 %
ERYTHROCYTE [DISTWIDTH] IN BLOOD BY AUTOMATED COUNT: 13.5 % (ref 10–15)
ERYTHROCYTE [SEDIMENTATION RATE] IN BLOOD BY WESTERGREN METHOD: 14 MM/H (ref 0–30)
GFR SERPL CREATININE-BSD FRML MDRD: 50 ML/MIN/1.7M2
HCT VFR BLD AUTO: 36.2 % (ref 35–47)
HGB BLD-MCNC: 12 G/DL (ref 11.7–15.7)
IMM GRANULOCYTES # BLD: 0 10E9/L (ref 0–0.4)
IMM GRANULOCYTES NFR BLD: 0.2 %
LYMPHOCYTES # BLD AUTO: 1.8 10E9/L (ref 0.8–5.3)
LYMPHOCYTES NFR BLD AUTO: 37.6 %
MCH RBC QN AUTO: 29.3 PG (ref 26.5–33)
MCHC RBC AUTO-ENTMCNC: 33.1 G/DL (ref 31.5–36.5)
MCV RBC AUTO: 88 FL (ref 78–100)
MONOCYTES # BLD AUTO: 0.4 10E9/L (ref 0–1.3)
MONOCYTES NFR BLD AUTO: 8.2 %
NEUTROPHILS # BLD AUTO: 2.3 10E9/L (ref 1.6–8.3)
NEUTROPHILS NFR BLD AUTO: 49.8 %
NRBC # BLD AUTO: 0 10*3/UL
NRBC BLD AUTO-RTO: 0 /100
PLATELET # BLD AUTO: 224 10E9/L (ref 150–450)
RBC # BLD AUTO: 4.1 10E12/L (ref 3.8–5.2)
WBC # BLD AUTO: 4.7 10E9/L (ref 4–11)

## 2018-08-13 PROCEDURE — 86140 C-REACTIVE PROTEIN: CPT | Mod: ZL | Performed by: NURSE PRACTITIONER

## 2018-08-13 PROCEDURE — 85652 RBC SED RATE AUTOMATED: CPT | Mod: ZL | Performed by: NURSE PRACTITIONER

## 2018-08-13 PROCEDURE — 84460 ALANINE AMINO (ALT) (SGPT): CPT | Mod: ZL | Performed by: NURSE PRACTITIONER

## 2018-08-13 PROCEDURE — 82565 ASSAY OF CREATININE: CPT | Mod: ZL | Performed by: NURSE PRACTITIONER

## 2018-08-13 PROCEDURE — 36415 COLL VENOUS BLD VENIPUNCTURE: CPT | Mod: ZL | Performed by: NURSE PRACTITIONER

## 2018-08-13 PROCEDURE — 82040 ASSAY OF SERUM ALBUMIN: CPT | Mod: ZL | Performed by: NURSE PRACTITIONER

## 2018-08-13 PROCEDURE — 85025 COMPLETE CBC W/AUTO DIFF WBC: CPT | Mod: ZL | Performed by: NURSE PRACTITIONER

## 2018-08-14 RX ORDER — GABAPENTIN 300 MG/1
CAPSULE ORAL
Qty: 210 CAPSULE | Refills: 0 | Status: SHIPPED | OUTPATIENT
Start: 2018-08-14 | End: 2018-09-10

## 2018-08-14 RX ORDER — FEXOFENADINE HYDROCHLORIDE AND PSEUDOEPHEDRINE HYDROCHLORIDE 60; 120 MG/1; MG/1
TABLET, FILM COATED, EXTENDED RELEASE ORAL
Qty: 30 TABLET | Refills: 0 | Status: SHIPPED | OUTPATIENT
Start: 2018-08-14 | End: 2018-09-10

## 2018-08-14 RX ORDER — HYDROCODONE BITARTRATE AND ACETAMINOPHEN 7.5; 325 MG/1; MG/1
TABLET ORAL
Qty: 60 TABLET | Refills: 0 | Status: SHIPPED | OUTPATIENT
Start: 2018-08-14 | End: 2019-01-22

## 2018-08-14 NOTE — TELEPHONE ENCOUNTER
HYDROcodone-acetaminophen (NORCO) 7.5-325 MG per tablet,      Last Written Prescription Date:  3/28/2018  Last Fill Quantity: 60,   # refills: 0  Last Office Visit: 8/7/2018  Future Office visit:       gabapentin (NEURONTIN) 300 MG capsule      Last Written Prescription Date:  7/6/2018  Last Fill Quantity: 210,   # refills: 0  Last Office Visit: 8/7/2018  Future Office visit:       ALLEGRA-D ALLERGY & CONGESTION  MG per 12 hr tablet     Last Written Prescription Date:  6/26/2018  Last Fill Quantity: 30,   # refills: 0  Last Office Visit: 8/7/2018  Future Office visit:

## 2018-08-15 NOTE — TELEPHONE ENCOUNTER
Walked RX to [facility].   norco 7.5-325 mg

## 2018-08-22 ENCOUNTER — HOSPITAL ENCOUNTER (OUTPATIENT)
Dept: MRI IMAGING | Facility: HOSPITAL | Age: 68
End: 2018-08-22
Attending: FAMILY MEDICINE
Payer: MEDICARE

## 2018-08-22 ENCOUNTER — HOSPITAL ENCOUNTER (OUTPATIENT)
Dept: INTERVENTIONAL RADIOLOGY/VASCULAR | Facility: HOSPITAL | Age: 68
End: 2018-08-22
Attending: FAMILY MEDICINE
Payer: MEDICARE

## 2018-08-22 ENCOUNTER — HOSPITAL ENCOUNTER (OUTPATIENT)
Dept: MRI IMAGING | Facility: HOSPITAL | Age: 68
Discharge: HOME | End: 2018-08-22
Attending: FAMILY MEDICINE | Admitting: FAMILY MEDICINE
Payer: MEDICARE

## 2018-08-22 DIAGNOSIS — M54.42 BILATERAL LOW BACK PAIN WITH BILATERAL SCIATICA, UNSPECIFIED CHRONICITY: ICD-10-CM

## 2018-08-22 DIAGNOSIS — M54.6 CHRONIC MIDLINE THORACIC BACK PAIN: ICD-10-CM

## 2018-08-22 DIAGNOSIS — M54.41 BILATERAL LOW BACK PAIN WITH BILATERAL SCIATICA, UNSPECIFIED CHRONICITY: ICD-10-CM

## 2018-08-22 DIAGNOSIS — M54.2 CERVICALGIA: ICD-10-CM

## 2018-08-22 DIAGNOSIS — G89.29 CHRONIC MIDLINE THORACIC BACK PAIN: ICD-10-CM

## 2018-08-22 PROCEDURE — 72146 MRI CHEST SPINE W/O DYE: CPT | Mod: TC

## 2018-08-22 PROCEDURE — G0463 HOSPITAL OUTPT CLINIC VISIT: HCPCS | Mod: TC

## 2018-08-22 PROCEDURE — 72141 MRI NECK SPINE W/O DYE: CPT | Mod: TC

## 2018-08-22 PROCEDURE — 72148 MRI LUMBAR SPINE W/O DYE: CPT | Mod: TC

## 2018-08-22 NOTE — PROGRESS NOTES
"Pain Management Referral Consult    Patient History    Anatomy/region of concern:  Cervical thoracic and lumbar  Location of the pain? Low back hips legs, neck hurts and up into skull with bad head aches     When and how did the pain begin?  Date years   Event lifting and aging      What makes the pain worse? Standing and bending kneeling and squatting     Pain scale:  8/10    Describe what pain feels like:feels like body does not want to support her, neck is a constant ache and headaches often, back pain always there with stabbing into hips pain medial leg on right mainly, leg and foot numb most the time on right and sometimes left    ,   Does it interfere with daily activities? Yes    What makes your pain better? Chiropractor heat hypocone as needed        Focal tenderness at one point along the length of a taut band (\"knotted muscle\")?  Yes in neck no low back     Restricted range of motion of the involved muscle or joint?  Yes    Did you have a previous injection series where you found relief of at least 3 months? Yes    Have you had surgery in the area of pain?   Yes.  When was the surgery?  l4-5 disk ruptured 1995 cleaned out        What treatments have you already had for your pain?   Pain clinic Not tried      Physical therapy Tried for off and on for  year      Chiropractic care Tried for since 1999 year      Spine injections 3 months     Other nerve blocks Not tried     Surgery Tried for few  month(s)     Exercise Not tried     Others (list): Not tried   Medications? Tried for as need for years years  hydrocondone   No outpatient prescriptions have been marked as taking for the 8/22/18 encounter (Hospital Encounter) with PATRIZIA.            "

## 2018-08-27 DIAGNOSIS — R69 DIAGNOSIS UNKNOWN: Primary | ICD-10-CM

## 2018-08-27 NOTE — TELEPHONE ENCOUNTER
SM gas relief antiflatuent      Last Written Prescription Date:  11/5/15  Last Fill Quantity: 60,   # refills: 5  Last Office Visit: 8/7/18  Future Office visit:

## 2018-08-29 RX ORDER — SIMETHICONE 180 MG
CAPSULE ORAL
Qty: 60 CAPSULE | Refills: 11 | Status: SHIPPED | OUTPATIENT
Start: 2018-08-29 | End: 2020-05-20

## 2018-09-10 DIAGNOSIS — R09.81 NASAL CONGESTION: ICD-10-CM

## 2018-09-10 DIAGNOSIS — G62.9 PERIPHERAL POLYNEUROPATHY: ICD-10-CM

## 2018-09-11 RX ORDER — GABAPENTIN 300 MG/1
CAPSULE ORAL
Qty: 210 CAPSULE | Refills: 0 | Status: SHIPPED | OUTPATIENT
Start: 2018-09-11 | End: 2018-10-16

## 2018-09-11 RX ORDER — FEXOFENADINE HYDROCHLORIDE AND PSEUDOEPHEDRINE HYDROCHLORIDE 60; 120 MG/1; MG/1
TABLET, FILM COATED, EXTENDED RELEASE ORAL
Qty: 30 TABLET | Refills: 0 | Status: SHIPPED | OUTPATIENT
Start: 2018-09-11 | End: 2018-10-03

## 2018-09-11 NOTE — TELEPHONE ENCOUNTER
Lula KIRBY  Last visit: 8.7.18  Last refill: 8.14.18 #30    Neurontin   Last refill: 8.14.18 #210

## 2018-09-12 ENCOUNTER — TELEPHONE (OUTPATIENT)
Dept: INTERVENTIONAL RADIOLOGY/VASCULAR | Facility: HOSPITAL | Age: 68
End: 2018-09-12

## 2018-09-12 ENCOUNTER — HOSPITAL ENCOUNTER (EMERGENCY)
Facility: HOSPITAL | Age: 68
Discharge: HOME | End: 2018-09-12
Attending: PHYSICIAN ASSISTANT | Admitting: PHYSICIAN ASSISTANT
Payer: MEDICARE

## 2018-09-12 VITALS
OXYGEN SATURATION: 94 % | SYSTOLIC BLOOD PRESSURE: 135 MMHG | RESPIRATION RATE: 16 BRPM | DIASTOLIC BLOOD PRESSURE: 70 MMHG | TEMPERATURE: 97.7 F

## 2018-09-12 DIAGNOSIS — K11.20 PAROTIDITIS: ICD-10-CM

## 2018-09-12 DIAGNOSIS — M35.00 SJOGREN'S SYNDROME, WITH UNSPECIFIED ORGAN INVOLVEMENT (H): ICD-10-CM

## 2018-09-12 PROCEDURE — 99213 OFFICE O/P EST LOW 20 MIN: CPT | Performed by: PHYSICIAN ASSISTANT

## 2018-09-12 PROCEDURE — G0463 HOSPITAL OUTPT CLINIC VISIT: HCPCS

## 2018-09-12 ASSESSMENT — ENCOUNTER SYMPTOMS
PSYCHIATRIC NEGATIVE: 1
NAUSEA: 0
CARDIOVASCULAR NEGATIVE: 1
FEVER: 0
NEUROLOGICAL NEGATIVE: 1
FACIAL SWELLING: 1
VOMITING: 0

## 2018-09-12 NOTE — TELEPHONE ENCOUNTER
STEROID INJECTION REMINDER CALL    Called to remind patient of appointment on 09/14/2018 at 1330.      Patient has not had a flu shot in the last two weeks.  Patient has not had immunizations in the last two weeks.  Patient has not had a steroid injection in the last two weeks.  Patient has not taken antibiotics in the past two weeks.    Discussed after care, and explained that a  needs to accompany patient to these appointments.  Patient verbalized an understanding of the  requirement.

## 2018-09-12 NOTE — ED AVS SNAPSHOT
[facility]    [address] [address]    Phone:  [phone]                                       Melvi Cleveland   MRN: [id number]    Department:  [facility]   Date of Visit:  9/12/2018           After Visit Summary Signature Page     I have received my discharge instructions, and my questions have been answered. I have discussed any challenges I see with this plan with the nurse or doctor.    ..........................................................................................................................................  Patient/Patient Representative Signature      ..........................................................................................................................................  Patient Representative Print Name and Relationship to Patient    ..................................................               ................................................  Date                                   Time    ..........................................................................................................................................  Reviewed by Signature/Title    ...................................................              ..............................................  Date                                               Time          22EPIC Rev 08/18

## 2018-09-12 NOTE — ED AVS SNAPSHOT
[facility]    [address] [address]    Phone:  [phone]                                       Melvi Cleveland   MRN: [id number]    Department:  [facility]   Date of Visit:  9/12/2018           Patient Information     Date Of Birth          1950        Your diagnoses for this visit were:     Parotiditis        You were seen by Irene Moses PA.      Follow-up Information     Follow up with Jimbo Martinez MD.    Specialty:  Family Practice    Why:  If symptoms worsen    Contact information:    [address]  [facility] [address] [phone]          Follow up with [facility].    Specialty:  EMERGENCY MEDICINE    Why:  If further concerns develop    Contact information:    [address] [address] [phone]    Additional information:    From [facility]: Take [address]. Turn left at [address]. Turn left at the first stoplight on [address]. At the first stop sign, take a right onto [address]. Take a left into the parking lot and continue through until you reach the North enterance of the building.       From [place]: Take [address]. Take the [address] ramp towards [place]. Turn left onto [address]. Take a slight right onto [address]. Turn left at the first stoplight on [address]. At the first stop sign, take a right onto [address]. Take a left into the parking lot and continue through until you reach the North enterance of the building.       From [place]: Take [address]. Take a right at [address]. At the first stop sign, take a right onto [address]. Take a left into the parking lot and continue through until you reach the North enterance of the building.         Discharge Instructions       Eat Lemon Drops.    Discharge References/Attachments     SALIVARY GLAND STONES (ENGLISH)    SALIVARY GLAND INFECTION  (ENGLISH)      Your next 10 appointments already scheduled     Sep 13, 2018  2:00 PM CDT   (Arrive by 1:45 PM)   SHORT with Rachell Kincaid MD   [facility] ([facility] )    [address]  [facility] [address]   [phone]            Sep 14, 2018  1:30 PM CDT   (Arrive by 1:15 PM)   XR LUMBAR TRANSLAMINAR INJECTION with HIIR1, HIIRRAD   [facility] ([facility] )    [address] [id number] [phone]           How do I prepare for my exam? (Food and drink instructions) No Food and Drink Restrictions.  How do I prepare for my exam? (Other instructions) * If you take Coumadin (or any other blood thinners) you may need to stop taking them up to 5 days prior to the exam. Talk to your doctor before stopping. * If you take Plavix, Ticlid, Pletal or Persantine, please ask your doctor if you should stop these medicines. You may need extra tests on the morning of your scan. * If you take Xarelto (Rivaroxaban), you may need to stop taking it 24 hours before treatment. Talk to your doctor before stopping this medicine.  What should I wear: Wear comfortable clothes.  How long does the exam take: Injections take about 30 to 45 minutes. Most people spend up to 2 hours in the clinic or hospital.  What should I bring: Please bring any scans or X-rays taken at other hospitals, if similar tests were done. Also bring a list of your medicines, including vitamins, minerals and over-the-counter drugs. It is safest to leave personal items at home. Do I need a :  Plan to have someone drive you home afterward.  What do I need to tell my doctor: Tell your doctor in advance: * If you are allergic to X-ray dye (contrast fluid). * If you may be pregnant.  What should I do after the exam: You may have slight cramping during this exam. The cramps should go away afterward. You may have some spotting after the exam.  What is this test: A spinal shot is done in  or near the spine. You may receive steroid medicine (to reduce swelling) or contrast fluid (dye that makes the area show up more clearly on X-rays). A nerve root shot is a shot into the nerve near the spine. It may reduce inflammation near the nerve root or spinal cord and reduce pain in the arm or leg.  Who should I call with questions: If you have any questions, please call the Imaging Department where you will have your exam. Directions, parking instructions, and other information is available on our website, [email or web address].            Sep 28, 2018 11:30 AM CDT   (Arrive by 11:15 AM)   XR CERVICAL/THORACIC EPIDURAL INJECTION with HIIR1, HIIRRAD   [facility] ([facility] )    [address] [place] [address] [phone]           How do I prepare for my exam? (Food and drink instructions) Stop all food and drink (including water) 3 hours before your test or treatment.  How do I prepare for my exam? (Other instructions) Stop taking the following medicines (but talk to your doctor first): * If you take blood thinners, you may need to stop taking them a few days before treatment. Talk to your doctor before stopping these medicines. Stop taking Coumadin (warfarin) 3 days before treatment. Restart the day after treatment. * If you take Plavix, Ticlid, Pletal or Persantine, please ask your doctor if you should stop these medicines. You may need extra tests on the morning of your scan. * If you take Xarelto (Rivaroxaban), you may need to stop taking it 24 hours before treatment. Talk to your doctor before stopping this medicine. Restart the day after treatment.  You may take your other medicines as normal.  What should I wear: Wear comfortable clothes.  How long does the exam take: Injections take about 30 to 45 minutes. Most people spend up to 2 hours in the clinic or hospital.  What should I bring: For nerve root injection, please send or bring copies of any MRIs or other scans  you have had. Please bring a list of your current medicines to your exam. Include vitamins, minerals and over-the-counter medicines.  Do I need a :  Plan to have someone drive you home afterward.  What do I need to tell my doctor: Tell your doctor if: * You have ever had an allergic reaction to X-ray dye (contrast fluid). * If there s any chance you are pregnant.  What should I do after the exam: We may ask you to lie down for a short time before you go home. If you had a nerve shot and your arm or leg feels numb, you will stay for up to two hours until the numbness wears off. You may return to your normal activities the next day.  What is this test: A spinal shot is done in or near the spine. You may receive steroid medicine (to reduce swelling) or contrast fluid (dye that makes the area show up more clearly on X-rays).  Who should I call with questions: If you have any questions, please call the Imaging Department where you will have your exam. Directions, parking instructions, and other information is available on our website, [email or web address].                 Review of your medicines      START taking        Dose / Directions Last dose taken    amoxicillin-clavulanate 875-125 MG per tablet   Commonly known as:  AUGMENTIN   Dose:  1 tablet   Quantity:  20 tablet        Take 1 tablet by mouth 2 times daily   Refills:  0          Our records show that you are taking the medicines listed below. If these are incorrect, please call your family doctor or clinic.        Dose / Directions Last dose taken    ALFALFA PO   Dose:  5 tablet        Take 5 tablets by mouth 2 times daily.   Refills:  0        ALLEGRA-D ALLERGY & CONGESTION  MG per 12 hr tablet   Quantity:  30 tablet   Generic drug:  fexofenadine-pseudoePHEDrine        TAKE 1 TABLET BY MOUTH TWICE DAILY   Refills:  0        benzonatate 100 MG capsule   Commonly known as:  TESSALON   Dose:  100 mg   Quantity:  42 capsule        Take 1 capsule (100  mg) by mouth 3 times daily as needed for cough   Refills:  0        CALCIUM + D 500-1000-40 MG-UNT-MCG Chew   Dose:  3 tablet   Generic drug:  Calcium-Vitamin D-Vitamin K        Take 3 tablets by mouth daily   Refills:  0        citalopram 40 MG tablet   Commonly known as:  celeXA   Quantity:  90 tablet        TAKE 1 TABLET BY MOUTH DAILY   Refills:  1        DICLOFENAC PO   Dose:  100 mg        Take 100 mg by mouth daily   Refills:  0        fish oil-omega-3 fatty acids 1000 MG capsule   Dose:  1 g        Take 1 g by mouth daily.   Refills:  0        flax seed oil 1000 MG capsule   Dose:  2 capsule        Take 2 capsules by mouth daily   Refills:  0        fluticasone 50 MCG/ACT spray   Commonly known as:  FLONASE   Dose:  1 spray        1 spray Reported on 5/11/2017   Refills:  0        folic acid 1 MG tablet   Commonly known as:  FOLVITE   Dose:  1 mg        Take 1 mg by mouth 3 times daily.   Refills:  0        gabapentin 300 MG capsule   Commonly known as:  NEURONTIN   Quantity:  210 capsule        TAKE 3 CAPSULES BY MOUTH IN THE MORNING, 2 CAPSULES AT NOON AND 2 CAPSULES IN THE EVENING   Refills:  0        Garlic 400 MG Tbec        Refills:  0        HYDROcodone-acetaminophen 7.5-325 MG per tablet   Commonly known as:  NORCO   Quantity:  60 tablet        TAKE 1 TABLET BY MOUTH EVERY 4 TO 6 HOURS AS NEEDED FOR PAIN   Refills:  0        ketotifen 0.025 % Soln ophthalmic solution   Commonly known as:  ZADITOR/REFRESH ANTI-ITCH   Dose:  1 drop        1 drop   Refills:  0        MILK THISTLE PO   Dose:  1000 mg        Take 1,000 mg by mouth daily.   Refills:  0        MULTIPLE VITAMIN PO        Take  by mouth 2 times daily.   Refills:  0        order for DME   Quantity:  1 Device        Equipment being ordered: Nebulizer   Refills:  0        pantoprazole 40 MG EC tablet   Commonly known as:  PROTONIX   Dose:  40 mg   Quantity:  90 tablet        Take 1 tablet (40 mg) by mouth daily   Refills:  3        PREBIOTIC  PRODUCT PO   Dose:  1 tablet        Take 1 tablet by mouth daily   Refills:  0        * PROAIR  (90 Base) MCG/ACT inhaler   Quantity:  8.5 g   Generic drug:  albuterol        INHALE 2 PUFFS INTO THE LUNGS EVERY 6 HOURS AS NEEDED FOR SHORTNESS OF BREATH OR WHEEZING   Refills:  5        * albuterol (2.5 MG/3ML) 0.083% neb solution   Dose:  2.5 mg   Quantity:  25 vial        Take 1 vial (2.5 mg) by nebulization every 6 hours as needed for shortness of breath / dyspnea or wheezing   Refills:  0        REFRESH 1.4-0.6 % ophthalmic solution   Dose:  1-2 drop   Generic drug:  polyvinyl alcohol-povidone        1-2 drops as needed   Refills:  0        RESTASIS 0.05 % ophthalmic emulsion   Quantity:  60 each   Generic drug:  cycloSPORINE        USE 1 DROP IN BOTH EYES 2 TIMES A DAY   Refills:  0        Simethicone 180 MG Caps   Commonly known as:  SM GAS RELIEF ANTIFLATUENT   Quantity:  60 capsule        TAKE ONE CAPSULE BY MOUTH AS NEEDED AFTER A MEAL. MAX 2 CAPS/DAY   Refills:  11        SOLUBLE FIBER/PROBIOTICS PO   Dose:  1 tablet        Take 1 tablet by mouth   Refills:  0        traZODone 50 MG tablet   Commonly known as:  DESYREL   Quantity:  270 tablet        TAKE 2-3 TABLETS BY MOUTH DAILY AT BEDTIME   Refills:  1        Vitamin C 500 MG Caps   Dose:  2 tablet        Take 2 tablets by mouth 2 times daily.   Refills:  0        vitamin D 1000 units capsule   Dose:  1 capsule        Take 1 capsule by mouth daily.   Refills:  0        * Notice:  This list has 2 medication(s) that are the same as other medications prescribed for you. Read the directions carefully, and ask your doctor or other care provider to review them with you.            Prescriptions were sent or printed at these locations (1 Prescription)                   [facility] [address]   [address] [address]    Telephone:  [phone]   Fax:  [phone]   Hours:                  E-Prescribed (1 of  1)         amoxicillin-clavulanate (AUGMENTIN) 875-125 MG per tablet                Orders Needing Specimen Collection     None      Pending Results     No orders found from 9/10/2018 to 9/13/2018.            Pending Culture Results     No orders found from 9/10/2018 to 9/13/2018.            Thank you for choosing [facility]       Thank you for choosing [facility] for your care. Our goal is always to provide you with excellent care. Hearing back from our patients is one way we can continue to improve our services. Please take a few minutes to complete the written survey that you may receive in the mail after you visit with us. Thank you!        [email or web address] Information     [facility] gives you secure access to your electronic health record. If you see a primary care provider, you can also send messages to your care team and make appointments. If you have questions, please call your primary care clinic.  If you do not have a primary care provider, please call [phone] and they will assist you.        Care EveryWhere ID     This is your Care EveryWhere ID. This could be used by other organizations to access your [facility] medical records  [id number]        Equal Access to Services     [facility] : Hadii dawna Holland, waissada luravindra, qaybta kaaljenna pascal, bill estevez . So [facility] [phone].    ATENCIÓN: Si habla español, tiene a garza disposición servicios gratuitos de asistencia lingüística. Llame al [phone].    We comply with applicable federal civil rights laws and [place] laws. We do not discriminate on the basis of race, color, national origin, age, disability, sex, sexual orientation, or gender identity.            After Visit Summary       This is your record. Keep this with you and show to your community pharmacist(s) and doctor(s) at your next visit.

## 2018-09-13 NOTE — ED PROVIDER NOTES
History     Chief Complaint   Patient presents with     Facial Swelling     rt facial swelling and pain since monday     The history is provided by the patient. No  was used.     Melvi Cleveland is a 68 year old female who has swelling to right side of her face, along her jaw x 3 days now. Worse when eating. Starting to have pink color in the area. Pt also has Sjogren's syndrome so she is always sucking on cany- to help her dry mouth. Denies fever. No n/v. Denies any direct injury.    Problem List:    Patient Active Problem List    Diagnosis Date Noted     ACP (advance care planning) 09/28/2016     Priority: Medium     Advance Care Planning 9/28/2016: ACP Review of Chart / Resources Provided:  Reviewed chart for advance care plan.  Melvi Cleveland has been provided information and resources to begin or update their advance care plan.  Added by Kathy Galvan             RA (rheumatoid arthritis) (H) 11/25/2015     Priority: Medium     Comprehensive Medical Examination 11/25/2015     Priority: Medium     Seasonal allergic rhinitis 11/25/2015     Priority: Medium     Fibrocystic breast disease (FCBD) 11/25/2015     Priority: Medium     Crohn's disease of large intestine (H) 03/01/2011     Priority: Medium     Dyslipidemia 03/01/2011     Priority: Medium     Sicca syndrome (H) 03/01/2011     Priority: Medium     Polyneuropathy in other diseases classified elsewhere (H) 03/01/2011     Priority: Medium     Neck pain, chronic 07/05/2005     Priority: Medium     05/2015 MRI:  IMPRESSION:  BROAD-BASED DISK PROTRUSION AT C5-C6 ON THE LEFT, MILDLY  IMPINGING ON THE LEFT C6 NERVE ROOT       Low back pain, chronic 12/13/2000     Priority: Medium     03/2016 MRI:  MULTILEVEL DEGENERATIVE CHANGES OF THE LUMBAR SPINE,  SIMILAR IN APPEARANCE TO THE PRIOR STUDY.  A RIGHT FORAMINAL DISK  PROTRUSION AT L3-L4 AGAIN IMPINGES THE EXITING RIGHT L3 NERVE ROOT.  CORRELATE FOR A RELATED RADICULOPATHY.          Past  Medical History:    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     Parotiditis 5/9/2011     Peripheral Neuriopathy 3/1/2011     Rheumatoid arthritis(714.0) 12/13/1999     Seborrhea capitis 10/6/2011     Sjogrens Syndrome 3/1/2011     Urethral stricture 4/30/2008       Past Surgical History:    Past Surgical History:   Procedure Laterality Date     BACK SURGERY  05/1995    back surgery     BIOPSY      breast bx X2     BIOPSY BREAST      LT     BIOPSY BREAST NEEDLE LOCALIZATION Right 6/12/2017    Procedure: BIOPSY BREAST NEEDLE LOCALIZATION;  WIRE LOCALIZED EXCISIONAL BIOPSY  RIGHT BREAST MASS;  Surgeon: Jeni Amin MD;  Location: [facility]     CHOLECYSTECTOMY  2004     COLONOSCOPY  11/15/2004    screening     COLONOSCOPY  9/24/2014     EGD with biopsy  2010, 2011    GERD     laminectomy      L4 L5 laminectomy; back pain     RELEASE CARPAL TUNNEL Right 1/16/2018    Procedure: RELEASE CARPAL TUNNEL;  RIGHT CARPAL TUNNEL RELEASE;  Surgeon: Haider Carlos MD;  Location: [facility]       Family History:    Family History   Problem Relation Age of Onset     Diabetes Mother      Rheumatoid Arthritis Mother      Other - See Comments Mother      fibromyalgia     Osteoarthritis Mother      Thyroid Disease Mother      thyroid disorder     Unknown/Adopted Father      cause of death unknown     Rheumatoid Arthritis Father      Diabetes Sister      Myocardial Infarction Sister      cause of death     Lupus Sister      cause of death     C.A.D. Maternal Grandmother      Cerebrovascular Disease Maternal Grandmother      Diabetes Maternal Grandmother      Thyroid Disease Maternal Grandmother      thyrodi disorder; goitor  removed     Cancer Maternal Grandfather      Hypertension Brother      Other - See Comments Brother      sleep apnea     Other - See Comments Sister      sleep apnea       Social History:  Marital Status:   [2]  Social History   Substance Use Topics     Smoking status: Former Smoker     Types: Cigarettes     Quit date: 3/28/1997     Smokeless tobacco: Never Used      Comment: no passive exposure     Alcohol use No      Comment: former. quit 1984        Medications:      amoxicillin-clavulanate (AUGMENTIN) 875-125 MG per tablet   albuterol (2.5 MG/3ML) 0.083% neb solution   ALBUTEROL 108 (90 BASE) MCG/ACT inhaler   ALFALFA PO   ALLEGRA-D ALLERGY & CONGESTION  MG per 12 hr tablet   Ascorbic Acid (VITAMIN C) 500 MG CAPS   benzonatate (TESSALON) 100 MG capsule   Calcium-Vitamin D-Vitamin K (CALCIUM + D) 500-1000-40 MG-UNT-MCG CHEW   Cholecalciferol (VITAMIN D) 1000 UNITS capsule   citalopram (CELEXA) 40 MG tablet   DICLOFENAC PO   fish oil-omega-3 fatty acids (OMEGA-3) 1000 MG capsule   Flaxseed, Linseed, (FLAX SEED OIL) 1000 MG capsule   fluticasone (FLONASE) 50 MCG/ACT spray   folic acid (FOLVITE) 1 MG tablet   gabapentin (NEURONTIN) 300 MG capsule   Garlic 400 MG TBEC   HYDROcodone-acetaminophen (NORCO) 7.5-325 MG per tablet   ketotifen (ZADITOR/REFRESH ANTI-ITCH) 0.025 % SOLN ophthalmic solution   MILK THISTLE PO   MULTIPLE VITAMIN PO   order for DME   pantoprazole (PROTONIX) 40 MG enteric coated tablet   polyvinyl alcohol-povidone (REFRESH) 1.4-0.6 % ophthalmic solution   PREBIOTIC PRODUCT PO   Probiotic Product (SOLUBLE FIBER/PROBIOTICS PO)   RESTASIS 0.05 % ophthalmic emulsion   Simethicone (SM GAS RELIEF ANTIFLATUENT) 180 MG CAPS   traZODone (DESYREL) 50 MG tablet         Review of Systems   Constitutional: Negative for fever.   HENT: Positive for facial swelling. Negative for ear pain.    Cardiovascular: Negative.    Gastrointestinal: Negative for nausea and vomiting.   Skin: Negative for rash.    Neurological: Negative.    Psychiatric/Behavioral: Negative.        Physical Exam   BP: 135/70  Heart Rate: 77  Temp: 97.7  F (36.5  C)  Resp: 16  SpO2: 94 %      Physical Exam   Constitutional: She is oriented to person, place, and time. She appears well-developed and well-nourished. No distress.   HENT:   Right parotid gland enlarged. Area has mild erythema. Mild TTP   Neck: Normal range of motion. Neck supple.   Cardiovascular: Normal rate, regular rhythm and normal heart sounds.    Pulmonary/Chest: Effort normal and breath sounds normal. No respiratory distress.   Neurological: She is alert and oriented to person, place, and time.   Skin: She is not diaphoretic.   Psychiatric: She has a normal mood and affect.   Nursing note and vitals reviewed.      ED Course     ED Course     Procedures            Assessments & Plan (with Medical Decision Making)     I have reviewed the nursing notes.    I have reviewed the findings, diagnosis, plan and need for follow up with the patient.      Discharge Medication List as of 9/12/2018  3:13 PM      START taking these medications    Details   amoxicillin-clavulanate (AUGMENTIN) 875-125 MG per tablet Take 1 tablet by mouth 2 times daily, Disp-20 tablet, R-0, E-Prescribe             Final diagnoses:   Parotiditis   Sjogren's syndrome, with unspecified organ involvement (H)           Patient verbally educated and given appropriate education sheets for the diagnoses and has no questions.  Take medications as directed.   Follow up with your Primary Care provider if symptoms increase or if further concerns develop, return to the ER  Irene Moses Certified  Physician Assistant  9/12/2018  10:13 PM  [facility]      9/12/2018   [facility]     Irene Moses PA  09/12/18 [id number]

## 2018-09-25 DIAGNOSIS — F41.0 PANIC DISORDER WITHOUT AGORAPHOBIA: ICD-10-CM

## 2018-09-25 DIAGNOSIS — H04.123 BILATERAL DRY EYES: ICD-10-CM

## 2018-09-26 NOTE — TELEPHONE ENCOUNTER
Celexa       Last Written Prescription Date:  3/27/2018  Last Fill Quantity: 90,   # refills: 1  Last Office Visit: 8/07/2018  Future Office visit:

## 2018-09-26 NOTE — TELEPHONE ENCOUNTER
RESTASIS 0.05 % ophthalmic emulsion    Last Written Prescription Date:  11/17/17  Last Fill Quantity: 60,   # refills: 0  Last Office Visit: 8/7/18  Future Office visit:

## 2018-09-27 RX ORDER — CYCLOSPORINE 0.5 MG/ML
EMULSION OPHTHALMIC
Qty: 60 EACH | Refills: 3 | Status: SHIPPED | OUTPATIENT
Start: 2018-09-27 | End: 2020-01-03

## 2018-09-27 RX ORDER — CITALOPRAM HYDROBROMIDE 40 MG/1
TABLET ORAL
Qty: 90 TABLET | Refills: 1 | Status: SHIPPED | OUTPATIENT
Start: 2018-09-27 | End: 2019-03-20

## 2018-10-03 DIAGNOSIS — R09.81 NASAL CONGESTION: ICD-10-CM

## 2018-10-04 NOTE — TELEPHONE ENCOUNTER
Allegra-D  Last Written Prescription Date: 9/11/18  Last Fill Quantity: 30 # of Refills: 0  Last Office Visit: 8/7/18

## 2018-10-05 ENCOUNTER — TELEPHONE (OUTPATIENT)
Dept: INTERVENTIONAL RADIOLOGY/VASCULAR | Facility: HOSPITAL | Age: 68
End: 2018-10-05

## 2018-10-05 RX ORDER — FEXOFENADINE HYDROCHLORIDE AND PSEUDOEPHEDRINE HYDROCHLORIDE 60; 120 MG/1; MG/1
TABLET, FILM COATED, EXTENDED RELEASE ORAL
Qty: 30 TABLET | Refills: 0 | Status: SHIPPED | OUTPATIENT
Start: 2018-10-05 | End: 2018-11-01

## 2018-10-05 NOTE — TELEPHONE ENCOUNTER
STEROID INJECTION REMINDER CALL    Called to remind patient of appointment on 10/09/2018 at 1330.      Patient has not had a flu shot in the last two weeks. Patient is scheduled for another injection on 10/23/2018 and will have a flu shot two weeks after that injection.  Patient has not had immunizations in the last two weeks.  Patient has not had a steroid injection in the last two weeks.  Patient has not taken antibiotics in the past two weeks.    Discussed after care, and explained that a  needs to accompany patient to these appointments.  Patient verbalized an understanding of the  requirement.

## 2018-10-09 ENCOUNTER — HOSPITAL ENCOUNTER (OUTPATIENT)
Dept: INTERVENTIONAL RADIOLOGY/VASCULAR | Facility: HOSPITAL | Age: 68
Discharge: HOME | End: 2018-10-09
Attending: FAMILY MEDICINE | Admitting: FAMILY MEDICINE
Payer: MEDICARE

## 2018-10-09 DIAGNOSIS — M54.42 BILATERAL LOW BACK PAIN WITH BILATERAL SCIATICA, UNSPECIFIED CHRONICITY: ICD-10-CM

## 2018-10-09 DIAGNOSIS — M54.41 BILATERAL LOW BACK PAIN WITH BILATERAL SCIATICA, UNSPECIFIED CHRONICITY: ICD-10-CM

## 2018-10-09 DIAGNOSIS — G89.29 CHRONIC MIDLINE THORACIC BACK PAIN: ICD-10-CM

## 2018-10-09 DIAGNOSIS — M54.6 CHRONIC MIDLINE THORACIC BACK PAIN: ICD-10-CM

## 2018-10-09 PROCEDURE — 25000128 H RX IP 250 OP 636: Performed by: RADIOLOGY

## 2018-10-09 PROCEDURE — C1751 CATH, INF, PER/CENT/MIDLINE: HCPCS | Mod: TC

## 2018-10-09 PROCEDURE — 25000125 ZZHC RX 250: Performed by: RADIOLOGY

## 2018-10-09 RX ORDER — IOPAMIDOL 612 MG/ML
15 INJECTION, SOLUTION INTRATHECAL ONCE
Status: COMPLETED | OUTPATIENT
Start: 2018-10-09 | End: 2018-10-09

## 2018-10-09 RX ORDER — METHYLPREDNISOLONE ACETATE 80 MG/ML
80 INJECTION, SUSPENSION INTRA-ARTICULAR; INTRALESIONAL; INTRAMUSCULAR; SOFT TISSUE ONCE
Status: COMPLETED | OUTPATIENT
Start: 2018-10-09 | End: 2018-10-09

## 2018-10-09 RX ORDER — LIDOCAINE HYDROCHLORIDE 10 MG/ML
INJECTION, SOLUTION EPIDURAL; INFILTRATION; INTRACAUDAL; PERINEURAL
Status: DISCONTINUED
Start: 2018-10-09 | End: 2018-10-10 | Stop reason: HOSPADM

## 2018-10-09 RX ORDER — METHYLPREDNISOLONE ACETATE 80 MG/ML
INJECTION, SUSPENSION INTRA-ARTICULAR; INTRALESIONAL; INTRAMUSCULAR; SOFT TISSUE
Status: DISCONTINUED
Start: 2018-10-09 | End: 2018-10-10 | Stop reason: HOSPADM

## 2018-10-09 RX ORDER — LIDOCAINE HYDROCHLORIDE 10 MG/ML
10 INJECTION, SOLUTION INFILTRATION; PERINEURAL ONCE
Status: COMPLETED | OUTPATIENT
Start: 2018-10-09 | End: 2018-10-09

## 2018-10-09 RX ADMIN — METHYLPREDNISOLONE ACETATE 80 MG: 80 INJECTION, SUSPENSION INTRA-ARTICULAR; INTRALESIONAL; INTRAMUSCULAR; SOFT TISSUE at 14:28

## 2018-10-09 RX ADMIN — LIDOCAINE HYDROCHLORIDE 5 ML: 10 INJECTION, SOLUTION EPIDURAL; INFILTRATION; INTRACAUDAL; PERINEURAL at 14:28

## 2018-10-09 RX ADMIN — IOPAMIDOL 5 ML: 612 INJECTION, SOLUTION INTRATHECAL at 14:29

## 2018-10-09 NOTE — IP AVS SNAPSHOT
[facility]    [address] [address]    Phone:  [phone]    Fax:  [phone]                                       After Visit Summary   10/9/2018    Melvi Cleveland    MRN: [id number]           After Visit Summary Signature Page     I have received my discharge instructions, and my questions have been answered. I have discussed any challenges I see with this plan with the nurse or doctor.    ..........................................................................................................................................  Patient/Patient Representative Signature      ..........................................................................................................................................  Patient Representative Print Name and Relationship to Patient    ..................................................               ................................................  Date                                   Time    ..........................................................................................................................................  Reviewed by Signature/Title    ...................................................              ..............................................  Date                                               Time          22EPIC Rev 08/18

## 2018-10-09 NOTE — DISCHARGE INSTRUCTIONS
Cell number on file:    Telephone Information:   Mobile [phone]     Is it ok to leave a message at this number(s)? Yes    Dr Hassan completed your procedure on 10/9/2018.    Current Pain Level (0-10 Scale): 4/10  Post Pain Level (0-10):  0/10    Radiology Discharge instructions for Steroid Injection    Activity Level:     Do not do any heavy activity or exercise for 24 hours.   Do not drive for 4 hours after your injection.  Diet:   Return to your normal diet.  Medications:   If you have stopped taking your Aspirin, Coumadin/Warfarin, Ibuprofen, or any   other blood thinner for this procedure you may resume in the morning unless   your primary care provider has given you other instructions.    Diabetics may see an increase in blood sugar after steroid injections. If you are concerned about your blood sugar, please contact your family doctor.    Site Care:  Remove the bandage and bathe or shower the morning after the procedure.      This is a Pain Management procedure.  You will be contacted in two weeks for follow up.    Call your Primary Care Provider if you have the following (if your primary care provider is not available please seek emergency care):   Nausea with vomiting   Severe headache   Drowsiness or confusion   Redness or drainage at the injection or puncture site   Temperature over 101 degrees F   Other concerns   Worsening back pain   Stiff neck

## 2018-10-09 NOTE — IP AVS SNAPSHOT
MRN:[id number]                      After Visit Summary   10/9/2018    Melvi Cleveland    MRN: [id number]           Visit Information        Provider Department      10/9/2018  1:30 PM HIIRRAD; HIIR1 [facility] INTERVENTIONAL RAD           Review of your medicines      UNREVIEWED medicines. Ask your doctor about these medicines        Dose / Directions    ALFALFA PO        Dose:  5 tablet   Take 5 tablets by mouth 2 times daily.   Refills:  0       ALLEGRA-D ALLERGY & CONGESTION  MG per 12 hr tablet   Used for:  Nasal congestion   Generic drug:  fexofenadine-pseudoePHEDrine        TAKE 1 TABLET BY MOUTH TWICE DAILY   Quantity:  30 tablet   Refills:  0       amoxicillin-clavulanate 875-125 MG per tablet   Commonly known as:  AUGMENTIN        Dose:  1 tablet   Take 1 tablet by mouth 2 times daily   Quantity:  20 tablet   Refills:  0       benzonatate 100 MG capsule   Commonly known as:  TESSALON   Used for:  Cough        Dose:  100 mg   Take 1 capsule (100 mg) by mouth 3 times daily as needed for cough   Quantity:  42 capsule   Refills:  0       CALCIUM + D 500-1000-40 MG-UNT-MCG Chew   Generic drug:  Calcium-Vitamin D-Vitamin K        Dose:  3 tablet   Take 3 tablets by mouth daily   Refills:  0       citalopram 40 MG tablet   Commonly known as:  celeXA   Used for:  Panic disorder without agoraphobia        TAKE 1 TABLET BY MOUTH DAILY   Quantity:  90 tablet   Refills:  1       DICLOFENAC PO        Dose:  100 mg   Take 100 mg by mouth daily   Refills:  0       fish oil-omega-3 fatty acids 1000 MG capsule        Dose:  1 g   Take 1 g by mouth daily.   Refills:  0       flax seed oil 1000 MG capsule        Dose:  2 capsule   Take 2 capsules by mouth daily   Refills:  0       fluticasone 50 MCG/ACT spray   Commonly known as:  FLONASE        Dose:  1 spray   1 spray Reported on 5/11/2017   Refills:  0       folic acid 1 MG tablet   Commonly known as:  FOLVITE        Dose:  1 mg   Take 1 mg by mouth 3  times daily.   Refills:  0       gabapentin 300 MG capsule   Commonly known as:  NEURONTIN   Used for:  Peripheral polyneuropathy        TAKE 3 CAPSULES BY MOUTH IN THE MORNING, 2 CAPSULES AT NOON AND 2 CAPSULES IN THE EVENING   Quantity:  210 capsule   Refills:  0       Garlic 400 MG Tbec        Refills:  0       HYDROcodone-acetaminophen 7.5-325 MG per tablet   Commonly known as:  NORCO   Used for:  Rheumatoid arthritis involving multiple sites with positive rheumatoid factor (H)        TAKE 1 TABLET BY MOUTH EVERY 4 TO 6 HOURS AS NEEDED FOR PAIN   Quantity:  60 tablet   Refills:  0       ketotifen 0.025 % Soln ophthalmic solution   Commonly known as:  ZADITOR/REFRESH ANTI-ITCH        Dose:  1 drop   1 drop   Refills:  0       MILK THISTLE PO        Dose:  1000 mg   Take 1,000 mg by mouth daily.   Refills:  0       MULTIPLE VITAMIN PO        Take  by mouth 2 times daily.   Refills:  0       pantoprazole 40 MG EC tablet   Commonly known as:  PROTONIX   Used for:  Diagnosis unknown        Dose:  40 mg   Take 1 tablet (40 mg) by mouth daily   Quantity:  90 tablet   Refills:  3       PREBIOTIC PRODUCT PO        Dose:  1 tablet   Take 1 tablet by mouth daily   Refills:  0       * PROAIR  (90 Base) MCG/ACT inhaler   Used for:  Influenza-like symptoms   Generic drug:  albuterol        INHALE 2 PUFFS INTO THE LUNGS EVERY 6 HOURS AS NEEDED FOR SHORTNESS OF BREATH OR WHEEZING   Quantity:  8.5 g   Refills:  5       * albuterol (2.5 MG/3ML) 0.083% neb solution   Used for:  Cough        Dose:  2.5 mg   Take 1 vial (2.5 mg) by nebulization every 6 hours as needed for shortness of breath / dyspnea or wheezing   Quantity:  25 vial   Refills:  0       REFRESH 1.4-0.6 % ophthalmic solution   Generic drug:  polyvinyl alcohol-povidone        Dose:  1-2 drop   1-2 drops as needed   Refills:  0       RESTASIS 0.05 % ophthalmic emulsion   Used for:  Bilateral dry eyes   Generic drug:  cycloSPORINE        USE 1 DROP IN BOTH EYES 2  TIMES A DAY   Quantity:  60 each   Refills:  3       Simethicone 180 MG Caps   Commonly known as:  SM GAS RELIEF ANTIFLATUENT   Used for:  Diagnosis unknown        TAKE ONE CAPSULE BY MOUTH AS NEEDED AFTER A MEAL. MAX 2 CAPS/DAY   Quantity:  60 capsule   Refills:  11       SOLUBLE FIBER/PROBIOTICS PO        Dose:  1 tablet   Take 1 tablet by mouth   Refills:  0       traZODone 50 MG tablet   Commonly known as:  DESYREL   Used for:  Primary insomnia        TAKE 2-3 TABLETS BY MOUTH DAILY AT BEDTIME   Quantity:  270 tablet   Refills:  1       Vitamin C 500 MG Caps        Dose:  2 tablet   Take 2 tablets by mouth 2 times daily.   Refills:  0       vitamin D 1000 units capsule        Dose:  1 capsule   Take 1 capsule by mouth daily.   Refills:  0       * Notice:  This list has 2 medication(s) that are the same as other medications prescribed for you. Read the directions carefully, and ask your doctor or other care provider to review them with you.      CONTINUE these medicines which have NOT CHANGED        Dose / Directions    order for DME   Used for:  Cough        Equipment being ordered: Nebulizer   Quantity:  1 Device   Refills:  0                Protect others around you: Learn how to safely use, store and throw away your medicines at www.disposemymeds.org.         Follow-ups after your visit        Your next 10 appointments already scheduled     Oct 23, 2018  1:30 PM CDT   (Arrive by 1:15 PM)   XR CERVICAL/THORACIC EPIDURAL INJECTION with HIIR1, HIIRRAD   [facility] INTERVENTIONAL RAD ([facility] )    [address] [place] [address] [id number] [phone]           How do I prepare for my exam? (Food and drink instructions) No Food and Drink Restrictions.  How do I prepare for my exam? (Other instructions) * If you take Coumadin (or any other blood thinners) you may need to stop taking them up to 5 days prior to the exam. Talk to your doctor before stopping. * If you take Plavix, Ticlid, Pletal or  Persantine, please ask your doctor if you should stop these medicines. You may need extra tests on the morning of your scan. * If you take Xarelto (Rivaroxaban), you may need to stop taking it 24 hours before treatment. Talk to your doctor before stopping this medicine.  What should I wear: Wear comfortable clothes.  How long does the exam take: Injections take about 30 to 45 minutes. Most people spend up to 2 hours in the clinic or hospital.  What should I bring: Please bring any scans or X-rays taken at other hospitals, if similar tests were done. Also bring a list of your medicines, including vitamins, minerals and over-the-counter drugs. It is safest to leave personal items at home.  Do I need a :  Plan to have someone drive you home afterward.  What do I need to tell my doctor: Tell your doctor in advance: * If you are allergic to X-ray dye (contrast fluid). * If you may be pregnant.  What should I do after the exam: You may have slight cramping during this exam. The cramps should go away afterward. You may have some spotting after the exam.  What is this test: A spinal shot is done in or near the spine. You may receive steroid medicine (to reduce swelling) or contrast fluid (dye that makes the area show up more clearly on X-rays). A nerve root shot is a shot into the nerve near the spine. It may reduce inflammation near the nerve root or spinal cord and reduce pain in the arm or leg.  Who should I call with questions: If you have any questions, please call the Imaging Department where you will have your exam. Directions, parking instructions, and other information are available on our website, [email or web address].               Care Instructions        Further instructions from your care team       Cell number on file:    Telephone Information:   Mobile [phone]     Is it ok to leave a message at this number(s)? Yes    Dr Hassan completed your procedure on 10/9/2018.    Current Pain Level (0-10  Scale): 4/10  Post Pain Level (0-10):  0/10    Radiology Discharge instructions for Steroid Injection    Activity Level:     Do not do any heavy activity or exercise for 24 hours.   Do not drive for 4 hours after your injection.  Diet:   Return to your normal diet.  Medications:   If you have stopped taking your Aspirin, Coumadin/Warfarin, Ibuprofen, or any   other blood thinner for this procedure you may resume in the morning unless   your primary care provider has given you other instructions.    Diabetics may see an increase in blood sugar after steroid injections. If you are concerned about your blood sugar, please contact your family doctor.    Site Care:  Remove the bandage and bathe or shower the morning after the procedure.      This is a Pain Management procedure.  You will be contacted in two weeks for follow up.    Call your Primary Care Provider if you have the following (if your primary care provider is not available please seek emergency care):   Nausea with vomiting   Severe headache   Drowsiness or confusion   Redness or drainage at the injection or puncture site   Temperature over 101 degrees F   Other concerns   Worsening back pain   Stiff neck           Additional Information About Your Visit        [facility] Information     [facility] gives you secure access to your electronic health record. If you see a primary care provider, you can also send messages to your care team and make appointments. If you have questions, please call your primary care clinic.  If you do not have a primary care provider, please call [phone] and they will assist you.        Care EveryWhere ID     This is your Care EveryWhere ID. This could be used by other organizations to access your [facility] medical records  [id number]         Primary Care Provider Office Phone # Fax #    Jimbo Martinez [phone]      Equal Access to Services     RILEY OLIVEROS AH: carmela Walters,  dulce hernández erikbill robledo toniowillis loganaan ah. So [facility] [phone].    ATENCIÓN: Si enrique fan, tiene a garza disposición servicios gratuitos de asistencia lingüística. Freddy al [phone].    We comply with applicable federal civil rights laws and [place] laws. We do not discriminate on the basis of race, color, national origin, age, disability, sex, sexual orientation, or gender identity.            Thank you!     Thank you for choosing [facility] for your care. Our goal is always to provide you with excellent care. Hearing back from our patients is one way we can continue to improve our services. Please take a few minutes to complete the written survey that you may receive in the mail after you visit with us. Thank you!             Medication List: This is a list of all your medications and when to take them. Check marks below indicate your daily home schedule. Keep this list as a reference.      Medications           Morning Afternoon Evening Bedtime As Needed    ALFALFA PO   Take 5 tablets by mouth 2 times daily.                                ALLEGRA-D ALLERGY & CONGESTION  MG per 12 hr tablet   TAKE 1 TABLET BY MOUTH TWICE DAILY   Generic drug:  fexofenadine-pseudoePHEDrine                                amoxicillin-clavulanate 875-125 MG per tablet   Commonly known as:  AUGMENTIN   Take 1 tablet by mouth 2 times daily                                benzonatate 100 MG capsule   Commonly known as:  TESSALON   Take 1 capsule (100 mg) by mouth 3 times daily as needed for cough                                CALCIUM + D 500-1000-40 MG-UNT-MCG Chew   Take 3 tablets by mouth daily   Generic drug:  Calcium-Vitamin D-Vitamin K                                citalopram 40 MG tablet   Commonly known as:  celeXA   TAKE 1 TABLET BY MOUTH DAILY                                DICLOFENAC PO   Take 100 mg by mouth daily                                fish oil-omega-3 fatty acids 1000 MG  capsule   Take 1 g by mouth daily.                                flax seed oil 1000 MG capsule   Take 2 capsules by mouth daily                                fluticasone 50 MCG/ACT spray   Commonly known as:  FLONASE   1 spray Reported on 5/11/2017                                folic acid 1 MG tablet   Commonly known as:  FOLVITE   Take 1 mg by mouth 3 times daily.                                gabapentin 300 MG capsule   Commonly known as:  NEURONTIN   TAKE 3 CAPSULES BY MOUTH IN THE MORNING, 2 CAPSULES AT NOON AND 2 CAPSULES IN THE EVENING                                Garlic 400 MG Tbec                                HYDROcodone-acetaminophen 7.5-325 MG per tablet   Commonly known as:  NORCO   TAKE 1 TABLET BY MOUTH EVERY 4 TO 6 HOURS AS NEEDED FOR PAIN                                ketotifen 0.025 % Soln ophthalmic solution   Commonly known as:  ZADITOR/REFRESH ANTI-ITCH   1 drop                                MILK THISTLE PO   Take 1,000 mg by mouth daily.                                MULTIPLE VITAMIN PO   Take  by mouth 2 times daily.                                order for DME   Equipment being ordered: Nebulizer                                pantoprazole 40 MG EC tablet   Commonly known as:  PROTONIX   Take 1 tablet (40 mg) by mouth daily                                PREBIOTIC PRODUCT PO   Take 1 tablet by mouth daily                                * PROAIR  (90 Base) MCG/ACT inhaler   INHALE 2 PUFFS INTO THE LUNGS EVERY 6 HOURS AS NEEDED FOR SHORTNESS OF BREATH OR WHEEZING   Generic drug:  albuterol                                * albuterol (2.5 MG/3ML) 0.083% neb solution   Take 1 vial (2.5 mg) by nebulization every 6 hours as needed for shortness of breath / dyspnea or wheezing                                REFRESH 1.4-0.6 % ophthalmic solution   1-2 drops as needed   Generic drug:  polyvinyl alcohol-povidone                                RESTASIS 0.05 % ophthalmic emulsion   USE 1  DROP IN BOTH EYES 2 TIMES A DAY   Generic drug:  cycloSPORINE                                Simethicone 180 MG Caps   Commonly known as:  SM GAS RELIEF ANTIFLATUENT   TAKE ONE CAPSULE BY MOUTH AS NEEDED AFTER A MEAL. MAX 2 CAPS/DAY                                SOLUBLE FIBER/PROBIOTICS PO   Take 1 tablet by mouth                                traZODone 50 MG tablet   Commonly known as:  DESYREL   TAKE 2-3 TABLETS BY MOUTH DAILY AT BEDTIME                                Vitamin C 500 MG Caps   Take 2 tablets by mouth 2 times daily.                                vitamin D 1000 units capsule   Take 1 capsule by mouth daily.                                * Notice:  This list has 2 medication(s) that are the same as other medications prescribed for you. Read the directions carefully, and ask your doctor or other care provider to review them with you.

## 2018-10-16 DIAGNOSIS — G62.9 PERIPHERAL POLYNEUROPATHY: ICD-10-CM

## 2018-10-17 RX ORDER — GABAPENTIN 300 MG/1
CAPSULE ORAL
Qty: 210 CAPSULE | Refills: 0 | Status: SHIPPED | OUTPATIENT
Start: 2018-10-17 | End: 2018-11-13

## 2018-10-23 ENCOUNTER — HOSPITAL ENCOUNTER (OUTPATIENT)
Dept: INTERVENTIONAL RADIOLOGY/VASCULAR | Facility: HOSPITAL | Age: 68
Discharge: HOME | End: 2018-10-23
Attending: FAMILY MEDICINE | Admitting: FAMILY MEDICINE
Payer: MEDICARE

## 2018-10-23 DIAGNOSIS — M54.6 CHRONIC MIDLINE THORACIC BACK PAIN: ICD-10-CM

## 2018-10-23 DIAGNOSIS — M54.41 BILATERAL LOW BACK PAIN WITH BILATERAL SCIATICA, UNSPECIFIED CHRONICITY: ICD-10-CM

## 2018-10-23 DIAGNOSIS — G89.29 CHRONIC MIDLINE THORACIC BACK PAIN: ICD-10-CM

## 2018-10-23 DIAGNOSIS — M54.42 BILATERAL LOW BACK PAIN WITH BILATERAL SCIATICA, UNSPECIFIED CHRONICITY: ICD-10-CM

## 2018-10-23 PROCEDURE — 25000125 ZZHC RX 250: Performed by: RADIOLOGY

## 2018-10-23 PROCEDURE — 62321 NJX INTERLAMINAR CRV/THRC: CPT | Mod: TC

## 2018-10-23 PROCEDURE — 25000128 H RX IP 250 OP 636: Performed by: RADIOLOGY

## 2018-10-23 RX ORDER — METHYLPREDNISOLONE ACETATE 80 MG/ML
80 INJECTION, SUSPENSION INTRA-ARTICULAR; INTRALESIONAL; INTRAMUSCULAR; SOFT TISSUE ONCE
Status: COMPLETED | OUTPATIENT
Start: 2018-10-23 | End: 2018-10-23

## 2018-10-23 RX ORDER — IOPAMIDOL 612 MG/ML
15 INJECTION, SOLUTION INTRATHECAL ONCE
Status: COMPLETED | OUTPATIENT
Start: 2018-10-23 | End: 2018-10-23

## 2018-10-23 RX ORDER — LIDOCAINE HYDROCHLORIDE 10 MG/ML
INJECTION, SOLUTION EPIDURAL; INFILTRATION; INTRACAUDAL; PERINEURAL
Status: DISCONTINUED
Start: 2018-10-23 | End: 2018-10-24 | Stop reason: HOSPADM

## 2018-10-23 RX ORDER — LIDOCAINE HYDROCHLORIDE 10 MG/ML
5 INJECTION, SOLUTION EPIDURAL; INFILTRATION; INTRACAUDAL; PERINEURAL ONCE
Status: COMPLETED | OUTPATIENT
Start: 2018-10-23 | End: 2018-10-23

## 2018-10-23 RX ORDER — METHYLPREDNISOLONE ACETATE 80 MG/ML
INJECTION, SUSPENSION INTRA-ARTICULAR; INTRALESIONAL; INTRAMUSCULAR; SOFT TISSUE
Status: DISCONTINUED
Start: 2018-10-23 | End: 2018-10-24 | Stop reason: HOSPADM

## 2018-10-23 RX ADMIN — LIDOCAINE HYDROCHLORIDE 4 ML: 10 INJECTION, SOLUTION EPIDURAL; INFILTRATION; INTRACAUDAL; PERINEURAL at 14:17

## 2018-10-23 RX ADMIN — METHYLPREDNISOLONE ACETATE 80 MG: 80 INJECTION, SUSPENSION INTRA-ARTICULAR; INTRALESIONAL; INTRAMUSCULAR; SOFT TISSUE at 14:17

## 2018-10-23 RX ADMIN — IOPAMIDOL 8 ML: 612 INJECTION, SOLUTION INTRATHECAL at 14:16

## 2018-10-23 NOTE — IP AVS SNAPSHOT
MRN:[id number]                      After Visit Summary   10/23/2018    Melvi Cleveland    MRN: [id number]           Visit Information        Provider Department      10/23/2018  1:30 PM [facility]; [facility]           Review of your medicines      UNREVIEWED medicines. Ask your doctor about these medicines        Dose / Directions    ALFALFA PO        Dose:  5 tablet   Take 5 tablets by mouth 2 times daily.   Refills:  0       ALLEGRA-D ALLERGY & CONGESTION  MG per 12 hr tablet   Used for:  Nasal congestion   Generic drug:  fexofenadine-pseudoePHEDrine        TAKE 1 TABLET BY MOUTH TWICE DAILY   Quantity:  30 tablet   Refills:  0       amoxicillin-clavulanate 875-125 MG per tablet   Commonly known as:  AUGMENTIN        Dose:  1 tablet   Take 1 tablet by mouth 2 times daily   Quantity:  20 tablet   Refills:  0       benzonatate 100 MG capsule   Commonly known as:  TESSALON   Used for:  Cough        Dose:  100 mg   Take 1 capsule (100 mg) by mouth 3 times daily as needed for cough   Quantity:  42 capsule   Refills:  0       CALCIUM + D 500-1000-40 MG-UNT-MCG Chew   Generic drug:  Calcium-Vitamin D-Vitamin K        Dose:  3 tablet   Take 3 tablets by mouth daily   Refills:  0       citalopram 40 MG tablet   Commonly known as:  celeXA   Used for:  Panic disorder without agoraphobia        TAKE 1 TABLET BY MOUTH DAILY   Quantity:  90 tablet   Refills:  1       DICLOFENAC PO        Dose:  100 mg   Take 100 mg by mouth daily   Refills:  0       fish oil-omega-3 fatty acids 1000 MG capsule        Dose:  1 g   Take 1 g by mouth daily.   Refills:  0       flax seed oil 1000 MG capsule        Dose:  2 capsule   Take 2 capsules by mouth daily   Refills:  0       fluticasone 50 MCG/ACT spray   Commonly known as:  FLONASE        Dose:  1 spray   1 spray Reported on 5/11/2017   Refills:  0       folic acid 1 MG tablet   Commonly known as:  FOLVITE        Dose:  1 mg   Take 1 mg by mouth 3  times daily.   Refills:  0       gabapentin 300 MG capsule   Commonly known as:  NEURONTIN   Used for:  Peripheral polyneuropathy        TAKE 3 CAPSULES BY MOUTH IN THE MORNING, 2 CAPSULES AT NOON AND 2 CAPSULES IN THE EVENING   Quantity:  210 capsule   Refills:  0       Garlic 400 MG Tbec        Refills:  0       HYDROcodone-acetaminophen 7.5-325 MG per tablet   Commonly known as:  NORCO   Used for:  Rheumatoid arthritis involving multiple sites with positive rheumatoid factor (H)        TAKE 1 TABLET BY MOUTH EVERY 4 TO 6 HOURS AS NEEDED FOR PAIN   Quantity:  60 tablet   Refills:  0       ketotifen 0.025 % Soln ophthalmic solution   Commonly known as:  ZADITOR/REFRESH ANTI-ITCH        Dose:  1 drop   1 drop   Refills:  0       MILK THISTLE PO        Dose:  1000 mg   Take 1,000 mg by mouth daily.   Refills:  0       MULTIPLE VITAMIN PO        Take  by mouth 2 times daily.   Refills:  0       pantoprazole 40 MG EC tablet   Commonly known as:  PROTONIX   Used for:  Diagnosis unknown        Dose:  40 mg   Take 1 tablet (40 mg) by mouth daily   Quantity:  90 tablet   Refills:  3       PREBIOTIC PRODUCT PO        Dose:  1 tablet   Take 1 tablet by mouth daily   Refills:  0       * PROAIR  (90 Base) MCG/ACT inhaler   Used for:  Influenza-like symptoms   Generic drug:  albuterol        INHALE 2 PUFFS INTO THE LUNGS EVERY 6 HOURS AS NEEDED FOR SHORTNESS OF BREATH OR WHEEZING   Quantity:  8.5 g   Refills:  5       * albuterol (2.5 MG/3ML) 0.083% neb solution   Used for:  Cough        Dose:  2.5 mg   Take 1 vial (2.5 mg) by nebulization every 6 hours as needed for shortness of breath / dyspnea or wheezing   Quantity:  25 vial   Refills:  0       REFRESH 1.4-0.6 % ophthalmic solution   Generic drug:  polyvinyl alcohol-povidone        Dose:  1-2 drop   1-2 drops as needed   Refills:  0       RESTASIS 0.05 % ophthalmic emulsion   Used for:  Bilateral dry eyes   Generic drug:  cycloSPORINE        USE 1 DROP IN BOTH EYES 2  TIMES A DAY   Quantity:  60 each   Refills:  3       Simethicone 180 MG Caps   Commonly known as:  SM GAS RELIEF ANTIFLATUENT   Used for:  Diagnosis unknown        TAKE ONE CAPSULE BY MOUTH AS NEEDED AFTER A MEAL. MAX 2 CAPS/DAY   Quantity:  60 capsule   Refills:  11       SOLUBLE FIBER/PROBIOTICS PO        Dose:  1 tablet   Take 1 tablet by mouth   Refills:  0       traZODone 50 MG tablet   Commonly known as:  DESYREL   Used for:  Primary insomnia        TAKE 2-3 TABLETS BY MOUTH DAILY AT BEDTIME   Quantity:  270 tablet   Refills:  1       Vitamin C 500 MG Caps        Dose:  2 tablet   Take 2 tablets by mouth 2 times daily.   Refills:  0       vitamin D 1000 units capsule        Dose:  1 capsule   Take 1 capsule by mouth daily.   Refills:  0       * Notice:  This list has 2 medication(s) that are the same as other medications prescribed for you. Read the directions carefully, and ask your doctor or other care provider to review them with you.      CONTINUE these medicines which have NOT CHANGED        Dose / Directions    order for DME   Used for:  Cough        Equipment being ordered: Nebulizer   Quantity:  1 Device   Refills:  0                Protect others around you: Learn how to safely use, store and throw away your medicines at www.disposemymeds.org.         Follow-ups after your visit        Your next 10 appointments already scheduled     Nov 19, 2018  9:00 AM CST   (Arrive by 8:45 AM)   PHYSICAL with Jimbo Martinez MD   [facility]    [address] [place] [address]  [place] [address]   [phone]               Care Instructions        Further instructions from your care team       Home number on file [phone] (home)  Is it ok to leave a message at this number(s)? Yes    Dr. Jones completed your procedure on 10/23/2018.    Current Pain Level (0-10 Scale): 3/10  Post Pain Level (0-10):  3/10    Radiology Discharge instructions for Steroid  Injection    Activity Level:     Do not do any heavy activity or exercise for 24 hours.   Do not drive for 4 hours after your injection.  Diet:   Return to your normal diet.  Medications:   If you have stopped taking your Aspirin, Coumadin/Warfarin, Ibuprofen, or any   other blood thinner for this procedure you may resume in the morning unless   your primary care provider has given you other instructions.    Diabetics may see an increase in blood sugar after steroid injections. If you are concerned about your blood sugar, please contact your family doctor.    Site Care:  Remove the bandage and bathe or shower the morning after the procedure.      This is a Pain Management procedure.  You will be contacted in two weeks for follow up.    Call your Primary Care Provider if you have the following (if your primary care provider is not available please seek emergency care):   Nausea with vomiting   Severe headache   Drowsiness or confusion   Redness or drainage at the injection or puncture site   Temperature over 101 degrees F   Other concerns   Worsening back pain   Stiff neck           Additional Information About Your Visit        [employer] ([facility]) Information     [facility] gives you secure access to your electronic health record. If you see a primary care provider, you can also send messages to your care team and make appointments. If you have questions, please call your primary care clinic.  If you do not have a primary care provider, please call [phone] and they will assist you.        Care EveryWhere ID     This is your Care EveryWhere ID. This could be used by other organizations to access your [place] medical records  [id number]         Primary Care Provider Office Phone # Fax #    Jimbo Martinez [phone]      Equal Access to Services     RILEY OLIVEROS : Kenna Holland, carmela palma, bill august. So [facility]  [phone].    ATENCIÓN: Si enrique fan, tiene a garza disposición servicios gratuitos de asistencia lingüística. Freddy falcon [phone].    We comply with applicable federal civil rights laws and [place] laws. We do not discriminate on the basis of race, color, national origin, age, disability, sex, sexual orientation, or gender identity.            Thank you!     Thank you for choosing [facility] for your care. Our goal is always to provide you with excellent care. Hearing back from our patients is one way we can continue to improve our services. Please take a few minutes to complete the written survey that you may receive in the mail after you visit with us. Thank you!             Medication List: This is a list of all your medications and when to take them. Check marks below indicate your daily home schedule. Keep this list as a reference.      Medications           Morning Afternoon Evening Bedtime As Needed    ALFALFA PO   Take 5 tablets by mouth 2 times daily.                                ALLEGRA-D ALLERGY & CONGESTION  MG per 12 hr tablet   TAKE 1 TABLET BY MOUTH TWICE DAILY   Generic drug:  fexofenadine-pseudoePHEDrine                                amoxicillin-clavulanate 875-125 MG per tablet   Commonly known as:  AUGMENTIN   Take 1 tablet by mouth 2 times daily                                benzonatate 100 MG capsule   Commonly known as:  TESSALON   Take 1 capsule (100 mg) by mouth 3 times daily as needed for cough                                CALCIUM + D 500-1000-40 MG-UNT-MCG Chew   Take 3 tablets by mouth daily   Generic drug:  Calcium-Vitamin D-Vitamin K                                citalopram 40 MG tablet   Commonly known as:  celeXA   TAKE 1 TABLET BY MOUTH DAILY                                DICLOFENAC PO   Take 100 mg by mouth daily                                fish oil-omega-3 fatty acids 1000 MG capsule   Take 1 g by mouth daily.                                flax seed oil 1000  MG capsule   Take 2 capsules by mouth daily                                fluticasone 50 MCG/ACT spray   Commonly known as:  FLONASE   1 spray Reported on 5/11/2017                                folic acid 1 MG tablet   Commonly known as:  FOLVITE   Take 1 mg by mouth 3 times daily.                                gabapentin 300 MG capsule   Commonly known as:  NEURONTIN   TAKE 3 CAPSULES BY MOUTH IN THE MORNING, 2 CAPSULES AT NOON AND 2 CAPSULES IN THE EVENING                                Garlic 400 MG Tbec                                HYDROcodone-acetaminophen 7.5-325 MG per tablet   Commonly known as:  NORCO   TAKE 1 TABLET BY MOUTH EVERY 4 TO 6 HOURS AS NEEDED FOR PAIN                                ketotifen 0.025 % Soln ophthalmic solution   Commonly known as:  ZADITOR/REFRESH ANTI-ITCH   1 drop                                MILK THISTLE PO   Take 1,000 mg by mouth daily.                                MULTIPLE VITAMIN PO   Take  by mouth 2 times daily.                                order for DME   Equipment being ordered: Nebulizer                                pantoprazole 40 MG EC tablet   Commonly known as:  PROTONIX   Take 1 tablet (40 mg) by mouth daily                                PREBIOTIC PRODUCT PO   Take 1 tablet by mouth daily                                * PROAIR  (90 Base) MCG/ACT inhaler   INHALE 2 PUFFS INTO THE LUNGS EVERY 6 HOURS AS NEEDED FOR SHORTNESS OF BREATH OR WHEEZING   Generic drug:  albuterol                                * albuterol (2.5 MG/3ML) 0.083% neb solution   Take 1 vial (2.5 mg) by nebulization every 6 hours as needed for shortness of breath / dyspnea or wheezing                                REFRESH 1.4-0.6 % ophthalmic solution   1-2 drops as needed   Generic drug:  polyvinyl alcohol-povidone                                RESTASIS 0.05 % ophthalmic emulsion   USE 1 DROP IN BOTH EYES 2 TIMES A DAY   Generic drug:  cycloSPORINE                                 Simethicone 180 MG Caps   Commonly known as:  SM GAS RELIEF ANTIFLATUENT   TAKE ONE CAPSULE BY MOUTH AS NEEDED AFTER A MEAL. MAX 2 CAPS/DAY                                SOLUBLE FIBER/PROBIOTICS PO   Take 1 tablet by mouth                                traZODone 50 MG tablet   Commonly known as:  DESYREL   TAKE 2-3 TABLETS BY MOUTH DAILY AT BEDTIME                                Vitamin C 500 MG Caps   Take 2 tablets by mouth 2 times daily.                                vitamin D 1000 units capsule   Take 1 capsule by mouth daily.                                * Notice:  This list has 2 medication(s) that are the same as other medications prescribed for you. Read the directions carefully, and ask your doctor or other care provider to review them with you.

## 2018-10-23 NOTE — IP AVS SNAPSHOT
[facility]    [address] [address]    Phone:  [phone]    Fax:  [phone]                                       After Visit Summary   10/23/2018    Melvi Cleveland    MRN: [id number]           After Visit Summary Signature Page     I have received my discharge instructions, and my questions have been answered. I have discussed any challenges I see with this plan with the nurse or doctor.    ..........................................................................................................................................  Patient/Patient Representative Signature      ..........................................................................................................................................  Patient Representative Print Name and Relationship to Patient    ..................................................               ................................................  Date                                   Time    ..........................................................................................................................................  Reviewed by Signature/Title    ...................................................              ..............................................  Date                                               Time          22EPIC Rev 08/18

## 2018-10-23 NOTE — DISCHARGE INSTRUCTIONS
Home number on file [phone] (home)  Is it ok to leave a message at this number(s)? Yes    Dr. Jones completed your procedure on 10/23/2018.    Current Pain Level (0-10 Scale): 3/10  Post Pain Level (0-10):  3/10    Radiology Discharge instructions for Steroid Injection    Activity Level:     Do not do any heavy activity or exercise for 24 hours.   Do not drive for 4 hours after your injection.  Diet:   Return to your normal diet.  Medications:   If you have stopped taking your Aspirin, Coumadin/Warfarin, Ibuprofen, or any   other blood thinner for this procedure you may resume in the morning unless   your primary care provider has given you other instructions.    Diabetics may see an increase in blood sugar after steroid injections. If you are concerned about your blood sugar, please contact your family doctor.    Site Care:  Remove the bandage and bathe or shower the morning after the procedure.      This is a Pain Management procedure.  You will be contacted in two weeks for follow up.    Call your Primary Care Provider if you have the following (if your primary care provider is not available please seek emergency care):   Nausea with vomiting   Severe headache   Drowsiness or confusion   Redness or drainage at the injection or puncture site   Temperature over 101 degrees F   Other concerns   Worsening back pain   Stiff neck

## 2018-10-26 ENCOUNTER — TELEPHONE (OUTPATIENT)
Dept: INTERVENTIONAL RADIOLOGY/VASCULAR | Facility: HOSPITAL | Age: 68
End: 2018-10-26

## 2018-10-26 NOTE — TELEPHONE ENCOUNTER
[unfilled]    CONSULT PATIENT  PAIN INJECTION POST CALL    Procedure: ADEEL TL L3-4  Radiologist(s): Dr. Barry Hassan  Date of Procedure: 10/09/2018    I responded to the patient's questions/concerns.    Pre-procedure pain score was: 4 (See pre-procedure score)  Post-procedure pain score as of today is: 2  Percentage of pain reduction: 50%  Where is the pain? Lower back  Is the pain radiating? Yes: down both legs to the knee, left is worse  Is this new pain? No    Patient would like to pursue another injection.      JAREK RITCHIE

## 2018-11-01 DIAGNOSIS — R09.81 NASAL CONGESTION: ICD-10-CM

## 2018-11-01 NOTE — TELEPHONE ENCOUNTER
Lula KIRBY  Last visit: 8.7.18  Last refill: 10.5.18 #30    Next 5 appointments (look out 90 days)     Nov 19, 2018  9:00 AM CST   (Arrive by 8:45 AM)   PHYSICAL with Jimbo Martinez MD   [facility] [place] ([facility] )    [address] [place] [address]   [phone]

## 2018-11-02 RX ORDER — FEXOFENADINE HYDROCHLORIDE AND PSEUDOEPHEDRINE HYDROCHLORIDE 60; 120 MG/1; MG/1
TABLET, FILM COATED, EXTENDED RELEASE ORAL
Qty: 30 TABLET | Refills: 0 | Status: SHIPPED | OUTPATIENT
Start: 2018-11-02 | End: 2018-12-14

## 2018-11-08 ENCOUNTER — TELEPHONE (OUTPATIENT)
Dept: INTERVENTIONAL RADIOLOGY/VASCULAR | Facility: HOSPITAL | Age: 68
End: 2018-11-08

## 2018-11-08 NOTE — TELEPHONE ENCOUNTER
[unfilled]    CONSULT PATIENT  PAIN INJECTION POST CALL    Procedure: ADEEL TL T1-2  Radiologist(s): Dr. Paxton Jones  Date of Procedure: 10/23/2018    I responded to the patient's questions/concerns.    Pre-procedure pain score was: 3 (See pre-procedure score)  Post-procedure pain score as of today is: 1  Percentage of pain reduction: 67%  Where is the pain? Neck  Is the pain radiating? No  Is this new pain? No    Patient states that the pain in between her shoulder blades and going down both of her arms has completely resolved following the injection. She is pain free in those areas, however she continues to have pain in her neck.     Patient would not like to pursue another injection at this time.  The patient will contact IR at [phone] if that changes.      JAREK RITCHIE

## 2018-11-12 NOTE — PATIENT INSTRUCTIONS
Preventive Health Recommendations    See your health care provider every year to    Review health changes.     Discuss preventive care.      Review your medicines if your doctor has prescribed any.      You no longer need a yearly Pap test unless you've had an abnormal Pap test in the past 10 years. If you have vaginal symptoms, such as bleeding or discharge, be sure to talk with your provider about a Pap test.      Every 1 to 2 years, have a mammogram.  If you are over 69, talk with your health care provider about whether or not you want to continue having screening mammograms.      Every 10 years, have a colonoscopy. Or, have a yearly FIT test (stool test). These exams will check for colon cancer.       Have a cholesterol test every 5 years, or more often if your doctor advises it.       Have a diabetes test (fasting glucose) every three years. If you are at risk for diabetes, you should have this test more often.       At age 65, have a bone density scan (DEXA) to check for osteoporosis (brittle bone disease).    Shots:    Get a flu shot each year.    Get a tetanus shot every 10 years.    Talk to your doctor about your pneumonia vaccines. There are now two you should receive - Pneumovax (PPSV 23) and Prevnar (PCV 13).    Talk to your pharmacist about the shingles vaccine.    Talk to your doctor about the hepatitis B vaccine.    Nutrition:     Eat at least 5 servings of fruits and vegetables each day.      Eat whole-grain bread, whole-wheat pasta and brown rice instead of white grains and rice.      Get adequate about Calcium and Vitamin D.     Lifestyle    Exercise at least 150 minutes a week (30 minutes a day, 5 days a week). This will help you control your weight and prevent disease.      Limit alcohol to one drink per day.      No smoking.       Wear sunscreen to prevent skin cancer.       See your dentist twice a year for an exam and cleaning.      See your eye doctor every 1 to 2 years to screen for  conditions such as glaucoma, macular degeneration, cataracts, etc.    Personalized Prevention Plan  You are due for the preventive services outlined below.  Your care team is available to assist you in scheduling these services.  If you have already completed any of these items, please share that information with your care team to update in your medical record.    Health Maintenance Due   Topic Date Due     Hepatitis C Screening  03/02/1968     Flu Vaccine (1) 09/01/2018     HEATH QUESTIONNAIRE 1 MONTH  09/07/2018     Depression Assessment Monthly  09/07/2018

## 2018-11-12 NOTE — PROGRESS NOTES
"  SUBJECTIVE:   Melvi Cleveland is a 68 year old female who presents for Preventive Visit.  Are you in the first 12 months of your Medicare Part B coverage?  No    Physical Health:  Answers for HPI/ROS submitted by the patient on 11/16/2018   Annual Exam:  In general, how would you rate your overall physical health?: good  Frequency of exercise:: None  Do you usually eat at least 4 servings of fruit and vegetables a day, include whole grains & fiber, and avoid regularly eating high fat or \"junk\" foods? : No  Taking medications regularly:: Yes  Medication side effects:: Lightheadedness  Activities of Daily Living: no assistance needed  Home safety: no safety concerns identified  Hearing Impairment:: no hearing concerns  In the past 6 months, have you been bothered by leaking of urine?: Yes  In general, how would you rate your overall mental or emotional health?: good  Additional concerns today:: Yes  PHQ-2 Score: 2  Mental Health:    In general, how would you rate your overall mental or emotional health? good  PHQ-2 Score:      Additional concerns to address?  YES, having another injection in back, coughing, at end of August skin and hair became very dry which is not usual.     Fall risk:      Fallen 2 or more times in the past year?: No  Any fall with injury in the past year?: No    COGNITIVE SCREEN  1) Repeat 3 items (Leader, Season, Table)      2) Clock draw:   NORMAL  3) 3 item recall:   Recalls 3 objects  Results: 3 items recalled: COGNITIVE IMPAIRMENT LESS LIKELY    Mini-CogTM Copyright [employer]. Licensed by the author for use in [facility]; reprinted with permission (jc@.[facility]). All rights reserved.            Reviewed and updated as needed this visit by clinical staff         Reviewed and updated as needed this visit by Provider        Social History   Substance Use Topics     Smoking status: Former Smoker     Types: Cigarettes     Quit date: 3/28/1997     Smokeless tobacco: Never Used      " Comment: no passive exposure     Alcohol use No      Comment: former. quit 1984                             Do you feel safe in your environment - Yes    Do you have a Health Care Directive?: Yes: Advance Directive has been received and scanned.    Current providers sharing in care for this patient include:   Patient Care Team:  Jimbo Martinez MD as PCP - General    The following health maintenance items are reviewed in Epic and correct as of today:  Health Maintenance   Topic Date Due     HEPATITIS C SCREENING  03/02/1970     INFLUENZA VACCINE (1) 09/01/2018     HEATH QUESTIONNAIRE 1 MONTH  09/07/2018     PHQ-9 Q1 MONTH  09/07/2018     FALL RISK ASSESSMENT  08/07/2019     TETANUS IMMUNIZATION (SYSTEM ASSIGNED)  11/04/2019     MAMMO SCREEN Q2 YR (SYSTEM ASSIGNED)  12/12/2019     ADVANCE DIRECTIVE PLANNING Q5 YRS  09/28/2021     LIPID SCREEN Q5 YR FEMALE (SYSTEM ASSIGNED)  11/15/2022     COLON CANCER SCREEN (SYSTEM ASSIGNED)  11/01/2027     DEXA SCAN SCREENING (SYSTEM ASSIGNED)  Completed     PNEUMOCOCCAL  Completed     BP Readings from Last 3 Encounters:   11/19/18 120/72   09/12/18 135/70   08/07/18 116/60    Wt Readings from Last 3 Encounters:   11/19/18 210 lb (95.3 kg)   08/07/18 205 lb (93 kg)   06/19/18 205 lb (93 kg)                  Patient Active Problem List   Diagnosis     Neck pain, chronic     Low back pain, chronic     Crohn's disease of large intestine (H)     Dyslipidemia     Sicca syndrome (H)     Polyneuropathy in other diseases classified elsewhere (H)     RA (rheumatoid arthritis) (H)     Comprehensive Medical Examination     Seasonal allergic rhinitis     Fibrocystic breast disease (FCBD)     ACP (advance care planning)     Need for hepatitis C screening test     Past Surgical History:   Procedure Laterality Date     BACK SURGERY  05/1995    back surgery     BIOPSY      breast bx X2     BIOPSY BREAST      LT     BIOPSY BREAST NEEDLE LOCALIZATION Right 6/12/2017    Procedure: BIOPSY BREAST  NEEDLE LOCALIZATION;  WIRE LOCALIZED EXCISIONAL BIOPSY  RIGHT BREAST MASS;  Surgeon: Jeni Amin MD;  Location: [facility] OR     CHOLECYSTECTOMY  2004     COLONOSCOPY  11/15/2004    screening     COLONOSCOPY  9/24/2014     EGD with biopsy  2010, 2011    GERD     laminectomy      L4 L5 laminectomy; back pain     RELEASE CARPAL TUNNEL Right 1/16/2018    Procedure: RELEASE CARPAL TUNNEL;  RIGHT CARPAL TUNNEL RELEASE;  Surgeon: Haider Carlos MD;  Location: [facility] OR       Social History   Substance Use Topics     Smoking status: Former Smoker     Types: Cigarettes     Quit date: 3/28/1997     Smokeless tobacco: Never Used      Comment: no passive exposure     Alcohol use No      Comment: former. quit 1984     Family History   Problem Relation Age of Onset     Diabetes Mother      Rheumatoid Arthritis Mother      Other - See Comments Mother      fibromyalgia     Osteoarthritis Mother      Thyroid Disease Mother      thyroid disorder     Unknown/Adopted Father      cause of death unknown     Rheumatoid Arthritis Father      Diabetes Sister      Myocardial Infarction Sister      cause of death     Lupus Sister      cause of death     C.A.D. Maternal Grandmother      Cerebrovascular Disease Maternal Grandmother      Diabetes Maternal Grandmother      Thyroid Disease Maternal Grandmother      thyrodi disorder; goitor removed     Cancer Maternal Grandfather      Hypertension Brother      Other - See Comments Brother      sleep apnea     Other - See Comments Sister      sleep apnea         Current Outpatient Prescriptions   Medication Sig Dispense Refill     albuterol (2.5 MG/3ML) 0.083% neb solution Take 1 vial (2.5 mg) by nebulization every 6 hours as needed for shortness of breath / dyspnea or wheezing (Patient not taking: Reported on 8/7/2018) 25 vial 0     ALBUTEROL 108 (90 BASE) MCG/ACT inhaler INHALE 2 PUFFS INTO THE LUNGS EVERY 6 HOURS AS NEEDED FOR SHORTNESS OF BREATH OR WHEEZING 8.5 g 5     ALFALFA PO Take 5 tablets by  mouth 2 times daily.       ALLEGRA-D ALLERGY & CONGESTION  MG per 12 hr tablet TAKE 1 TABLET BY MOUTH TWICE DAILY 30 tablet 0     Ascorbic Acid (VITAMIN C) 500 MG CAPS Take 2 tablets by mouth 2 times daily.       benzonatate (TESSALON) 100 MG capsule Take 1 capsule (100 mg) by mouth 3 times daily as needed for cough (Patient not taking: Reported on 8/7/2018) 42 capsule 0     Calcium-Vitamin D-Vitamin K (CALCIUM + D) 500-1000-40 MG-UNT-MCG CHEW Take 3 tablets by mouth daily        Cholecalciferol (VITAMIN D) 1000 UNITS capsule Take 1 capsule by mouth daily.       citalopram (CELEXA) 40 MG tablet TAKE 1 TABLET BY MOUTH DAILY 90 tablet 1     DICLOFENAC PO Take 100 mg by mouth daily        fish oil-omega-3 fatty acids (OMEGA-3) 1000 MG capsule Take 1 g by mouth daily.       Flaxseed, Linseed, (FLAX SEED OIL) 1000 MG capsule Take 2 capsules by mouth daily       fluticasone (FLONASE) 50 MCG/ACT spray 1 spray Reported on 5/11/2017       folic acid (FOLVITE) 1 MG tablet Take 1 mg by mouth 3 times daily.       gabapentin (NEURONTIN) 300 MG capsule TAKE 3 CAPSULES BY MOUTH IN THE MORNING, 2 CAPSULES AT NOON AND 2 CAPSULES IN THE EVENING 210 capsule 0     Garlic 400 MG TBEC        HYDROcodone-acetaminophen (NORCO) 7.5-325 MG per tablet TAKE 1 TABLET BY MOUTH EVERY 4 TO 6 HOURS AS NEEDED FOR PAIN 60 tablet 0     ketotifen (ZADITOR/REFRESH ANTI-ITCH) 0.025 % SOLN ophthalmic solution 1 drop       MILK THISTLE PO Take 1,000 mg by mouth daily.       MULTIPLE VITAMIN PO Take  by mouth 2 times daily.       order for DME Equipment being ordered: Nebulizer (Patient not taking: Reported on 8/7/2018) 1 Device 0     pantoprazole (PROTONIX) 40 MG enteric coated tablet Take 1 tablet (40 mg) by mouth daily (Patient taking differently: Take 40 mg by mouth 2 times daily ) 90 tablet 3     polyvinyl alcohol-povidone (REFRESH) 1.4-0.6 % ophthalmic solution 1-2 drops as needed       PREBIOTIC PRODUCT PO Take 1 tablet by mouth daily        "Probiotic Product (SOLUBLE FIBER/PROBIOTICS PO) Take 1 tablet by mouth       RESTASIS 0.05 % ophthalmic emulsion USE 1 DROP IN BOTH EYES 2 TIMES A DAY 60 each 3     Simethicone (SM GAS RELIEF ANTIFLATUENT) 180 MG CAPS TAKE ONE CAPSULE BY MOUTH AS NEEDED AFTER A MEAL. MAX 2 CAPS/DAY 60 capsule 11     traZODone (DESYREL) 50 MG tablet TAKE 2-3 TABLETS BY MOUTH DAILY AT BEDTIME 270 tablet 1     Allergies   Allergen Reactions     Adhesive Tape      Glue and nicotine patches     Benzoin Compound      Tin-Co-Javier     Mold      Seasonal Allergies      Soap      Any cleanser/soap/disinfectant that is used right before surgery.  Makes patient break out horribly. Chart was looked at and Chloraprep was used.       ROS:  Constitutional, HEENT, cardiovascular, pulmonary, GI, , musculoskeletal, neuro, skin, endocrine and psych systems are negative, except as otherwise noted.    OBJECTIVE:   There were no vitals taken for this visit. Estimated body mass index is 33.09 kg/(m^2) as calculated from the following:    Height as of 8/7/18: 5' 6\" (1.676 m).    Weight as of 8/7/18: 205 lb (93 kg).  EXAM:   Physical Exam   Constitutional: She is oriented to person, place, and time. She appears well-developed and well-nourished. No distress.   HENT:   Head: Normocephalic.   Right Ear: Hearing, tympanic membrane, external ear and ear canal normal.   Left Ear: Hearing, tympanic membrane, external ear and ear canal normal.   Nose: Nose normal.   Mouth/Throat: Oropharynx is clear and moist.   Eyes: Conjunctivae and EOM are normal. Pupils are equal, round, and reactive to light.   Neck: Neck supple. No JVD present. No thyromegaly present.   Cardiovascular: Normal rate, regular rhythm and intact distal pulses.  Exam reveals no gallop and no friction rub.    No murmur heard.  Pulmonary/Chest: Effort normal and breath sounds normal.   Abdominal: Soft. Bowel sounds are normal. She exhibits no mass. There is no tenderness.   Genitourinary: No breast " swelling, tenderness, discharge or bleeding.   Musculoskeletal: Normal range of motion. She exhibits no edema.   Lymphadenopathy:     She has no cervical adenopathy.   Neurological: She is alert and oriented to person, place, and time. She has normal reflexes.   Skin: Skin is warm and dry.   Psychiatric: She has a normal mood and affect.         Diagnostic Test Results:  Results for orders placed or performed in visit on 11/14/18   ESR: Erythrocyte sedimentation rate   Result Value Ref Range    Sed Rate 29 0 - 30 mm/h   CBC with platelets and differential   Result Value Ref Range    WBC 5.8 4.0 - 11.0 10e9/L    RBC Count 4.28 3.8 - 5.2 10e12/L    Hemoglobin 12.5 11.7 - 15.7 g/dL    Hematocrit 38.0 35.0 - 47.0 %    MCV 89 78 - 100 fl    MCH 29.2 26.5 - 33.0 pg    MCHC 32.9 31.5 - 36.5 g/dL    RDW 14.2 10.0 - 15.0 %    Platelet Count 218 150 - 450 10e9/L    Diff Method Automated Method     % Neutrophils 58.9 %    % Lymphocytes 24.0 %    % Monocytes 9.1 %    % Eosinophils 6.5 %    % Basophils 1.0 %    % Immature Granulocytes 0.5 %    Nucleated RBCs 0 0 /100    Absolute Neutrophil 3.4 1.6 - 8.3 10e9/L    Absolute Lymphocytes 1.4 0.8 - 5.3 10e9/L    Absolute Monocytes 0.5 0.0 - 1.3 10e9/L    Absolute Eosinophils 0.4 0.0 - 0.7 10e9/L    Absolute Basophils 0.1 0.0 - 0.2 10e9/L    Abs Immature Granulocytes 0.0 0 - 0.4 10e9/L    Absolute Nucleated RBC 0.0    CRP, inflammation   Result Value Ref Range    CRP Inflammation 10.9 (H) 0.0 - 8.0 mg/L   Comprehensive metabolic panel   Result Value Ref Range    Sodium 140 133 - 144 mmol/L    Potassium 3.7 3.4 - 5.3 mmol/L    Chloride 107 94 - 109 mmol/L    Carbon Dioxide 28 20 - 32 mmol/L    Anion Gap 5 3 - 14 mmol/L    Glucose 95 70 - 99 mg/dL    Urea Nitrogen 18 7 - 30 mg/dL    Creatinine 1.04 0.52 - 1.04 mg/dL    GFR Estimate 53 (L) >60 mL/min/1.7m2    GFR Estimate If Black 64 >60 mL/min/1.7m2    Calcium 8.4 (L) 8.5 - 10.1 mg/dL    Bilirubin Total 0.3 0.2 - 1.3 mg/dL    Albumin  3.1 (L) 3.4 - 5.0 g/dL    Protein Total 7.2 6.8 - 8.8 g/dL    Alkaline Phosphatase 70 40 - 150 U/L    ALT 35 0 - 50 U/L    AST 27 0 - 45 U/L   Lipid Profile   Result Value Ref Range    Cholesterol 196 <200 mg/dL    Triglycerides 100 <150 mg/dL    HDL Cholesterol 72 >49 mg/dL    LDL Cholesterol Calculated 104 (H) <100 mg/dL    Non HDL Cholesterol 124 <130 mg/dL   TSH with free T4 reflex   Result Value Ref Range    TSH 1.58 0.40 - 4.00 mU/L   Hepatitis C Screen Reflex to HCV RNA Quant and Genotype   Result Value Ref Range    Hepatitis C Antibody Nonreactive NR^Nonreactive   UA reflex to Microscopic and Culture   Result Value Ref Range    Color Urine Yellow     Appearance Urine Clear     Glucose Urine Negative NEG^Negative mg/dL    Bilirubin Urine Negative NEG^Negative    Ketones Urine Negative NEG^Negative mg/dL    Specific Gravity Urine 1.007 1.003 - 1.035    Blood Urine Negative NEG^Negative    pH Urine 6.5 4.7 - 8.0 pH    Protein Albumin Urine Negative NEG^Negative mg/dL    Urobilinogen mg/dL Normal 0.0 - 2.0 mg/dL    Nitrite Urine Negative NEG^Negative    Leukocyte Esterase Urine Negative NEG^Negative    Source Midstream Urine        ASSESSMENT / PLAN:   1. Comprehensive Medical Examination  General medical exam completed.  Breast exam performed.  Pap and pelvic exam deferred. Mammogram recommended. Screening labs drawn.  Colonoscopy and immunizations reviewed.  Discussed the importance of monthly breast self exam, aspirin use, and osteoporosis prevention . Follow up 1 year.      2. RA (rheumatoid arthritis) (H)  Continue follow up with Rheumatology    3. Dyslipidemia  Fasting labs reviewed.  Goals discussed.  Discussed the importance of diet, exercise, and weight reduction.  Follow up 12 months.     4. Polyneuropathy in other diseases classified elsewhere (H)  Stable    5. Sicca syndrome (H)  Stable    6. Need for influenza vaccination  Immunization updated  - FLU VACCINE, INCREASED ANTIGEN, PRESV FREE  - ADMIN  "1st VACCINE    7. Cough  Refilled  - albuterol (2.5 MG/3ML) 0.083% neb solution; Take 1 vial (2.5 mg) by nebulization every 6 hours as needed for shortness of breath / dyspnea or wheezing  Dispense: 25 vial; Refill: 3    End of Life Planning:  Patient currently has an advanced directive: Yes.  Practitioner is supportive of decision.    COUNSELING:  Reviewed preventive health counseling, as reflected in patient instructions  Special attention given to:       Regular exercise       Healthy diet/nutrition    BP Readings from Last 1 Encounters:   09/12/18 135/70     Estimated body mass index is 33.09 kg/(m^2) as calculated from the following:    Height as of 8/7/18: 5' 6\" (1.676 m).    Weight as of 8/7/18: 205 lb (93 kg).     Weight management plan: Discussed healthy diet and exercise guidelines and patient will follow up in 12 months in clinic to re-evaluate.     reports that she quit smoking about 21 years ago. Her smoking use included Cigarettes. She has never used smokeless tobacco.      Appropriate preventive services were discussed with this patient, including applicable screening as appropriate for cardiovascular disease, diabetes, osteopenia/osteoporosis, and glaucoma.  As appropriate for age/gender, discussed screening for colorectal cancer, prostate cancer, breast cancer, and cervical cancer. Checklist reviewing preventive services available has been given to the patient.    Reviewed patients plan of care and provided an AVS. The Basic Care Plan (routine screening as documented in Health Maintenance) for Melvi meets the Care Plan requirement. This Care Plan has been established and reviewed with the Patient.    Counseling Resources:  ATP IV Guidelines  Pooled Cohorts Equation Calculator  Breast Cancer Risk Calculator  FRAX Risk Assessment  ICSI Preventive Guidelines  Dietary Guidelines for Americans, 2010  USDA's MyPlate  ASA Prophylaxis  Lung CA Screening    Jimbo Martinez MD  [facility] - " [place]

## 2018-11-13 DIAGNOSIS — G62.9 PERIPHERAL POLYNEUROPATHY: ICD-10-CM

## 2018-11-13 PROBLEM — Z11.59 NEED FOR HEPATITIS C SCREENING TEST: Status: ACTIVE | Noted: 2018-11-13

## 2018-11-13 RX ORDER — GABAPENTIN 300 MG/1
CAPSULE ORAL
Qty: 210 CAPSULE | Refills: 0 | Status: SHIPPED | OUTPATIENT
Start: 2018-11-13 | End: 2018-12-14

## 2018-11-13 NOTE — TELEPHONE ENCOUNTER
gabapentin (NEURONTIN) 300 MG capsule      Last Written Prescription Date:  10/17/18  Last Fill Quantity: 210,   # refills: 0  Last Office Visit: 8/7/18  Future Office visit:    Next 5 appointments (look out 90 days)     Nov 19, 2018  9:00 AM CST   (Arrive by 8:45 AM)   PHYSICAL with Jimbo Martinez MD   [facility] ([facility] )    [address] [place] [address]   [phone]                   Routing refill request to provider for review/approval because:  Drug not on the [facility], P or [facility] refill protocol or controlled substance

## 2018-11-14 DIAGNOSIS — Z79.899 ENCOUNTER FOR LONG-TERM (CURRENT) USE OF MEDICATIONS: ICD-10-CM

## 2018-11-14 DIAGNOSIS — E78.2 MIXED HYPERLIPIDEMIA: ICD-10-CM

## 2018-11-14 DIAGNOSIS — M05.79 RHEUMATOID ARTHRITIS INVOLVING MULTIPLE SITES WITH POSITIVE RHEUMATOID FACTOR (H): ICD-10-CM

## 2018-11-14 DIAGNOSIS — Z11.59 NEED FOR HEPATITIS C SCREENING TEST: ICD-10-CM

## 2018-11-14 LAB
ALBUMIN SERPL-MCNC: 3.1 G/DL (ref 3.4–5)
ALBUMIN UR-MCNC: NEGATIVE MG/DL
ALP SERPL-CCNC: 70 U/L (ref 40–150)
ALT SERPL W P-5'-P-CCNC: 35 U/L (ref 0–50)
ANION GAP SERPL CALCULATED.3IONS-SCNC: 5 MMOL/L (ref 3–14)
APPEARANCE UR: CLEAR
AST SERPL W P-5'-P-CCNC: 27 U/L (ref 0–45)
BASOPHILS # BLD AUTO: 0.1 10E9/L (ref 0–0.2)
BASOPHILS NFR BLD AUTO: 1 %
BILIRUB SERPL-MCNC: 0.3 MG/DL (ref 0.2–1.3)
BILIRUB UR QL STRIP: NEGATIVE
BUN SERPL-MCNC: 18 MG/DL (ref 7–30)
CALCIUM SERPL-MCNC: 8.4 MG/DL (ref 8.5–10.1)
CHLORIDE SERPL-SCNC: 107 MMOL/L (ref 94–109)
CHOLEST SERPL-MCNC: 196 MG/DL
CO2 SERPL-SCNC: 28 MMOL/L (ref 20–32)
COLOR UR AUTO: YELLOW
CREAT SERPL-MCNC: 1.04 MG/DL (ref 0.52–1.04)
CRP SERPL-MCNC: 10.9 MG/L (ref 0–8)
DIFFERENTIAL METHOD BLD: NORMAL
EOSINOPHIL # BLD AUTO: 0.4 10E9/L (ref 0–0.7)
EOSINOPHIL NFR BLD AUTO: 6.5 %
ERYTHROCYTE [DISTWIDTH] IN BLOOD BY AUTOMATED COUNT: 14.2 % (ref 10–15)
ERYTHROCYTE [SEDIMENTATION RATE] IN BLOOD BY WESTERGREN METHOD: 29 MM/H (ref 0–30)
GFR SERPL CREATININE-BSD FRML MDRD: 53 ML/MIN/1.7M2
GLUCOSE SERPL-MCNC: 95 MG/DL (ref 70–99)
GLUCOSE UR STRIP-MCNC: NEGATIVE MG/DL
HCT VFR BLD AUTO: 38 % (ref 35–47)
HDLC SERPL-MCNC: 72 MG/DL
HGB BLD-MCNC: 12.5 G/DL (ref 11.7–15.7)
HGB UR QL STRIP: NEGATIVE
IMM GRANULOCYTES # BLD: 0 10E9/L (ref 0–0.4)
IMM GRANULOCYTES NFR BLD: 0.5 %
KETONES UR STRIP-MCNC: NEGATIVE MG/DL
LDLC SERPL CALC-MCNC: 104 MG/DL
LEUKOCYTE ESTERASE UR QL STRIP: NEGATIVE
LYMPHOCYTES # BLD AUTO: 1.4 10E9/L (ref 0.8–5.3)
LYMPHOCYTES NFR BLD AUTO: 24 %
MCH RBC QN AUTO: 29.2 PG (ref 26.5–33)
MCHC RBC AUTO-ENTMCNC: 32.9 G/DL (ref 31.5–36.5)
MCV RBC AUTO: 89 FL (ref 78–100)
MONOCYTES # BLD AUTO: 0.5 10E9/L (ref 0–1.3)
MONOCYTES NFR BLD AUTO: 9.1 %
NEUTROPHILS # BLD AUTO: 3.4 10E9/L (ref 1.6–8.3)
NEUTROPHILS NFR BLD AUTO: 58.9 %
NITRATE UR QL: NEGATIVE
NONHDLC SERPL-MCNC: 124 MG/DL
NRBC # BLD AUTO: 0 10*3/UL
NRBC BLD AUTO-RTO: 0 /100
PH UR STRIP: 6.5 PH (ref 4.7–8)
PLATELET # BLD AUTO: 218 10E9/L (ref 150–450)
POTASSIUM SERPL-SCNC: 3.7 MMOL/L (ref 3.4–5.3)
PROT SERPL-MCNC: 7.2 G/DL (ref 6.8–8.8)
RBC # BLD AUTO: 4.28 10E12/L (ref 3.8–5.2)
SODIUM SERPL-SCNC: 140 MMOL/L (ref 133–144)
SOURCE: NORMAL
SP GR UR STRIP: 1.01 (ref 1–1.03)
TRIGL SERPL-MCNC: 100 MG/DL
TSH SERPL DL<=0.005 MIU/L-ACNC: 1.58 MU/L (ref 0.4–4)
UROBILINOGEN UR STRIP-MCNC: NORMAL MG/DL (ref 0–2)
WBC # BLD AUTO: 5.8 10E9/L (ref 4–11)

## 2018-11-14 PROCEDURE — 80061 LIPID PANEL: CPT | Mod: ZL | Performed by: FAMILY MEDICINE

## 2018-11-14 PROCEDURE — 84443 ASSAY THYROID STIM HORMONE: CPT | Mod: ZL | Performed by: FAMILY MEDICINE

## 2018-11-14 PROCEDURE — 81003 URINALYSIS AUTO W/O SCOPE: CPT | Mod: ZL | Performed by: FAMILY MEDICINE

## 2018-11-14 PROCEDURE — 36415 COLL VENOUS BLD VENIPUNCTURE: CPT | Mod: ZL | Performed by: FAMILY MEDICINE

## 2018-11-14 PROCEDURE — 99000 SPECIMEN HANDLING OFFICE-LAB: CPT | Performed by: FAMILY MEDICINE

## 2018-11-14 PROCEDURE — 85025 COMPLETE CBC W/AUTO DIFF WBC: CPT | Mod: ZL | Performed by: FAMILY MEDICINE

## 2018-11-14 PROCEDURE — 80053 COMPREHEN METABOLIC PANEL: CPT | Mod: ZL | Performed by: FAMILY MEDICINE

## 2018-11-14 PROCEDURE — 86140 C-REACTIVE PROTEIN: CPT | Mod: ZL | Performed by: FAMILY MEDICINE

## 2018-11-14 PROCEDURE — 86803 HEPATITIS C AB TEST: CPT | Mod: ZL | Performed by: FAMILY MEDICINE

## 2018-11-14 PROCEDURE — 85652 RBC SED RATE AUTOMATED: CPT | Mod: ZL | Performed by: FAMILY MEDICINE

## 2018-11-15 LAB — HCV AB SERPL QL IA: NONREACTIVE

## 2018-11-16 ASSESSMENT — ACTIVITIES OF DAILY LIVING (ADL): CURRENT_FUNCTION: NO ASSISTANCE NEEDED

## 2018-11-19 ENCOUNTER — OFFICE VISIT (OUTPATIENT)
Dept: FAMILY MEDICINE | Facility: OTHER | Age: 68
End: 2018-11-19
Attending: FAMILY MEDICINE
Payer: COMMERCIAL

## 2018-11-19 VITALS
OXYGEN SATURATION: 93 % | WEIGHT: 210 LBS | HEIGHT: 66 IN | BODY MASS INDEX: 33.75 KG/M2 | HEART RATE: 87 BPM | SYSTOLIC BLOOD PRESSURE: 120 MMHG | DIASTOLIC BLOOD PRESSURE: 72 MMHG

## 2018-11-19 DIAGNOSIS — R05.9 COUGH: ICD-10-CM

## 2018-11-19 DIAGNOSIS — E78.2 MIXED HYPERLIPIDEMIA: ICD-10-CM

## 2018-11-19 DIAGNOSIS — Z23 NEED FOR INFLUENZA VACCINATION: ICD-10-CM

## 2018-11-19 DIAGNOSIS — G63 POLYNEUROPATHY IN OTHER DISEASES CLASSIFIED ELSEWHERE (H): ICD-10-CM

## 2018-11-19 DIAGNOSIS — M35.00 SICCA SYNDROME (H): ICD-10-CM

## 2018-11-19 DIAGNOSIS — M05.79 RHEUMATOID ARTHRITIS INVOLVING MULTIPLE SITES WITH POSITIVE RHEUMATOID FACTOR (H): ICD-10-CM

## 2018-11-19 DIAGNOSIS — Z00.00 ROUTINE GENERAL MEDICAL EXAMINATION AT A HEALTH CARE FACILITY: Primary | ICD-10-CM

## 2018-11-19 PROCEDURE — 99397 PER PM REEVAL EST PAT 65+ YR: CPT | Performed by: FAMILY MEDICINE

## 2018-11-19 PROCEDURE — 90471 IMMUNIZATION ADMIN: CPT | Performed by: FAMILY MEDICINE

## 2018-11-19 PROCEDURE — 90662 IIV NO PRSV INCREASED AG IM: CPT | Performed by: FAMILY MEDICINE

## 2018-11-19 RX ORDER — ALBUTEROL SULFATE 0.83 MG/ML
2.5 SOLUTION RESPIRATORY (INHALATION) EVERY 6 HOURS PRN
Qty: 25 VIAL | Refills: 3 | Status: SHIPPED | OUTPATIENT
Start: 2018-11-19 | End: 2019-04-16

## 2018-11-19 ASSESSMENT — ANXIETY QUESTIONNAIRES
IF YOU CHECKED OFF ANY PROBLEMS ON THIS QUESTIONNAIRE, HOW DIFFICULT HAVE THESE PROBLEMS MADE IT FOR YOU TO DO YOUR WORK, TAKE CARE OF THINGS AT HOME, OR GET ALONG WITH OTHER PEOPLE: NOT DIFFICULT AT ALL
7. FEELING AFRAID AS IF SOMETHING AWFUL MIGHT HAPPEN: NOT AT ALL
2. NOT BEING ABLE TO STOP OR CONTROL WORRYING: NOT AT ALL
1. FEELING NERVOUS, ANXIOUS, OR ON EDGE: NOT AT ALL
5. BEING SO RESTLESS THAT IT IS HARD TO SIT STILL: NOT AT ALL
GAD7 TOTAL SCORE: 2
6. BECOMING EASILY ANNOYED OR IRRITABLE: SEVERAL DAYS
3. WORRYING TOO MUCH ABOUT DIFFERENT THINGS: NOT AT ALL

## 2018-11-19 ASSESSMENT — PATIENT HEALTH QUESTIONNAIRE - PHQ9
5. POOR APPETITE OR OVEREATING: SEVERAL DAYS
SUM OF ALL RESPONSES TO PHQ QUESTIONS 1-9: 4

## 2018-11-19 ASSESSMENT — PAIN SCALES - GENERAL: PAINLEVEL: NO PAIN (0)

## 2018-11-19 NOTE — MR AVS SNAPSHOT
After Visit Summary   11/19/2018    Melvi Cleveland    MRN: [id number]           Patient Information     Date Of Birth          1950        Visit Information        Provider Department      11/19/2018 9:00 AM Jimbo Martinez MD [facility] [place]        Today's Diagnoses     Comprehensive Medical Examination    -  1    RA (rheumatoid arthritis) (H)        Dyslipidemia        Polyneuropathy in other diseases classified elsewhere (H)        Sicca syndrome (H)        Need for influenza vaccination        Cough          Care Instructions      Preventive Health Recommendations    See your health care provider every year to    Review health changes.     Discuss preventive care.      Review your medicines if your doctor has prescribed any.      You no longer need a yearly Pap test unless you've had an abnormal Pap test in the past 10 years. If you have vaginal symptoms, such as bleeding or discharge, be sure to talk with your provider about a Pap test.      Every 1 to 2 years, have a mammogram.  If you are over 69, talk with your health care provider about whether or not you want to continue having screening mammograms.      Every 10 years, have a colonoscopy. Or, have a yearly FIT test (stool test). These exams will check for colon cancer.       Have a cholesterol test every 5 years, or more often if your doctor advises it.       Have a diabetes test (fasting glucose) every three years. If you are at risk for diabetes, you should have this test more often.       At age 65, have a bone density scan (DEXA) to check for osteoporosis (brittle bone disease).    Shots:    Get a flu shot each year.    Get a tetanus shot every 10 years.    Talk to your doctor about your pneumonia vaccines. There are now two you should receive - Pneumovax (PPSV 23) and Prevnar (PCV 13).    Talk to your pharmacist about the shingles vaccine.    Talk to your doctor about the hepatitis B vaccine.    Nutrition:      Eat at least 5 servings of fruits and vegetables each day.      Eat whole-grain bread, whole-wheat pasta and brown rice instead of white grains and rice.      Get adequate about Calcium and Vitamin D.     Lifestyle    Exercise at least 150 minutes a week (30 minutes a day, 5 days a week). This will help you control your weight and prevent disease.      Limit alcohol to one drink per day.      No smoking.       Wear sunscreen to prevent skin cancer.       See your dentist twice a year for an exam and cleaning.      See your eye doctor every 1 to 2 years to screen for conditions such as glaucoma, macular degeneration, cataracts, etc.    Personalized Prevention Plan  You are due for the preventive services outlined below.  Your care team is available to assist you in scheduling these services.  If you have already completed any of these items, please share that information with your care team to update in your medical record.    Health Maintenance Due   Topic Date Due     Hepatitis C Screening  03/02/1970     Flu Vaccine (1) 09/01/2018     HEATH QUESTIONNAIRE 1 MONTH  09/07/2018     Depression Assessment Monthly  09/07/2018             Follow-ups after your visit        Follow-up notes from your care team     Return in about 1 year (around 11/19/2019) for Comprehensive Exam.      Your next 10 appointments already scheduled     Dec 07, 2018  9:30 AM CST   (Arrive by 9:15 AM)   XR LUMBAR TRANSLAMINAR INJECTION with HIIR1, HIIRRAD   HI INTERVENTIONAL RAD ([facility] )    [address] [place] [id number] [phone]           How do I prepare for my exam? (Food and drink instructions) No Food and Drink Restrictions.  How do I prepare for my exam? (Other instructions) * If you take Coumadin (or any other blood thinners) you may need to stop taking them up to 5 days prior to the exam. Talk to your doctor before stopping. * If you take Plavix, Ticlid, Pletal or Persantine, please ask your doctor if  you should stop these medicines. You may need extra tests on the morning of your scan. * If you take Xarelto (Rivaroxaban), you may need to stop taking it 24 hours before treatment. Talk to your doctor before stopping this medicine.  What should I wear: Wear comfortable clothes.  How long does the exam take: Injections take about 30 to 45 minutes. Most people spend up to 2 hours in the clinic or hospital.  What should I bring: Please bring any scans or X-rays taken at other hospitals, if similar tests were done. Also bring a list of your medicines, including vitamins, minerals and over-the-counter drugs. It is safest to leave personal items at home. Do I need a :  Plan to have someone drive you home afterward.  What do I need to tell my doctor: Tell your doctor in advance: * If you are allergic to X-ray dye (contrast fluid). * If you may be pregnant.  What should I do after the exam: You may have slight cramping during this exam. The cramps should go away afterward. You may have some spotting after the exam.  What is this test: A spinal shot is done in or near the spine. You may receive steroid medicine (to reduce swelling) or contrast fluid (dye that makes the area show up more clearly on X-rays). A nerve root shot is a shot into the nerve near the spine. It may reduce inflammation near the nerve root or spinal cord and reduce pain in the arm or leg.  Who should I call with questions: If you have any questions, please call the Imaging Department where you will have your exam. Directions, parking instructions, and other information is available on our website, [email or web address].              Who to contact     If you have questions or need follow up information about today's clinic visit or your schedule please contact [facility] directly at [phone].  Normal or non-critical lab and imaging results will be communicated to you by MyChart, letter or phone within 4 business days  "after the clinic has received the results. If you do not hear from us within 7 days, please contact the clinic through [employer] or phone. If you have a critical or abnormal lab result, we will notify you by phone as soon as possible.  Submit refill requests through [employer] or call your pharmacy and they will forward the refill request to us. Please allow 3 business days for your refill to be completed.          Additional Information About Your Visit        [employer] Information     [employer] gives you secure access to your electronic health record. If you see a primary care provider, you can also send messages to your care team and make appointments. If you have questions, please call your primary care clinic.  If you do not have a primary care provider, please call [phone] and they will assist you.        Care EveryWhere ID     This is your Care EveryWhere ID. This could be used by other organizations to access your [facility] medical records  [id number]        Your Vitals Were     Pulse Height Pulse Oximetry BMI (Body Mass Index)          87 5' 5.5\" (1.664 m) 93% 34.41 kg/m2         Blood Pressure from Last 3 Encounters:   11/19/18 120/72   09/12/18 135/70   08/07/18 116/60    Weight from Last 3 Encounters:   11/19/18 210 lb (95.3 kg)   08/07/18 205 lb (93 kg)   06/19/18 205 lb (93 kg)              We Performed the Following     ADMIN 1st VACCINE     FLU VACCINE, INCREASED ANTIGEN, PRESV FREE          Today's Medication Changes          These changes are accurate as of 11/19/18 11:59 PM.  If you have any questions, ask your nurse or doctor.               These medicines have changed or have updated prescriptions.        Dose/Directions    pantoprazole 40 MG EC tablet   Commonly known as:  PROTONIX   This may have changed:  when to take this   Used for:  Diagnosis unknown        Dose:  40 mg   Take 1 tablet (40 mg) by mouth daily   Quantity:  90 tablet   Refills:  3            Where to get your medicines    "   These medications were sent to [facility] - [place], [place] - [address]  [address] [address]     Phone:  [phone]     albuterol (2.5 MG/3ML) 0.083% neb solution                Primary Care Provider Office Phone # Fax #    Jimbo Martinez -[phone] [address] [address] [address]        Equal Access to Services     BENITEZ OLIVEROS : Hadii aad ku hadasho Soomaali, waaxda luqadaha, qaybta kaalmada adeegyada, waxay idiin hayaan adeeg kharash laclement . So [facility] [phone].    ATENCIÓN: Si habla español, tiene a garza disposición servicios gratuitos de asistencia lingüística. [facility] [phone].    We comply with applicable federal civil rights laws and [place] laws. We do not discriminate on the basis of race, color, national origin, age, disability, sex, sexual orientation, or gender identity.            Thank you!     Thank you for choosing [facility]  for your care. Our goal is always to provide you with excellent care. Hearing back from our patients is one way we can continue to improve our services. Please take a few minutes to complete the written survey that you may receive in the mail after your visit with us. Thank you!             Your Updated Medication List - Protect others around you: Learn how to safely use, store and throw away your medicines at www.disposemymeds.org.          This list is accurate as of 11/19/18 11:59 PM.  Always use your most recent med list.                   Brand Name Dispense Instructions for use Diagnosis    ALFALFA PO      Take 5 tablets by mouth 2 times daily.        ALLEGRA-D ALLERGY & CONGESTION  MG per 12 hr tablet   Generic drug:  fexofenadine-pseudoePHEDrine     30 tablet    TAKE 1 TABLET BY MOUTH TWICE DAILY    Nasal congestion       benzonatate 100 MG capsule    TESSALON    42 capsule    Take 1 capsule (100 mg) by mouth 3 times daily as needed for cough    Cough       CALCIUM  + D 500-1000-40 MG-UNT-MCG Chew   Generic drug:  Calcium-Vitamin D-Vitamin K      Take 3 tablets by mouth daily        citalopram 40 MG tablet    celeXA    90 tablet    TAKE 1 TABLET BY MOUTH DAILY    Panic disorder without agoraphobia       DICLOFENAC PO      Take 100 mg by mouth daily        fish oil-omega-3 fatty acids 1000 MG capsule      Take 1 g by mouth daily.        flax seed oil 1000 MG capsule      Take 2 capsules by mouth daily        fluticasone 50 MCG/ACT spray    FLONASE     1 spray Reported on 5/11/2017        folic acid 1 MG tablet    FOLVITE     Take 1 mg by mouth 3 times daily.        gabapentin 300 MG capsule    NEURONTIN    210 capsule    TAKE 3 CAPSULES BY MOUTH IN THE MORNING, 2 CAPSULES AT NOON AND 2 CAPSULES IN THE EVENING    Peripheral polyneuropathy       Garlic 400 MG Tbec           HYDROcodone-acetaminophen 7.5-325 MG per tablet    NORCO    60 tablet    TAKE 1 TABLET BY MOUTH EVERY 4 TO 6 HOURS AS NEEDED FOR PAIN    Rheumatoid arthritis involving multiple sites with positive rheumatoid factor (H)       ketotifen 0.025 % Soln ophthalmic solution    ZADITOR/REFRESH ANTI-ITCH     1 drop        MILK THISTLE PO      Take 1,000 mg by mouth daily.        MULTIPLE VITAMIN PO      Take  by mouth 2 times daily.        order for DME     1 Device    Equipment being ordered: Nebulizer    Cough       pantoprazole 40 MG EC tablet    PROTONIX    90 tablet    Take 1 tablet (40 mg) by mouth daily    Diagnosis unknown       PREBIOTIC PRODUCT PO      Take 1 tablet by mouth daily        * PROAIR  (90 Base) MCG/ACT inhaler   Generic drug:  albuterol     8.5 g    INHALE 2 PUFFS INTO THE LUNGS EVERY 6 HOURS AS NEEDED FOR SHORTNESS OF BREATH OR WHEEZING    Influenza-like symptoms       * albuterol (2.5 MG/3ML) 0.083% neb solution     25 vial    Take 1 vial (2.5 mg) by nebulization every 6 hours as needed for shortness of breath / dyspnea or wheezing    Cough       REFRESH 1.4-0.6 % ophthalmic solution    Generic drug:  polyvinyl alcohol-povidone      1-2 drops as needed        RESTASIS 0.05 % ophthalmic emulsion   Generic drug:  cycloSPORINE     60 each    USE 1 DROP IN BOTH EYES 2 TIMES A DAY    Bilateral dry eyes       Simethicone 180 MG Caps    SM GAS RELIEF ANTIFLATUENT    60 capsule    TAKE ONE CAPSULE BY MOUTH AS NEEDED AFTER A MEAL. MAX 2 CAPS/DAY    Diagnosis unknown       SOLUBLE FIBER/PROBIOTICS PO      Take 1 tablet by mouth        traZODone 50 MG tablet    DESYREL    270 tablet    TAKE 2-3 TABLETS BY MOUTH DAILY AT BEDTIME    Primary insomnia       Vitamin C 500 MG Caps      Take 2 tablets by mouth 2 times daily.        vitamin D 1000 units capsule      Take 1 capsule by mouth daily.        * Notice:  This list has 2 medication(s) that are the same as other medications prescribed for you. Read the directions carefully, and ask your doctor or other care provider to review them with you.

## 2018-11-19 NOTE — NURSING NOTE
"Chief Complaint   Patient presents with     Physical       Initial /72 (BP Location: Right arm, Patient Position: Sitting, Cuff Size: Adult Regular)  Pulse 87  Ht 5' 5.5\" (1.664 m)  Wt 210 lb (95.3 kg)  SpO2 93%  BMI 34.41 kg/m2 Estimated body mass index is 34.41 kg/(m^2) as calculated from the following:    Height as of this encounter: 5' 5.5\" (1.664 m).    Weight as of this encounter: 210 lb (95.3 kg).  Medication Reconciliation: complete    Babs Shaikh MA  "

## 2018-11-20 ASSESSMENT — ANXIETY QUESTIONNAIRES: GAD7 TOTAL SCORE: 2

## 2018-11-29 DIAGNOSIS — F51.01 PRIMARY INSOMNIA: ICD-10-CM

## 2018-11-30 RX ORDER — TRAZODONE HYDROCHLORIDE 50 MG/1
TABLET, FILM COATED ORAL
Qty: 270 TABLET | Refills: 1 | Status: ON HOLD | OUTPATIENT
Start: 2018-11-30 | End: 2019-05-28

## 2018-12-01 ENCOUNTER — HOSPITAL ENCOUNTER (EMERGENCY)
Facility: HOSPITAL | Age: 68
Discharge: HOME | End: 2018-12-01
Attending: FAMILY MEDICINE | Admitting: FAMILY MEDICINE
Payer: MEDICARE

## 2018-12-01 VITALS
SYSTOLIC BLOOD PRESSURE: 107 MMHG | HEIGHT: 65 IN | BODY MASS INDEX: 34.49 KG/M2 | DIASTOLIC BLOOD PRESSURE: 62 MMHG | RESPIRATION RATE: 20 BRPM | WEIGHT: 207 LBS | HEART RATE: 80 BPM | OXYGEN SATURATION: 95 %

## 2018-12-01 DIAGNOSIS — R04.0 EPISTAXIS: ICD-10-CM

## 2018-12-01 PROCEDURE — 30901 CONTROL OF NOSEBLEED: CPT | Mod: LT

## 2018-12-01 PROCEDURE — 99282 EMERGENCY DEPT VISIT SF MDM: CPT | Mod: 25

## 2018-12-01 PROCEDURE — 30901 CONTROL OF NOSEBLEED: CPT | Mod: LT | Performed by: FAMILY MEDICINE

## 2018-12-01 NOTE — ED TRIAGE NOTES
Patient reports steady nose bleed X 30 minutes at home, from left nostril. She denies any antiplatelet medications. Patient also reports post nasal bleed with clots. Currently, patient continues to hold pressure. Bleeding controlled.

## 2018-12-01 NOTE — ED AVS SNAPSHOT
[facility]    [address]    [place] [address]    Phone:  [phone]                                       Melvi Cleveland   MRN: [id number]    Department:  [facility]   Date of Visit:  12/1/2018           Patient Information     Date Of Birth          1950        Your diagnoses for this visit were:     Epistaxis        You were seen by Idalia Mortensen MD.        Discharge Instructions         Nosebleed (Adult)    Bleeding from the nose most commonly occurs because of injury or drying and cracking of the inner lining of the nose. Most nosebleeds are because of dry air or nose-picking. They can occur during a common cold or an allergy attack. They can also occur on a very hot day, or from dry air in the winter.  If the bleeding site is found, it may be cauterized. This means it is treated to cause a blood clot to form. This may be done with a chemical, heat, or electricity. If the bleeding continues after the site is cauterized, or if the site cannot be found, packing may be put in your nose. This is to apply pressure and stop the bleeding. The packing may be made of gauze or sponge. A small balloon catheter is sometimes used. These must be removed by your healthcare provider. Some types of packing dissolve on their own. If you are taking blood thinning (anticoagulant) medicine, you may have a blood test.  Home care    If packing was put in your nose, unless told otherwise, do not pull on it or try to remove it yourself. You will be given an appointment to have it removed. You may also have been given antibiotics to prevent a sinus infection. If so, finish all of the medicine.    Don't blow your nose for 12 hours after the bleeding stops. This will allow a strong blood clot to form. Don't pick your nose. This may restart bleeding.    Don't drink alcohol or hot liquids for the next 2 days. Alcohol or hot liquids in your mouth can dilate blood vessels in your nose. This  can cause bleeding to start again.    Don't take ibuprofen, naproxen, or medicines that contain aspirin. These thin the blood and may cause your nose to bleed. You may take acetaminophen for pain, unless another pain medicine was prescribed.    If the bleeding starts again, sit up and lean forward to prevent swallowing blood. Pinch your nose tightly on both sides, as shown above, for 10 to 15 minutes. Time yourself. Don t release the pressure on your nose until 10 minutes is up. If bleeding does not stop, continue to pinch your nose and call your healthcare provider or return to this facility.    If you have a cold, allergies, or dry nasal membranes, lubricate the nasal passages. Apply a small amount of petroleum jelly inside the nose with a cotton swab twice a day (morning and night).    Don't overheat your home. This can dry the air and make your condition worse.    Put a humidifier in the room where you sleep. This will add moisture to the air. Clean the humidifier as advised by the .    Use a saline nasal spray to keep nasal passages moist.    Don't pick your nose. Keep fingernails trimmed to decrease risk of bleeds.    Don't smoke.  Follow-up care  Follow up with your healthcare provider, or as advised. Nasal packing should be rechecked or removed within 2 to 3 days.  When to seek medical advice  Call your healthcare provider right away if any of these occur.    You have another nosebleed that you cannot control    Dizziness, weakness, or fainting    You become tired or confused    Fever of 100.4 F (38 C) or higher, or as directed by your healthcare provider    Headache    Sinus or facial pain    Shortness of breath or trouble breathing  Date Last Reviewed: 11/1/2017 2000-2018 [employer]. [address] [place] [address], [place] [address]. All rights reserved. This information is not intended as a substitute for professional medical care. Always follow your healthcare professional's  instructions.          Your next 10 appointments already scheduled     Dec 07, 2018  9:30 AM CST   (Arrive by 9:15 AM)   XR LUMBAR TRANSLAMINAR INJECTION with HIIR1, HIIRRAD   [facility] ([facility] )    [address] [place] [id number] [phone]           How do I prepare for my exam? (Food and drink instructions) No Food and Drink Restrictions.  How do I prepare for my exam? (Other instructions) * If you take Coumadin (or any other blood thinners) you may need to stop taking them up to 5 days prior to the exam. Talk to your doctor before stopping. * If you take Plavix, Ticlid, Pletal or Persantine, please ask your doctor if you should stop these medicines. You may need extra tests on the morning of your scan. * If you take Xarelto (Rivaroxaban), you may need to stop taking it 24 hours before treatment. Talk to your doctor before stopping this medicine.  What should I wear: Wear comfortable clothes.  How long does the exam take: Injections take about 30 to 45 minutes. Most people spend up to 2 hours in the clinic or hospital.  What should I bring: Please bring any scans or X-rays taken at other hospitals, if similar tests were done. Also bring a list of your medicines, including vitamins, minerals and over-the-counter drugs. It is safest to leave personal items at home. Do I need a :  Plan to have someone drive you home afterward.  What do I need to tell my doctor: Tell your doctor in advance: * If you are allergic to X-ray dye (contrast fluid). * If you may be pregnant.  What should I do after the exam: You may have slight cramping during this exam. The cramps should go away afterward. You may have some spotting after the exam.  What is this test: A spinal shot is done in or near the spine. You may receive steroid medicine (to reduce swelling) or contrast fluid (dye that makes the area show up more clearly on X-rays). A nerve root shot is a shot into the nerve near the  spine. It may reduce inflammation near the nerve root or spinal cord and reduce pain in the arm or leg.  Who should I call with questions: If you have any questions, please call the Imaging Department where you will have your exam. Directions, parking instructions, and other information is available on our website, [email or web address].                 Review of your medicines      Our records show that you are taking the medicines listed below. If these are incorrect, please call your family doctor or clinic.        Dose / Directions Last dose taken    ALFALFA PO   Dose:  5 tablet        Take 5 tablets by mouth 2 times daily.   Refills:  0        ALLEGRA-D ALLERGY & CONGESTION  MG 12 hr tablet   Quantity:  30 tablet   Generic drug:  fexofenadine-pseudoePHEDrine        TAKE 1 TABLET BY MOUTH TWICE DAILY   Refills:  0        CALCIUM + D 500-1000-40 MG-UNT-MCG Chew   Dose:  3 tablet   Generic drug:  Calcium-Vitamin D-Vitamin K        Take 3 tablets by mouth daily   Refills:  0        citalopram 40 MG tablet   Commonly known as:  celeXA   Quantity:  90 tablet        TAKE 1 TABLET BY MOUTH DAILY   Refills:  1        DICLOFENAC PO   Dose:  100 mg        Take 100 mg by mouth daily   Refills:  0        fish oil-omega-3 fatty acids 1000 MG capsule   Dose:  1 g        Take 1 g by mouth daily.   Refills:  0        flax seed oil 1000 MG capsule   Dose:  2 capsule        Take 2 capsules by mouth daily   Refills:  0        fluticasone 50 MCG/ACT nasal spray   Commonly known as:  FLONASE   Dose:  1 spray        1 spray Reported on 5/11/2017   Refills:  0        folic acid 1 MG tablet   Commonly known as:  FOLVITE   Dose:  1 mg        Take 1 mg by mouth 3 times daily.   Refills:  0        gabapentin 300 MG capsule   Commonly known as:  NEURONTIN   Quantity:  210 capsule        TAKE 3 CAPSULES BY MOUTH IN THE MORNING, 2 CAPSULES AT NOON AND 2 CAPSULES IN THE EVENING   Refills:  0        Garlic 400 MG Tbec        Refills:  0         HYDROcodone-acetaminophen 7.5-325 MG per tablet   Commonly known as:  NORCO   Quantity:  60 tablet        TAKE 1 TABLET BY MOUTH EVERY 4 TO 6 HOURS AS NEEDED FOR PAIN   Refills:  0        MILK THISTLE PO   Dose:  1000 mg        Take 1,000 mg by mouth daily.   Refills:  0        MULTIPLE VITAMIN PO        Take  by mouth 2 times daily.   Refills:  0        order for DME   Quantity:  1 Device        Equipment being ordered: Nebulizer   Refills:  0        pantoprazole 40 MG EC tablet   Commonly known as:  PROTONIX   Dose:  40 mg   Quantity:  90 tablet        Take 1 tablet (40 mg) by mouth daily   Refills:  3        PREBIOTIC PRODUCT PO   Dose:  1 tablet        Take 1 tablet by mouth daily   Refills:  0        * PROAIR  (90 Base) MCG/ACT inhaler   Quantity:  8.5 g   Generic drug:  albuterol        INHALE 2 PUFFS INTO THE LUNGS EVERY 6 HOURS AS NEEDED FOR SHORTNESS OF BREATH OR WHEEZING   Refills:  5        * albuterol (2.5 MG/3ML) 0.083% neb solution   Commonly known as:  PROVENTIL   Dose:  2.5 mg   Quantity:  25 vial        Take 1 vial (2.5 mg) by nebulization every 6 hours as needed for shortness of breath / dyspnea or wheezing   Refills:  3        REFRESH 1.4-0.6 % ophthalmic solution   Dose:  1-2 drop   Generic drug:  polyvinyl alcohol-povidone        1-2 drops as needed   Refills:  0        RESTASIS 0.05 % ophthalmic emulsion   Quantity:  60 each   Generic drug:  cycloSPORINE        USE 1 DROP IN BOTH EYES 2 TIMES A DAY   Refills:  3        Simethicone 180 MG Caps   Commonly known as:  SM GAS RELIEF ANTIFLATUENT   Quantity:  60 capsule        TAKE ONE CAPSULE BY MOUTH AS NEEDED AFTER A MEAL. MAX 2 CAPS/DAY   Refills:  11        SOLUBLE FIBER/PROBIOTICS PO   Dose:  1 tablet        Take 1 tablet by mouth   Refills:  0        traZODone 50 MG tablet   Commonly known as:  DESYREL   Quantity:  270 tablet        TAKE 2-3 TABLETS BY MOUTH DAILY AT BEDTIME   Refills:  1        Vitamin C 500 MG Caps   Dose:  2  tablet        Take 2 tablets by mouth 2 times daily.   Refills:  0        vitamin D 1000 units capsule   Dose:  1 capsule        Take 1 capsule by mouth daily.   Refills:  0        * Notice:  This list has 2 medication(s) that are the same as other medications prescribed for you. Read the directions carefully, and ask your doctor or other care provider to review them with you.            Orders Needing Specimen Collection     None      Pending Results     No orders found from 11/29/2018 to 12/2/2018.            Pending Culture Results     No orders found from 11/29/2018 to 12/2/2018.            Thank you for choosing [facility]       Thank you for choosing [facility] for your care. Our goal is always to provide you with excellent care. Hearing back from our patients is one way we can continue to improve our services. Please take a few minutes to complete the written survey that you may receive in the mail after you visit with us. Thank you!        [facility] Information     [facility] gives you secure access to your electronic health record. If you see a primary care provider, you can also send messages to your care team and make appointments. If you have questions, please call your primary care clinic.  If you do not have a primary care provider, please call [phone] and they will assist you.        Care EveryWhere ID     This is your Care EveryWhere ID. This could be used by other organizations to access your [facility] medical records  [id number]        Equal Access to Services     RILEY OLIVEROS : Hadii dawna Holland, wablayne palma, qaroxanna kaaljenna pascal, bill soto. So [facility] [phone].    ATENCIÓN: Si habla español, tiene a garza disposición servicios gratuitos de asistencia lingüística. Llame al [phone].    We comply with applicable federal civil rights laws and [place] laws. We do not discriminate on the basis of race, color, national origin, age, disability, sex,  sexual orientation, or gender identity.            After Visit Summary       This is your record. Keep this with you and show to your community pharmacist(s) and doctor(s) at your next visit.

## 2018-12-01 NOTE — DISCHARGE INSTRUCTIONS
Nosebleed (Adult)    Bleeding from the nose most commonly occurs because of injury or drying and cracking of the inner lining of the nose. Most nosebleeds are because of dry air or nose-picking. They can occur during a common cold or an allergy attack. They can also occur on a very hot day, or from dry air in the winter.  If the bleeding site is found, it may be cauterized. This means it is treated to cause a blood clot to form. This may be done with a chemical, heat, or electricity. If the bleeding continues after the site is cauterized, or if the site cannot be found, packing may be put in your nose. This is to apply pressure and stop the bleeding. The packing may be made of gauze or sponge. A small balloon catheter is sometimes used. These must be removed by your healthcare provider. Some types of packing dissolve on their own. If you are taking blood thinning (anticoagulant) medicine, you may have a blood test.  Home care    If packing was put in your nose, unless told otherwise, do not pull on it or try to remove it yourself. You will be given an appointment to have it removed. You may also have been given antibiotics to prevent a sinus infection. If so, finish all of the medicine.    Don't blow your nose for 12 hours after the bleeding stops. This will allow a strong blood clot to form. Don't pick your nose. This may restart bleeding.    Don't drink alcohol or hot liquids for the next 2 days. Alcohol or hot liquids in your mouth can dilate blood vessels in your nose. This can cause bleeding to start again.    Don't take ibuprofen, naproxen, or medicines that contain aspirin. These thin the blood and may cause your nose to bleed. You may take acetaminophen for pain, unless another pain medicine was prescribed.    If the bleeding starts again, sit up and lean forward to prevent swallowing blood. Pinch your nose tightly on both sides, as shown above, for 10 to 15 minutes. Time yourself. Don t release the  pressure on your nose until 10 minutes is up. If bleeding does not stop, continue to pinch your nose and call your healthcare provider or return to this facility.    If you have a cold, allergies, or dry nasal membranes, lubricate the nasal passages. Apply a small amount of petroleum jelly inside the nose with a cotton swab twice a day (morning and night).    Don't overheat your home. This can dry the air and make your condition worse.    Put a humidifier in the room where you sleep. This will add moisture to the air. Clean the humidifier as advised by the .    Use a saline nasal spray to keep nasal passages moist.    Don't pick your nose. Keep fingernails trimmed to decrease risk of bleeds.    Don't smoke.  Follow-up care  Follow up with your healthcare provider, or as advised. Nasal packing should be rechecked or removed within 2 to 3 days.  When to seek medical advice  Call your healthcare provider right away if any of these occur.    You have another nosebleed that you cannot control    Dizziness, weakness, or fainting    You become tired or confused    Fever of 100.4 F (38 C) or higher, or as directed by your healthcare provider    Headache    Sinus or facial pain    Shortness of breath or trouble breathing  Date Last Reviewed: 11/1/2017 2000-2018 [employer]. [address] [place] [address], [place] [address]. All rights reserved. This information is not intended as a substitute for professional medical care. Always follow your healthcare professional's instructions.

## 2018-12-01 NOTE — ED AVS SNAPSHOT
[facility]    [address] [address]    Phone:  [phone]                                       Melvi Cleveland   MRN: [id number]    Department[facility]  [facility]   Date of Visit:  12/1/2018           After Visit Summary Signature Page     I have received my discharge instructions, and my questions have been answered. I have discussed any challenges I see with this plan with the nurse or doctor.    ..........................................................................................................................................  Patient/Patient Representative Signature      ..........................................................................................................................................  Patient Representative Print Name and Relationship to Patient    ..................................................               ................................................  Date                                   Time    ..........................................................................................................................................  Reviewed by Signature/Title    ...................................................              ..............................................  Date                                               Time          22EPIC Rev 08/18

## 2018-12-01 NOTE — ED NOTES
Discharge teaching done, AVS reviewed with pt, questions answered. Pt verbalized understanding and is agreeable with discharge plan. Pt discharged to home accompanied by her .

## 2018-12-01 NOTE — ED NOTES
"Pt presents with Lt nare bleeding that started at 0645, \"It gushed for an hour, I was spitting out clots.\" pt reports it stopped just as she arrived here.   "

## 2018-12-02 NOTE — ED PROVIDER NOTES
eMERGENCY dEPARTMENT eNCOUnter        CHIEF COMPLAINT  Chief Complaint   Patient presents with     Epistaxis     X 30 minutes, patient denies use of blood thinners       HPI    Melvi Cleveland is a 68 year old female who presents with nosebleed since onset  Half an hour ago.  The duration has been constant.  The location is the left nare. .  The quality of the bleeding is bright red blood.  There no aggravating or alleviating factors except for direct pressure that has slowed down the bleeding.  The patient has no associated symptoms.    REVIEW OF SYSTEMS    Neurologic: No LOC  Cardiac: No Chest Pain, Denies syncope  Respiratory: No shortness of breath or or difficulty breathing  GI: No Bloody Stool or Diarrhea  : No Dysuria or Hematuria  General: No Fever    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     Parotiditis 5/9/2011     Peripheral Neuriopathy 3/1/2011     Rheumatoid arthritis(714.0) 12/13/1999     Seborrhea capitis 10/6/2011     Sjogrens Syndrome 3/1/2011     Urethral stricture 4/30/2008     Past Surgical History:   Procedure Laterality Date     BACK SURGERY  05/1995    back surgery     BIOPSY      breast bx X2     BIOPSY BREAST      LT     BIOPSY BREAST NEEDLE LOCALIZATION Right 6/12/2017    Procedure: BIOPSY BREAST NEEDLE LOCALIZATION;  WIRE LOCALIZED EXCISIONAL BIOPSY  RIGHT BREAST MASS;  Surgeon: Jeni Amin MD;  Location: [facility]     CHOLECYSTECTOMY  2004     COLONOSCOPY  11/15/2004    screening     COLONOSCOPY  9/24/2014     EGD with biopsy  2010, 2011    GERD     laminectomy      L4 L5 laminectomy;  back pain     RELEASE CARPAL TUNNEL Right 1/16/2018    Procedure: RELEASE CARPAL TUNNEL;  RIGHT CARPAL TUNNEL RELEASE;  Surgeon: Haider Carlos MD;  Location: [facility]       CURRENT MEDICATIONS    Current Outpatient Rx   Medication Sig Dispense Refill     albuterol (2.5 MG/3ML) 0.083% neb solution Take 1 vial (2.5 mg) by nebulization every 6 hours as needed for shortness of breath / dyspnea or wheezing 25 vial 3     ALBUTEROL 108 (90 BASE) MCG/ACT inhaler INHALE 2 PUFFS INTO THE LUNGS EVERY 6 HOURS AS NEEDED FOR SHORTNESS OF BREATH OR WHEEZING 8.5 g 5     ALFALFA PO Take 5 tablets by mouth 2 times daily.       ALLEGRA-D ALLERGY & CONGESTION  MG per 12 hr tablet TAKE 1 TABLET BY MOUTH TWICE DAILY 30 tablet 0     Ascorbic Acid (VITAMIN C) 500 MG CAPS Take 2 tablets by mouth 2 times daily.       Calcium-Vitamin D-Vitamin K (CALCIUM + D) 500-1000-40 MG-UNT-MCG CHEW Take 3 tablets by mouth daily        Cholecalciferol (VITAMIN D) 1000 UNITS capsule Take 1 capsule by mouth daily.       citalopram (CELEXA) 40 MG tablet TAKE 1 TABLET BY MOUTH DAILY 90 tablet 1     DICLOFENAC PO Take 100 mg by mouth daily        fish oil-omega-3 fatty acids (OMEGA-3) 1000 MG capsule Take 1 g by mouth daily.       Flaxseed, Linseed, (FLAX SEED OIL) 1000 MG capsule Take 2 capsules by mouth daily       fluticasone (FLONASE) 50 MCG/ACT spray 1 spray Reported on 5/11/2017       folic acid (FOLVITE) 1 MG tablet Take 1 mg by mouth 3 times daily.       gabapentin (NEURONTIN) 300 MG capsule TAKE 3 CAPSULES BY MOUTH IN THE MORNING, 2 CAPSULES AT NOON AND 2 CAPSULES IN THE EVENING 210 capsule 0     Garlic 400 MG TBEC        HYDROcodone-acetaminophen (NORCO) 7.5-325 MG per tablet TAKE 1 TABLET BY MOUTH EVERY 4 TO 6 HOURS AS NEEDED FOR PAIN 60 tablet 0     MILK THISTLE PO Take 1,000 mg by mouth daily.       MULTIPLE VITAMIN PO Take  by mouth 2 times daily.       order for DME Equipment being ordered: Nebulizer 1 Device 0     pantoprazole (PROTONIX) 40  MG enteric coated tablet Take 1 tablet (40 mg) by mouth daily (Patient taking differently: Take 40 mg by mouth 2 times daily ) 90 tablet 3     polyvinyl alcohol-povidone (REFRESH) 1.4-0.6 % ophthalmic solution 1-2 drops as needed       PREBIOTIC PRODUCT PO Take 1 tablet by mouth daily       Probiotic Product (SOLUBLE FIBER/PROBIOTICS PO) Take 1 tablet by mouth       RESTASIS 0.05 % ophthalmic emulsion USE 1 DROP IN BOTH EYES 2 TIMES A DAY 60 each 3     Simethicone (SM GAS RELIEF ANTIFLATUENT) 180 MG CAPS TAKE ONE CAPSULE BY MOUTH AS NEEDED AFTER A MEAL. MAX 2 CAPS/DAY 60 capsule 11     traZODone (DESYREL) 50 MG tablet TAKE 2-3 TABLETS BY MOUTH DAILY AT BEDTIME 270 tablet 1       ALLERGIES    Allergies   Allergen Reactions     Adhesive Tape      Glue and nicotine patches     Benzoin Compound      Tin-Co-Javier     Mold      Seasonal Allergies      Soap      Any cleanser/soap/disinfectant that is used right before surgery.  Makes patient break out horribly. Chart was looked at and Chloraprep was used.       SOCIAL & FAMILY HISTORY    Social History     Social History     Marital status:      Spouse name: N/A     Number of children: N/A     Years of education: N/A     Occupational History      [employer]     part time     Social History Main Topics     Smoking status: Former Smoker     Types: Cigarettes     Quit date: 3/28/1997     Smokeless tobacco: Never Used      Comment: no passive exposure     Alcohol use No      Comment: former. quit 1984     Drug use: No     Sexual activity: Not Asked     Other Topics Concern     Parent/Sibling W/ Cabg, Mi Or Angioplasty Before 65f 55m? Yes     Blood Transfusions Yes     Caffeine Concern Yes     chocolate rare     Seat Belt Yes     Social History Narrative     Family History   Problem Relation Age of Onset     Diabetes Mother      Rheumatoid Arthritis Mother      Other - See Comments Mother      fibromyalgia     Osteoarthritis Mother      Thyroid Disease Mother      thyroid  "disorder     Unknown/Adopted Father      cause of death unknown     Rheumatoid Arthritis Father      Diabetes Sister      Myocardial Infarction Sister      cause of death     Lupus Sister      cause of death     C.A.D. Maternal Grandmother      Cerebrovascular Disease Maternal Grandmother      Diabetes Maternal Grandmother      Thyroid Disease Maternal Grandmother      thyrodi disorder; goitor removed     Cancer Maternal Grandfather      Hypertension Brother      Other - See Comments Brother      sleep apnea     Other - See Comments Sister      sleep apnea       PHYSICAL EXAM    VITAL SIGNS: /62  Pulse 80  Resp 20  Ht 1.651 m (5' 5\")  Wt 93.9 kg (207 lb)  SpO2 95%  BMI 34.45 kg/m2   Constitutional:  Well developed, well nourished, no acute distress   Nose: no active bleeding, has scabs on the left  HENT:  Atraumatic, moist mucus membranes  Neck: supple, no JVD   Respiratory:  Lungs Clear, no retractions   Cardiovascular:  regular rate, no murmurs  GI:  Soft, nontender, normal bowel sounds  Musculoskeletal:  No edema, no acute deformities  Integument:  Well hydrated, no petechiae   Neurologic:  Alert & oriented, no slurred speech  Psych: Pleasant affect, no hallucinations      PROCEDURE:  The left septum was cauterized with silver nitrate.  No further bleeding.    ED COURSE & MEDICAL DECISION MAKING    The patient was observed in the ED after the packing was placed. During the observation, the patient had no further bleeding.    Vitals:    12/01/18 0747   BP: 107/62   Pulse: 80   Resp: 20   SpO2: 95%   Weight: 93.9 kg (207 lb)   Height: 1.651 m (5' 5\")       Differential Diagnosis: Coagulopathy, Platelet Dysfunction, Thrombocytopenia, Anemia, Hpertension, Nasal Infection, Aspiration Pneumonia, other.    FINAL IMPRESSION    Left  Epistaxis    Plan: the septum was cauterized, she didn't have any more bleeding.  Unfortunately, she has a very busy afternoon planned at a [employer].  She is encouraged to go " "home and take it easy, she promises to do that \"after I make a salad\".  Return here as needed.        Idalia Mortensen MD  12/02/18 1310    "

## 2018-12-07 ENCOUNTER — HOSPITAL ENCOUNTER (OUTPATIENT)
Dept: INTERVENTIONAL RADIOLOGY/VASCULAR | Facility: HOSPITAL | Age: 68
Discharge: HOME | End: 2018-12-07
Attending: RADIOLOGY | Admitting: FAMILY MEDICINE
Payer: MEDICARE

## 2018-12-07 DIAGNOSIS — M54.50 CHRONIC LOW BACK PAIN: ICD-10-CM

## 2018-12-07 DIAGNOSIS — G89.29 CHRONIC MIDLINE THORACIC BACK PAIN: ICD-10-CM

## 2018-12-07 DIAGNOSIS — M54.6 CHRONIC MIDLINE THORACIC BACK PAIN: ICD-10-CM

## 2018-12-07 DIAGNOSIS — G89.29 CHRONIC LOW BACK PAIN: ICD-10-CM

## 2018-12-07 PROCEDURE — 25000125 ZZHC RX 250: Performed by: RADIOLOGY

## 2018-12-07 PROCEDURE — C1751 CATH, INF, PER/CENT/MIDLINE: HCPCS | Mod: TC

## 2018-12-07 PROCEDURE — 25000128 H RX IP 250 OP 636: Performed by: RADIOLOGY

## 2018-12-07 RX ORDER — METHYLPREDNISOLONE ACETATE 80 MG/ML
80 INJECTION, SUSPENSION INTRA-ARTICULAR; INTRALESIONAL; INTRAMUSCULAR; SOFT TISSUE ONCE
Status: COMPLETED | OUTPATIENT
Start: 2018-12-07 | End: 2018-12-07

## 2018-12-07 RX ORDER — IOPAMIDOL 612 MG/ML
15 INJECTION, SOLUTION INTRATHECAL ONCE
Status: COMPLETED | OUTPATIENT
Start: 2018-12-07 | End: 2018-12-07

## 2018-12-07 RX ORDER — METHYLPREDNISOLONE ACETATE 80 MG/ML
INJECTION, SUSPENSION INTRA-ARTICULAR; INTRALESIONAL; INTRAMUSCULAR; SOFT TISSUE
Status: DISPENSED
Start: 2018-12-07 | End: 2018-12-07

## 2018-12-07 RX ADMIN — METHYLPREDNISOLONE ACETATE 80 MG: 80 INJECTION, SUSPENSION INTRA-ARTICULAR; INTRALESIONAL; INTRAMUSCULAR; SOFT TISSUE at 10:14

## 2018-12-07 RX ADMIN — IOPAMIDOL 6 ML: 612 INJECTION, SOLUTION INTRATHECAL at 10:13

## 2018-12-07 NOTE — DISCHARGE INSTRUCTIONS
Home number on file [phone] (home)  Is it ok to leave a message at this number(s)? Yes    Dr. Jones completed your procedure on 12/7/2018.    Current Pain Level (0-10 Scale): 5/10  Post Pain Level (0-10):  0/10    Radiology Discharge instructions for Steroid Injection    Activity Level:     Do not do any heavy activity or exercise for 24 hours.   Do not drive for 4 hours after your injection.  Diet:   Return to your normal diet.  Medications:   If you have stopped taking your Aspirin, Coumadin/Warfarin, Ibuprofen, or any   other blood thinner for this procedure you may resume in the morning unless   your primary care provider has given you other instructions.    Diabetics may see an increase in blood sugar after steroid injections. If you are concerned about your blood sugar, please contact your family doctor.    Site Care:  Remove the bandage and bathe or shower the morning after the procedure.      This is a Pain Management procedure.  You will be contacted in two weeks for follow up.    Call your Primary Care Provider if you have the following (if your primary care provider is not available please seek emergency care):   Nausea with vomiting   Severe headache   Drowsiness or confusion   Redness or drainage at the injection or puncture site   Temperature over 101 degrees F   Other concerns   Worsening back pain   Stiff neck

## 2018-12-07 NOTE — IP AVS SNAPSHOT
[facility]    [address] [address]    Phone:  [phone]    Fax:  [phone]                                       After Visit Summary   12/7/2018    Melvi Cleveland    MRN: [id number]           After Visit Summary Signature Page     I have received my discharge instructions, and my questions have been answered. I have discussed any challenges I see with this plan with the nurse or doctor.    ..........................................................................................................................................  Patient/Patient Representative Signature      ..........................................................................................................................................  Patient Representative Print Name and Relationship to Patient    ..................................................               ................................................  Date                                   Time    ..........................................................................................................................................  Reviewed by Signature/Title    ...................................................              ..............................................  Date                                               Time          22EPIC Rev 08/18

## 2018-12-07 NOTE — IP AVS SNAPSHOT
MRN:[id number]                      After Visit Summary   12/7/2018    Melvi Cleveland    MRN: [id number]           Visit Information        Provider Department      12/7/2018  9:30 AM [facility]; [facility]           Review of your medicines      UNREVIEWED medicines. Ask your doctor about these medicines        Dose / Directions    ALFALFA PO        Dose:  5 tablet   Take 5 tablets by mouth 2 times daily.   Refills:  0       ALLEGRA-D ALLERGY & CONGESTION  MG 12 hr tablet   Used for:  Nasal congestion   Generic drug:  fexofenadine-pseudoePHEDrine        TAKE 1 TABLET BY MOUTH TWICE DAILY   Quantity:  30 tablet   Refills:  0       CALCIUM + D 500-1000-40 MG-UNT-MCG Chew   Generic drug:  Calcium-Vitamin D-Vitamin K        Dose:  3 tablet   Take 3 tablets by mouth daily   Refills:  0       citalopram 40 MG tablet   Commonly known as:  celeXA   Used for:  Panic disorder without agoraphobia        TAKE 1 TABLET BY MOUTH DAILY   Quantity:  90 tablet   Refills:  1       DICLOFENAC PO        Dose:  100 mg   Take 100 mg by mouth daily   Refills:  0       fish oil-omega-3 fatty acids 1000 MG capsule        Dose:  1 g   Take 1 g by mouth daily.   Refills:  0       flax seed oil 1000 MG capsule        Dose:  2 capsule   Take 2 capsules by mouth daily   Refills:  0       fluticasone 50 MCG/ACT nasal spray   Commonly known as:  FLONASE        Dose:  1 spray   1 spray Reported on 5/11/2017   Refills:  0       folic acid 1 MG tablet   Commonly known as:  FOLVITE        Dose:  1 mg   Take 1 mg by mouth 3 times daily.   Refills:  0       gabapentin 300 MG capsule   Commonly known as:  NEURONTIN   Used for:  Peripheral polyneuropathy        TAKE 3 CAPSULES BY MOUTH IN THE MORNING, 2 CAPSULES AT NOON AND 2 CAPSULES IN THE EVENING   Quantity:  210 capsule   Refills:  0       Garlic 400 MG Tbec        Refills:  0       HYDROcodone-acetaminophen 7.5-325 MG per tablet   Commonly known as:  NORCO    Used for:  Rheumatoid arthritis involving multiple sites with positive rheumatoid factor (H)        TAKE 1 TABLET BY MOUTH EVERY 4 TO 6 HOURS AS NEEDED FOR PAIN   Quantity:  60 tablet   Refills:  0       MILK THISTLE PO        Dose:  1000 mg   Take 1,000 mg by mouth daily.   Refills:  0       MULTIPLE VITAMIN PO        Take  by mouth 2 times daily.   Refills:  0       pantoprazole 40 MG EC tablet   Commonly known as:  PROTONIX   Used for:  Diagnosis unknown        Dose:  40 mg   Take 1 tablet (40 mg) by mouth daily   Quantity:  90 tablet   Refills:  3       PREBIOTIC PRODUCT PO        Dose:  1 tablet   Take 1 tablet by mouth daily   Refills:  0       * PROAIR  (90 Base) MCG/ACT inhaler   Used for:  Influenza-like symptoms   Generic drug:  albuterol        INHALE 2 PUFFS INTO THE LUNGS EVERY 6 HOURS AS NEEDED FOR SHORTNESS OF BREATH OR WHEEZING   Quantity:  8.5 g   Refills:  5       * albuterol (2.5 MG/3ML) 0.083% neb solution   Commonly known as:  PROVENTIL   Used for:  Cough        Dose:  2.5 mg   Take 1 vial (2.5 mg) by nebulization every 6 hours as needed for shortness of breath / dyspnea or wheezing   Quantity:  25 vial   Refills:  3       REFRESH 1.4-0.6 % ophthalmic solution   Generic drug:  polyvinyl alcohol-povidone        Dose:  1-2 drop   1-2 drops as needed   Refills:  0       RESTASIS 0.05 % ophthalmic emulsion   Used for:  Bilateral dry eyes   Generic drug:  cycloSPORINE        USE 1 DROP IN BOTH EYES 2 TIMES A DAY   Quantity:  60 each   Refills:  3       Simethicone 180 MG Caps   Commonly known as:  SM GAS RELIEF ANTIFLATUENT   Used for:  Diagnosis unknown        TAKE ONE CAPSULE BY MOUTH AS NEEDED AFTER A MEAL. MAX 2 CAPS/DAY   Quantity:  60 capsule   Refills:  11       SOLUBLE FIBER/PROBIOTICS PO        Dose:  1 tablet   Take 1 tablet by mouth   Refills:  0       traZODone 50 MG tablet   Commonly known as:  DESYREL   Used for:  Primary insomnia        TAKE 2-3 TABLETS BY MOUTH DAILY AT  BEDTIME   Quantity:  270 tablet   Refills:  1       Vitamin C 500 MG Caps        Dose:  2 tablet   Take 2 tablets by mouth 2 times daily.   Refills:  0       vitamin D 1000 units capsule        Dose:  1 capsule   Take 1 capsule by mouth daily.   Refills:  0       * Notice:  This list has 2 medication(s) that are the same as other medications prescribed for you. Read the directions carefully, and ask your doctor or other care provider to review them with you.      CONTINUE these medicines which have NOT CHANGED        Dose / Directions    order for DME   Used for:  Cough        Equipment being ordered: Nebulizer   Quantity:  1 Device   Refills:  0                Protect others around you: Learn how to safely use, store and throw away your medicines at www.disposemymeds.org.         Follow-ups after your visit         Care Instructions        Further instructions from your care team       Home number on file [phone] (home)  Is it ok to leave a message at this number(s)? Yes    Dr. Jones completed your procedure on 12/7/2018.    Current Pain Level (0-10 Scale): 5/10  Post Pain Level (0-10):  0/10    Radiology Discharge instructions for Steroid Injection    Activity Level:     Do not do any heavy activity or exercise for 24 hours.   Do not drive for 4 hours after your injection.  Diet:   Return to your normal diet.  Medications:   If you have stopped taking your Aspirin, Coumadin/Warfarin, Ibuprofen, or any   other blood thinner for this procedure you may resume in the morning unless   your primary care provider has given you other instructions.    Diabetics may see an increase in blood sugar after steroid injections. If you are concerned about your blood sugar, please contact your family doctor.    Site Care:  Remove the bandage and bathe or shower the morning after the procedure.      This is a Pain Management procedure.  You will be contacted in two weeks for follow up.    Call your Primary Care Provider if  you have the following (if your primary care provider is not available please seek emergency care):   Nausea with vomiting   Severe headache   Drowsiness or confusion   Redness or drainage at the injection or puncture site   Temperature over 101 degrees F   Other concerns   Worsening back pain   Stiff neck           Additional Information About Your Visit        MyChart Information     [facility] gives you secure access to your electronic health record. If you see a primary care provider, you can also send messages to your care team and make appointments. If you have questions, please call your primary care clinic.  If you do not have a primary care provider, please call [phone] and they will assist you.        Care EveryWhere ID     This is your Care EveryWhere ID. This could be used by other organizations to access your [facility] medical records  [id number]         Primary Care Provider Office Phone # Fax #    Jimbo Martinez [phone]      Equal Access to Services     [facility]ENITEZ [facility] : Hadroberta kinney Sodex, waaxlexy palma, qaybta dilciaaljenna pascal, bill estevez . So [facility] [phone].    ATENCIÓN: Si habla español, tiene a garza disposición servicios gratuitos de asistencia lingüística. Freddy al [phone].    We comply with applicable federal civil rights laws and [place] laws. We do not discriminate on the basis of race, color, national origin, age, disability, sex, sexual orientation, or gender identity.            Thank you!     Thank you for choosing [facility] for your care. Our goal is always to provide you with excellent care. Hearing back from our patients is one way we can continue to improve our services. Please take a few minutes to complete the written survey that you may receive in the mail after you visit with us. Thank you!             Medication List: This is a list of all your medications and when to take them. Check marks below  indicate your daily home schedule. Keep this list as a reference.      Medications           Morning Afternoon Evening Bedtime As Needed    ALFALFA PO   Take 5 tablets by mouth 2 times daily.                                ALLEGRA-D ALLERGY & CONGESTION  MG 12 hr tablet   TAKE 1 TABLET BY MOUTH TWICE DAILY   Generic drug:  fexofenadine-pseudoePHEDrine                                CALCIUM + D 500-1000-40 MG-UNT-MCG Chew   Take 3 tablets by mouth daily   Generic drug:  Calcium-Vitamin D-Vitamin K                                citalopram 40 MG tablet   Commonly known as:  celeXA   TAKE 1 TABLET BY MOUTH DAILY                                DICLOFENAC PO   Take 100 mg by mouth daily                                fish oil-omega-3 fatty acids 1000 MG capsule   Take 1 g by mouth daily.                                flax seed oil 1000 MG capsule   Take 2 capsules by mouth daily                                fluticasone 50 MCG/ACT nasal spray   Commonly known as:  FLONASE   1 spray Reported on 5/11/2017                                folic acid 1 MG tablet   Commonly known as:  FOLVITE   Take 1 mg by mouth 3 times daily.                                gabapentin 300 MG capsule   Commonly known as:  NEURONTIN   TAKE 3 CAPSULES BY MOUTH IN THE MORNING, 2 CAPSULES AT NOON AND 2 CAPSULES IN THE EVENING                                Garlic 400 MG Tbec                                HYDROcodone-acetaminophen 7.5-325 MG per tablet   Commonly known as:  NORCO   TAKE 1 TABLET BY MOUTH EVERY 4 TO 6 HOURS AS NEEDED FOR PAIN                                MILK THISTLE PO   Take 1,000 mg by mouth daily.                                MULTIPLE VITAMIN PO   Take  by mouth 2 times daily.                                order for DME   Equipment being ordered: Nebulizer                                pantoprazole 40 MG EC tablet   Commonly known as:  PROTONIX   Take 1 tablet (40 mg) by mouth daily                                 PREBIOTIC PRODUCT PO   Take 1 tablet by mouth daily                                * PROAIR  (90 Base) MCG/ACT inhaler   INHALE 2 PUFFS INTO THE LUNGS EVERY 6 HOURS AS NEEDED FOR SHORTNESS OF BREATH OR WHEEZING   Generic drug:  albuterol                                * albuterol (2.5 MG/3ML) 0.083% neb solution   Commonly known as:  PROVENTIL   Take 1 vial (2.5 mg) by nebulization every 6 hours as needed for shortness of breath / dyspnea or wheezing                                REFRESH 1.4-0.6 % ophthalmic solution   1-2 drops as needed   Generic drug:  polyvinyl alcohol-povidone                                RESTASIS 0.05 % ophthalmic emulsion   USE 1 DROP IN BOTH EYES 2 TIMES A DAY   Generic drug:  cycloSPORINE                                Simethicone 180 MG Caps   Commonly known as:  SM GAS RELIEF ANTIFLATUENT   TAKE ONE CAPSULE BY MOUTH AS NEEDED AFTER A MEAL. MAX 2 CAPS/DAY                                SOLUBLE FIBER/PROBIOTICS PO   Take 1 tablet by mouth                                traZODone 50 MG tablet   Commonly known as:  DESYREL   TAKE 2-3 TABLETS BY MOUTH DAILY AT BEDTIME                                Vitamin C 500 MG Caps   Take 2 tablets by mouth 2 times daily.                                vitamin D 1000 units capsule   Take 1 capsule by mouth daily.                                * Notice:  This list has 2 medication(s) that are the same as other medications prescribed for you. Read the directions carefully, and ask your doctor or other care provider to review them with you.

## 2018-12-14 DIAGNOSIS — G62.9 PERIPHERAL POLYNEUROPATHY: ICD-10-CM

## 2018-12-14 DIAGNOSIS — R09.81 NASAL CONGESTION: ICD-10-CM

## 2018-12-17 RX ORDER — GABAPENTIN 300 MG/1
CAPSULE ORAL
Qty: 210 CAPSULE | Refills: 0 | Status: SHIPPED | OUTPATIENT
Start: 2018-12-17 | End: 2019-01-11

## 2018-12-17 RX ORDER — FEXOFENADINE HYDROCHLORIDE AND PSEUDOEPHEDRINE HYDROCHLORIDE 60; 120 MG/1; MG/1
TABLET, FILM COATED, EXTENDED RELEASE ORAL
Qty: 30 TABLET | Refills: 0 | Status: SHIPPED | OUTPATIENT
Start: 2018-12-17 | End: 2019-01-11

## 2018-12-17 NOTE — TELEPHONE ENCOUNTER
ALLEGRA-D ALLERGY & CONGESTION  MG 12 hr tablet    Last refill date 11/2/18      Last office visit 11/19/18    gabapentin (NEURONTIN) 300 MG capsule    Last refill date 11/13/18    Last office visit 11/19/18

## 2018-12-24 ENCOUNTER — TELEPHONE (OUTPATIENT)
Dept: INTERVENTIONAL RADIOLOGY/VASCULAR | Facility: HOSPITAL | Age: 68
End: 2018-12-24

## 2018-12-24 NOTE — TELEPHONE ENCOUNTER
CONSULT PATIENT  PAIN INJECTION POST CALL    Procedure: Epidural TL L4-5  Radiologist(s): Dr. Paxton Jones  Date of Procedure: 12/7/18    The patient had no questions or concerns.    Pre-procedure pain score was: 5 (See pre-procedure score)  Post-procedure pain score as of today is: 4  Where is the pain? LOW BACK  Is this new pain? No    Patient would like to pursue another injection.  Patient would like to start workup towards a Rhizotomy.  Explained the procedure to her and I will put the orders into epic so she can be scheduled.      Sara Sanford

## 2019-01-11 DIAGNOSIS — R09.81 NASAL CONGESTION: ICD-10-CM

## 2019-01-11 DIAGNOSIS — G62.9 PERIPHERAL POLYNEUROPATHY: ICD-10-CM

## 2019-01-11 RX ORDER — GABAPENTIN 300 MG/1
CAPSULE ORAL
Qty: 210 CAPSULE | Refills: 0 | Status: SHIPPED | OUTPATIENT
Start: 2019-01-11 | End: 2019-02-13

## 2019-01-11 RX ORDER — FEXOFENADINE HYDROCHLORIDE AND PSEUDOEPHEDRINE HYDROCHLORIDE 60; 120 MG/1; MG/1
TABLET, FILM COATED, EXTENDED RELEASE ORAL
Qty: 30 TABLET | Refills: 0 | Status: SHIPPED | OUTPATIENT
Start: 2019-01-11 | End: 2019-02-13

## 2019-01-11 NOTE — TELEPHONE ENCOUNTER
Gabapentin  Last Written Prescription Date: 12/17/18  Last Fill Quantity: 210 # of Refills: 0  Last Office Visit: 11/19/18    Allegra-D  Last Written Prescription Date: 12/17/18  Last Fill Quantity: 30 # of Refills: 0  Last Office Visit: 11/19/18

## 2019-01-16 ENCOUNTER — ANCILLARY PROCEDURE (OUTPATIENT)
Dept: MAMMOGRAPHY | Facility: OTHER | Age: 69
End: 2019-01-16
Attending: FAMILY MEDICINE
Payer: MEDICARE

## 2019-01-16 DIAGNOSIS — Z12.39 SCREENING BREAST EXAMINATION: ICD-10-CM

## 2019-01-16 PROCEDURE — 77063 BREAST TOMOSYNTHESIS BI: CPT | Mod: TC

## 2019-01-18 NOTE — PROGRESS NOTES
SUBJECTIVE:   Melvi Cleveland is a 68 year old female who presents to clinic today for the following health issues:      Weak      Duration:2 episodes    Description (location/character/radiation): feels weak and just drops to the ground with pain in lower back.    Intensity:  mild    Accompanying signs and symptoms: none    History (similar episodes/previous evaluation): None    Precipitating or alleviating factors: None    Therapies tried and outcome: None     Sherrie had 2 episodes of her legs giving out giving out while getting out of bed.  She also has had episodes of dizziness, described as more near syncope, which has occurred intermittently.  This had lasted some time.  She has had orthostatic symptoms in the past.    Lump on back      Duration: months    Description (location/character/radiation): lump on back , under skin and painful    Intensity:  5/10 at worst    Accompanying signs and symptoms: see above    History (similar episodes/previous evaluation): None    Precipitating or alleviating factors: None    Therapies tried and outcome: heating pad, helps the pain       Problem list and histories reviewed & adjusted, as indicated.  Additional history: as documented    Patient Active Problem List   Diagnosis     Neck pain, chronic     Low back pain, chronic     Crohn's disease of large intestine (H)     Dyslipidemia     Sicca syndrome (H)     Polyneuropathy in other diseases classified elsewhere (H)     RA (rheumatoid arthritis) (H)     Comprehensive Medical Examination     Seasonal allergic rhinitis     Fibrocystic breast disease (FCBD)     ACP (advance care planning)     Need for hepatitis C screening test     Past Surgical History:   Procedure Laterality Date     BACK SURGERY  05/1995    back surgery     BIOPSY      breast bx X2     BIOPSY BREAST      LT     BIOPSY BREAST NEEDLE LOCALIZATION Right 6/12/2017    Procedure: BIOPSY BREAST NEEDLE LOCALIZATION;  WIRE LOCALIZED EXCISIONAL BIOPSY  RIGHT BREAST  MASS;  Surgeon: Jeni Amin MD;  Location: [facility]     CHOLECYSTECTOMY  2004     COLONOSCOPY  11/15/2004    screening     COLONOSCOPY  2014     EGD with biopsy  ,     GERD     laminectomy      L4 L5 laminectomy; back pain     RELEASE CARPAL TUNNEL Right 2018    Procedure: RELEASE CARPAL TUNNEL;  RIGHT CARPAL TUNNEL RELEASE;  Surgeon: Haider Carlos MD;  Location: [facility]       Social History     Tobacco Use     Smoking status: Former Smoker     Types: Cigarettes     Last attempt to quit: 3/28/1997     Years since quittin.8     Smokeless tobacco: Never Used     Tobacco comment: no passive exposure   Substance Use Topics     Alcohol use: No     Alcohol/week: 0.0 oz     Comment: former. quit      Family History   Problem Relation Age of Onset     Diabetes Mother      Rheumatoid Arthritis Mother      Other - See Comments Mother         fibromyalgia     Osteoarthritis Mother      Thyroid Disease Mother         thyroid disorder     Unknown/Adopted Father         cause of death unknown     Rheumatoid Arthritis Father      Diabetes Sister      Myocardial Infarction Sister         cause of death     Lupus Sister         cause of death     C.A.D. Maternal Grandmother      Cerebrovascular Disease Maternal Grandmother      Diabetes Maternal Grandmother      Thyroid Disease Maternal Grandmother         thyrodi disorder; goitor removed     Cancer Maternal Grandfather      Hypertension Brother      Other - See Comments Brother         sleep apnea     Other - See Comments Sister         sleep apnea         Current Outpatient Medications   Medication Sig Dispense Refill     albuterol (2.5 MG/3ML) 0.083% neb solution Take 1 vial (2.5 mg) by nebulization every 6 hours as needed for shortness of breath / dyspnea or wheezing 25 vial 3     ALBUTEROL 108 (90 BASE) MCG/ACT inhaler INHALE 2 PUFFS INTO THE LUNGS EVERY 6 HOURS AS NEEDED FOR SHORTNESS OF BREATH OR WHEEZING 8.5 g 5     ALFALFA PO Take 5 tablets by  mouth 2 times daily.       ALLEGRA-D ALLERGY & CONGESTION  MG 12 hr tablet TAKE 1 TABLET BY MOUTH TWICE DAILY 30 tablet 0     Ascorbic Acid (VITAMIN C) 500 MG CAPS Take 2 tablets by mouth 2 times daily.       Calcium-Vitamin D-Vitamin K (CALCIUM + D) 500-1000-40 MG-UNT-MCG CHEW Take 3 tablets by mouth daily        Cholecalciferol (VITAMIN D) 1000 UNITS capsule Take 1 capsule by mouth daily.       citalopram (CELEXA) 40 MG tablet TAKE 1 TABLET BY MOUTH DAILY 90 tablet 1     DICLOFENAC PO Take 100 mg by mouth daily        fish oil-omega-3 fatty acids (OMEGA-3) 1000 MG capsule Take 1 g by mouth daily.       Flaxseed, Linseed, (FLAX SEED OIL) 1000 MG capsule Take 2 capsules by mouth daily       fluticasone (FLONASE) 50 MCG/ACT spray 1 spray Reported on 5/11/2017       folic acid (FOLVITE) 1 MG tablet Take 1 mg by mouth 3 times daily.       gabapentin (NEURONTIN) 300 MG capsule TAKE 3 CAPSULES BY MOUTH IN THE MORNING, 2 CAPSULES AT NOON AND 2 CAPSULES IN THE EVENING 210 capsule 0     Garlic 400 MG TBEC        HYDROcodone-acetaminophen (NORCO) 7.5-325 MG per tablet TAKE 1 TABLET BY MOUTH EVERY 4 TO 6 HOURS AS NEEDED FOR PAIN 60 tablet 0     MILK THISTLE PO Take 1,000 mg by mouth daily.       MULTIPLE VITAMIN PO Take  by mouth 2 times daily.       order for DME Equipment being ordered: Nebulizer 1 Device 0     pantoprazole (PROTONIX) 40 MG enteric coated tablet Take 1 tablet (40 mg) by mouth daily (Patient taking differently: Take 40 mg by mouth 2 times daily ) 90 tablet 3     polyvinyl alcohol-povidone (REFRESH) 1.4-0.6 % ophthalmic solution 1-2 drops as needed       PREBIOTIC PRODUCT PO Take 1 tablet by mouth daily       Probiotic Product (SOLUBLE FIBER/PROBIOTICS PO) Take 1 tablet by mouth       RESTASIS 0.05 % ophthalmic emulsion USE 1 DROP IN BOTH EYES 2 TIMES A DAY 60 each 3     Simethicone (SM GAS RELIEF ANTIFLATUENT) 180 MG CAPS TAKE ONE CAPSULE BY MOUTH AS NEEDED AFTER A MEAL. MAX 2 CAPS/DAY 60 capsule 11  "    traZODone (DESYREL) 50 MG tablet TAKE 2-3 TABLETS BY MOUTH DAILY AT BEDTIME 270 tablet 1     Allergies   Allergen Reactions     Adhesive Tape      Glue and nicotine patches     Benzoin Compound      Tin-Co-Javier     Mold      Seasonal Allergies      Soap      Any cleanser/soap/disinfectant that is used right before surgery.  Makes patient break out horribly. Chart was looked at and Chloraprep was used.     BP Readings from Last 3 Encounters:   12/01/18 107/62   11/19/18 120/72   09/12/18 135/70    Wt Readings from Last 3 Encounters:   01/22/19 95.3 kg (210 lb)   12/01/18 93.9 kg (207 lb)   11/19/18 95.3 kg (210 lb)                    Reviewed and updated as needed this visit by clinical staff       Reviewed and updated as needed this visit by Provider         ROS:  Constitutional, HEENT, cardiovascular, pulmonary, gi and gu systems are negative, except as otherwise noted.    OBJECTIVE:     Temp 98.1  F (36.7  C) (Tympanic)   Resp 18   Ht 1.651 m (5' 5\")   Wt 95.3 kg (210 lb)   SpO2 90%   BMI 34.95 kg/m    Body mass index is 34.95 kg/m .  Physical Exam   Constitutional: She is oriented to person, place, and time. She appears well-developed and well-nourished. No distress.   HENT:   Head: Normocephalic.   Right Ear: Tympanic membrane, external ear and ear canal normal.   Left Ear: Tympanic membrane, external ear and ear canal normal.   Nose: Nose normal.   Mouth/Throat: Oropharynx is clear and moist.   Eyes: Conjunctivae are normal.   Neck: Neck supple. No JVD present. No thyromegaly present.   Cardiovascular: Normal rate, regular rhythm, normal heart sounds and intact distal pulses. Exam reveals no gallop and no friction rub.   No murmur heard.  Pulmonary/Chest: Effort normal and breath sounds normal. She has no rales.   Musculoskeletal: Normal range of motion. She exhibits no edema.   Tenderness noted over the paralumbar musculature.  Straight leg raising negative.   Neurological: She is alert and oriented to " person, place, and time. She has normal strength. She displays no atrophy. No sensory deficit.   Reflex Scores:       Patellar reflexes are 2+ on the right side and 2+ on the left side.       Achilles reflexes are 2+ on the right side and 2+ on the left side.  Skin: Skin is warm and dry.   There is a subdermal cystic lesion note right lower back   Psychiatric: She has a normal mood and affect.     Other exam not repeated        ASSESSMENT/PLAN:     Near syncope  Etiology undetermined.  Discussed possible orthostatic hypotension or arrhythmia.  Event monitor scheduled.  Previous labs reviewed.  Follow up post testing  - Cardiac Event Monitor Adult Pediatric; Future    Epidermal cyst  Reviewed benign nature.  Will follow.      Jimbo Martinez MD  [facility] - AIDE

## 2019-01-22 ENCOUNTER — OFFICE VISIT (OUTPATIENT)
Dept: FAMILY MEDICINE | Facility: OTHER | Age: 69
End: 2019-01-22
Attending: FAMILY MEDICINE
Payer: COMMERCIAL

## 2019-01-22 VITALS
OXYGEN SATURATION: 90 % | WEIGHT: 210 LBS | TEMPERATURE: 98.1 F | BODY MASS INDEX: 34.99 KG/M2 | HEIGHT: 65 IN | RESPIRATION RATE: 18 BRPM

## 2019-01-22 DIAGNOSIS — M05.79 RHEUMATOID ARTHRITIS INVOLVING MULTIPLE SITES WITH POSITIVE RHEUMATOID FACTOR (H): ICD-10-CM

## 2019-01-22 DIAGNOSIS — R55 NEAR SYNCOPE: ICD-10-CM

## 2019-01-22 DIAGNOSIS — L72.0 EPIDERMAL CYST: Primary | ICD-10-CM

## 2019-01-22 PROCEDURE — G0463 HOSPITAL OUTPT CLINIC VISIT: HCPCS

## 2019-01-22 PROCEDURE — 99214 OFFICE O/P EST MOD 30 MIN: CPT | Performed by: FAMILY MEDICINE

## 2019-01-22 RX ORDER — HYDROCODONE BITARTRATE AND ACETAMINOPHEN 7.5; 325 MG/1; MG/1
TABLET ORAL
Qty: 60 TABLET | Refills: 0 | Status: ON HOLD | OUTPATIENT
Start: 2019-01-22 | End: 2019-05-28

## 2019-01-22 ASSESSMENT — PAIN SCALES - GENERAL: PAINLEVEL: MILD PAIN (2)

## 2019-01-22 ASSESSMENT — MIFFLIN-ST. JEOR: SCORE: 1493.43

## 2019-01-22 NOTE — NURSING NOTE
"Chief Complaint   Patient presents with     Mass     Fatigue       Initial Temp 98.1  F (36.7  C) (Tympanic)   Resp 18   Ht 1.651 m (5' 5\")   Wt 95.3 kg (210 lb)   SpO2 90%   BMI 34.95 kg/m   Estimated body mass index is 34.95 kg/m  as calculated from the following:    Height as of this encounter: 1.651 m (5' 5\").    Weight as of this encounter: 95.3 kg (210 lb).  Medication Reconciliation: complete    Devika Gutierres LPN  "

## 2019-01-23 ENCOUNTER — TELEPHONE (OUTPATIENT)
Dept: FAMILY MEDICINE | Facility: OTHER | Age: 69
End: 2019-01-23

## 2019-01-25 ENCOUNTER — HOSPITAL ENCOUNTER (OUTPATIENT)
Dept: CARDIOLOGY | Facility: HOSPITAL | Age: 69
Discharge: HOME | End: 2019-01-25
Attending: FAMILY MEDICINE | Admitting: FAMILY MEDICINE
Payer: MEDICARE

## 2019-01-25 DIAGNOSIS — R55 NEAR SYNCOPE: ICD-10-CM

## 2019-01-25 PROCEDURE — 93270 REMOTE 30 DAY ECG REV/REPORT: CPT | Mod: TC

## 2019-01-25 PROCEDURE — 93272 ECG/REVIEW INTERPRET ONLY: CPT | Performed by: INTERNAL MEDICINE

## 2019-01-28 ENCOUNTER — TELEPHONE (OUTPATIENT)
Dept: FAMILY MEDICINE | Facility: OTHER | Age: 69
End: 2019-01-28

## 2019-01-28 NOTE — TELEPHONE ENCOUNTER
1/28/2019 - Dr. Martinez:  I received a PA request notification from [facility] for patient's hydrocodone (norco).  They were only able to fill a 7 day supply (Patient only received 42 pills).  I tried to submit a PA thru [facility], but the message came back stating that patient has the medication and no PA is required.  However, when patient is out of her 7 day supply, you will probably need to write a new prescription.  Just wanted to let you know.  Thanks!  Tania Robins, [facility] Specialist.

## 2019-02-11 DIAGNOSIS — Z79.899 ENCOUNTER FOR LONG-TERM (CURRENT) USE OF MEDICATIONS: ICD-10-CM

## 2019-02-11 LAB
ALBUMIN SERPL-MCNC: 3.3 G/DL (ref 3.4–5)
ALT SERPL W P-5'-P-CCNC: 30 U/L (ref 0–50)
BASOPHILS # BLD AUTO: 0 10E9/L (ref 0–0.2)
BASOPHILS NFR BLD AUTO: 0.6 %
CREAT SERPL-MCNC: 1.04 MG/DL (ref 0.52–1.04)
CRP SERPL-MCNC: <2.9 MG/L (ref 0–8)
DIFFERENTIAL METHOD BLD: NORMAL
EOSINOPHIL # BLD AUTO: 0.3 10E9/L (ref 0–0.7)
EOSINOPHIL NFR BLD AUTO: 5.3 %
ERYTHROCYTE [DISTWIDTH] IN BLOOD BY AUTOMATED COUNT: 13.9 % (ref 10–15)
ERYTHROCYTE [SEDIMENTATION RATE] IN BLOOD BY WESTERGREN METHOD: 16 MM/H (ref 0–30)
GFR SERPL CREATININE-BSD FRML MDRD: 56 ML/MIN/{1.73_M2}
HCT VFR BLD AUTO: 36.3 % (ref 35–47)
HGB BLD-MCNC: 11.7 G/DL (ref 11.7–15.7)
IMM GRANULOCYTES # BLD: 0 10E9/L (ref 0–0.4)
IMM GRANULOCYTES NFR BLD: 0.2 %
LYMPHOCYTES # BLD AUTO: 2.1 10E9/L (ref 0.8–5.3)
LYMPHOCYTES NFR BLD AUTO: 39 %
MCH RBC QN AUTO: 28.7 PG (ref 26.5–33)
MCHC RBC AUTO-ENTMCNC: 32.2 G/DL (ref 31.5–36.5)
MCV RBC AUTO: 89 FL (ref 78–100)
MONOCYTES # BLD AUTO: 0.5 10E9/L (ref 0–1.3)
MONOCYTES NFR BLD AUTO: 9.1 %
NEUTROPHILS # BLD AUTO: 2.4 10E9/L (ref 1.6–8.3)
NEUTROPHILS NFR BLD AUTO: 45.8 %
NRBC # BLD AUTO: 0 10*3/UL
NRBC BLD AUTO-RTO: 0 /100
PLATELET # BLD AUTO: 241 10E9/L (ref 150–450)
RBC # BLD AUTO: 4.07 10E12/L (ref 3.8–5.2)
WBC # BLD AUTO: 5.3 10E9/L (ref 4–11)

## 2019-02-11 PROCEDURE — 86140 C-REACTIVE PROTEIN: CPT | Mod: ZL | Performed by: NURSE PRACTITIONER

## 2019-02-11 PROCEDURE — 85652 RBC SED RATE AUTOMATED: CPT | Mod: ZL | Performed by: NURSE PRACTITIONER

## 2019-02-11 PROCEDURE — 85025 COMPLETE CBC W/AUTO DIFF WBC: CPT | Mod: ZL | Performed by: NURSE PRACTITIONER

## 2019-02-11 PROCEDURE — 36415 COLL VENOUS BLD VENIPUNCTURE: CPT | Mod: ZL | Performed by: NURSE PRACTITIONER

## 2019-02-11 PROCEDURE — 82040 ASSAY OF SERUM ALBUMIN: CPT | Mod: ZL | Performed by: NURSE PRACTITIONER

## 2019-02-11 PROCEDURE — 82565 ASSAY OF CREATININE: CPT | Mod: ZL | Performed by: NURSE PRACTITIONER

## 2019-02-11 PROCEDURE — 84460 ALANINE AMINO (ALT) (SGPT): CPT | Mod: ZL | Performed by: NURSE PRACTITIONER

## 2019-02-13 DIAGNOSIS — G62.9 PERIPHERAL POLYNEUROPATHY: ICD-10-CM

## 2019-02-13 DIAGNOSIS — R09.81 NASAL CONGESTION: ICD-10-CM

## 2019-02-13 RX ORDER — GABAPENTIN 300 MG/1
CAPSULE ORAL
Qty: 210 CAPSULE | Refills: 0 | Status: SHIPPED | OUTPATIENT
Start: 2019-02-13 | End: 2019-03-13

## 2019-02-13 RX ORDER — FEXOFENADINE HYDROCHLORIDE AND PSEUDOEPHEDRINE HYDROCHLORIDE 60; 120 MG/1; MG/1
TABLET, FILM COATED, EXTENDED RELEASE ORAL
Qty: 30 TABLET | Refills: 0 | Status: SHIPPED | OUTPATIENT
Start: 2019-02-13 | End: 2019-03-13

## 2019-02-13 NOTE — TELEPHONE ENCOUNTER
gabapentin (NEURONTIN) 300 MG capsule      Last Written Prescription Date:  1/11/19  Last Fill Quantity: 210,   # refills: 0        ALLEGRA-D ALLERGY & CONGESTION  MG 12 hr tablet      Last Written Prescription Date:  1/11/19  Last Fill Quantity: 30,   # refills: 0    Last Office Visit: 1/22/19  Future Office visit:       Routing refill request to provider for review/approval because:  Drug not on the [facility], [facility] or [facility] refill protocol or controlled substance

## 2019-03-13 DIAGNOSIS — R09.81 NASAL CONGESTION: ICD-10-CM

## 2019-03-13 DIAGNOSIS — G62.9 PERIPHERAL POLYNEUROPATHY: ICD-10-CM

## 2019-03-13 NOTE — TELEPHONE ENCOUNTER
GABAPENTIN 300 MG CAPSULE      Last Written Prescription Date:  2/13/19  Last Fill Quantity: 210,   # refills: 0  Last Office Visit: 1/21/19  Future Office visit:       ALLEGRA-D 12 HR      Last Written Prescription Date:  2/13/19  Last Fill Quantity: 30,   # refills: 0  Last Office Visit: 1/21/19  Future Office visit:

## 2019-03-14 RX ORDER — GABAPENTIN 300 MG/1
CAPSULE ORAL
Qty: 210 CAPSULE | Refills: 0 | Status: SHIPPED | OUTPATIENT
Start: 2019-03-14 | End: 2019-04-12

## 2019-03-14 RX ORDER — FEXOFENADINE HYDROCHLORIDE AND PSEUDOEPHEDRINE HYDROCHLORIDE 60; 120 MG/1; MG/1
TABLET, FILM COATED, EXTENDED RELEASE ORAL
Qty: 30 TABLET | Refills: 0 | Status: SHIPPED | OUTPATIENT
Start: 2019-03-14 | End: 2019-04-12

## 2019-03-29 ENCOUNTER — APPOINTMENT (OUTPATIENT)
Dept: GENERAL RADIOLOGY | Facility: HOSPITAL | Age: 69
End: 2019-03-29
Attending: FAMILY MEDICINE
Payer: MEDICARE

## 2019-03-29 ENCOUNTER — HOSPITAL ENCOUNTER (EMERGENCY)
Facility: HOSPITAL | Age: 69
Discharge: HOME | End: 2019-03-29
Attending: FAMILY MEDICINE | Admitting: FAMILY MEDICINE
Payer: MEDICARE

## 2019-03-29 VITALS
BODY MASS INDEX: 34.95 KG/M2 | DIASTOLIC BLOOD PRESSURE: 64 MMHG | SYSTOLIC BLOOD PRESSURE: 129 MMHG | RESPIRATION RATE: 16 BRPM | OXYGEN SATURATION: 93 % | TEMPERATURE: 98.7 F | WEIGHT: 210 LBS

## 2019-03-29 DIAGNOSIS — J18.9 PNEUMONIA OF LEFT LUNG DUE TO INFECTIOUS ORGANISM, UNSPECIFIED PART OF LUNG: ICD-10-CM

## 2019-03-29 LAB
ALBUMIN SERPL-MCNC: 2.9 G/DL (ref 3.4–5)
ALP SERPL-CCNC: 89 U/L (ref 40–150)
ALT SERPL W P-5'-P-CCNC: 30 U/L (ref 0–50)
ANION GAP SERPL CALCULATED.3IONS-SCNC: 7 MMOL/L (ref 3–14)
AST SERPL W P-5'-P-CCNC: 24 U/L (ref 0–45)
BASOPHILS # BLD AUTO: 0 10E9/L (ref 0–0.2)
BASOPHILS NFR BLD AUTO: 0.5 %
BILIRUB SERPL-MCNC: 0.4 MG/DL (ref 0.2–1.3)
BUN SERPL-MCNC: 17 MG/DL (ref 7–30)
CALCIUM SERPL-MCNC: 9.1 MG/DL (ref 8.5–10.1)
CHLORIDE SERPL-SCNC: 105 MMOL/L (ref 94–109)
CO2 SERPL-SCNC: 27 MMOL/L (ref 20–32)
CREAT SERPL-MCNC: 1.02 MG/DL (ref 0.52–1.04)
DIFFERENTIAL METHOD BLD: ABNORMAL
EOSINOPHIL # BLD AUTO: 0.2 10E9/L (ref 0–0.7)
EOSINOPHIL NFR BLD AUTO: 4.2 %
ERYTHROCYTE [DISTWIDTH] IN BLOOD BY AUTOMATED COUNT: 14.1 % (ref 10–15)
FLUAV+FLUBV RNA SPEC QL NAA+PROBE: NEGATIVE
FLUAV+FLUBV RNA SPEC QL NAA+PROBE: NEGATIVE
GFR SERPL CREATININE-BSD FRML MDRD: 57 ML/MIN/{1.73_M2}
GLUCOSE SERPL-MCNC: 97 MG/DL (ref 70–99)
HCT VFR BLD AUTO: 33.3 % (ref 35–47)
HGB BLD-MCNC: 11 G/DL (ref 11.7–15.7)
IMM GRANULOCYTES # BLD: 0 10E9/L (ref 0–0.4)
IMM GRANULOCYTES NFR BLD: 0.2 %
LYMPHOCYTES # BLD AUTO: 1.1 10E9/L (ref 0.8–5.3)
LYMPHOCYTES NFR BLD AUTO: 20.9 %
MCH RBC QN AUTO: 28.8 PG (ref 26.5–33)
MCHC RBC AUTO-ENTMCNC: 33 G/DL (ref 31.5–36.5)
MCV RBC AUTO: 87 FL (ref 78–100)
MONOCYTES # BLD AUTO: 0.8 10E9/L (ref 0–1.3)
MONOCYTES NFR BLD AUTO: 13.9 %
NEUTROPHILS # BLD AUTO: 3.3 10E9/L (ref 1.6–8.3)
NEUTROPHILS NFR BLD AUTO: 60.3 %
NRBC # BLD AUTO: 0 10*3/UL
NRBC BLD AUTO-RTO: 0 /100
PLATELET # BLD AUTO: 218 10E9/L (ref 150–450)
POTASSIUM SERPL-SCNC: 4.1 MMOL/L (ref 3.4–5.3)
PROT SERPL-MCNC: 7.2 G/DL (ref 6.8–8.8)
RBC # BLD AUTO: 3.82 10E12/L (ref 3.8–5.2)
RSV RNA SPEC NAA+PROBE: NEGATIVE
SODIUM SERPL-SCNC: 139 MMOL/L (ref 133–144)
SPECIMEN SOURCE: NORMAL
WBC # BLD AUTO: 5.5 10E9/L (ref 4–11)

## 2019-03-29 PROCEDURE — 87631 RESP VIRUS 3-5 TARGETS: CPT | Performed by: FAMILY MEDICINE

## 2019-03-29 PROCEDURE — 99284 EMERGENCY DEPT VISIT MOD MDM: CPT | Mod: 25

## 2019-03-29 PROCEDURE — 36415 COLL VENOUS BLD VENIPUNCTURE: CPT | Performed by: FAMILY MEDICINE

## 2019-03-29 PROCEDURE — 99283 EMERGENCY DEPT VISIT LOW MDM: CPT | Mod: Z6 | Performed by: FAMILY MEDICINE

## 2019-03-29 PROCEDURE — 85025 COMPLETE CBC W/AUTO DIFF WBC: CPT | Performed by: FAMILY MEDICINE

## 2019-03-29 PROCEDURE — 71046 X-RAY EXAM CHEST 2 VIEWS: CPT | Mod: TC

## 2019-03-29 PROCEDURE — 80053 COMPREHEN METABOLIC PANEL: CPT | Performed by: FAMILY MEDICINE

## 2019-03-29 RX ORDER — LEVOFLOXACIN 250 MG/1
500 TABLET, FILM COATED ORAL DAILY
Qty: 14 TABLET | Refills: 0 | Status: SHIPPED | OUTPATIENT
Start: 2019-03-29 | End: 2019-04-16

## 2019-03-29 NOTE — ED AVS SNAPSHOT
[facility]  [address] [address]  Phone:  [phone]                                    Melvi Cleveland   MRN: [id number]    Department:  [facility]   Date of Visit:  3/29/2019           After Visit Summary Signature Page    I have received my discharge instructions, and my questions have been answered. I have discussed any challenges I see with this plan with the nurse or doctor.    ..........................................................................................................................................  Patient/Patient Representative Signature      ..........................................................................................................................................  Patient Representative Print Name and Relationship to Patient    ..................................................               ................................................  Date                                   Time    ..........................................................................................................................................  Reviewed by Signature/Title    ...................................................              ..............................................  Date                                               Time          22EPIC Rev 08/18

## 2019-03-29 NOTE — ED TRIAGE NOTES
"Pt states she has had a cough/cold for \"a long time\". States last week it was getting better and then this week it became worse again. Denies any fevers but c/o strong dry cough with occasional green sputum and mid sternal pain that is worse with cough. States \"at 4:00 am it is really bad and I cough and my chest hurts more\".  "

## 2019-03-29 NOTE — ED NOTES
Discharge instructions given. Verbalized understanding. Ambulated out of ED independently with  at side.

## 2019-03-30 NOTE — ED PROVIDER NOTES
eMERGENCY dEPARTMENT eNCOUnter        CHIEF COMPLAINT    Chief Complaint   Patient presents with     Cough     Chest Wall Pain       HPI    Melvi Cleveland is a 69 year old female who presents with cough and chest wall pain for the last couple of days. No fever.     REVIEW OF SYSTEMS    Cardiac: No palpitations, No syncope  Respiratory: +SOB  Neurologic: No syncope or LOC  GI: No Vomiting, No Diarrhea  General: No Fever, No Chills  All other systems reviewed and are negative.    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     Parotiditis 5/9/2011     Peripheral Neuriopathy 3/1/2011     Rheumatoid arthritis(714.0) 12/13/1999     Seborrhea capitis 10/6/2011     Sjogrens Syndrome 3/1/2011     Urethral stricture 4/30/2008     Past Surgical History:   Procedure Laterality Date     BACK SURGERY  05/1995    back surgery     BIOPSY      breast bx X2     BIOPSY BREAST      LT     BIOPSY BREAST NEEDLE LOCALIZATION Right 6/12/2017    Procedure: BIOPSY BREAST NEEDLE LOCALIZATION;  WIRE LOCALIZED EXCISIONAL BIOPSY  RIGHT BREAST MASS;  Surgeon: Jeni Amin MD;  Location: [facility]     CHOLECYSTECTOMY  2004     COLONOSCOPY  11/15/2004    screening     COLONOSCOPY  9/24/2014     EGD with biopsy  2010, 2011    GERD     laminectomy      L4 L5 laminectomy; back pain     RELEASE CARPAL TUNNEL Right 1/16/2018    Procedure: RELEASE CARPAL TUNNEL;  RIGHT CARPAL TUNNEL RELEASE;  Surgeon: Haider Carlos MD;  Location: [facility]       CURRENT MEDICATIONS    Current Outpatient Rx   Medication Sig Dispense Refill     levofloxacin (LEVAQUIN) 250 MG tablet  Take 2 tablets (500 mg) by mouth daily for 7 days 14 tablet 0     albuterol (2.5 MG/3ML) 0.083% neb solution Take 1 vial (2.5 mg) by nebulization every 6 hours as needed for shortness of breath / dyspnea or wheezing 25 vial 3     ALBUTEROL 108 (90 BASE) MCG/ACT inhaler INHALE 2 PUFFS INTO THE LUNGS EVERY 6 HOURS AS NEEDED FOR SHORTNESS OF BREATH OR WHEEZING 8.5 g 5     ALFALFA PO Take 5 tablets by mouth 2 times daily.       ALLEGRA-D ALLERGY & CONGESTION  MG 12 hr tablet TAKE 1 TABLET BY MOUTH TWICE DAILY 30 tablet 0     Ascorbic Acid (VITAMIN C) 500 MG CAPS Take 2 tablets by mouth 2 times daily.       Calcium-Vitamin D-Vitamin K (CALCIUM + D) 500-1000-40 MG-UNT-MCG CHEW Take 3 tablets by mouth daily        Cholecalciferol (VITAMIN D) 1000 UNITS capsule Take 1 capsule by mouth daily.       citalopram (CELEXA) 40 MG tablet TAKE 1 TABLET BY MOUTH DAILY 90 tablet 1     DICLOFENAC PO Take 100 mg by mouth daily        fish oil-omega-3 fatty acids (OMEGA-3) 1000 MG capsule Take 1 g by mouth daily.       Flaxseed, Linseed, (FLAX SEED OIL) 1000 MG capsule Take 2 capsules by mouth daily       fluticasone (FLONASE) 50 MCG/ACT spray 1 spray Reported on 5/11/2017       folic acid (FOLVITE) 1 MG tablet Take 1 mg by mouth 3 times daily.       gabapentin (NEURONTIN) 300 MG capsule TAKE 3 CAPSULES BY MOUTH IN THE MORNING, 2 CAPSULES AT NOON AND 2 CAPSULES IN THE EVENING 210 capsule 0     Garlic 400 MG TBEC        HYDROcodone-acetaminophen (NORCO) 7.5-325 MG per tablet TAKE 1 TABLET BY MOUTH EVERY 4 TO 6 HOURS AS NEEDED FOR PAIN 60 tablet 0     MILK THISTLE PO Take 1,000 mg by mouth daily.       MULTIPLE VITAMIN PO Take  by mouth 2 times daily.       order for DME Equipment being ordered: Nebulizer 1 Device 0     pantoprazole (PROTONIX) 40 MG enteric coated tablet Take 1 tablet (40 mg) by mouth daily (Patient taking differently: Take 40 mg by mouth 2 times daily ) 90 tablet 3     polyvinyl alcohol-povidone (REFRESH) 1.4-0.6 %  ophthalmic solution 1-2 drops as needed       PREBIOTIC PRODUCT PO Take 1 tablet by mouth daily       Probiotic Product (SOLUBLE FIBER/PROBIOTICS PO) Take 1 tablet by mouth       RESTASIS 0.05 % ophthalmic emulsion USE 1 DROP IN BOTH EYES 2 TIMES A DAY 60 each 3     Simethicone (SM GAS RELIEF ANTIFLATUENT) 180 MG CAPS TAKE ONE CAPSULE BY MOUTH AS NEEDED AFTER A MEAL. MAX 2 CAPS/DAY 60 capsule 11     traZODone (DESYREL) 50 MG tablet TAKE 2-3 TABLETS BY MOUTH DAILY AT BEDTIME 270 tablet 1       ALLERGIES    Allergies   Allergen Reactions     Adhesive Tape      Glue and nicotine patches     Benzoin Compound      Tin-Co-Javier     Mold      Seasonal Allergies      Soap      Any cleanser/soap/disinfectant that is used right before surgery.  Makes patient break out horribly. Chart was looked at and Chloraprep was used.       SOCIAL & FAMILY HISTORY    Social History     Socioeconomic History     Marital status:      Spouse name: Not on file     Number of children: Not on file     Years of education: Not on file     Highest education level: Not on file   Occupational History     Employer:      Comment: part time   Social Needs     Financial resource strain: Not on file     Food insecurity:     Worry: Not on file     Inability: Not on file     Transportation needs:     Medical: Not on file     Non-medical: Not on file   Tobacco Use     Smoking status: Former Smoker     Types: Cigarettes     Last attempt to quit: 3/28/1997     Years since quittin.0     Smokeless tobacco: Never Used     Tobacco comment: no passive exposure   Substance and Sexual Activity     Alcohol use: No     Alcohol/week: 0.0 oz     Comment: former. quit      Drug use: No     Sexual activity: Not on file   Lifestyle     Physical activity:     Days per week: Not on file     Minutes per session: Not on file     Stress: Not on file   Relationships     Social connections:     Talks on phone: Not on file     Gets together: Not on file      Attends Buddhism service: Not on file     Active member of club or organization: Not on file     Attends meetings of clubs or organizations: Not on file     Relationship status: Not on file     Intimate partner violence:     Fear of current or ex partner: Not on file     Emotionally abused: Not on file     Physically abused: Not on file     Forced sexual activity: Not on file   Other Topics Concern     Parent/sibling w/ CABG, MI or angioplasty before 65F 55M? Yes      Service Not Asked     Blood Transfusions Yes     Caffeine Concern Yes     Comment: chocolate rare     Occupational Exposure Not Asked     Hobby Hazards Not Asked     Sleep Concern Not Asked     Stress Concern Not Asked     Weight Concern Not Asked     Special Diet Not Asked     Back Care Not Asked     Exercise Not Asked     Bike Helmet Not Asked     Seat Belt Yes     Self-Exams Not Asked   Social History Narrative     Not on file     Family History   Problem Relation Age of Onset     Diabetes Mother      Rheumatoid Arthritis Mother      Other - See Comments Mother         fibromyalgia     Osteoarthritis Mother      Thyroid Disease Mother         thyroid disorder     Unknown/Adopted Father         cause of death unknown     Rheumatoid Arthritis Father      Diabetes Sister      Myocardial Infarction Sister         cause of death     Lupus Sister         cause of death     C.A.D. Maternal Grandmother      Cerebrovascular Disease Maternal Grandmother      Diabetes Maternal Grandmother      Thyroid Disease Maternal Grandmother         thyrodi disorder; goitor removed     Cancer Maternal Grandfather      Hypertension Brother      Other - See Comments Brother         sleep apnea     Other - See Comments Sister         sleep apnea       PHYSICAL EXAM    VITAL SIGNS: /64   Temp 98.7  F (37.1  C) (Tympanic)   Resp 16   Wt 95.3 kg (210 lb)   SpO2 93%   BMI 34.95 kg/m     Constitutional:  Well developed, well nourished, no acute distress    HENT:  Atraumatic, moist mucus membranes  Neck: supple, no JVD   Respiratory:  Lungs clear, no retractions   Cardiovascular:  regular rate, no murmurs  GI:  Soft, nontender, normal bowel sounds  Musculoskeletal:  No edema, no acute deformities  Integument:  Skin is warm and dry, no petechiae   Neurologic:  Alert & oriented, no slurred speech  Psych: Pleasant affect, no hallucinations        RADIOLOGY/PROCEDURES    CXR:    Results for orders placed or performed during the hospital encounter of 03/29/19   XR Chest 2 Views    Narrative    XR CHEST 2 VW    HISTORY: 69 years Female cough    COMPARISON: 6/19/2018    TECHNIQUE: 2 views of the chest were obtained.    FINDINGS: There is airspace opacity within the left lower lobe visible  on both views. The upper left lung and right lung are clear.    Heart size and pulmonary vascularity are within normal limits.        Impression    IMPRESSION: Consolidating airspace opacity in the left lower lobe  suggestive of pneumonia.    JACKIE ZAMORA MD         ED COURSE & MEDICAL DECISION MAKING    Pertinent Labs & Imaging studies reviewed and interpreted. (See chart for details)    See chart for details of medications given during the ED stay.    Vitals:    03/29/19 1325   BP: 129/64   Resp: 16   Temp: 98.7  F (37.1  C)   TempSrc: Tympanic   SpO2: 93%   Weight: 95.3 kg (210 lb)       Differential Diagnosis: Acute Coronary Syndrome, Congestive Heart Failure, Myocardial Infarction, Pulmonary Embolus, Pneumonia, Pneumothorax, other    Consultation: Jimbo Martinez at 5:39 PM for admission.    FINAL IMPRESSION    1. Pneumonia of left lung due to infectious organism, unspecified part of lung        PLAN  I think she can be treated outpatient.  Return here if not improving.      Idalia Mortensen MD  03/30/19 [id number]

## 2019-04-02 ENCOUNTER — OFFICE VISIT (OUTPATIENT)
Dept: FAMILY MEDICINE | Facility: OTHER | Age: 69
End: 2019-04-02
Attending: FAMILY MEDICINE
Payer: COMMERCIAL

## 2019-04-02 VITALS
SYSTOLIC BLOOD PRESSURE: 122 MMHG | DIASTOLIC BLOOD PRESSURE: 62 MMHG | TEMPERATURE: 97.4 F | RESPIRATION RATE: 20 BRPM | HEIGHT: 65 IN | OXYGEN SATURATION: 91 % | BODY MASS INDEX: 34.95 KG/M2 | HEART RATE: 91 BPM

## 2019-04-02 DIAGNOSIS — L98.9 SKIN LESION: ICD-10-CM

## 2019-04-02 DIAGNOSIS — J18.9 PNEUMONIA OF LEFT LOWER LOBE DUE TO INFECTIOUS ORGANISM: Primary | ICD-10-CM

## 2019-04-02 PROCEDURE — G0463 HOSPITAL OUTPT CLINIC VISIT: HCPCS

## 2019-04-02 PROCEDURE — 99214 OFFICE O/P EST MOD 30 MIN: CPT | Performed by: FAMILY MEDICINE

## 2019-04-02 RX ORDER — PREDNISONE 20 MG/1
TABLET ORAL
Qty: 20 TABLET | Refills: 0 | Status: SHIPPED | OUTPATIENT
Start: 2019-04-02 | End: 2019-04-16

## 2019-04-02 ASSESSMENT — PAIN SCALES - GENERAL: PAINLEVEL: NO PAIN (0)

## 2019-04-02 NOTE — PROGRESS NOTES
SUBJECTIVE:   Melvi Cleveland is a 69 year old female who presents to clinic today for the following health issues:    ED/UC Followup:    Facility:  [facility] – [place], [place]  Date of visit: 03/29/19  Reason for visit: pneumonia  Current Status: improving some         RESPIRATORY SYMPTOMS      Duration: over 2 weeks    Description  cough, wheezing, chills, headache and fatigue/malaise    Severity: moderate    Accompanying signs and symptoms: see above    History (predisposing factors):  none    Precipitating or alleviating factors: None    Therapies tried and outcome:  rest and fluids and levofloxacin       Mother with valve replaced, skin cancer    Skin lesion      Duration: Months    Description (location/character/radiation): left forearm    Accompanying signs and symptoms: None    History (similar episodes/previous evaluation): None     Sherrie notes skin lesion as described above.  There is no asymmetry,  irregular borders,  abnormal pigmentation, or  increasing size.  No associated nonhealing areas.    Problem list and histories reviewed & adjusted, as indicated.  Additional history: as documented    Patient Active Problem List   Diagnosis     Neck pain, chronic     Low back pain, chronic     Crohn's disease of large intestine (H)     Dyslipidemia     Sicca syndrome (H)     Polyneuropathy in other diseases classified elsewhere (H)     RA (rheumatoid arthritis) (H)     Comprehensive Medical Examination     Seasonal allergic rhinitis     Fibrocystic breast disease (FCBD)     ACP (advance care planning)     Need for hepatitis C screening test     Past Surgical History:   Procedure Laterality Date     BACK SURGERY  05/1995    back surgery     BIOPSY      breast bx X2     BIOPSY BREAST      LT     BIOPSY BREAST NEEDLE LOCALIZATION Right 6/12/2017    Procedure: BIOPSY BREAST NEEDLE LOCALIZATION;  WIRE LOCALIZED EXCISIONAL BIOPSY  RIGHT BREAST MASS;  Surgeon: Jeni Amin MD;  Location: [facility] OR     CHOLECYSTECTOMY  2004      COLONOSCOPY  11/15/2004    screening     COLONOSCOPY  2014     EGD with biopsy  ,     GERD     laminectomy      L4 L5 laminectomy; back pain     RELEASE CARPAL TUNNEL Right 2018    Procedure: RELEASE CARPAL TUNNEL;  RIGHT CARPAL TUNNEL RELEASE;  Surgeon: Haider Carlos MD;  Location: [facility] OR       Social History     Tobacco Use     Smoking status: Former Smoker     Types: Cigarettes     Last attempt to quit: 3/28/1997     Years since quittin.0     Smokeless tobacco: Never Used     Tobacco comment: no passive exposure   Substance Use Topics     Alcohol use: No     Alcohol/week: 0.0 oz     Comment: former. quit      Family History   Problem Relation Age of Onset     Diabetes Mother      Rheumatoid Arthritis Mother      Other - See Comments Mother         fibromyalgia     Osteoarthritis Mother      Thyroid Disease Mother         thyroid disorder     Unknown/Adopted Father         cause of death unknown     Rheumatoid Arthritis Father      Diabetes Sister      Myocardial Infarction Sister         cause of death     Lupus Sister         cause of death     C.A.D. Maternal Grandmother      Cerebrovascular Disease Maternal Grandmother      Diabetes Maternal Grandmother      Thyroid Disease Maternal Grandmother         thyrodi disorder; goitor removed     Cancer Maternal Grandfather      Hypertension Brother      Other - See Comments Brother         sleep apnea     Other - See Comments Sister         sleep apnea         Current Outpatient Medications   Medication Sig Dispense Refill     albuterol (2.5 MG/3ML) 0.083% neb solution Take 1 vial (2.5 mg) by nebulization every 6 hours as needed for shortness of breath / dyspnea or wheezing 25 vial 3     ALBUTEROL 108 (90 BASE) MCG/ACT inhaler INHALE 2 PUFFS INTO THE LUNGS EVERY 6 HOURS AS NEEDED FOR SHORTNESS OF BREATH OR WHEEZING 8.5 g 5     ALFALFA PO Take 5 tablets by mouth 2 times daily.       ALLEGRA-D ALLERGY & CONGESTION  MG 12 hr tablet TAKE  1 TABLET BY MOUTH TWICE DAILY 30 tablet 0     Ascorbic Acid (VITAMIN C) 500 MG CAPS Take 2 tablets by mouth 2 times daily.       Calcium-Vitamin D-Vitamin K (CALCIUM + D) 500-1000-40 MG-UNT-MCG CHEW Take 3 tablets by mouth daily        Cholecalciferol (VITAMIN D) 1000 UNITS capsule Take 1 capsule by mouth daily.       citalopram (CELEXA) 40 MG tablet TAKE 1 TABLET BY MOUTH DAILY 90 tablet 1     DICLOFENAC PO Take 100 mg by mouth daily        fish oil-omega-3 fatty acids (OMEGA-3) 1000 MG capsule Take 1 g by mouth daily.       Flaxseed, Linseed, (FLAX SEED OIL) 1000 MG capsule Take 2 capsules by mouth daily       fluticasone (FLONASE) 50 MCG/ACT spray 1 spray Reported on 5/11/2017       folic acid (FOLVITE) 1 MG tablet Take 1 mg by mouth 3 times daily.       gabapentin (NEURONTIN) 300 MG capsule TAKE 3 CAPSULES BY MOUTH IN THE MORNING, 2 CAPSULES AT NOON AND 2 CAPSULES IN THE EVENING 210 capsule 0     Garlic 400 MG TBEC        HYDROcodone-acetaminophen (NORCO) 7.5-325 MG per tablet TAKE 1 TABLET BY MOUTH EVERY 4 TO 6 HOURS AS NEEDED FOR PAIN 60 tablet 0     levofloxacin (LEVAQUIN) 250 MG tablet Take 2 tablets (500 mg) by mouth daily for 7 days 14 tablet 0     MILK THISTLE PO Take 1,000 mg by mouth daily.       MULTIPLE VITAMIN PO Take  by mouth 2 times daily.       order for DME Equipment being ordered: Nebulizer 1 Device 0     pantoprazole (PROTONIX) 40 MG enteric coated tablet Take 1 tablet (40 mg) by mouth daily (Patient taking differently: Take 40 mg by mouth 2 times daily ) 90 tablet 3     polyvinyl alcohol-povidone (REFRESH) 1.4-0.6 % ophthalmic solution 1-2 drops as needed       PREBIOTIC PRODUCT PO Take 1 tablet by mouth daily       Probiotic Product (SOLUBLE FIBER/PROBIOTICS PO) Take 1 tablet by mouth       RESTASIS 0.05 % ophthalmic emulsion USE 1 DROP IN BOTH EYES 2 TIMES A DAY 60 each 3     Simethicone (SM GAS RELIEF ANTIFLATUENT) 180 MG CAPS TAKE ONE CAPSULE BY MOUTH AS NEEDED AFTER A MEAL. MAX 2  "CAPS/DAY 60 capsule 11     traZODone (DESYREL) 50 MG tablet TAKE 2-3 TABLETS BY MOUTH DAILY AT BEDTIME 270 tablet 1     Allergies   Allergen Reactions     Adhesive Tape      Glue and nicotine patches     Benzoin Compound      Tin-Co-Javier     Mold      Seasonal Allergies      Soap      Any cleanser/soap/disinfectant that is used right before surgery.  Makes patient break out horribly. Chart was looked at and Chloraprep was used.     BP Readings from Last 3 Encounters:   04/02/19 122/62   03/29/19 129/64   12/01/18 107/62    Wt Readings from Last 3 Encounters:   03/29/19 95.3 kg (210 lb)   01/22/19 95.3 kg (210 lb)   12/01/18 93.9 kg (207 lb)                    Reviewed and updated as needed this visit by clinical staff  Tobacco  Allergies  Meds  Problems  Med Hx  Surg Hx  Fam Hx       Reviewed and updated as needed this visit by Provider         ROS:  Constitutional, HEENT, cardiovascular, pulmonary, gi and gu systems are negative, except as otherwise noted.    OBJECTIVE:     /62 (BP Location: Right arm, Patient Position: Sitting, Cuff Size: Adult Regular)   Pulse 91   Temp 97.4  F (36.3  C) (Tympanic)   Resp 20   Ht 1.651 m (5' 5\")   SpO2 91%   BMI 34.95 kg/m    Body mass index is 34.95 kg/m .  Physical Exam   Constitutional: She is oriented to person, place, and time. She appears well-developed and well-nourished. No distress.   HENT:   Head: Normocephalic.   Right Ear: Hearing, tympanic membrane, external ear and ear canal normal.   Left Ear: Hearing, tympanic membrane, external ear and ear canal normal.   Nose: Nose normal. Right sinus exhibits no maxillary sinus tenderness and no frontal sinus tenderness. Left sinus exhibits no maxillary sinus tenderness and no frontal sinus tenderness.   Mouth/Throat: Uvula is midline and oropharynx is clear and moist. No oropharyngeal exudate or posterior oropharyngeal erythema.   Eyes: Conjunctivae are normal.   Neck: Neck supple.   Pulmonary/Chest: Effort " normal. She has wheezes.   Lymphadenopathy:     She has no cervical adenopathy.   Neurological: She is alert and oriented to person, place, and time.   Skin: Skin is warm and dry.   There are liver spots noted on the left anterior forearm   Psychiatric: She has a normal mood and affect.       Other exam not repeated.  Diagnostic Test Results:  Results for orders placed or performed during the hospital encounter of 03/29/19   XR Chest 2 Views    Narrative    XR CHEST 2 VW    HISTORY: 69 years Female cough    COMPARISON: 6/19/2018    TECHNIQUE: 2 views of the chest were obtained.    FINDINGS: There is airspace opacity within the left lower lobe visible  on both views. The upper left lung and right lung are clear.    Heart size and pulmonary vascularity are within normal limits.        Impression    IMPRESSION: Consolidating airspace opacity in the left lower lobe  suggestive of pneumonia.    JACKIE ZAMORA MD   CBC with platelets differential   Result Value Ref Range    WBC 5.5 4.0 - 11.0 10e9/L    RBC Count 3.82 3.8 - 5.2 10e12/L    Hemoglobin 11.0 (L) 11.7 - 15.7 g/dL    Hematocrit 33.3 (L) 35.0 - 47.0 %    MCV 87 78 - 100 fl    MCH 28.8 26.5 - 33.0 pg    MCHC 33.0 31.5 - 36.5 g/dL    RDW 14.1 10.0 - 15.0 %    Platelet Count 218 150 - 450 10e9/L    Diff Method Automated Method     % Neutrophils 60.3 %    % Lymphocytes 20.9 %    % Monocytes 13.9 %    % Eosinophils 4.2 %    % Basophils 0.5 %    % Immature Granulocytes 0.2 %    Nucleated RBCs 0 0 /100    Absolute Neutrophil 3.3 1.6 - 8.3 10e9/L    Absolute Lymphocytes 1.1 0.8 - 5.3 10e9/L    Absolute Monocytes 0.8 0.0 - 1.3 10e9/L    Absolute Eosinophils 0.2 0.0 - 0.7 10e9/L    Absolute Basophils 0.0 0.0 - 0.2 10e9/L    Abs Immature Granulocytes 0.0 0 - 0.4 10e9/L    Absolute Nucleated RBC 0.0    Comprehensive metabolic panel   Result Value Ref Range    Sodium 139 133 - 144 mmol/L    Potassium 4.1 3.4 - 5.3 mmol/L    Chloride 105 94 - 109 mmol/L    Carbon Dioxide 27  20 - 32 mmol/L    Anion Gap 7 3 - 14 mmol/L    Glucose 97 70 - 99 mg/dL    Urea Nitrogen 17 7 - 30 mg/dL    Creatinine 1.02 0.52 - 1.04 mg/dL    GFR Estimate 57 (L) >60 mL/min/[1.73_m2]    GFR Estimate If Black 66 >60 mL/min/[1.73_m2]    Calcium 9.1 8.5 - 10.1 mg/dL    Bilirubin Total 0.4 0.2 - 1.3 mg/dL    Albumin 2.9 (L) 3.4 - 5.0 g/dL    Protein Total 7.2 6.8 - 8.8 g/dL    Alkaline Phosphatase 89 40 - 150 U/L    ALT 30 0 - 50 U/L    AST 24 0 - 45 U/L   Influenza A and B and RSV PCR   Result Value Ref Range    Specimen Description Nasopharyngeal     Influenza A PCR Negative NEG^Negative    Influenza B PCR Negative NEG^Negative    Resp Syncytial Virus Negative NEG^Negative       ASSESSMENT/PLAN:     Pneumonia of left lower lobe due to infectious organism (H)  Associated bronchospasm.  Prednisone burst and taper.  Continue other medications a prior.  Follow up with persistent symptoms.  - predniSONE (DELTASONE) 20 MG tablet; Take 60 mg by mouth daily for 3 days, THEN 40 mg daily for 3 days, THEN 20 mg daily for 3 days, THEN 10 mg daily for 3 days.    Skin lesion  Discussed benign nature.  Will follow.          Jimbo Martinez MD  [facility] - AIDE

## 2019-04-02 NOTE — LETTER
[facility] [place]  [address]  [place] [address]  Phone: [phone]    April 2, 2019        Melvi Cleveland  [address]   [facility] [address]          To whom it may concern:    RE: Melvi CHRISTOPHER Cristofer    Patient was seen and treated today at our clinic and missed work.  No work until Monday 8 April 2019      Please contact me for questions or concerns.      Sincerely,        Jimbo Martinez MD

## 2019-04-02 NOTE — NURSING NOTE
"Chief Complaint   Patient presents with     ER F/U       Initial /62 (BP Location: Right arm, Patient Position: Sitting, Cuff Size: Adult Regular)   Pulse 91   Temp 97.4  F (36.3  C) (Tympanic)   Resp 20   Ht 1.651 m (5' 5\")   SpO2 91%   BMI 34.95 kg/m   Estimated body mass index is 34.95 kg/m  as calculated from the following:    Height as of this encounter: 1.651 m (5' 5\").    Weight as of 3/29/19: 95.3 kg (210 lb).  Medication Reconciliation: complete    Devika Gutierres LPN  "

## 2019-04-12 DIAGNOSIS — R09.81 NASAL CONGESTION: ICD-10-CM

## 2019-04-12 DIAGNOSIS — G62.9 PERIPHERAL POLYNEUROPATHY: ICD-10-CM

## 2019-04-12 RX ORDER — FEXOFENADINE HYDROCHLORIDE AND PSEUDOEPHEDRINE HYDROCHLORIDE 60; 120 MG/1; MG/1
TABLET, FILM COATED, EXTENDED RELEASE ORAL
Qty: 30 TABLET | Refills: 0 | Status: SHIPPED | OUTPATIENT
Start: 2019-04-12 | End: 2019-05-06

## 2019-04-12 RX ORDER — GABAPENTIN 300 MG/1
CAPSULE ORAL
Qty: 210 CAPSULE | Refills: 0 | Status: SHIPPED | OUTPATIENT
Start: 2019-04-12 | End: 2019-05-06

## 2019-04-12 NOTE — TELEPHONE ENCOUNTER
Refill request for Neurontin 300 mg 3 caps q AM, 2 caps @ noon, and 2 caps every evening  Last office visit 4/02/19  Last refill 3/14/19 QTY # 210    Refill request for Allegra D  Last office visit 42/19  Last refill 3/14/19 QTY # 30

## 2019-04-16 ENCOUNTER — OFFICE VISIT (OUTPATIENT)
Dept: FAMILY MEDICINE | Facility: OTHER | Age: 69
End: 2019-04-16
Attending: FAMILY MEDICINE
Payer: MEDICARE

## 2019-04-16 ENCOUNTER — ANCILLARY PROCEDURE (OUTPATIENT)
Dept: GENERAL RADIOLOGY | Facility: OTHER | Age: 69
End: 2019-04-16
Attending: FAMILY MEDICINE
Payer: MEDICARE

## 2019-04-16 ENCOUNTER — TELEPHONE (OUTPATIENT)
Dept: FAMILY MEDICINE | Facility: OTHER | Age: 69
End: 2019-04-16

## 2019-04-16 VITALS
RESPIRATION RATE: 20 BRPM | HEART RATE: 82 BPM | BODY MASS INDEX: 34.99 KG/M2 | DIASTOLIC BLOOD PRESSURE: 68 MMHG | SYSTOLIC BLOOD PRESSURE: 112 MMHG | OXYGEN SATURATION: 95 % | WEIGHT: 210 LBS | HEIGHT: 65 IN | TEMPERATURE: 98.3 F

## 2019-04-16 DIAGNOSIS — J18.9 PNEUMONIA OF LEFT LOWER LOBE DUE TO INFECTIOUS ORGANISM: Primary | ICD-10-CM

## 2019-04-16 DIAGNOSIS — J30.1 SEASONAL ALLERGIC RHINITIS DUE TO POLLEN: ICD-10-CM

## 2019-04-16 DIAGNOSIS — J45.991 COUGH VARIANT ASTHMA: ICD-10-CM

## 2019-04-16 PROCEDURE — 96372 THER/PROPH/DIAG INJ SC/IM: CPT

## 2019-04-16 PROCEDURE — 71046 X-RAY EXAM CHEST 2 VIEWS: CPT | Mod: TC

## 2019-04-16 PROCEDURE — 99214 OFFICE O/P EST MOD 30 MIN: CPT | Performed by: FAMILY MEDICINE

## 2019-04-16 PROCEDURE — G0463 HOSPITAL OUTPT CLINIC VISIT: HCPCS

## 2019-04-16 RX ORDER — TRIAMCINOLONE ACETONIDE 40 MG/ML
60 INJECTION, SUSPENSION INTRA-ARTICULAR; INTRAMUSCULAR ONCE
Status: COMPLETED | OUTPATIENT
Start: 2019-04-16 | End: 2019-04-16

## 2019-04-16 RX ORDER — ALBUTEROL SULFATE 0.83 MG/ML
2.5 SOLUTION RESPIRATORY (INHALATION) EVERY 6 HOURS PRN
Qty: 25 VIAL | Refills: 3 | Status: SHIPPED | OUTPATIENT
Start: 2019-04-16 | End: 2019-06-10

## 2019-04-16 RX ORDER — MONTELUKAST SODIUM 10 MG/1
10 TABLET ORAL AT BEDTIME
Qty: 90 TABLET | Refills: 3 | Status: SHIPPED | OUTPATIENT
Start: 2019-04-16 | End: 2020-02-17

## 2019-04-16 RX ADMIN — TRIAMCINOLONE ACETONIDE 60 MG: 40 INJECTION, SUSPENSION INTRA-ARTICULAR; INTRAMUSCULAR at 16:50

## 2019-04-16 ASSESSMENT — PAIN SCALES - GENERAL: PAINLEVEL: MILD PAIN (2)

## 2019-04-16 ASSESSMENT — MIFFLIN-ST. JEOR: SCORE: 1488.43

## 2019-04-16 NOTE — PROGRESS NOTES
"  SUBJECTIVE:   Melvi Cleveland is a 69 year old female who presents to clinic today for the following   health issues:    RESPIRATORY SYMPTOMS      Duration: Follow up pneumonia     Description  Cough, chest tightness and SOB    Severity: mild    Accompanying signs and symptoms: None    History (predisposing factors):  none    Precipitating or alleviating factors: None    Therapies tried and outcome:  Rest and fluids. Antibiotics, prednisone, and nebulizer     ER and MEKHI notes reviewe    Was getting better and seems like prednisone helping     Off steroid since Sunday     Now seems more heavy in chest and coughing more    Stopped inhaler and nebs since feeling well    Pt does have asthma and  Seasonal allergies - -spring allergies and summer bad for her sx    On daily allegra d    Never on daily inhaler in past           Additional history: as documented    Reviewed  and updated as needed this visit by clinical staff  Tobacco  Allergies  Meds  Med Hx  Surg Hx  Fam Hx  Soc Hx        Reviewed and updated as needed this visit by Provider         Labs reviewed in EPIC    ROS:  CONSTITUTIONAL: NEGATIVE for fever, chills, change in weight  ENT/MOUTH: NEGATIVE for ear, mouth and throat problems  CV: NEGATIVE for chest pain, palpitations or peripheral edema    OBJECTIVE:                                                    /68 (BP Location: Right arm, Patient Position: Sitting, Cuff Size: Adult Regular)   Pulse 82   Temp 98.3  F (36.8  C) (Tympanic)   Resp 20   Ht 1.651 m (5' 5\")   Wt 95.3 kg (210 lb)   SpO2 95%   BMI 34.95 kg/m    Body mass index is 34.95 kg/m .   GENERAL: healthy, alert, well nourished, well hydrated, no distress  HENT: ear canals- normal; TMs- normal; Nose- normal; Mouth- no ulcers, no lesions  NECK: no tenderness, no adenopathy, no asymmetry, no masses, no stiffness; thyroid- normal to palpation  RESP: lungs clear to auscultation - no rales, no rhonchi, no wheezes         Results for " orders placed or performed in visit on 04/16/19   XR Chest 2 Views    Narrative    Procedure:XR CHEST 2 VW    Clinical history:Female, 69 years, Pneumonia of left lower lobe due to  infectious organism (H)    Technique: Two views are submitted.    Comparison: 3/29/2019    Findings: The cardiac silhouette is normal. The pulmonary vasculature  is normal.    The lungs demonstrate improved aeration at the left lung base with  residual consolidation and atelectasis. Right lung is clear. Bony  structures are unremarkable.      Impression    Impression:   Interval improvement with residual pneumonia/consolidation persists  within the lingula/left lower lobe.    JEN GOMEZ MD       ASSESSMENT/PLAN:                                                        ICD-10-CM    1. Pneumonia of left lower lobe due to infectious organism (H) J18.1 XR Chest 2 Views     XR Chest 2 Views   2. Seasonal allergic rhinitis due to pollen J30.1 montelukast (SINGULAIR) 10 MG tablet     triamcinolone (KENALOG-40) injection 60 mg   3. Cough variant asthma J45.991 montelukast (SINGULAIR) 10 MG tablet     triamcinolone (KENALOG-40) injection 60 mg     CXR better - has RAD/intermtitent asthma or cough variant asthma with significant allergy component from my history. Discussed. Will give kenalog shot and start Singulair to help with current lung and allergy sx along with future spring and summer allergies. Discussed risk and benefits. Continue current medications and behavioral changes.   Keep up with nebs until feel better. Symptomatic treatment was discussed along when patient should call and/or come back into the clinic or go to ER/Urgent care. All questions answered.   *25 minutes was spent with patient and over 50%  of this time was spent on counseling patient regarding  illness, medication and / or treatment  options, coordinating further cares and follow ups that are needed along with resource material that will be helpful in the treatment  of these issues.         Romeo Krishna MD  [facility] [place]

## 2019-04-16 NOTE — PATIENT INSTRUCTIONS
Results for orders placed or performed in visit on 04/16/19   XR Chest 2 Views    Narrative    Procedure:XR CHEST 2 VW    Clinical history:Female, 69 years, Pneumonia of left lower lobe due to  infectious organism (H)    Technique: Two views are submitted.    Comparison: 3/29/2019    Findings: The cardiac silhouette is normal. The pulmonary vasculature  is normal.    The lungs demonstrate improved aeration at the left lung base with  residual consolidation and atelectasis. Right lung is clear. Bony  structures are unremarkable.      Impression    Impression:   Interval improvement with residual pneumonia/consolidation persists  within the lingula/left lower lobe.    JEN GOMEZ MD       Patient Education     Seasonal Allergy  Seasonal allergy is also called hay fever. It may occur after a person is exposed to pollens released from grasses, weeds, trees and shrubs. This type of allergy occurs during the spring and summer when the pollen contacts the lining of the nose, eyes, eyelids, sinuses and throat. This causes histamine to be released from the tissues. Histamine causes itching and swelling. This may produce a watery discharge from the eyes or nose. Violent sneezing, nasal congestion, post-nasal drip, itching of the eyes, nose, throat and mouth, scratchy throat, and dry cough may also occur.  Home care  Seasonal allergy cannot be cured, but symptoms can be reduced by these measures:    Stay away from or limit your time near the allergen as much as you can:    ? Stay indoors on windy days of pollen season.   ? Keep windows and doors closed. Use air conditioning instead in your home and car. This filters the air.  ? Change air conditioner filters often.  ? Take a shower, wash your hair, and change clothes after being outdoors.  ? Put on a NIOSH-rated 95 filter mask when working outdoors. Before going outside, take your allergy medicine as advised by your healthcare provider.    Decongestant pills and  sprays reduce tissue swelling and watery discharge. Overuse of nasal decongestant sprays may make symptoms worse. Do not use these more often than recommended. Sometimes you can experience a rebound effect (symptoms worsen), when stopping them. Talk to your healthcare provider or pharmacist about these medicines before taking them, especially if you have high blood pressure or heart problems.     Antihistamines block the release of histamine during the allergic response. They work better when taken before symptoms develop. Unless a prescription antihistamine was prescribed, you can take over-the-counter antihistamines that do not cause drowsiness.  Ask your pharmacist for suggestions.    Steroid nasal sprays or oral steroids may also be prescribed for more severe symptoms. These help to reduce the local inflammation that can add to the allergic response.    If you have asthma, pollen season may make your asthma symptoms worse. It is important that you use your asthma medicines as directed during this time to prevent or treat attacks. Some persons with asthma have asthma symptoms that get worse when they take antihistamines. This is due to the drying effect on the lungs. If you notice this, stop the antihistamines, drink extra fluids and notify your doctor.    If you have sinus congestion or drainage, a saline nasal rinse may give relief. A saline nasal rinse lessens the swelling and clears excess mucus. This allows sinuses to drain. Prepackaged kits are sold at most drug stores. These contain pre-mixed salt packets and an irrigation device.  Follow-up care  Follow up with your healthcare provider, or as advised. If you have been referred to a specialist, make an appointment promptly.  When to seek medical advice  Call your healthcare provider right away for any of the following:    Facial, ear or sinus pain; colored drainage from the nose    Headaches    You have asthma and your asthma symptoms do not respond to the  usual doses of your medicine    Cough with colored sputum (mucus)    Fever of 100.4 F (38 C) or higher, or as directed by the healthcare provider  Call 911  Call 911 if any of these occur:    Trouble breathing or swallowing, wheezing    Hoarse voice, trouble speaking, or drooling    Confusion    Very drowsy or trouble awakening    Fainting or loss of consciousness    Rapid heart rate, or weak pulse    Low blood pressure    Feeling of doom    Nausea, vomiting, abdominal pain, diarrhea    Vomiting blood, or large amounts of blood in stool    Seizure    Cold, moist, or pale (blue in color) skin  Date Last Reviewed: 5/1/2017 2000-2018 [employer] [address] [place] [address]. All rights reserved. This information is not intended as a substitute for professional medical care. Always follow your healthcare professional's instructions.

## 2019-04-16 NOTE — PROGRESS NOTES
Patient phoned regarding scheduling an appointment because she finished the antibiotics and prednisone she was prescribe for pneumonia on Sunday and she still has a heavy chest. Patient thinks she may need more antibiotics. Scheduled for today at 3:45.

## 2019-04-16 NOTE — NURSING NOTE
"Chief Complaint   Patient presents with     Cough       Initial /68 (BP Location: Right arm, Patient Position: Sitting, Cuff Size: Adult Regular)   Pulse 82   Temp 98.3  F (36.8  C) (Tympanic)   Resp 20   Ht 1.651 m (5' 5\")   Wt 95.3 kg (210 lb)   SpO2 95%   BMI 34.95 kg/m   Estimated body mass index is 34.95 kg/m  as calculated from the following:    Height as of this encounter: 1.651 m (5' 5\").    Weight as of this encounter: 95.3 kg (210 lb).  Medication Reconciliation: complete    Sara Delong LPN  "

## 2019-05-06 ENCOUNTER — ANCILLARY PROCEDURE (OUTPATIENT)
Dept: GENERAL RADIOLOGY | Facility: OTHER | Age: 69
End: 2019-05-06
Attending: FAMILY MEDICINE
Payer: MEDICARE

## 2019-05-06 ENCOUNTER — OFFICE VISIT (OUTPATIENT)
Dept: FAMILY MEDICINE | Facility: OTHER | Age: 69
End: 2019-05-06
Attending: FAMILY MEDICINE
Payer: MEDICARE

## 2019-05-06 VITALS
SYSTOLIC BLOOD PRESSURE: 102 MMHG | DIASTOLIC BLOOD PRESSURE: 60 MMHG | OXYGEN SATURATION: 94 % | TEMPERATURE: 97.6 F | HEART RATE: 82 BPM

## 2019-05-06 DIAGNOSIS — G62.9 PERIPHERAL POLYNEUROPATHY: ICD-10-CM

## 2019-05-06 DIAGNOSIS — J45.991 COUGH VARIANT ASTHMA: ICD-10-CM

## 2019-05-06 DIAGNOSIS — J06.9 UPPER RESPIRATORY TRACT INFECTION, UNSPECIFIED TYPE: ICD-10-CM

## 2019-05-06 DIAGNOSIS — J06.9 UPPER RESPIRATORY TRACT INFECTION, UNSPECIFIED TYPE: Primary | ICD-10-CM

## 2019-05-06 DIAGNOSIS — R09.81 NASAL CONGESTION: ICD-10-CM

## 2019-05-06 LAB
BASOPHILS # BLD AUTO: 0.1 10E9/L (ref 0–0.2)
BASOPHILS NFR BLD AUTO: 0.7 %
CRP SERPL-MCNC: 3.3 MG/L (ref 0–8)
DIFFERENTIAL METHOD BLD: NORMAL
EOSINOPHIL # BLD AUTO: 0.4 10E9/L (ref 0–0.7)
EOSINOPHIL NFR BLD AUTO: 4.8 %
ERYTHROCYTE [DISTWIDTH] IN BLOOD BY AUTOMATED COUNT: 14 % (ref 10–15)
HCT VFR BLD AUTO: 37.2 % (ref 35–47)
HGB BLD-MCNC: 12.2 G/DL (ref 11.7–15.7)
IMM GRANULOCYTES # BLD: 0 10E9/L (ref 0–0.4)
IMM GRANULOCYTES NFR BLD: 0.3 %
LYMPHOCYTES # BLD AUTO: 2.1 10E9/L (ref 0.8–5.3)
LYMPHOCYTES NFR BLD AUTO: 29 %
MCH RBC QN AUTO: 28.7 PG (ref 26.5–33)
MCHC RBC AUTO-ENTMCNC: 32.8 G/DL (ref 31.5–36.5)
MCV RBC AUTO: 88 FL (ref 78–100)
MONOCYTES # BLD AUTO: 0.6 10E9/L (ref 0–1.3)
MONOCYTES NFR BLD AUTO: 7.9 %
NEUTROPHILS # BLD AUTO: 4.2 10E9/L (ref 1.6–8.3)
NEUTROPHILS NFR BLD AUTO: 57.3 %
NRBC # BLD AUTO: 0 10*3/UL
NRBC BLD AUTO-RTO: 0 /100
PLATELET # BLD AUTO: 251 10E9/L (ref 150–450)
RBC # BLD AUTO: 4.25 10E12/L (ref 3.8–5.2)
WBC # BLD AUTO: 7.2 10E9/L (ref 4–11)

## 2019-05-06 PROCEDURE — 85025 COMPLETE CBC W/AUTO DIFF WBC: CPT | Mod: ZL | Performed by: FAMILY MEDICINE

## 2019-05-06 PROCEDURE — 70210 X-RAY EXAM OF SINUSES: CPT | Mod: TC

## 2019-05-06 PROCEDURE — G0463 HOSPITAL OUTPT CLINIC VISIT: HCPCS | Mod: 25

## 2019-05-06 PROCEDURE — 99214 OFFICE O/P EST MOD 30 MIN: CPT | Performed by: FAMILY MEDICINE

## 2019-05-06 PROCEDURE — 71046 X-RAY EXAM CHEST 2 VIEWS: CPT | Mod: TC

## 2019-05-06 PROCEDURE — 86140 C-REACTIVE PROTEIN: CPT | Mod: ZL | Performed by: FAMILY MEDICINE

## 2019-05-06 PROCEDURE — 36415 COLL VENOUS BLD VENIPUNCTURE: CPT | Mod: ZL | Performed by: FAMILY MEDICINE

## 2019-05-06 RX ORDER — HYDROXYCHLOROQUINE SULFATE 200 MG/1
200 TABLET, FILM COATED ORAL 2 TIMES DAILY
COMMUNITY

## 2019-05-06 RX ORDER — PREDNISONE 20 MG/1
TABLET ORAL
Qty: 20 TABLET | Refills: 0 | Status: ON HOLD | OUTPATIENT
Start: 2019-05-06 | End: 2019-06-04

## 2019-05-06 RX ORDER — BENZONATATE 200 MG/1
200 CAPSULE ORAL 3 TIMES DAILY PRN
Qty: 30 CAPSULE | Refills: 1 | Status: SHIPPED | OUTPATIENT
Start: 2019-05-06 | End: 2022-10-24

## 2019-05-06 ASSESSMENT — PAIN SCALES - GENERAL: PAINLEVEL: MILD PAIN (2)

## 2019-05-06 NOTE — PATIENT INSTRUCTIONS
Results for orders placed or performed in visit on 05/06/19   CBC with platelets differential   Result Value Ref Range    WBC 7.2 4.0 - 11.0 10e9/L    RBC Count 4.25 3.8 - 5.2 10e12/L    Hemoglobin 12.2 11.7 - 15.7 g/dL    Hematocrit 37.2 35.0 - 47.0 %    MCV 88 78 - 100 fl    MCH 28.7 26.5 - 33.0 pg    MCHC 32.8 31.5 - 36.5 g/dL    RDW 14.0 10.0 - 15.0 %    Platelet Count 251 150 - 450 10e9/L    Diff Method Automated Method     % Neutrophils 57.3 %    % Lymphocytes 29.0 %    % Monocytes 7.9 %    % Eosinophils 4.8 %    % Basophils 0.7 %    % Immature Granulocytes 0.3 %    Nucleated RBCs 0 0 /100    Absolute Neutrophil 4.2 1.6 - 8.3 10e9/L    Absolute Lymphocytes 2.1 0.8 - 5.3 10e9/L    Absolute Monocytes 0.6 0.0 - 1.3 10e9/L    Absolute Eosinophils 0.4 0.0 - 0.7 10e9/L    Absolute Basophils 0.1 0.0 - 0.2 10e9/L    Abs Immature Granulocytes 0.0 0 - 0.4 10e9/L    Absolute Nucleated RBC 0.0    CRP inflammation   Result Value Ref Range    CRP Inflammation 3.3 0.0 - 8.0 mg/L

## 2019-05-06 NOTE — LETTER
My Asthma Action Plan  Name: Melvi Cleveland   YOB: 1950  Date: 5/6/2019   My doctor: Romeo Krishna MD   My clinic: [facility] - [place]        My Control Medicine: Montelukast (Singulair) -  10 mg at bedtime  My Rescue Medicine: Albuterol nebulizer solution every 6 hours as needed, albuterol 2 puffs every 6 hrs as needed    My Asthma Severity:   moderate persistent  Avoid your asthma triggers: animal dander, mold and strong odors and fumes               GREEN ZONE   Good Control    I feel good    No cough or wheeze    Can work, sleep and play without asthma symptoms       Take your asthma control medicine every day.     1. If exercise triggers your asthma, take your rescue medication    15 minutes before exercise or sports, and    During exercise if you have asthma symptoms  2. Spacer to use with inhaler: If you have a spacer, make sure to use it with your inhaler             YELLOW ZONE Getting Worse  I have ANY of these:    I do not feel good    Cough or wheeze    Chest feels tight    Wake up at night   1. Keep taking your Green Zone medications  2. Start taking your rescue medicine:    every 20 minutes for up to 1 hour. Then every 4 hours for 24-48 hours.  3. If you stay in the Yellow Zone for more than 12-24 hours, contact your doctor.  4. If you do not return to the Green Zone in 12-24 hours or you get worse, start taking your oral steroid medicine if prescribed by your provider.           RED ZONE Medical Alert - Get Help  I have ANY of these:    I feel awful    Medicine is not helping    Breathing getting harder    Trouble walking or talking    Nose opens wide to breathe       1. Take your rescue medicine NOW  2. If your provider has prescribed an oral steroid medicine, start taking it NOW  3. Call your doctor NOW  4. If you are still in the Red Zone after 20 minutes and you have not reached your doctor:    Take your rescue medicine again and    Call 911 or go to the  emergency room right away    See your regular doctor within 2 weeks of an Emergency Room or Urgent Care visit for follow-up treatment.          Annual Reminders:  Meet with Asthma Educator,  Flu Shot in the Fall, consider Pneumonia Vaccination for patients with asthma (aged 19 and older).    Pharmacy: [facility] [place], [place] - [facility]                      Asthma Triggers  How To Control Things That Make Your Asthma Worse    Triggers are things that make your asthma worse.  Look at the list below to help you find your triggers and what you can do about them.  You can help prevent asthma flare-ups by staying away from your triggers.      Trigger                                                          What you can do   Cigarette Smoke  Tobacco smoke can make asthma worse. Do not allow smoking in your home, car or around you.  Be sure no one smokes at a child s day care or school.  If you smoke, ask your health care provider for ways to help you quit.  Ask family members to quit too.  Ask your health care provider for a referral to Quit Plan to help you quit smoking, or call 9-629-815-PLAN.     Colds, Flu, Bronchitis  These are common triggers of asthma. Wash your hands often.  Don t touch your eyes, nose or mouth.  Get a flu shot every year.     Dust Mites  These are tiny bugs that live in cloth or carpet. They are too small to see. Wash sheets and blankets in hot water every week.   Encase pillows and mattress in dust mite proof covers.  Avoid having carpet if you can. If you have carpet, vacuum weekly.   Use a dust mask and HEPA vacuum.   Pollen and Outdoor Mold  Some people are allergic to trees, grass, or weed pollen, or molds. Try to keep your windows closed.  Limit time out doors when pollen count is high.   Ask you health care provider about taking medicine during allergy season.     Animal Dander  Some people are allergic to skin flakes, urine or saliva from pets with fur or feathers.  Keep pets with fur or feathers out of your home.    If you can t keep the pet outdoors, then keep the pet out of your bedroom.  Keep the bedroom door closed.  Keep pets off cloth furniture and away from stuffed toys.     Mice, Rats, and Cockroaches  Some people are allergic to the waste from these pests.   Cover food and garbage.  Clean up spills and food crumbs.  Store grease in the refrigerator.   Keep food out of the bedroom.   Indoor Mold  This can be a trigger if your home has high moisture. Fix leaking faucets, pipes, or other sources of water.   Clean moldy surfaces.  Dehumidify basement if it is damp and smelly.   Smoke, Strong Odors, and Sprays  These can reduce air quality. Stay away from strong odors and sprays, such as perfume, powder, hair spray, paints, smoke incense, paint, cleaning products, candles and new carpet.   Exercise or Sports  Some people with asthma have this trigger. Be active!  Ask your doctor about taking medicine before sports or exercise to prevent symptoms.    Warm up for 5-10 minutes before and after sports or exercise.     Other Triggers of Asthma  Cold air:  Cover your nose and mouth with a scarf.  Sometimes laughing or crying can be a trigger.  Some medicines and food can trigger asthma.

## 2019-05-06 NOTE — NURSING NOTE
"Chief Complaint   Patient presents with     Cough     folow up not feeling better since 3-29-19 ER visit        Initial /60 (BP Location: Right arm, Patient Position: Chair, Cuff Size: Adult Large)   Pulse 82   Temp 97.6  F (36.4  C) (Tympanic)   SpO2 94%  Estimated body mass index is 34.95 kg/m  as calculated from the following:    Height as of 4/16/19: 1.651 m (5' 5\").    Weight as of 4/16/19: 95.3 kg (210 lb).  Medication Reconciliation: complete    Ashley Dooley LPN  "

## 2019-05-06 NOTE — PROGRESS NOTES
bert  SUBJECTIVE:   Melvi Cleveland is a 69 year old female who presents to clinic today for the following   health issues:      RESPIRATORY SYMPTOMS      Duration: was Diagnosed with pneumonia on 3-29-19 has not felt better since.     Description  nasal congestion, cough, wheezing, headache and fatigue/malaise    Severity: moderate    Accompanying signs and symptoms: cough     History (predisposing factors):  asthma    Precipitating or alleviating factors: None    Therapies tried and outcome:  Mucinex , Flonase states it helps clear the nasal congestion   Last notes reviewed  Got some better since last seen and now getting worse again  Increase sinus sx last day or so and still lots of coughing  Sleeping upright due to cough  Rattling in upper chest  Takes allegra in am and Singulair daily       On PPI BID  Never on daily inhalers  ON immunosuppressants for RA   No new allergies     Long h/o sinus issues- was doing BID irrigation - stopped due to increase dryness and epitaxis       Additional history:     Reviewed  and updated as needed this visit by clinical staff  Tobacco  Allergies  Meds  Med Hx  Surg Hx  Fam Hx  Soc Hx        Reviewed and updated as needed this visit by Provider         Labs reviewed in EPIC    ROS:  CONSTITUTIONAL: NEGATIVE for fever, chills, change in weight  CV: NEGATIVE for chest pain, palpitations or peripheral edema  ROS otherwise negative    OBJECTIVE:                                                    /60 (BP Location: Right arm, Patient Position: Chair, Cuff Size: Adult Large)   Pulse 82   Temp 97.6  F (36.4  C) (Tympanic)   SpO2 94%   There is no height or weight on file to calculate BMI.   GENERAL: healthy, alert, well nourished, well hydrated, no distress  HENT: ear canals- normal; TMs- normal; Nose- normal; Mouth- no ulcers, no lesions  Tender over right sinuses   NECK: no tenderness, no adenopathy, no asymmetry, no masses, no stiffness; thyroid- normal to  palpation  RESP: lungs not clear to auscultation - no rales, no rhonchi, few exp wheezes  Harsh cough       Results for orders placed or performed in visit on 05/06/19   CBC with platelets differential   Result Value Ref Range    WBC 7.2 4.0 - 11.0 10e9/L    RBC Count 4.25 3.8 - 5.2 10e12/L    Hemoglobin 12.2 11.7 - 15.7 g/dL    Hematocrit 37.2 35.0 - 47.0 %    MCV 88 78 - 100 fl    MCH 28.7 26.5 - 33.0 pg    MCHC 32.8 31.5 - 36.5 g/dL    RDW 14.0 10.0 - 15.0 %    Platelet Count 251 150 - 450 10e9/L    Diff Method Automated Method     % Neutrophils 57.3 %    % Lymphocytes 29.0 %    % Monocytes 7.9 %    % Eosinophils 4.8 %    % Basophils 0.7 %    % Immature Granulocytes 0.3 %    Nucleated RBCs 0 0 /100    Absolute Neutrophil 4.2 1.6 - 8.3 10e9/L    Absolute Lymphocytes 2.1 0.8 - 5.3 10e9/L    Absolute Monocytes 0.6 0.0 - 1.3 10e9/L    Absolute Eosinophils 0.4 0.0 - 0.7 10e9/L    Absolute Basophils 0.1 0.0 - 0.2 10e9/L    Abs Immature Granulocytes 0.0 0 - 0.4 10e9/L    Absolute Nucleated RBC 0.0    CRP inflammation   Result Value Ref Range    CRP Inflammation 3.3 0.0 - 8.0 mg/L     CXR/Sinus XR negative   ASSESSMENT/PLAN:                                                        ICD-10-CM    1. Upper respiratory tract infection, unspecified type J06.9 CBC with platelets differential     CRP inflammation     XR Chest 2 Views     XR Sinus 1/2 Views   2. Cough variant asthma J45.991 CBC with platelets differential     CRP inflammation     XR Chest 2 Views     XR Sinus 1/2 Views     benzonatate (TESSALON) 200 MG capsule     fluticasone-salmeterol (ADVAIR) 250-50 MCG/DOSE inhaler     predniSONE (DELTASONE) 20 MG tablet     Seems more like she has asthma issues than a true infection - may be aggravated by sinuses.  Pt stopped irrigating sinus around time of initial flare- but did have a pneumonia at that time.   Have added Singulair last visit. Will add Advair and do another round of oral steroids.  Risk of thrush discussed  and how to rinse mouth well.  Go back to irrigation of sinuses like before. F/u with DR GAMING in 3-4 weeks. ?? Would be if should stay on lifetime advair of just for several months and try off. Pt agrees with plan.  Symptomatic treatment was discussed along when patient should call and/or come back into the clinic or go to ER/Urgent care. All questions answered.     IF no better with above - needs CT of chest / PFT and appt with Pulmonology.      *30 minutes was spent with patient and over 50%  of this time was spent on counseling patient regarding  illness, medication and / or treatment  options, coordinating further cares and follow ups that are needed along with resource material that will be helpful in the treatment of these issues.       See Patient Instructions    Romeo Krishna MD  [facility]

## 2019-05-07 RX ORDER — GABAPENTIN 300 MG/1
CAPSULE ORAL
Qty: 210 CAPSULE | Refills: 0 | Status: ON HOLD | OUTPATIENT
Start: 2019-05-07 | End: 2019-05-28

## 2019-05-07 RX ORDER — FEXOFENADINE HYDROCHLORIDE AND PSEUDOEPHEDRINE HYDROCHLORIDE 60; 120 MG/1; MG/1
TABLET, FILM COATED, EXTENDED RELEASE ORAL
Qty: 30 TABLET | Refills: 0 | Status: SHIPPED | OUTPATIENT
Start: 2019-05-07 | End: 2019-06-11

## 2019-05-07 ASSESSMENT — ASTHMA QUESTIONNAIRES: ACT_TOTALSCORE: 10

## 2019-05-07 NOTE — TELEPHONE ENCOUNTER
[facility]  MG 12 hr tablet      Last Written Prescription Date:  4-12-19  Last Fill Quantity: 30,   # refills: 0  Last Office Visit: 4-2-19  Future Office visit:    Next 5 appointments (look out 90 days)    Jun 03, 2019  9:20 AM CDT  (Arrive by 9:05 AM)  SHORT with Jimbo Martinez MD  [facility] ([facility] ) [address]  [place] [address]  [phone]           Routing refill request to provider for review/approval because:  Drug not on the [facility], [facility] or [facility] refill protocol or controlled substance    gabapentin (NEURONTIN) 300 MG capsule      Last Written Prescription Date:  4-12-19  Last Fill Quantity: 210,   # refills: 0  Last Office Visit: 4-2-19    Routing refill request to provider for review/approval because:  Drug not on the [facility], [facility] or [facility] refill protocol or controlled substance

## 2019-05-09 ENCOUNTER — E-VISIT (OUTPATIENT)
Dept: FAMILY MEDICINE | Facility: OTHER | Age: 69
End: 2019-05-09
Payer: COMMERCIAL

## 2019-05-09 DIAGNOSIS — M06.09 RHEUMATOID ARTHRITIS OF MULTIPLE SITES WITHOUT RHEUMATOID FACTOR (H): Primary | ICD-10-CM

## 2019-05-09 DIAGNOSIS — Z79.899 ENCOUNTER FOR LONG-TERM (CURRENT) USE OF MEDICATIONS: ICD-10-CM

## 2019-05-09 DIAGNOSIS — J34.89 RHINORRHEA: Primary | ICD-10-CM

## 2019-05-09 LAB
ALBUMIN SERPL-MCNC: 3.3 G/DL (ref 3.4–5)
ALT SERPL W P-5'-P-CCNC: 36 U/L (ref 0–50)
BASOPHILS # BLD AUTO: 0 10E9/L (ref 0–0.2)
BASOPHILS NFR BLD AUTO: 0.1 %
CREAT SERPL-MCNC: 1.02 MG/DL (ref 0.52–1.04)
CRP SERPL-MCNC: <2.9 MG/L (ref 0–8)
DIFFERENTIAL METHOD BLD: NORMAL
EOSINOPHIL # BLD AUTO: 0 10E9/L (ref 0–0.7)
EOSINOPHIL NFR BLD AUTO: 0.2 %
ERYTHROCYTE [DISTWIDTH] IN BLOOD BY AUTOMATED COUNT: 14.3 % (ref 10–15)
ERYTHROCYTE [SEDIMENTATION RATE] IN BLOOD BY WESTERGREN METHOD: 22 MM/H (ref 0–30)
GFR SERPL CREATININE-BSD FRML MDRD: 57 ML/MIN/{1.73_M2}
HCT VFR BLD AUTO: 36.6 % (ref 35–47)
HGB BLD-MCNC: 12 G/DL (ref 11.7–15.7)
IMM GRANULOCYTES # BLD: 0 10E9/L (ref 0–0.4)
IMM GRANULOCYTES NFR BLD: 0.5 %
LYMPHOCYTES # BLD AUTO: 0.9 10E9/L (ref 0.8–5.3)
LYMPHOCYTES NFR BLD AUTO: 10.2 %
MCH RBC QN AUTO: 29.1 PG (ref 26.5–33)
MCHC RBC AUTO-ENTMCNC: 32.8 G/DL (ref 31.5–36.5)
MCV RBC AUTO: 89 FL (ref 78–100)
MONOCYTES # BLD AUTO: 0.2 10E9/L (ref 0–1.3)
MONOCYTES NFR BLD AUTO: 1.9 %
NEUTROPHILS # BLD AUTO: 7.5 10E9/L (ref 1.6–8.3)
NEUTROPHILS NFR BLD AUTO: 87.1 %
NRBC # BLD AUTO: 0 10*3/UL
NRBC BLD AUTO-RTO: 0 /100
PLATELET # BLD AUTO: 253 10E9/L (ref 150–450)
RBC # BLD AUTO: 4.12 10E12/L (ref 3.8–5.2)
WBC # BLD AUTO: 8.6 10E9/L (ref 4–11)

## 2019-05-09 PROCEDURE — 82565 ASSAY OF CREATININE: CPT | Mod: ZL | Performed by: NURSE PRACTITIONER

## 2019-05-09 PROCEDURE — 85652 RBC SED RATE AUTOMATED: CPT | Mod: ZL | Performed by: NURSE PRACTITIONER

## 2019-05-09 PROCEDURE — 84460 ALANINE AMINO (ALT) (SGPT): CPT | Mod: ZL | Performed by: NURSE PRACTITIONER

## 2019-05-09 PROCEDURE — 82040 ASSAY OF SERUM ALBUMIN: CPT | Mod: ZL | Performed by: NURSE PRACTITIONER

## 2019-05-09 PROCEDURE — 85025 COMPLETE CBC W/AUTO DIFF WBC: CPT | Mod: ZL | Performed by: NURSE PRACTITIONER

## 2019-05-09 PROCEDURE — 36415 COLL VENOUS BLD VENIPUNCTURE: CPT | Mod: ZL | Performed by: NURSE PRACTITIONER

## 2019-05-09 PROCEDURE — 86140 C-REACTIVE PROTEIN: CPT | Mod: ZL | Performed by: NURSE PRACTITIONER

## 2019-05-10 NOTE — TELEPHONE ENCOUNTER
ELECTRONIC VISIT DOCUMENTATION:    SUBJECTIVE:  Note above accurately captures the substance of my conversation with the patient.    ASSESSMENT/ PLAN:  Rhinorrhea  I suspect this may represent sinusitis.  Recommend that patient be seen in the office.

## 2019-05-28 ENCOUNTER — APPOINTMENT (OUTPATIENT)
Dept: CT IMAGING | Facility: HOSPITAL | Age: 69
DRG: 871 | End: 2019-05-28
Attending: FAMILY MEDICINE
Payer: MEDICARE

## 2019-05-28 ENCOUNTER — APPOINTMENT (OUTPATIENT)
Dept: GENERAL RADIOLOGY | Facility: HOSPITAL | Age: 69
DRG: 871 | End: 2019-05-28
Attending: INTERNAL MEDICINE
Payer: MEDICARE

## 2019-05-28 ENCOUNTER — HOSPITAL ENCOUNTER (INPATIENT)
Facility: HOSPITAL | Age: 69
LOS: 7 days | Discharge: HOME | DRG: 871 | End: 2019-06-04
Attending: FAMILY MEDICINE | Admitting: INTERNAL MEDICINE
Payer: MEDICARE

## 2019-05-28 DIAGNOSIS — A41.9 SEPSIS (H): ICD-10-CM

## 2019-05-28 DIAGNOSIS — A41.9 SEPSIS, DUE TO UNSPECIFIED ORGANISM: ICD-10-CM

## 2019-05-28 DIAGNOSIS — J18.9 PNEUMONIA OF LEFT LOWER LOBE DUE TO INFECTIOUS ORGANISM: Primary | ICD-10-CM

## 2019-05-28 PROBLEM — N17.9 AKI (ACUTE KIDNEY INJURY) (H): Status: ACTIVE | Noted: 2019-05-28

## 2019-05-28 PROBLEM — J96.01 ACUTE RESPIRATORY FAILURE WITH HYPOXIA (H): Status: ACTIVE | Noted: 2019-05-28

## 2019-05-28 LAB
ABO + RH BLD: NORMAL
ABO + RH BLD: NORMAL
ALBUMIN SERPL-MCNC: 2.3 G/DL (ref 3.4–5)
ALBUMIN UR-MCNC: 100 MG/DL
ALP SERPL-CCNC: 66 U/L (ref 40–150)
ALT SERPL W P-5'-P-CCNC: 28 U/L (ref 0–50)
AMORPH CRY #/AREA URNS HPF: ABNORMAL /HPF
ANION GAP SERPL CALCULATED.3IONS-SCNC: 7 MMOL/L (ref 3–14)
ANION GAP SERPL CALCULATED.3IONS-SCNC: 8 MMOL/L (ref 3–14)
APPEARANCE UR: ABNORMAL
AST SERPL W P-5'-P-CCNC: 15 U/L (ref 0–45)
BACTERIA #/AREA URNS HPF: ABNORMAL /HPF
BASE DEFICIT BLDA-SCNC: 5.3 MMOL/L
BASE DEFICIT BLDA-SCNC: 5.6 MMOL/L
BASOPHILS # BLD AUTO: 0 10E9/L (ref 0–0.2)
BASOPHILS NFR BLD AUTO: 0 %
BILIRUB SERPL-MCNC: 0.7 MG/DL (ref 0.2–1.3)
BILIRUB UR QL STRIP: NEGATIVE
BLD GP AB SCN SERPL QL: NORMAL
BLOOD BANK CMNT PATIENT-IMP: NORMAL
BUN SERPL-MCNC: 25 MG/DL (ref 7–30)
BUN SERPL-MCNC: 27 MG/DL (ref 7–30)
CALCIUM SERPL-MCNC: 7.8 MG/DL (ref 8.5–10.1)
CALCIUM SERPL-MCNC: 8.8 MG/DL (ref 8.5–10.1)
CHLORIDE SERPL-SCNC: 106 MMOL/L (ref 94–109)
CHLORIDE SERPL-SCNC: 109 MMOL/L (ref 94–109)
CK SERPL-CCNC: 62 U/L (ref 30–225)
CO2 SERPL-SCNC: 21 MMOL/L (ref 20–32)
CO2 SERPL-SCNC: 22 MMOL/L (ref 20–32)
COLOR UR AUTO: ABNORMAL
CORTIS SERPL-MCNC: 38.1 UG/DL (ref 4–22)
CREAT SERPL-MCNC: 1.27 MG/DL (ref 0.52–1.04)
CREAT SERPL-MCNC: 1.54 MG/DL (ref 0.52–1.04)
CRP SERPL-MCNC: 372 MG/L (ref 0–8)
DIFFERENTIAL METHOD BLD: ABNORMAL
EOSINOPHIL # BLD AUTO: 0 10E9/L (ref 0–0.7)
EOSINOPHIL NFR BLD AUTO: 0 %
ERYTHROCYTE [DISTWIDTH] IN BLOOD BY AUTOMATED COUNT: 14.8 % (ref 10–15)
GFR SERPL CREATININE-BSD FRML MDRD: 35 ML/MIN/{1.73_M2}
GFR SERPL CREATININE-BSD FRML MDRD: 44 ML/MIN/{1.73_M2}
GLUCOSE SERPL-MCNC: 85 MG/DL (ref 70–99)
GLUCOSE SERPL-MCNC: 97 MG/DL (ref 70–99)
GLUCOSE UR STRIP-MCNC: NEGATIVE MG/DL
GRAM STN SPEC: ABNORMAL
HCO3 BLD-SCNC: 20 MMOL/L (ref 21–28)
HCO3 BLD-SCNC: 20 MMOL/L (ref 21–28)
HCT VFR BLD AUTO: 34.5 % (ref 35–47)
HGB BLD-MCNC: 11.1 G/DL (ref 11.7–15.7)
HGB UR QL STRIP: ABNORMAL
HYALINE CASTS #/AREA URNS LPF: 184 /LPF
KETONES UR STRIP-MCNC: NEGATIVE MG/DL
LACTATE BLD-SCNC: 2.9 MMOL/L (ref 0.7–2)
LACTATE BLD-SCNC: 3 MMOL/L (ref 0.7–2)
LACTATE BLD-SCNC: 4 MMOL/L (ref 0.7–2)
LACTATE BLD-SCNC: 4.9 MMOL/L (ref 0.7–2)
LEUKOCYTE ESTERASE UR QL STRIP: NEGATIVE
LYMPHOCYTES # BLD AUTO: 1 10E9/L (ref 0.8–5.3)
LYMPHOCYTES NFR BLD AUTO: 9 %
MCH RBC QN AUTO: 28.7 PG (ref 26.5–33)
MCHC RBC AUTO-ENTMCNC: 32.2 G/DL (ref 31.5–36.5)
MCV RBC AUTO: 89 FL (ref 78–100)
MONOCYTES # BLD AUTO: 0.2 10E9/L (ref 0–1.3)
MONOCYTES NFR BLD AUTO: 2 %
MUCOUS THREADS #/AREA URNS LPF: PRESENT /LPF
NEUTROPHILS # BLD AUTO: 9.9 10E9/L (ref 1.6–8.3)
NEUTROPHILS NFR BLD AUTO: 88 %
NEUTS BAND # BLD AUTO: 0.1 10E9/L (ref 0–0.6)
NEUTS BAND NFR BLD MANUAL: 1 %
NITRATE UR QL: NEGATIVE
NT-PROBNP SERPL-MCNC: 1160 PG/ML (ref 0–900)
O2/TOTAL GAS SETTING VFR VENT: ABNORMAL %
O2/TOTAL GAS SETTING VFR VENT: ABNORMAL %
OXYHGB MFR BLD: 93 % (ref 92–100)
OXYHGB MFR BLD: 93 % (ref 92–100)
PCO2 BLD: 37 MM HG (ref 35–45)
PCO2 BLD: 39 MM HG (ref 35–45)
PH BLD: 7.32 PH (ref 7.35–7.45)
PH BLD: 7.33 PH (ref 7.35–7.45)
PH UR STRIP: 5.5 PH (ref 4.7–8)
PLATELET # BLD AUTO: 183 10E9/L (ref 150–450)
PLATELET # BLD EST: ABNORMAL 10*3/UL
PO2 BLD: 73 MM HG (ref 80–105)
PO2 BLD: 76 MM HG (ref 80–105)
POTASSIUM SERPL-SCNC: 4.6 MMOL/L (ref 3.4–5.3)
POTASSIUM SERPL-SCNC: 5 MMOL/L (ref 3.4–5.3)
PROCALCITONIN SERPL-MCNC: 16.66 NG/ML
PROT SERPL-MCNC: 6.3 G/DL (ref 6.8–8.8)
RBC # BLD AUTO: 3.87 10E12/L (ref 3.8–5.2)
RBC #/AREA URNS AUTO: 5 /HPF (ref 0–2)
RBC MORPH BLD: NORMAL
SODIUM SERPL-SCNC: 136 MMOL/L (ref 133–144)
SODIUM SERPL-SCNC: 137 MMOL/L (ref 133–144)
SOURCE: ABNORMAL
SP GR UR STRIP: 1.03 (ref 1–1.03)
SPECIMEN EXP DATE BLD: NORMAL
SPECIMEN SOURCE: ABNORMAL
TROPONIN I SERPL-MCNC: <0.015 UG/L (ref 0–0.04)
UROBILINOGEN UR STRIP-MCNC: 2 MG/DL (ref 0–2)
WBC # BLD AUTO: 11.3 10E9/L (ref 4–11)
WBC #/AREA URNS AUTO: 5 /HPF (ref 0–5)

## 2019-05-28 PROCEDURE — 82805 BLOOD GASES W/O2 SATURATION: CPT | Performed by: NURSE PRACTITIONER

## 2019-05-28 PROCEDURE — 25000128 H RX IP 250 OP 636: Performed by: NURSE PRACTITIONER

## 2019-05-28 PROCEDURE — 83880 ASSAY OF NATRIURETIC PEPTIDE: CPT | Performed by: INTERNAL MEDICINE

## 2019-05-28 PROCEDURE — 40000275 ZZH STATISTIC RCP TIME EA 10 MIN

## 2019-05-28 PROCEDURE — 96365 THER/PROPH/DIAG IV INF INIT: CPT

## 2019-05-28 PROCEDURE — 93010 ELECTROCARDIOGRAM REPORT: CPT | Performed by: INTERNAL MEDICINE

## 2019-05-28 PROCEDURE — 87186 SC STD MICRODIL/AGAR DIL: CPT | Performed by: FAMILY MEDICINE

## 2019-05-28 PROCEDURE — 86901 BLOOD TYPING SEROLOGIC RH(D): CPT | Performed by: INTERNAL MEDICINE

## 2019-05-28 PROCEDURE — 82805 BLOOD GASES W/O2 SATURATION: CPT | Performed by: INTERNAL MEDICINE

## 2019-05-28 PROCEDURE — 86140 C-REACTIVE PROTEIN: CPT | Performed by: INTERNAL MEDICINE

## 2019-05-28 PROCEDURE — 36415 COLL VENOUS BLD VENIPUNCTURE: CPT | Performed by: INTERNAL MEDICINE

## 2019-05-28 PROCEDURE — 25000125 ZZHC RX 250: Performed by: INTERNAL MEDICINE

## 2019-05-28 PROCEDURE — 36600 WITHDRAWAL OF ARTERIAL BLOOD: CPT

## 2019-05-28 PROCEDURE — 71045 X-RAY EXAM CHEST 1 VIEW: CPT | Mod: TC

## 2019-05-28 PROCEDURE — 87086 URINE CULTURE/COLONY COUNT: CPT | Performed by: INTERNAL MEDICINE

## 2019-05-28 PROCEDURE — 94640 AIRWAY INHALATION TREATMENT: CPT | Mod: 76

## 2019-05-28 PROCEDURE — 36415 COLL VENOUS BLD VENIPUNCTURE: CPT | Performed by: NURSE PRACTITIONER

## 2019-05-28 PROCEDURE — 80053 COMPREHEN METABOLIC PANEL: CPT | Performed by: INTERNAL MEDICINE

## 2019-05-28 PROCEDURE — 99285 EMERGENCY DEPT VISIT HI MDM: CPT | Mod: Z6 | Performed by: FAMILY MEDICINE

## 2019-05-28 PROCEDURE — 25000128 H RX IP 250 OP 636: Performed by: FAMILY MEDICINE

## 2019-05-28 PROCEDURE — 25800030 ZZH RX IP 258 OP 636: Performed by: NURSE PRACTITIONER

## 2019-05-28 PROCEDURE — 87077 CULTURE AEROBIC IDENTIFY: CPT | Performed by: FAMILY MEDICINE

## 2019-05-28 PROCEDURE — 82533 TOTAL CORTISOL: CPT | Performed by: NURSE PRACTITIONER

## 2019-05-28 PROCEDURE — 87070 CULTURE OTHR SPECIMN AEROBIC: CPT | Performed by: FAMILY MEDICINE

## 2019-05-28 PROCEDURE — 80048 BASIC METABOLIC PNL TOTAL CA: CPT | Performed by: NURSE PRACTITIONER

## 2019-05-28 PROCEDURE — A9270 NON-COVERED ITEM OR SERVICE: HCPCS | Performed by: NURSE PRACTITIONER

## 2019-05-28 PROCEDURE — 84145 PROCALCITONIN (PCT): CPT | Performed by: FAMILY MEDICINE

## 2019-05-28 PROCEDURE — 81001 URINALYSIS AUTO W/SCOPE: CPT | Performed by: INTERNAL MEDICINE

## 2019-05-28 PROCEDURE — 87205 SMEAR GRAM STAIN: CPT | Performed by: FAMILY MEDICINE

## 2019-05-28 PROCEDURE — 99285 EMERGENCY DEPT VISIT HI MDM: CPT | Mod: 25

## 2019-05-28 PROCEDURE — 99223 1ST HOSP IP/OBS HIGH 75: CPT | Mod: AI | Performed by: NURSE PRACTITIONER

## 2019-05-28 PROCEDURE — 94640 AIRWAY INHALATION TREATMENT: CPT

## 2019-05-28 PROCEDURE — 40000786 ZZHCL STATISTIC ACTIVE MRSA SURVEILLANCE CULTURE: Performed by: NURSE PRACTITIONER

## 2019-05-28 PROCEDURE — 71250 CT THORAX DX C-: CPT | Mod: TC

## 2019-05-28 PROCEDURE — 86900 BLOOD TYPING SEROLOGIC ABO: CPT | Performed by: INTERNAL MEDICINE

## 2019-05-28 PROCEDURE — 96361 HYDRATE IV INFUSION ADD-ON: CPT

## 2019-05-28 PROCEDURE — 86850 RBC ANTIBODY SCREEN: CPT | Performed by: INTERNAL MEDICINE

## 2019-05-28 PROCEDURE — 87899 AGENT NOS ASSAY W/OPTIC: CPT | Performed by: NURSE PRACTITIONER

## 2019-05-28 PROCEDURE — 82550 ASSAY OF CK (CPK): CPT | Performed by: INTERNAL MEDICINE

## 2019-05-28 PROCEDURE — 85025 COMPLETE CBC W/AUTO DIFF WBC: CPT | Performed by: INTERNAL MEDICINE

## 2019-05-28 PROCEDURE — 25000132 ZZH RX MED GY IP 250 OP 250 PS 637: Performed by: FAMILY MEDICINE

## 2019-05-28 PROCEDURE — 83605 ASSAY OF LACTIC ACID: CPT | Performed by: FAMILY MEDICINE

## 2019-05-28 PROCEDURE — 83605 ASSAY OF LACTIC ACID: CPT | Performed by: NURSE PRACTITIONER

## 2019-05-28 PROCEDURE — 25800030 ZZH RX IP 258 OP 636: Performed by: FAMILY MEDICINE

## 2019-05-28 PROCEDURE — 25000125 ZZHC RX 250: Performed by: NURSE PRACTITIONER

## 2019-05-28 PROCEDURE — 25000128 H RX IP 250 OP 636: Performed by: INTERNAL MEDICINE

## 2019-05-28 PROCEDURE — 83605 ASSAY OF LACTIC ACID: CPT | Performed by: INTERNAL MEDICINE

## 2019-05-28 PROCEDURE — 93005 ELECTROCARDIOGRAM TRACING: CPT

## 2019-05-28 PROCEDURE — 25000132 ZZH RX MED GY IP 250 OP 250 PS 637: Performed by: NURSE PRACTITIONER

## 2019-05-28 PROCEDURE — 20000003 ZZH R&B ICU

## 2019-05-28 PROCEDURE — 87040 BLOOD CULTURE FOR BACTERIA: CPT | Performed by: INTERNAL MEDICINE

## 2019-05-28 PROCEDURE — 84484 ASSAY OF TROPONIN QUANT: CPT | Performed by: INTERNAL MEDICINE

## 2019-05-28 PROCEDURE — A9270 NON-COVERED ITEM OR SERVICE: HCPCS | Performed by: FAMILY MEDICINE

## 2019-05-28 RX ORDER — FLUTICASONE PROPIONATE 50 MCG
1 SPRAY, SUSPENSION (ML) NASAL DAILY
COMMUNITY
End: 2022-01-02

## 2019-05-28 RX ORDER — BENZONATATE 100 MG/1
200 CAPSULE ORAL 3 TIMES DAILY PRN
Status: DISCONTINUED | OUTPATIENT
Start: 2019-05-28 | End: 2019-06-04 | Stop reason: HOSPADM

## 2019-05-28 RX ORDER — MONTELUKAST SODIUM 10 MG/1
10 TABLET ORAL AT BEDTIME
Status: DISCONTINUED | OUTPATIENT
Start: 2019-05-28 | End: 2019-06-04 | Stop reason: HOSPADM

## 2019-05-28 RX ORDER — SODIUM CHLORIDE 9 MG/ML
INJECTION, SOLUTION INTRAVENOUS CONTINUOUS
Status: DISCONTINUED | OUTPATIENT
Start: 2019-05-28 | End: 2019-05-29

## 2019-05-28 RX ORDER — IPRATROPIUM BROMIDE AND ALBUTEROL SULFATE 2.5; .5 MG/3ML; MG/3ML
3 SOLUTION RESPIRATORY (INHALATION) ONCE
Status: COMPLETED | OUTPATIENT
Start: 2019-05-28 | End: 2019-05-28

## 2019-05-28 RX ORDER — GABAPENTIN 300 MG/1
600 CAPSULE ORAL AT BEDTIME
Status: DISCONTINUED | OUTPATIENT
Start: 2019-05-28 | End: 2019-06-04 | Stop reason: HOSPADM

## 2019-05-28 RX ORDER — GABAPENTIN 300 MG/1
600 CAPSULE ORAL DAILY
Status: DISCONTINUED | OUTPATIENT
Start: 2019-05-29 | End: 2019-06-04 | Stop reason: HOSPADM

## 2019-05-28 RX ORDER — GABAPENTIN 300 MG/1
900 CAPSULE ORAL EVERY MORNING
Status: DISCONTINUED | OUTPATIENT
Start: 2019-05-29 | End: 2019-06-04 | Stop reason: HOSPADM

## 2019-05-28 RX ORDER — ONDANSETRON 4 MG/1
4 TABLET, ORALLY DISINTEGRATING ORAL EVERY 6 HOURS PRN
Status: DISCONTINUED | OUTPATIENT
Start: 2019-05-28 | End: 2019-06-04 | Stop reason: HOSPADM

## 2019-05-28 RX ORDER — CEFTRIAXONE SODIUM 1 G/50ML
1 INJECTION, SOLUTION INTRAVENOUS ONCE
Status: COMPLETED | OUTPATIENT
Start: 2019-05-28 | End: 2019-05-28

## 2019-05-28 RX ORDER — FAMOTIDINE 20 MG/1
20 TABLET, FILM COATED ORAL 2 TIMES DAILY
Status: DISCONTINUED | OUTPATIENT
Start: 2019-05-28 | End: 2019-05-28

## 2019-05-28 RX ORDER — ACETAMINOPHEN 325 MG/1
650 TABLET ORAL EVERY 4 HOURS PRN
Status: DISCONTINUED | OUTPATIENT
Start: 2019-05-28 | End: 2019-06-04 | Stop reason: HOSPADM

## 2019-05-28 RX ORDER — GABAPENTIN 300 MG/1
600 CAPSULE ORAL DAILY
COMMUNITY
End: 2019-11-23

## 2019-05-28 RX ORDER — HYDROCODONE BITARTRATE AND ACETAMINOPHEN 7.5; 325 MG/1; MG/1
1 TABLET ORAL PRN
COMMUNITY
End: 2020-01-20

## 2019-05-28 RX ORDER — NALOXONE HYDROCHLORIDE 0.4 MG/ML
.1-.4 INJECTION, SOLUTION INTRAMUSCULAR; INTRAVENOUS; SUBCUTANEOUS
Status: DISCONTINUED | OUTPATIENT
Start: 2019-05-28 | End: 2019-06-04 | Stop reason: HOSPADM

## 2019-05-28 RX ORDER — FAMOTIDINE 20 MG/1
20 TABLET, FILM COATED ORAL DAILY
Status: DISCONTINUED | OUTPATIENT
Start: 2019-05-29 | End: 2019-06-01

## 2019-05-28 RX ORDER — POLYETHYLENE GLYCOL 3350 17 G/17G
17 POWDER, FOR SOLUTION ORAL DAILY PRN
Status: DISCONTINUED | OUTPATIENT
Start: 2019-05-28 | End: 2019-06-04 | Stop reason: HOSPADM

## 2019-05-28 RX ORDER — ONDANSETRON 2 MG/ML
4 INJECTION INTRAMUSCULAR; INTRAVENOUS EVERY 6 HOURS PRN
Status: DISCONTINUED | OUTPATIENT
Start: 2019-05-28 | End: 2019-06-04 | Stop reason: HOSPADM

## 2019-05-28 RX ORDER — GABAPENTIN 300 MG/1
600 CAPSULE ORAL DAILY
Status: DISCONTINUED | OUTPATIENT
Start: 2019-05-28 | End: 2019-05-28

## 2019-05-28 RX ORDER — GABAPENTIN 300 MG/1
900 CAPSULE ORAL EVERY MORNING
COMMUNITY
End: 2019-11-29

## 2019-05-28 RX ORDER — PANTOPRAZOLE SODIUM 40 MG/1
40 TABLET, DELAYED RELEASE ORAL
COMMUNITY
End: 2019-11-23

## 2019-05-28 RX ORDER — TRAZODONE HYDROCHLORIDE 50 MG/1
150 TABLET, FILM COATED ORAL AT BEDTIME
COMMUNITY
End: 2020-01-20

## 2019-05-28 RX ORDER — IPRATROPIUM BROMIDE AND ALBUTEROL SULFATE 2.5; .5 MG/3ML; MG/3ML
3 SOLUTION RESPIRATORY (INHALATION)
Status: DISCONTINUED | OUTPATIENT
Start: 2019-05-28 | End: 2019-06-04 | Stop reason: HOSPADM

## 2019-05-28 RX ORDER — ALBUTEROL SULFATE 0.83 MG/ML
2.5 SOLUTION RESPIRATORY (INHALATION)
Status: DISCONTINUED | OUTPATIENT
Start: 2019-05-28 | End: 2019-06-04 | Stop reason: HOSPADM

## 2019-05-28 RX ORDER — HYDROCODONE BITARTRATE AND ACETAMINOPHEN 5; 325 MG/1; MG/1
1-2 TABLET ORAL EVERY 4 HOURS PRN
Status: DISCONTINUED | OUTPATIENT
Start: 2019-05-28 | End: 2019-06-04 | Stop reason: HOSPADM

## 2019-05-28 RX ORDER — FEXOFENADINE HCL 60 MG/1
60 TABLET, FILM COATED ORAL AT BEDTIME
Status: DISCONTINUED | OUTPATIENT
Start: 2019-05-28 | End: 2019-05-29

## 2019-05-28 RX ORDER — CEFAZOLIN SODIUM 1 G/50ML
1750 SOLUTION INTRAVENOUS EVERY 24 HOURS
Status: DISCONTINUED | OUTPATIENT
Start: 2019-05-28 | End: 2019-05-30

## 2019-05-28 RX ORDER — PANTOPRAZOLE SODIUM 40 MG/1
40 TABLET, DELAYED RELEASE ORAL
Status: DISCONTINUED | OUTPATIENT
Start: 2019-05-28 | End: 2019-06-04 | Stop reason: HOSPADM

## 2019-05-28 RX ORDER — AZITHROMYCIN 250 MG/1
500 TABLET, FILM COATED ORAL ONCE
Status: COMPLETED | OUTPATIENT
Start: 2019-05-28 | End: 2019-05-28

## 2019-05-28 RX ORDER — FLUTICASONE PROPIONATE 50 MCG
1 SPRAY, SUSPENSION (ML) NASAL DAILY
Status: DISCONTINUED | OUTPATIENT
Start: 2019-05-28 | End: 2019-06-04 | Stop reason: HOSPADM

## 2019-05-28 RX ORDER — CITALOPRAM HYDROBROMIDE 20 MG/1
40 TABLET ORAL DAILY
Status: DISCONTINUED | OUTPATIENT
Start: 2019-05-29 | End: 2019-06-04 | Stop reason: HOSPADM

## 2019-05-28 RX ADMIN — IPRATROPIUM BROMIDE AND ALBUTEROL SULFATE 3 ML: .5; 3 SOLUTION RESPIRATORY (INHALATION) at 08:02

## 2019-05-28 RX ADMIN — ENOXAPARIN SODIUM 40 MG: 40 INJECTION SUBCUTANEOUS at 12:14

## 2019-05-28 RX ADMIN — ALBUTEROL SULFATE 2.5 MG: 2.5 SOLUTION RESPIRATORY (INHALATION) at 12:30

## 2019-05-28 RX ADMIN — VANCOMYCIN HYDROCHLORIDE 1750 MG: 5 INJECTION, POWDER, LYOPHILIZED, FOR SOLUTION INTRAVENOUS at 12:14

## 2019-05-28 RX ADMIN — SODIUM CHLORIDE: 9 INJECTION, SOLUTION INTRAVENOUS at 15:52

## 2019-05-28 RX ADMIN — AZITHROMYCIN 500 MG: 250 TABLET, FILM COATED ORAL at 08:16

## 2019-05-28 RX ADMIN — TAZOBACTAM SODIUM AND PIPERACILLIN SODIUM 4.5 G: 500; 4 INJECTION, SOLUTION INTRAVENOUS at 11:33

## 2019-05-28 RX ADMIN — IPRATROPIUM BROMIDE AND ALBUTEROL SULFATE 3 ML: .5; 3 SOLUTION RESPIRATORY (INHALATION) at 19:38

## 2019-05-28 RX ADMIN — IPRATROPIUM BROMIDE AND ALBUTEROL SULFATE 3 ML: .5; 3 SOLUTION RESPIRATORY (INHALATION) at 14:14

## 2019-05-28 RX ADMIN — FLUTICASONE PROPIONATE 1 SPRAY: 50 SPRAY, METERED NASAL at 18:03

## 2019-05-28 RX ADMIN — ACETAMINOPHEN 650 MG: 325 TABLET, FILM COATED ORAL at 15:51

## 2019-05-28 RX ADMIN — FAMOTIDINE 20 MG: 20 TABLET, FILM COATED ORAL at 12:14

## 2019-05-28 RX ADMIN — SODIUM CHLORIDE, POTASSIUM CHLORIDE, SODIUM LACTATE AND CALCIUM CHLORIDE 1000 ML: 600; 310; 30; 20 INJECTION, SOLUTION INTRAVENOUS at 11:22

## 2019-05-28 RX ADMIN — FEXOFENADINE HYDROCHLORIDE 60 MG: 60 TABLET, FILM COATED ORAL at 21:29

## 2019-05-28 RX ADMIN — TAZOBACTAM SODIUM AND PIPERACILLIN SODIUM 4.5 G: 500; 4 INJECTION, SOLUTION INTRAVENOUS at 17:11

## 2019-05-28 RX ADMIN — MONTELUKAST 10 MG: 10 TABLET, FILM COATED ORAL at 21:29

## 2019-05-28 RX ADMIN — SODIUM CHLORIDE 1000 ML: 9 INJECTION, SOLUTION INTRAVENOUS at 07:57

## 2019-05-28 RX ADMIN — PANTOPRAZOLE SODIUM 40 MG: 40 TABLET, DELAYED RELEASE ORAL at 17:10

## 2019-05-28 RX ADMIN — SODIUM CHLORIDE: 9 INJECTION, SOLUTION INTRAVENOUS at 11:29

## 2019-05-28 RX ADMIN — SODIUM CHLORIDE 1000 ML: 9 INJECTION, SOLUTION INTRAVENOUS at 09:12

## 2019-05-28 RX ADMIN — GABAPENTIN 600 MG: 300 CAPSULE ORAL at 21:29

## 2019-05-28 RX ADMIN — TAZOBACTAM SODIUM AND PIPERACILLIN SODIUM 4.5 G: 500; 4 INJECTION, SOLUTION INTRAVENOUS at 22:52

## 2019-05-28 RX ADMIN — CEFTRIAXONE SODIUM 1 G: 1 INJECTION, SOLUTION INTRAVENOUS at 08:16

## 2019-05-28 ASSESSMENT — ENCOUNTER SYMPTOMS
VOMITING: 0
LIGHT-HEADEDNESS: 1
CHEST TIGHTNESS: 0
ABDOMINAL PAIN: 0
NAUSEA: 0
WOUND: 0
WHEEZING: 0
HEMOPTYSIS: 1
FEVER: 1
APNEA: 0
FLANK PAIN: 0
CHILLS: 1
PALPITATIONS: 0
DIAPHORESIS: 0
HEMATURIA: 0
EYE REDNESS: 0
ABDOMINAL DISTENTION: 0
ARTHRALGIAS: 0
DIZZINESS: 1
NECK STIFFNESS: 0
BLOOD IN STOOL: 0
SHORTNESS OF BREATH: 1
BACK PAIN: 0
DIFFICULTY URINATING: 0
COUGH: 1
MYALGIAS: 0
COLOR CHANGE: 0
APPETITE CHANGE: 1
FATIGUE: 1
ANAL BLEEDING: 0
NECK PAIN: 0
HEADACHES: 0
VOICE CHANGE: 0
NUMBNESS: 0
CONFUSION: 0
DYSURIA: 0

## 2019-05-28 ASSESSMENT — MIFFLIN-ST. JEOR
SCORE: 1504.3
SCORE: 1535.88

## 2019-05-28 ASSESSMENT — ACTIVITIES OF DAILY LIVING (ADL)
ADLS_ACUITY_SCORE: 13

## 2019-05-28 NOTE — PHARMACY-VANCOMYCIN DOSING SERVICE
Pharmacy Vancomycin Initial Note  Date of Service May 28, 2019  Patient's  1950  69 year old, female    Indication: Community Acquired Pneumonia and Sepsis    Current estimated CrCl = Estimated Creatinine Clearance: 41.2 mL/min (A) (based on SCr of 1.54 mg/dL (H)).    Creatinine for last 3 days  2019:  8:11 AM Creatinine 1.54 mg/dL    Recent Vancomycin Level(s) for last 3 days  No results found for requested labs within last 72 hours.      Vancomycin IV Administrations (past 72 hours)      No vancomycin orders with administrations in past 72 hours.                Nephrotoxins and other renal medications (From now, onward)    Start     Dose/Rate Route Frequency Ordered Stop    19 1145  vancomycin (VANCOCIN) 1,750 mg in sodium chloride 0.9 % 500 mL intermittent infusion      1,750 mg  284 mL/hr over 2 Hours Intravenous EVERY 24 HOURS 19 1136      19 1115  piperacillin-tazobactam (ZOSYN) intermittent infusion 4.5 g      4.5 g  200 mL/hr over 30 Minutes Intravenous EVERY 6 HOURS 19 1107            Contrast Orders - past 72 hours (72h ago, onward)    None                Plan:  1.  Start vancomycin  1750 mg IV q24h.   2.  Goal Trough Level: 15-20 mg/L   3.  Pharmacy will check trough levels as appropriate in 1-3 Days.    4. Serum creatinine levels will be ordered daily for the first week of therapy and at least twice weekly for subsequent weeks.    5. Hodges method utilized to dose vancomycin therapy: [facility]    Romeo Arellano, Pharm D.

## 2019-05-28 NOTE — H&P
[facility]    History and Physical  Hospitalist       Date of Admission:  5/28/2019  Date of Service (when I saw the patient): 05/28/19    Assessment & Plan       Bilateral pneumonia: Sputum culture pending. Immunocompromised due to multiple RA drugs. Treated with Levaquin for LLL pneumonia 3/27/19, patient reports no improvement since then, worsening over the last 10 days. ABG remarkably normal considering her high flow oxygen need. Also having significant hemoptysis. CT scan shows bilateral pneumonia.  Zithromax, zosyn, vanco-pharmacy to dose. Duonebs, oxygen supplementation.       Sepsis (H): Due to bilateral pneumonia, organism unknown. Presented with hypotension, hypoxia, tachycardia. Leukocytosis, elevated ProCal and lactic acid of 4.9 on presentation. Fuid bolus started in ED, antibiotics given. Lactic acid improving. Hypotension resolved with fluid resuscitation. On arrival to the floor her cap refill is <3sec, mentation is clear, work of breathing improved and she reports feeling improved as well. Sputum and blood cultures spending. Treatment as above.      CASE (acute kidney injury) (H): Due to acute sepsis. Improved already with fluid resuscitation so ATN ruled out.       Acute respiratory failure with hypoxia (H): Due to #1, treatment as above. She is requiring 10-15 liters of oxygen though her work of breathing has improved significantly since arrival. Will try AIRVO to see if this will give better oxygenation. he does have some cough variant asthma for which she has been treated for presumed exacebation over the last 2 months with new start of fexofenadine, Singulair and Advair as well as multiple rounds of steroids.      Low back pain, chronic: Continue home dose hydrocodone, gabapentin.       RA (rheumatoid arthritis) (H): Will need to hold methotrexate and plaquenil.       DVT Prophylaxis: Enoxaparin (Lovenox) SQ  Code Status: Full Code    Disposition: Expected discharge in several days  once improved.    Kelly Lim, CNP    Primary Care Physician   Jimbo Martinez    Chief Complaint   Weakness, dizziness    History is obtained from the patient    History of Present Illness   Melvi Cleveland is a 69 year old female who presents with sudden onset weakness, dizziness. Has had increasing dyspnea over the last 10 days with pink tinged sputum starting 3 days ago and progressing to fahad bloody sputum. She also reports worsening back pain, right mid back that is different than her chronic back pain. Denies nausea/vomiting, has been drinking fluids well. On arrival to the ED whe was found to be hypotension, tachycardiac and hypoxia.     Past Medical History    I have reviewed this patient's medical history and updated it with pertinent information if needed.   Past Medical History:   Diagnosis Date     Allergic rhinitis, seasonal 3/1/2011     Arthritis      Chronic/Recurrent Back Pain 12/13/2000     Chronic/Recurrent Neck Pain 7/5/2005     Cough variant asthma 4/16/2019     Crohn's Disease 3/1/2011     CTS (carpal tunnel syndrome) 1/1/2011     Depressive disorder      Dyslipidemia 3/1/2011     Fibrocystic Breast Disease 3/1/2011     Mixed hyperlipidemia 1/1/2011     Wilson's neuroma 1/1/2011     Parotiditis 5/9/2011     Peripheral Neuriopathy 3/1/2011     Pneumonia of left lower lobe due to infectious organism (H) 4/16/2019     Rheumatoid arthritis(714.0) 12/13/1999     Seborrhea capitis 10/6/2011     Sjogrens Syndrome 3/1/2011     Urethral stricture 4/30/2008       Past Surgical History   I have reviewed this patient's surgical history and updated it with pertinent information if needed.  Past Surgical History:   Procedure Laterality Date     BACK SURGERY  05/1995    back surgery     BIOPSY      breast bx X2     BIOPSY BREAST      LT     BIOPSY BREAST NEEDLE LOCALIZATION Right 6/12/2017    Procedure: BIOPSY BREAST NEEDLE LOCALIZATION;  WIRE LOCALIZED EXCISIONAL BIOPSY  RIGHT BREAST MASS;  Surgeon:  Jeni Amin MD;  Location: [facility] OR     CHOLECYSTECTOMY  2004     COLONOSCOPY  11/15/2004    screening     COLONOSCOPY  9/24/2014     EGD with biopsy  2010, 2011    GERD     laminectomy      L4 L5 laminectomy; back pain     RELEASE CARPAL TUNNEL Right 1/16/2018    Procedure: RELEASE CARPAL TUNNEL;  RIGHT CARPAL TUNNEL RELEASE;  Surgeon: Haider Carlos MD;  Location: [facility] OR       Prior to Admission Medications   Prior to Admission Medications   Prescriptions Last Dose Informant Patient Reported? Taking?   ALBUTEROL 108 (90 BASE) MCG/ACT inhaler 5/27/2019 at Unknown time Self No Yes   Sig: INHALE 2 PUFFS INTO THE LUNGS EVERY 6 HOURS AS NEEDED FOR SHORTNESS OF BREATH OR WHEEZING   ALFALFA PO 5/27/2019 at PM Self Yes Yes   Sig: Take 5 tablets by mouth 2 times daily.   ALLEGRA-D ALLERGY & CONGESTION  MG 12 hr tablet 5/27/2019 at AM  No Yes   Sig: TAKE 1 TABLET BY MOUTH TWICE DAILY   Ascorbic Acid (VITAMIN C) 500 MG CAPS 5/27/2019 at AM Self Yes Yes   Sig: Take 1 tablet by mouth daily    Calcium-Vitamin D-Vitamin K (CALCIUM + D) 500-1000-40 MG-UNT-MCG CHEW 5/27/2019 at PM Self Yes Yes   Sig: Take 3 tablets by mouth daily    Cholecalciferol (VITAMIN D) 1000 UNITS capsule 5/27/2019 at AM Self Yes Yes   Sig: Take 2 capsules by mouth daily    DICLOFENAC PO 5/27/2019 at AM Self Yes Yes   Sig: Take 100 mg by mouth daily    Flaxseed, Linseed, (FLAX SEED OIL) 1000 MG capsule 5/27/2019 at PM Self Yes Yes   Sig: Take 2 capsules by mouth daily   Garlic 400 MG TBEC 5/27/2019 at PM Self Yes Yes   Sig: Take 1 tablet by mouth daily    HYDROcodone-acetaminophen (NORCO) 7.5-325 MG per tablet 5/27/2019 at PM  Yes Yes   Sig: Take 1 tablet by mouth as needed for severe pain Every 4-6 hours as needed.   MILK THISTLE PO 5/27/2019 at PM Self Yes Yes   Sig: Take 1,000 mg by mouth daily.   Methotrexate, Anti-Rheumatic, (METHOTREXATE, PF, SC) 5/22/2019 at Unknown time  Yes Yes   Sig: Inject 0.6 mLs Subcutaneous 25 mg vial- patient draws up  0.6 ml.    Omega-3 1000 MG capsule 5/27/2019 at PM Self Yes Yes   Sig: Take 1.5 g by mouth daily    PREBIOTIC PRODUCT PO Past Week at Unknown time Self Yes Yes   Sig: Take 1 tablet by mouth daily   Probiotic Product (SOLUBLE FIBER/PROBIOTICS PO) Past Week at Unknown time Self Yes Yes   Sig: Take 1 tablet by mouth   RESTASIS 0.05 % ophthalmic emulsion Unknown at Unknown time Self No No   Sig: USE 1 DROP IN BOTH EYES 2 TIMES A DAY   Simethicone (SM GAS RELIEF ANTIFLATUENT) 180 MG CAPS Past Month at Unknown time Self No Yes   Sig: TAKE ONE CAPSULE BY MOUTH AS NEEDED AFTER A MEAL. MAX 2 CAPS/DAY   albuterol (PROVENTIL) (2.5 MG/3ML) 0.083% neb solution 5/28/2019 at AM Self No Yes   Sig: Take 1 vial (2.5 mg) by nebulization every 6 hours as needed for shortness of breath / dyspnea or wheezing   benzonatate (TESSALON) 200 MG capsule 5/27/2019 at PM  No Yes   Sig: Take 1 capsule (200 mg) by mouth 3 times daily as needed for cough   citalopram (CELEXA) 40 MG tablet 5/27/2019 at AM Self No Yes   Sig: TAKE 1 TABLET BY MOUTH DAILY   fluticasone (FLONASE) 50 MCG/ACT nasal spray 5/27/2019 at AM  Yes Yes   Sig: Spray 1 spray into both nostrils daily   fluticasone-salmeterol (ADVAIR) 250-50 MCG/DOSE inhaler 5/27/2019 at PM  No Yes   Sig: Inhale 1 puff into the lungs every 12 hours   folic acid (FOLVITE) 1 MG tablet 5/27/2019 at PM Self Yes Yes   Sig: Take 1 mg by mouth 3 times daily.   gabapentin (NEURONTIN) 300 MG capsule 5/27/2019 at AMt  Yes Yes   Sig: Take 900 mg by mouth every morning   gabapentin (NEURONTIN) 300 MG capsule 5/27/2019 at PM  Yes Yes   Sig: Take 600 mg by mouth daily In the afternoon   gabapentin (NEURONTIN) 300 MG capsule 5/27/2019 at PM  Yes Yes   Sig: Take 600 mg by mouth daily At bedtime   hydroxychloroquine (PLAQUENIL) 200 MG tablet 5/27/2019 at PM Self Yes Yes   Sig: Take 200 mg by mouth daily   montelukast (SINGULAIR) 10 MG tablet 5/27/2019 at PM Self No Yes   Sig: Take 1 tablet (10 mg) by mouth At  Bedtime   order for DME Unknown at Unknown time Self No No   Sig: Equipment being ordered: Nebulizer   pantoprazole (PROTONIX) 40 MG EC tablet 2019 at PM  Yes Yes   Sig: Take 40 mg by mouth 2 times daily   polyvinyl alcohol-povidone (REFRESH) 1.4-0.6 % ophthalmic solution Unknown at Unknown time Self Yes No   Si-2 drops as needed   predniSONE (DELTASONE) 20 MG tablet   No No   Sig: Take 60 mg by mouth daily for 3 days, THEN 40 mg daily for 3 days, THEN 20 mg daily for 3 days, THEN 10 mg daily for 3 days.   traZODone (DESYREL) 50 MG tablet 2019 at PM  Yes Yes   Sig: Take 150 mg by mouth At Bedtime      Facility-Administered Medications: None     Allergies   Allergies   Allergen Reactions     Adhesive Tape      Glue and nicotine patches     Benzoin Compound      Tin-Co-Javier     Mold      Seasonal Allergies      Soap      Any cleanser/soap/disinfectant that is used right before surgery.  Makes patient break out horribly. Chart was looked at and Chloraprep was used.       Social History   I have reviewed this patient's social history and updated it with pertinent information if needed. Melvi CHRISTOPHER [place]  reports that she quit smoking about 22 years ago. Her smoking use included cigarettes. She has never used smokeless tobacco. She reports that she does not drink alcohol or use drugs.    Family History   I have reviewed this patient's family history and updated it with pertinent information if needed.   Family History   Problem Relation Age of Onset     Diabetes Mother      Rheumatoid Arthritis Mother      Other - See Comments Mother         fibromyalgia     Osteoarthritis Mother      Thyroid Disease Mother         thyroid disorder     Unknown/Adopted Father         cause of death unknown     Rheumatoid Arthritis Father      Diabetes Sister      Myocardial Infarction Sister         cause of death     Lupus Sister         cause of death     C.A.D. Maternal Grandmother      Cerebrovascular Disease Maternal  Grandmother      Diabetes Maternal Grandmother      Thyroid Disease Maternal Grandmother         thyrodi disorder; goitor removed     Cancer Maternal Grandfather      Hypertension Brother      Other - See Comments Brother         sleep apnea     Other - See Comments Sister         sleep apnea       Review of Systems   CONSTITUTIONAL:  positive for  chills and fatigue  negative for  sweats, anorexia and weight loss  HEENT:  negative for  nasal congestion, epistaxis and sore throat  RESPIRATORY:  positive for  cough with sputum, wheezing and hemoptysis  CARDIOVASCULAR:  negative for  chest pain, palpitations, exertional chest pressure/discomfort, edema  GASTROINTESTINAL:  negative for nausea, vomiting, change in bowel habits and abdominal pain  MUSCULOSKELETAL:  positive for  muscle weakness  NEUROLOGICAL:  positive for dizziness and weakness    Physical Exam   Temp: 99.2  F (37.3  C) Temp src: Tympanic BP: 113/62   Heart Rate: 101 Resp: 18 SpO2: 95 % O2 Device: High Flow Nasal Cannula (HFNC) Oxygen Delivery: 13 LPM  Vital Signs with Ranges  Temp:  [98.7  F (37.1  C)-99.2  F (37.3  C)] 99.2  F (37.3  C)  Heart Rate:  [101-124] 101  Resp:  [15-36] 18  BP: ()/(52-90) 113/62  SpO2:  [82 %-95 %] 95 %  220 lbs 7.36 oz    Constitutional: Awake,alert, ill appearing   HEENT: Mucous membranes dry, pink, no cyanosis.  Respiratory: Course rhonchi left mid to base, course crackles right mid to base, no wheezes noted. Equal chest rise and fall, minimal dyspnea at rest noted with conversation but she is able to speak in full sentences.   Cardiovascular: HRR, mildly tachycardiac. NO murmurs, rubs,thrills.  GI: Soft, nontender, bowel sound ar hypoactive.   Skin: No rashes, unusual bruising or open areas. Pale, warm and dry.   Musculoskeletal: Moves all extremities equally, able to sit up in bed by herself though appears weak.   Neurologic: Mentation clear, easily follows commands, recalls events.       Data   Data reviewed  today:   Recent Labs   Lab 19  1559 19  0811   WBC  --  11.3*   HGB  --  11.1*   MCV  --  89   PLT  --  183    136   POTASSIUM 4.6 5.0   CHLORIDE 109 106   CO2 21 22   BUN 25 27   CR 1.27* 1.54*   ANIONGAP 7 8   KATHLEEN 7.8* 8.8   GLC 97 85   ALBUMIN  --  2.3*   PROTTOTAL  --  6.3*   BILITOTAL  --  0.7   ALKPHOS  --  66   ALT  --  28   AST  --  15   TROPI  --  <0.015       Recent Results (from the past 24 hour(s))   XR Chest Port 1 View    Narrative    PROCEDURE:  XR CHEST PORT 1 VW    HISTORY:  sob.     COMPARISON:  None.    FINDINGS:   The cardiac silhouette is normal in size. The pulmonary vasculature is  normal.  Abnormal decreased  in the left lung suspicious for  pneumonia. No pleural effusion or pneumothorax.      Impression    IMPRESSION:  Left lung infiltrate suspicious for pneumonia      SUE CANCINO MD   CT Chest w/o Contrast    Narrative    PROCEDURE: CT CHEST W/O CONTRAST 2019 10:19 AM    HISTORY: Acute resp illness, > 40 years old; Pneumonia, unresolved or  complicated    COMPARISONS: None.    Meds/Dose Given: None.    TECHNIQUE: Axial noncontrast enhanced images with coronal and sagittal  reformatted images.    FINDINGS: No enlarged lymph nodes are seen in the mediastinum, rowena or  axilla.    There are bilateral lung infiltrates most consistent with pneumonia.  These are most prominent in the lower lobes but are also seen in the  lingula and in the right middle lobe.    No aneurysm is seen. There is no pleural or pericardial effusion.  There is no significant coronary artery calcification.    Limited images through the upper abdomen show changes of  cholecystectomy.    Bone windows show mild degenerative change. There is no acute  compression fracture.         Impression    IMPRESSION: Bilateral lung infiltrates most consistent with pneumonia.    LEE VALENTIN MD

## 2019-05-28 NOTE — ED PROVIDER NOTES
History     Chief Complaint   Patient presents with     Shortness of Breath     Hemoptysis     The history is provided by the patient and the spouse.   Shortness of Breath   Severity:  Severe  Onset quality:  Gradual  Timing:  Constant  Progression:  Worsening  Chronicity:  New  Associated symptoms: cough, fever and hemoptysis    Associated symptoms: no abdominal pain, no chest pain, no diaphoresis, no headaches, no neck pain, no rash, no vomiting and no wheezing      Melvi Cleveland is a 69 year old female who     Allergies:  Allergies   Allergen Reactions     Adhesive Tape      Glue and nicotine patches     Benzoin Compound      Tin-Co-Javier     Mold      Seasonal Allergies      Soap      Any cleanser/soap/disinfectant that is used right before surgery.  Makes patient break out horribly. Chart was looked at and Chloraprep was used.       Problem List:    Patient Active Problem List    Diagnosis Date Noted     Cough variant asthma 04/16/2019     Priority: Medium     Pneumonia of left lower lobe due to infectious organism (H) 04/16/2019     Priority: Medium     Need for hepatitis C screening test 11/13/2018     Priority: Medium     ACP (advance care planning) 09/28/2016     Priority: Medium     Advance Care Planning 9/28/2016: ACP Review of Chart / Resources Provided:  Reviewed chart for advance care plan.  Melvi Cleveland has been provided information and resources to begin or update their advance care plan.  Added by Kathy Galvan             RA (rheumatoid arthritis) (H) 11/25/2015     Priority: Medium     Comprehensive Medical Examination 11/25/2015     Priority: Medium     Seasonal allergic rhinitis 11/25/2015     Priority: Medium     Fibrocystic breast disease (FCBD) 11/25/2015     Priority: Medium     Crohn's disease of large intestine (H) 03/01/2011     Priority: Medium     Dyslipidemia 03/01/2011     Priority: Medium     Sicca syndrome (H) 03/01/2011     Priority: Medium     Polyneuropathy in other  diseases classified elsewhere (H) 03/01/2011     Priority: Medium     Neck pain, chronic 07/05/2005     Priority: Medium     05/2015 MRI:  IMPRESSION:  BROAD-BASED DISK PROTRUSION AT C5-C6 ON THE LEFT, MILDLY  IMPINGING ON THE LEFT C6 NERVE ROOT       Low back pain, chronic 12/13/2000     Priority: Medium     03/2016 MRI:  MULTILEVEL DEGENERATIVE CHANGES OF THE LUMBAR SPINE,  SIMILAR IN APPEARANCE TO THE PRIOR STUDY.  A RIGHT FORAMINAL DISK  PROTRUSION AT L3-L4 AGAIN IMPINGES THE EXITING RIGHT L3 NERVE ROOT.  CORRELATE FOR A RELATED RADICULOPATHY.          Past Medical History:    Past Medical History:   Diagnosis Date     Allergic rhinitis, seasonal 3/1/2011     Arthritis      Chronic/Recurrent Back Pain 12/13/2000     Chronic/Recurrent Neck Pain 7/5/2005     Cough variant asthma 4/16/2019     Crohn's Disease 3/1/2011     CTS (carpal tunnel syndrome) 1/1/2011     Depressive disorder      Dyslipidemia 3/1/2011     Fibrocystic Breast Disease 3/1/2011     Mixed hyperlipidemia 1/1/2011     Wilson's neuroma 1/1/2011     Parotiditis 5/9/2011     Peripheral Neuriopathy 3/1/2011     Pneumonia of left lower lobe due to infectious organism (H) 4/16/2019     Rheumatoid arthritis(714.0) 12/13/1999     Seborrhea capitis 10/6/2011     Sjogrens Syndrome 3/1/2011     Urethral stricture 4/30/2008       Past Surgical History:    Past Surgical History:   Procedure Laterality Date     BACK SURGERY  05/1995    back surgery     BIOPSY      breast bx X2     BIOPSY BREAST      LT     BIOPSY BREAST NEEDLE LOCALIZATION Right 6/12/2017    Procedure: BIOPSY BREAST NEEDLE LOCALIZATION;  WIRE LOCALIZED EXCISIONAL BIOPSY  RIGHT BREAST MASS;  Surgeon: Jeni Amin MD;  Location: [facility] OR     CHOLECYSTECTOMY  2004     COLONOSCOPY  11/15/2004    screening     COLONOSCOPY  9/24/2014     EGD with biopsy  2010, 2011    GERD     laminectomy      L4 L5 laminectomy; back pain     RELEASE CARPAL TUNNEL Right 1/16/2018    Procedure: RELEASE CARPAL  TUNNEL;  RIGHT CARPAL TUNNEL RELEASE;  Surgeon: Haider Carlos MD;  Location: [facility] OR       Family History:    Family History   Problem Relation Age of Onset     Diabetes Mother      Rheumatoid Arthritis Mother      Other - See Comments Mother         fibromyalgia     Osteoarthritis Mother      Thyroid Disease Mother         thyroid disorder     Unknown/Adopted Father         cause of death unknown     Rheumatoid Arthritis Father      Diabetes Sister      Myocardial Infarction Sister         cause of death     Lupus Sister         cause of death     C.A.D. Maternal Grandmother      Cerebrovascular Disease Maternal Grandmother      Diabetes Maternal Grandmother      Thyroid Disease Maternal Grandmother         thyrodi disorder; goitor removed     Cancer Maternal Grandfather      Hypertension Brother      Other - See Comments Brother         sleep apnea     Other - See Comments Sister         sleep apnea       Social History:  Marital Status:   [2]  Social History     Tobacco Use     Smoking status: Former Smoker     Types: Cigarettes     Last attempt to quit: 3/28/1997     Years since quittin.1     Smokeless tobacco: Never Used     Tobacco comment: no passive exposure   Substance Use Topics     Alcohol use: No     Alcohol/week: 0.0 oz     Comment: former. quit      Drug use: No        Medications:      albuterol (PROVENTIL) (2.5 MG/3ML) 0.083% neb solution   ALBUTEROL 108 (90 BASE) MCG/ACT inhaler   ALFALFA PO   ALLEGRA-D ALLERGY & CONGESTION  MG 12 hr tablet   Ascorbic Acid (VITAMIN C) 500 MG CAPS   benzonatate (TESSALON) 200 MG capsule   Calcium-Vitamin D-Vitamin K (CALCIUM + D) 500-1000-40 MG-UNT-MCG CHEW   Cholecalciferol (VITAMIN D) 1000 UNITS capsule   citalopram (CELEXA) 40 MG tablet   DICLOFENAC PO   fish oil-omega-3 fatty acids (OMEGA-3) 1000 MG capsule   Flaxseed, Linseed, (FLAX SEED OIL) 1000 MG capsule   fluticasone (FLONASE) 50 MCG/ACT spray   fluticasone-salmeterol (ADVAIR) 250-50  "MCG/DOSE inhaler   folic acid (FOLVITE) 1 MG tablet   gabapentin (NEURONTIN) 300 MG capsule   Garlic 400 MG TBEC   HYDROcodone-acetaminophen (NORCO) 7.5-325 MG per tablet   hydroxychloroquine (PLAQUENIL) 200 MG tablet   MILK THISTLE PO   montelukast (SINGULAIR) 10 MG tablet   MULTIPLE VITAMIN PO   order for DME   pantoprazole (PROTONIX) 40 MG enteric coated tablet   polyvinyl alcohol-povidone (REFRESH) 1.4-0.6 % ophthalmic solution   PREBIOTIC PRODUCT PO   Probiotic Product (SOLUBLE FIBER/PROBIOTICS PO)   RESTASIS 0.05 % ophthalmic emulsion   Simethicone (SM GAS RELIEF ANTIFLATUENT) 180 MG CAPS   traZODone (DESYREL) 50 MG tablet         Review of Systems   Constitutional: Positive for appetite change, chills, fatigue and fever. Negative for diaphoresis.   HENT: Negative for voice change.    Eyes: Negative for visual disturbance.   Respiratory: Positive for cough, hemoptysis and shortness of breath. Negative for chest tightness and wheezing.    Cardiovascular: Negative for chest pain, palpitations and leg swelling.   Gastrointestinal: Negative for abdominal distention, abdominal pain, anal bleeding, blood in stool, nausea and vomiting.   Genitourinary: Negative for decreased urine volume, dysuria, flank pain and hematuria.   Musculoskeletal: Negative for arthralgias, back pain, gait problem, myalgias, neck pain and neck stiffness.   Skin: Negative for color change, pallor, rash and wound.   Neurological: Positive for dizziness and light-headedness. Negative for syncope, numbness and headaches.   Psychiatric/Behavioral: Negative for confusion and suicidal ideas.       Physical Exam   BP: 113/90  Heart Rate: (!) 124  Temp: 99.2  F (37.3  C)  Height: 167.6 cm (5' 6\")  Weight: 95.3 kg (210 lb)  SpO2: (!) 82 %      Physical Exam   Constitutional: She is oriented to person, place, and time. She appears well-developed and well-nourished.   HENT:   Head: Normocephalic and atraumatic.   Mouth/Throat: No oropharyngeal exudate. "   Eyes: Pupils are equal, round, and reactive to light. Conjunctivae are normal.   Neck: Normal range of motion. Neck supple. No JVD present. No tracheal deviation present. No thyromegaly present.   Cardiovascular: Regular rhythm, normal heart sounds and intact distal pulses. Tachycardia present. Exam reveals no gallop and no friction rub.   No murmur heard.  Pulses:       Radial pulses are 1+ on the right side, and 1+ on the left side.   Pulmonary/Chest: Effort normal. No stridor. No respiratory distress. She has no wheezes. She has rales in the right lower field and the left lower field. She exhibits no tenderness.   Abdominal: Soft. Bowel sounds are normal. She exhibits no distension and no mass. There is no tenderness. There is no rebound and no guarding.   Musculoskeletal: Normal range of motion. She exhibits no edema or tenderness.   Lymphadenopathy:     She has no cervical adenopathy.   Neurological: She is alert and oriented to person, place, and time.   Skin: Skin is warm and dry. No rash noted. No erythema. No pallor.   Psychiatric: Her behavior is normal.   Nursing note and vitals reviewed.      ED Course        Procedures                 Results for orders placed or performed during the hospital encounter of 19 (from the past 24 hour(s))   XR Chest Port 1 View    Narrative    PROCEDURE:  XR CHEST PORT 1 VW    HISTORY:  sob.     COMPARISON:  None.    FINDINGS:   The cardiac silhouette is normal in size. The pulmonary vasculature is  normal.  Abnormal decreased  in the left lung suspicious for  pneumonia. No pleural effusion or pneumothorax.      Impression    IMPRESSION:  Left lung infiltrate suspicious for pneumonia      SUE CANCINO MD       Medications   0.9% sodium chloride BOLUS (1,000 mLs Intravenous New Bag 19 0997)   ipratropium - albuterol 0.5 mg/2.5 mg/3 mL (DUONEB) neb solution 3 mL (3 mLs Nebulization Given 19 0802)       Assessments & Plan (with Medical Decision  Making)   Progressive SOB over past 3 days, coughing blood.   As per patient she had pneumonia 2 mo ago and her symptoms never  resolved  Low grade fever, chills at home  In ER hypotensive 88/45, tachycardica 125, very hypoxic Sp2 88 on 6 lit/m NC  NRBM placed , o2 improved over 92, pt AAox3, slightly tachyonic but not in obvious respiratory distress  IVF NS bolus, sepsis workup ordered, CXR, cardiac montotring  S/o to Dr Garcia at the change of shift    I have reviewed the nursing notes.    I have reviewed the findings, diagnosis, plan and need for follow up with the patient.         Medication List      There are no discharge medications for this visit.         Final diagnoses:   None       5/28/2019   [facility]     John Gar MD  05/28/19 0813

## 2019-05-28 NOTE — PLAN OF CARE
Verified with April K, ED primary RN, pt received a total of 2000 ml bolus prior to admission to ICU. This RN finished 859 ml bolus once pt received to unit. Will continue to monitor.

## 2019-05-28 NOTE — ED NOTES
DATE:  5/28/2019   TIME OF RECEIPT FROM LAB:  0915  LAB TEST:  procalcitonin  LAB VALUE:  16.66  RESULTS GIVEN WITH READ-BACK TO (PROVIDER):  Dr. Soni  TIME LAB VALUE REPORTED TO PROVIDER:   0915

## 2019-05-28 NOTE — PLAN OF CARE
[facility] Inpatient Admission Note:    Patient admitted to 3124/3124-1 at approximately 1005 via cart accompanied by spouse from emergency room . Report received from April RN in SBAR format at 1005 via face to face in room. Patient ambulated to bed via self.. Patient is alert and oriented X 3, denies pain; rates at 0 on 0-10 scale.  Patient oriented to room, unit, hourly rounding, and plan of care. Explained admission packet and patient handbook with patient bill of rights brochure. Will continue to monitor and document as needed.     Inpatient Nursing criteria listed below was met:    Health care directives status obtained and documented: Yes    Care Everywhere authorization obtained No    MRSA swab completed for patient 65 years and older: Yes    Patient identifies a surrogate decision maker: Yes If yes, who:See ACP Contact Information: See ACP    Core Measure diagnosis present:Yes - If yes, state diagnosis: Sepsis.      If initial lactic acid >2.0, repeat lactic acid drawn within one hour of arrival to unit: Yes. If no, state reason: NA    Vaccination assessment and education completed: Yes   Vaccinations received prior to admission: Pneumovax yes  Influenza(seasonal)  YES   Vaccination(s) ordered: rec'd prior    Clergy visit ordered if patient requests: N/A    Skin issues/needs documented: N/A    Isolation Patient: no Education given, correct sign in place and documentation row added to PCS:  NA    Fall Prevention Yes: Care plan updated, education given and documented, sticker and magnet in place: Yes    Care Plan initiated: Yes    Education Documented (including assessment): Yes    Patient has discharge needs : TBD If yes, please explain:TBD

## 2019-05-28 NOTE — PROVIDER NOTIFICATION
ZANE Mendoza, updated on pt condition- sats 88-92% on 12 LPM via high flow NC. Will put pt back on non-rebreather. Will continue to monitor closely.

## 2019-05-28 NOTE — PLAN OF CARE
DATE:  5/28/2019   TIME OF RECEIPT FROM LAB:  1032  LAB TEST:  Lactic Acid  LAB VALUE:  4.0  RESULTS GIVEN WITH READ-BACK TO (PROVIDER):  Kelly Lim, NP  TIME LAB VALUE REPORTED TO PROVIDER:   1035

## 2019-05-28 NOTE — ED PROVIDER NOTES
"  History     Chief Complaint   Patient presents with     Shortness of Breath     Hemoptysis     HPI  Melvi Cleveland is a 69 year old woman with history of rheumatoid arthritis (immunosuppressed with hydroxychloroquil), treated for left-sided CAP on 3/29/19 with interval improvement of her symptoms until approximately 10 days ago. She feels as though she never, \"completely got over\" her illness and continued to have a cough. However, over the last week she has developed fevers/chills, a persistent cough productive of thick, yellow sputum and worsening shortness of breath. She was initially evaluated by Dr. Gar this morning and found to be sating in the low 80s as well as hypotensive to the 80s/50s. She responded to initial fluid bolus with normalizing pressures as well as improved to sats in the mid-90s with 15 L by non-rebreather.    Allergies:  Allergies   Allergen Reactions     Adhesive Tape      Glue and nicotine patches     Benzoin Compound      Tin-Co-Javier     Mold      Seasonal Allergies      Soap      Any cleanser/soap/disinfectant that is used right before surgery.  Makes patient break out horribly. Chart was looked at and Chloraprep was used.       Problem List:    Patient Active Problem List    Diagnosis Date Noted     Cough variant asthma 04/16/2019     Priority: Medium     Pneumonia of left lower lobe due to infectious organism (H) 04/16/2019     Priority: Medium     Need for hepatitis C screening test 11/13/2018     Priority: Medium     ACP (advance care planning) 09/28/2016     Priority: Medium     Advance Care Planning 9/28/2016: ACP Review of Chart / Resources Provided:  Reviewed chart for advance care plan.  Melvi Cleveland has been provided information and resources to begin or update their advance care plan.  Added by Kathy Galvan             RA (rheumatoid arthritis) (H) 11/25/2015     Priority: Medium     Comprehensive Medical Examination 11/25/2015     Priority: Medium     Seasonal " allergic rhinitis 11/25/2015     Priority: Medium     Fibrocystic breast disease (FCBD) 11/25/2015     Priority: Medium     Crohn's disease of large intestine (H) 03/01/2011     Priority: Medium     Dyslipidemia 03/01/2011     Priority: Medium     Sicca syndrome (H) 03/01/2011     Priority: Medium     Polyneuropathy in other diseases classified elsewhere (H) 03/01/2011     Priority: Medium     Neck pain, chronic 07/05/2005     Priority: Medium     05/2015 MRI:  IMPRESSION:  BROAD-BASED DISK PROTRUSION AT C5-C6 ON THE LEFT, MILDLY  IMPINGING ON THE LEFT C6 NERVE ROOT       Low back pain, chronic 12/13/2000     Priority: Medium     03/2016 MRI:  MULTILEVEL DEGENERATIVE CHANGES OF THE LUMBAR SPINE,  SIMILAR IN APPEARANCE TO THE PRIOR STUDY.  A RIGHT FORAMINAL DISK  PROTRUSION AT L3-L4 AGAIN IMPINGES THE EXITING RIGHT L3 NERVE ROOT.  CORRELATE FOR A RELATED RADICULOPATHY.          Past Medical History:    Past Medical History:   Diagnosis Date     Allergic rhinitis, seasonal 3/1/2011     Arthritis      Chronic/Recurrent Back Pain 12/13/2000     Chronic/Recurrent Neck Pain 7/5/2005     Cough variant asthma 4/16/2019     Crohn's Disease 3/1/2011     CTS (carpal tunnel syndrome) 1/1/2011     Depressive disorder      Dyslipidemia 3/1/2011     Fibrocystic Breast Disease 3/1/2011     Mixed hyperlipidemia 1/1/2011     Wilson's neuroma 1/1/2011     Parotiditis 5/9/2011     Peripheral Neuriopathy 3/1/2011     Pneumonia of left lower lobe due to infectious organism (H) 4/16/2019     Rheumatoid arthritis(714.0) 12/13/1999     Seborrhea capitis 10/6/2011     Sjogrens Syndrome 3/1/2011     Urethral stricture 4/30/2008       Past Surgical History:    Past Surgical History:   Procedure Laterality Date     BACK SURGERY  05/1995    back surgery     BIOPSY      breast bx X2     BIOPSY BREAST      LT     BIOPSY BREAST NEEDLE LOCALIZATION Right 6/12/2017    Procedure: BIOPSY BREAST NEEDLE LOCALIZATION;  WIRE LOCALIZED EXCISIONAL BIOPSY   RIGHT BREAST MASS;  Surgeon: Jeni Amin MD;  Location: [facility] OR     CHOLECYSTECTOMY  2004     COLONOSCOPY  11/15/2004    screening     COLONOSCOPY  2014     EGD with biopsy  ,     GERD     laminectomy      L4 L5 laminectomy; back pain     RELEASE CARPAL TUNNEL Right 2018    Procedure: RELEASE CARPAL TUNNEL;  RIGHT CARPAL TUNNEL RELEASE;  Surgeon: Haider Carlos MD;  Location: [facility] OR       Family History:    Family History   Problem Relation Age of Onset     Diabetes Mother      Rheumatoid Arthritis Mother      Other - See Comments Mother         fibromyalgia     Osteoarthritis Mother      Thyroid Disease Mother         thyroid disorder     Unknown/Adopted Father         cause of death unknown     Rheumatoid Arthritis Father      Diabetes Sister      Myocardial Infarction Sister         cause of death     Lupus Sister         cause of death     C.A.D. Maternal Grandmother      Cerebrovascular Disease Maternal Grandmother      Diabetes Maternal Grandmother      Thyroid Disease Maternal Grandmother         thyrodi disorder; goitor removed     Cancer Maternal Grandfather      Hypertension Brother      Other - See Comments Brother         sleep apnea     Other - See Comments Sister         sleep apnea       Social History:  Marital Status:   [2]  Social History     Tobacco Use     Smoking status: Former Smoker     Types: Cigarettes     Last attempt to quit: 3/28/1997     Years since quittin.1     Smokeless tobacco: Never Used     Tobacco comment: no passive exposure   Substance Use Topics     Alcohol use: No     Alcohol/week: 0.0 oz     Comment: former. quit      Drug use: No        Medications:      albuterol (PROVENTIL) (2.5 MG/3ML) 0.083% neb solution   ALBUTEROL 108 (90 BASE) MCG/ACT inhaler   ALFALFA PO   ALLEGRA-D ALLERGY & CONGESTION  MG 12 hr tablet   Ascorbic Acid (VITAMIN C) 500 MG CAPS   benzonatate (TESSALON) 200 MG capsule   Calcium-Vitamin D-Vitamin K (CALCIUM + D)  "500-1000-40 MG-UNT-MCG CHEW   Cholecalciferol (VITAMIN D) 1000 UNITS capsule   citalopram (CELEXA) 40 MG tablet   DICLOFENAC PO   fish oil-omega-3 fatty acids (OMEGA-3) 1000 MG capsule   Flaxseed, Linseed, (FLAX SEED OIL) 1000 MG capsule   fluticasone (FLONASE) 50 MCG/ACT spray   fluticasone-salmeterol (ADVAIR) 250-50 MCG/DOSE inhaler   folic acid (FOLVITE) 1 MG tablet   gabapentin (NEURONTIN) 300 MG capsule   Garlic 400 MG TBEC   HYDROcodone-acetaminophen (NORCO) 7.5-325 MG per tablet   hydroxychloroquine (PLAQUENIL) 200 MG tablet   MILK THISTLE PO   montelukast (SINGULAIR) 10 MG tablet   MULTIPLE VITAMIN PO   order for DME   pantoprazole (PROTONIX) 40 MG enteric coated tablet   polyvinyl alcohol-povidone (REFRESH) 1.4-0.6 % ophthalmic solution   PREBIOTIC PRODUCT PO   Probiotic Product (SOLUBLE FIBER/PROBIOTICS PO)   RESTASIS 0.05 % ophthalmic emulsion   Simethicone (SM GAS RELIEF ANTIFLATUENT) 180 MG CAPS   traZODone (DESYREL) 50 MG tablet         Review of Systems   Constitutional: Negative for diaphoresis.   HENT: Negative for congestion.    Eyes: Negative for redness.   Respiratory: Negative for apnea.    Cardiovascular: Negative for chest pain.   Gastrointestinal: Negative for abdominal pain.   Genitourinary: Negative for difficulty urinating.   Musculoskeletal: Negative for arthralgias and neck stiffness.   Skin: Negative for color change.   Neurological: Negative for headaches.   Psychiatric/Behavioral: Negative for confusion.       Physical Exam   BP: 113/90  Heart Rate: (!) 124  Temp: 99.2  F (37.3  C)  Height: 167.6 cm (5' 6\")  Weight: 95.3 kg (210 lb)  SpO2: (!) 82 %      Physical Exam    General Appearance: well-developed, well-nourished, alert & oriented, no apparent distress.    HEENT: atraumatic. Pupils equal, round & reactive to light, extraocular movements intact. Bilateral ear canals clear. Nose without rhinorrhea or epistaxis. Clear oropharynx. Moist mucous membranes.    Neck: normal inspection, " non-tender, full & painless ROM, supple, no lymphadenopathy or nuchal rigidity. No jugular venous distension.    Cardiovascular: regular rate, rhythm, normal S1 & S2, no murmurs, rubs or gallops.    Respiratory: coarse lung sounds bilaterally (worse in left lower lung base).with diffuse wheezes and mild acute respiratory distress with tachypnea.    Gastrointestinal: normal inspection, normal bowel sounds, non-tender, no masses or organomegaly.    Extremities: normal inspection, 2+/4+ pulses bilaterally, normal & painless ROM, non-tender, joints normal.    Neurologic: CN II - XII intact, no motor/sensory deficit.    Skin: normal color, no skin rash.    ED Course        Procedures          The patient has signs of Severe Sepsis as evidenced by:    1. 2 SIRS criteria, AND  2. Suspected infection, AND   3. Organ dysfunction:  SBP <90, MAP < 65, or SBP decrease of >40 from baseline due to infection and Lactic Acid > 1.9    Time severe sepsis diagnosis confirmed = 0811 as this was the time when SBP <90 or MAP <65 and Lactate resulted, and the level was > 1.9      3 Hour Severe Sepsis Bundle Completion:  1. Initial Lactic Acid Result:   Recent Labs   Lab Test 05/28/19  0811 09/01/17  2046   LACT 4.9* 1.3     2. Blood Cultures before Antibiotics: Yes  3. Broad Spectrum Antibiotics Administered: Yes     Anti-infectives (From admission through now)    Start     Dose/Rate Route Frequency Ordered Stop    05/28/19 0814  cefTRIAXone in d5w (ROCEPHIN) intermittent infusion 1 g      1 g  over 30 Minutes Intravenous ONCE 05/28/19 0813 05/28/19 0846    05/28/19 0813  azithromycin (ZITHROMAX) tablet 500 mg     Note to Pharmacy:  DO NOT USE THIS FIELD FOR ADMIN INSTRUCTIONS; INFORMATION DOES NOT SHOW ON MAR. USE THE FIELD ABOVE MARKED ADMIN INSTRUCTIONS    500 mg Oral ONCE 05/28/19 0813 05/28/19 0816        4. 2,859 mL of IVF fluid  Ideal body weight: 59.3 kg (130 lb 11.7 oz)  Adjusted ideal body weight: 73.7 kg (162 lb 7 oz)    Severe  Sepsis reassessment:  1. Repeat Lactic Acid Level: pending  2. MAP>65 after initial IVF bolus, will continue to monitor fluid status and vital signs  I attest to having performed a repeat sepsis exam and assessment of perfusion at 0900 and the results demonstrate improved perfusion.              Results for orders placed or performed during the hospital encounter of 19 (from the past 24 hour(s))   Blood gas arterial and oxyhgb   Result Value Ref Range    pH Arterial 7.33 (L) 7.35 - 7.45 pH    pCO2 Arterial 37 35 - 45 mm Hg    pO2 Arterial 76 (L) 80 - 105 mm Hg    Bicarbonate Arterial 20 (L) 21 - 28 mmol/L    FIO2 96%     Oxyhemoglobin Arterial 93 92 - 100 %    Base Deficit Art 5.6 mmol/L   XR Chest Port 1 View    Narrative    PROCEDURE:  XR CHEST PORT 1 VW    HISTORY:  sob.     COMPARISON:  None.    FINDINGS:   The cardiac silhouette is normal in size. The pulmonary vasculature is  normal.  Abnormal decreased  in the left lung suspicious for  pneumonia. No pleural effusion or pneumothorax.      Impression    IMPRESSION:  Left lung infiltrate suspicious for pneumonia      SUE CANCINO MD   CBC with platelets differential   Result Value Ref Range    WBC 11.3 (H) 4.0 - 11.0 10e9/L    RBC Count 3.87 3.8 - 5.2 10e12/L    Hemoglobin 11.1 (L) 11.7 - 15.7 g/dL    Hematocrit 34.5 (L) 35.0 - 47.0 %    MCV 89 78 - 100 fl    MCH 28.7 26.5 - 33.0 pg    MCHC 32.2 31.5 - 36.5 g/dL    RDW 14.8 10.0 - 15.0 %    Platelet Count 183 150 - 450 10e9/L    Diff Method Manual Differential     % Neutrophils 88.0 %    % Lymphocytes 9.0 %    % Monocytes 2.0 %    % Eosinophils 0.0 %    % Basophils 0.0 %    % Band 1.0 %    Absolute Neutrophil 9.9 (H) 1.6 - 8.3 10e9/L    Absolute Lymphocytes 1.0 0.8 - 5.3 10e9/L    Absolute Monocytes 0.2 0.0 - 1.3 10e9/L    Absolute Eosinophils 0.0 0.0 - 0.7 10e9/L    Absolute Basophils 0.0 0.0 - 0.2 10e9/L    Absolute Bands 0.1 0.0 - 0.6 10e9/L    RBC Morphology Normal     Platelet Estimate        Automated count confirmed.  Platelet morphology is normal.   CK total   Result Value Ref Range    CK Total 62 30 - 225 U/L   Comprehensive metabolic panel   Result Value Ref Range    Sodium 136 133 - 144 mmol/L    Potassium 5.0 3.4 - 5.3 mmol/L    Chloride 106 94 - 109 mmol/L    Carbon Dioxide 22 20 - 32 mmol/L    Anion Gap 8 3 - 14 mmol/L    Glucose 85 70 - 99 mg/dL    Urea Nitrogen 27 7 - 30 mg/dL    Creatinine 1.54 (H) 0.52 - 1.04 mg/dL    GFR Estimate 35 (L) >60 mL/min/[1.73_m2]    GFR Estimate If Black 40 (L) >60 mL/min/[1.73_m2]    Calcium 8.8 8.5 - 10.1 mg/dL    Bilirubin Total 0.7 0.2 - 1.3 mg/dL    Albumin 2.3 (L) 3.4 - 5.0 g/dL    Protein Total 6.3 (L) 6.8 - 8.8 g/dL    Alkaline Phosphatase 66 40 - 150 U/L    ALT 28 0 - 50 U/L    AST 15 0 - 45 U/L   Lactic acid whole blood   Result Value Ref Range    Lactic Acid 4.9 (HH) 0.7 - 2.0 mmol/L   Nt probnp inpatient   Result Value Ref Range    N-Terminal Pro BNP Inpatient 1,160 (H) 0 - 900 pg/mL   Troponin I   Result Value Ref Range    Troponin I ES <0.015 0.000 - 0.045 ug/L   UA with Microscopic reflex to Culture   Result Value Ref Range    Color Urine Orange     Appearance Urine Slightly Cloudy     Glucose Urine Negative NEG^Negative mg/dL    Bilirubin Urine Negative NEG^Negative    Ketones Urine Negative NEG^Negative mg/dL    Specific Gravity Urine 1.033 1.003 - 1.035    Blood Urine Small (A) NEG^Negative    pH Urine 5.5 4.7 - 8.0 pH    Protein Albumin Urine 100 (A) NEG^Negative mg/dL    Urobilinogen mg/dL 2.0 0.0 - 2.0 mg/dL    Nitrite Urine Negative NEG^Negative    Leukocyte Esterase Urine Negative NEG^Negative    Source Catheterized Urine     WBC Urine 5 0 - 5 /HPF    RBC Urine 5 (H) 0 - 2 /HPF    Bacteria Urine None (A) NEG^Negative /HPF    Mucous Urine Present (A) NEG^Negative /LPF    Hyaline Casts 184 (A) OTO2^O - 2 /LPF    Amorphous Crystals Few (A) NEG^Negative /HPF       Medications   0.9% sodium chloride BOLUS (has no administration in time  range)   sodium chloride 0.9% infusion (has no administration in time range)   0.9% sodium chloride BOLUS (1,000 mLs Intravenous New Bag 5/28/19 1497)   ipratropium - albuterol 0.5 mg/2.5 mg/3 mL (DUONEB) neb solution 3 mL (3 mLs Nebulization Given 5/28/19 0802)   cefTRIAXone in d5w (ROCEPHIN) intermittent infusion 1 g (1 g Intravenous New Bag 5/28/19 0816)   azithromycin (ZITHROMAX) tablet 500 mg (500 mg Oral Given 5/28/19 0816)       Assessments & Plan (with Medical Decision Making)   The patient is a 69 year old woman who presents with     1) LLL PNA with sepsis - patient responded well to fluid resuscitation and received ceftriaxone and azithromycin. The patient was discussed with Dr. Teresa who accepts the patient for admission and requests a CT to be performed to better characterize the PNA.      I have reviewed the nursing notes.    I have reviewed the findings, diagnosis, plan and need for follow up with the patient.       Medication List      There are no discharge medications for this visit.         Final diagnoses:   Pneumonia of left lower lobe due to infectious organism (H)   Sepsis, due to unspecified organism (H)       5/28/2019   [facility]     Aric Soni MD  05/28/19 5321       Aric Soni MD  05/28/19 1336

## 2019-05-28 NOTE — ED NOTES
"Pt presents to ER with c/o \"coughing up blood for past couple days.\" Noted to be short of breath, upon entering triage. Reports was treated for bacterial pneumonia a 2 months ago and has been followingup with her PCP and they have been telling her she is getting better, states she feel like she is getting worse. 02 sats 77-82% I triage on room air, brought to room 2 immediately and 02 placed.   "

## 2019-05-28 NOTE — PLAN OF CARE
Pt is admitted to the floor from ED this AM around 1015. Upon arrival pt very SOB/tachypneic requiring 15 LPM via non-rebreather. Pt then put on 12-15 LPM high flow NC with sats 88-92%. Improving by afternoon and eventually put on Airvo 40% FIO2 by RT. RR-18 to 24, unlabored. Lungs have crackles in the bases/dim throughout. HR-'s. Lactic trending down after boluses. ABX- IV Zosyn and IV Vanco. Up to BSC with SBA.  in room most of shift, attentive to pt. Uses call light appropriately, makes needs known.     Face to face report given with opportunity to observe patient.    Report given to Ammy Arora   5/28/2019  7:01 PM

## 2019-05-28 NOTE — ED NOTES
DATE:  5/28/2019   TIME OF RECEIPT FROM LAB: 0823  LAB TEST:  Lactic Acid   LAB VALUE: 4.9  RESULTS GIVEN WITH READ-BACK TO (PROVIDER):    TIME LAB VALUE REPORTED TO PROVIDER:  0823

## 2019-05-28 NOTE — PROVIDER NOTIFICATION
This writer spoke with ZANE Mendoza, regarding pt condition RE sats 90-92% on 14 LPM via non-rebreather. Per Kelly, okay for pt sats to be at 90%. Will continue to monitor closely.

## 2019-05-28 NOTE — PLAN OF CARE
DATE:  5/28/2019   TIME OF RECEIPT FROM LAB:  1310  LAB TEST:  Lactic Acid   LAB VALUE:  3.0  RESULTS GIVEN WITH READ-BACK TO (PROVIDER):  Kelly Lim, NP  TIME LAB VALUE REPORTED TO PROVIDER:   6558

## 2019-05-28 NOTE — PHARMACY
[facility]    Pharmacy      Antimicrobial Stewardship Note     Current antimicrobial therapy:  Anti-infectives (From now, onward)    Start     Dose/Rate Route Frequency Ordered Stop    05/29/19 0800  azithromycin (ZITHROMAX) 250 mg in sodium chloride 0.9 % 250 mL intermittent infusion      250 mg  280 mL/hr over 1 Hours Intravenous EVERY 24 HOURS 05/28/19 1107 06/02/19 0759    05/28/19 1145  vancomycin (VANCOCIN) 1,750 mg in sodium chloride 0.9 % 500 mL intermittent infusion      1,750 mg  284 mL/hr over 2 Hours Intravenous EVERY 24 HOURS 05/28/19 1136      05/28/19 1115  piperacillin-tazobactam (ZOSYN) intermittent infusion 4.5 g      4.5 g  200 mL/hr over 30 Minutes Intravenous EVERY 6 HOURS 05/28/19 1107            Indication: CAP (possibly pseudomonas); sepsis     Days of Therapy: 1      Pertinent labs:  Creatinine   Creatinine   Date Value Ref Range Status   05/28/2019 1.54 (H) 0.52 - 1.04 mg/dL Final   05/09/2019 1.02 0.52 - 1.04 mg/dL Final   03/29/2019 1.02 0.52 - 1.04 mg/dL Final     WBC   WBC   Date Value Ref Range Status   05/28/2019 11.3 (H) 4.0 - 11.0 10e9/L Final   05/09/2019 8.6 4.0 - 11.0 10e9/L Final   05/06/2019 7.2 4.0 - 11.0 10e9/L Final     Procalcitonin   Procalcitonin   Date Value Ref Range Status   05/28/2019 16.66 (HH) ng/ml Final     Comment:     Critical Value called to and read back by  MARGOT FERRER AT 0914 ON 05/28/19 AJE  >/= 10.00 ng/ml   Very high likelihood of severe sepsis or septic shock.  Recommendation: Strongly recommend initiating or continuing antibiotics.   Evaluate culture results and clinical features to target antibacterial   therapy. Obtain blood cultures and other relevant cultures if not done.Repeat   PCT in 2 days to guide antibiotic de-escalation. Consider de-escalating   antibiotics when PCT concentration is <80% of peak or abs PCT <1.       CRP   CRP Inflammation   Date Value Ref Range Status   05/28/2019 372.0 (H) 0.0 - 8.0 mg/L Final   05/09/2019 <2.9  0.0 - 8.0 mg/L Final   05/06/2019 3.3 0.0 - 8.0 mg/L Final       Culture Results:   7-Day Micro Results     Procedure Component Value Units Date/Time   Active MRSA Surveillance Culture [[id number]] Collected: 05/28/19 1057   Order Status: Completed Lab Status: In process Updated: 05/28/19 1100   Specimen: Swab from Nose    Sputum Culture Aerobic Bacterial [[id number]] Collected: 05/28/19 0955   Order Status: Completed Lab Status: In process Updated: 05/28/19 1001   Specimen: Sputum    Gram stain [[id number]] (Abnormal) Collected: 05/28/19 0955   Order Status: Completed Lab Status: Final result Updated: 05/28/19 1030   Specimen: Sputum     Specimen Description Sputum    Gram Stain <10 Squamous epithelial cells/low power field     <25 PMNs/low power field     Many   Gram positive cocci in pairs   Abnormal    Urine Culture Aerobic Bacterial [[id number]] Collected: 05/28/19 0829   Order Status: Completed Lab Status: In process Updated: 05/28/19 0837   Specimen: Urine    Blood culture [[id number]] Collected: 05/28/19 0810   Order Status: Completed Lab Status: In process Updated: 05/28/19 0811   Specimen: Blood    Blood culture    Order Status: Canceled Lab Status: No result    Specimen: Blood    Blood culture [[id number]] Collected: 05/28/19 0750   Order Status: Completed Lab Status: In process Updated: 05/28/19 0755   Specimen: Blood       Recommendations/Interventions:  1. No recommendations at this time. Will continue to monitor.     Gabriele Jimenes  May 28, 2019

## 2019-05-29 DIAGNOSIS — F51.01 PRIMARY INSOMNIA: ICD-10-CM

## 2019-05-29 LAB
ALBUMIN SERPL-MCNC: 1.9 G/DL (ref 3.4–5)
ALP SERPL-CCNC: 62 U/L (ref 40–150)
ALT SERPL W P-5'-P-CCNC: 23 U/L (ref 0–50)
ANION GAP SERPL CALCULATED.3IONS-SCNC: 6 MMOL/L (ref 3–14)
AST SERPL W P-5'-P-CCNC: 16 U/L (ref 0–45)
BACTERIA SPEC CULT: NORMAL
BASOPHILS # BLD AUTO: 0 10E9/L (ref 0–0.2)
BASOPHILS NFR BLD AUTO: 0 %
BILIRUB SERPL-MCNC: 0.4 MG/DL (ref 0.2–1.3)
BUN SERPL-MCNC: 18 MG/DL (ref 7–30)
CALCIUM SERPL-MCNC: 8.2 MG/DL (ref 8.5–10.1)
CHLORIDE SERPL-SCNC: 114 MMOL/L (ref 94–109)
CO2 SERPL-SCNC: 23 MMOL/L (ref 20–32)
CREAT SERPL-MCNC: 1.15 MG/DL (ref 0.52–1.04)
DIFFERENTIAL METHOD BLD: ABNORMAL
EOSINOPHIL # BLD AUTO: 0.1 10E9/L (ref 0–0.7)
EOSINOPHIL NFR BLD AUTO: 1 %
ERYTHROCYTE [DISTWIDTH] IN BLOOD BY AUTOMATED COUNT: 15 % (ref 10–15)
GFR SERPL CREATININE-BSD FRML MDRD: 49 ML/MIN/{1.73_M2}
GLUCOSE SERPL-MCNC: 101 MG/DL (ref 70–99)
HCT VFR BLD AUTO: 29.6 % (ref 35–47)
HGB BLD-MCNC: 9.6 G/DL (ref 11.7–15.7)
LACTATE BLD-SCNC: 1.3 MMOL/L (ref 0.7–2)
LYMPHOCYTES # BLD AUTO: 0.9 10E9/L (ref 0.8–5.3)
LYMPHOCYTES NFR BLD AUTO: 7 %
MCH RBC QN AUTO: 28.6 PG (ref 26.5–33)
MCHC RBC AUTO-ENTMCNC: 32.4 G/DL (ref 31.5–36.5)
MCV RBC AUTO: 88 FL (ref 78–100)
MONOCYTES # BLD AUTO: 0.4 10E9/L (ref 0–1.3)
MONOCYTES NFR BLD AUTO: 3 %
NEUTROPHILS # BLD AUTO: 11.3 10E9/L (ref 1.6–8.3)
NEUTROPHILS NFR BLD AUTO: 88 %
NEUTS BAND # BLD AUTO: 0.1 10E9/L (ref 0–0.6)
NEUTS BAND NFR BLD MANUAL: 1 %
PLATELET # BLD AUTO: 161 10E9/L (ref 150–450)
PLATELET # BLD EST: ABNORMAL 10*3/UL
POTASSIUM SERPL-SCNC: 4.3 MMOL/L (ref 3.4–5.3)
PROT SERPL-MCNC: 5.6 G/DL (ref 6.8–8.8)
RBC # BLD AUTO: 3.36 10E12/L (ref 3.8–5.2)
RBC MORPH BLD: NORMAL
S PNEUM AG SPEC QL: NORMAL
S PNEUM AG SPEC QL: NORMAL
SODIUM SERPL-SCNC: 143 MMOL/L (ref 133–144)
SPECIMEN SOURCE: NORMAL
SPECIMEN SOURCE: NORMAL
WBC # BLD AUTO: 12.8 10E9/L (ref 4–11)

## 2019-05-29 PROCEDURE — A9270 NON-COVERED ITEM OR SERVICE: HCPCS | Performed by: NURSE PRACTITIONER

## 2019-05-29 PROCEDURE — 25000128 H RX IP 250 OP 636: Performed by: NURSE PRACTITIONER

## 2019-05-29 PROCEDURE — 25800030 ZZH RX IP 258 OP 636: Performed by: NURSE PRACTITIONER

## 2019-05-29 PROCEDURE — 85025 COMPLETE CBC W/AUTO DIFF WBC: CPT | Performed by: NURSE PRACTITIONER

## 2019-05-29 PROCEDURE — 40000275 ZZH STATISTIC RCP TIME EA 10 MIN

## 2019-05-29 PROCEDURE — 25000125 ZZHC RX 250: Performed by: NURSE PRACTITIONER

## 2019-05-29 PROCEDURE — 36415 COLL VENOUS BLD VENIPUNCTURE: CPT | Performed by: NURSE PRACTITIONER

## 2019-05-29 PROCEDURE — 83605 ASSAY OF LACTIC ACID: CPT | Performed by: NURSE PRACTITIONER

## 2019-05-29 PROCEDURE — 20000003 ZZH R&B ICU

## 2019-05-29 PROCEDURE — 94640 AIRWAY INHALATION TREATMENT: CPT

## 2019-05-29 PROCEDURE — 25000132 ZZH RX MED GY IP 250 OP 250 PS 637: Performed by: NURSE PRACTITIONER

## 2019-05-29 PROCEDURE — 94640 AIRWAY INHALATION TREATMENT: CPT | Mod: 76

## 2019-05-29 PROCEDURE — 99232 SBSQ HOSP IP/OBS MODERATE 35: CPT | Performed by: NURSE PRACTITIONER

## 2019-05-29 PROCEDURE — 80053 COMPREHEN METABOLIC PANEL: CPT | Performed by: NURSE PRACTITIONER

## 2019-05-29 RX ORDER — FEXOFENADINE HCL 60 MG/1
60 TABLET, FILM COATED ORAL DAILY
Status: DISCONTINUED | OUTPATIENT
Start: 2019-05-29 | End: 2019-05-30

## 2019-05-29 RX ADMIN — GABAPENTIN 900 MG: 300 CAPSULE ORAL at 09:10

## 2019-05-29 RX ADMIN — PANTOPRAZOLE SODIUM 40 MG: 40 TABLET, DELAYED RELEASE ORAL at 07:30

## 2019-05-29 RX ADMIN — TAZOBACTAM SODIUM AND PIPERACILLIN SODIUM 4.5 G: 500; 4 INJECTION, SOLUTION INTRAVENOUS at 05:40

## 2019-05-29 RX ADMIN — ACETAMINOPHEN 650 MG: 325 TABLET, FILM COATED ORAL at 16:04

## 2019-05-29 RX ADMIN — BENZONATATE 200 MG: 100 CAPSULE ORAL at 22:40

## 2019-05-29 RX ADMIN — FEXOFENADINE HYDROCHLORIDE 60 MG: 60 TABLET, FILM COATED ORAL at 09:10

## 2019-05-29 RX ADMIN — FLUTICASONE FUROATE AND VILANTEROL TRIFENATATE 1 PUFF: 200; 25 POWDER RESPIRATORY (INHALATION) at 08:53

## 2019-05-29 RX ADMIN — PANTOPRAZOLE SODIUM 40 MG: 40 TABLET, DELAYED RELEASE ORAL at 16:04

## 2019-05-29 RX ADMIN — ENOXAPARIN SODIUM 40 MG: 40 INJECTION SUBCUTANEOUS at 12:27

## 2019-05-29 RX ADMIN — SODIUM CHLORIDE: 9 INJECTION, SOLUTION INTRAVENOUS at 18:17

## 2019-05-29 RX ADMIN — VANCOMYCIN HYDROCHLORIDE 1750 MG: 5 INJECTION, POWDER, LYOPHILIZED, FOR SOLUTION INTRAVENOUS at 12:08

## 2019-05-29 RX ADMIN — IPRATROPIUM BROMIDE AND ALBUTEROL SULFATE 3 ML: .5; 3 SOLUTION RESPIRATORY (INHALATION) at 20:09

## 2019-05-29 RX ADMIN — GABAPENTIN 600 MG: 300 CAPSULE ORAL at 12:26

## 2019-05-29 RX ADMIN — IPRATROPIUM BROMIDE AND ALBUTEROL SULFATE 3 ML: .5; 3 SOLUTION RESPIRATORY (INHALATION) at 14:24

## 2019-05-29 RX ADMIN — IPRATROPIUM BROMIDE AND ALBUTEROL SULFATE 3 ML: .5; 3 SOLUTION RESPIRATORY (INHALATION) at 01:40

## 2019-05-29 RX ADMIN — ACETAMINOPHEN 650 MG: 325 TABLET, FILM COATED ORAL at 05:40

## 2019-05-29 RX ADMIN — FLUTICASONE PROPIONATE 1 SPRAY: 50 SPRAY, METERED NASAL at 09:12

## 2019-05-29 RX ADMIN — AZITHROMYCIN MONOHYDRATE 250 MG: 500 INJECTION, POWDER, LYOPHILIZED, FOR SOLUTION INTRAVENOUS at 07:30

## 2019-05-29 RX ADMIN — GABAPENTIN 600 MG: 300 CAPSULE ORAL at 22:31

## 2019-05-29 RX ADMIN — TAZOBACTAM SODIUM AND PIPERACILLIN SODIUM 4.5 G: 500; 4 INJECTION, SOLUTION INTRAVENOUS at 23:28

## 2019-05-29 RX ADMIN — IPRATROPIUM BROMIDE AND ALBUTEROL SULFATE 3 ML: .5; 3 SOLUTION RESPIRATORY (INHALATION) at 08:48

## 2019-05-29 RX ADMIN — MONTELUKAST 10 MG: 10 TABLET, FILM COATED ORAL at 22:31

## 2019-05-29 RX ADMIN — ACETAMINOPHEN 650 MG: 325 TABLET, FILM COATED ORAL at 12:29

## 2019-05-29 RX ADMIN — TAZOBACTAM SODIUM AND PIPERACILLIN SODIUM 4.5 G: 500; 4 INJECTION, SOLUTION INTRAVENOUS at 17:21

## 2019-05-29 RX ADMIN — FAMOTIDINE 20 MG: 20 TABLET, FILM COATED ORAL at 12:25

## 2019-05-29 RX ADMIN — CITALOPRAM HYDROBROMIDE 40 MG: 20 TABLET ORAL at 09:10

## 2019-05-29 RX ADMIN — TAZOBACTAM SODIUM AND PIPERACILLIN SODIUM 4.5 G: 500; 4 INJECTION, SOLUTION INTRAVENOUS at 11:05

## 2019-05-29 ASSESSMENT — ACTIVITIES OF DAILY LIVING (ADL)
ADLS_ACUITY_SCORE: 13
ADLS_ACUITY_SCORE: 15
ADLS_ACUITY_SCORE: 13
ADLS_ACUITY_SCORE: 13
ADLS_ACUITY_SCORE: 15
ADLS_ACUITY_SCORE: 15

## 2019-05-29 ASSESSMENT — MIFFLIN-ST. JEOR: SCORE: 1537.88

## 2019-05-29 NOTE — PROGRESS NOTES
[facility]    Hospitalist Progress Note    Date of Service (when I saw the patient): 05/29/2019    Assessment & Plan   Melvi Cleveland is a 69 year old female who was admitted on 5/28/2019.     Bilateral pneumonia: Sputum culture pending,presumptive strep pneumo on prelim. Immunocompromised due to multiple RA drugs. Treated with Levaquin for LLL pneumonia 3/27/19, patient reports no improvement since then, worsening over the last 10 days. ABG remarkably normal considering her high flow oxygen need. Hemoptysis much improved, now bubba streaking instead of fahad bloody. CT scan shows bilateral pneumonia.  Continue Zithromax, zosyn, vanco-pharmacy to dose for now pending 48 hours on cultures. Duonebs, oxygen supplementation.       Sepsis (H): Resolved. Due to bilateral pneumonia, organism pending. Presented with hypotension, hypoxia, tachycardia. Leukocytosis, elevated ProCal and lactic acid of 4.9 on presentation. Fuid bolus started in ED, antibiotics given. Lactic acid improving. Hypotension resolved with fluid resuscitation. On arrival to the floor her cap refill is <3sec, mentation is clear, work of breathing improved and she reported feeling improved as well. Lactic acid cleared, tachycardia resolved, blood pressures stable and at her normal per her report. Sputum and blood cultures spending. Treatment as above.       CASE (acute kidney injury) (H): Due to acute sepsis. Improved already with fluid resuscitation so ATN ruled out, expect near normal by tomorrow.        Acute respiratory failure with hypoxia (H): Due to #1, treatment as above. She was requiring 15 liters of oxygen though her work of breathing has improved significantly by the time she arrived to the floor. AIRVO was started to see if this will give better oxygenation and she did respond well, no 40% fio2. She does have some cough variant asthma for which she has been treated for presumed exacerbation over the last 2 months with new start  of fexofenadine, Singulair and Advair as well as multiple rounds of steroids.       Low back pain, chronic: Continue home dose hydrocodone, gabapentin.        RA (rheumatoid arthritis) (H): Will need to hold methotrexate and plaquenil.       DVT Prophylaxis: Enoxaparin (Lovenox) SQ  Code Status: Full Code    Disposition: Expected discharge in several days once respiratory status stable.    Kelly Lim, CNP    Interval History   Feels better, still dyspneic with activity but improved. Denies chest pain, abdominal pain, nausea.     -Data reviewed today: I reviewed all new labs and imaging results over the last 24 hours.    Physical Exam   Temp: 98  F (36.7  C) Temp src: Temporal BP: 120/60   Heart Rate: 100 Resp: 19 SpO2: 94 % O2 Device: High Flow Nasal Cannula (HFNC)(Airvo) Oxygen Delivery: Other (Comments)(30 LPM)  Vitals:    05/28/19 0734 05/28/19 1015 05/29/19 0539   Weight: 95.3 kg (210 lb) 100 kg (220 lb 7.4 oz) 100.2 kg (220 lb 14.4 oz)     Vital Signs with Ranges  Temp:  [97.7  F (36.5  C)-99.1  F (37.3  C)] 98  F (36.7  C)  Heart Rate:  [] 100  Resp:  [12-27] 19  BP: ()/(54-78) 120/60  FiO2 (%):  [40 %-45 %] 43 %  SpO2:  [89 %-96 %] 94 %  I/O last 3 completed shifts:  In: 5760 [P.O.:1800; I.V.:3960]  Out: 6050 [Urine:6050]    Peripheral IV 05/28/19 Right Lower forearm (Active)   Site Assessment [facility] 5/29/2019  2:00 PM   Line Status Infusing 5/29/2019  2:00 PM   Phlebitis Scale 0-->no symptoms 5/29/2019  2:00 PM   Infiltration Scale 0 5/29/2019  2:00 PM   Number of days: 1       Peripheral IV 05/29/19 Left Lower forearm (Active)   Site Assessment [facility] 5/29/2019  2:00 PM   Line Status Saline locked 5/29/2019  2:00 PM   Phlebitis Scale 0-->no symptoms 5/29/2019  2:00 PM   Infiltration Scale 0 5/29/2019  2:00 PM   Number of days: 0     Line/device assessment(s) completed for medical necessity    Constitutional: Awake,alert, no acute distress. Still ill appearing but  nontoxic.  Respiratory: Crackles mid-base with clearing of rhonchi from yesterday, improvement in overall air movement compared to yesterday.  Cardiovascular: HRR, no murmurs, rubs, thrills.   GI: Soft, nontender, bowel sounds are positive.  Skin/Integumen: Remains quite pale, no unusual bruising, open areas or rashes.         Medications     sodium chloride 150 mL/hr at 05/28/19 1552       azithromycin  250 mg Intravenous Q24H     citalopram  40 mg Oral Daily     enoxaparin  40 mg Subcutaneous Q24H     famotidine  20 mg Oral Daily     fexofenadine  60 mg Oral Daily     fluticasone  1 spray Both Nostrils Daily     fluticasone-vilanterol  1 puff Inhalation Daily     gabapentin  600 mg Oral At Bedtime     gabapentin  600 mg Oral Daily     gabapentin  900 mg Oral QAM     ipratropium - albuterol 0.5 mg/2.5 mg/3 mL  3 mL Nebulization Q6H     montelukast  10 mg Oral At Bedtime     pantoprazole  40 mg Oral BID     piperacillin-tazobactam  4.5 g Intravenous Q6H     vancomycin (VANCOCIN) IV  1,750 mg Intravenous Q24H       Data   Recent Labs   Lab 05/29/19  0451 05/28/19  1559 05/28/19  0811   WBC 12.8*  --  11.3*   HGB 9.6*  --  11.1*   MCV 88  --  89     --  183    137 136   POTASSIUM 4.3 4.6 5.0   CHLORIDE 114* 109 106   CO2 23 21 22   BUN 18 25 27   CR 1.15* 1.27* 1.54*   ANIONGAP 6 7 8   KATHLEEN 8.2* 7.8* 8.8   * 97 85   ALBUMIN 1.9*  --  2.3*   PROTTOTAL 5.6*  --  6.3*   BILITOTAL 0.4  --  0.7   ALKPHOS 62  --  66   ALT 23  --  28   AST 16  --  15   TROPI  --   --  <0.015       No results found for this or any previous visit (from the past 24 hour(s)).

## 2019-05-29 NOTE — PLAN OF CARE
Pt alert/oriented, VSS to baseline/afebrile. Lungs are dim/coarse. Continues on the Airvo with sats 92-94%. Pt continues to have poor activity tolerance becoming short of breath with any exertion. HR-SR/ST 90's-100's. IVF infusing @ 150 ml/hr. Up to BSC with SBA. Voiding adequately (see flowsheets). Uses call light/makes needs known.     Face to face report given with opportunity to observe patient.    Report given to Anita Arora   5/29/2019  7:33 PM

## 2019-05-29 NOTE — PLAN OF CARE
Pt remains on AIRVO this shift, with settings as charted. CASAS, poor activity tolerance. LS coarse/dim. Frequent harsh cough that is occasionally productive of scant amounts of bubba sputum. Sats generally 88-92%. AIRVO settings adjusted slightly for pt comfort and due to slight drop in sats with improvement. Remains tachy t/o shift. Afebrile. Alert and oriented. PRN Tylenol for c/o headache pain with good effect. Up to commode with SBA, tolerates well. Voiding large amounts. IVF infusing. Poor appetite, good oral fluid intake. Calls appropriately.     Face to face report given with opportunity to observe patient.    Report given to Christine and Bryleigh, RN's     Ammy Caruso   5/29/2019  7:31 AM

## 2019-05-29 NOTE — PROGRESS NOTES
Assessment completed see flowsheet.    LOC: alert   Others present: Patient and Family,  Issa    Dx: Bilateral pneumonia/sepsis  Chronic Disease Management: RA    Lives with:  Issa  Living at:  home  Transportation: YES, both drive    Primary PCP: Jimbo Martinez, she goes to [facility] for dental care and to [facility]  Insurance:  Medicare/medica  Medicare Inpatient letter reviewed with Sherrie.    Support System:  Issa, daughter in law and [employer] family  Homecare/PCA: no  /County Services:   no  : NO      [facility] Referral line called: n/a    Health Care Directive: YES, on file  Guardian: no  POA: no    Pharmacy: [facility]  Meds management: YES, manages own    Adequate Resources for needs (housing, utilities, food/med): YES  Household chores: shared  Work/community/social activity: YES gets out to go to [employer], eat and to visit with family    ADLs: independent  Ambulation:independent  Falls: no  Nutrition: no concerns  Sleep: uses medication to help    Equipment used: cane when outside only      Oxygen supplier: n/a      Does the supplier have valid oxygen orders: n/a    Mental health: no  Substance abuse: no  Exposure to violence/abuse: no  Stressors: denies    Able to Return to Prior Living Arrangements: YES    Choice of Vendor: n/a    Barriers: none    JESUS MANUEL: average    Plan: home

## 2019-05-29 NOTE — PHARMACY
[facility]    Pharmacy      Antimicrobial Stewardship Note     Current antimicrobial therapy:  Anti-infectives (From now, onward)    Start     Dose/Rate Route Frequency Ordered Stop    05/29/19 0800  azithromycin (ZITHROMAX) 250 mg in sodium chloride 0.9 % 250 mL intermittent infusion      250 mg  280 mL/hr over 1 Hours Intravenous EVERY 24 HOURS 05/28/19 1107 06/02/19 0759    05/28/19 1145  vancomycin (VANCOCIN) 1,750 mg in sodium chloride 0.9 % 500 mL intermittent infusion      1,750 mg  284 mL/hr over 2 Hours Intravenous EVERY 24 HOURS 05/28/19 1136      05/28/19 1115  piperacillin-tazobactam (ZOSYN) intermittent infusion 4.5 g      4.5 g  200 mL/hr over 30 Minutes Intravenous EVERY 6 HOURS 05/28/19 1107          Indication: CAP; Sepsis     Days of Therapy: 2     Pertinent labs:  Creatinine   Creatinine   Date Value Ref Range Status   05/29/2019 1.15 (H) 0.52 - 1.04 mg/dL Final   05/28/2019 1.27 (H) 0.52 - 1.04 mg/dL Final   05/28/2019 1.54 (H) 0.52 - 1.04 mg/dL Final     WBC   WBC   Date Value Ref Range Status   05/29/2019 12.8 (H) 4.0 - 11.0 10e9/L Final   05/28/2019 11.3 (H) 4.0 - 11.0 10e9/L Final   05/09/2019 8.6 4.0 - 11.0 10e9/L Final     Procalcitonin   Procalcitonin   Date Value Ref Range Status   05/28/2019 16.66 (HH) ng/ml Final     Comment:     Critical Value called to and read back by  MARGOT FERRER AT 0914 ON 05/28/19 AJE  >/= 10.00 ng/ml   Very high likelihood of severe sepsis or septic shock.  Recommendation: Strongly recommend initiating or continuing antibiotics.   Evaluate culture results and clinical features to target antibacterial   therapy. Obtain blood cultures and other relevant cultures if not done.Repeat   PCT in 2 days to guide antibiotic de-escalation. Consider de-escalating   antibiotics when PCT concentration is <80% of peak or abs PCT <1.       CRP   CRP Inflammation   Date Value Ref Range Status   05/28/2019 372.0 (H) 0.0 - 8.0 mg/L Final   05/09/2019 <2.9 0.0 - 8.0 mg/L  Final   05/06/2019 3.3 0.0 - 8.0 mg/L Final       Culture Results:   7-Day Micro Results     Procedure Component Value Units Date/Time   Strep pneumo Agn Urine or CSF [[id number]] Collected: 05/28/19 2002   Order Status: Completed Lab Status: In process Updated: 05/28/19 2022   Specimen: Urine    Strep pneumo Agn Urine or CSF    Order Status: Canceled Lab Status: No result    Specimen: Urine    Active MRSA Surveillance Culture [[id number]] Collected: 05/28/19 1057   Order Status: Completed Lab Status: Final result Updated: 05/29/19 0630   Specimen: Nares from Nose     Specimen Description Nares    Culture Micro No MRSA isolated   Sputum Culture Aerobic Bacterial [[id number]] (Abnormal) Collected: 05/28/19 0955   Order Status: Completed Lab Status: Preliminary result Updated: 05/29/19 0828   Specimen: Sputum     Specimen Description Sputum    Culture Micro Presumptive identification:   Light growth   Streptococcus pneumoniae   Identification and susceptibility to follow.   Abnormal    Gram stain [[id number]] (Abnormal) Collected: 05/28/19 0955   Order Status: Completed Lab Status: Final result Updated: 05/28/19 1030   Specimen: Sputum     Specimen Description Sputum    Gram Stain <10 Squamous epithelial cells/low power field     <25 PMNs/low power field     Many   Gram positive cocci in pairs   Abnormal    Urine Culture Aerobic Bacterial [[id number]] Collected: 05/28/19 0829   Order Status: Completed Lab Status: Preliminary result Updated: 05/29/19 0713   Specimen: Catheterized Urine     Specimen Description Catheterized Urine    Culture Micro Culture negative monitoring continues   Blood culture [[id number]] Collected: 05/28/19 0810   Order Status: Completed Lab Status: Preliminary result Updated: 05/29/19 0617   Specimen: Blood     Specimen Description Blood    Culture Micro No growth after 22 hours   Blood culture    Order Status: Canceled Lab Status: No result    Specimen: Blood    Blood culture [[id number]] Collected: 05/28/19 0750   Order  Status: Completed Lab Status: Preliminary result Updated: 05/29/19 0617   Specimen: Blood     Specimen Description Blood    Special Requests Left Arm    Culture Micro No growth after 22 hours     Recommendations/Interventions:  1. No recommendations at this time. Will continue to monitor.     Gabriele Jimenes  May 29, 2019

## 2019-05-30 LAB
ANION GAP SERPL CALCULATED.3IONS-SCNC: 4 MMOL/L (ref 3–14)
BACTERIA SPEC CULT: NO GROWTH
BUN SERPL-MCNC: 11 MG/DL (ref 7–30)
CALCIUM SERPL-MCNC: 8.5 MG/DL (ref 8.5–10.1)
CHLORIDE SERPL-SCNC: 111 MMOL/L (ref 94–109)
CO2 SERPL-SCNC: 27 MMOL/L (ref 20–32)
CREAT SERPL-MCNC: 0.91 MG/DL (ref 0.52–1.04)
ERYTHROCYTE [DISTWIDTH] IN BLOOD BY AUTOMATED COUNT: 14.9 % (ref 10–15)
GFR SERPL CREATININE-BSD FRML MDRD: 66 ML/MIN/{1.73_M2}
GLUCOSE SERPL-MCNC: 86 MG/DL (ref 70–99)
HCT VFR BLD AUTO: 30 % (ref 35–47)
HGB BLD-MCNC: 9.4 G/DL (ref 11.7–15.7)
MCH RBC QN AUTO: 28.1 PG (ref 26.5–33)
MCHC RBC AUTO-ENTMCNC: 31.3 G/DL (ref 31.5–36.5)
MCV RBC AUTO: 90 FL (ref 78–100)
PLATELET # BLD AUTO: 174 10E9/L (ref 150–450)
POTASSIUM SERPL-SCNC: 3.9 MMOL/L (ref 3.4–5.3)
PROCALCITONIN SERPL-MCNC: 5.39 NG/ML
RBC # BLD AUTO: 3.34 10E12/L (ref 3.8–5.2)
SODIUM SERPL-SCNC: 142 MMOL/L (ref 133–144)
SPECIMEN SOURCE: NORMAL
WBC # BLD AUTO: 9.8 10E9/L (ref 4–11)

## 2019-05-30 PROCEDURE — A9270 NON-COVERED ITEM OR SERVICE: HCPCS | Performed by: HOSPITALIST

## 2019-05-30 PROCEDURE — 12000000 ZZH R&B MED SURG/OB

## 2019-05-30 PROCEDURE — 84145 PROCALCITONIN (PCT): CPT | Performed by: NURSE PRACTITIONER

## 2019-05-30 PROCEDURE — 80048 BASIC METABOLIC PNL TOTAL CA: CPT | Performed by: NURSE PRACTITIONER

## 2019-05-30 PROCEDURE — 25800030 ZZH RX IP 258 OP 636: Performed by: NURSE PRACTITIONER

## 2019-05-30 PROCEDURE — A9270 NON-COVERED ITEM OR SERVICE: HCPCS | Performed by: NURSE PRACTITIONER

## 2019-05-30 PROCEDURE — 40000275 ZZH STATISTIC RCP TIME EA 10 MIN

## 2019-05-30 PROCEDURE — 85027 COMPLETE CBC AUTOMATED: CPT | Performed by: NURSE PRACTITIONER

## 2019-05-30 PROCEDURE — 36415 COLL VENOUS BLD VENIPUNCTURE: CPT | Performed by: NURSE PRACTITIONER

## 2019-05-30 PROCEDURE — 25000132 ZZH RX MED GY IP 250 OP 250 PS 637: Performed by: NURSE PRACTITIONER

## 2019-05-30 PROCEDURE — 25000132 ZZH RX MED GY IP 250 OP 250 PS 637: Performed by: HOSPITALIST

## 2019-05-30 PROCEDURE — 25000125 ZZHC RX 250: Performed by: NURSE PRACTITIONER

## 2019-05-30 PROCEDURE — 94640 AIRWAY INHALATION TREATMENT: CPT

## 2019-05-30 PROCEDURE — 99232 SBSQ HOSP IP/OBS MODERATE 35: CPT | Performed by: NURSE PRACTITIONER

## 2019-05-30 PROCEDURE — 94640 AIRWAY INHALATION TREATMENT: CPT | Mod: 76

## 2019-05-30 PROCEDURE — 25000128 H RX IP 250 OP 636: Performed by: NURSE PRACTITIONER

## 2019-05-30 RX ORDER — GUAIFENESIN 600 MG/1
600 TABLET, EXTENDED RELEASE ORAL 2 TIMES DAILY
Status: DISCONTINUED | OUTPATIENT
Start: 2019-05-30 | End: 2019-06-04 | Stop reason: HOSPADM

## 2019-05-30 RX ORDER — FEXOFENADINE HCL AND PSEUDOEPHEDRINE HCL 180; 240 MG/1; MG/1
1 TABLET, EXTENDED RELEASE ORAL DAILY
Status: DISCONTINUED | OUTPATIENT
Start: 2019-05-31 | End: 2019-05-30

## 2019-05-30 RX ORDER — FEXOFENADINE HCL AND PSEUDOEPHEDRINE HCI 60; 120 MG/1; MG/1
1 TABLET, EXTENDED RELEASE ORAL EVERY 12 HOURS SCHEDULED
Status: DISCONTINUED | OUTPATIENT
Start: 2019-05-31 | End: 2019-06-04 | Stop reason: HOSPADM

## 2019-05-30 RX ORDER — TRAZODONE HYDROCHLORIDE 150 MG/1
150 TABLET ORAL AT BEDTIME
Status: DISCONTINUED | OUTPATIENT
Start: 2019-05-30 | End: 2019-06-04 | Stop reason: HOSPADM

## 2019-05-30 RX ADMIN — MONTELUKAST 10 MG: 10 TABLET, FILM COATED ORAL at 22:12

## 2019-05-30 RX ADMIN — GUAIFENESIN 600 MG: 600 TABLET, EXTENDED RELEASE ORAL at 03:56

## 2019-05-30 RX ADMIN — TAZOBACTAM SODIUM AND PIPERACILLIN SODIUM 4.5 G: 500; 4 INJECTION, SOLUTION INTRAVENOUS at 11:54

## 2019-05-30 RX ADMIN — IPRATROPIUM BROMIDE AND ALBUTEROL SULFATE 3 ML: .5; 3 SOLUTION RESPIRATORY (INHALATION) at 20:04

## 2019-05-30 RX ADMIN — PANTOPRAZOLE SODIUM 40 MG: 40 TABLET, DELAYED RELEASE ORAL at 16:33

## 2019-05-30 RX ADMIN — GABAPENTIN 600 MG: 300 CAPSULE ORAL at 11:38

## 2019-05-30 RX ADMIN — ENOXAPARIN SODIUM 40 MG: 40 INJECTION SUBCUTANEOUS at 11:38

## 2019-05-30 RX ADMIN — GABAPENTIN 600 MG: 300 CAPSULE ORAL at 22:12

## 2019-05-30 RX ADMIN — FEXOFENADINE HYDROCHLORIDE 60 MG: 60 TABLET, FILM COATED ORAL at 08:30

## 2019-05-30 RX ADMIN — CITALOPRAM HYDROBROMIDE 40 MG: 20 TABLET ORAL at 08:30

## 2019-05-30 RX ADMIN — IPRATROPIUM BROMIDE AND ALBUTEROL SULFATE 3 ML: .5; 3 SOLUTION RESPIRATORY (INHALATION) at 02:04

## 2019-05-30 RX ADMIN — TRAZODONE HYDROCHLORIDE 150 MG: 150 TABLET ORAL at 22:12

## 2019-05-30 RX ADMIN — PANTOPRAZOLE SODIUM 40 MG: 40 TABLET, DELAYED RELEASE ORAL at 08:30

## 2019-05-30 RX ADMIN — TAZOBACTAM SODIUM AND PIPERACILLIN SODIUM 4.5 G: 500; 4 INJECTION, SOLUTION INTRAVENOUS at 18:30

## 2019-05-30 RX ADMIN — ACETAMINOPHEN 650 MG: 325 TABLET, FILM COATED ORAL at 16:35

## 2019-05-30 RX ADMIN — IPRATROPIUM BROMIDE AND ALBUTEROL SULFATE 3 ML: .5; 3 SOLUTION RESPIRATORY (INHALATION) at 08:02

## 2019-05-30 RX ADMIN — GABAPENTIN 900 MG: 300 CAPSULE ORAL at 08:30

## 2019-05-30 RX ADMIN — IPRATROPIUM BROMIDE AND ALBUTEROL SULFATE 3 ML: .5; 3 SOLUTION RESPIRATORY (INHALATION) at 13:51

## 2019-05-30 RX ADMIN — ACETAMINOPHEN 650 MG: 325 TABLET, FILM COATED ORAL at 22:14

## 2019-05-30 RX ADMIN — FAMOTIDINE 20 MG: 20 TABLET, FILM COATED ORAL at 08:30

## 2019-05-30 RX ADMIN — BENZONATATE 200 MG: 100 CAPSULE ORAL at 14:01

## 2019-05-30 RX ADMIN — GUAIFENESIN 600 MG: 600 TABLET, EXTENDED RELEASE ORAL at 22:12

## 2019-05-30 RX ADMIN — TAZOBACTAM SODIUM AND PIPERACILLIN SODIUM 4.5 G: 500; 4 INJECTION, SOLUTION INTRAVENOUS at 06:43

## 2019-05-30 RX ADMIN — FLUTICASONE PROPIONATE 1 SPRAY: 50 SPRAY, METERED NASAL at 08:33

## 2019-05-30 RX ADMIN — AZITHROMYCIN MONOHYDRATE 250 MG: 500 INJECTION, POWDER, LYOPHILIZED, FOR SOLUTION INTRAVENOUS at 08:33

## 2019-05-30 RX ADMIN — HYDROCODONE BITARTRATE AND ACETAMINOPHEN 1 TABLET: 5; 325 TABLET ORAL at 08:30

## 2019-05-30 RX ADMIN — FLUTICASONE FUROATE AND VILANTEROL TRIFENATATE 1 PUFF: 200; 25 POWDER RESPIRATORY (INHALATION) at 08:10

## 2019-05-30 ASSESSMENT — MIFFLIN-ST. JEOR: SCORE: 1536.88

## 2019-05-30 ASSESSMENT — ACTIVITIES OF DAILY LIVING (ADL)
ADLS_ACUITY_SCORE: 15
ADLS_ACUITY_SCORE: 15
ADLS_ACUITY_SCORE: 16
ADLS_ACUITY_SCORE: 15

## 2019-05-30 NOTE — PLAN OF CARE
Verbal orders received to make pt step down status. Will to room 3210. Will continue to monitor closely.

## 2019-05-30 NOTE — PLAN OF CARE
Patient ambulated the entire third floor pushing a wheelchair with portable oxygen. Sats 85% upon return to room with oxygen on at 3L. Took a short time for sats to return up to 90%. Encouraged patient to sit up in chair for at least an hour.  remains here.

## 2019-05-30 NOTE — PHARMACY
[facility]    Pharmacy      Antimicrobial Stewardship Note     Current antimicrobial therapy:  Anti-infectives (From now, onward)    Start     Dose/Rate Route Frequency Ordered Stop    05/29/19 0800  azithromycin (ZITHROMAX) 250 mg in sodium chloride 0.9 % 250 mL intermittent infusion      250 mg  280 mL/hr over 1 Hours Intravenous EVERY 24 HOURS 05/28/19 1107 06/02/19 0759    05/28/19 1115  piperacillin-tazobactam (ZOSYN) intermittent infusion 4.5 g      4.5 g  200 mL/hr over 30 Minutes Intravenous EVERY 6 HOURS 05/28/19 1107            Indication: CAP    Days of Therapy: 3 (and 3 prior days of vanco)     Pertinent labs:  Creatinine   Creatinine   Date Value Ref Range Status   05/30/2019 0.91 0.52 - 1.04 mg/dL Final   05/29/2019 1.15 (H) 0.52 - 1.04 mg/dL Final   05/28/2019 1.27 (H) 0.52 - 1.04 mg/dL Final     WBC   WBC   Date Value Ref Range Status   05/30/2019 9.8 4.0 - 11.0 10e9/L Final   05/29/2019 12.8 (H) 4.0 - 11.0 10e9/L Final   05/28/2019 11.3 (H) 4.0 - 11.0 10e9/L Final     Procalcitonin   Procalcitonin   Date Value Ref Range Status   05/30/2019 5.39 ng/ml Final     Comment:     2.00-9.99 ng/ml  High risk for progression to severe sepsis.  Recommendation: Strongly recommend initiating or continuing antibiotics.   Evaluate culture results and clinical features to target antibacterial   therapy. Obtain blood cultures and other relevant cultures if not done. Repeat   PCT in 2 days to guide antibiotic de-escalation. Consider de-escalating   antibiotics when PCT concentration is <80% of peak or abs PCT <0.5.     05/28/2019 16.66 (HH) ng/ml Final     Comment:     Critical Value called to and read back by  MARGOT FERRER AT 0914 ON 05/28/19 AJE  >/= 10.00 ng/ml   Very high likelihood of severe sepsis or septic shock.  Recommendation: Strongly recommend initiating or continuing antibiotics.   Evaluate culture results and clinical features to target antibacterial   therapy. Obtain blood cultures and other  relevant cultures if not done.Repeat   PCT in 2 days to guide antibiotic de-escalation. Consider de-escalating   antibiotics when PCT concentration is <80% of peak or abs PCT <1.       CRP   CRP Inflammation   Date Value Ref Range Status   05/28/2019 372.0 (H) 0.0 - 8.0 mg/L Final   05/09/2019 <2.9 0.0 - 8.0 mg/L Final   05/06/2019 3.3 0.0 - 8.0 mg/L Final       Culture Results:   7-Day Micro Results     Procedure Component Value Units Date/Time   Strep pneumo Agn Urine or CSF [[id number]] Collected: 05/28/19 2002   Order Status: Completed Lab Status: Final result Updated: 05/29/19 2201   Specimen: Urine     Specimen Description Urine    S Pneumoniae Antigen Negative, no Streptococcus pneumoniae antigen detected by immunochromatographic membrane   assay. A negative Streptococcus pneumoniae antigen result does not rule out infection with    Streptococcus pneumoniae.      Internal QC OK   Internal QC OK    Strep pneumo Agn Urine or CSF    Order Status: Canceled Lab Status: No result    Specimen: Urine    Active MRSA Surveillance Culture [[id number]] Collected: 05/28/19 1057   Order Status: Completed Lab Status: Final result Updated: 05/29/19 0630   Specimen: Nares from Nose     Specimen Description Nares    Culture Micro No MRSA isolated   Sputum Culture Aerobic Bacterial [[id number]] (Abnormal) Collected: 05/28/19 0955   Order Status: Completed Lab Status: Preliminary result Updated: 05/29/19 0828   Specimen: Sputum     Specimen Description Sputum    Culture Micro Presumptive identification:   Light growth   Streptococcus pneumoniae   Identification and susceptibility to follow.   Abnormal    Gram stain [[id number]] (Abnormal) Collected: 05/28/19 0955   Order Status: Completed Lab Status: Final result Updated: 05/28/19 1030   Specimen: Sputum     Specimen Description Sputum    Gram Stain <10 Squamous epithelial cells/low power field     <25 PMNs/low power field     Many   Gram positive cocci in pairs   Abnormal    Urine Culture  Aerobic Bacterial [[id number]] Collected: 05/28/19 0829   Order Status: Completed Lab Status: Final result Updated: 05/30/19 0702   Specimen: Catheterized Urine     Specimen Description Catheterized Urine    Culture Micro No growth   Blood culture [[id number]] Collected: 05/28/19 0810   Order Status: Completed Lab Status: Preliminary result Updated: 05/30/19 0758   Specimen: Blood     Specimen Description Blood    Culture Micro No growth after 2 days   Blood culture    Order Status: Canceled Lab Status: No result    Specimen: Blood    Blood culture [[id number]] Collected: 05/28/19 0750   Order Status: Completed Lab Status: Preliminary result Updated: 05/30/19 0758   Specimen: Blood     Specimen Description Blood    Special Requests Left Arm    Culture Micro No growth after 2 days     Recommendations/Interventions:  1. No recommendations at this time. Will continue to monitor.     Gabriele Jimenes  May 30, 2019

## 2019-05-30 NOTE — PROGRESS NOTES
[facility]    Hospitalist Progress Note    Date of Service (when I saw the patient): 05/30/2019    Assessment & Plan   Melvi Cleveland is a 69 year old female who was admitted on 5/28/2019.     Bilateral pneumonia: Sputum culture pending,presumptive strep pneumo on prelim. Immunocompromised due to multiple RA drugs. Treated with Levaquin for LLL pneumonia 3/27/19, patient reported no improvement since then, worsening over the last 10 days. ABG remarkably normal considering her high flow oxygen need. CT scan shows bilateral pneumonia.    -5/29-Continue Zithromax, zosyn, vanco-pharmacy to dose for now pending 48 hours on cultures. Duonebs, oxygen supplementation.  -5/30-Procal down from 16 to just over 5. WBC normal. Dyspnea and cough improved. Discontinue Vanco, continue Zithromax and Zosyn pending full culture. Able to titrate oxygen from airvo 40% to nasal cannula 2 liters. Transfer out of ICU to medical floor.       Sepsis (H): Resolved. Due to bilateral pneumonia, organism pending. Presented with hypotension, hypoxia, tachycardia. Leukocytosis, elevated ProCal and lactic acid of 4.9 on presentation. Fluid bolus started in ED, antibiotics given. Lactic acid reduced and hypotension resolved with fluid resuscitation. On arrival to the floor her cap refill is <3sec, mentation is clear, work of breathing improved and she reported feeling improved as well. Lactic acid cleared, tachycardia resolved, blood pressures stable and at her normal per her report. Sputum and blood cultures spending. Treatment as above.       CASE (acute kidney injury) (H): Due to acute sepsis. Improved already with fluid resuscitation so ATN ruled out, now back to normal.       Acute respiratory failure with hypoxia (H): Due to #1, treatment as above. She was requiring 15 liters of oxygen though her work of breathing has improved significantly by the time she arrived to the floor. AIRVO was started to see if this will give better  oxygenation and she did respond well, no 40% fio2. She does have some cough variant asthma for which she has been treated for presumed exacerbation over the last 2 months with new start of fexofenadine, Singulair and Advair as well as multiple rounds of steroids.       Low back pain, chronic: Continue home dose hydrocodone, gabapentin.        RA (rheumatoid arthritis) (H): Will need to hold methotrexate and plaquenil.       DVT Prophylaxis: Enoxaparin (Lovenox) SQ  Code Status: Full Code    Disposition: Expected discharge in several days once respiratory status stable.    Kelly Lim, CNP    Interval History   Feels better, still dyspneic with activity but improved. Denies chest pain, abdominal pain, nausea.     -Data reviewed today: I reviewed all new labs and imaging results over the last 24 hours.    Physical Exam   Temp: 98.4  F (36.9  C) Temp src: Tympanic BP: 122/63   Heart Rate: 92 Resp: 20 SpO2: (!) 90 % O2 Device: Nasal cannula Oxygen Delivery: 2 LPM  Vitals:    05/28/19 1015 05/29/19 0539 05/30/19 0636   Weight: 100 kg (220 lb 7.4 oz) 100.2 kg (220 lb 14.4 oz) 100.1 kg (220 lb 10.9 oz)     Vital Signs with Ranges  Temp:  [97.8  F (36.6  C)-99.1  F (37.3  C)] 98.4  F (36.9  C)  Heart Rate:  [] 92  Resp:  [18-23] 20  BP: (104-135)/(60-78) 122/63  FiO2 (%):  [40 %-46 %] 46 %  SpO2:  [90 %-98 %] 90 %  I/O last 3 completed shifts:  In: 4060 [P.O.:1440; I.V.:2620]  Out: 4450 [Urine:4450]    Peripheral IV 05/30/19 Left Hand (Active)   Number of days: 0     Line/device assessment(s) completed for medical necessity    Constitutional: Awake,alert, no acute distress. Still ill appearing but nontoxic.  Respiratory: Crackles mid-base but less course today, improvement in overall air movement compared to yesterday.  Cardiovascular: HRR, no murmurs, rubs, thrills.   GI: Soft, nontender, bowel sounds are positive.  Skin/Integumen: Remains quite pale, no unusual bruising, open areas or rashes.          Medications       azithromycin  250 mg Intravenous Q24H     citalopram  40 mg Oral Daily     enoxaparin  40 mg Subcutaneous Q24H     famotidine  20 mg Oral Daily     [START ON 5/31/2019] fexofenadine-pseudoePHEDrine  1 tablet Oral Daily     fluticasone  1 spray Both Nostrils Daily     fluticasone-vilanterol  1 puff Inhalation Daily     gabapentin  600 mg Oral At Bedtime     gabapentin  600 mg Oral Daily     gabapentin  900 mg Oral QAM     guaiFENesin  600 mg Oral BID     ipratropium - albuterol 0.5 mg/2.5 mg/3 mL  3 mL Nebulization Q6H     montelukast  10 mg Oral At Bedtime     pantoprazole  40 mg Oral BID     piperacillin-tazobactam  4.5 g Intravenous Q6H     traZODone  150 mg Oral At Bedtime       Data   Recent Labs   Lab 05/30/19  0436 05/29/19  0451 05/28/19  1559 05/28/19  0811   WBC 9.8 12.8*  --  11.3*   HGB 9.4* 9.6*  --  11.1*   MCV 90 88  --  89    161  --  183    143 137 136   POTASSIUM 3.9 4.3 4.6 5.0   CHLORIDE 111* 114* 109 106   CO2 27 23 21 22   BUN 11 18 25 27   CR 0.91 1.15* 1.27* 1.54*   ANIONGAP 4 6 7 8   KATHLEEN 8.5 8.2* 7.8* 8.8   GLC 86 101* 97 85   ALBUMIN  --  1.9*  --  2.3*   PROTTOTAL  --  5.6*  --  6.3*   BILITOTAL  --  0.4  --  0.7   ALKPHOS  --  62  --  66   ALT  --  23  --  28   AST  --  16  --  15   TROPI  --   --   --  <0.015       No results found for this or any previous visit (from the past 24 hour(s)).

## 2019-05-30 NOTE — PLAN OF CARE
Face to face report given with opportunity to observe patient.    Report given to Daisy and Cary Núñez   5/30/2019  7:07 AM

## 2019-05-30 NOTE — PLAN OF CARE
Able to wean oxygen down to 2-3 L via NC today. BBS are with crackles and diminished in upper lobes. States she has a productive cough of bubba phlegm at times and then grayish brown in color. Skin color very pale. Edema noted in lower extremities. Patient states that the swelling is way down from this morning.  here. Complains nagging headache this evening. Low grade temp 100.54. Medicated with tylenol. Will recheck temp. IV lock patent.

## 2019-05-30 NOTE — PLAN OF CARE
"Pt made step down to med surg this AM around 0930. Pt is alert and oriented, VSS/max temp 99.3.  Pt placed on 3 LPM via NC from Airvo this AM. Sats 91-97%. Lungs are clear with fine crackles in the bases. Pt ambulated in hallway about 125 ft.. She did desat to 78% during ambulation on 3-6 LPM, was able to recover within minutes. Pt continues to report SOB with any activity though states she does \"feel better today\". C/o frequent productive cough with small amount of bubba sputum. Medicated with Tessalon prn with adequate results. Pt also reporting headache this AM, medicated with prn Norco with decrease in pain. Trace edema to BLE's. Up with SBA, makes needs known. Will continue to monitor.     Face to face report given with opportunity to observe patient.    Report given to Blanca Arora   5/30/2019  4:36 PM   "

## 2019-05-30 NOTE — PLAN OF CARE
Alert, oriented. VSS and afebrile. Sinus tach 100s. Remains on airvo 30LPM 43% FiO2, sats 92-94%. Lungs are coarse. Frequent productive coughs with bubba colored sputum. D/c'd IV fluids this shift. 1+ edema in BLE. Up to commode with SBA. Uses call light appropriately. Will continue to monitor.

## 2019-05-31 LAB
ANION GAP SERPL CALCULATED.3IONS-SCNC: 8 MMOL/L (ref 3–14)
BACTERIA SPEC CULT: ABNORMAL
BUN SERPL-MCNC: 10 MG/DL (ref 7–30)
CALCIUM SERPL-MCNC: 8.7 MG/DL (ref 8.5–10.1)
CHLORIDE SERPL-SCNC: 109 MMOL/L (ref 94–109)
CO2 SERPL-SCNC: 25 MMOL/L (ref 20–32)
CREAT SERPL-MCNC: 0.9 MG/DL (ref 0.52–1.04)
ERYTHROCYTE [DISTWIDTH] IN BLOOD BY AUTOMATED COUNT: 14.6 % (ref 10–15)
GFR SERPL CREATININE-BSD FRML MDRD: 66 ML/MIN/{1.73_M2}
GLUCOSE SERPL-MCNC: 92 MG/DL (ref 70–99)
HCT VFR BLD AUTO: 28.4 % (ref 35–47)
HGB BLD-MCNC: 9.4 G/DL (ref 11.7–15.7)
MCH RBC QN AUTO: 28.5 PG (ref 26.5–33)
MCHC RBC AUTO-ENTMCNC: 33.1 G/DL (ref 31.5–36.5)
MCV RBC AUTO: 86 FL (ref 78–100)
PLATELET # BLD AUTO: 174 10E9/L (ref 150–450)
POTASSIUM SERPL-SCNC: 3.5 MMOL/L (ref 3.4–5.3)
RBC # BLD AUTO: 3.3 10E12/L (ref 3.8–5.2)
SODIUM SERPL-SCNC: 142 MMOL/L (ref 133–144)
SPECIMEN SOURCE: ABNORMAL
WBC # BLD AUTO: 6.2 10E9/L (ref 4–11)

## 2019-05-31 PROCEDURE — 36415 COLL VENOUS BLD VENIPUNCTURE: CPT | Performed by: NURSE PRACTITIONER

## 2019-05-31 PROCEDURE — 94640 AIRWAY INHALATION TREATMENT: CPT | Mod: 76

## 2019-05-31 PROCEDURE — 40000275 ZZH STATISTIC RCP TIME EA 10 MIN

## 2019-05-31 PROCEDURE — A9270 NON-COVERED ITEM OR SERVICE: HCPCS | Performed by: NURSE PRACTITIONER

## 2019-05-31 PROCEDURE — 25000132 ZZH RX MED GY IP 250 OP 250 PS 637: Performed by: NURSE PRACTITIONER

## 2019-05-31 PROCEDURE — 25000125 ZZHC RX 250: Performed by: NURSE PRACTITIONER

## 2019-05-31 PROCEDURE — 12000000 ZZH R&B MED SURG/OB

## 2019-05-31 PROCEDURE — 25800030 ZZH RX IP 258 OP 636: Performed by: NURSE PRACTITIONER

## 2019-05-31 PROCEDURE — 25000128 H RX IP 250 OP 636: Performed by: NURSE PRACTITIONER

## 2019-05-31 PROCEDURE — 94640 AIRWAY INHALATION TREATMENT: CPT

## 2019-05-31 PROCEDURE — 80048 BASIC METABOLIC PNL TOTAL CA: CPT | Performed by: NURSE PRACTITIONER

## 2019-05-31 PROCEDURE — 85027 COMPLETE CBC AUTOMATED: CPT | Performed by: NURSE PRACTITIONER

## 2019-05-31 RX ORDER — TRAZODONE HYDROCHLORIDE 50 MG/1
TABLET, FILM COATED ORAL
Qty: 270 TABLET | Refills: 0 | Status: SHIPPED | OUTPATIENT
Start: 2019-05-31 | End: 2019-06-11

## 2019-05-31 RX ADMIN — ENOXAPARIN SODIUM 40 MG: 40 INJECTION SUBCUTANEOUS at 12:12

## 2019-05-31 RX ADMIN — TAZOBACTAM SODIUM AND PIPERACILLIN SODIUM 4.5 G: 500; 4 INJECTION, SOLUTION INTRAVENOUS at 12:07

## 2019-05-31 RX ADMIN — FEXOFENADINE HYDROCHLORIDE AND PSEUDOEPHEDRINE HYDROCHLORIDE 1 TABLET: 60; 120 TABLET, FILM COATED, EXTENDED RELEASE ORAL at 07:52

## 2019-05-31 RX ADMIN — GABAPENTIN 600 MG: 300 CAPSULE ORAL at 20:51

## 2019-05-31 RX ADMIN — TAZOBACTAM SODIUM AND PIPERACILLIN SODIUM 4.5 G: 500; 4 INJECTION, SOLUTION INTRAVENOUS at 06:00

## 2019-05-31 RX ADMIN — TAZOBACTAM SODIUM AND PIPERACILLIN SODIUM 4.5 G: 500; 4 INJECTION, SOLUTION INTRAVENOUS at 00:10

## 2019-05-31 RX ADMIN — MONTELUKAST 10 MG: 10 TABLET, FILM COATED ORAL at 20:52

## 2019-05-31 RX ADMIN — FAMOTIDINE 20 MG: 20 TABLET, FILM COATED ORAL at 07:52

## 2019-05-31 RX ADMIN — IPRATROPIUM BROMIDE AND ALBUTEROL SULFATE 3 ML: .5; 3 SOLUTION RESPIRATORY (INHALATION) at 14:24

## 2019-05-31 RX ADMIN — ACETAMINOPHEN 650 MG: 325 TABLET, FILM COATED ORAL at 06:51

## 2019-05-31 RX ADMIN — ACETAMINOPHEN 650 MG: 325 TABLET, FILM COATED ORAL at 15:46

## 2019-05-31 RX ADMIN — AZITHROMYCIN MONOHYDRATE 250 MG: 500 INJECTION, POWDER, LYOPHILIZED, FOR SOLUTION INTRAVENOUS at 07:53

## 2019-05-31 RX ADMIN — BENZONATATE 200 MG: 100 CAPSULE ORAL at 07:51

## 2019-05-31 RX ADMIN — GABAPENTIN 600 MG: 300 CAPSULE ORAL at 12:06

## 2019-05-31 RX ADMIN — IPRATROPIUM BROMIDE AND ALBUTEROL SULFATE 3 ML: .5; 3 SOLUTION RESPIRATORY (INHALATION) at 08:06

## 2019-05-31 RX ADMIN — TAZOBACTAM SODIUM AND PIPERACILLIN SODIUM 4.5 G: 500; 4 INJECTION, SOLUTION INTRAVENOUS at 18:28

## 2019-05-31 RX ADMIN — CITALOPRAM HYDROBROMIDE 40 MG: 20 TABLET ORAL at 07:52

## 2019-05-31 RX ADMIN — GUAIFENESIN 600 MG: 600 TABLET, EXTENDED RELEASE ORAL at 07:51

## 2019-05-31 RX ADMIN — GUAIFENESIN 600 MG: 600 TABLET, EXTENDED RELEASE ORAL at 20:53

## 2019-05-31 RX ADMIN — IPRATROPIUM BROMIDE AND ALBUTEROL SULFATE 3 ML: .5; 3 SOLUTION RESPIRATORY (INHALATION) at 20:15

## 2019-05-31 RX ADMIN — IPRATROPIUM BROMIDE AND ALBUTEROL SULFATE 3 ML: .5; 3 SOLUTION RESPIRATORY (INHALATION) at 02:01

## 2019-05-31 RX ADMIN — BENZONATATE 200 MG: 100 CAPSULE ORAL at 16:46

## 2019-05-31 RX ADMIN — FLUTICASONE FUROATE AND VILANTEROL TRIFENATATE 1 PUFF: 200; 25 POWDER RESPIRATORY (INHALATION) at 08:06

## 2019-05-31 RX ADMIN — FLUTICASONE PROPIONATE 1 SPRAY: 50 SPRAY, METERED NASAL at 10:15

## 2019-05-31 RX ADMIN — PANTOPRAZOLE SODIUM 40 MG: 40 TABLET, DELAYED RELEASE ORAL at 15:46

## 2019-05-31 RX ADMIN — GABAPENTIN 900 MG: 300 CAPSULE ORAL at 07:52

## 2019-05-31 RX ADMIN — PANTOPRAZOLE SODIUM 40 MG: 40 TABLET, DELAYED RELEASE ORAL at 07:51

## 2019-05-31 RX ADMIN — TRAZODONE HYDROCHLORIDE 150 MG: 150 TABLET ORAL at 20:52

## 2019-05-31 ASSESSMENT — MIFFLIN-ST. JEOR: SCORE: 1510.88

## 2019-05-31 ASSESSMENT — ACTIVITIES OF DAILY LIVING (ADL)
ADLS_ACUITY_SCORE: 16
ADLS_ACUITY_SCORE: 16
ADLS_ACUITY_SCORE: 15
ADLS_ACUITY_SCORE: 16

## 2019-05-31 NOTE — TELEPHONE ENCOUNTER
Trazodone  Last Written Prescription Date: 11/30/18  Last Fill Quantity: 270 # of Refills: 1  Last Office Visit: 5/6/19

## 2019-05-31 NOTE — PLAN OF CARE
Face to face report given with opportunity to observe patient.    Report given to Hi Roy   5/31/2019  3:38 PM

## 2019-05-31 NOTE — PLAN OF CARE
Face to face report given with opportunity to observe patient.    Report given to Alma Varela   5/30/2019  7:07 PM

## 2019-05-31 NOTE — PLAN OF CARE
Pt ambulated hallways x 1.  Pt desaturated to 83% on 2 LPM NC, once pt got back to bed.  Oxygen turned up to 3 LPM NC, then SPO2 above 90%.  Pt c/o CASAS.  She stated she feels better than she has.  Pt rated headache pain 4/10, PO tylenol 650 mg given x 1.  Around 0600, pt stated she felt weaker and didn't feel as good as she did before.  Temperature was taken, and temp 100 F, will continue to monitor.  Received scheduled IV Zosyn.    Face to face report given with opportunity to observe patient.    Report given to KATHERINE Sanchez.    Alma Murry   5/31/2019  7:30 AM

## 2019-05-31 NOTE — PROGRESS NOTES
[facility]    Hospitalist Progress Note    Date of Service (when I saw the patient): 05/31/2019    Assessment & Plan   Melvi Cleveland is a 69 year old female who was admitted on 5/28/2019.     Bilateral pneumonia: Sputum culture pending,presumptive strep pneumo on prelim. Immunocompromised due to multiple RA drugs. Treated with Levaquin for LLL pneumonia 3/27/19, patient reported no improvement since then, worsening over the last 10 days. ABG remarkably normal considering her high flow oxygen need. CT scan shows bilateral pneumonia.    -5/29-Continue Zithromax, zosyn, vanco-pharmacy to dose for now pending 48 hours on cultures. Duonebs, oxygen supplementation.  -5/30-Procal down from 16 to just over 5. WBC normal. Dyspnea and cough improved. Discontinue Vanco, continue Zithromax and Zosyn pending full culture. Able to titrate oxygen from airvo 40% to nasal cannula 2 liters. Transfer out of ICU to medical floor.  -5/31- Continues to improve. Culture confirms strep pneumonia without any other organism growth to date. Will plan on switching to roal antibiotics tomorrow.        Sepsis (H): Resolved. Due to bilateral pneumonia, organism pending. Presented with hypotension, hypoxia, tachycardia. Leukocytosis, elevated ProCal and lactic acid of 4.9 on presentation. Fluid bolus started in ED, antibiotics given. Lactic acid reduced and hypotension resolved with fluid resuscitation. On arrival to the floor her cap refill is <3sec, mentation is clear, work of breathing improved and she reported feeling improved as well. Lactic acid cleared, tachycardia resolved, blood pressures stable and at her normal per her report. Sputum and blood cultures spending. Treatment as above.       CASE (acute kidney injury) (H): Due to acute sepsis. Improved already with fluid resuscitation so ATN ruled out, now back to normal.       Acute respiratory failure with hypoxia (H): Due to #1, treatment as above. She was requiring 15  liters of oxygen though her work of breathing has improved significantly by the time she arrived to the floor. AIRVO was started to see if this will give better oxygenation and she did respond well. She was titrated off airvo yesterday quite rapidly and is now tolerating 2-3 L/nc at rest, some daturations with exertion but has been able to ambulate with mold to moderate dyspnea.  She does have some cough variant asthma for which she has been treated for presumed exacerbation over the last 2 months with new start of fexofenadine, Singulair and Advair as well as multiple rounds of steroids.       Low back pain, chronic: Continue home dose hydrocodone, gabapentin.        RA (rheumatoid arthritis) (H): Will need to hold methotrexate and plaquenil.       DVT Prophylaxis: Enoxaparin (Lovenox) SQ  Code Status: Full Code    Disposition: Expected discharge in several days once respiratory status stable-2-3 days.    Kelly Lim, CNP    Interval History   Feels better, still dyspneic with activity but improved. Denies chest pain, abdominal pain, nausea.     -Data reviewed today: I reviewed all new labs and imaging results over the last 24 hours.    Physical Exam   Temp: 99.6  F (37.6  C) Temp src: Tympanic BP: 121/63 Pulse: 88 Heart Rate: 105 Resp: 20 SpO2: (!) 91 % O2 Device: Nasal cannula Oxygen Delivery: 2.5 LPM  Vitals:    05/29/19 0539 05/30/19 0636 05/31/19 0600   Weight: 100.2 kg (220 lb 14.4 oz) 100.1 kg (220 lb 10.9 oz) 97.5 kg (214 lb 15.2 oz)     Vital Signs with Ranges  Temp:  [99.4  F (37.4  C)-101  F (38.3  C)] 99.6  F (37.6  C)  Pulse:  [88-90] 88  Heart Rate:  [] 105  Resp:  [20-22] 20  BP: (117-121)/(55-63) 121/63  SpO2:  [90 %-93 %] 91 %  I/O last 3 completed shifts:  In: 2450 [P.O.:880; I.V.:1570]  Out: 400 [Urine:400]    Peripheral IV 05/30/19 Left Hand (Active)   Site Assessment WDL 5/31/2019  3:55 PM   Line Status Saline locked 5/31/2019  3:55 PM   Phlebitis Scale 0-->no symptoms 5/31/2019   3:55 PM   Infiltration Scale 0 5/31/2019  3:55 PM   Number of days: 1     Line/device assessment(s) completed for medical necessity    Constitutional: Awake,alert, no acute distress. Still ill appearing but color better today.  Respiratory: Crackles mid-base but less course again today, improvement in overall air movement compared to yesterday.  Cardiovascular: HRR, no murmurs, rubs, thrills.   GI: Soft, nontender, bowel sounds are positive.  Skin/Integumen: Remains quite pale, no unusual bruising, open areas or rashes.         Medications       citalopram  40 mg Oral Daily     enoxaparin  40 mg Subcutaneous Q24H     famotidine  20 mg Oral Daily     fexofenadine-pseudoePHEDrine  1 tablet Oral Q12H MIKE     fluticasone  1 spray Both Nostrils Daily     fluticasone-vilanterol  1 puff Inhalation Daily     gabapentin  600 mg Oral At Bedtime     gabapentin  600 mg Oral Daily     gabapentin  900 mg Oral QAM     guaiFENesin  600 mg Oral BID     ipratropium - albuterol 0.5 mg/2.5 mg/3 mL  3 mL Nebulization Q6H     montelukast  10 mg Oral At Bedtime     pantoprazole  40 mg Oral BID     piperacillin-tazobactam  4.5 g Intravenous Q6H     traZODone  150 mg Oral At Bedtime       Data   Recent Labs   Lab 05/31/19  0545 05/30/19  0436 05/29/19  0451  05/28/19  0811   WBC 6.2 9.8 12.8*  --  11.3*   HGB 9.4* 9.4* 9.6*  --  11.1*   MCV 86 90 88  --  89    174 161  --  183    142 143   < > 136   POTASSIUM 3.5 3.9 4.3   < > 5.0   CHLORIDE 109 111* 114*   < > 106   CO2 25 27 23   < > 22   BUN 10 11 18   < > 27   CR 0.90 0.91 1.15*   < > 1.54*   ANIONGAP 8 4 6   < > 8   KATHLEEN 8.7 8.5 8.2*   < > 8.8   GLC 92 86 101*   < > 85   ALBUMIN  --   --  1.9*  --  2.3*   PROTTOTAL  --   --  5.6*  --  6.3*   BILITOTAL  --   --  0.4  --  0.7   ALKPHOS  --   --  62  --  66   ALT  --   --  23  --  28   AST  --   --  16  --  15   TROPI  --   --   --   --  <0.015    < > = values in this interval not displayed.       No results found for this or  any previous visit (from the past 24 hour(s)).

## 2019-05-31 NOTE — PROGRESS NOTES
Checked in with Rosemary. She is feeling much better. Does not feel that she will need any services on discharge.  Denies questions or concerns at this time.  CTS will continue to remain available for support and resources.

## 2019-05-31 NOTE — PLAN OF CARE
"  Problem: Fall Injury Risk  Goal: Absence of Fall and Fall-Related Injury  5/31/2019 1518 by Laura Roy, RN  Outcome: Improving   Ambulating w/ SBA. Steady on feet. Calls appropriately     Problem: Respiratory Compromise (Sepsis/Septic Shock)  Goal: Effective Oxygenation and Ventilation  5/31/2019 1518 by Laura Roy, RN  Outcome: Improving   2lpm NC. Continues to desat w/ ambulating 80s% but does recover when rests    Problem: Infection (Sepsis/Septic Shock)  Goal: Absence of Infection Signs/Symptoms  5/31/2019 1518 by Laura Roy, RN  Outcome: Improving   Remains on ZOZYN IV. Temps 100 but does get \"achey\" resolves with tylenol. Temp remains 99s.     Problem: Fluid Imbalance (Pneumonia)  Goal: Fluid Balance  5/31/2019 1518 by Laura Roy, RN  Outcome: Improving   Drinking borderline adequate fluids PO. IVL    Problem: Adult Inpatient Plan of Care  Goal: Plan of Care Review  5/31/2019 1518 by Laura Roy, RN  Outcome: Improving   Cough remains frequent, productive. Tessalon pearls are helping to relive incessant coughing jags. Will continue to offer t/o day.   Appetite poor-fair, but does eat small frequent meals/snacks.   Encouraged to rest between activities.   "

## 2019-06-01 LAB
ANION GAP SERPL CALCULATED.3IONS-SCNC: 7 MMOL/L (ref 3–14)
BUN SERPL-MCNC: 8 MG/DL (ref 7–30)
CALCIUM SERPL-MCNC: 8.6 MG/DL (ref 8.5–10.1)
CHLORIDE SERPL-SCNC: 106 MMOL/L (ref 94–109)
CO2 SERPL-SCNC: 29 MMOL/L (ref 20–32)
CREAT SERPL-MCNC: 0.9 MG/DL (ref 0.52–1.04)
ERYTHROCYTE [DISTWIDTH] IN BLOOD BY AUTOMATED COUNT: 14.6 % (ref 10–15)
GFR SERPL CREATININE-BSD FRML MDRD: 66 ML/MIN/{1.73_M2}
GLUCOSE SERPL-MCNC: 105 MG/DL (ref 70–99)
HCT VFR BLD AUTO: 29.8 % (ref 35–47)
HGB BLD-MCNC: 9.7 G/DL (ref 11.7–15.7)
MCH RBC QN AUTO: 28.1 PG (ref 26.5–33)
MCHC RBC AUTO-ENTMCNC: 32.6 G/DL (ref 31.5–36.5)
MCV RBC AUTO: 86 FL (ref 78–100)
PLATELET # BLD AUTO: 203 10E9/L (ref 150–450)
POTASSIUM SERPL-SCNC: 3.3 MMOL/L (ref 3.4–5.3)
RBC # BLD AUTO: 3.45 10E12/L (ref 3.8–5.2)
SODIUM SERPL-SCNC: 142 MMOL/L (ref 133–144)
WBC # BLD AUTO: 5.3 10E9/L (ref 4–11)

## 2019-06-01 PROCEDURE — 94640 AIRWAY INHALATION TREATMENT: CPT | Mod: 76

## 2019-06-01 PROCEDURE — 36415 COLL VENOUS BLD VENIPUNCTURE: CPT | Performed by: NURSE PRACTITIONER

## 2019-06-01 PROCEDURE — 12000000 ZZH R&B MED SURG/OB

## 2019-06-01 PROCEDURE — 25000125 ZZHC RX 250: Performed by: NURSE PRACTITIONER

## 2019-06-01 PROCEDURE — 99232 SBSQ HOSP IP/OBS MODERATE 35: CPT | Performed by: NURSE PRACTITIONER

## 2019-06-01 PROCEDURE — 94799 UNLISTED PULMONARY SVC/PX: CPT

## 2019-06-01 PROCEDURE — 85027 COMPLETE CBC AUTOMATED: CPT | Performed by: NURSE PRACTITIONER

## 2019-06-01 PROCEDURE — A9270 NON-COVERED ITEM OR SERVICE: HCPCS | Performed by: NURSE PRACTITIONER

## 2019-06-01 PROCEDURE — 25000132 ZZH RX MED GY IP 250 OP 250 PS 637: Performed by: NURSE PRACTITIONER

## 2019-06-01 PROCEDURE — 80048 BASIC METABOLIC PNL TOTAL CA: CPT | Performed by: NURSE PRACTITIONER

## 2019-06-01 PROCEDURE — 94640 AIRWAY INHALATION TREATMENT: CPT

## 2019-06-01 PROCEDURE — 25000128 H RX IP 250 OP 636: Performed by: NURSE PRACTITIONER

## 2019-06-01 RX ORDER — POTASSIUM CHLORIDE 750 MG/1
20 CAPSULE, EXTENDED RELEASE ORAL 2 TIMES DAILY
Status: COMPLETED | OUTPATIENT
Start: 2019-06-01 | End: 2019-06-01

## 2019-06-01 RX ORDER — FAMOTIDINE 20 MG/1
20 TABLET, FILM COATED ORAL 2 TIMES DAILY
Status: DISCONTINUED | OUTPATIENT
Start: 2019-06-01 | End: 2019-06-04 | Stop reason: HOSPADM

## 2019-06-01 RX ADMIN — TAZOBACTAM SODIUM AND PIPERACILLIN SODIUM 4.5 G: 500; 4 INJECTION, SOLUTION INTRAVENOUS at 00:47

## 2019-06-01 RX ADMIN — ACETAMINOPHEN 650 MG: 325 TABLET, FILM COATED ORAL at 04:27

## 2019-06-01 RX ADMIN — MONTELUKAST 10 MG: 10 TABLET, FILM COATED ORAL at 20:41

## 2019-06-01 RX ADMIN — CITALOPRAM HYDROBROMIDE 40 MG: 20 TABLET ORAL at 09:21

## 2019-06-01 RX ADMIN — TRAZODONE HYDROCHLORIDE 150 MG: 150 TABLET ORAL at 22:07

## 2019-06-01 RX ADMIN — GABAPENTIN 600 MG: 300 CAPSULE ORAL at 12:26

## 2019-06-01 RX ADMIN — IPRATROPIUM BROMIDE AND ALBUTEROL SULFATE 3 ML: .5; 3 SOLUTION RESPIRATORY (INHALATION) at 19:37

## 2019-06-01 RX ADMIN — IPRATROPIUM BROMIDE AND ALBUTEROL SULFATE 3 ML: .5; 3 SOLUTION RESPIRATORY (INHALATION) at 02:17

## 2019-06-01 RX ADMIN — GABAPENTIN 600 MG: 300 CAPSULE ORAL at 20:41

## 2019-06-01 RX ADMIN — POTASSIUM CHLORIDE 20 MEQ: 750 CAPSULE, EXTENDED RELEASE ORAL at 18:06

## 2019-06-01 RX ADMIN — FEXOFENADINE HYDROCHLORIDE AND PSEUDOEPHEDRINE HYDROCHLORIDE 1 TABLET: 60; 120 TABLET, FILM COATED, EXTENDED RELEASE ORAL at 09:21

## 2019-06-01 RX ADMIN — FLUTICASONE FUROATE AND VILANTEROL TRIFENATATE 1 PUFF: 200; 25 POWDER RESPIRATORY (INHALATION) at 07:33

## 2019-06-01 RX ADMIN — TAZOBACTAM SODIUM AND PIPERACILLIN SODIUM 4.5 G: 500; 4 INJECTION, SOLUTION INTRAVENOUS at 06:37

## 2019-06-01 RX ADMIN — IPRATROPIUM BROMIDE AND ALBUTEROL SULFATE 3 ML: .5; 3 SOLUTION RESPIRATORY (INHALATION) at 07:31

## 2019-06-01 RX ADMIN — GUAIFENESIN 600 MG: 600 TABLET, EXTENDED RELEASE ORAL at 09:21

## 2019-06-01 RX ADMIN — PANTOPRAZOLE SODIUM 40 MG: 40 TABLET, DELAYED RELEASE ORAL at 09:19

## 2019-06-01 RX ADMIN — GUAIFENESIN 600 MG: 600 TABLET, EXTENDED RELEASE ORAL at 20:41

## 2019-06-01 RX ADMIN — AMOXICILLIN AND CLAVULANATE POTASSIUM 1 TABLET: 875; 125 TABLET, FILM COATED ORAL at 20:41

## 2019-06-01 RX ADMIN — FLUTICASONE PROPIONATE 1 SPRAY: 50 SPRAY, METERED NASAL at 09:25

## 2019-06-01 RX ADMIN — ENOXAPARIN SODIUM 40 MG: 40 INJECTION SUBCUTANEOUS at 12:26

## 2019-06-01 RX ADMIN — POTASSIUM CHLORIDE 20 MEQ: 750 CAPSULE, EXTENDED RELEASE ORAL at 09:21

## 2019-06-01 RX ADMIN — GABAPENTIN 900 MG: 300 CAPSULE ORAL at 09:20

## 2019-06-01 RX ADMIN — ACETAMINOPHEN 650 MG: 325 TABLET, FILM COATED ORAL at 18:09

## 2019-06-01 RX ADMIN — IPRATROPIUM BROMIDE AND ALBUTEROL SULFATE 3 ML: .5; 3 SOLUTION RESPIRATORY (INHALATION) at 13:27

## 2019-06-01 RX ADMIN — AMOXICILLIN AND CLAVULANATE POTASSIUM 1 TABLET: 875; 125 TABLET, FILM COATED ORAL at 09:19

## 2019-06-01 RX ADMIN — ACETAMINOPHEN 650 MG: 325 TABLET, FILM COATED ORAL at 22:07

## 2019-06-01 RX ADMIN — PANTOPRAZOLE SODIUM 40 MG: 40 TABLET, DELAYED RELEASE ORAL at 18:07

## 2019-06-01 ASSESSMENT — ACTIVITIES OF DAILY LIVING (ADL)
ADLS_ACUITY_SCORE: 13
ADLS_ACUITY_SCORE: 16
ADLS_ACUITY_SCORE: 13
ADLS_ACUITY_SCORE: 13
ADLS_ACUITY_SCORE: 16
ADLS_ACUITY_SCORE: 16

## 2019-06-01 ASSESSMENT — MIFFLIN-ST. JEOR: SCORE: 1502.88

## 2019-06-01 NOTE — PROGRESS NOTES
[facility]    Hospitalist Progress Note    Date of Service (when I saw the patient): 06/01/2019    Assessment & Plan   Melvi Cleveland is a 69 year old female who was admitted on 5/28/2019.     Bilateral pneumonia: Sputum culture pending,presumptive strep pneumo on prelim. Immunocompromised due to multiple RA drugs. Treated with Levaquin for LLL pneumonia 3/27/19, patient reported no improvement since then, worsening over the last 10 days. ABG remarkably normal considering her high flow oxygen need. CT scan shows bilateral pneumonia.    -5/29-Continue Zithromax, zosyn, vanco-pharmacy to dose for now pending 48 hours on cultures. Duonebs, oxygen supplementation.  -5/30-Procal down from 16 to just over 5. WBC normal. Dyspnea and cough improved. Discontinue Vanco, continue Zithromax and Zosyn pending full culture. Able to titrate oxygen from airvo 40% to nasal cannula 2 liters. Transfer out of ICU to medical floor.  -5/31- Continues to improve. Culture confirms strep pneumonia without any other organism growth to date. Will plan on switching to roal antibiotics tomorrow.   -6/1-Feels quit a bit better overall this morning. Sputum culture complete sowing S. Pneumonia only, switch to oral Augmentin. Try to titrate oxygen down.        Sepsis (H): Resolved. Due to bilateral pneumonia, organism pending. Presented with hypotension, hypoxia, tachycardia. Leukocytosis, elevated ProCal and lactic acid of 4.9 on presentation. Fluid bolus started in ED, antibiotics given. Lactic acid reduced and hypotension resolved with fluid resuscitation. On arrival to the floor her cap refill is <3sec, mentation is clear, work of breathing improved and she reported feeling improved as well. Lactic acid cleared, tachycardia resolved, blood pressures stable and at her normal per her report. Sputum and blood cultures spending. Treatment as above.       CASE (acute kidney injury) (H): Due to acute sepsis. Improved quickly with fluid  resuscitation so ATN ruled out, now back to normal.       Acute respiratory failure with hypoxia (H): Due to #1, treatment as above. She was requiring 15 liters of oxygen though her work of breathing has improved significantly by the time she arrived to the floor. She does have some cough variant asthma for which she has been treated for presumed exacerbation over the last 2 months with new start of fexofenadine, Singulair and Advair as well as multiple rounds of steroids.       Low back pain, chronic: Continue home dose hydrocodone, gabapentin.        RA (rheumatoid arthritis) (H): Will need to hold methotrexate and plaquenil.       DVT Prophylaxis: Enoxaparin (Lovenox) SQ  Code Status: Full Code    Disposition: Expected discharge in several days once respiratory status stable-2-3 days.    Kelly Lim, CNP    Interval History   Feels better, still dyspneic with activity but improved. Denies chest pain, abdominal pain, nausea.     -Data reviewed today: I reviewed all new labs and imaging results over the last 24 hours.    Physical Exam   Temp: 98.4  F (36.9  C) Temp src: Temporal BP: 125/64 Pulse: 86 Heart Rate: 76 Resp: 20 SpO2: 92 % O2 Device: Nasal cannula Oxygen Delivery: 2.5 LPM  Vitals:    05/30/19 0636 05/31/19 0600 06/01/19 0609   Weight: 100.1 kg (220 lb 10.9 oz) 97.5 kg (214 lb 15.2 oz) 96.7 kg (213 lb 3 oz)     Vital Signs with Ranges  Temp:  [98.4  F (36.9  C)-100.6  F (38.1  C)] 98.4  F (36.9  C)  Pulse:  [86] 86  Heart Rate:  [] 76  Resp:  [20] 20  BP: (114-125)/(53-64) 125/64  SpO2:  [80 %-98 %] 92 %  I/O last 3 completed shifts:  In: 2565 [P.O.:760; I.V.:1805]  Out: -     Peripheral IV 05/30/19 Left Hand (Active)   Site Assessment WDL 6/1/2019  8:15 AM   Line Status Saline locked 6/1/2019  8:15 AM   Phlebitis Scale 0-->no symptoms 6/1/2019  8:15 AM   Infiltration Scale 0 6/1/2019  8:15 AM   Number of days: 2     Line/device assessment(s) completed for medical  necessity    Constitutional: Awake,alert, no acute distress. Still ill appearing but color better today.  Respiratory: Crackles mid-base, right more course today, air movement stable.   Cardiovascular: HRR, no murmurs, rubs, thrills.   GI: Soft, nontender, bowel sounds are positive.  Skin/Integumen: Pink warm and dry, no unusual bruising, open areas or rashes.         Medications       amoxicillin-clavulanate  1 tablet Oral Q12H MIKE     citalopram  40 mg Oral Daily     enoxaparin  40 mg Subcutaneous Q24H     famotidine  20 mg Oral BID     fexofenadine-pseudoePHEDrine  1 tablet Oral Q12H MIKE     fluticasone  1 spray Both Nostrils Daily     fluticasone-vilanterol  1 puff Inhalation Daily     gabapentin  600 mg Oral At Bedtime     gabapentin  600 mg Oral Daily     gabapentin  900 mg Oral QAM     guaiFENesin  600 mg Oral BID     ipratropium - albuterol 0.5 mg/2.5 mg/3 mL  3 mL Nebulization Q6H     montelukast  10 mg Oral At Bedtime     pantoprazole  40 mg Oral BID     potassium chloride ER  20 mEq Oral BID     traZODone  150 mg Oral At Bedtime       Data   Recent Labs   Lab 06/01/19  0544 05/31/19  0545 05/30/19  0436 05/29/19  0451  05/28/19  0811   WBC 5.3 6.2 9.8 12.8*  --  11.3*   HGB 9.7* 9.4* 9.4* 9.6*  --  11.1*   MCV 86 86 90 88  --  89    174 174 161  --  183    142 142 143   < > 136   POTASSIUM 3.3* 3.5 3.9 4.3   < > 5.0   CHLORIDE 106 109 111* 114*   < > 106   CO2 29 25 27 23   < > 22   BUN 8 10 11 18   < > 27   CR 0.90 0.90 0.91 1.15*   < > 1.54*   ANIONGAP 7 8 4 6   < > 8   KATHLEEN 8.6 8.7 8.5 8.2*   < > 8.8   * 92 86 101*   < > 85   ALBUMIN  --   --   --  1.9*  --  2.3*   PROTTOTAL  --   --   --  5.6*  --  6.3*   BILITOTAL  --   --   --  0.4  --  0.7   ALKPHOS  --   --   --  62  --  66   ALT  --   --   --  23  --  28   AST  --   --   --  16  --  15   TROPI  --   --   --   --   --  <0.015    < > = values in this interval not displayed.       No results found for this or any previous visit  (from the past 24 hour(s)).

## 2019-06-01 NOTE — PHARMACY
[facility]    Pharmacy      Antimicrobial Stewardship Note     Current antimicrobial therapy:  Anti-infectives (From now, onward)    Start     Dose/Rate Route Frequency Ordered Stop    06/01/19 1000  amoxicillin-clavulanate (AUGMENTIN) 875-125 MG per tablet 1 tablet      1 tablet Oral EVERY 12 HOURS SCHEDULED 06/01/19 0700          Indication: CAP    Days of Therapy: 1 (previously on Zosyn x 4 days, Zithromax x 4 days, Vanco x 3 days)      Pertinent labs:  Creatinine   Creatinine   Date Value Ref Range Status   06/01/2019 0.90 0.52 - 1.04 mg/dL Final   05/31/2019 0.90 0.52 - 1.04 mg/dL Final   05/30/2019 0.91 0.52 - 1.04 mg/dL Final     WBC   WBC   Date Value Ref Range Status   06/01/2019 5.3 4.0 - 11.0 10e9/L Final   05/31/2019 6.2 4.0 - 11.0 10e9/L Final   05/30/2019 9.8 4.0 - 11.0 10e9/L Final     Procalcitonin   Procalcitonin   Date Value Ref Range Status   05/30/2019 5.39 ng/ml Final     Comment:     2.00-9.99 ng/ml  High risk for progression to severe sepsis.  Recommendation: Strongly recommend initiating or continuing antibiotics.   Evaluate culture results and clinical features to target antibacterial   therapy. Obtain blood cultures and other relevant cultures if not done. Repeat   PCT in 2 days to guide antibiotic de-escalation. Consider de-escalating   antibiotics when PCT concentration is <80% of peak or abs PCT <0.5.     05/28/2019 16.66 (HH) ng/ml Final     Comment:     Critical Value called to and read back by  MARGOT FERRER AT 0914 ON 05/28/19 AJE  >/= 10.00 ng/ml   Very high likelihood of severe sepsis or septic shock.  Recommendation: Strongly recommend initiating or continuing antibiotics.   Evaluate culture results and clinical features to target antibacterial   therapy. Obtain blood cultures and other relevant cultures if not done.Repeat   PCT in 2 days to guide antibiotic de-escalation. Consider de-escalating   antibiotics when PCT concentration is <80% of peak or abs PCT <1.       CRP    CRP Inflammation   Date Value Ref Range Status   05/28/2019 372.0 (H) 0.0 - 8.0 mg/L Final   05/09/2019 <2.9 0.0 - 8.0 mg/L Final   05/06/2019 3.3 0.0 - 8.0 mg/L Final       Culture Results:   30-Day Micro Results     Procedure Component Value Units Date/Time   Strep pneumo Agn Urine or CSF [[id number]] Collected: 05/28/19 2002   Order Status: Completed Lab Status: Final result Updated: 05/29/19 2201   Specimen: Urine     Specimen Description Urine    S Pneumoniae Antigen Negative, no Streptococcus pneumoniae antigen detected by immunochromatographic membrane   assay. A negative Streptococcus pneumoniae antigen result does not rule out infection with    Streptococcus pneumoniae.      Internal QC OK   Internal QC OK    Strep pneumo Agn Urine or CSF    Order Status: Canceled Lab Status: No result    Specimen: Urine    Active MRSA Surveillance Culture [[id number]] Collected: 05/28/19 1057   Order Status: Completed Lab Status: Final result Updated: 05/29/19 0630   Specimen: Nares from Nose     Specimen Description Nares    Culture Micro No MRSA isolated   Sputum Culture Aerobic Bacterial [[id number]] (Abnormal)  Collected: 05/28/19 0955   Order Status: Completed Lab Status: Final result Updated: 05/31/19 0832   Specimen: Sputum     Specimen Description Sputum    Culture Micro Light growth   Streptococcus pneumoniae   Predominant organism   Abnormal    Susceptibility     Streptococcus pneumoniae (1)     Antibiotic Interpretation Sensitivity Method Status    VANCOMYCIN Sensitive <=1 ug/mL EZEKIEL Final    TETRACYCLINE Sensitive <=1 ug/mL EZEKIEL Final    Trimethoprim/Sulfa Sensitive <=0.5/9.5 ug/mL EZEKIEL Final    Ceftriaxone(nonmening) Sensitive <=0.06 ug/mL EZEKIEL Final    Ceftriaxone(meningitit)* Sensitive <=0.06 ug/mL EZEKIEL Final    LEVOFLOXACIN Sensitive <=0.5 ug/mL EZEKIEL Final    MEROPENEM* Sensitive <=0.06 ug/mL EZEKIEL Final    Penicillin(nonmening) Sensitive   EZEKIEL Final     EZEKIEL <=0.6   CLINDAMYCIN Sensitive   EZEKIEL Final    CEFOTAXIME*  <=0.06  ug/mL EZEKIEL Final    ERYTHROMYCIN Sensitive   EZEKIEL Final    Penicillin(oral) Sensitive   EZEKIEL Final    * Suppressed Antibiotic            Gram stain [[id number]] (Abnormal) Collected: 05/28/19 0955   Order Status: Completed Lab Status: Final result Updated: 05/28/19 1030   Specimen: Sputum     Specimen Description Sputum    Gram Stain <10 Squamous epithelial cells/low power field     <25 PMNs/low power field     Many   Gram positive cocci in pairs   Abnormal    Urine Culture Aerobic Bacterial [[id number]] Collected: 05/28/19 0829   Order Status: Completed Lab Status: Final result Updated: 05/30/19 0702   Specimen: Catheterized Urine     Specimen Description Catheterized Urine    Culture Micro No growth   Blood culture [[id number]] Collected: 05/28/19 0810   Order Status: Completed Lab Status: Preliminary result Updated: 06/01/19 0608   Specimen: Blood     Specimen Description Blood    Culture Micro No growth after 4 days   Blood culture    Order Status: Canceled Lab Status: No result    Specimen: Blood    Blood culture [[id number]] Collected: 05/28/19 0750   Order Status: Completed Lab Status: Preliminary result Updated: 06/01/19 0608   Specimen: Blood     Specimen Description Blood    Special Requests Left Arm    Culture Micro No growth after 4 days       Recommendations/Interventions:  1. No recommendations at this time. Will continue to monitor.     Sandrita Parrish RPH  June 1, 2019

## 2019-06-01 NOTE — PLAN OF CARE
Patient remains A&O. VSS. Fever max of 100.2 this shift. HR 90s-100s. /55. RR 20, CASAS remains, frequent cough, lung sounds have coarse crackles through out L>R, pt continues to require 2.5L of oxygen, O2 sats 89-90% at rest, does desat as low as 79% with ambulation. Up with SBA, gait slightly unsteady. Uses call light appropriately.       Face to face report given with opportunity to observe patient.    Report given to Jill F RN Annelyse J. Stromberg   6/1/2019  7:03 AM

## 2019-06-01 NOTE — PLAN OF CARE
Pt ambulated with staff around large loop. Ambulated with 3L NC, pt states visited the whole and does not feel SOB but fatigued. sats after dropped to 77% but recovered quickly within 30 secs. Pt weaned to 2L will monitor.

## 2019-06-01 NOTE — PLAN OF CARE
A&O. VSS except T 99.6. O2 2L NC 92%. Endorses HA with initial assessment, medicated with 650 mg tylenol with stated decrease in pain and subsequent reduction of temp to 98.8. Continues to have frequent cough, productive at times. Medicated with tessalon pearls x 1 this shift with noted reduction of cough. Ambulates with SBA wearing 2L O2 with slight desating to 88-89% recovering well at rest. States feels better this evening than has previously.   Face to face report given with opportunity to observe patient.    Report given to Cristal Lagunas   5/31/2019  11:35 PM

## 2019-06-01 NOTE — PLAN OF CARE
"Lungs with crackles. Afebrile. Changed to oral augmentin today. Pt sat in chair for meals. Ambulated twice. Unsuceessful to wean pt off O2. Continue on 2.5L at 87-92%. Poor appetite but drinking adequate fluids. Pt feeling \"crummy around 1800. Temp 99.8 and gave tylenol. Pt face flushed. Started on K Bid. Coughing lots after flutter and coughing up fair amt of sputum. Receiving duonebs. Pt pleasant and cooperative. Makes needs known approp.    Face to face report given with opportunity to observe patient.    Report given to Joie JAY   6/1/2019  7:05 PM      "

## 2019-06-02 ENCOUNTER — APPOINTMENT (OUTPATIENT)
Dept: GENERAL RADIOLOGY | Facility: HOSPITAL | Age: 69
DRG: 871 | End: 2019-06-02
Attending: NURSE PRACTITIONER
Payer: MEDICARE

## 2019-06-02 LAB
ANION GAP SERPL CALCULATED.3IONS-SCNC: 6 MMOL/L (ref 3–14)
BUN SERPL-MCNC: 7 MG/DL (ref 7–30)
CALCIUM SERPL-MCNC: 8.9 MG/DL (ref 8.5–10.1)
CHLORIDE SERPL-SCNC: 107 MMOL/L (ref 94–109)
CO2 SERPL-SCNC: 27 MMOL/L (ref 20–32)
CREAT SERPL-MCNC: 0.84 MG/DL (ref 0.52–1.04)
ERYTHROCYTE [DISTWIDTH] IN BLOOD BY AUTOMATED COUNT: 14.6 % (ref 10–15)
GFR SERPL CREATININE-BSD FRML MDRD: 72 ML/MIN/{1.73_M2}
GLUCOSE SERPL-MCNC: 95 MG/DL (ref 70–99)
HCT VFR BLD AUTO: 31.6 % (ref 35–47)
HGB BLD-MCNC: 10.4 G/DL (ref 11.7–15.7)
MCH RBC QN AUTO: 28.7 PG (ref 26.5–33)
MCHC RBC AUTO-ENTMCNC: 32.9 G/DL (ref 31.5–36.5)
MCV RBC AUTO: 87 FL (ref 78–100)
PLATELET # BLD AUTO: 246 10E9/L (ref 150–450)
POTASSIUM SERPL-SCNC: 3.6 MMOL/L (ref 3.4–5.3)
PROCALCITONIN SERPL-MCNC: 0.89 NG/ML
RBC # BLD AUTO: 3.63 10E12/L (ref 3.8–5.2)
SODIUM SERPL-SCNC: 140 MMOL/L (ref 133–144)
WBC # BLD AUTO: 5 10E9/L (ref 4–11)

## 2019-06-02 PROCEDURE — 25000132 ZZH RX MED GY IP 250 OP 250 PS 637: Performed by: NURSE PRACTITIONER

## 2019-06-02 PROCEDURE — 94640 AIRWAY INHALATION TREATMENT: CPT

## 2019-06-02 PROCEDURE — 94799 UNLISTED PULMONARY SVC/PX: CPT

## 2019-06-02 PROCEDURE — 99232 SBSQ HOSP IP/OBS MODERATE 35: CPT | Performed by: NURSE PRACTITIONER

## 2019-06-02 PROCEDURE — 25000125 ZZHC RX 250: Performed by: NURSE PRACTITIONER

## 2019-06-02 PROCEDURE — 25000128 H RX IP 250 OP 636: Performed by: NURSE PRACTITIONER

## 2019-06-02 PROCEDURE — 36415 COLL VENOUS BLD VENIPUNCTURE: CPT | Performed by: NURSE PRACTITIONER

## 2019-06-02 PROCEDURE — A9270 NON-COVERED ITEM OR SERVICE: HCPCS | Performed by: NURSE PRACTITIONER

## 2019-06-02 PROCEDURE — 84145 PROCALCITONIN (PCT): CPT | Performed by: NURSE PRACTITIONER

## 2019-06-02 PROCEDURE — 71046 X-RAY EXAM CHEST 2 VIEWS: CPT | Mod: TC

## 2019-06-02 PROCEDURE — 25000131 ZZH RX MED GY IP 250 OP 636 PS 637: Performed by: NURSE PRACTITIONER

## 2019-06-02 PROCEDURE — 80048 BASIC METABOLIC PNL TOTAL CA: CPT | Performed by: NURSE PRACTITIONER

## 2019-06-02 PROCEDURE — 85027 COMPLETE CBC AUTOMATED: CPT | Performed by: NURSE PRACTITIONER

## 2019-06-02 PROCEDURE — 12000000 ZZH R&B MED SURG/OB

## 2019-06-02 PROCEDURE — 94640 AIRWAY INHALATION TREATMENT: CPT | Mod: 76

## 2019-06-02 RX ORDER — PREDNISONE 10 MG/1
40 TABLET ORAL DAILY
Status: DISCONTINUED | OUTPATIENT
Start: 2019-06-02 | End: 2019-06-04 | Stop reason: HOSPADM

## 2019-06-02 RX ADMIN — IPRATROPIUM BROMIDE AND ALBUTEROL SULFATE 3 ML: .5; 3 SOLUTION RESPIRATORY (INHALATION) at 02:08

## 2019-06-02 RX ADMIN — ACETAMINOPHEN 650 MG: 325 TABLET, FILM COATED ORAL at 20:54

## 2019-06-02 RX ADMIN — AMOXICILLIN AND CLAVULANATE POTASSIUM 1 TABLET: 875; 125 TABLET, FILM COATED ORAL at 08:36

## 2019-06-02 RX ADMIN — ACETAMINOPHEN 650 MG: 325 TABLET, FILM COATED ORAL at 08:39

## 2019-06-02 RX ADMIN — FLUTICASONE PROPIONATE 1 SPRAY: 50 SPRAY, METERED NASAL at 08:43

## 2019-06-02 RX ADMIN — CITALOPRAM HYDROBROMIDE 40 MG: 20 TABLET ORAL at 08:34

## 2019-06-02 RX ADMIN — IPRATROPIUM BROMIDE AND ALBUTEROL SULFATE 3 ML: .5; 3 SOLUTION RESPIRATORY (INHALATION) at 13:29

## 2019-06-02 RX ADMIN — IPRATROPIUM BROMIDE AND ALBUTEROL SULFATE 3 ML: .5; 3 SOLUTION RESPIRATORY (INHALATION) at 08:04

## 2019-06-02 RX ADMIN — FAMOTIDINE 20 MG: 20 TABLET, FILM COATED ORAL at 08:36

## 2019-06-02 RX ADMIN — AMOXICILLIN AND CLAVULANATE POTASSIUM 1 TABLET: 875; 125 TABLET, FILM COATED ORAL at 20:54

## 2019-06-02 RX ADMIN — GUAIFENESIN 600 MG: 600 TABLET, EXTENDED RELEASE ORAL at 20:54

## 2019-06-02 RX ADMIN — GABAPENTIN 600 MG: 300 CAPSULE ORAL at 20:54

## 2019-06-02 RX ADMIN — IPRATROPIUM BROMIDE AND ALBUTEROL SULFATE 3 ML: .5; 3 SOLUTION RESPIRATORY (INHALATION) at 19:06

## 2019-06-02 RX ADMIN — TRAZODONE HYDROCHLORIDE 150 MG: 150 TABLET ORAL at 20:54

## 2019-06-02 RX ADMIN — FLUTICASONE FUROATE AND VILANTEROL TRIFENATATE 1 PUFF: 200; 25 POWDER RESPIRATORY (INHALATION) at 08:06

## 2019-06-02 RX ADMIN — PANTOPRAZOLE SODIUM 40 MG: 40 TABLET, DELAYED RELEASE ORAL at 08:35

## 2019-06-02 RX ADMIN — FAMOTIDINE 20 MG: 20 TABLET, FILM COATED ORAL at 20:53

## 2019-06-02 RX ADMIN — GUAIFENESIN 600 MG: 600 TABLET, EXTENDED RELEASE ORAL at 08:36

## 2019-06-02 RX ADMIN — PREDNISONE 40 MG: 10 TABLET ORAL at 08:40

## 2019-06-02 RX ADMIN — GABAPENTIN 900 MG: 300 CAPSULE ORAL at 08:35

## 2019-06-02 RX ADMIN — PANTOPRAZOLE SODIUM 40 MG: 40 TABLET, DELAYED RELEASE ORAL at 17:55

## 2019-06-02 RX ADMIN — FEXOFENADINE HYDROCHLORIDE AND PSEUDOEPHEDRINE HYDROCHLORIDE 1 TABLET: 60; 120 TABLET, FILM COATED, EXTENDED RELEASE ORAL at 08:36

## 2019-06-02 RX ADMIN — GABAPENTIN 600 MG: 300 CAPSULE ORAL at 11:42

## 2019-06-02 RX ADMIN — MONTELUKAST 10 MG: 10 TABLET, FILM COATED ORAL at 20:54

## 2019-06-02 RX ADMIN — ENOXAPARIN SODIUM 40 MG: 40 INJECTION SUBCUTANEOUS at 11:43

## 2019-06-02 ASSESSMENT — ACTIVITIES OF DAILY LIVING (ADL)
ADLS_ACUITY_SCORE: 13
ADLS_ACUITY_SCORE: 14

## 2019-06-02 ASSESSMENT — MIFFLIN-ST. JEOR: SCORE: 1489.88

## 2019-06-02 NOTE — PHARMACY
[facility]    Pharmacy      Antimicrobial Stewardship Note     Current antimicrobial therapy:  Anti-infectives (From now, onward)    Start     Dose/Rate Route Frequency Ordered Stop    06/01/19 1000  amoxicillin-clavulanate (AUGMENTIN) 875-125 MG per tablet 1 tablet      1 tablet Oral EVERY 12 HOURS SCHEDULED 06/01/19 0700            Indication: CAP    Days of Therapy: 2      Pertinent labs:  Creatinine   Creatinine   Date Value Ref Range Status   06/02/2019 0.84 0.52 - 1.04 mg/dL Final   06/01/2019 0.90 0.52 - 1.04 mg/dL Final   05/31/2019 0.90 0.52 - 1.04 mg/dL Final     WBC   WBC   Date Value Ref Range Status   06/02/2019 5.0 4.0 - 11.0 10e9/L Final   06/01/2019 5.3 4.0 - 11.0 10e9/L Final   05/31/2019 6.2 4.0 - 11.0 10e9/L Final     Procalcitonin   Procalcitonin   Date Value Ref Range Status   06/02/2019 0.89 ng/ml Final     Comment:     0.50-1.99 ng/ml  Moderate risk of systemic infection.  Recommendation:   Recommend antibiotics. Evaluate culture results and clinical features to   target antibacterial therapy. Obtain blood cultures and other relevant   cultures if not done.  If empiric antibiotics were started, recheck PCT in: 2 days to guide   antibiotic de-escalation.  Discontinue or de-escalate antibiotics when PCT   concentration is <80% of peak or abs PCT <0.5. If empiric antibiotics were NOT   started, recheck PCT in 6-24 hours to re-evaluate need for antibiotics.     05/30/2019 5.39 ng/ml Final     Comment:     2.00-9.99 ng/ml  High risk for progression to severe sepsis.  Recommendation: Strongly recommend initiating or continuing antibiotics.   Evaluate culture results and clinical features to target antibacterial   therapy. Obtain blood cultures and other relevant cultures if not done. Repeat   PCT in 2 days to guide antibiotic de-escalation. Consider de-escalating   antibiotics when PCT concentration is <80% of peak or abs PCT <0.5.     05/28/2019 16.66 (HH) ng/ml Final     Comment:      Critical Value called to and read back by  MARGOT FERRER AT 0914 ON 05/28/19 AJE  >/= 10.00 ng/ml   Very high likelihood of severe sepsis or septic shock.  Recommendation: Strongly recommend initiating or continuing antibiotics.   Evaluate culture results and clinical features to target antibacterial   therapy. Obtain blood cultures and other relevant cultures if not done.Repeat   PCT in 2 days to guide antibiotic de-escalation. Consider de-escalating   antibiotics when PCT concentration is <80% of peak or abs PCT <1.       CRP   CRP Inflammation   Date Value Ref Range Status   05/28/2019 372.0 (H) 0.0 - 8.0 mg/L Final   05/09/2019 <2.9 0.0 - 8.0 mg/L Final   05/06/2019 3.3 0.0 - 8.0 mg/L Final       Culture Results:   Procedure Component Value Units Date/Time   Strep pneumo Agn Urine or CSF [[id number]] Collected: 05/28/19 2002   Order Status: Completed Lab Status: Final result Updated: 05/29/19 2201   Specimen: Urine     Specimen Description Urine    S Pneumoniae Antigen Negative, no Streptococcus pneumoniae antigen detected by immunochromatographic membrane   assay. A negative Streptococcus pneumoniae antigen result does not rule out infection with    Streptococcus pneumoniae.      Internal QC OK   Internal QC OK    Strep pneumo Agn Urine or CSF    Order Status: Canceled Lab Status: No result    Specimen: Urine    Active MRSA Surveillance Culture [[id number]] Collected: 05/28/19 1057   Order Status: Completed Lab Status: Final result Updated: 05/29/19 0630   Specimen: Nares from Nose     Specimen Description Nares    Culture Micro No MRSA isolated   Sputum Culture Aerobic Bacterial [[id number]] (Abnormal)  Collected: 05/28/19 0955   Order Status: Completed Lab Status: Final result Updated: 05/31/19 0832   Specimen: Sputum     Specimen Description Sputum    Culture Micro Light growth   Streptococcus pneumoniae   Predominant organism   Abnormal    Susceptibility     Streptococcus pneumoniae (1)     Antibiotic Interpretation  Sensitivity Method Status    VANCOMYCIN Sensitive <=1 ug/mL EZEKIEL Final    TETRACYCLINE Sensitive <=1 ug/mL EZEKIEL Final    Trimethoprim/Sulfa Sensitive <=0.5/9.5 ug/mL EZEKIEL Final    Ceftriaxone(nonmening) Sensitive <=0.06 ug/mL EZEKIEL Final    Ceftriaxone(meningitit)* Sensitive <=0.06 ug/mL EZEKIEL Final    LEVOFLOXACIN Sensitive <=0.5 ug/mL EZEKIEL Final    MEROPENEM* Sensitive <=0.06 ug/mL EZEKIEL Final    Penicillin(nonmening) Sensitive   EZEKIEL Final     EZEKIEL <=0.6   CLINDAMYCIN Sensitive   EZEKIEL Final    CEFOTAXIME*  <=0.06 ug/mL EZEKIEL Final    ERYTHROMYCIN Sensitive   EZEKIEL Final    Penicillin(oral) Sensitive   EZEKIEL Final    * Suppressed Antibiotic            Gram stain [[id number]] (Abnormal) Collected: 05/28/19 0955   Order Status: Completed Lab Status: Final result Updated: 05/28/19 1030   Specimen: Sputum     Specimen Description Sputum    Gram Stain <10 Squamous epithelial cells/low power field     <25 PMNs/low power field     Many   Gram positive cocci in pairs   Abnormal    Urine Culture Aerobic Bacterial [[id number]] Collected: 05/28/19 0829   Order Status: Completed Lab Status: Final result Updated: 05/30/19 0702   Specimen: Catheterized Urine     Specimen Description Catheterized Urine    Culture Micro No growth   Blood culture [[id number]] Collected: 05/28/19 0810   Order Status: Completed Lab Status: Preliminary result Updated: 06/02/19 0607   Specimen: Blood     Specimen Description Blood    Culture Micro No growth after 5 days   Blood culture    Order Status: Canceled Lab Status: No result    Specimen: Blood    Blood culture [[id number]] Collected: 05/28/19 0750   Order Status: Completed Lab Status: Preliminary result Updated: 06/02/19 0607   Specimen: Blood     Specimen Description Blood    Special Requests Left Arm    Culture Micro No growth after 5 days       Recommendations/Interventions:  1. No recommendations at this time    Sandrita Parrish, [facility]  June 2, 2019

## 2019-06-02 NOTE — PLAN OF CARE
Face to face report given with opportunity to observe patient.    Report given to Gina K. RN Annelyse J. Stromberg   6/2/2019  7:35 AM

## 2019-06-02 NOTE — PROGRESS NOTES
[facility]    Hospitalist Progress Note    Date of Service (when I saw the patient): 06/02/2019    Assessment & Plan   Melvi Cleveland is a 69 year old female who was admitted on 5/28/2019.     Bilateral pneumonia: Sputum culture pending,presumptive strep pneumo on prelim. Immunocompromised due to multiple RA drugs. Treated with Levaquin for LLL pneumonia 3/27/19, patient reported no improvement since then, worsening over the last 10 days. ABG remarkably normal considering her high flow oxygen need. CT scan shows bilateral pneumonia.    -5/29-Continue Zithromax, zosyn, vanco-pharmacy to dose for now pending 48 hours on cultures. Duonebs, oxygen supplementation.  -5/30-Procal down from 16 to just over 5. WBC normal. Dyspnea and cough improved. Discontinue Vanco, continue Zithromax and Zosyn pending full culture. Able to titrate oxygen from airvo 40% to nasal cannula 2 liters. Transfer out of ICU to medical floor.  -5/31- Continues to improve. Culture confirms strep pneumonia without any other organism growth to date. Will plan on switching to roal antibiotics tomorrow.   -6/1-Feels quit a bit better overall this morning. Sputum culture complete sowing S. Pneumonia only, switch to oral Augmentin. Try to titrate oxygen down.   -6/2- Had a good night. Increasing paroxysmal cough, lungs sounds a bit tight and wheezy this morning. Will add prednisone.    Acute respiratory failure with hypoxia: Present on admission, due to #1. Improving. Continue to wean oxygen as tolerated. Dyspnea is improving as well.        Sepsis (H): Resolved. Due to bilateral pneumonia, organism pending. Presented with hypotension, hypoxia, tachycardia. Leukocytosis, elevated ProCal and lactic acid of 4.9 on presentation. Fluid bolus started in ED, antibiotics given. Lactic acid reduced and hypotension resolved with fluid resuscitation. On arrival to the floor her cap refill is <3sec, mentation is clear, work of breathing improved and  she reported feeling improved as well. Lactic acid cleared, tachycardia resolved, blood pressures stable and at her normal per her report. Sputum and blood cultures spending. Treatment as above.       CASE (acute kidney injury) (H): Due to acute sepsis. Improved quickly with fluid resuscitation so ATN ruled out, now back to normal.       Acute respiratory failure with hypoxia (H): Due to #1, treatment as above. She was requiring 15 liters of oxygen though her work of breathing has improved significantly by the time she arrived to the floor. She does have some cough variant asthma for which she has been treated for presumed exacerbation over the last 2 months with new start of fexofenadine, Singulair and Advair as well as multiple rounds of steroids.       Low back pain, chronic: Continue home dose hydrocodone, gabapentin.        RA (rheumatoid arthritis) (H): Will need to hold methotrexate and plaquenil.       DVT Prophylaxis: Enoxaparin (Lovenox) SQ  Code Status: Full Code    Disposition: Expected discharge in several days once respiratory status stable-2-3 days.    Kelly Lim, CNP    Interval History   Feels better, still dyspneic with activity but improved. Denies chest pain, abdominal pain, nausea. Worsening paroxysmal cough, not bringing much up anymore.    -Data reviewed today: I reviewed all new labs and imaging results over the last 24 hours.    Physical Exam   Temp: 98.4  F (36.9  C) Temp src: (P) Tympanic BP: 132/56   Heart Rate: 91 Resp: 20 SpO2: (!) 90 % O2 Device: Nasal cannula Oxygen Delivery: 2 LPM  Vitals:    05/31/19 0600 06/01/19 0609 06/02/19 0543   Weight: 97.5 kg (214 lb 15.2 oz) 96.7 kg (213 lb 3 oz) 95.4 kg (210 lb 5.1 oz)     Vital Signs with Ranges  Temp:  [98.4  F (36.9  C)-99.8  F (37.7  C)] 98.4  F (36.9  C)  Heart Rate:  [76-95] 91  Resp:  [20] 20  BP: (111-141)/(56-65) 132/56  SpO2:  [83 %-98 %] 90 %  I/O last 3 completed shifts:  In: 1440 [P.O.:1440]  Out: 6252  [Urine:2925]    Peripheral IV 05/30/19 Left Hand (Active)   Site Assessment WDL 6/2/2019  1:47 AM   Line Status Saline locked 6/2/2019  1:47 AM   Phlebitis Scale 0-->no symptoms 6/2/2019  1:47 AM   Infiltration Scale 0 6/2/2019  1:47 AM   Number of days: 3     Line/device assessment(s) completed for medical necessity    Constitutional: Awake,alert, no acute distress. Still ill appearing but color better today.  Respiratory: Crackles not heard today but decreased airflow and occasional scattered wheeze.   Cardiovascular: HRR, no murmurs, rubs, thrills.   GI: Soft, nontender, bowel sounds are positive.  Skin/Integumen: Pink warm and dry, no unusual bruising, open areas or rashes.         Medications       amoxicillin-clavulanate  1 tablet Oral Q12H MIKE     citalopram  40 mg Oral Daily     enoxaparin  40 mg Subcutaneous Q24H     famotidine  20 mg Oral BID     fexofenadine-pseudoePHEDrine  1 tablet Oral Q12H MIKE     fluticasone  1 spray Both Nostrils Daily     fluticasone-vilanterol  1 puff Inhalation Daily     gabapentin  600 mg Oral At Bedtime     gabapentin  600 mg Oral Daily     gabapentin  900 mg Oral QAM     guaiFENesin  600 mg Oral BID     ipratropium - albuterol 0.5 mg/2.5 mg/3 mL  3 mL Nebulization Q6H     montelukast  10 mg Oral At Bedtime     pantoprazole  40 mg Oral BID     predniSONE  40 mg Oral Daily     traZODone  150 mg Oral At Bedtime       Data   Recent Labs   Lab 06/02/19  0539 06/01/19  0544 05/31/19  0545  05/29/19  0451  05/28/19  0811   WBC 5.0 5.3 6.2   < > 12.8*  --  11.3*   HGB 10.4* 9.7* 9.4*   < > 9.6*  --  11.1*   MCV 87 86 86   < > 88  --  89    203 174   < > 161  --  183    142 142   < > 143   < > 136   POTASSIUM 3.6 3.3* 3.5   < > 4.3   < > 5.0   CHLORIDE 107 106 109   < > 114*   < > 106   CO2 27 29 25   < > 23   < > 22   BUN 7 8 10   < > 18   < > 27   CR 0.84 0.90 0.90   < > 1.15*   < > 1.54*   ANIONGAP 6 7 8   < > 6   < > 8   KATHLEEN 8.9 8.6 8.7   < > 8.2*   < > 8.8   GLC 95  105* 92   < > 101*   < > 85   ALBUMIN  --   --   --   --  1.9*  --  2.3*   PROTTOTAL  --   --   --   --  5.6*  --  6.3*   BILITOTAL  --   --   --   --  0.4  --  0.7   ALKPHOS  --   --   --   --  62  --  66   ALT  --   --   --   --  23  --  28   AST  --   --   --   --  16  --  15   TROPI  --   --   --   --   --   --  <0.015    < > = values in this interval not displayed.       Recent Results (from the past 24 hour(s))   XR Chest 2 Views    Narrative    PROCEDURE:  XR CHEST 2 VW    HISTORY:  pneumonia.     COMPARISON:  5/28/2019    FINDINGS:   The cardiac silhouette is normal in size. The pulmonary vasculature is  normal.  There are bilateral infiltrates, left greater than right,  slightly worsened from the prior chest x-ray. Small pleural effusions  are noted.       Impression    IMPRESSION:  Slight worsening of bilateral lung infiltrates.      ENRIQUE BORGES MD

## 2019-06-02 NOTE — PLAN OF CARE
Patient slept through most of night. Required increased O2 delivery to 4L while sleeping tonight to maintain sats >90%, patient was 83% on 2.5L while sleeping, 88-90% while awake. Up to restroom with O2 on, desats to 78% with activity, recovers quickly with rest. Lung sounds have coarse crackles through out left lung, clear right upper lobe, crackles right base. Infrequent cough. Continues with nebs. Pt ambulated in hallway with stand by assist, mild CASAS but poor oxygenation. Gait steady. Rest of VSS. Afebrile. Pt reports chronic dull headache 2/10, PO tylenol given at bedtime. Continues on PO antibiotics. IV saline locked. Trace edema to bilateral LEs. Voiding adequately. Pt uses call light appropriately.

## 2019-06-02 NOTE — PLAN OF CARE
No falls or injuries this shift. Continues on O2 at 2.5L NC. CASAS. Occ NPC. Started on po prednisone today. Amb in hogue with SBA. Appears winded with activity but sats remain 88-91 Chronic pain, headache and RA pain. .      Face to face report given with opportunity to observe patient.    Report given to Dedra Sylvester   6/2/2019  3:26 PM      Pt sat in chair for supper and ambulated. Remains 2.5L NC. Afebrile. Lungs with crackles and EW.    Face to face report given with opportunity to observe patient.    Report given to Joie JAY   6/2/2019  7:16 PM

## 2019-06-03 LAB
BACTERIA SPEC CULT: NORMAL
BACTERIA SPEC CULT: NORMAL
Lab: NORMAL
PLATELET # BLD AUTO: 264 10E9/L (ref 150–450)
SPECIMEN SOURCE: NORMAL
SPECIMEN SOURCE: NORMAL

## 2019-06-03 PROCEDURE — A9270 NON-COVERED ITEM OR SERVICE: HCPCS | Performed by: NURSE PRACTITIONER

## 2019-06-03 PROCEDURE — 25000132 ZZH RX MED GY IP 250 OP 250 PS 637: Performed by: NURSE PRACTITIONER

## 2019-06-03 PROCEDURE — 40000275 ZZH STATISTIC RCP TIME EA 10 MIN

## 2019-06-03 PROCEDURE — 85049 AUTOMATED PLATELET COUNT: CPT | Performed by: NURSE PRACTITIONER

## 2019-06-03 PROCEDURE — 94640 AIRWAY INHALATION TREATMENT: CPT | Mod: 76

## 2019-06-03 PROCEDURE — 25000125 ZZHC RX 250: Performed by: NURSE PRACTITIONER

## 2019-06-03 PROCEDURE — 94640 AIRWAY INHALATION TREATMENT: CPT

## 2019-06-03 PROCEDURE — 12000000 ZZH R&B MED SURG/OB

## 2019-06-03 PROCEDURE — 36415 COLL VENOUS BLD VENIPUNCTURE: CPT | Performed by: NURSE PRACTITIONER

## 2019-06-03 PROCEDURE — 99232 SBSQ HOSP IP/OBS MODERATE 35: CPT | Performed by: NURSE PRACTITIONER

## 2019-06-03 PROCEDURE — 94799 UNLISTED PULMONARY SVC/PX: CPT

## 2019-06-03 PROCEDURE — 25000128 H RX IP 250 OP 636: Performed by: NURSE PRACTITIONER

## 2019-06-03 PROCEDURE — 25000131 ZZH RX MED GY IP 250 OP 636 PS 637: Performed by: NURSE PRACTITIONER

## 2019-06-03 RX ORDER — FUROSEMIDE 20 MG
20 TABLET ORAL ONCE
Status: COMPLETED | OUTPATIENT
Start: 2019-06-03 | End: 2019-06-03

## 2019-06-03 RX ORDER — POTASSIUM CHLORIDE 750 MG/1
20 CAPSULE, EXTENDED RELEASE ORAL ONCE
Status: COMPLETED | OUTPATIENT
Start: 2019-06-03 | End: 2019-06-03

## 2019-06-03 RX ADMIN — TRAZODONE HYDROCHLORIDE 150 MG: 150 TABLET ORAL at 20:18

## 2019-06-03 RX ADMIN — POTASSIUM CHLORIDE 20 MEQ: 750 CAPSULE, EXTENDED RELEASE ORAL at 12:40

## 2019-06-03 RX ADMIN — IPRATROPIUM BROMIDE AND ALBUTEROL SULFATE 3 ML: .5; 3 SOLUTION RESPIRATORY (INHALATION) at 13:14

## 2019-06-03 RX ADMIN — FUROSEMIDE 20 MG: 20 TABLET ORAL at 12:40

## 2019-06-03 RX ADMIN — FLUTICASONE PROPIONATE 1 SPRAY: 50 SPRAY, METERED NASAL at 08:20

## 2019-06-03 RX ADMIN — IPRATROPIUM BROMIDE AND ALBUTEROL SULFATE 3 ML: .5; 3 SOLUTION RESPIRATORY (INHALATION) at 07:29

## 2019-06-03 RX ADMIN — GUAIFENESIN 600 MG: 600 TABLET, EXTENDED RELEASE ORAL at 08:14

## 2019-06-03 RX ADMIN — HYDROCODONE BITARTRATE AND ACETAMINOPHEN 1 TABLET: 5; 325 TABLET ORAL at 05:19

## 2019-06-03 RX ADMIN — GABAPENTIN 600 MG: 300 CAPSULE ORAL at 12:40

## 2019-06-03 RX ADMIN — FAMOTIDINE 20 MG: 20 TABLET, FILM COATED ORAL at 20:19

## 2019-06-03 RX ADMIN — MONTELUKAST 10 MG: 10 TABLET, FILM COATED ORAL at 20:18

## 2019-06-03 RX ADMIN — GUAIFENESIN 600 MG: 600 TABLET, EXTENDED RELEASE ORAL at 20:19

## 2019-06-03 RX ADMIN — ENOXAPARIN SODIUM 40 MG: 40 INJECTION SUBCUTANEOUS at 12:40

## 2019-06-03 RX ADMIN — GABAPENTIN 900 MG: 300 CAPSULE ORAL at 08:15

## 2019-06-03 RX ADMIN — IPRATROPIUM BROMIDE AND ALBUTEROL SULFATE 3 ML: .5; 3 SOLUTION RESPIRATORY (INHALATION) at 19:00

## 2019-06-03 RX ADMIN — AMOXICILLIN AND CLAVULANATE POTASSIUM 1 TABLET: 875; 125 TABLET, FILM COATED ORAL at 08:14

## 2019-06-03 RX ADMIN — PREDNISONE 40 MG: 10 TABLET ORAL at 08:15

## 2019-06-03 RX ADMIN — AMOXICILLIN AND CLAVULANATE POTASSIUM 1 TABLET: 875; 125 TABLET, FILM COATED ORAL at 20:19

## 2019-06-03 RX ADMIN — PANTOPRAZOLE SODIUM 40 MG: 40 TABLET, DELAYED RELEASE ORAL at 08:14

## 2019-06-03 RX ADMIN — CITALOPRAM HYDROBROMIDE 40 MG: 20 TABLET ORAL at 08:14

## 2019-06-03 RX ADMIN — GABAPENTIN 600 MG: 300 CAPSULE ORAL at 20:19

## 2019-06-03 RX ADMIN — PANTOPRAZOLE SODIUM 40 MG: 40 TABLET, DELAYED RELEASE ORAL at 16:34

## 2019-06-03 RX ADMIN — FEXOFENADINE HYDROCHLORIDE AND PSEUDOEPHEDRINE HYDROCHLORIDE 1 TABLET: 60; 120 TABLET, FILM COATED, EXTENDED RELEASE ORAL at 08:14

## 2019-06-03 RX ADMIN — IPRATROPIUM BROMIDE AND ALBUTEROL SULFATE 3 ML: .5; 3 SOLUTION RESPIRATORY (INHALATION) at 02:05

## 2019-06-03 RX ADMIN — FLUTICASONE FUROATE AND VILANTEROL TRIFENATATE 1 PUFF: 200; 25 POWDER RESPIRATORY (INHALATION) at 11:22

## 2019-06-03 RX ADMIN — FAMOTIDINE 20 MG: 20 TABLET, FILM COATED ORAL at 08:15

## 2019-06-03 ASSESSMENT — ACTIVITIES OF DAILY LIVING (ADL)
ADLS_ACUITY_SCORE: 13
ADLS_ACUITY_SCORE: 14
ADLS_ACUITY_SCORE: 13
ADLS_ACUITY_SCORE: 14
ADLS_ACUITY_SCORE: 14
ADLS_ACUITY_SCORE: 13

## 2019-06-03 ASSESSMENT — MIFFLIN-ST. JEOR: SCORE: 1475.88

## 2019-06-03 NOTE — PROGRESS NOTES
Checked in with Sherrie and Issa. I Sherrie needs O2 on discharge she would like to go through [facility].  Denies questions or concerns at this time.  CTS will continue to remain available for support and resources.

## 2019-06-03 NOTE — PHARMACY
[facility]    Pharmacy      Antimicrobial Stewardship Note     Current antimicrobial therapy:  Anti-infectives (From now, onward)    Start     Dose/Rate Route Frequency Ordered Stop    06/01/19 1000  amoxicillin-clavulanate (AUGMENTIN) 875-125 MG per tablet 1 tablet      1 tablet Oral EVERY 12 HOURS SCHEDULED 06/01/19 0700          Indication: CAP    Days of Therapy: 3 (7 total days of antibiotic therapy)      Pertinent labs:  Creatinine   Creatinine   Date Value Ref Range Status   06/02/2019 0.84 0.52 - 1.04 mg/dL Final   06/01/2019 0.90 0.52 - 1.04 mg/dL Final   05/31/2019 0.90 0.52 - 1.04 mg/dL Final     WBC   WBC   Date Value Ref Range Status   06/02/2019 5.0 4.0 - 11.0 10e9/L Final   06/01/2019 5.3 4.0 - 11.0 10e9/L Final   05/31/2019 6.2 4.0 - 11.0 10e9/L Final     Procalcitonin   Procalcitonin   Date Value Ref Range Status   06/02/2019 0.89 ng/ml Final     Comment:     0.50-1.99 ng/ml  Moderate risk of systemic infection.  Recommendation:   Recommend antibiotics. Evaluate culture results and clinical features to   target antibacterial therapy. Obtain blood cultures and other relevant   cultures if not done.  If empiric antibiotics were started, recheck PCT in: 2 days to guide   antibiotic de-escalation.  Discontinue or de-escalate antibiotics when PCT   concentration is <80% of peak or abs PCT <0.5. If empiric antibiotics were NOT   started, recheck PCT in 6-24 hours to re-evaluate need for antibiotics.     05/30/2019 5.39 ng/ml Final     Comment:     2.00-9.99 ng/ml  High risk for progression to severe sepsis.  Recommendation: Strongly recommend initiating or continuing antibiotics.   Evaluate culture results and clinical features to target antibacterial   therapy. Obtain blood cultures and other relevant cultures if not done. Repeat   PCT in 2 days to guide antibiotic de-escalation. Consider de-escalating   antibiotics when PCT concentration is <80% of peak or abs PCT <0.5.     05/28/2019 16.66  (HH) ng/ml Final     Comment:     Critical Value called to and read back by  MARGOT FERRER AT 0914 ON 05/28/19 AJE  >/= 10.00 ng/ml   Very high likelihood of severe sepsis or septic shock.  Recommendation: Strongly recommend initiating or continuing antibiotics.   Evaluate culture results and clinical features to target antibacterial   therapy. Obtain blood cultures and other relevant cultures if not done.Repeat   PCT in 2 days to guide antibiotic de-escalation. Consider de-escalating   antibiotics when PCT concentration is <80% of peak or abs PCT <1.       CRP   CRP Inflammation   Date Value Ref Range Status   05/28/2019 372.0 (H) 0.0 - 8.0 mg/L Final   05/09/2019 <2.9 0.0 - 8.0 mg/L Final   05/06/2019 3.3 0.0 - 8.0 mg/L Final     Culture Results:   7-Day Micro Results     Procedure Component Value Units Date/Time   Strep pneumo Agn Urine or CSF [[id number]] Collected: 05/28/19 2002   Order Status: Completed Lab Status: Final result Updated: 05/29/19 2201   Specimen: Urine     Specimen Description Urine    S Pneumoniae Antigen Negative, no Streptococcus pneumoniae antigen detected by immunochromatographic membrane   assay. A negative Streptococcus pneumoniae antigen result does not rule out infection with    Streptococcus pneumoniae.      Internal QC OK   Internal QC OK    Strep pneumo Agn Urine or CSF    Order Status: Canceled Lab Status: No result    Specimen: Urine    Active MRSA Surveillance Culture [[id number]] Collected: 05/28/19 1057   Order Status: Completed Lab Status: Final result Updated: 05/29/19 0630   Specimen: Nares from Nose     Specimen Description Nares    Culture Micro No MRSA isolated   Sputum Culture Aerobic Bacterial [[id number]] (Abnormal)  Collected: 05/28/19 0955   Order Status: Completed Lab Status: Final result Updated: 05/31/19 0832   Specimen: Sputum     Specimen Description Sputum    Culture Micro Light growth   Streptococcus pneumoniae   Predominant organism   Abnormal    Susceptibility      Streptococcus pneumoniae (1)     Antibiotic Interpretation Sensitivity Method Status    VANCOMYCIN Sensitive <=1 ug/mL EZEKIEL Final    TETRACYCLINE Sensitive <=1 ug/mL EZEKIEL Final    Trimethoprim/Sulfa Sensitive <=0.5/9.5 ug/mL EZEKIEL Final    Ceftriaxone(nonmening) Sensitive <=0.06 ug/mL EZEKIEL Final    Ceftriaxone(meningitit)* Sensitive <=0.06 ug/mL EZEKIEL Final    LEVOFLOXACIN Sensitive <=0.5 ug/mL EZEKIEL Final    MEROPENEM* Sensitive <=0.06 ug/mL EZEKIEL Final    Penicillin(nonmening) Sensitive   EZEKIEL Final     EZEKIEL <=0.6   CLINDAMYCIN Sensitive   EZEKIEL Final    CEFOTAXIME*  <=0.06 ug/mL EZEKIEL Final    ERYTHROMYCIN Sensitive   EZEKIEL Final    Penicillin(oral) Sensitive   EZEKIEL Final    * Suppressed Antibiotic            Gram stain [[id number]] (Abnormal) Collected: 05/28/19 0955   Order Status: Completed Lab Status: Final result Updated: 05/28/19 1030   Specimen: Sputum     Specimen Description Sputum    Gram Stain <10 Squamous epithelial cells/low power field     <25 PMNs/low power field     Many   Gram positive cocci in pairs   Abnormal    Urine Culture Aerobic Bacterial [[id number]] Collected: 05/28/19 0829   Order Status: Completed Lab Status: Final result Updated: 05/30/19 0702   Specimen: Catheterized Urine     Specimen Description Catheterized Urine    Culture Micro No growth   Blood culture [[id number]] Collected: 05/28/19 0810   Order Status: Completed Lab Status: Final result Updated: 06/03/19 0613   Specimen: Blood     Specimen Description Blood    Culture Micro No growth after 6 days   Blood culture    Order Status: Canceled Lab Status: No result    Specimen: Blood    Blood culture [[id number]] Collected: 05/28/19 0750   Order Status: Completed Lab Status: Final result Updated: 06/03/19 0613   Specimen: Blood     Specimen Description Blood    Special Requests Left Arm    Culture Micro No growth after 6 days     Recommendations/Interventions:  1. No recommendations at this time. Will continue to monitor.     Gabriele Jimenes  Monserrat 3, 2019

## 2019-06-03 NOTE — PLAN OF CARE
"Patient A&O.Complained of headache this AM rated 8/10 described as \"sinus pressure\", 1 PRN Norco given with relief. VSS. Afebrile. Remains on 2.5L of oxygen, sats 88-92%. Lung sounds improving, clear to bilateral upper lobes, fine crackles to bases. Infrequent dry cough. Pt using flutter valve independently. Pt denies SOB at rest, mild CASAS noted, O2 sats continue to drop with activity, 77% with ambulation to restroom. Pt up independently in room, gait steady.       Face to face report given with opportunity to observe patient.    Report given to Dana C. RN Annelyse J. Stromberg   6/3/2019  7:25 AM      "

## 2019-06-03 NOTE — PLAN OF CARE
Alert, oriented, VSS, afebrile. Denies pain this shift. Requiring 2.5LPM. Lung sounds with fine crackles in bilateral bases. Some dyspnea with exertion, ambulates in hogue independently. Loose productive cough, scant amounts of tan thick sputum. Oral lasix given x1, 800ml urine out since. Uses call light appropriately. Will continue to monitor.

## 2019-06-03 NOTE — PROGRESS NOTES
[facility]    Hospitalist Progress Note    Date of Service (when I saw the patient): 06/03/2019    Assessment & Plan   Melvi Cleveland is a 69 year old female who was admitted on 5/28/2019.     Bilateral pneumonia: Sputum culture pending,presumptive strep pneumo on prelim. Immunocompromised due to multiple RA drugs. Treated with Levaquin for LLL pneumonia 3/27/19, patient reported no improvement since then, worsening over the last 10 days. ABG remarkably normal considering her high flow oxygen need. CT scan shows bilateral pneumonia.    -5/29-Continue Zithromax, zosyn, vanco-pharmacy to dose for now pending 48 hours on cultures. Duonebs, oxygen supplementation.  -5/30-Procal down from 16 to just over 5. WBC normal. Dyspnea and cough improved. Discontinue Vanco, continue Zithromax and Zosyn pending full culture. Able to titrate oxygen from airvo 40% to nasal cannula 2 liters. Transfer out of ICU to medical floor.  -5/31- Continues to improve. Culture confirms strep pneumonia without any other organism growth to date. Will plan on switching to roal antibiotics tomorrow.   -6/1-Feels quit a bit better overall this morning. Sputum culture complete sowing S. Pneumonia only, switch to oral Augmentin. Try to titrate oxygen down.   -6/2- Had a good night. Increasing paroxysmal cough, lungs sounds a bit tight and wheezy this morning. Will add prednisone.  -6/3- Not able to titrate below 2 liters. Continue prednisone-wheezes have improved. She has lower leg edema and crackles, will give single dose of Lasix to see if that helps hypoxia. Discussed with her that she may need to go home on oxygen if above measures do not result in change. Would be good to get her in to pulmonology in the future with the severe dyspnea she has been having since March and multiple new medications started of COPD/asthma though she has not had formal PFT's.     Acute respiratory failure with hypoxia: Present on admission, due to #1.  Improving. Continue to wean oxygen as tolerated. Dyspnea is improving as well.        Sepsis (H): Resolved. Due to bilateral pneumonia, pneumococcal pneumonia. Presented with hypotension, hypoxia, tachycardia. Leukocytosis, elevated ProCal and lactic acid of 4.9 on presentation. Fluid bolus started in ED, antibiotics given. Lactic acid reduced and hypotension resolved with fluid resuscitation. On arrival to the floor her cap refill is <3sec, mentation is clear, work of breathing improved and she reported feeling improved as well. Lactic acid cleared, tachycardia resolved, blood pressures stable and at her normal per her report. Treatment as above.       CASE (acute kidney injury) (H): Due to acute sepsis. Improved quickly with fluid resuscitation so ATN ruled out, now back to normal.       Acute respiratory failure with hypoxia (H): Due to #1, treatment as above. She was requiring 15 liters of oxygen though her work of breathing has improved significantly by the time she arrived to the floor. She does have history or vague cough variant asthma for which she has been treated for presumed exacerbation over the last 2 months with new start of fexofenadine, Singulair and Advair as well as multiple rounds of steroids.       Low back pain, chronic: Continue home dose hydrocodone, gabapentin.        RA (rheumatoid arthritis) (H): Will need to hold methotrexate and plaquenil.       DVT Prophylaxis: Enoxaparin (Lovenox) SQ  Code Status: Full Code    Disposition: Expected discharge in several days once respiratory status stable-1-2 days.    Kelly Lim, CNP    Interval History   Feels overall better today but did fell poorly again yesterday afternoon, still dyspneic with activity but improved. Denies chest pain, abdominal pain, nausea.      -Data reviewed today: I reviewed all new labs and imaging results over the last 24 hours.    Physical Exam   Temp: 98.6  F (37  C) Temp src: Tympanic BP: 125/52   Heart Rate: 87  Resp: 20 SpO2: (!) 91 % O2 Device: Nasal cannula Oxygen Delivery: 2.5 LPM  Vitals:    06/01/19 0609 06/02/19 0543 06/03/19 0500   Weight: 96.7 kg (213 lb 3 oz) 95.4 kg (210 lb 5.1 oz) 94 kg (207 lb 3.7 oz)     Vital Signs with Ranges  Temp:  [97.4  F (36.3  C)-99  F (37.2  C)] 98.6  F (37  C)  Heart Rate:  [83-91] 87  Resp:  [20] 20  BP: (110-138)/(47-60) 125/52  SpO2:  [86 %-92 %] 91 %  I/O last 3 completed shifts:  In: 460 [P.O.:460]  Out: 2475 [Urine:2475]       Line/device assessment(s) completed for medical necessity    Constitutional: Awake,alert, no acute distress. Still ill appearing but color better today.  Respiratory: No wheezes, bilateral crackles mid to base L>R  Cardiovascular: HRR, no murmurs, rubs, thrills. 1+ pitting edema bilaterally mid calf  GI: Soft, nontender, bowel sounds are positive.  Skin/Integumen: Pink warm and dry, no unusual bruising, open areas or rashes.         Medications       amoxicillin-clavulanate  1 tablet Oral Q12H MIKE     citalopram  40 mg Oral Daily     enoxaparin  40 mg Subcutaneous Q24H     famotidine  20 mg Oral BID     fexofenadine-pseudoePHEDrine  1 tablet Oral Q12H MIKE     fluticasone  1 spray Both Nostrils Daily     fluticasone-vilanterol  1 puff Inhalation Daily     furosemide  20 mg Oral Once     gabapentin  600 mg Oral At Bedtime     gabapentin  600 mg Oral Daily     gabapentin  900 mg Oral QAM     guaiFENesin  600 mg Oral BID     ipratropium - albuterol 0.5 mg/2.5 mg/3 mL  3 mL Nebulization Q6H     montelukast  10 mg Oral At Bedtime     pantoprazole  40 mg Oral BID     potassium chloride ER  20 mEq Oral Once     predniSONE  40 mg Oral Daily     traZODone  150 mg Oral At Bedtime       Data   Recent Labs   Lab 06/03/19  0535 06/02/19  0539 06/01/19  0544 05/31/19  0545  05/29/19  0451  05/28/19  0811   WBC  --  5.0 5.3 6.2   < > 12.8*  --  11.3*   HGB  --  10.4* 9.7* 9.4*   < > 9.6*  --  11.1*   MCV  --  87 86 86   < > 88  --  89    246 203 174   < > 161  --   183   NA  --  140 142 142   < > 143   < > 136   POTASSIUM  --  3.6 3.3* 3.5   < > 4.3   < > 5.0   CHLORIDE  --  107 106 109   < > 114*   < > 106   CO2  --  27 29 25   < > 23   < > 22   BUN  --  7 8 10   < > 18   < > 27   CR  --  0.84 0.90 0.90   < > 1.15*   < > 1.54*   ANIONGAP  --  6 7 8   < > 6   < > 8   KATHLEEN  --  8.9 8.6 8.7   < > 8.2*   < > 8.8   GLC  --  95 105* 92   < > 101*   < > 85   ALBUMIN  --   --   --   --   --  1.9*  --  2.3*   PROTTOTAL  --   --   --   --   --  5.6*  --  6.3*   BILITOTAL  --   --   --   --   --  0.4  --  0.7   ALKPHOS  --   --   --   --   --  62  --  66   ALT  --   --   --   --   --  23  --  28   AST  --   --   --   --   --  16  --  15   TROPI  --   --   --   --   --   --   --  <0.015    < > = values in this interval not displayed.       No results found for this or any previous visit (from the past 24 hour(s)).

## 2019-06-03 NOTE — PLAN OF CARE
Face to face report given with opportunity to observe patient.    Report given to Margot Núñez   6/3/2019  3:32 PM

## 2019-06-03 NOTE — PROGRESS NOTES
"CLINICAL NUTRITION SERVICES  -  ASSESSMENT NOTE    Melvi Cleveland : LOS    69 yof admitted for bilateral pneumonia. Pt has a hx of sjogrens syndrome, hyperlipidemia, dyslipidemia, and crohn's disease. Pt reports having a poor appetite starting a few days before admission. Since then her appetite has been improving and she believes she is eating adequately. She has not noticed weight loss. She did note her weight was up from her normal weight upon admission, but is now closer to her usual weight. Pt reported she is having loose stools after eating, but believes it is related to antibiotics.     Diet Order: Regular  Intake: % of meals documented    Height: 5' 5\"  Weight: 207 lbs 3.72 oz  Body mass index is 34.49 kg/m .  Weight Status:  Obesity Grade I BMI 30-34.9  IBWR: 111-149lb  Weight History: Usual weight around 210lb.  210lb - 6/2/19  214lb - 5/31/19  220lb - 5/28/19  210lb - 5/28/19  208lb - 5/6/19  210lb - 4/16/19  210lb - 3/29/19    Estimated Energy Needs: [id number] kcals (Love St Jeor- stress factor 1-1.2) - current weight  Estimated Protein Needs: 60-70 grams protein (1-1.2 g pro/Kg)- average IBW    Malnutrition Diagnosis: Patient does not meet two of the criteria necessary for diagnosing malnutrition - reduced intake meets criteria    NUTRITION DIAGNOSIS:  Inadequate oral intake related to loss of appetite as evidenced by pt report of poor intake/appetite for about a week. -Improving.    NUTRITION RECOMMENDATIONS  - Encourage intake. Supplements available if pt desires- pt declined at this time.  - Pt may benefit from a probiotic supplement. She is eating yogurt fairly frequently.    MONITORING AND EVALUATION:  RD will monitor intake, weight, labs      "

## 2019-06-04 VITALS
SYSTOLIC BLOOD PRESSURE: 111 MMHG | TEMPERATURE: 98.6 F | BODY MASS INDEX: 34.2 KG/M2 | RESPIRATION RATE: 18 BRPM | DIASTOLIC BLOOD PRESSURE: 54 MMHG | HEIGHT: 65 IN | OXYGEN SATURATION: 90 % | HEART RATE: 75 BPM | WEIGHT: 205.25 LBS

## 2019-06-04 LAB
ANION GAP SERPL CALCULATED.3IONS-SCNC: 5 MMOL/L (ref 3–14)
BUN SERPL-MCNC: 12 MG/DL (ref 7–30)
CALCIUM SERPL-MCNC: 8.7 MG/DL (ref 8.5–10.1)
CHLORIDE SERPL-SCNC: 108 MMOL/L (ref 94–109)
CO2 SERPL-SCNC: 27 MMOL/L (ref 20–32)
CREAT SERPL-MCNC: 0.89 MG/DL (ref 0.52–1.04)
ERYTHROCYTE [DISTWIDTH] IN BLOOD BY AUTOMATED COUNT: 14.6 % (ref 10–15)
GFR SERPL CREATININE-BSD FRML MDRD: 67 ML/MIN/{1.73_M2}
GLUCOSE SERPL-MCNC: 87 MG/DL (ref 70–99)
HCT VFR BLD AUTO: 29.8 % (ref 35–47)
HGB BLD-MCNC: 9.9 G/DL (ref 11.7–15.7)
MCH RBC QN AUTO: 28.7 PG (ref 26.5–33)
MCHC RBC AUTO-ENTMCNC: 33.2 G/DL (ref 31.5–36.5)
MCV RBC AUTO: 86 FL (ref 78–100)
PLATELET # BLD AUTO: 323 10E9/L (ref 150–450)
POTASSIUM SERPL-SCNC: 3.6 MMOL/L (ref 3.4–5.3)
RBC # BLD AUTO: 3.45 10E12/L (ref 3.8–5.2)
SODIUM SERPL-SCNC: 140 MMOL/L (ref 133–144)
WBC # BLD AUTO: 6.1 10E9/L (ref 4–11)

## 2019-06-04 PROCEDURE — 25000132 ZZH RX MED GY IP 250 OP 250 PS 637: Performed by: NURSE PRACTITIONER

## 2019-06-04 PROCEDURE — 25000131 ZZH RX MED GY IP 250 OP 636 PS 637: Performed by: NURSE PRACTITIONER

## 2019-06-04 PROCEDURE — A9270 NON-COVERED ITEM OR SERVICE: HCPCS | Performed by: NURSE PRACTITIONER

## 2019-06-04 PROCEDURE — 80048 BASIC METABOLIC PNL TOTAL CA: CPT | Performed by: NURSE PRACTITIONER

## 2019-06-04 PROCEDURE — 36415 COLL VENOUS BLD VENIPUNCTURE: CPT | Performed by: NURSE PRACTITIONER

## 2019-06-04 PROCEDURE — 40000275 ZZH STATISTIC RCP TIME EA 10 MIN

## 2019-06-04 PROCEDURE — 25000128 H RX IP 250 OP 636: Performed by: NURSE PRACTITIONER

## 2019-06-04 PROCEDURE — 94640 AIRWAY INHALATION TREATMENT: CPT

## 2019-06-04 PROCEDURE — 85027 COMPLETE CBC AUTOMATED: CPT | Performed by: NURSE PRACTITIONER

## 2019-06-04 PROCEDURE — 99239 HOSP IP/OBS DSCHRG MGMT >30: CPT | Performed by: INTERNAL MEDICINE

## 2019-06-04 PROCEDURE — 94640 AIRWAY INHALATION TREATMENT: CPT | Mod: 76

## 2019-06-04 PROCEDURE — 25000125 ZZHC RX 250: Performed by: NURSE PRACTITIONER

## 2019-06-04 RX ORDER — PREDNISONE 10 MG/1
TABLET ORAL
Qty: 50 TABLET | Refills: 0 | Status: SHIPPED | OUTPATIENT
Start: 2019-06-05 | End: 2019-06-25

## 2019-06-04 RX ADMIN — PANTOPRAZOLE SODIUM 40 MG: 40 TABLET, DELAYED RELEASE ORAL at 08:52

## 2019-06-04 RX ADMIN — IPRATROPIUM BROMIDE AND ALBUTEROL SULFATE 3 ML: .5; 3 SOLUTION RESPIRATORY (INHALATION) at 02:54

## 2019-06-04 RX ADMIN — AMOXICILLIN AND CLAVULANATE POTASSIUM 1 TABLET: 875; 125 TABLET, FILM COATED ORAL at 08:52

## 2019-06-04 RX ADMIN — ENOXAPARIN SODIUM 40 MG: 40 INJECTION SUBCUTANEOUS at 11:09

## 2019-06-04 RX ADMIN — FLUTICASONE FUROATE AND VILANTEROL TRIFENATATE 1 PUFF: 200; 25 POWDER RESPIRATORY (INHALATION) at 08:35

## 2019-06-04 RX ADMIN — FEXOFENADINE HYDROCHLORIDE AND PSEUDOEPHEDRINE HYDROCHLORIDE 1 TABLET: 60; 120 TABLET, FILM COATED, EXTENDED RELEASE ORAL at 08:52

## 2019-06-04 RX ADMIN — FAMOTIDINE 20 MG: 20 TABLET, FILM COATED ORAL at 08:52

## 2019-06-04 RX ADMIN — GABAPENTIN 600 MG: 300 CAPSULE ORAL at 11:09

## 2019-06-04 RX ADMIN — GUAIFENESIN 600 MG: 600 TABLET, EXTENDED RELEASE ORAL at 08:52

## 2019-06-04 RX ADMIN — IPRATROPIUM BROMIDE AND ALBUTEROL SULFATE 3 ML: .5; 3 SOLUTION RESPIRATORY (INHALATION) at 08:30

## 2019-06-04 RX ADMIN — PREDNISONE 40 MG: 10 TABLET ORAL at 08:51

## 2019-06-04 RX ADMIN — GABAPENTIN 900 MG: 300 CAPSULE ORAL at 08:52

## 2019-06-04 RX ADMIN — CITALOPRAM HYDROBROMIDE 40 MG: 20 TABLET ORAL at 08:52

## 2019-06-04 RX ADMIN — FLUTICASONE PROPIONATE 1 SPRAY: 50 SPRAY, METERED NASAL at 08:52

## 2019-06-04 ASSESSMENT — ACTIVITIES OF DAILY LIVING (ADL)
ADLS_ACUITY_SCORE: 13

## 2019-06-04 ASSESSMENT — MIFFLIN-ST. JEOR: SCORE: 1466.88

## 2019-06-04 NOTE — PLAN OF CARE
Face to face report given with opportunity to observe patient.    Report given to Kim DEE RN.    Margot Pagan   6/3/2019  11:34 PM

## 2019-06-04 NOTE — PLAN OF CARE
Face to face report given with opportunity to observe patient.    Report given to Susan Mars   6/4/2019  7:33 AM

## 2019-06-04 NOTE — PROGRESS NOTES
Patient has been assessed for Home Oxygen needs. Oxygen readings:    *Pulse oximetry (SpO2) = 86% on room air at rest while awake.    *SpO2 improved to 92% on 2liters/minute at rest.

## 2019-06-04 NOTE — PLAN OF CARE
Pt. Has been afebrile through the night, VS as charted.  Her O2 sats are in the upper 80's to low 90's on 2 LPM via NC, lung sounds are with fine crackles in the bases, she does get CASAS.  She has denied any pain/discomfort.  She does not have an IV access, good oral intake/output, trace edema to BLE.  She has remained free of falls, call light within reach - does make her needs known, frequent rounding done to assess needs.

## 2019-06-04 NOTE — DISCHARGE INSTRUCTIONS
[facility] Pulmonology department will be calling you to schedule your Pulmonology appointment. If you have not received a call from them by Friday 6/7/19 please call the Clinic at [phone] to follow up on the status of your appointment scheduling.

## 2019-06-04 NOTE — PROGRESS NOTES
Name: Melvi Cleveland    MRN#: [id number]    Reason for Hospitalization: Sepsis, due to unspecified organism (H) [A41.9]  Pneumonia of left lower lobe due to infectious organism (H) [J18.1]    Discharge Date: 6/4/2019    Patient / Family response to discharge plan: in agreement    Follow-Up Appt:   Future Appointments   Date Time Provider Department Center   6/11/2019 11:00 AM Jimbo Martinez MD [facility]       Other Providers (Care Coordinator, [facility], PCA services etc): No    Discharge Disposition: home, with  transporting    Tania Leyla

## 2019-06-04 NOTE — DISCHARGE SUMMARY
[facility]    Discharge Summary  Hospitalist    Date of Admission:  5/28/2019  Date of Discharge:  6/4/2019  Discharging Provider: Pierre Valdivia MD  Date of Service (when I saw the patient): 06/04/19    Discharge Diagnoses   Principal Problem:    Bilateral pneumonia (5/28/2019)  Active Problems:    Low back pain, chronic (12/13/2000)    RA (rheumatoid arthritis) (H) (11/25/2015)    Sepsis (H) (5/28/2019)    CASE (acute kidney injury) (H) (5/28/2019)    Acute respiratory failure with hypoxia (H) (5/28/2019)      History of Present Illness   Melvi Cleveland is an 69 year old female who presented with sob and hypoxia.  Please see admission H+P for additional details.    Hospital Course   Melvi Cleveland was admitted on 5/28/2019.  The following problems were addressed during her hospitalization:     Bilateral pneumonia: Strep pneumo pneumonia. Immunocompromised due to multiple RA drugs. Treated with Levaquin for LLL pneumonia on 3/27/19, patient reported no improvement since then, worsening over the last 10 days.  CT scan shows bilateral pneumonia.  Started with broad spectrum. Continued to improve. Culture confirms strep pneumonia without any other organism growth to date.  Switched to oral Augmentin. Try to titrate oxygen down. Added adjuvant steroids, will give her taper.  Weaned O2 down to 2L, but she is hypoxic into the 80s on RA at rest, she will need outpatient O2.      Acute respiratory failure with hypoxia (H): Due to pneumonia. She required 15 liters of oxygen initially. She does have history or vague cough variant asthma for which she has been treated for presumed exacerbation over the last 2 months with new start of fexofenadine, Singulair and Advair as well as multiple rounds of steroids.  She will continue her singulair and Advair.       Sepsis (H): Resolved. Due to bilateral pneumonia, pneumococcal pneumonia. Presented with hypotension, hypoxia, tachycardia. Leukocytosis, elevated ProCal  and lactic acid of 4.9 on presentation. Lactic acid cleared, tachycardia resolved, blood pressures stable and at her normal per her report. Treatment as above.       CASE (acute kidney injury) (H): Due to acute sepsis. Improved quickly with fluid resuscitation so ATN ruled out, now back to normal.     RA (rheumatoid arthritis) (H): Will have her continue outpatient methotrexate and plaquenil.     Pierre Valdivia MD      Significant Results and Procedures   See below.    Pending Results   These results will be followed up by Jimbo Martinez    Unresulted Labs Ordered in the Past 30 Days of this Admission     No orders found from 3/29/2019 to 5/29/2019.          Code Status   Full Code       Primary Care Physician   Jimbo Martinez    Physical Exam   Temp: 98.6  F (37  C) Temp src: Tympanic BP: 111/54 Pulse: 75 Heart Rate: 95 Resp: 18 SpO2: (!) 90 % O2 Device: Nasal cannula Oxygen Delivery: 2 LPM  Vitals:    06/02/19 0543 06/03/19 0500 06/04/19 0542   Weight: 95.4 kg (210 lb 5.1 oz) 94 kg (207 lb 3.7 oz) 93.1 kg (205 lb 4 oz)     Vital Signs with Ranges  Temp:  [98.2  F (36.8  C)-99.4  F (37.4  C)] 98.6  F (37  C)  Pulse:  [75] 75  Heart Rate:  [76-97] 95  Resp:  [18-20] 18  BP: (111-125)/(50-63) 111/54  SpO2:  [90 %-93 %] 90 %  I/O last 3 completed shifts:  In: 240 [P.O.:240]  Out: 3725 [Urine:3725]    Constitutional: AA, NAD  Eyes: PERRLA, no injection, no icterus  HEENT: atraumatic, normocephalic  Respiratory: crackles heard right lung>left, no wheezes  Cardiovascular: S1 S2 RRR  GI: soft, NT, ND, + bowel sounds  Lymph/Hematologic: no palpable lymphadenopathy  Skin: no rashes, no lesions  Musculoskeletal: trace edema, good tone, no deformities  Neurologic: oriented x 3, no focal deficits  Psychiatric: appropriate affect    Discharge Disposition   Discharged to home  Condition at discharge: Stable    I certify that this patient, Melvi L Moore has been under my care and that I, or a nurse practitioner or  physician's assistant working with me, had a face-to-face encounter that meets face-to-face encounter requirements with this patient on June 4, 2019.    Melvi Cleveland is now in a chronic stable state and continues to require supplemental oxygen due to continued oxygen desaturation.  This patient has been treated in part, or in whole for the following medical condition(s):  Chronic Respiratory Failure with Hypoxia J93.11  Treatments tried and failed or ruled out to treat hypoxemia include RA.  If portability is ordered, is the patient mobile within the home? yes      Consultations This Hospital Stay   PHARMACY TO DOSE VANCO  PHARMACY TO DOSE VANCO  RESPIRATORY CARE IP CONSULT    Time Spent on this Encounter   I, Pierre Valdivia MD, personally saw the patient today and spent greater than 30 minutes discharging this patient.    Discharge Orders      PULMONARY MEDICINE REFERRAL      Reason for your hospital stay    Bilateral pneumonia     Follow-up and recommended labs and tests     Follow up with primary care provider, Jimbo Martinez, within 7 days for hospital follow- up.  No follow up labs or test are needed.  Follow up with pulmonology 2-4 weeks.     Activity    Your activity upon discharge: activity as tolerated     Full Code     Oxygen Adult    [facility] Order 2 liter(s) by nasal cannula continuously with use of portable tank. Expected treatment length is indefinite (99 months).. Test on conserving device as applicable.    Patients who qualify for home O2 coverage under the CMS guidelines require ABG tests or O2 sat readings obtained closest to, but no earlier than 2 days prior to the discharge, as evidence of the need for home oxygen therapy. Testing must be performed while patient is in the chronic stable state. See notes for O2 sats.    I certify that this patient, Melvi Cleveland has been under my care and that I, or a nurse practitioner or physician's assistant working with me, had a face-to-face  encounter that meets the face-to-face encounter requirements with this patient on June 4, 2019. The patient, Melvi Cleveland was evaluated or treated in whole, or in part, for the following medical condition, which necessitates the use of the ordered oxygen. Treatment Diagnosis: pneumonia    Attending Provider: Pierre Valdivia MD  Physician signature: See electronic signature associated with these discharge orders  Date of Order: June 4, 2019    I certify that this patient, Melvi Cleveland has been under my care and that I, or a nurse practitioner or physician's assistant working with me, had a face-to-face encounter that meets face-to-face encounter requirements with this patient on June 4, 2019.    Melvi Cleveland is now in a chronic stable state and continues to require supplemental oxygen due to continued oxygen desaturation.  This patient has been treated in part, or in whole for the following medical condition(s):  Chronic Respiratory Failure with Hypoxia J93.11  Treatments tried and failed or ruled out to treat hypoxemia include RA.  If portability is ordered, is the patient mobile within the home? yes     Diet    Follow this diet upon discharge: Orders Placed This Encounter      Combination Diet Regular Diet Adult     Discharge Medications   Current Discharge Medication List      START taking these medications    Details   amoxicillin-clavulanate (AUGMENTIN) 875-125 MG tablet Take 1 tablet by mouth every 12 hours for 5 days  Qty: 10 tablet, Refills: 0    Associated Diagnoses: Pneumonia of left lower lobe due to infectious organism (H)         CONTINUE these medications which have CHANGED    Details   predniSONE (DELTASONE) 10 MG tablet Take 4 tablets (40 mg) by mouth daily for 5 days, THEN 3 tablets (30 mg) daily for 5 days, THEN 2 tablets (20 mg) daily for 5 days, THEN 1 tablet (10 mg) daily for 5 days.  Qty: 50 tablet, Refills: 0    Associated Diagnoses: Pneumonia of left lower lobe due to infectious  organism (H)         CONTINUE these medications which have NOT CHANGED    Details   albuterol (PROVENTIL) (2.5 MG/3ML) 0.083% neb solution Take 1 vial (2.5 mg) by nebulization every 6 hours as needed for shortness of breath / dyspnea or wheezing  Qty: 25 vial, Refills: 3      ALBUTEROL 108 (90 BASE) MCG/ACT inhaler INHALE 2 PUFFS INTO THE LUNGS EVERY 6 HOURS AS NEEDED FOR SHORTNESS OF BREATH OR WHEEZING  Qty: 8.5 g, Refills: 5    Associated Diagnoses: Influenza-like symptoms      ALFALFA PO Take 5 tablets by mouth 2 times daily.      ALLEGRA-D ALLERGY & CONGESTION  MG 12 hr tablet TAKE 1 TABLET BY MOUTH TWICE DAILY  Qty: 30 tablet, Refills: 0    Associated Diagnoses: Nasal congestion      Ascorbic Acid (VITAMIN C) 500 MG CAPS Take 1 tablet by mouth daily       benzonatate (TESSALON) 200 MG capsule Take 1 capsule (200 mg) by mouth 3 times daily as needed for cough  Qty: 30 capsule, Refills: 1    Associated Diagnoses: Cough variant asthma      Calcium-Vitamin D-Vitamin K (CALCIUM + D) 500-1000-40 MG-UNT-MCG CHEW Take 3 tablets by mouth daily       Cholecalciferol (VITAMIN D) 1000 UNITS capsule Take 2 capsules by mouth daily       citalopram (CELEXA) 40 MG tablet TAKE 1 TABLET BY MOUTH DAILY  Qty: 90 tablet, Refills: 1    Associated Diagnoses: Panic disorder without agoraphobia      DICLOFENAC PO Take 100 mg by mouth daily       Flaxseed, Linseed, (FLAX SEED OIL) 1000 MG capsule Take 2 capsules by mouth daily      fluticasone (FLONASE) 50 MCG/ACT nasal spray Spray 1 spray into both nostrils daily      fluticasone-salmeterol (ADVAIR) 250-50 MCG/DOSE inhaler Inhale 1 puff into the lungs every 12 hours  Qty: 1 Inhaler, Refills: 3    Associated Diagnoses: Cough variant asthma      folic acid (FOLVITE) 1 MG tablet Take 1 mg by mouth 3 times daily.      !! gabapentin (NEURONTIN) 300 MG capsule Take 900 mg by mouth every morning      !! gabapentin (NEURONTIN) 300 MG capsule Take 600 mg by mouth daily In the afternoon       !! gabapentin (NEURONTIN) 300 MG capsule Take 600 mg by mouth daily At bedtime      Garlic 400 MG TBEC Take 1 tablet by mouth daily       HYDROcodone-acetaminophen (NORCO) 7.5-325 MG per tablet Take 1 tablet by mouth as needed for severe pain Every 4-6 hours as needed.      hydroxychloroquine (PLAQUENIL) 200 MG tablet Take 200 mg by mouth daily      Methotrexate, Anti-Rheumatic, (METHOTREXATE, PF, SC) Inject 0.6 mLs Subcutaneous 25 mg vial- patient draws up 0.6 ml.       MILK THISTLE PO Take 1,000 mg by mouth daily.      montelukast (SINGULAIR) 10 MG tablet Take 1 tablet (10 mg) by mouth At Bedtime  Qty: 90 tablet, Refills: 3    Associated Diagnoses: Seasonal allergic rhinitis due to pollen; Cough variant asthma      Omega-3 1000 MG capsule Take 1.5 g by mouth daily       pantoprazole (PROTONIX) 40 MG EC tablet Take 40 mg by mouth 2 times daily      PREBIOTIC PRODUCT PO Take 1 tablet by mouth daily      Simethicone (SM GAS RELIEF ANTIFLATUENT) 180 MG CAPS TAKE ONE CAPSULE BY MOUTH AS NEEDED AFTER A MEAL. MAX 2 CAPS/DAY  Qty: 60 capsule, Refills: 11    Associated Diagnoses: Diagnosis unknown      !! traZODone (DESYREL) 50 MG tablet Take 150 mg by mouth At Bedtime      order for DME Equipment being ordered: Nebulizer  Qty: 1 Device, Refills: 0    Associated Diagnoses: Cough      polyvinyl alcohol-povidone (REFRESH) 1.4-0.6 % ophthalmic solution 1-2 drops as needed      RESTASIS 0.05 % ophthalmic emulsion USE 1 DROP IN BOTH EYES 2 TIMES A DAY  Qty: 60 each, Refills: 3    Associated Diagnoses: Bilateral dry eyes      !! traZODone (DESYREL) 50 MG tablet TAKE 2-3 TABLETS BY MOUTH DAILY AT BEDTIME  Qty: 270 tablet, Refills: 0    Associated Diagnoses: Primary insomnia       !! - Potential duplicate medications found. Please discuss with provider.        Allergies   Allergies   Allergen Reactions     Adhesive Tape      Glue and nicotine patches     Benzoin Compound      Tin-Co-Javier     Mold      Seasonal Allergies      Soap       Any cleanser/soap/disinfectant that is used right before surgery.  Makes patient break out horribly. Chart was looked at and Chloraprep was used.     Data   Most Recent 3 CBC's:  Recent Labs   Lab Test 19  0537 19  0535 19  0539 19  0544   WBC 6.1  --  5.0 5.3   HGB 9.9*  --  10.4* 9.7*   MCV 86  --  87 86    264 246 203      Most Recent 3 BMP's:  Recent Labs   Lab Test 19  0537 19  0539 19  0544    140 142   POTASSIUM 3.6 3.6 3.3*   CHLORIDE 108 107 106   CO2 27 27 29   BUN 12 7 8   CR 0.89 0.84 0.90   ANIONGAP 5 6 7   KATHLEEN 8.7 8.9 8.6   GLC 87 95 105*     Most Recent 2 LFT's:  Recent Labs   Lab Test 19  0451 19  0811   AST 16 15   ALT 23 28   ALKPHOS 62 66   BILITOTAL 0.4 0.7     Most Recent INR's and Anticoagulation Dosing History:  Anticoagulation Dose History     There is no flowsheet data to display.        Most Recent 3 Troponin's:  Recent Labs   Lab Test 19  0811   TROPI <0.015     Most Recent Cholesterol Panel:  Recent Labs   Lab Test 18  0915   CHOL 196   *   HDL 72   TRIG 100     Most Recent 6 Bacteria Isolates From Any Culture (See EPIC Reports for Culture Details):  Recent Labs   Lab Test 19  1057 19  0955 19  0829 19  0810 19  0750 17  0505   CULT No MRSA isolated Light growth  Streptococcus pneumoniae  Predominant organism  * No growth No growth after 6 days No growth after 6 days No MRSA isolated     Most Recent TSH, T4 and A1c Labs:  Recent Labs   Lab Test 18  0915   TSH 1.58     Results for orders placed or performed during the hospital encounter of 19   XR Chest Port 1 View    Narrative    PROCEDURE:  XR CHEST PORT 1 VW    HISTORY:  sob.     COMPARISON:  None.    FINDINGS:   The cardiac silhouette is normal in size. The pulmonary vasculature is  normal.  Abnormal decreased  in the left lung suspicious for  pneumonia. No pleural effusion or  pneumothorax.      Impression    IMPRESSION:  Left lung infiltrate suspicious for pneumonia      SUE CANCINO MD   CT Chest w/o Contrast    Narrative    PROCEDURE: CT CHEST W/O CONTRAST 5/28/2019 10:19 AM    HISTORY: Acute resp illness, > 40 years old; Pneumonia, unresolved or  complicated    COMPARISONS: None.    Meds/Dose Given: None.    TECHNIQUE: Axial noncontrast enhanced images with coronal and sagittal  reformatted images.    FINDINGS: No enlarged lymph nodes are seen in the mediastinum, rowena or  axilla.    There are bilateral lung infiltrates most consistent with pneumonia.  These are most prominent in the lower lobes but are also seen in the  lingula and in the right middle lobe.    No aneurysm is seen. There is no pleural or pericardial effusion.  There is no significant coronary artery calcification.    Limited images through the upper abdomen show changes of  cholecystectomy.    Bone windows show mild degenerative change. There is no acute  compression fracture.         Impression    IMPRESSION: Bilateral lung infiltrates most consistent with pneumonia.    LEE VALENTIN MD   XR Chest 2 Views    Narrative    PROCEDURE:  XR CHEST 2 VW    HISTORY:  pneumonia.     COMPARISON:  5/28/2019    FINDINGS:   The cardiac silhouette is normal in size. The pulmonary vasculature is  normal.  There are bilateral infiltrates, left greater than right,  slightly worsened from the prior chest x-ray. Small pleural effusions  are noted.       Impression    IMPRESSION:  Slight worsening of bilateral lung infiltrates.      ENRIQUE BORGES MD

## 2019-06-04 NOTE — PLAN OF CARE
Patient discharged at 1257 PM via wheel chair accompanied by spouse and staff. Prescriptions filled and sent with patient upon discharge. All belongings sent with patient.     Discharge instructions reviewed with pt and . Listed belongings gathered and returned to patient. yes    Patient discharged to home.     Core Measures and Vaccines  Core Measures applicable during stay: yes. If yes, state diagnosis: pneu   Pneumonia and Influenza given prior to discharge, if indicated: Already had.     Surgical Patient   Surgical Procedures during stay: no  Did patient receive discharge instruction on wound care and recognition of infection symptoms? N/A    MISC  Follow up appointment made:  Yes. Follow up with PCP made. Pulmonologist will be calling to schedule an appt.     Home and hospital aquired medications returned to patient: Yes  Patient reports pain was well managed at discharge: Yes    Pt left with new prescription of home O2.

## 2019-06-04 NOTE — PLAN OF CARE
VSS. Low grade T of 99.4. SaO2 low 90s on 2LPM. No c/o pain.     Lungs: fine crackles in bases. Occasional productive cough. Abd: soft and nontender. Bowel sounds audible and active. Regular diet tolerated well. Edema: 1+ in RLE and LLE.     ABX: augmentin PO. No IV access. Call light within reach. Pt independent, alert and oriented, and calls appropriately.

## 2019-06-05 ENCOUNTER — TELEPHONE (OUTPATIENT)
Dept: CASE MANAGEMENT | Facility: HOSPITAL | Age: 69
End: 2019-06-05

## 2019-06-05 NOTE — TELEPHONE ENCOUNTER
Melvi Cleveland, was discharged to home on 6/4/19  from [facility]. I spoke today with her regarding her  discharge.   She indicates she has receive(d) sufficient information upon discharge. Medications were reviewed in full on discharge, including: Medications to be started;  medications to be continued from preadmission and any side effects. Prescriptions were received at discharge and were able to be filled. Medications are being taken as prescribed.   She indicates she has  an appointment scheduled for 6/11  and has transportation available. They are able to confirm their appointment time and have it written down.   She did not have any questions regarding discharge instructions or condition.  Per their request, the following employee (s) can be recognized for their outstanding services delivered:  Wonderful care. Everyone was just great.  Suggestions to improve service: Nothing indicated.   She was informed she may receive a survey in the mail from the hospital. Asked if she  would kindly complete the survey in order for [facility] to know if services fully met patient needs.

## 2019-06-05 NOTE — PROGRESS NOTES
Subjective     Melvi Cleveland is a 69 year old female who presents to clinic today for the following health issues:    HPI       Hospital Follow-up Visit:    Hospital/Nursing Home/IP Rehab Facility: [facility]  Date of Admission: 05/28/19  Date of Discharge: 06/04/19  Reason(s) for Admission: Pneumonia            Problems taking medications regularly:  None       Medication changes since discharge: None       Problems adhering to non-medication therapy:  None    Summary of hospitalization:  [facility] discharge summary reviewed  Diagnostic Tests/Treatments reviewed.  Follow up needed: Pulmonary Medicine  Other Healthcare Providers Involved in Patient s Care:         Specialist appointment - Pulmonary Medicine  Update since discharge: improved.       Post Discharge Medication Reconciliation: discharge medications reconciled, continue medications without change.  Plan of care communicated with patient and family     Coding guidelines for this visit:  Type of Medical   Decision Making Face-to-Face Visit       within 7 Days of discharge Face-to-Face Visit        within 14 days of discharge   Moderate Complexity [id number]   High Complexity [id number]            Pneumonia      Duration: 2 months    Description (location/character/radiation): left lower lobe pneumonia    Intensity:  moderate    Accompanying signs and symptoms: shortness of breath    History (similar episodes/previous evaluation): yes , previously diagnosed in March    Precipitating or alleviating factors: None    Therapies tried and outcome: Augmentin- completed and taking prednisone     Sherrie was hospitalized with left lower lobe pneumonia secondary to pneumococcus.  This was complicated by bronchospasm as well as hypoxia. She was discharged on amoxicillin-clavulinic acid (Augmentin), oxygen, as well as frequent nebulization.  Her symptoms are improving.  Records are reviewed.  Patient Active Problem List   Diagnosis     Neck pain,  chronic     Low back pain, chronic     Crohn's disease of large intestine (H)     Dyslipidemia     Sicca syndrome (H)     Polyneuropathy in other diseases classified elsewhere (H)     RA (rheumatoid arthritis) (H)     Comprehensive Medical Examination     Seasonal allergic rhinitis     Fibrocystic breast disease (FCBD)     ACP (advance care planning)     Need for hepatitis C screening test     Cough variant asthma     Pneumonia of left lower lobe due to infectious organism (H)     Sepsis (H)     CASE (acute kidney injury) (H)     Bilateral pneumonia     Acute respiratory failure with hypoxia (H)     Past Surgical History:   Procedure Laterality Date     BACK SURGERY  1995    back surgery     BIOPSY      breast bx X2     BIOPSY BREAST      LT     BIOPSY BREAST NEEDLE LOCALIZATION Right 2017    Procedure: BIOPSY BREAST NEEDLE LOCALIZATION;  WIRE LOCALIZED EXCISIONAL BIOPSY  RIGHT BREAST MASS;  Surgeon: Jeni Amin MD;  Location: [facility]     CHOLECYSTECTOMY       COLONOSCOPY  11/15/2004    screening     COLONOSCOPY  2014     EGD with biopsy  ,     GERD     laminectomy      L4 L5 laminectomy; back pain     RELEASE CARPAL TUNNEL Right 2018    Procedure: RELEASE CARPAL TUNNEL;  RIGHT CARPAL TUNNEL RELEASE;  Surgeon: Haider Carlos MD;  Location: [facility] OR       Social History     Tobacco Use     Smoking status: Former Smoker     Types: Cigarettes     Last attempt to quit: 3/28/1997     Years since quittin.2     Smokeless tobacco: Never Used     Tobacco comment: no passive exposure   Substance Use Topics     Alcohol use: No     Alcohol/week: 0.0 oz     Comment: former. quit      Family History   Problem Relation Age of Onset     Diabetes Mother      Rheumatoid Arthritis Mother      Other - See Comments Mother         fibromyalgia     Osteoarthritis Mother      Thyroid Disease Mother         thyroid disorder     Unknown/Adopted Father         cause of death unknown     Rheumatoid  Arthritis Father      Diabetes Sister      Myocardial Infarction Sister         cause of death     Lupus Sister         cause of death     C.A.D. Maternal Grandmother      Cerebrovascular Disease Maternal Grandmother      Diabetes Maternal Grandmother      Thyroid Disease Maternal Grandmother         thyrodi disorder; goitor removed     Cancer Maternal Grandfather      Hypertension Brother      Other - See Comments Brother         sleep apnea     Other - See Comments Sister         sleep apnea         Current Outpatient Medications   Medication Sig Dispense Refill     fexofenadine-pseudoePHEDrine (ALLEGRA-D)  MG 12 hr tablet Take 1 tablet by mouth every morning       albuterol (PROVENTIL) (2.5 MG/3ML) 0.083% neb solution Take 1 vial (2.5 mg) by nebulization every 6 hours as needed for shortness of breath / dyspnea or wheezing 25 vial 3     ALBUTEROL 108 (90 BASE) MCG/ACT inhaler INHALE 2 PUFFS INTO THE LUNGS EVERY 6 HOURS AS NEEDED FOR SHORTNESS OF BREATH OR WHEEZING 8.5 g 5     ALFALFA PO Take 5 tablets by mouth 2 times daily.       Ascorbic Acid (VITAMIN C) 500 MG CAPS Take 1 tablet by mouth daily        benzonatate (TESSALON) 200 MG capsule Take 1 capsule (200 mg) by mouth 3 times daily as needed for cough 30 capsule 1     Calcium-Vitamin D-Vitamin K (CALCIUM + D) 500-1000-40 MG-UNT-MCG CHEW Take 3 tablets by mouth daily        Cholecalciferol (VITAMIN D) 1000 UNITS capsule Take 2 capsules by mouth daily        citalopram (CELEXA) 40 MG tablet TAKE 1 TABLET BY MOUTH DAILY 90 tablet 1     DICLOFENAC PO Take 100 mg by mouth daily        Flaxseed, Linseed, (FLAX SEED OIL) 1000 MG capsule Take 2 capsules by mouth daily       fluticasone (FLONASE) 50 MCG/ACT nasal spray Spray 1 spray into both nostrils daily       fluticasone-salmeterol (ADVAIR) 250-50 MCG/DOSE inhaler Inhale 1 puff into the lungs every 12 hours 1 Inhaler 3     folic acid (FOLVITE) 1 MG tablet Take 1 mg by mouth 3 times daily.       gabapentin  (NEURONTIN) 300 MG capsule Take 900 mg by mouth every morning       gabapentin (NEURONTIN) 300 MG capsule Take 600 mg by mouth daily In the afternoon       gabapentin (NEURONTIN) 300 MG capsule Take 600 mg by mouth daily At bedtime       Garlic 400 MG TBEC Take 1 tablet by mouth daily        HYDROcodone-acetaminophen (NORCO) 7.5-325 MG per tablet Take 1 tablet by mouth as needed for severe pain Every 4-6 hours as needed.       hydroxychloroquine (PLAQUENIL) 200 MG tablet Take 200 mg by mouth 2 times daily        Methotrexate, Anti-Rheumatic, (METHOTREXATE, PF, SC) Inject 0.6 mLs Subcutaneous 25 mg vial- patient draws up 0.6 ml.        MILK THISTLE PO Take 1,000 mg by mouth daily.       montelukast (SINGULAIR) 10 MG tablet Take 1 tablet (10 mg) by mouth At Bedtime 90 tablet 3     Omega-3 1000 MG capsule Take 1.5 g by mouth daily        order for DME Equipment being ordered: Nebulizer 1 Device 0     pantoprazole (PROTONIX) 40 MG EC tablet Take 40 mg by mouth 2 times daily       polyvinyl alcohol-povidone (REFRESH) 1.4-0.6 % ophthalmic solution 1-2 drops as needed       PREBIOTIC PRODUCT PO Take 1 tablet by mouth daily       predniSONE (DELTASONE) 10 MG tablet Take 4 tablets (40 mg) by mouth daily for 5 days, THEN 3 tablets (30 mg) daily for 5 days, THEN 2 tablets (20 mg) daily for 5 days, THEN 1 tablet (10 mg) daily for 5 days. 50 tablet 0     RESTASIS 0.05 % ophthalmic emulsion USE 1 DROP IN BOTH EYES 2 TIMES A DAY 60 each 3     Simethicone (SM GAS RELIEF ANTIFLATUENT) 180 MG CAPS TAKE ONE CAPSULE BY MOUTH AS NEEDED AFTER A MEAL. MAX 2 CAPS/DAY 60 capsule 11     traZODone (DESYREL) 50 MG tablet Take 150 mg by mouth At Bedtime       Allergies   Allergen Reactions     Adhesive Tape      Glue and nicotine patches     Benzoin Compound      Tin-Co-Javier     Mold      Seasonal Allergies      Soap      Any cleanser/soap/disinfectant that is used right before surgery.  Makes patient break out horribly. Chart was looked at and  "Chloraprep was used.     BP Readings from Last 3 Encounters:   19 124/70   19 111/54   19 102/60    Wt Readings from Last 3 Encounters:   19 93.1 kg (205 lb 4 oz)   19 95.3 kg (210 lb)   19 95.3 kg (210 lb)                    Bar  Reviewed and updated as needed this visit by Provider         Review of Systems   ROS COMP: Constitutional, HEENT, cardiovascular, pulmonary, gi and gu systems are negative, except as otherwise noted.      Objective    /70 (BP Location: Right arm, Patient Position: Sitting, Cuff Size: Adult Regular)   Pulse 78   Temp 98  F (36.7  C) (Tympanic)   Resp 23   Ht 1.651 m (5' 5\")   SpO2 (!) 86%   BMI 34.16 kg/m    Body mass index is 34.16 kg/m .  Physical Exam   Constitutional: She is oriented to person, place, and time. She appears well-developed and well-nourished. No distress.   HENT:   Head: Normocephalic and atraumatic.   Right Ear: External ear normal.   Left Ear: External ear normal.   Mouth/Throat: Oropharynx is clear and moist.   Eyes: Pupils are equal, round, and reactive to light. Conjunctivae and EOM are normal.   Neck: Neck supple.   Cardiovascular: Normal rate, regular rhythm and normal heart sounds.   Pulmonary/Chest: Effort normal and breath sounds normal.   Lymphadenopathy:     She has no cervical adenopathy.   Neurological: She is alert and oriented to person, place, and time.   Skin: Skin is warm and dry.   Psychiatric: She has a normal mood and affect.   Nursing note and vitals reviewed.       Diagnostic Test Results:  Results for orders placed or performed during the hospital encounter of 19   XR Chest Port 1 View    Narrative    PROCEDURE:  XR CHEST PORT 1 VW    HISTORY:  sob.     COMPARISON:  None.    FINDINGS:   The cardiac silhouette is normal in size. The pulmonary vasculature is  normal.  Abnormal decreased  in the left lung suspicious for  pneumonia. No pleural effusion or pneumothorax.      Impression    " IMPRESSION:  Left lung infiltrate suspicious for pneumonia      SUE CANCINO MD   CT Chest w/o Contrast    Narrative    PROCEDURE: CT CHEST W/O CONTRAST 5/28/2019 10:19 AM    HISTORY: Acute resp illness, > 40 years old; Pneumonia, unresolved or  complicated    COMPARISONS: None.    Meds/Dose Given: None.    TECHNIQUE: Axial noncontrast enhanced images with coronal and sagittal  reformatted images.    FINDINGS: No enlarged lymph nodes are seen in the mediastinum, rowena or  axilla.    There are bilateral lung infiltrates most consistent with pneumonia.  These are most prominent in the lower lobes but are also seen in the  lingula and in the right middle lobe.    No aneurysm is seen. There is no pleural or pericardial effusion.  There is no significant coronary artery calcification.    Limited images through the upper abdomen show changes of  cholecystectomy.    Bone windows show mild degenerative change. There is no acute  compression fracture.         Impression    IMPRESSION: Bilateral lung infiltrates most consistent with pneumonia.    LEE VALENTIN MD   XR Chest 2 Views    Narrative    PROCEDURE:  XR CHEST 2 VW    HISTORY:  pneumonia.     COMPARISON:  5/28/2019    FINDINGS:   The cardiac silhouette is normal in size. The pulmonary vasculature is  normal.  There are bilateral infiltrates, left greater than right,  slightly worsened from the prior chest x-ray. Small pleural effusions  are noted.       Impression    IMPRESSION:  Slight worsening of bilateral lung infiltrates.      ENRIQUE BORGES MD   UA with Microscopic reflex to Culture   Result Value Ref Range    Color Urine Orange     Appearance Urine Slightly Cloudy     Glucose Urine Negative NEG^Negative mg/dL    Bilirubin Urine Negative NEG^Negative    Ketones Urine Negative NEG^Negative mg/dL    Specific Gravity Urine 1.033 1.003 - 1.035    Blood Urine Small (A) NEG^Negative    pH Urine 5.5 4.7 - 8.0 pH    Protein Albumin Urine 100 (A) NEG^Negative  mg/dL    Urobilinogen mg/dL 2.0 0.0 - 2.0 mg/dL    Nitrite Urine Negative NEG^Negative    Leukocyte Esterase Urine Negative NEG^Negative    Source Catheterized Urine     WBC Urine 5 0 - 5 /HPF    RBC Urine 5 (H) 0 - 2 /HPF    Bacteria Urine None (A) NEG^Negative /HPF    Mucous Urine Present (A) NEG^Negative /LPF    Hyaline Casts 184 (A) OTO2^O - 2 /LPF    Amorphous Crystals Few (A) NEG^Negative /HPF   Blood gas arterial and oxyhgb   Result Value Ref Range    pH Arterial 7.33 (L) 7.35 - 7.45 pH    pCO2 Arterial 37 35 - 45 mm Hg    pO2 Arterial 76 (L) 80 - 105 mm Hg    Bicarbonate Arterial 20 (L) 21 - 28 mmol/L    FIO2 96%     Oxyhemoglobin Arterial 93 92 - 100 %    Base Deficit Art 5.6 mmol/L   CBC with platelets differential   Result Value Ref Range    WBC 11.3 (H) 4.0 - 11.0 10e9/L    RBC Count 3.87 3.8 - 5.2 10e12/L    Hemoglobin 11.1 (L) 11.7 - 15.7 g/dL    Hematocrit 34.5 (L) 35.0 - 47.0 %    MCV 89 78 - 100 fl    MCH 28.7 26.5 - 33.0 pg    MCHC 32.2 31.5 - 36.5 g/dL    RDW 14.8 10.0 - 15.0 %    Platelet Count 183 150 - 450 10e9/L    Diff Method Manual Differential     % Neutrophils 88.0 %    % Lymphocytes 9.0 %    % Monocytes 2.0 %    % Eosinophils 0.0 %    % Basophils 0.0 %    % Band 1.0 %    Absolute Neutrophil 9.9 (H) 1.6 - 8.3 10e9/L    Absolute Lymphocytes 1.0 0.8 - 5.3 10e9/L    Absolute Monocytes 0.2 0.0 - 1.3 10e9/L    Absolute Eosinophils 0.0 0.0 - 0.7 10e9/L    Absolute Basophils 0.0 0.0 - 0.2 10e9/L    Absolute Bands 0.1 0.0 - 0.6 10e9/L    RBC Morphology Normal     Platelet Estimate       Automated count confirmed.  Platelet morphology is normal.   CK total   Result Value Ref Range    CK Total 62 30 - 225 U/L   Comprehensive metabolic panel   Result Value Ref Range    Sodium 136 133 - 144 mmol/L    Potassium 5.0 3.4 - 5.3 mmol/L    Chloride 106 94 - 109 mmol/L    Carbon Dioxide 22 20 - 32 mmol/L    Anion Gap 8 3 - 14 mmol/L    Glucose 85 70 - 99 mg/dL    Urea Nitrogen 27 7 - 30 mg/dL    Creatinine  1.54 (H) 0.52 - 1.04 mg/dL    GFR Estimate 35 (L) >60 mL/min/[1.73_m2]    GFR Estimate If Black 40 (L) >60 mL/min/[1.73_m2]    Calcium 8.8 8.5 - 10.1 mg/dL    Bilirubin Total 0.7 0.2 - 1.3 mg/dL    Albumin 2.3 (L) 3.4 - 5.0 g/dL    Protein Total 6.3 (L) 6.8 - 8.8 g/dL    Alkaline Phosphatase 66 40 - 150 U/L    ALT 28 0 - 50 U/L    AST 15 0 - 45 U/L   CRP inflammation   Result Value Ref Range    CRP Inflammation 372.0 (H) 0.0 - 8.0 mg/L   Lactic acid whole blood   Result Value Ref Range    Lactic Acid 4.9 (HH) 0.7 - 2.0 mmol/L   Nt probnp inpatient   Result Value Ref Range    N-Terminal Pro BNP Inpatient 1,160 (H) 0 - 900 pg/mL   Troponin I   Result Value Ref Range    Troponin I ES <0.015 0.000 - 0.045 ug/L   Procalcitonin   Result Value Ref Range    Procalcitonin 16.66 (HH) ng/ml   Lactic acid whole blood   Result Value Ref Range    Lactic Acid 4.0 (HH) 0.7 - 2.0 mmol/L   Lactic acid whole blood   Result Value Ref Range    Lactic Acid 3.0 (H) 0.7 - 2.0 mmol/L   Cortisol   Result Value Ref Range    Cortisol Serum 38.1 (H) 4 - 22 ug/dL   Lactic acid whole blood   Result Value Ref Range    Lactic Acid 2.9 (H) 0.7 - 2.0 mmol/L   Blood gas arterial and oxyhgb   Result Value Ref Range    pH Arterial 7.32 (L) 7.35 - 7.45 pH    pCO2 Arterial 39 35 - 45 mm Hg    pO2 Arterial 73 (L) 80 - 105 mm Hg    Bicarbonate Arterial 20 (L) 21 - 28 mmol/L    FIO2 90%     Oxyhemoglobin Arterial 93 92 - 100 %    Base Deficit Art 5.3 mmol/L   Basic metabolic panel   Result Value Ref Range    Sodium 137 133 - 144 mmol/L    Potassium 4.6 3.4 - 5.3 mmol/L    Chloride 109 94 - 109 mmol/L    Carbon Dioxide 21 20 - 32 mmol/L    Anion Gap 7 3 - 14 mmol/L    Glucose 97 70 - 99 mg/dL    Urea Nitrogen 25 7 - 30 mg/dL    Creatinine 1.27 (H) 0.52 - 1.04 mg/dL    GFR Estimate 44 (L) >60 mL/min/[1.73_m2]    GFR Estimate If Black 50 (L) >60 mL/min/[1.73_m2]    Calcium 7.8 (L) 8.5 - 10.1 mg/dL   Comprehensive metabolic panel   Result Value Ref Range     Sodium 143 133 - 144 mmol/L    Potassium 4.3 3.4 - 5.3 mmol/L    Chloride 114 (H) 94 - 109 mmol/L    Carbon Dioxide 23 20 - 32 mmol/L    Anion Gap 6 3 - 14 mmol/L    Glucose 101 (H) 70 - 99 mg/dL    Urea Nitrogen 18 7 - 30 mg/dL    Creatinine 1.15 (H) 0.52 - 1.04 mg/dL    GFR Estimate 49 (L) >60 mL/min/[1.73_m2]    GFR Estimate If Black 57 (L) >60 mL/min/[1.73_m2]    Calcium 8.2 (L) 8.5 - 10.1 mg/dL    Bilirubin Total 0.4 0.2 - 1.3 mg/dL    Albumin 1.9 (L) 3.4 - 5.0 g/dL    Protein Total 5.6 (L) 6.8 - 8.8 g/dL    Alkaline Phosphatase 62 40 - 150 U/L    ALT 23 0 - 50 U/L    AST 16 0 - 45 U/L   CBC with platelets differential   Result Value Ref Range    WBC 12.8 (H) 4.0 - 11.0 10e9/L    RBC Count 3.36 (L) 3.8 - 5.2 10e12/L    Hemoglobin 9.6 (L) 11.7 - 15.7 g/dL    Hematocrit 29.6 (L) 35.0 - 47.0 %    MCV 88 78 - 100 fl    MCH 28.6 26.5 - 33.0 pg    MCHC 32.4 31.5 - 36.5 g/dL    RDW 15.0 10.0 - 15.0 %    Platelet Count 161 150 - 450 10e9/L    Diff Method Manual Differential     % Neutrophils 88.0 %    % Lymphocytes 7.0 %    % Monocytes 3.0 %    % Eosinophils 1.0 %    % Basophils 0.0 %    % Band 1.0 %    Absolute Neutrophil 11.3 (H) 1.6 - 8.3 10e9/L    Absolute Lymphocytes 0.9 0.8 - 5.3 10e9/L    Absolute Monocytes 0.4 0.0 - 1.3 10e9/L    Absolute Eosinophils 0.1 0.0 - 0.7 10e9/L    Absolute Basophils 0.0 0.0 - 0.2 10e9/L    Absolute Bands 0.1 0.0 - 0.6 10e9/L    RBC Morphology Normal     Platelet Estimate       Automated count confirmed.  Platelet morphology is normal.   Lactic acid whole blood   Result Value Ref Range    Lactic Acid 1.3 0.7 - 2.0 mmol/L   CBC with platelets   Result Value Ref Range    WBC 9.8 4.0 - 11.0 10e9/L    RBC Count 3.34 (L) 3.8 - 5.2 10e12/L    Hemoglobin 9.4 (L) 11.7 - 15.7 g/dL    Hematocrit 30.0 (L) 35.0 - 47.0 %    MCV 90 78 - 100 fl    MCH 28.1 26.5 - 33.0 pg    MCHC 31.3 (L) 31.5 - 36.5 g/dL    RDW 14.9 10.0 - 15.0 %    Platelet Count 174 150 - 450 10e9/L   Basic metabolic panel   Result  "Value Ref Range    Sodium 142 133 - 144 mmol/L    Potassium 3.9 3.4 - 5.3 mmol/L    Chloride 111 (H) 94 - 109 mmol/L    Carbon Dioxide 27 20 - 32 mmol/L    Anion Gap 4 3 - 14 mmol/L    Glucose 86 70 - 99 mg/dL    Urea Nitrogen 11 7 - 30 mg/dL    Creatinine 0.91 0.52 - 1.04 mg/dL    GFR Estimate 66 >60 mL/min/[1.73_m2]    GFR Estimate If Black 76 >60 mL/min/[1.73_m2]    Calcium 8.5 8.5 - 10.1 mg/dL   Procalcitonin   Result Value Ref Range    Procalcitonin 5.39 ng/ml   CBC with platelets   Result Value Ref Range    WBC 6.2 4.0 - 11.0 10e9/L    RBC Count 3.30 (L) 3.8 - 5.2 10e12/L    Hemoglobin 9.4 (L) 11.7 - 15.7 g/dL    Hematocrit 28.4 (L) 35.0 - 47.0 %    MCV 86 78 - 100 fl    MCH 28.5 26.5 - 33.0 pg    MCHC 33.1 31.5 - 36.5 g/dL    RDW 14.6 10.0 - 15.0 %    Platelet Count 174 150 - 450 10e9/L     *Note: Due to a large number of results and/or encounters for the requested time period, some results have not been displayed. A complete set of results can be found in Results Review.           Assessment & Plan     1. Pneumonia of left lower lobe due to Streptococcus pneumoniae (H)  She will be seeing Pulmonary Medicine.  Order written for pulse oximeter.  Continue other medications as outlined.  Follow up 2 weeks.  - order for DME; Equipment being ordered: Pulse oximeter  Dispense: 1 Device; Refill: 0     BMI:   Estimated body mass index is 34.16 kg/m  as calculated from the following:    Height as of this encounter: 1.651 m (5' 5\").    Weight as of 6/4/19: 93.1 kg (205 lb 4 oz).   Weight management plan: Discussed healthy diet and exercise guidelines        See Patient Instructions    No follow-ups on file.    Jimbo Martinez MD  [facility] - [place]      "

## 2019-06-10 DIAGNOSIS — R69 DIAGNOSIS UNKNOWN: Primary | ICD-10-CM

## 2019-06-10 NOTE — TELEPHONE ENCOUNTER
albuterol (PROVENTIL) (2.5 MG/3ML) 0.083% neb solution    Last Written Prescription Date:  04/16/2019  Last Fill Quantity: 25 vials,   # refills: 3  Last Office Visit: 05/06/2019  Future Office visit:    Next 5 appointments (look out 90 days)    Jun 11, 2019 11:00 AM CDT  (Arrive by 10:45 AM)  SHORT with Jimbo Martinez MD  [facility] [place] ([facility] ) [address]  [place] [address]  [phone]           Routing refill request to provider for review/approval because:

## 2019-06-11 ENCOUNTER — OFFICE VISIT (OUTPATIENT)
Dept: FAMILY MEDICINE | Facility: OTHER | Age: 69
End: 2019-06-11
Attending: FAMILY MEDICINE
Payer: COMMERCIAL

## 2019-06-11 VITALS
HEART RATE: 78 BPM | DIASTOLIC BLOOD PRESSURE: 70 MMHG | HEIGHT: 65 IN | BODY MASS INDEX: 34.16 KG/M2 | OXYGEN SATURATION: 96 % | TEMPERATURE: 98 F | SYSTOLIC BLOOD PRESSURE: 124 MMHG | RESPIRATION RATE: 23 BRPM

## 2019-06-11 DIAGNOSIS — J13 PNEUMONIA OF LEFT LOWER LOBE DUE TO STREPTOCOCCUS PNEUMONIAE (H): Primary | ICD-10-CM

## 2019-06-11 PROCEDURE — G0463 HOSPITAL OUTPT CLINIC VISIT: HCPCS

## 2019-06-11 PROCEDURE — 99495 TRANSJ CARE MGMT MOD F2F 14D: CPT | Performed by: FAMILY MEDICINE

## 2019-06-11 RX ORDER — FEXOFENADINE HCL AND PSEUDOEPHEDRINE HCI 60; 120 MG/1; MG/1
1 TABLET, EXTENDED RELEASE ORAL EVERY MORNING
COMMUNITY
End: 2019-06-20

## 2019-06-11 RX ORDER — ALBUTEROL SULFATE 0.83 MG/ML
SOLUTION RESPIRATORY (INHALATION)
Qty: 75 ML | Refills: 0 | Status: SHIPPED | OUTPATIENT
Start: 2019-06-11 | End: 2019-06-20

## 2019-06-11 ASSESSMENT — PAIN SCALES - GENERAL: PAINLEVEL: NO PAIN (0)

## 2019-06-11 NOTE — NURSING NOTE
"Chief Complaint   Patient presents with     Hospital F/U       Initial /70 (BP Location: Right arm, Patient Position: Sitting, Cuff Size: Adult Regular)   Pulse 78   Temp 98  F (36.7  C) (Tympanic)   Resp 23   Ht 1.651 m (5' 5\")   SpO2 (!) 86%   BMI 34.16 kg/m   Estimated body mass index is 34.16 kg/m  as calculated from the following:    Height as of this encounter: 1.651 m (5' 5\").    Weight as of 6/4/19: 93.1 kg (205 lb 4 oz).  Medication Reconciliation: complete    Devika Gutierres LPN  "

## 2019-06-20 DIAGNOSIS — R69 DIAGNOSIS UNKNOWN: ICD-10-CM

## 2019-06-20 DIAGNOSIS — G62.9 PERIPHERAL POLYNEUROPATHY: ICD-10-CM

## 2019-06-20 DIAGNOSIS — J30.2 SEASONAL ALLERGIC RHINITIS, UNSPECIFIED TRIGGER: Primary | ICD-10-CM

## 2019-06-20 RX ORDER — GABAPENTIN 300 MG/1
CAPSULE ORAL
Qty: 210 CAPSULE | Refills: 0 | Status: SHIPPED | OUTPATIENT
Start: 2019-06-20 | End: 2019-06-25

## 2019-06-20 RX ORDER — ALBUTEROL SULFATE 0.83 MG/ML
SOLUTION RESPIRATORY (INHALATION)
Qty: 75 ML | Refills: 0 | Status: SHIPPED | OUTPATIENT
Start: 2019-06-20 | End: 2019-12-23

## 2019-06-20 RX ORDER — FEXOFENADINE HYDROCHLORIDE AND PSEUDOEPHEDRINE HYDROCHLORIDE 60; 120 MG/1; MG/1
TABLET, FILM COATED, EXTENDED RELEASE ORAL
Qty: 30 TABLET | Refills: 0 | Status: SHIPPED | OUTPATIENT
Start: 2019-06-20 | End: 2019-07-19

## 2019-06-20 NOTE — TELEPHONE ENCOUNTER
PCP Michelle    gabapentin (NEURONTIN) 300 MG capsule   Last Written Prescription Date:  5/7/2019  Last Fill Quantity: 210,   # refills: 0  Last Office Visit: 6/11/2019  Future Office visit:      fexofenadine-pseudoePHEDrine (ALLEGRA-D) 60  Reported as historical      Next 5 appointments (look out 90 days)    Jun 25, 2019  1:40 PM CDT  (Arrive by 1:25 PM)  SHORT with Jimbo Martinez MD  [facility] [place] ([facility] ) [address]  [place] [address]  [phone]           Routing refill request to provider for review/approval because:  Drug not on the [facility], P or  Health refill protocol or controlled substance

## 2019-06-24 NOTE — PROGRESS NOTES
"Subjective     Melvi Cleveland is a 69 year old female who presents to clinic today for the following health issues:    HPI   Pneumonia   Follow up      Duration: Follow up    Description (location/character/radiation): Pneumonia of left lower lobe     Intensity:  {mild,moderate,severe:432769}    Accompanying signs and symptoms: ***    History (similar episodes/previous evaluation): {.:185555::\"None\"}    Precipitating or alleviating factors: {.:008643::\"None\"}    Therapies tried and outcome: {.:858158::\"None\"}     {additonal problems for provider to add (Optional):664054}    {HIST REVIEW/ LINKS 2 (Optional):055901}    {Additional problems for the provider to add (optional):315593}  Reviewed and updated as needed this visit by Provider         Review of Systems   {ROS COMP (Optional):436203}      Objective    There were no vitals taken for this visit.  There is no height or weight on file to calculate BMI.  Physical Exam   {Exam List (Optional):667043}    {Diagnostic Test Results (Optional):259720::\"Diagnostic Test Results:\",\"Labs reviewed in Epic\"}        {PROVIDER CHARTING PREFERENCE:794363}      "

## 2019-06-25 ENCOUNTER — ANCILLARY PROCEDURE (OUTPATIENT)
Dept: GENERAL RADIOLOGY | Facility: OTHER | Age: 69
End: 2019-06-25
Attending: FAMILY MEDICINE
Payer: MEDICARE

## 2019-06-25 ENCOUNTER — OFFICE VISIT (OUTPATIENT)
Dept: FAMILY MEDICINE | Facility: OTHER | Age: 69
End: 2019-06-25
Attending: FAMILY MEDICINE
Payer: COMMERCIAL

## 2019-06-25 VITALS
BODY MASS INDEX: 34.11 KG/M2 | TEMPERATURE: 97 F | HEART RATE: 63 BPM | WEIGHT: 205 LBS | SYSTOLIC BLOOD PRESSURE: 100 MMHG | OXYGEN SATURATION: 95 % | DIASTOLIC BLOOD PRESSURE: 60 MMHG

## 2019-06-25 DIAGNOSIS — J13 PNEUMONIA OF LEFT LOWER LOBE DUE TO STREPTOCOCCUS PNEUMONIAE (H): ICD-10-CM

## 2019-06-25 DIAGNOSIS — J13 PNEUMONIA OF LEFT LOWER LOBE DUE TO STREPTOCOCCUS PNEUMONIAE (H): Primary | ICD-10-CM

## 2019-06-25 PROCEDURE — 71046 X-RAY EXAM CHEST 2 VIEWS: CPT | Mod: TC

## 2019-06-25 PROCEDURE — 99214 OFFICE O/P EST MOD 30 MIN: CPT | Performed by: FAMILY MEDICINE

## 2019-06-25 PROCEDURE — G0463 HOSPITAL OUTPT CLINIC VISIT: HCPCS

## 2019-06-25 PROCEDURE — G0463 HOSPITAL OUTPT CLINIC VISIT: HCPCS | Mod: 25

## 2019-06-25 ASSESSMENT — PAIN SCALES - GENERAL: PAINLEVEL: NO PAIN (0)

## 2019-06-25 ASSESSMENT — ANXIETY QUESTIONNAIRES
IF YOU CHECKED OFF ANY PROBLEMS ON THIS QUESTIONNAIRE, HOW DIFFICULT HAVE THESE PROBLEMS MADE IT FOR YOU TO DO YOUR WORK, TAKE CARE OF THINGS AT HOME, OR GET ALONG WITH OTHER PEOPLE: NOT DIFFICULT AT ALL
5. BEING SO RESTLESS THAT IT IS HARD TO SIT STILL: NOT AT ALL
3. WORRYING TOO MUCH ABOUT DIFFERENT THINGS: NOT AT ALL
2. NOT BEING ABLE TO STOP OR CONTROL WORRYING: SEVERAL DAYS
1. FEELING NERVOUS, ANXIOUS, OR ON EDGE: NOT AT ALL
7. FEELING AFRAID AS IF SOMETHING AWFUL MIGHT HAPPEN: NOT AT ALL
6. BECOMING EASILY ANNOYED OR IRRITABLE: NOT AT ALL
GAD7 TOTAL SCORE: 1

## 2019-06-25 ASSESSMENT — ASTHMA QUESTIONNAIRES
QUESTION_2 LAST FOUR WEEKS HOW OFTEN HAVE YOU HAD SHORTNESS OF BREATH: NOT AT ALL
QUESTION_3 LAST FOUR WEEKS HOW OFTEN DID YOUR ASTHMA SYMPTOMS (WHEEZING, COUGHING, SHORTNESS OF BREATH, CHEST TIGHTNESS OR PAIN) WAKE YOU UP AT NIGHT OR EARLIER THAN USUAL IN THE MORNING: NOT AT ALL
QUESTION_1 LAST FOUR WEEKS HOW MUCH OF THE TIME DID YOUR ASTHMA KEEP YOU FROM GETTING AS MUCH DONE AT WORK, SCHOOL OR AT HOME: NONE OF THE TIME
ACUTE_EXACERBATION_TODAY: NO
QUESTION_4 LAST FOUR WEEKS HOW OFTEN HAVE YOU USED YOUR RESCUE INHALER OR NEBULIZER MEDICATION (SUCH AS ALBUTEROL): NOT AT ALL

## 2019-06-25 ASSESSMENT — PATIENT HEALTH QUESTIONNAIRE - PHQ9
SUM OF ALL RESPONSES TO PHQ QUESTIONS 1-9: 8
5. POOR APPETITE OR OVEREATING: NOT AT ALL

## 2019-06-25 NOTE — NURSING NOTE
"Chief Complaint   Patient presents with     Cough       Initial /60 (BP Location: Left arm, Patient Position: Sitting, Cuff Size: Adult Large)   Pulse 63   Temp 97  F (36.1  C) (Tympanic)   Wt 93 kg (205 lb)   SpO2 95%   BMI 34.11 kg/m   Estimated body mass index is 34.11 kg/m  as calculated from the following:    Height as of 6/11/19: 1.651 m (5' 5\").    Weight as of this encounter: 93 kg (205 lb).  Medication Reconciliation: complete    Jennifer Zayas LPN       "

## 2019-06-26 ASSESSMENT — ANXIETY QUESTIONNAIRES: GAD7 TOTAL SCORE: 1

## 2019-07-03 ENCOUNTER — OFFICE VISIT (OUTPATIENT)
Dept: SLEEP MEDICINE | Facility: HOSPITAL | Age: 69
End: 2019-07-03
Attending: INTERNAL MEDICINE
Payer: MEDICARE

## 2019-07-03 ENCOUNTER — TRANSFERRED RECORDS (OUTPATIENT)
Dept: HEALTH INFORMATION MANAGEMENT | Facility: CLINIC | Age: 69
End: 2019-07-03

## 2019-07-03 DIAGNOSIS — J18.9 PNEUMONIA: Primary | ICD-10-CM

## 2019-07-03 DIAGNOSIS — R09.02 HYPOXIA: Primary | ICD-10-CM

## 2019-07-03 PROCEDURE — 99207 ZZC NO CHARGE NURSE ONLY: CPT

## 2019-07-03 NOTE — NURSING NOTE
Patient picked up over night oximeter number SL-1. Patient was instructed on use of device. Patient verbalized understanding.     Patient will return device Friday  by: noon

## 2019-07-05 ENCOUNTER — DOCUMENTATION ONLY (OUTPATIENT)
Dept: SLEEP MEDICINE | Facility: HOSPITAL | Age: 69
End: 2019-07-05
Attending: INTERNAL MEDICINE
Payer: MEDICARE

## 2019-07-05 PROCEDURE — 99207 ZZC NO CHARGE NURSE ONLY: CPT

## 2019-07-05 NOTE — PROGRESS NOTES
Patient returned the oximeter and the data was downloaded and the report was sent to Dr Lomeli and to be scanned.

## 2019-07-19 DIAGNOSIS — G62.9 PERIPHERAL POLYNEUROPATHY: ICD-10-CM

## 2019-07-19 DIAGNOSIS — J30.2 SEASONAL ALLERGIC RHINITIS, UNSPECIFIED TRIGGER: ICD-10-CM

## 2019-07-19 DIAGNOSIS — R09.02 HYPOXIA: Primary | ICD-10-CM

## 2019-07-19 NOTE — TELEPHONE ENCOUNTER
ALLEGRA-D [employer]ERGY [employer] CONGESTION  MG 12 hr tablet      Last Written Prescription Date:  6-20-19  Last Fill Quantity: 30,   # refills: 0  Last Office Visit: 6-25-19  Future Office visit:       Routing refill request to provider for review/approval because:  Drug not on the [facility] or [facility] refill protocol or controlled substance      gabapentin (NEURONTIN) 300 MG capsule      Last Written Prescription Date:  NA  Last Fill Quantity: NA,   # refills: NA  Last Office Visit: 6-25-19  Future Office visit:       Routing refill request to provider for review/approval because:  Medication is reported/historical

## 2019-07-20 RX ORDER — GABAPENTIN 300 MG/1
CAPSULE ORAL
Qty: 210 CAPSULE | Refills: 0 | Status: SHIPPED | OUTPATIENT
Start: 2019-07-20 | End: 2019-08-16

## 2019-07-20 RX ORDER — FEXOFENADINE HYDROCHLORIDE AND PSEUDOEPHEDRINE HYDROCHLORIDE 60; 120 MG/1; MG/1
TABLET, FILM COATED, EXTENDED RELEASE ORAL
Qty: 30 TABLET | Refills: 0 | Status: SHIPPED | OUTPATIENT
Start: 2019-07-20 | End: 2019-08-16

## 2019-07-23 ENCOUNTER — OFFICE VISIT (OUTPATIENT)
Dept: SLEEP MEDICINE | Facility: HOSPITAL | Age: 69
End: 2019-07-23
Attending: INTERNAL MEDICINE
Payer: MEDICARE

## 2019-07-23 DIAGNOSIS — R09.02 HYPOXIA: Primary | ICD-10-CM

## 2019-07-23 PROCEDURE — 99207 ZZC NO CHARGE NURSE ONLY: CPT

## 2019-07-23 NOTE — NURSING NOTE
Patient picked up over night oximeter number SL-3. Patient was instructed on use of device. Patient verbalized understanding.     Patient will return device tomorrow by: 2:30

## 2019-07-24 ENCOUNTER — DOCUMENTATION ONLY (OUTPATIENT)
Dept: SLEEP MEDICINE | Facility: HOSPITAL | Age: 69
End: 2019-07-24
Attending: INTERNAL MEDICINE
Payer: MEDICARE

## 2019-07-24 NOTE — PROGRESS NOTES
Patient dropped off the oximeter and the data was downloaded and the report was sent to Dr. Lomeli    Patient stated that her oxygen had come off during the night and so the last few hours of the night were with with out oxygen

## 2019-08-05 DIAGNOSIS — R68.89 INFLUENZA-LIKE SYMPTOMS: ICD-10-CM

## 2019-08-05 NOTE — TELEPHONE ENCOUNTER
Paco       Last Written Prescription Date:  10/28/2016  Last Fill Quantity: 8.5g,   # refills: 5  Last Office Visit: 7/3/2019  Future Office visit:

## 2019-08-06 RX ORDER — ALBUTEROL SULFATE 90 UG/1
AEROSOL, METERED RESPIRATORY (INHALATION)
Qty: 8.5 G | Refills: 0 | Status: SHIPPED | OUTPATIENT
Start: 2019-08-06 | End: 2019-09-30

## 2019-08-16 DIAGNOSIS — G62.9 PERIPHERAL POLYNEUROPATHY: ICD-10-CM

## 2019-08-16 DIAGNOSIS — J30.2 SEASONAL ALLERGIC RHINITIS, UNSPECIFIED TRIGGER: ICD-10-CM

## 2019-08-20 RX ORDER — GABAPENTIN 300 MG/1
CAPSULE ORAL
Qty: 210 CAPSULE | Refills: 0 | Status: SHIPPED | OUTPATIENT
Start: 2019-08-20 | End: 2019-09-20

## 2019-08-20 RX ORDER — FEXOFENADINE HYDROCHLORIDE AND PSEUDOEPHEDRINE HYDROCHLORIDE 60; 120 MG/1; MG/1
TABLET, FILM COATED, EXTENDED RELEASE ORAL
Qty: 30 TABLET | Refills: 0 | Status: SHIPPED | OUTPATIENT
Start: 2019-08-20 | End: 2019-09-20

## 2019-08-20 NOTE — TELEPHONE ENCOUNTER
Allegra-D      Last Written Prescription Date:  7/20/2019  Last Fill Quantity: 30,   # refills: 0  Last Office Visit: 6/25/2019  Future Office visit:       Routing refill request to provider for review/approval because:  Drug not on the [facility], [facility] or [facility] refill protocol or controlled substance

## 2019-08-20 NOTE — TELEPHONE ENCOUNTER
Gabapentin 300mg      Last Written Prescription Date:  7/20/2019  Last Fill Quantity: 210,   # refills: 0  Last Office Visit: 6/25/2019  Future Office visit:       Routing refill request to provider for review/approval because:  Drug not on the [facility], [facility] or [facility] refill protocol or controlled substance

## 2019-08-30 DIAGNOSIS — F51.01 PRIMARY INSOMNIA: ICD-10-CM

## 2019-08-30 RX ORDER — TRAZODONE HYDROCHLORIDE 50 MG/1
TABLET, FILM COATED ORAL
Qty: 270 TABLET | Refills: 0 | Status: SHIPPED | OUTPATIENT
Start: 2019-08-30 | End: 2019-09-30

## 2019-08-30 NOTE — TELEPHONE ENCOUNTER
Trazodone      Last Written Prescription Date:  historical  Last Fill Quantity: 0,   # refills: 0  Last Office Visit: 6/25/2019  Future Office visit:       Routing refill request to provider for review/approval because:  Medication is reported/historical

## 2019-09-04 DIAGNOSIS — M54.41 BILATERAL LOW BACK PAIN WITH BILATERAL SCIATICA, UNSPECIFIED CHRONICITY: Primary | ICD-10-CM

## 2019-09-04 DIAGNOSIS — M54.42 BILATERAL LOW BACK PAIN WITH BILATERAL SCIATICA, UNSPECIFIED CHRONICITY: Primary | ICD-10-CM

## 2019-09-06 RX ORDER — DICLOFENAC SODIUM 100 MG/1
TABLET, FILM COATED, EXTENDED RELEASE ORAL
Qty: 30 TABLET | Refills: 0 | Status: SHIPPED | OUTPATIENT
Start: 2019-09-06 | End: 2019-10-03

## 2019-09-06 NOTE — TELEPHONE ENCOUNTER
Diclofenac       Last Written Prescription Date:  historical  Last Fill Quantity: 0,   # refills: 0  Last Office Visit: 6/25/2019  Future Office visit:       Routing refill request to provider for review/approval because:  Drug not on the [facility], [facility] or [facility] refill protocol or controlled substance

## 2019-09-10 NOTE — PHARMACY
[facility]    Pharmacy      Antimicrobial Stewardship Note     Current antimicrobial therapy:  Anti-infectives (From now, onward)    Start     Dose/Rate Route Frequency Ordered Stop    05/28/19 1115  piperacillin-tazobactam (ZOSYN) intermittent infusion 4.5 g      4.5 g  200 mL/hr over 30 Minutes Intravenous EVERY 6 HOURS 05/28/19 1107            Indication: CAP    Days of Therapy: 4 (4 prior days of Zithromax, 3 prior days of vanco)     Pertinent labs:  Creatinine   Creatinine   Date Value Ref Range Status   05/31/2019 0.90 0.52 - 1.04 mg/dL Final   05/30/2019 0.91 0.52 - 1.04 mg/dL Final   05/29/2019 1.15 (H) 0.52 - 1.04 mg/dL Final     WBC   WBC   Date Value Ref Range Status   05/31/2019 6.2 4.0 - 11.0 10e9/L Final   05/30/2019 9.8 4.0 - 11.0 10e9/L Final   05/29/2019 12.8 (H) 4.0 - 11.0 10e9/L Final     Procalcitonin   Procalcitonin   Date Value Ref Range Status   05/30/2019 5.39 ng/ml Final     Comment:     2.00-9.99 ng/ml  High risk for progression to severe sepsis.  Recommendation: Strongly recommend initiating or continuing antibiotics.   Evaluate culture results and clinical features to target antibacterial   therapy. Obtain blood cultures and other relevant cultures if not done. Repeat   PCT in 2 days to guide antibiotic de-escalation. Consider de-escalating   antibiotics when PCT concentration is <80% of peak or abs PCT <0.5.     05/28/2019 16.66 (HH) ng/ml Final     Comment:     Critical Value called to and read back by  MARGOT FERRER AT 0914 ON 05/28/19 AJE  >/= 10.00 ng/ml   Very high likelihood of severe sepsis or septic shock.  Recommendation: Strongly recommend initiating or continuing antibiotics.   Evaluate culture results and clinical features to target antibacterial   therapy. Obtain blood cultures and other relevant cultures if not done.Repeat   PCT in 2 days to guide antibiotic de-escalation. Consider de-escalating   antibiotics when PCT concentration is <80% of peak or abs PCT <1.        CRP   CRP Inflammation   Date Value Ref Range Status   05/28/2019 372.0 (H) 0.0 - 8.0 mg/L Final   05/09/2019 <2.9 0.0 - 8.0 mg/L Final   05/06/2019 3.3 0.0 - 8.0 mg/L Final       Culture Results:   7-Day Micro Results     Procedure Component Value Units Date/Time   Strep pneumo Agn Urine or CSF [[id number]] Collected: 05/28/19 2002   Order Status: Completed Lab Status: Final result Updated: 05/29/19 2201   Specimen: Urine     Specimen Description Urine    S Pneumoniae Antigen Negative, no Streptococcus pneumoniae antigen detected by immunochromatographic membrane   assay. A negative Streptococcus pneumoniae antigen result does not rule out infection with    Streptococcus pneumoniae.      Internal QC OK   Internal QC OK    Strep pneumo Agn Urine or CSF    Order Status: Canceled Lab Status: No result    Specimen: Urine    Active MRSA Surveillance Culture [[id number]] Collected: 05/28/19 1057   Order Status: Completed Lab Status: Final result Updated: 05/29/19 0630   Specimen: Nares from Nose     Specimen Description Nares    Culture Micro No MRSA isolated   Sputum Culture Aerobic Bacterial [[id number]] (Abnormal)  Collected: 05/28/19 0955   Order Status: Completed Lab Status: Final result Updated: 05/31/19 0832   Specimen: Sputum     Specimen Description Sputum    Culture Micro Light growth   Streptococcus pneumoniae   Predominant organism   Abnormal    Susceptibility     Streptococcus pneumoniae (1)     Antibiotic Interpretation Sensitivity Method Status    VANCOMYCIN Sensitive <=1 ug/mL EZEKIEL Final    TETRACYCLINE Sensitive <=1 ug/mL EZEKILE Final    Trimethoprim/Sulfa Sensitive <=0.5/9.5 ug/mL EZEKIEL Final    Ceftriaxone(nonmening) Sensitive <=0.06 ug/mL EZEKIEL Final    Ceftriaxone(meningitit)* Sensitive <=0.06 ug/mL EZEKIEL Final    LEVOFLOXACIN Sensitive <=0.5 ug/mL EZEKIEL Final    MEROPENEM* Sensitive <=0.06 ug/mL EZEKIEL Final    Penicillin(nonmening) Sensitive   EZEKIEL Final     EZEKIEL <=0.6   CLINDAMYCIN Sensitive   EZEKIEL Final    CEFOTAXIME*   <=0.06 ug/mL EZEKIEL Final    ERYTHROMYCIN Sensitive   EZEKIEL Final    Penicillin(oral) Sensitive   EZEKIEL Final    * Suppressed Antibiotic            Gram stain [[id number]] (Abnormal) Collected: 05/28/19 0955   Order Status: Completed Lab Status: Final result Updated: 05/28/19 1030   Specimen: Sputum     Specimen Description Sputum    Gram Stain <10 Squamous epithelial cells/low power field     <25 PMNs/low power field     Many   Gram positive cocci in pairs   Abnormal    Urine Culture Aerobic Bacterial [[id number]] Collected: 05/28/19 0829   Order Status: Completed Lab Status: Final result Updated: 05/30/19 0702   Specimen: Catheterized Urine     Specimen Description Catheterized Urine    Culture Micro No growth   Blood culture [[id number]] Collected: 05/28/19 0810   Order Status: Completed Lab Status: Preliminary result Updated: 05/31/19 0918   Specimen: Blood     Specimen Description Blood    Culture Micro No growth after 3 days   Blood culture    Order Status: Canceled Lab Status: No result    Specimen: Blood    Blood culture [[id number]] Collected: 05/28/19 0750   Order Status: Completed Lab Status: Preliminary result Updated: 05/31/19 0918   Specimen: Blood     Specimen Description Blood    Special Requests Left Arm    Culture Micro No growth after 3 days     Recommendations/Interventions:  1. If clinically appropriate consider narrowing therapy and/or switching to oral antibiotics (Augmentin) based on sensitivity results.     Gabriele Jimenes  May 31, 2019         VITAL SIGNS: I have reviewed nursing notes and confirm.  CONSTITUTIONAL: well-appearing, non-toxic, NAD  SKIN: Warm dry, normal skin turgor  HEAD: NCAT  EYES: EOMI, PERRLA, no scleral icterus  ENT: Moist mucous membranes, normal pharynx with no erythema or exudates  NECK: Supple; non tender. Full ROM. No cervical LAD  CARD: RRR, no murmurs, rubs or gallops  RESP: clear to ausculation b/l.  No rales, rhonchi, or wheezing.  ABD: soft, + BS, non-tender, non-distended, no rebound or guarding.   EXT: Full ROM, no bony tenderness, no pedal edema, no calf tenderness  NEURO: normal motor. normal sensory. GCS 15  PSYCH: Cooperative, appropriate.

## 2019-09-20 DIAGNOSIS — J30.2 SEASONAL ALLERGIC RHINITIS, UNSPECIFIED TRIGGER: ICD-10-CM

## 2019-09-20 DIAGNOSIS — G62.9 PERIPHERAL POLYNEUROPATHY: ICD-10-CM

## 2019-09-20 RX ORDER — GABAPENTIN 300 MG/1
CAPSULE ORAL
Qty: 210 CAPSULE | Refills: 0 | Status: SHIPPED | OUTPATIENT
Start: 2019-09-20 | End: 2019-09-30

## 2019-09-20 RX ORDER — FEXOFENADINE HYDROCHLORIDE AND PSEUDOEPHEDRINE HYDROCHLORIDE 60; 120 MG/1; MG/1
TABLET, FILM COATED, EXTENDED RELEASE ORAL
Qty: 30 TABLET | Refills: 0 | Status: SHIPPED | OUTPATIENT
Start: 2019-09-20 | End: 2019-10-18

## 2019-09-20 NOTE — TELEPHONE ENCOUNTER
Allegra-KOFI      Last Written Prescription Date:  8/20/2019  Last Fill Quantity: 30,   # refills: 0    Gabapentin       Last Written Prescription Date:  8/20/2019  Last Fill Quantity: 210,   # refills: 0  Last Office Visit: 7/23/2019  Future Office visit:

## 2019-09-26 NOTE — PROGRESS NOTES
Subjective     Melvi Cleveland is a 69 year old female who presents to clinic today for the following health issues:    HPI   Toe pain      Duration: on and off for years    Description (location/character/radiation): right great toe    Intensity:  Mild to moderate    Accompanying signs and symptoms: pain    History (similar episodes/previous evaluation): no    Precipitating or alleviating factors: None    Therapies tried and outcome: None     Musculoskeletal problem/pain      Duration: years    Description  Location: hips    Intensity: mild to moderate    Accompanying signs and symptoms: none    History  Previous similar problem: YES  Previous evaluation:  none    Precipitating or alleviating factors:  Trauma or overuse: no   Aggravating factors include: standing, walking and overuse    Therapies tried and outcome: nothing      Patient Active Problem List   Diagnosis     Neck pain, chronic     Low back pain, chronic     Crohn's disease of large intestine (H)     Dyslipidemia     Sicca syndrome (H)     Polyneuropathy in other diseases classified elsewhere (H)     RA (rheumatoid arthritis) (H)     Comprehensive Medical Examination     Seasonal allergic rhinitis     Fibrocystic breast disease (FCBD)     ACP (advance care planning)     Need for hepatitis C screening test     Cough variant asthma     Pneumonia of left lower lobe due to infectious organism (H)     Sepsis (H)     CASE (acute kidney injury) (H)     Bilateral pneumonia     Acute respiratory failure with hypoxia (H)     Past Surgical History:   Procedure Laterality Date     BACK SURGERY  05/1995    back surgery     BIOPSY      breast bx X2     BIOPSY BREAST      LT     BIOPSY BREAST NEEDLE LOCALIZATION Right 6/12/2017    Procedure: BIOPSY BREAST NEEDLE LOCALIZATION;  WIRE LOCALIZED EXCISIONAL BIOPSY  RIGHT BREAST MASS;  Surgeon: Jeni Amin MD;  Location: [facility] OR     CHOLECYSTECTOMY  2004     COLONOSCOPY  11/15/2004    screening     COLONOSCOPY   2014     EGD with biopsy  ,     GERD     laminectomy      L4 L5 laminectomy; back pain     RELEASE CARPAL TUNNEL Right 2018    Procedure: RELEASE CARPAL TUNNEL;  RIGHT CARPAL TUNNEL RELEASE;  Surgeon: Haider Carlos MD;  Location: [facility] OR       Social History     Tobacco Use     Smoking status: Former Smoker     Types: Cigarettes     Last attempt to quit: 3/28/1997     Years since quittin.5     Smokeless tobacco: Never Used     Tobacco comment: no passive exposure   Substance Use Topics     Alcohol use: No     Alcohol/week: 0.0 standard drinks     Comment: former. quit      Family History   Problem Relation Age of Onset     Diabetes Mother      Rheumatoid Arthritis Mother      Other - See Comments Mother         fibromyalgia     Osteoarthritis Mother      Thyroid Disease Mother         thyroid disorder     Unknown/Adopted Father         cause of death unknown     Rheumatoid Arthritis Father      Diabetes Sister      Myocardial Infarction Sister         cause of death     Lupus Sister         cause of death     C.A.D. Maternal Grandmother      Cerebrovascular Disease Maternal Grandmother      Diabetes Maternal Grandmother      Thyroid Disease Maternal Grandmother         thyrodi disorder; goitor removed     Cancer Maternal Grandfather      Hypertension Brother      Other - See Comments Brother         sleep apnea     Other - See Comments Sister         sleep apnea         Current Outpatient Medications   Medication Sig Dispense Refill     albuterol (PROVENTIL) (2.5 MG/3ML) 0.083% neb solution NEBULIZE THE CONTENTS OF 1 VIAL EVERY 6 HOURS AS NEEDED FOR SHORTNESSOF BREATH OR WHEEZING 75 mL 0     ALFALFA PO Take 5 tablets by mouth 2 times daily.       ALLEGRA-D ALLERGY & CONGESTION  MG 12 hr tablet TAKE 1 TABLET BY MOUTH TWICE DAILY 30 tablet 0     Ascorbic Acid (VITAMIN C) 500 MG CAPS Take 1 tablet by mouth daily        benzonatate (TESSALON) 200 MG capsule Take 1 capsule (200 mg) by  mouth 3 times daily as needed for cough 30 capsule 1     Calcium-Vitamin D-Vitamin K (CALCIUM + D) 500-1000-40 MG-UNT-MCG CHEW Take 3 tablets by mouth daily        Cholecalciferol (VITAMIN D) 1000 UNITS capsule Take 2 capsules by mouth daily        citalopram (CELEXA) 40 MG tablet TAKE 1 TABLET BY MOUTH DAILY 90 tablet 1     diclofenac (VOLTAREN XR) 100 MG 24 hr tablet TAKE 1 TABLET BY MOUTH DAILY 30 tablet 0     DICLOFENAC PO Take 100 mg by mouth daily        Flaxseed, Linseed, (FLAX SEED OIL) 1000 MG capsule Take 2 capsules by mouth daily       fluticasone (FLONASE) 50 MCG/ACT nasal spray Spray 1 spray into both nostrils daily       fluticasone-salmeterol (ADVAIR) 250-50 MCG/DOSE inhaler Inhale 1 puff into the lungs every 12 hours 1 Inhaler 3     folic acid (FOLVITE) 1 MG tablet Take 1 mg by mouth 3 times daily.       gabapentin (NEURONTIN) 300 MG capsule Take 900 mg by mouth every morning       gabapentin (NEURONTIN) 300 MG capsule Take 600 mg by mouth daily In the afternoon       gabapentin (NEURONTIN) 300 MG capsule Take 600 mg by mouth daily At bedtime       Garlic 400 MG TBEC Take 1 tablet by mouth daily        HYDROcodone-acetaminophen (NORCO) 7.5-325 MG per tablet Take 1 tablet by mouth as needed for severe pain Every 4-6 hours as needed.       hydroxychloroquine (PLAQUENIL) 200 MG tablet Take 200 mg by mouth 2 times daily        Methotrexate, Anti-Rheumatic, (METHOTREXATE, PF, SC) Inject 0.6 mLs Subcutaneous 25 mg vial- patient draws up 0.6 ml.        MILK THISTLE PO Take 1,000 mg by mouth daily.       montelukast (SINGULAIR) 10 MG tablet Take 1 tablet (10 mg) by mouth At Bedtime 90 tablet 3     Omega-3 1000 MG capsule Take 1.5 g by mouth daily        order for DME Equipment being ordered: Nebulizer 1 Device 0     pantoprazole (PROTONIX) 40 MG EC tablet Take 40 mg by mouth 2 times daily       polyvinyl alcohol-povidone (REFRESH) 1.4-0.6 % ophthalmic solution 1-2 drops as needed       PREBIOTIC PRODUCT PO  Take 1 tablet by mouth daily       PROAIR  (90 Base) MCG/ACT inhaler INHALE 2 PUFFS INTO THE LUNGS EVERY 6 HOURS AS NEEDED FOR SHORTNESS OF BREATH OR WHEEZING 8.5 g 0     Probiotic Product (PROBIOTIC PO) Take 1 capsule by mouth daily       RESTASIS 0.05 % ophthalmic emulsion USE 1 DROP IN BOTH EYES 2 TIMES A DAY 60 each 3     Simethicone (SM GAS RELIEF ANTIFLATUENT) 180 MG CAPS TAKE ONE CAPSULE BY MOUTH AS NEEDED AFTER A MEAL. MAX 2 CAPS/DAY 60 capsule 11     traZODone (DESYREL) 50 MG tablet Take 150 mg by mouth At Bedtime       UNABLE TO FIND Take 1,000 mg by mouth daily MEDICATION NAME: Quinol/tumeric       Allergies   Allergen Reactions     Adhesive Tape      Glue and nicotine patches     Benzoin Compound      Tin-Co-Javier     Mold      Seasonal Allergies      Soap      Any cleanser/soap/disinfectant that is used right before surgery.  Makes patient break out horribly. Chart was looked at and Chloraprep was used.     Recent Labs   Lab Test 06/04/19  0537 06/02/19  0539  05/29/19  0451  05/28/19  0811 05/09/19  1355  11/14/18  0915  11/15/17  0912  11/22/16  0820   LDL  --   --   --   --   --   --   --   --  104*  --  107*  --  120*   HDL  --   --   --   --   --   --   --   --  72  --  71  --  64   TRIG  --   --   --   --   --   --   --   --  100  --  125  --  123   ALT  --   --   --  23  --  28 36   < > 35   < > 35   < > 33   CR 0.89 0.84   < > 1.15*   < > 1.54* 1.02   < > 1.04   < > 1.11*   < > 1.05*   GFRESTIMATED 67 72   < > 49*   < > 35* 57*   < > 53*   < > 49*   < > 53*   GFRESTBLACK 77 84   < > 57*   < > 40* 66   < > 64   < > 60*   < > 64   POTASSIUM 3.6 3.6   < > 4.3   < > 5.0  --    < > 3.7  --  4.3   < > 4.1   TSH  --   --   --   --   --   --   --   --  1.58  --  2.23  --   --     < > = values in this interval not displayed.      BP Readings from Last 3 Encounters:   09/30/19 112/60   06/25/19 100/60   06/11/19 124/70    Wt Readings from Last 3 Encounters:   09/30/19 91.6 kg (202 lb)   06/25/19 93  "kg (205 lb)   06/04/19 93.1 kg (205 lb 4 oz)                  Reviewed and updated as needed this visit by Provider         Review of Systems   ROS COMP: Constitutional, HEENT, cardiovascular, pulmonary, gi and gu systems are negative, except as otherwise noted.      Objective    /60 (BP Location: Left arm, Patient Position: Sitting, Cuff Size: Adult Regular)   Pulse 76   Temp 97.9  F (36.6  C) (Tympanic)   Resp 17   Ht 1.651 m (5' 5\")   Wt 91.6 kg (202 lb)   SpO2 91%   BMI 33.61 kg/m    Body mass index is 33.61 kg/m .  Physical Exam  Vitals signs and nursing note reviewed.   Constitutional:       General: She is not in acute distress.     Appearance: She is well-developed.   Musculoskeletal:      Comments: There is a bunion deformity noted right great toe   Neurological:      Mental Status: She is alert and oriented to person, place, and time.   Psychiatric:         Mood and Affect: Mood normal.         Behavior: Behavior normal.         Thought Content: Thought content normal.         Diagnostic Test Results:  Labs reviewed in Epic  Results for orders placed or performed in visit on 06/25/19   XR Chest 2 Views    Narrative    PROCEDURE:  XR CHEST 2 VW    HISTORY:  Pneumonia of left lower lobe due to Streptococcus pneumoniae  (H).     COMPARISON:  June 2, 2019    FINDINGS:   The cardiac silhouette is normal in size. The pulmonary vasculature is  normal.  There has been interval improvement in the pulmonary  parenchymal opacities as compared to June 2. There is some residual  subsegmental atelectasis at the right lung base and some patchy  ill-defined opacity lateral to the left hilus. No pleural effusion or  pneumothorax.      Impression    IMPRESSION:  Improving bilateral pulmonary parenchymal opacities as  compared to previous examination on June 2      SUE CANCINO MD           Assessment & Plan   Bilateral hip pain  Previous evaluation and xrays of lumbar spine reviewed. She has a history of " "rheumatoid arthritis.  This is discussed.  Will continue same medications as outlined.  If symptoms worsen will have dedicated imaging of bilateral hips.        BMI:   Estimated body mass index is 34.11 kg/m  as calculated from the following:    Height as of 6/11/19: 1.651 m (5' 5\").    Weight as of 6/25/19: 93 kg (205 lb).   Weight management plan: Discussed healthy diet and exercise guidelines        See Patient Instructions    No follow-ups on file.    Jimbo Martinez MD  [facility] - [place]      "

## 2019-09-27 ENCOUNTER — TELEPHONE (OUTPATIENT)
Dept: FAMILY MEDICINE | Facility: OTHER | Age: 69
End: 2019-09-27

## 2019-09-27 DIAGNOSIS — F41.0 PANIC DISORDER WITHOUT AGORAPHOBIA: ICD-10-CM

## 2019-09-27 NOTE — TELEPHONE ENCOUNTER
10:00 AM    Reason for Call: OVERBOOK    Pt is requesting that her  comes with her on her apt date 09/30/2019. She stated that her  is having knee pain and that her  normally comes with her.      Was an appointment offered for this call? No  If yes : Appointment type              Date    Preferred method for responding to this message: Telephone Call  What is your phone number ?  [phone] Sherrie    If we cannot reach you directly, may we leave a detailed response at the number you provided? Yes    Can this message wait until your PCP/provider returns, if unavailable today? Not applicable, PCP is in    [facility] [place]

## 2019-09-30 ENCOUNTER — OFFICE VISIT (OUTPATIENT)
Dept: FAMILY MEDICINE | Facility: OTHER | Age: 69
End: 2019-09-30
Attending: FAMILY MEDICINE
Payer: COMMERCIAL

## 2019-09-30 VITALS
BODY MASS INDEX: 33.66 KG/M2 | RESPIRATION RATE: 17 BRPM | OXYGEN SATURATION: 91 % | WEIGHT: 202 LBS | TEMPERATURE: 97.9 F | SYSTOLIC BLOOD PRESSURE: 112 MMHG | DIASTOLIC BLOOD PRESSURE: 60 MMHG | HEIGHT: 65 IN | HEART RATE: 76 BPM

## 2019-09-30 DIAGNOSIS — R68.89 INFLUENZA-LIKE SYMPTOMS: ICD-10-CM

## 2019-09-30 DIAGNOSIS — M25.552 BILATERAL HIP PAIN: Primary | ICD-10-CM

## 2019-09-30 DIAGNOSIS — M25.551 BILATERAL HIP PAIN: Primary | ICD-10-CM

## 2019-09-30 PROCEDURE — 99213 OFFICE O/P EST LOW 20 MIN: CPT | Performed by: FAMILY MEDICINE

## 2019-09-30 ASSESSMENT — MIFFLIN-ST. JEOR: SCORE: 1452.15

## 2019-09-30 ASSESSMENT — PAIN SCALES - GENERAL: PAINLEVEL: MILD PAIN (2)

## 2019-09-30 NOTE — LETTER
[facility] [place]  [address]  [place] [address]  Phone: [phone]    September 30, 2019        Melvi Cleveland  [address]   [facility] [address]          To whom it may concern:    RE: Melvi Cleveland    Patient was seen and treated today at our clinic.  Sherrie suffers from rheumatoid arthritis as well as osteoarthritis involving multiple joints.  She would benefit from using a pool.     Please contact me for questions or concerns.      Sincerely,        Jimbo Martinez MD

## 2019-09-30 NOTE — NURSING NOTE
"Chief Complaint   Patient presents with     Musculoskeletal Problem       Initial /60 (BP Location: Left arm, Patient Position: Sitting, Cuff Size: Adult Regular)   Pulse 76   Temp 97.9  F (36.6  C) (Tympanic)   Resp 17   Ht 1.651 m (5' 5\")   Wt 91.6 kg (202 lb)   SpO2 91%   BMI 33.61 kg/m   Estimated body mass index is 33.61 kg/m  as calculated from the following:    Height as of this encounter: 1.651 m (5' 5\").    Weight as of this encounter: 91.6 kg (202 lb).  Medication Reconciliation: complete  Devika Gutierres LPN  "

## 2019-10-01 RX ORDER — CITALOPRAM HYDROBROMIDE 40 MG/1
TABLET ORAL
Qty: 90 TABLET | Refills: 0 | Status: SHIPPED | OUTPATIENT
Start: 2019-10-01 | End: 2019-12-23

## 2019-10-02 RX ORDER — ALBUTEROL SULFATE 90 UG/1
AEROSOL, METERED RESPIRATORY (INHALATION)
Qty: 8.5 G | Refills: 3 | Status: SHIPPED | OUTPATIENT
Start: 2019-10-02

## 2019-10-03 DIAGNOSIS — M54.42 BILATERAL LOW BACK PAIN WITH BILATERAL SCIATICA, UNSPECIFIED CHRONICITY: ICD-10-CM

## 2019-10-03 DIAGNOSIS — M54.41 BILATERAL LOW BACK PAIN WITH BILATERAL SCIATICA, UNSPECIFIED CHRONICITY: ICD-10-CM

## 2019-10-07 RX ORDER — DICLOFENAC SODIUM 100 MG/1
TABLET, FILM COATED, EXTENDED RELEASE ORAL
Qty: 30 TABLET | Refills: 0 | Status: SHIPPED | OUTPATIENT
Start: 2019-10-07 | End: 2019-10-30

## 2019-10-07 NOTE — TELEPHONE ENCOUNTER
Diclofenac      Last Written Prescription Date:  Pt reported  Last Fill Quantity: ,   # refills:   Last Office Visit: 9/30/2019  Future Office visit:       Routing refill request to provider for review/approval because:  Medication is reported/historical

## 2019-10-15 ENCOUNTER — IMMUNIZATION (OUTPATIENT)
Dept: FAMILY MEDICINE | Facility: OTHER | Age: 69
End: 2019-10-15
Attending: FAMILY MEDICINE
Payer: MEDICARE

## 2019-10-15 DIAGNOSIS — Z23 NEED FOR PROPHYLACTIC VACCINATION AND INOCULATION AGAINST INFLUENZA: Primary | ICD-10-CM

## 2019-10-15 PROCEDURE — 90662 IIV NO PRSV INCREASED AG IM: CPT

## 2019-10-15 PROCEDURE — G0008 ADMIN INFLUENZA VIRUS VAC: HCPCS

## 2019-10-18 DIAGNOSIS — J30.2 SEASONAL ALLERGIC RHINITIS, UNSPECIFIED TRIGGER: ICD-10-CM

## 2019-10-18 DIAGNOSIS — J45.991 COUGH VARIANT ASTHMA: ICD-10-CM

## 2019-10-18 DIAGNOSIS — G62.9 PERIPHERAL POLYNEUROPATHY: ICD-10-CM

## 2019-10-20 ENCOUNTER — HOSPITAL ENCOUNTER (EMERGENCY)
Facility: HOSPITAL | Age: 69
Discharge: HOME | End: 2019-10-21
Attending: EMERGENCY MEDICINE | Admitting: EMERGENCY MEDICINE
Payer: MEDICARE

## 2019-10-20 DIAGNOSIS — R04.0 EPISTAXIS: ICD-10-CM

## 2019-10-20 PROCEDURE — 99282 EMERGENCY DEPT VISIT SF MDM: CPT | Mod: 25

## 2019-10-20 PROCEDURE — 30901 CONTROL OF NOSEBLEED: CPT | Mod: 50 | Performed by: EMERGENCY MEDICINE

## 2019-10-20 PROCEDURE — 30901 CONTROL OF NOSEBLEED: CPT | Mod: 50

## 2019-10-20 NOTE — ED AVS SNAPSHOT
[facility]  [address] [address]  Phone:  [phone]                                    Melvi Cleveland   MRN: [id number]    Department:  [facility]   Date of Visit:  10/20/2019           After Visit Summary Signature Page    I have received my discharge instructions, and my questions have been answered. I have discussed any challenges I see with this plan with the nurse or doctor.    ..........................................................................................................................................  Patient/Patient Representative Signature      ..........................................................................................................................................  Patient Representative Print Name and Relationship to Patient    ..................................................               ................................................  Date                                   Time    ..........................................................................................................................................  Reviewed by Signature/Title    ...................................................              ..............................................  Date                                               Time          22EPIC Rev 08/18

## 2019-10-21 VITALS
HEART RATE: 67 BPM | OXYGEN SATURATION: 93 % | DIASTOLIC BLOOD PRESSURE: 70 MMHG | RESPIRATION RATE: 18 BRPM | TEMPERATURE: 98.2 F | SYSTOLIC BLOOD PRESSURE: 136 MMHG

## 2019-10-21 RX ORDER — FEXOFENADINE HYDROCHLORIDE AND PSEUDOEPHEDRINE HYDROCHLORIDE 60; 120 MG/1; MG/1
TABLET, FILM COATED, EXTENDED RELEASE ORAL
Qty: 30 TABLET | Refills: 0 | Status: SHIPPED | OUTPATIENT
Start: 2019-10-21 | End: 2019-11-20

## 2019-10-21 RX ORDER — GABAPENTIN 300 MG/1
CAPSULE ORAL
Qty: 210 CAPSULE | Refills: 0 | Status: SHIPPED | OUTPATIENT
Start: 2019-10-21 | End: 2019-11-22

## 2019-10-21 NOTE — ED PROVIDER NOTES
History     Chief Complaint   Patient presents with     Epistaxis     left nare bleeding for about an hour     HPI  Melvi Cleveland is a 69 year old female who presents with report of nasal bleeding from both naris.  The patient is on oxygen at home and nighttime only for slightly low oxygen saturations.  This was initiated at nighttime only last summer.  Per her report, her doctor is entertaining discontinuing such.  She feels as though the nasal cannula oxygen is causing her nares to dry out and bleed.  She is bleeding from both naris tonight.  No other associated symptoms.  The patient is not on any blood thinners.  Onset several hours ago duration ongoing.  Character persistent.    Allergies:  Allergies   Allergen Reactions     Adhesive Tape      Glue and nicotine patches     Benzoin Compound      Tin-Co-Javier     Mold      Seasonal Allergies      Soap      Any cleanser/soap/disinfectant that is used right before surgery.  Makes patient break out horribly. Chart was looked at and Chloraprep was used.       Problem List:    Patient Active Problem List    Diagnosis Date Noted     Sepsis (H) 05/28/2019     Priority: Medium     CASE (acute kidney injury) (H) 05/28/2019     Priority: Medium     Bilateral pneumonia 05/28/2019     Priority: Medium     Acute respiratory failure with hypoxia (H) 05/28/2019     Priority: Medium     Cough variant asthma 04/16/2019     Priority: Medium     Pneumonia of left lower lobe due to infectious organism (H) 04/16/2019     Priority: Medium     Need for hepatitis C screening test 11/13/2018     Priority: Medium     ACP (advance care planning) 09/28/2016     Priority: Medium     Advance Care Planning 9/28/2016: ACP Review of Chart / Resources Provided:  Reviewed chart for advance care plan.  Melvi Cleveland has been provided information and resources to begin or update their advance care plan.  Added by Kathy Galvan             RA (rheumatoid arthritis) (H) 11/25/2015      Priority: Medium     Comprehensive Medical Examination 11/25/2015     Priority: Medium     Seasonal allergic rhinitis 11/25/2015     Priority: Medium     Fibrocystic breast disease (FCBD) 11/25/2015     Priority: Medium     Crohn's disease of large intestine (H) 03/01/2011     Priority: Medium     Dyslipidemia 03/01/2011     Priority: Medium     Sicca syndrome (H) 03/01/2011     Priority: Medium     Polyneuropathy in other diseases classified elsewhere (H) 03/01/2011     Priority: Medium     Neck pain, chronic 07/05/2005     Priority: Medium     05/2015 MRI:  IMPRESSION:  BROAD-BASED DISK PROTRUSION AT C5-C6 ON THE LEFT, MILDLY  IMPINGING ON THE LEFT C6 NERVE ROOT       Low back pain, chronic 12/13/2000     Priority: Medium     03/2016 MRI:  MULTILEVEL DEGENERATIVE CHANGES OF THE LUMBAR SPINE,  SIMILAR IN APPEARANCE TO THE PRIOR STUDY.  A RIGHT FORAMINAL DISK  PROTRUSION AT L3-L4 AGAIN IMPINGES THE EXITING RIGHT L3 NERVE ROOT.  CORRELATE FOR A RELATED RADICULOPATHY.          Past Medical History:    Past Medical History:   Diagnosis Date     Allergic rhinitis, seasonal 3/1/2011     Arthritis      Chronic/Recurrent Back Pain 12/13/2000     Chronic/Recurrent Neck Pain 7/5/2005     Cough variant asthma 4/16/2019     Crohn's Disease 3/1/2011     CTS (carpal tunnel syndrome) 1/1/2011     Depressive disorder      Dyslipidemia 3/1/2011     Fibrocystic Breast Disease 3/1/2011     Mixed hyperlipidemia 1/1/2011     Wilson's neuroma 1/1/2011     Parotiditis 5/9/2011     Peripheral Neuriopathy 3/1/2011     Pneumonia of left lower lobe due to infectious organism (H) 4/16/2019     Rheumatoid arthritis(714.0) 12/13/1999     Seborrhea capitis 10/6/2011     Sjogrens Syndrome 3/1/2011     Urethral stricture 4/30/2008       Past Surgical History:    Past Surgical History:   Procedure Laterality Date     BACK SURGERY  05/1995    back surgery     BIOPSY      breast bx X2     BIOPSY BREAST      LT     BIOPSY BREAST NEEDLE LOCALIZATION  Right 2017    Procedure: BIOPSY BREAST NEEDLE LOCALIZATION;  WIRE LOCALIZED EXCISIONAL BIOPSY  RIGHT BREAST MASS;  Surgeon: Jeni Amin MD;  Location: [facility]     CHOLECYSTECTOMY       COLONOSCOPY  11/15/2004    screening     COLONOSCOPY  2014     EGD with biopsy  ,     GERD     laminectomy      L4 L5 laminectomy; back pain     RELEASE CARPAL TUNNEL Right 2018    Procedure: RELEASE CARPAL TUNNEL;  RIGHT CARPAL TUNNEL RELEASE;  Surgeon: Haider Carlos MD;  Location: [facility]       Family History:    Family History   Problem Relation Age of Onset     Diabetes Mother      Rheumatoid Arthritis Mother      Other - See Comments Mother         fibromyalgia     Osteoarthritis Mother      Thyroid Disease Mother         thyroid disorder     Unknown/Adopted Father         cause of death unknown     Rheumatoid Arthritis Father      Diabetes Sister      Myocardial Infarction Sister         cause of death     Lupus Sister         cause of death     C.A.D. Maternal Grandmother      Cerebrovascular Disease Maternal Grandmother      Diabetes Maternal Grandmother      Thyroid Disease Maternal Grandmother         thyrodi disorder; goitor removed     Cancer Maternal Grandfather      Hypertension Brother      Other - See Comments Brother         sleep apnea     Other - See Comments Sister         sleep apnea       Social History:  Marital Status:   [2]  Social History     Tobacco Use     Smoking status: Former Smoker     Types: Cigarettes     Last attempt to quit: 3/28/1997     Years since quittin.5     Smokeless tobacco: Never Used     Tobacco comment: no passive exposure   Substance Use Topics     Alcohol use: No     Alcohol/week: 0.0 standard drinks     Comment: former. quit      Drug use: No        Medications:    albuterol (PROAIR HFA/PROVENTIL HFA/VENTOLIN HFA) 108 (90 Base) MCG/ACT inhaler  albuterol (PROVENTIL) (2.5 MG/3ML) 0.083% neb solution  ALFALFA PO  ALLEGRA-D ALLERGY &  CONGESTION  MG 12 hr tablet  Ascorbic Acid (VITAMIN C) 500 MG CAPS  benzonatate (TESSALON) 200 MG capsule  Calcium-Vitamin D-Vitamin K (CALCIUM + D) 500-1000-40 MG-UNT-MCG CHEW  Cholecalciferol (VITAMIN D) 1000 UNITS capsule  citalopram (CELEXA) 40 MG tablet  diclofenac (VOLTAREN XR) 100 MG 24 hr tablet  DICLOFENAC PO  Flaxseed, Linseed, (FLAX SEED OIL) 1000 MG capsule  fluticasone (FLONASE) 50 MCG/ACT nasal spray  fluticasone-salmeterol (ADVAIR) 250-50 MCG/DOSE inhaler  folic acid (FOLVITE) 1 MG tablet  gabapentin (NEURONTIN) 300 MG capsule  gabapentin (NEURONTIN) 300 MG capsule  gabapentin (NEURONTIN) 300 MG capsule  Garlic 400 MG TBEC  HYDROcodone-acetaminophen (NORCO) 7.5-325 MG per tablet  hydroxychloroquine (PLAQUENIL) 200 MG tablet  Methotrexate, Anti-Rheumatic, (METHOTREXATE, PF, SC)  MILK THISTLE PO  montelukast (SINGULAIR) 10 MG tablet  Omega-3 1000 MG capsule  order for DME  pantoprazole (PROTONIX) 40 MG EC tablet  polyvinyl alcohol-povidone (REFRESH) 1.4-0.6 % ophthalmic solution  PREBIOTIC PRODUCT PO  Probiotic Product (PROBIOTIC PO)  RESTASIS 0.05 % ophthalmic emulsion  Simethicone (SM GAS RELIEF ANTIFLATUENT) 180 MG CAPS  traZODone (DESYREL) 50 MG tablet  UNABLE TO FIND      Review of Systems  Respiratory denies.  Cardiovascular denies.  Heme per HPI.  GI denies.   denies.  ENT Per HPI    Physical Exam   BP: 138/77  Pulse: 86  Temp: 98.2  F (36.8  C)  Resp: 18  SpO2: 93 %      Physical Exam  Constitutional:       Appearance: Normal appearance.      Comments: Patient concerned on arrival holding her nose   HENT:      Head: Normocephalic.      Nose:      Comments: Bilateral Kiesselbach's plexus bleeding on arrival.  Multiple rechecks and atomized lidocaine with epinephrine and silver nitrate cautery with out any rebleeding on last 2 rechecks.     Mouth/Throat:      Mouth: Mucous membranes are dry.   Eyes:      Extraocular Movements: Extraocular movements intact.      Pupils: Pupils are equal,  round, and reactive to light.   Neck:      Musculoskeletal: Normal range of motion.   Cardiovascular:      Rate and Rhythm: Normal rate.   Pulmonary:      Effort: Pulmonary effort is normal. No respiratory distress.      Breath sounds: Normal breath sounds. No wheezing.   Abdominal:      General: Abdomen is flat. There is no distension.      Palpations: Abdomen is soft.      Tenderness: There is no tenderness.   Musculoskeletal: Normal range of motion.         General: No swelling or tenderness.   Skin:     General: Skin is warm and dry.      Coloration: Skin is not pale.   Neurological:      General: No focal deficit present.      Mental Status: She is alert and oriented to person, place, and time.   Psychiatric:         Mood and Affect: Mood normal.        Procedure note: Silver nitrate chemical cautery.  Given bleeding from bilateral Kiesselbach's plexus, atomized lidocaine with epinephrine was applied and 1 mL in each nare for total of 2 ml Subsequently silver nitrate was applied about Kiesselbach's plexus in the area of bleeding bilaterally.  Patient was rechecked and did have some rebleeding on the left and silver nitrate reapplied.  Subsequent rechecks x2 show no evidence for rebleeding.  Patient tolerated all well.    No results found for this or any previous visit (from the past 24 hour(s)).    Medications   silver nitrate (ARZOL) Misc (1 applicator Topical Given by Other 10/21/19 0014)       Assessments & Plan (with Medical Decision Making)   Patient with bleeding from bilateral Kiesselbach's plexus.  Silver nitrate applied after local lidocaine with epinephrine.  Good hemostasis thereafter.  Patient advised of potential for rebleeding.  She acknowledges understanding thereof however was checked on 2 occasions prior to discharge without rebleeding.  She agrees to return if any new or concerning symptoms.  The patient is not on any stronger blood thinners or even ASA per report which appears to be the case  per med list reviewed.  The patient has no other associated symptoms.  She is keen on discharge at this time.  I did advise her to refrain from using nasal oxygen, trialing such by mouth as to try to avoid precipitating further Kiesselbach's plexus bleeding as it would appear that the most likely etiology for the patient's presentation tonight is the drying effects about the naris of nasal cannula oxygen.  Patient acknowledged understanding thereof and was discharged.  Room air saturations 96% at discharge.    New Prescriptions    No medications on file       Final diagnoses:   Epistaxis       10/20/2019   [facility]     Uvaldo Cotton MD  10/21/19 0101

## 2019-10-21 NOTE — ED NOTES
Patient states that at this time, she doesn't believe her nose is still bleeding.  Will update provider.

## 2019-10-21 NOTE — ED NOTES
Discharge instructions gone over with patient and she states understanding. Patient is then discharged in stable condition, ambulatory, with significant other.

## 2019-10-21 NOTE — ED NOTES
Patient states that she was at home and felt like she needed to blow her nose. States she blew her nose and then bent over to pick something up and felt like she needed to blow her nose again.  States she blew her nose a second time and had bloody drainage.

## 2019-10-21 NOTE — TELEPHONE ENCOUNTER
gabapentin (NEURONTIN) 300 MG capsule      Last Written Prescription Date:  NA - medication is historical  Last Fill Quantity: NA,   # refills: NA  Last Office Visit: 09/30/19  Future Office visit:    Next 5 appointments (look out 90 days)    Nov 29, 2019  9:00 AM CST  (Arrive by 8:45 AM)  PHYSICAL with Jimbo Martinez MD  [facility] ([facility] ) [address]  [place] [address]  [phone]         ALLEGRA-D ALLERGY & CONGESTION  MG 12 hr tablet      Last Written Prescription Date:  09/20/19  Last Fill Quantity: 30,   # refills: 0  Last Office Visit: 09/30/19  Future Office visit:    Next 5 appointments (look out 90 days)    Nov 29, 2019  9:00 AM CST  (Arrive by 8:45 AM)  PHYSICAL with Jimbo Martinez MD  [facility] ([facility] ) [address]  [place] [address]  [phone]

## 2019-10-30 DIAGNOSIS — M54.41 BILATERAL LOW BACK PAIN WITH BILATERAL SCIATICA, UNSPECIFIED CHRONICITY: ICD-10-CM

## 2019-10-30 DIAGNOSIS — M54.42 BILATERAL LOW BACK PAIN WITH BILATERAL SCIATICA, UNSPECIFIED CHRONICITY: ICD-10-CM

## 2019-10-31 RX ORDER — DICLOFENAC SODIUM 100 MG/1
TABLET, FILM COATED, EXTENDED RELEASE ORAL
Qty: 30 TABLET | Refills: 1 | Status: SHIPPED | OUTPATIENT
Start: 2019-10-31 | End: 2019-12-27

## 2019-10-31 NOTE — TELEPHONE ENCOUNTER
dicofenac      Last Written Prescription Date:  historical  Last Fill Quantity: ,   # refills:   Last Office Visit: 9/30/2019  Future Office visit:    Next 5 appointments (look out 90 days)    Nov 29, 2019  9:00 AM CST  (Arrive by 8:45 AM)  PHYSICAL with Jimbo Martinez MD  [facility] ([facility] ) [address]  [place] [address]  [phone]           Routing refill request to provider for review/approval because:  Medication is reported/historical

## 2019-11-01 ENCOUNTER — TRANSFERRED RECORDS (OUTPATIENT)
Dept: HEALTH INFORMATION MANAGEMENT | Facility: CLINIC | Age: 69
End: 2019-11-01

## 2019-11-02 ENCOUNTER — HEALTH MAINTENANCE LETTER (OUTPATIENT)
Age: 69
End: 2019-11-02

## 2019-11-04 DIAGNOSIS — Z79.899 ENCOUNTER FOR LONG-TERM (CURRENT) USE OF MEDICATIONS: ICD-10-CM

## 2019-11-04 DIAGNOSIS — M06.09 RHEUMATOID ARTHRITIS OF MULTIPLE SITES WITHOUT RHEUMATOID FACTOR (H): ICD-10-CM

## 2019-11-04 LAB
ALBUMIN SERPL-MCNC: 3.4 G/DL (ref 3.4–5)
ALT SERPL W P-5'-P-CCNC: 30 U/L (ref 0–50)
BASOPHILS # BLD AUTO: 0 10E9/L (ref 0–0.2)
BASOPHILS NFR BLD AUTO: 0.8 %
CREAT SERPL-MCNC: 1.15 MG/DL (ref 0.52–1.04)
CRP SERPL-MCNC: <2.9 MG/L (ref 0–8)
DIFFERENTIAL METHOD BLD: NORMAL
EOSINOPHIL # BLD AUTO: 0.2 10E9/L (ref 0–0.7)
EOSINOPHIL NFR BLD AUTO: 4.4 %
ERYTHROCYTE [DISTWIDTH] IN BLOOD BY AUTOMATED COUNT: 14.7 % (ref 10–15)
ERYTHROCYTE [SEDIMENTATION RATE] IN BLOOD BY WESTERGREN METHOD: 15 MM/H (ref 0–30)
GFR SERPL CREATININE-BSD FRML MDRD: 49 ML/MIN/{1.73_M2}
HCT VFR BLD AUTO: 38 % (ref 35–47)
HGB BLD-MCNC: 12.4 G/DL (ref 11.7–15.7)
IMM GRANULOCYTES # BLD: 0 10E9/L (ref 0–0.4)
IMM GRANULOCYTES NFR BLD: 0.2 %
LYMPHOCYTES # BLD AUTO: 1.8 10E9/L (ref 0.8–5.3)
LYMPHOCYTES NFR BLD AUTO: 36.2 %
MCH RBC QN AUTO: 28.5 PG (ref 26.5–33)
MCHC RBC AUTO-ENTMCNC: 32.6 G/DL (ref 31.5–36.5)
MCV RBC AUTO: 87 FL (ref 78–100)
MONOCYTES # BLD AUTO: 0.4 10E9/L (ref 0–1.3)
MONOCYTES NFR BLD AUTO: 8.3 %
NEUTROPHILS # BLD AUTO: 2.5 10E9/L (ref 1.6–8.3)
NEUTROPHILS NFR BLD AUTO: 50.1 %
NRBC # BLD AUTO: 0 10*3/UL
NRBC BLD AUTO-RTO: 0 /100
PLATELET # BLD AUTO: 267 10E9/L (ref 150–450)
RBC # BLD AUTO: 4.35 10E12/L (ref 3.8–5.2)
WBC # BLD AUTO: 5.1 10E9/L (ref 4–11)

## 2019-11-04 PROCEDURE — 85652 RBC SED RATE AUTOMATED: CPT | Mod: ZL | Performed by: NURSE PRACTITIONER

## 2019-11-04 PROCEDURE — 84460 ALANINE AMINO (ALT) (SGPT): CPT | Mod: ZL | Performed by: NURSE PRACTITIONER

## 2019-11-04 PROCEDURE — 85025 COMPLETE CBC W/AUTO DIFF WBC: CPT | Mod: ZL | Performed by: NURSE PRACTITIONER

## 2019-11-04 PROCEDURE — 86140 C-REACTIVE PROTEIN: CPT | Mod: ZL | Performed by: NURSE PRACTITIONER

## 2019-11-04 PROCEDURE — 36415 COLL VENOUS BLD VENIPUNCTURE: CPT | Mod: ZL | Performed by: NURSE PRACTITIONER

## 2019-11-04 PROCEDURE — 82040 ASSAY OF SERUM ALBUMIN: CPT | Mod: ZL | Performed by: NURSE PRACTITIONER

## 2019-11-04 PROCEDURE — 82565 ASSAY OF CREATININE: CPT | Mod: ZL | Performed by: NURSE PRACTITIONER

## 2019-11-05 ENCOUNTER — OFFICE VISIT (OUTPATIENT)
Dept: SLEEP MEDICINE | Facility: HOSPITAL | Age: 69
End: 2019-11-05
Attending: INTERNAL MEDICINE
Payer: MEDICARE

## 2019-11-05 DIAGNOSIS — R09.02 HYPOXIA: Primary | ICD-10-CM

## 2019-11-05 PROCEDURE — 99207 ZZC NO CHARGE NURSE ONLY: CPT

## 2019-11-05 NOTE — NURSING NOTE
Patient picked up over night oximeter number SL-3. Patient was instructed on use of device. Patient verbalized understanding.     Patient will return device tomorrow by:2:30 pm

## 2019-11-06 ENCOUNTER — DOCUMENTATION ONLY (OUTPATIENT)
Dept: SLEEP MEDICINE | Facility: HOSPITAL | Age: 69
End: 2019-11-06
Attending: INTERNAL MEDICINE
Payer: MEDICARE

## 2019-11-06 DIAGNOSIS — R09.02 HYPOXIA: ICD-10-CM

## 2019-11-06 DIAGNOSIS — J18.9 PNEUMONIA: ICD-10-CM

## 2019-11-06 PROCEDURE — 99207 ZZC NO CHARGE NURSE ONLY: CPT

## 2019-11-06 NOTE — PROGRESS NOTES
Patient returned the oximeter and the data was downloaded and the report sent to Dr. Lomeli and a copy sent to scan

## 2019-11-20 DIAGNOSIS — G62.9 PERIPHERAL POLYNEUROPATHY: ICD-10-CM

## 2019-11-20 DIAGNOSIS — J30.2 SEASONAL ALLERGIC RHINITIS, UNSPECIFIED TRIGGER: ICD-10-CM

## 2019-11-22 RX ORDER — FEXOFENADINE HYDROCHLORIDE AND PSEUDOEPHEDRINE HYDROCHLORIDE 60; 120 MG/1; MG/1
TABLET, FILM COATED, EXTENDED RELEASE ORAL
Qty: 30 TABLET | Refills: 0 | Status: SHIPPED | OUTPATIENT
Start: 2019-11-22 | End: 2019-12-23

## 2019-11-22 RX ORDER — GABAPENTIN 300 MG/1
CAPSULE ORAL
Qty: 210 CAPSULE | Refills: 0 | OUTPATIENT
Start: 2019-11-22

## 2019-11-22 RX ORDER — GABAPENTIN 300 MG/1
CAPSULE ORAL
Qty: 210 CAPSULE | Refills: 0 | Status: SHIPPED | OUTPATIENT
Start: 2019-11-22 | End: 2019-12-27

## 2019-11-22 NOTE — TELEPHONE ENCOUNTER
gabapentin (NEURONTIN) 300 MG capsule      Last Written Prescription Date:  10-21-19  Last Fill Quantity: 210,   # refills: 0  Last Office Visit: 9-30-19  Future Office visit:    Next 5 appointments (look out 90 days)    Nov 29, 2019  9:00 AM CST  (Arrive by 8:45 AM)  PHYSICAL with Jimbo Martinez MD  [facility] ([facility] ) [address]  [place] [address]  [phone]           Routing refill request to provider for review/approval because:  Drug not on the [facility], [facility] or [facility] refill protocol or controlled substance      ALLEGRA-D ALLERGY & CONGESTION  MG 12 hr tablet      Last Written Prescription Date:  10-21-19  Last Fill Quantity: 30,   # refills: 0      Routing refill request to provider for review/approval because:  Drug not on the [facility], [facility] or [facility] refill protocol or controlled substance

## 2019-11-23 ENCOUNTER — HOSPITAL ENCOUNTER (EMERGENCY)
Facility: HOSPITAL | Age: 69
Discharge: HOME | End: 2019-11-23
Attending: NURSE PRACTITIONER | Admitting: NURSE PRACTITIONER
Payer: MEDICARE

## 2019-11-23 VITALS
OXYGEN SATURATION: 95 % | DIASTOLIC BLOOD PRESSURE: 69 MMHG | TEMPERATURE: 98.4 F | SYSTOLIC BLOOD PRESSURE: 130 MMHG | RESPIRATION RATE: 18 BRPM

## 2019-11-23 DIAGNOSIS — J01.90 ACUTE SINUSITIS WITH SYMPTOMS > 10 DAYS: ICD-10-CM

## 2019-11-23 DIAGNOSIS — J20.9 ACUTE BRONCHITIS WITH SYMPTOMS > 10 DAYS: ICD-10-CM

## 2019-11-23 DIAGNOSIS — Z79.899 IMMUNOSUPPRESSION DUE TO DRUG THERAPY (H): ICD-10-CM

## 2019-11-23 DIAGNOSIS — D84.821 IMMUNOSUPPRESSION DUE TO DRUG THERAPY (H): ICD-10-CM

## 2019-11-23 DIAGNOSIS — Z87.891 PERSONAL HISTORY OF TOBACCO USE, PRESENTING HAZARDS TO HEALTH: ICD-10-CM

## 2019-11-23 PROCEDURE — G0463 HOSPITAL OUTPT CLINIC VISIT: HCPCS

## 2019-11-23 PROCEDURE — 99213 OFFICE O/P EST LOW 20 MIN: CPT | Mod: Z6 | Performed by: NURSE PRACTITIONER

## 2019-11-23 RX ORDER — PREDNISONE 20 MG/1
TABLET ORAL
Qty: 10 TABLET | Refills: 0 | Status: SHIPPED | OUTPATIENT
Start: 2019-11-23 | End: 2019-11-29

## 2019-11-23 ASSESSMENT — ENCOUNTER SYMPTOMS
PALPITATIONS: 0
VOMITING: 0
EYE REDNESS: 0
ABDOMINAL PAIN: 0
WHEEZING: 0
DIFFICULTY URINATING: 0
CHILLS: 0
RHINORRHEA: 1
EYE PAIN: 0
SHORTNESS OF BREATH: 1
SINUS PRESSURE: 1
NAUSEA: 0
SORE THROAT: 0
SINUS PAIN: 1
DIARRHEA: 0
FATIGUE: 1
HEADACHES: 1
DIZZINESS: 0
APPETITE CHANGE: 0
EYE DISCHARGE: 0
LIGHT-HEADEDNESS: 0
EYE ITCHING: 0
COUGH: 1
FEVER: 0

## 2019-11-23 NOTE — ED PROVIDER NOTES
History     Chief Complaint   Patient presents with     Sinusitis     HPI  Melvi Cleveland is a 69 year old female who presents ambulatory with concerns of sinusitis. Symptoms started about 2 weeks ago with rhinorrhea, congestion, cough. Symptoms are worsening versus improving.   Frontal and maxillary pressure and pain. Pain and pressure in ears that is intermittent. Productive cough of conrado-grey/greenish phlegm. Denies fever, chills. Sometimes feels SOB - especially after coughing. She uses oxygen at night since her pneumonia last Spring.   Mucinex, Tylenol for headache - not helpful.   She does have a history of rheumatoid arthritis. She takes plaquenil and methotrexate for treatment. She saw rheumatologist on Wednesday who recommended she not take her weekly injection this week due to symptoms.   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    Allergies:  Allergies   Allergen Reactions     Adhesive Tape      Glue and nicotine patches     Benzoin Compound      Tin-Co-Javier     Mold      Seasonal Allergies      Soap      Any cleanser/soap/disinfectant that is used right before surgery.  Makes patient break out horribly. Chart was looked at and Chloraprep was used.       Problem List:    Patient Active Problem List    Diagnosis Date Noted     Sepsis (H) 05/28/2019     Priority: Medium     CASE (acute kidney injury) (H) 05/28/2019     Priority: Medium     Bilateral pneumonia 05/28/2019     Priority: Medium     Acute respiratory failure with hypoxia (H) 05/28/2019     Priority: Medium     Cough variant asthma 04/16/2019     Priority: Medium     Pneumonia of left lower lobe due to infectious organism (H) 04/16/2019     Priority: Medium     Need for hepatitis C screening test 11/13/2018     Priority: Medium     ACP (advance care planning) 09/28/2016     Priority: Medium     Advance Care Planning 9/28/2016: ACP Review of Chart / Resources Provided:  Reviewed chart  for advance care plan.  Melvi CHRISTOPHER [place] has been provided information and resources to begin or update their advance care plan.  Added by Kathy Galvan             RA (rheumatoid arthritis) (H) 11/25/2015     Priority: Medium     Comprehensive Medical Examination 11/25/2015     Priority: Medium     Seasonal allergic rhinitis 11/25/2015     Priority: Medium     Fibrocystic breast disease (FCBD) 11/25/2015     Priority: Medium     Crohn's disease of large intestine (H) 03/01/2011     Priority: Medium     Dyslipidemia 03/01/2011     Priority: Medium     Sicca syndrome (H) 03/01/2011     Priority: Medium     Polyneuropathy in other diseases classified elsewhere (H) 03/01/2011     Priority: Medium     Neck pain, chronic 07/05/2005     Priority: Medium     05/2015 MRI:  IMPRESSION:  BROAD-BASED DISK PROTRUSION AT C5-C6 ON THE LEFT, MILDLY  IMPINGING ON THE LEFT C6 NERVE ROOT       Low back pain, chronic 12/13/2000     Priority: Medium     03/2016 MRI:  MULTILEVEL DEGENERATIVE CHANGES OF THE LUMBAR SPINE,  SIMILAR IN APPEARANCE TO THE PRIOR STUDY.  A RIGHT FORAMINAL DISK  PROTRUSION AT L3-L4 AGAIN IMPINGES THE EXITING RIGHT L3 NERVE ROOT.  CORRELATE FOR A RELATED RADICULOPATHY.          Past Medical History:    Past Medical History:   Diagnosis Date     Allergic rhinitis, seasonal 3/1/2011     Arthritis      Chronic/Recurrent Back Pain 12/13/2000     Chronic/Recurrent Neck Pain 7/5/2005     Cough variant asthma 4/16/2019     Crohn's Disease 3/1/2011     CTS (carpal tunnel syndrome) 1/1/2011     Depressive disorder      Dyslipidemia 3/1/2011     Fibrocystic Breast Disease 3/1/2011     Mixed hyperlipidemia 1/1/2011     Wilson's neuroma 1/1/2011     Parotiditis 5/9/2011     Peripheral Neuriopathy 3/1/2011     Pneumonia of left lower lobe due to infectious organism (H) 4/16/2019     Rheumatoid arthritis(714.0) 12/13/1999     Seborrhea capitis 10/6/2011     Sjogrens Syndrome 3/1/2011     Urethral stricture 4/30/2008        Past Surgical History:    Past Surgical History:   Procedure Laterality Date     BACK SURGERY  1995    back surgery     BIOPSY      breast bx X2     BIOPSY BREAST      LT     BIOPSY BREAST NEEDLE LOCALIZATION Right 2017    Procedure: BIOPSY BREAST NEEDLE LOCALIZATION;  WIRE LOCALIZED EXCISIONAL BIOPSY  RIGHT BREAST MASS;  Surgeon: Jeni Amin MD;  Location: [facility]     CHOLECYSTECTOMY       COLONOSCOPY  11/15/2004    screening     COLONOSCOPY  2014     EGD with biopsy  ,     GERD     laminectomy      L4 L5 laminectomy; back pain     RELEASE CARPAL TUNNEL Right 2018    Procedure: RELEASE CARPAL TUNNEL;  RIGHT CARPAL TUNNEL RELEASE;  Surgeon: Haider Carlos MD;  Location: [facility]       Family History:    Family History   Problem Relation Age of Onset     Diabetes Mother      Rheumatoid Arthritis Mother      Other - See Comments Mother         fibromyalgia     Osteoarthritis Mother      Thyroid Disease Mother         thyroid disorder     Unknown/Adopted Father         cause of death unknown     Rheumatoid Arthritis Father      Diabetes Sister      Myocardial Infarction Sister         cause of death     Lupus Sister         cause of death     C.A.D. Maternal Grandmother      Cerebrovascular Disease Maternal Grandmother      Diabetes Maternal Grandmother      Thyroid Disease Maternal Grandmother         thyrodi disorder; goitor removed     Cancer Maternal Grandfather      Hypertension Brother      Other - See Comments Brother         sleep apnea     Other - See Comments Sister         sleep apnea       Social History:  Marital Status:   [2]  Social History     Tobacco Use     Smoking status: Former Smoker     Types: Cigarettes     Last attempt to quit: 3/28/1997     Years since quittin.6     Smokeless tobacco: Never Used     Tobacco comment: no passive exposure   Substance Use Topics     Alcohol use: No     Alcohol/week: 0.0 standard drinks     Comment: former. quit       Drug use: No        Medications:    amoxicillin-clavulanate (AUGMENTIN) 875-125 MG tablet  predniSONE (DELTASONE) 20 MG tablet  ADVAIR DISKUS 250-50 MCG/DOSE inhaler  albuterol (PROAIR HFA/PROVENTIL HFA/VENTOLIN HFA) 108 (90 Base) MCG/ACT inhaler  albuterol (PROVENTIL) (2.5 MG/3ML) 0.083% neb solution  ALFALFA PO  ALLEGRA-D ALLERGY & CONGESTION  MG 12 hr tablet  Ascorbic Acid (VITAMIN C) 500 MG CAPS  benzonatate (TESSALON) 200 MG capsule  Calcium-Vitamin D-Vitamin K (CALCIUM + D) 500-1000-40 MG-UNT-MCG CHEW  Cholecalciferol (VITAMIN D) 1000 UNITS capsule  citalopram (CELEXA) 40 MG tablet  diclofenac (VOLTAREN XR) 100 MG 24 hr tablet  Flaxseed, Linseed, (FLAX SEED OIL) 1000 MG capsule  fluticasone (FLONASE) 50 MCG/ACT nasal spray  folic acid (FOLVITE) 1 MG tablet  gabapentin (NEURONTIN) 300 MG capsule  gabapentin (NEURONTIN) 300 MG capsule  Garlic 400 MG TBEC  HYDROcodone-acetaminophen (NORCO) 7.5-325 MG per tablet  hydroxychloroquine (PLAQUENIL) 200 MG tablet  Methotrexate, Anti-Rheumatic, (METHOTREXATE, PF, SC)  MILK THISTLE PO  montelukast (SINGULAIR) 10 MG tablet  Omega-3 1000 MG capsule  order for DME  polyvinyl alcohol-povidone (REFRESH) 1.4-0.6 % ophthalmic solution  PREBIOTIC PRODUCT PO  Probiotic Product (PROBIOTIC PO)  RESTASIS 0.05 % ophthalmic emulsion  Simethicone (SM GAS RELIEF ANTIFLATUENT) 180 MG CAPS  traZODone (DESYREL) 50 MG tablet  UNABLE TO FIND          Review of Systems   Constitutional: Positive for fatigue. Negative for appetite change, chills and fever.   HENT: Positive for congestion, postnasal drip, rhinorrhea, sinus pressure and sinus pain. Negative for ear discharge, ear pain and sore throat.    Eyes: Negative for pain, discharge, redness and itching.   Respiratory: Positive for cough and shortness of breath (after coughing episodes). Negative for wheezing.    Cardiovascular: Negative for chest pain and palpitations.   Gastrointestinal: Negative for abdominal pain, diarrhea,  nausea and vomiting.   Genitourinary: Negative for difficulty urinating.   Musculoskeletal:        Negative for generalized body aches     Skin: Negative for rash.   Neurological: Positive for headaches. Negative for dizziness, syncope and light-headedness.       Physical Exam   BP: 130/69  Heart Rate: 80  Temp: 98.4  F (36.9  C)(APAP at 0730)  Resp: 18  SpO2: 95 %      Physical Exam  Constitutional:       Appearance: She is ill-appearing. She is not toxic-appearing or diaphoretic.   HENT:      Head: Normocephalic and atraumatic.      Right Ear: Ear canal and external ear normal. A middle ear effusion (clear fluid, TM translucent - no erythema, positive light reflex) is present.      Left Ear: Ear canal and external ear normal. A middle ear effusion (clear fluid, TM translucent - no erythema, positive light reflex) is present.      Nose: Mucosal edema and congestion present. No rhinorrhea.      Right Sinus: Maxillary sinus tenderness and frontal sinus tenderness present.      Left Sinus: Maxillary sinus tenderness and frontal sinus tenderness present.      Mouth/Throat:      Lips: Pink.      Mouth: Mucous membranes are moist.      Pharynx: Oropharynx is clear. Uvula midline. No pharyngeal swelling, oropharyngeal exudate, posterior oropharyngeal erythema or uvula swelling.   Neck:      Musculoskeletal: Full passive range of motion without pain and neck supple.   Cardiovascular:      Rate and Rhythm: Normal rate and regular rhythm.      Heart sounds: S1 normal and S2 normal. No murmur. No friction rub. No gallop.    Pulmonary:      Effort: Pulmonary effort is normal. No tachypnea or respiratory distress.      Breath sounds: Examination of the right-middle field reveals decreased breath sounds and wheezing. Examination of the left-middle field reveals decreased breath sounds. Examination of the right-lower field reveals decreased breath sounds and wheezing. Examination of the left-lower field reveals decreased breath  sounds. Decreased breath sounds and wheezing present. No rhonchi or rales.   Lymphadenopathy:      Cervical: No cervical adenopathy.   Skin:     General: Skin is warm and dry.      Capillary Refill: Capillary refill takes less than 2 seconds.      Coloration: Skin is not pale.   Neurological:      Mental Status: She is alert and oriented to person, place, and time.      Motor: No weakness.   Psychiatric:         Mood and Affect: Mood normal.         Speech: Speech normal.         Behavior: Behavior normal. Behavior is cooperative.         ED Course        Procedures         No results found for this or any previous visit (from the past 24 hour(s)).    Medications - No data to display    Assessments & Plan (with Medical Decision Making)     I have reviewed the nursing notes.    I have reviewed the findings, diagnosis, plan and need for follow up with the patient.  (J01.90) Acute sinusitis with symptoms > 10 days  Comment: Acute, symptomatic  Plan: 69 year old female with symptoms of viral upper respiratory infection about 2 weeks ago with now worsening sinus pressure, pain, headaches, worsening productive cough. Symptomatic treatments were helpful but are no longer providing relief. She is immunocompromised due to use of methotrexate in managing rheumatoid arthritis. Last spring she had a long hospital stay due to pneumonia. Given her symptoms and risk factors recommend starting Augmentin and prednisone as directed. Take prednisone with food.    - Take entire course of antibiotic even if you start to feel better.  - Antibiotics can cause stomach upset including nausea and diarrhea. Read your bottle or ask the pharmacist if antibiotic can be taken with food to help prevent nausea. If you have symptoms of diarrhea you can take an over-the-counter probiotic and/or increase foods with probiotics such as yogurt, Adolfo, sauerkraut.    (J20.9) Acute bronchitis with symptoms > 10 days  Comment: Acute, symptomatic  Plan: As  above. CXR not obtained today - She is afebrile, no tachycardia, no respiratory distress, no adventitious lung sounds other than wheezing on right side. Will treat sinusitis and cough with Augmentin and Prednisone. If symptoms do not improve or worsen she will return and discussed imaging can be obtained then.     (Z79.899) Immunosuppression due to drug therapy  Comment: Chronic  Plan: She is not taking methotrexate injection this week due to illness as directed by her rheumatologist.   Warning regarding augmentin increasing methotrexate concentration - again, she will not be taking this week.      Discharge Medication List as of 11/23/2019 11:15 AM      START taking these medications    Details   amoxicillin-clavulanate (AUGMENTIN) 875-125 MG tablet Take 1 tablet by mouth 2 times daily for 10 days, Disp-20 tablet, R-0, E-Prescribe      predniSONE (DELTASONE) 20 MG tablet Take two tablets (= 40mg) each day for 5 (five) days, Disp-10 tablet, R-0, E-Prescribe             Final diagnoses:   Acute sinusitis with symptoms > 10 days   Acute bronchitis with symptoms > 10 days   Immunosuppression due to drug therapy     Gia Huizar CNP  11/23/2019   [facility]     Gia Huizar CNP  11/23/19 [id number]

## 2019-11-23 NOTE — DISCHARGE INSTRUCTIONS
(J01.90) Acute sinusitis with symptoms > 10 days  Comment: Acute, symptomatic  Plan: Start Augmentin and prednisone as directed.  Take prednisone with food.    - Take entire course of antibiotic even if you start to feel better.  - Antibiotics can cause stomach upset including nausea and diarrhea. Read your bottle or ask the pharmacist if antibiotic can be taken with food to help prevent nausea. If you have symptoms of diarrhea you can take an over-the-counter probiotic and/or increase foods with probiotics such as yogurt, Adolfo, sauerkraut.      (J20.9) Acute bronchitis with symptoms > 10 days  Comment: Acute, symptomatic  Plan: CXR not obtained today. Will treat sinusitis and cough with Augmentin and Prednisone. If symptoms do not improve or worsen she will return and discussed imaging can be obtained then.     (Z79.899) Immunosuppression due to drug therapy  Comment: Chronic  Plan: She is not taking methotrexate injection this week due to illness as directed by her rheumatologist.   Warning regarding augmentin increasing methotrexate concentration - again, she will not be taking this week.      Gia Huizar, CNP

## 2019-11-27 NOTE — PATIENT INSTRUCTIONS
Preventive Health Recommendations    See your health care provider every year to    Review health changes.     Discuss preventive care.      Review your medicines if your doctor has prescribed any.      You no longer need a yearly Pap test unless you've had an abnormal Pap test in the past 10 years. If you have vaginal symptoms, such as bleeding or discharge, be sure to talk with your provider about a Pap test.      Every 1 to 2 years, have a mammogram.  If you are over 69, talk with your health care provider about whether or not you want to continue having screening mammograms.      Every 10 years, have a colonoscopy. Or, have a yearly FIT test (stool test). These exams will check for colon cancer.       Have a cholesterol test every 5 years, or more often if your doctor advises it.       Have a diabetes test (fasting glucose) every three years. If you are at risk for diabetes, you should have this test more often.       At age 65, have a bone density scan (DEXA) to check for osteoporosis (brittle bone disease).    Shots:    Get a flu shot each year.    Get a tetanus shot every 10 years.    Talk to your doctor about your pneumonia vaccines. There are now two you should receive - Pneumovax (PPSV 23) and Prevnar (PCV 13).    Talk to your pharmacist about the shingles vaccine.    Talk to your doctor about the hepatitis B vaccine.    Nutrition:     Eat at least 5 servings of fruits and vegetables each day.      Eat whole-grain bread, whole-wheat pasta and brown rice instead of white grains and rice.      Get adequate about Calcium and Vitamin D.     Lifestyle    Exercise at least 150 minutes a week (30 minutes a day, 5 days a week). This will help you control your weight and prevent disease.      Limit alcohol to one drink per day.      No smoking.       Wear sunscreen to prevent skin cancer.       See your dentist twice a year for an exam and cleaning.      See your eye doctor every 1 to 2 years to screen for  conditions such as glaucoma, macular degeneration, cataracts, etc.    Personalized Prevention Plan  You are due for the preventive services outlined below.  Your care team is available to assist you in scheduling these services.  If you have already completed any of these items, please share that information with your care team to update in your medical record.    Health Maintenance Due   Topic Date Due     Zoster (Shingles) Vaccine (1 of 2) 03/02/2002     Asthma Control Test  11/06/2019     Cholesterol Lab  11/14/2019     Diptheria Tetanus Pertussis (DTAP/TDAP/TD) Vaccine (2 - Td) 11/04/2019     Annual Wellness Visit  11/19/2019     Depression Assessment  12/25/2019

## 2019-11-27 NOTE — PROGRESS NOTES
"  SUBJECTIVE:   Melvi Cleveland is a 69 year old female who presents for Preventive Visit.    Physical Health:    In general, how would you rate your overall physical health? fair    Outside of work, how many days during the week do you exercise? 2-3 days/week    Outside of work, approximately how many minutes a day do you exercise?greater than 60 minutes    If you drink alcohol do you typically have >3 drinks per day or >7 drinks per week? Not Applicable    Do you usually eat at least 4 servings of fruit and vegetables a day, include whole grains & fiber and avoid regularly eating high fat or \"junk\" foods? NO    Do you have any problems taking medications regularly?  No    Do you have any side effects from medications? Dexilant- stomach pain, nausea    Needs assistance for the following daily activities: no assistance needed    Which of the following safety concerns are present in your home?  none identified     Hearing impairment: No    In the past 6 months, have you been bothered by leaking of urine? yes    Mental Health:    In general, how would you rate your overall mental or emotional health? good  PHQ-2 Score:      Do you feel safe in your environment? Yes    Have you ever done Advance Care Planning? (For example, a Health Directive, POLST, or a discussion with a medical provider or your loved ones about your wishes): Yes, advance care planning is on file.    Additional concerns to address?  No    Fall risk:  Fallen 2 or more times in the past year?: No  Any fall with injury in the past year?: No  click delete button to remove this line now  Cognitive Screenin) Repeat 3 items (Leader, Season, Table)      2) Clock draw:   NORMAL  3) 3 item recall:   Recalls 3 objects  Results: NORMAL clock, 1-2 items recalled: COGNITIVE IMPAIRMENT LESS LIKELY    Mini-CogTM Copyright S Kajal. Licensed by the author for use in [facility]; reprinted with permission (jc@.[email or web address]). All rights reserved.      Do " you have sleep apnea, excessive snoring or daytime drowsiness?: no      Reviewed and updated as needed this visit by clinical staff  Tobacco  Allergies  Meds  Med Hx  Surg Hx  Fam Hx  Soc Hx        Reviewed and updated as needed this visit by Provider        Social History     Tobacco Use     Smoking status: Former Smoker     Types: Cigarettes     Last attempt to quit: 3/28/1997     Years since quittin.6     Smokeless tobacco: Never Used     Tobacco comment: no passive exposure   Substance Use Topics     Alcohol use: No     Alcohol/week: 0.0 standard drinks     Comment: former. quit                            Current providers sharing in care for this patient include:     Patient Care Team:  Jimbo Martinez MD as PCP - General  Jimbo Martinez MD as Assigned PCP    The following health maintenance items are reviewed in Epic and correct as of today:  Health Maintenance   Topic Date Due     ZOSTER IMMUNIZATION (1 of 2) 2002     ASTHMA CONTROL TEST  2019     LIPID  2019     DTAP/TDAP/TD IMMUNIZATION (2 - Td) 2019     MEDICARE ANNUAL WELLNESS VISIT  2019     PHQ-9  2019     ASTHMA ACTION PLAN  2020     HEATH ASSESSMENT  2020     FALL RISK ASSESSMENT  2020     MAMMO SCREENING  2021     ADVANCE CARE PLANNING  2021     COLONOSCOPY  2027     DEXA  Completed     HEPATITIS C SCREENING  Completed     INFLUENZA VACCINE  Completed     PNEUMOCOCCAL IMMUNIZATION 65+ LOW/MEDIUM RISK  Completed     IPV IMMUNIZATION  Aged Out     MENINGITIS IMMUNIZATION  Aged Out     BP Readings from Last 3 Encounters:   19 116/62   19 130/69   10/21/19 136/70    Wt Readings from Last 3 Encounters:   19 91.6 kg (202 lb)   19 93 kg (205 lb)   19 93.1 kg (205 lb 4 oz)                  Patient Active Problem List   Diagnosis     Neck pain, chronic     Low back pain, chronic     Crohn's disease of large intestine (H)     Dyslipidemia      Sicca syndrome (H)     Polyneuropathy in other diseases classified elsewhere (H)     RA (rheumatoid arthritis) (H)     Comprehensive Medical Examination     Seasonal allergic rhinitis     Fibrocystic breast disease (FCBD)     ACP (advance care planning)     Need for hepatitis C screening test     Cough variant asthma     Past Surgical History:   Procedure Laterality Date     BACK SURGERY  1995    back surgery     BIOPSY      breast bx X2     BIOPSY BREAST      LT     BIOPSY BREAST NEEDLE LOCALIZATION Right 2017    Procedure: BIOPSY BREAST NEEDLE LOCALIZATION;  WIRE LOCALIZED EXCISIONAL BIOPSY  RIGHT BREAST MASS;  Surgeon: Jeni Amin MD;  Location: [facility]     CHOLECYSTECTOMY       COLONOSCOPY  11/15/2004    screening     COLONOSCOPY  2014     EGD with biopsy  ,     GERD     laminectomy      L4 L5 laminectomy; back pain     RELEASE CARPAL TUNNEL Right 2018    Procedure: RELEASE CARPAL TUNNEL;  RIGHT CARPAL TUNNEL RELEASE;  Surgeon: Haider Carlos MD;  Location: [facility] OR       Social History     Tobacco Use     Smoking status: Former Smoker     Types: Cigarettes     Last attempt to quit: 3/28/1997     Years since quittin.6     Smokeless tobacco: Never Used     Tobacco comment: no passive exposure   Substance Use Topics     Alcohol use: No     Alcohol/week: 0.0 standard drinks     Comment: former. quit      Family History   Problem Relation Age of Onset     Diabetes Mother      Rheumatoid Arthritis Mother      Other - See Comments Mother         fibromyalgia     Osteoarthritis Mother      Thyroid Disease Mother         thyroid disorder     Unknown/Adopted Father         cause of death unknown     Rheumatoid Arthritis Father      Diabetes Sister      Myocardial Infarction Sister         cause of death     Lupus Sister         cause of death     C.A.D. Maternal Grandmother      Cerebrovascular Disease Maternal Grandmother      Diabetes Maternal Grandmother      Thyroid Disease  Maternal Grandmother         thyrodi disorder; goitor removed     Cancer Maternal Grandfather      Hypertension Brother      Other - See Comments Brother         sleep apnea     Other - See Comments Sister         sleep apnea         Current Outpatient Medications   Medication Sig Dispense Refill     ADVAIR DISKUS 250-50 MCG/DOSE inhaler INHALE 1 PUFF INTO THE LUNGS EVERY 12 HOURS 60 Inhaler 0     albuterol (PROAIR HFA/PROVENTIL HFA/VENTOLIN HFA) 108 (90 Base) MCG/ACT inhaler INHALE 2 PUFFS INTO THE LUNGS EVERY 6 HOURS AS NEEDED FOR SHORTNESS OF BREATH OR WHEEZING 8.5 g 3     albuterol (PROVENTIL) (2.5 MG/3ML) 0.083% neb solution NEBULIZE THE CONTENTS OF 1 VIAL EVERY 6 HOURS AS NEEDED FOR SHORTNESSOF BREATH OR WHEEZING 75 mL 0     ALFALFA PO Take 5 tablets by mouth 2 times daily.       ALLEGRA-D ALLERGY & CONGESTION  MG 12 hr tablet TAKE 1 TABLET BY MOUTH TWICE DAILY 30 tablet 0     amoxicillin-clavulanate (AUGMENTIN) 875-125 MG tablet Take 1 tablet by mouth 2 times daily for 10 days 20 tablet 0     Ascorbic Acid (VITAMIN C) 500 MG CAPS Take 1 tablet by mouth daily        benzonatate (TESSALON) 200 MG capsule Take 1 capsule (200 mg) by mouth 3 times daily as needed for cough 30 capsule 1     Calcium-Vitamin D-Vitamin K (CALCIUM + D) 500-1000-40 MG-UNT-MCG CHEW Take 3 tablets by mouth daily        Cholecalciferol (VITAMIN D) 1000 UNITS capsule Take 2 capsules by mouth daily        citalopram (CELEXA) 40 MG tablet TAKE 1 TABLET BY MOUTH DAILY 90 tablet 0     diclofenac (VOLTAREN XR) 100 MG 24 hr tablet TAKE 1 TABLET BY MOUTH DAILY 30 tablet 1     Flaxseed, Linseed, (FLAX SEED OIL) 1000 MG capsule Take 2 capsules by mouth daily       fluticasone (FLONASE) 50 MCG/ACT nasal spray Spray 1 spray into both nostrils daily       folic acid (FOLVITE) 1 MG tablet Take 1 mg by mouth 3 times daily.       gabapentin (NEURONTIN) 300 MG capsule TAKE 3 CAPSULES BY MOUTH IN THE MORNING, 2 CAPSULES AT NOON AND 2 CAPSULES IN THE  EVENING 210 capsule 0     Garlic 400 MG TBEC Take 1 tablet by mouth daily        HYDROcodone-acetaminophen (NORCO) 7.5-325 MG per tablet Take 1 tablet by mouth as needed for severe pain Every 4-6 hours as needed.       hydroxychloroquine (PLAQUENIL) 200 MG tablet Take 200 mg by mouth 2 times daily        Methotrexate, Anti-Rheumatic, (METHOTREXATE, PF, SC) Inject 0.6 mLs Subcutaneous 25 mg vial- patient draws up 0.6 ml.        MILK THISTLE PO Take 1,000 mg by mouth daily.       montelukast (SINGULAIR) 10 MG tablet Take 1 tablet (10 mg) by mouth At Bedtime 90 tablet 3     Omega-3 1000 MG capsule Take 1.5 g by mouth daily        order for DME Equipment being ordered: Nebulizer 1 Device 0     pantoprazole (PROTONIX) 40 MG EC tablet        polyvinyl alcohol-povidone (REFRESH) 1.4-0.6 % ophthalmic solution 1-2 drops as needed       PREBIOTIC PRODUCT PO Take 1 tablet by mouth daily       Probiotic Product (PROBIOTIC PO) Take 1 capsule by mouth daily       RESTASIS 0.05 % ophthalmic emulsion USE 1 DROP IN BOTH EYES 2 TIMES A DAY 60 each 3     Simethicone (SM GAS RELIEF ANTIFLATUENT) 180 MG CAPS TAKE ONE CAPSULE BY MOUTH AS NEEDED AFTER A MEAL. MAX 2 CAPS/DAY 60 capsule 11     traZODone (DESYREL) 50 MG tablet Take 150 mg by mouth At Bedtime       UNABLE TO FIND Take 1,000 mg by mouth daily MEDICATION NAME: Quinol/tumeric       DEXILANT 60 MG CPDR CR capsule        Allergies   Allergen Reactions     Adhesive Tape      Glue and nicotine patches     Benzoin Compound      Tin-Co-Javier     Mold      Seasonal Allergies      Soap      Any cleanser/soap/disinfectant that is used right before surgery.  Makes patient break out horribly. Chart was looked at and Chloraprep was used.     Recent Labs   Lab Test 11/04/19  0905 06/04/19  0537 06/02/19  0539  05/29/19  0451  05/28/19  0811  11/14/18  0915  11/15/17  0912  11/22/16  0820   LDL  --   --   --   --   --   --   --   --  104*  --  107*  --  120*   HDL  --   --   --   --   --   --    "--   --  72  --  71  --  64   TRIG  --   --   --   --   --   --   --   --  100  --  125  --  123   ALT 30  --   --   --  23  --  28   < > 35   < > 35   < > 33   CR 1.15* 0.89 0.84   < > 1.15*   < > 1.54*   < > 1.04   < > 1.11*   < > 1.05*   GFRESTIMATED 49* 67 72   < > 49*   < > 35*   < > 53*   < > 49*   < > 53*   GFRESTBLACK 57* 77 84   < > 57*   < > 40*   < > 64   < > 60*   < > 64   POTASSIUM  --  3.6 3.6   < > 4.3   < > 5.0   < > 3.7  --  4.3   < > 4.1   TSH  --   --   --   --   --   --   --   --  1.58  --  2.23  --   --     < > = values in this interval not displayed.      Mammogram Screening: Mammogram Screening: Patient over age 50, mutual decision to screen reflected in health maintenance.    ROS:  Constitutional, HEENT, cardiovascular, pulmonary, gi and gu systems are negative, except as otherwise noted.    OBJECTIVE:   /62 (BP Location: Left arm, Patient Position: Sitting, Cuff Size: Adult Large)   Pulse 68   Temp 97.3  F (36.3  C) (Tympanic)   SpO2 96%  Estimated body mass index is 33.61 kg/m  as calculated from the following:    Height as of 9/30/19: 1.651 m (5' 5\").    Weight as of 9/30/19: 91.6 kg (202 lb).  EXAM:   GENERAL APPEARANCE: healthy, alert and no distress  EYES: Eyes grossly normal to inspection, PERRL and conjunctivae and sclerae normal  HENT: ear canals and TM's normal, nose and mouth without ulcers or lesions, oropharynx clear and oral mucous membranes moist  NECK: no adenopathy, no asymmetry, masses, or scars and thyroid normal to palpation  RESP: lungs clear to auscultation - no rales, rhonchi or wheezes  BREAST: normal without masses, tenderness or nipple discharge and no palpable axillary masses or adenopathy  CV: regular rate and rhythm, normal S1 S2, no S3 or S4, no murmur, click or rub, no peripheral edema and peripheral pulses strong  ABDOMEN: soft, nontender, no hepatosplenomegaly, no masses and bowel sounds normal  MS: no musculoskeletal defects are noted and gait is age " "appropriate without ataxia  SKIN: no suspicious lesions or rashes  NEURO: Normal strength and tone, sensory exam grossly normal, mentation intact and speech normal  PSYCH: mentation appears normal and affect normal/bright    Diagnostic Test Results:    Labs reviewed in Epic    ASSESSMENT / PLAN:   1. Routine general medical examination at a health care facility  General medical exam completed.  Breast exam performed.  Pap and pelvic exam deferred. Mammogram recommended. Screening labs drawn.  Colonoscopy and immunizations reviewed.  Discussed the importance of monthly breast self exam, aspirin use, and osteoporosis prevention . Follow up 1 year.      2. Dyslipidemia  Fasting labs reviewed.  Goals discussed.  Discussed the importance of diet, exercise, and weight reduction.  Follow up 12 months.    - Lipid Profile; Future  - Lipid Profile    3. RA (rheumatoid arthritis) (H)  Stable. Continue follow up with Rheumatology  - UA with Microscopic reflex to Culture    4. Sicca syndrome (H)  As above    5. Polyneuropathy in other diseases classified elsewhere (H)  Stable      COUNSELING:  Reviewed preventive health counseling, as reflected in patient instructions  Special attention given to:       Regular exercise       Healthy diet/nutrition    Estimated body mass index is 33.61 kg/m  as calculated from the following:    Height as of 9/30/19: 1.651 m (5' 5\").    Weight as of 9/30/19: 91.6 kg (202 lb).    Weight management plan: Discussed healthy diet and exercise guidelines     reports that she quit smoking about 22 years ago. Her smoking use included cigarettes. She has never used smokeless tobacco.      Appropriate preventive services were discussed with this patient, including applicable screening as appropriate for cardiovascular disease, diabetes, osteopenia/osteoporosis, and glaucoma.  As appropriate for age/gender, discussed screening for colorectal cancer, prostate cancer, breast cancer, and cervical cancer. " Checklist reviewing preventive services available has been given to the patient.    Reviewed patients plan of care and provided an AVS. The Basic Care Plan (routine screening as documented in Health Maintenance) for Melvi meets the Care Plan requirement. This Care Plan has been established and reviewed with the Patient.    Counseling Resources:  ATP IV Guidelines  Pooled Cohorts Equation Calculator  Breast Cancer Risk Calculator  FRAX Risk Assessment  ICSI Preventive Guidelines  Dietary Guidelines for Americans, 2010  USDA's MyPlate  ASA Prophylaxis  Lung CA Screening    Jimbo Martinez MD  [facility]

## 2019-11-28 PROBLEM — J96.01 ACUTE RESPIRATORY FAILURE WITH HYPOXIA (H): Status: RESOLVED | Noted: 2019-05-28 | Resolved: 2019-11-28

## 2019-11-28 PROBLEM — J18.9 PNEUMONIA OF LEFT LOWER LOBE DUE TO INFECTIOUS ORGANISM: Status: RESOLVED | Noted: 2019-04-16 | Resolved: 2019-11-28

## 2019-11-28 PROBLEM — A41.9 SEPSIS (H): Status: RESOLVED | Noted: 2019-05-28 | Resolved: 2019-11-28

## 2019-11-28 PROBLEM — J18.9 BILATERAL PNEUMONIA: Status: RESOLVED | Noted: 2019-05-28 | Resolved: 2019-11-28

## 2019-11-28 PROBLEM — N17.9 AKI (ACUTE KIDNEY INJURY) (H): Status: RESOLVED | Noted: 2019-05-28 | Resolved: 2019-11-28

## 2019-11-29 ENCOUNTER — OFFICE VISIT (OUTPATIENT)
Dept: FAMILY MEDICINE | Facility: OTHER | Age: 69
End: 2019-11-29
Attending: FAMILY MEDICINE
Payer: COMMERCIAL

## 2019-11-29 VITALS
DIASTOLIC BLOOD PRESSURE: 62 MMHG | OXYGEN SATURATION: 96 % | SYSTOLIC BLOOD PRESSURE: 116 MMHG | TEMPERATURE: 97.3 F | HEART RATE: 68 BPM

## 2019-11-29 DIAGNOSIS — E78.2 MIXED HYPERLIPIDEMIA: ICD-10-CM

## 2019-11-29 DIAGNOSIS — M35.00 SICCA SYNDROME (H): ICD-10-CM

## 2019-11-29 DIAGNOSIS — Z00.00 ROUTINE GENERAL MEDICAL EXAMINATION AT A HEALTH CARE FACILITY: Primary | ICD-10-CM

## 2019-11-29 DIAGNOSIS — G63 POLYNEUROPATHY IN OTHER DISEASES CLASSIFIED ELSEWHERE (H): ICD-10-CM

## 2019-11-29 DIAGNOSIS — M05.79 RHEUMATOID ARTHRITIS INVOLVING MULTIPLE SITES WITH POSITIVE RHEUMATOID FACTOR (H): ICD-10-CM

## 2019-11-29 LAB
ALBUMIN UR-MCNC: NEGATIVE MG/DL
APPEARANCE UR: CLEAR
BACTERIA #/AREA URNS HPF: ABNORMAL /HPF
BILIRUB UR QL STRIP: NEGATIVE
CHOLEST SERPL-MCNC: 211 MG/DL
COLOR UR AUTO: ABNORMAL
GLUCOSE UR STRIP-MCNC: NEGATIVE MG/DL
HDLC SERPL-MCNC: 72 MG/DL
HGB UR QL STRIP: NEGATIVE
KETONES UR STRIP-MCNC: NEGATIVE MG/DL
LDLC SERPL CALC-MCNC: 101 MG/DL
LEUKOCYTE ESTERASE UR QL STRIP: NEGATIVE
MUCOUS THREADS #/AREA URNS LPF: PRESENT /LPF
NITRATE UR QL: NEGATIVE
NONHDLC SERPL-MCNC: 139 MG/DL
PH UR STRIP: 6 PH (ref 4.7–8)
RBC #/AREA URNS AUTO: 2 /HPF (ref 0–2)
SOURCE: ABNORMAL
SP GR UR STRIP: 1.02 (ref 1–1.03)
TRIGL SERPL-MCNC: 189 MG/DL
UROBILINOGEN UR STRIP-MCNC: NORMAL MG/DL (ref 0–2)
WBC #/AREA URNS AUTO: <1 /HPF (ref 0–5)

## 2019-11-29 PROCEDURE — 81001 URINALYSIS AUTO W/SCOPE: CPT | Mod: ZL | Performed by: FAMILY MEDICINE

## 2019-11-29 PROCEDURE — 99397 PER PM REEVAL EST PAT 65+ YR: CPT | Performed by: FAMILY MEDICINE

## 2019-11-29 PROCEDURE — 36415 COLL VENOUS BLD VENIPUNCTURE: CPT | Mod: ZL | Performed by: FAMILY MEDICINE

## 2019-11-29 PROCEDURE — 80061 LIPID PANEL: CPT | Mod: ZL | Performed by: FAMILY MEDICINE

## 2019-11-29 RX ORDER — PANTOPRAZOLE SODIUM 40 MG/1
TABLET, DELAYED RELEASE ORAL 2 TIMES DAILY
COMMUNITY
Start: 2019-08-08 | End: 2022-01-02

## 2019-11-29 RX ORDER — DEXLANSOPRAZOLE 60 MG/1
CAPSULE, DELAYED RELEASE ORAL
COMMUNITY
Start: 2019-11-22 | End: 2020-10-06

## 2019-11-29 ASSESSMENT — ASTHMA QUESTIONNAIRES
QUESTION_1 LAST FOUR WEEKS HOW MUCH OF THE TIME DID YOUR ASTHMA KEEP YOU FROM GETTING AS MUCH DONE AT WORK, SCHOOL OR AT HOME: ALL OF THE TIME
QUESTION_2 LAST FOUR WEEKS HOW OFTEN HAVE YOU HAD SHORTNESS OF BREATH: ONCE OR TWICE A WEEK
QUESTION_4 LAST FOUR WEEKS HOW OFTEN HAVE YOU USED YOUR RESCUE INHALER OR NEBULIZER MEDICATION (SUCH AS ALBUTEROL): ONCE A WEEK OR LESS
QUESTION_3 LAST FOUR WEEKS HOW OFTEN DID YOUR ASTHMA SYMPTOMS (WHEEZING, COUGHING, SHORTNESS OF BREATH, CHEST TIGHTNESS OR PAIN) WAKE YOU UP AT NIGHT OR EARLIER THAN USUAL IN THE MORNING: NOT AT ALL
QUESTION_5 LAST FOUR WEEKS HOW WOULD YOU RATE YOUR ASTHMA CONTROL: WELL CONTROLLED
ACT_TOTALSCORE: 18

## 2019-11-29 ASSESSMENT — ANXIETY QUESTIONNAIRES
5. BEING SO RESTLESS THAT IT IS HARD TO SIT STILL: NOT AT ALL
1. FEELING NERVOUS, ANXIOUS, OR ON EDGE: NOT AT ALL
7. FEELING AFRAID AS IF SOMETHING AWFUL MIGHT HAPPEN: NOT AT ALL
4. TROUBLE RELAXING: NOT AT ALL
3. WORRYING TOO MUCH ABOUT DIFFERENT THINGS: NOT AT ALL
IF YOU CHECKED OFF ANY PROBLEMS ON THIS QUESTIONNAIRE, HOW DIFFICULT HAVE THESE PROBLEMS MADE IT FOR YOU TO DO YOUR WORK, TAKE CARE OF THINGS AT HOME, OR GET ALONG WITH OTHER PEOPLE: NOT DIFFICULT AT ALL
6. BECOMING EASILY ANNOYED OR IRRITABLE: SEVERAL DAYS
2. NOT BEING ABLE TO STOP OR CONTROL WORRYING: NOT AT ALL
GAD7 TOTAL SCORE: 1

## 2019-11-29 ASSESSMENT — PAIN SCALES - GENERAL: PAINLEVEL: NO PAIN (0)

## 2019-11-29 NOTE — NURSING NOTE
"Chief Complaint   Patient presents with     Physical       Initial /62 (BP Location: Left arm, Patient Position: Sitting, Cuff Size: Adult Large)   Pulse 68   Temp 97.3  F (36.3  C) (Tympanic)   SpO2 96%  Estimated body mass index is 33.61 kg/m  as calculated from the following:    Height as of 9/30/19: 1.651 m (5' 5\").    Weight as of 9/30/19: 91.6 kg (202 lb).  Medication Reconciliation: complete  Ashley A. Lechevalier, LPN  "

## 2019-11-30 ASSESSMENT — ANXIETY QUESTIONNAIRES: GAD7 TOTAL SCORE: 1

## 2019-11-30 ASSESSMENT — ASTHMA QUESTIONNAIRES: ACT_TOTALSCORE: 18

## 2019-12-02 ENCOUNTER — MEDICAL CORRESPONDENCE (OUTPATIENT)
Dept: HEALTH INFORMATION MANAGEMENT | Facility: CLINIC | Age: 69
End: 2019-12-02

## 2019-12-04 DIAGNOSIS — R09.02 HYPOXEMIA: Primary | ICD-10-CM

## 2019-12-04 DIAGNOSIS — F51.01 PRIMARY INSOMNIA: ICD-10-CM

## 2019-12-05 ENCOUNTER — OFFICE VISIT (OUTPATIENT)
Dept: SLEEP MEDICINE | Facility: HOSPITAL | Age: 69
End: 2019-12-05
Attending: INTERNAL MEDICINE
Payer: MEDICARE

## 2019-12-05 DIAGNOSIS — G47.33 OSA (OBSTRUCTIVE SLEEP APNEA): Primary | ICD-10-CM

## 2019-12-05 PROCEDURE — 99207 ZZC NO CHARGE NURSE ONLY: CPT

## 2019-12-06 ENCOUNTER — DOCUMENTATION ONLY (OUTPATIENT)
Dept: SLEEP MEDICINE | Facility: HOSPITAL | Age: 69
End: 2019-12-06
Attending: INTERNAL MEDICINE
Payer: MEDICARE

## 2019-12-06 PROCEDURE — 99207 ZZC NO CHARGE NURSE ONLY: CPT

## 2019-12-06 NOTE — PROGRESS NOTES
Patient returned the oximeter and the data was downloaded and the report sent to Dr Lomeli and to be scanned

## 2019-12-09 RX ORDER — TRAZODONE HYDROCHLORIDE 50 MG/1
TABLET, FILM COATED ORAL
Qty: 270 TABLET | Refills: 1 | Status: SHIPPED | OUTPATIENT
Start: 2019-12-09 | End: 2020-06-03

## 2019-12-21 DIAGNOSIS — F41.0 PANIC DISORDER WITHOUT AGORAPHOBIA: ICD-10-CM

## 2019-12-21 DIAGNOSIS — R69 DIAGNOSIS UNKNOWN: ICD-10-CM

## 2019-12-21 DIAGNOSIS — J30.2 SEASONAL ALLERGIC RHINITIS, UNSPECIFIED TRIGGER: ICD-10-CM

## 2019-12-23 RX ORDER — FEXOFENADINE HYDROCHLORIDE AND PSEUDOEPHEDRINE HYDROCHLORIDE 60; 120 MG/1; MG/1
TABLET, FILM COATED, EXTENDED RELEASE ORAL
Qty: 60 TABLET | Refills: 1 | Status: SHIPPED | OUTPATIENT
Start: 2019-12-23 | End: 2020-04-21

## 2019-12-23 RX ORDER — CITALOPRAM HYDROBROMIDE 40 MG/1
TABLET ORAL
Qty: 90 TABLET | Refills: 1 | Status: SHIPPED | OUTPATIENT
Start: 2019-12-23 | End: 2020-06-23

## 2019-12-23 RX ORDER — ALBUTEROL SULFATE 0.83 MG/ML
SOLUTION RESPIRATORY (INHALATION)
Qty: 75 ML | Refills: 0 | Status: SHIPPED | OUTPATIENT
Start: 2019-12-23 | End: 2023-03-16 | Stop reason: ALTCHOICE

## 2019-12-23 NOTE — TELEPHONE ENCOUNTER
ALLEGRA-D ALLERGY & CONGESTION  MG 12 hr tablet      Last Written Prescription Date:  11/22/19  Last Fill Quantity: 30,   # refills: 0  Last Office Visit: 11/29/19  Future Office visit:       Routing refill request to provider for review/approval because:  Drug not on the [facility], P or [facility] refill protocol or controlled substance

## 2019-12-26 DIAGNOSIS — G62.9 PERIPHERAL POLYNEUROPATHY: ICD-10-CM

## 2019-12-26 NOTE — TELEPHONE ENCOUNTER
Gabapentin   Last Office Visit: 11/29/2019  Last Refill Date:11/22/2019  # 210          Refills  0      Thank you!

## 2019-12-27 RX ORDER — GABAPENTIN 300 MG/1
CAPSULE ORAL
Qty: 210 CAPSULE | Refills: 0 | Status: SHIPPED | OUTPATIENT
Start: 2019-12-27 | End: 2020-01-31

## 2020-01-02 DIAGNOSIS — H04.123 BILATERAL DRY EYES: ICD-10-CM

## 2020-01-03 RX ORDER — CYCLOSPORINE 0.5 MG/ML
EMULSION OPHTHALMIC
Qty: 60 EACH | Refills: 0 | Status: SHIPPED | OUTPATIENT
Start: 2020-01-03 | End: 2020-04-21

## 2020-01-13 ENCOUNTER — ANESTHESIA EVENT (OUTPATIENT)
Dept: SURGERY | Facility: HOSPITAL | Age: 70
End: 2020-01-13
Payer: MEDICARE

## 2020-01-14 ASSESSMENT — LIFESTYLE VARIABLES: TOBACCO_USE: 1

## 2020-01-14 NOTE — ANESTHESIA PREPROCEDURE EVALUATION
Anesthesia Pre-Procedure Evaluation    Patient: Mlevi Cleveland   MRN: [id number] : 1950          Preoperative Diagnosis: Hx of colonic polyps [Z86.010]    Procedure(s):  UPPER ENDOSCOPY AND COLONOSCOPY    Past Medical History:   Diagnosis Date     Allergic rhinitis, seasonal 3/1/2011     Arthritis      Chronic/Recurrent Back Pain 2000     Chronic/Recurrent Neck Pain 2005     Cough variant asthma 2019     Crohn's Disease 3/1/2011     CTS (carpal tunnel syndrome) 2011     Depressive disorder      Dyslipidemia 3/1/2011     Fibrocystic Breast Disease 3/1/2011     Mixed hyperlipidemia 2011     Wilson's neuroma 2011     Parotiditis 2011     Peripheral Neuriopathy 3/1/2011     Pneumonia of left lower lobe due to infectious organism (H) 2019     Rheumatoid arthritis(714.0) 1999     Seborrhea capitis 10/6/2011     Sjogrens Syndrome 3/1/2011     Urethral stricture 2008     Past Surgical History:   Procedure Laterality Date     BACK SURGERY  1995    back surgery     BIOPSY      breast bx X2     BIOPSY BREAST      LT     BIOPSY BREAST NEEDLE LOCALIZATION Right 2017    Procedure: BIOPSY BREAST NEEDLE LOCALIZATION;  WIRE LOCALIZED EXCISIONAL BIOPSY  RIGHT BREAST MASS;  Surgeon: Jeni Amin MD;  Location: [facility]     CHOLECYSTECTOMY       COLONOSCOPY  11/15/2004    screening     COLONOSCOPY  2014     EGD with biopsy  ,     GERD     laminectomy      L4 L5 laminectomy; back pain     RELEASE CARPAL TUNNEL Right 2018    Procedure: RELEASE CARPAL TUNNEL;  RIGHT CARPAL TUNNEL RELEASE;  Surgeon: Haider Carlos MD;  Location: [facility]       Anesthesia Evaluation     . Pt has had prior anesthetic. Type: General    No history of anesthetic complications          ROS/MED HX    ENT/Pulmonary: Comment: Sjogrens Syndrome/Sicca syndrome    (+)RON risk factors snores loudly, daytime somnolence, allergic rhinitis, tobacco use (quit 26 years ago), Past use ,  recent URI resolved last had symptoms over a week ago, on home oxygen at night: . .    Neurologic: Comment: Peripheral neuniopathy    (+)neuropathy - bilateral hands and feet, migraines (several years since last migraine), other neuro CTS    Cardiovascular:     (+) Dyslipidemia, ----. : . . . :. . Previous cardiac testing date:results:date: results:ECG reviewed date:1- results:appears normal, NSR, normal axis, normal intervals, no acute ST/T changes c/w ischemia, no LVH by voltage criteria, unchanged from previous tracings date: results:          METS/Exercise Tolerance: Comment: SOB when walking up a slight hill >4 METS   Hematologic:  - neg hematologic  ROS       Musculoskeletal: Comment: Chronic recurrent neck pain  Wilson's neuroma  Rheumatoid arthritis  (+) arthritis (RA and OA),  other musculoskeletal- Chronic LBP      GI/Hepatic: Comment: Chrohn's Disease    (+) GERD Asymptomatic on medication, Inflammatory bowel disease, bowel prep,       Renal/Genitourinary:     (+) Other Renal/ Genitourinary, urethral stricture      Endo:     (+) Obesity, .      Psychiatric:     (+) psychiatric history depression      Infectious Disease:  - neg infectious disease ROS       Malignancy:   (+)   Fibrocystic breat disease     - no malignancy   Other: Comment: Norco, 3 a day for the last week   (+) H/O Chronic Pain,H/O chronic opiod use ,                         Physical Exam  Normal systems: cardiovascular, pulmonary and dental    Airway   Mallampati: II  TM distance: >3 FB  Neck ROM: full    Dental     Cardiovascular   Rhythm and rate: regular and normal      Pulmonary    breath sounds clear to auscultation            Lab Results   Component Value Date    WBC 5.1 11/04/2019    HGB 12.4 11/04/2019    HCT 38.0 11/04/2019     11/04/2019    CRP <2.9 11/04/2019    SED 15 11/04/2019     06/04/2019    POTASSIUM 3.6 06/04/2019    CHLORIDE 108 06/04/2019    CO2 27 06/04/2019    BUN 12 06/04/2019    CR 1.15 (H)  "11/04/2019    GLC 87 06/04/2019    KATHLEEN 8.7 06/04/2019    ALBUMIN 3.4 11/04/2019    PROTTOTAL 5.6 (L) 05/29/2019    ALT 30 11/04/2019    AST 16 05/29/2019    ALKPHOS 62 05/29/2019    BILITOTAL 0.4 05/29/2019    LIPASE 44 (L) 09/01/2017    AMYLASE 53 09/01/2017    TSH 1.58 11/14/2018       Preop Vitals  BP Readings from Last 3 Encounters:   11/29/19 116/62   11/23/19 130/69   10/21/19 136/70    Pulse Readings from Last 3 Encounters:   11/29/19 68   10/21/19 67   09/30/19 76      Resp Readings from Last 3 Encounters:   11/23/19 18   10/21/19 18   09/30/19 17    SpO2 Readings from Last 3 Encounters:   11/29/19 96%   11/23/19 95%   10/21/19 93%      Temp Readings from Last 1 Encounters:   11/29/19 97.3  F (36.3  C) (Tympanic)    Ht Readings from Last 1 Encounters:   09/30/19 1.651 m (5' 5\")      Wt Readings from Last 1 Encounters:   09/30/19 91.6 kg (202 lb)    Estimated body mass index is 33.61 kg/m  as calculated from the following:    Height as of 9/30/19: 1.651 m (5' 5\").    Weight as of 9/30/19: 91.6 kg (202 lb).       Anesthesia Plan      History & Physical Review  History and physical reviewed and following examination; no interval change.    ASA Status:  3 .    NPO Status:  > 8 hours    Plan for MAC with Intravenous and Propofol induction. Maintenance will be TIVA.  Reason for MAC:  Deep or markedly invasive procedure (G8)    No hp      Postoperative Care      Consents  Anesthetic plan, risks, benefits and alternatives discussed with:  Patient.  Use of blood products discussed: Yes.   Use of blood products discussed with Patient.  Consented to blood products.  .                 Paxton Nguyen, KNEDRA CRNA  "

## 2020-01-17 ENCOUNTER — HOSPITAL ENCOUNTER (OUTPATIENT)
Facility: HOSPITAL | Age: 70
Discharge: HOME | End: 2020-01-17
Attending: INTERNAL MEDICINE | Admitting: INTERNAL MEDICINE
Payer: MEDICARE

## 2020-01-17 ENCOUNTER — ANESTHESIA (OUTPATIENT)
Dept: SURGERY | Facility: HOSPITAL | Age: 70
End: 2020-01-17
Payer: MEDICARE

## 2020-01-17 VITALS
RESPIRATION RATE: 16 BRPM | TEMPERATURE: 98 F | DIASTOLIC BLOOD PRESSURE: 64 MMHG | SYSTOLIC BLOOD PRESSURE: 121 MMHG | OXYGEN SATURATION: 94 % | HEART RATE: 80 BPM

## 2020-01-17 PROCEDURE — 71000027 ZZH RECOVERY PHASE 2 EACH 15 MINS: Performed by: INTERNAL MEDICINE

## 2020-01-17 PROCEDURE — 25000128 H RX IP 250 OP 636: Performed by: NURSE ANESTHETIST, CERTIFIED REGISTERED

## 2020-01-17 PROCEDURE — 40000306 ZZH STATISTIC PRE PROC ASSESS II: Performed by: INTERNAL MEDICINE

## 2020-01-17 PROCEDURE — 88305 TISSUE EXAM BY PATHOLOGIST: CPT | Mod: TC | Performed by: INTERNAL MEDICINE

## 2020-01-17 PROCEDURE — 43239 EGD BIOPSY SINGLE/MULTIPLE: CPT | Performed by: NURSE ANESTHETIST, CERTIFIED REGISTERED

## 2020-01-17 PROCEDURE — 25800030 ZZH RX IP 258 OP 636: Performed by: NURSE ANESTHETIST, CERTIFIED REGISTERED

## 2020-01-17 PROCEDURE — 36000050 ZZH SURGERY LEVEL 2 1ST 30 MIN: Performed by: INTERNAL MEDICINE

## 2020-01-17 PROCEDURE — 25000125 ZZHC RX 250: Performed by: NURSE ANESTHETIST, CERTIFIED REGISTERED

## 2020-01-17 PROCEDURE — 27210794 ZZH OR GENERAL SUPPLY STERILE: Performed by: INTERNAL MEDICINE

## 2020-01-17 PROCEDURE — 37000008 ZZH ANESTHESIA TECHNICAL FEE, 1ST 30 MIN: Performed by: INTERNAL MEDICINE

## 2020-01-17 RX ORDER — LIDOCAINE 40 MG/G
CREAM TOPICAL
Status: DISCONTINUED | OUTPATIENT
Start: 2020-01-17 | End: 2020-01-17 | Stop reason: HOSPADM

## 2020-01-17 RX ORDER — SODIUM CHLORIDE, SODIUM LACTATE, POTASSIUM CHLORIDE, CALCIUM CHLORIDE 600; 310; 30; 20 MG/100ML; MG/100ML; MG/100ML; MG/100ML
INJECTION, SOLUTION INTRAVENOUS CONTINUOUS
Status: DISCONTINUED | OUTPATIENT
Start: 2020-01-17 | End: 2020-01-17 | Stop reason: HOSPADM

## 2020-01-17 RX ORDER — ONDANSETRON 4 MG/1
4 TABLET, ORALLY DISINTEGRATING ORAL EVERY 30 MIN PRN
Status: DISCONTINUED | OUTPATIENT
Start: 2020-01-17 | End: 2020-01-17 | Stop reason: HOSPADM

## 2020-01-17 RX ORDER — NALOXONE HYDROCHLORIDE 0.4 MG/ML
.1-.4 INJECTION, SOLUTION INTRAMUSCULAR; INTRAVENOUS; SUBCUTANEOUS
Status: DISCONTINUED | OUTPATIENT
Start: 2020-01-17 | End: 2020-01-17 | Stop reason: HOSPADM

## 2020-01-17 RX ORDER — PROPOFOL 10 MG/ML
INJECTION, EMULSION INTRAVENOUS PRN
Status: DISCONTINUED | OUTPATIENT
Start: 2020-01-17 | End: 2020-01-17

## 2020-01-17 RX ORDER — LIDOCAINE HYDROCHLORIDE 20 MG/ML
INJECTION, SOLUTION INFILTRATION; PERINEURAL PRN
Status: DISCONTINUED | OUTPATIENT
Start: 2020-01-17 | End: 2020-01-17

## 2020-01-17 RX ORDER — ONDANSETRON 2 MG/ML
4 INJECTION INTRAMUSCULAR; INTRAVENOUS EVERY 30 MIN PRN
Status: DISCONTINUED | OUTPATIENT
Start: 2020-01-17 | End: 2020-01-17 | Stop reason: HOSPADM

## 2020-01-17 RX ORDER — MEPERIDINE HYDROCHLORIDE 50 MG/ML
12.5 INJECTION INTRAMUSCULAR; INTRAVENOUS; SUBCUTANEOUS
Status: DISCONTINUED | OUTPATIENT
Start: 2020-01-17 | End: 2020-01-17 | Stop reason: HOSPADM

## 2020-01-17 RX ADMIN — PROPOFOL 30 MG: 10 INJECTION, EMULSION INTRAVENOUS at 10:08

## 2020-01-17 RX ADMIN — PROPOFOL 30 MG: 10 INJECTION, EMULSION INTRAVENOUS at 10:16

## 2020-01-17 RX ADMIN — PROPOFOL 30 MG: 10 INJECTION, EMULSION INTRAVENOUS at 10:13

## 2020-01-17 RX ADMIN — PROPOFOL 30 MG: 10 INJECTION, EMULSION INTRAVENOUS at 10:03

## 2020-01-17 RX ADMIN — LIDOCAINE HYDROCHLORIDE 40 MG: 20 INJECTION, SOLUTION INFILTRATION; PERINEURAL at 10:00

## 2020-01-17 RX ADMIN — PROPOFOL 30 MG: 10 INJECTION, EMULSION INTRAVENOUS at 10:06

## 2020-01-17 RX ADMIN — PROPOFOL 30 MG: 10 INJECTION, EMULSION INTRAVENOUS at 10:10

## 2020-01-17 RX ADMIN — PROPOFOL 30 MG: 10 INJECTION, EMULSION INTRAVENOUS at 10:00

## 2020-01-17 RX ADMIN — SODIUM CHLORIDE, POTASSIUM CHLORIDE, SODIUM LACTATE AND CALCIUM CHLORIDE: 600; 310; 30; 20 INJECTION, SOLUTION INTRAVENOUS at 09:37

## 2020-01-17 NOTE — ANESTHESIA CARE TRANSFER NOTE
Patient: Melvi Cleveland    Procedure(s):  UPPER ENDOSCOPY AND COLONOSCOPY    Diagnosis: Hx of colonic polyps [Z86.010]  Diagnosis Additional Information: No value filed.    Anesthesia Type:   MAC     Note:  Airway :Nasal Cannula  Patient transferred to:Phase II  Handoff Report: Identifed the Patient, Identified the Reponsible Provider, Reviewed the pertinent medical history, Discussed the surgical course, Reviewed Intra-OP anesthesia mangement and issues during anesthesia, Set expectations for post-procedure period and Allowed opportunity for questions and acknowledgement of understanding      Vitals: (Last set prior to Anesthesia Care Transfer)    CRNA VITALS  1/17/2020 0949 - 1/17/2020 1022      1/17/2020             Pulse:  79    Ht Rate:  78    SpO2:  97 %    Resp Rate (set):  8                Electronically Signed By: KENDRA Blanco CRNA  January 17, 2020  10:22 AM

## 2020-01-17 NOTE — DISCHARGE INSTRUCTIONS
UPPER ENDOSCOPY    AFTER THE PROCEDURE    You will return to Same Day Surgery to rest for about an hour before you go home.    The doctor will talk with you and your family.    A family member/friend may visit with you.    You may burp up any air remaining in your stomach.    You may feel dizzy or light-headed from the medicine.    Your nurse will go over the discharge instructions with you and your caregiver and answer any of your questions.      You will be contacted the next day to check on how you are doing.    If biopsies were taken, you will be contacted with the results usually within 3 days.  BACK AT HOME    Rest for an hour or two after you get home.    When your throat is no longer numb and you have a gag reflex, take a few sips of cool water.  If you can swallow comfortably, you may start eating again.    You may have a mild sore throat for the rest of the day.    You will be contacted with results by the surgeons office within a week. If not contacted in one week, call the surgeons office.  WHAT TO WATCH FOR:  Problems rarely occur after the exam, but it is important to be aware of the early signs of a complication.  Call your doctor immediately if you have:    Difficulty swallowing or breathing    Unusual pain in your stomach or chest    Vomiting blood or dark material that looks like coffee grounds    Black or tarry stools    Temperature over 101.5 degrees    MORE QUESTIONS?  Please ask your doctor or nurse before the exam begins  or call your doctor at the clinic.    IF YOU MUST CANCEL YOUR PROCEDURE THE EVENING/NIGHT BEFORE, PLEASE CALL HOSPITAL ADMITTING -[phone] OR TOLL FREE [phone], [phone].    Phone Numbers:  [facility] - [phone]  [facility] - [phone]  Surgery Patient Education - [phone] or toll free [phone]    INSTRUCTIONS AFTER COLONOSCOPY    WHEN YOU ARE BACK HOME:    Plan to rest for an hour or two after you get home.    You may have  some cramping or pressure until you pass gas.    You may resume your regular medications.    Eat a small, light meal at first, and then gradually return to normal meal sizes.    You will be contacted the next day to see how you are doing.  If you had a polyp removed:    Slight bleeding may occur.  You may have a slight blood stain on the toilet paper after a bowel movement.    To lessen the chance of bleeding, avoid heavy exercise for ONE WEEK.  This includes heavy lifting, vigorous sport activities, and heavy physical labor.  You may resume your normal sexual activity.      Avoid aspirin or aspirin products if instructed by your doctor.    If there is a polyp or biopsy, you will be contacted with results within one week.     WHAT TO WATCH FOR:  Problems rarely occur after the exam; however, it is important for you to watch for early signs of possible problems.  If you have     Unusual pain in your abdomen    Nausea and vomiting that persists    Excessive bleeding    Black or bloody bowel movements    Fever or temperature above 100.6 F  Please call your doctor ([facility] [phone]) or go to the nearest hospital emergency room.    Post-Anesthesia Patient Instructions    IMMEDIATELY FOLLOWING SURGERY:  Do not drive or operate machinery for the first twenty four hours after surgery.  Do not make any important decisions for twenty four hours after surgery or while taking narcotic pain medications or sedatives.  If you develop intractable nausea and vomiting or a severe headache please notify your doctor immediately.    FOLLOW-UP:  Please make an appointment with your surgeon as instructed. You do not need to follow up with anesthesia unless specifically instructed to do so.    WOUND CARE INSTRUCTIONS (if applicable):  Keep a dry clean dressing on the anesthesia/puncture wound site if there is drainage.  Once the wound has quit draining you may leave it open to air.  Generally you should leave the bandage intact  for twenty four hours unless there is drainage.  If the epidural site drains for more than 36-48 hours please call the anesthesia department.    QUESTIONS?:  Please feel free to call your physician or the hospital  if you have any questions, and they will be happy to assist you.

## 2020-01-17 NOTE — H&P
Pre-Endoscopy History and Physical     Melvi Cleveland MRN# [id number]   YOB: 1950 Age: 69 year old     Primary care provider: Jimbo Martinez  Type of Endoscopy: Colonoscopy with possible biopsy, possible polypectomy  Reason for Procedure: History of polyps  Type of Anesthesia Anticipated: MAC    HPI:    Melvi is a 69 year old female who will be undergoing the above procedure.      A history and physical has been performed. The patient's medications and allergies have been reviewed. The risks and benefits of the procedure and the sedation options and risks were discussed with the patient.  All questions were answered and informed consent was obtained.      She denies a personal or family history of anesthesia complications or bleeding disorders.     Patient Active Problem List   Diagnosis     Neck pain, chronic     Low back pain, chronic     Crohn's disease of large intestine (H)     Dyslipidemia     Sicca syndrome (H)     Polyneuropathy in other diseases classified elsewhere (H)     RA (rheumatoid arthritis) (H)     Comprehensive Medical Examination     Seasonal allergic rhinitis     Fibrocystic breast disease (FCBD)     ACP (advance care planning)     Need for hepatitis C screening test     Cough variant asthma        Past Medical History:   Diagnosis Date     Allergic rhinitis, seasonal 3/1/2011     Arthritis      Chronic/Recurrent Back Pain 12/13/2000     Chronic/Recurrent Neck Pain 7/5/2005     Cough variant asthma 4/16/2019     Crohn's Disease 3/1/2011     CTS (carpal tunnel syndrome) 1/1/2011     Depressive disorder      Dyslipidemia 3/1/2011     Fibrocystic Breast Disease 3/1/2011     Mixed hyperlipidemia 1/1/2011     Wilson's neuroma 1/1/2011     Parotiditis 5/9/2011     Peripheral Neuriopathy 3/1/2011     Pneumonia of left lower lobe due to infectious organism (H) 4/16/2019     Rheumatoid arthritis(714.0) 12/13/1999     Seborrhea capitis 10/6/2011     Sjogrens Syndrome 3/1/2011      Urethral stricture 2008        Past Surgical History:   Procedure Laterality Date     BACK SURGERY  1995    back surgery     BIOPSY      breast bx X2     BIOPSY BREAST      LT     BIOPSY BREAST NEEDLE LOCALIZATION Right 2017    Procedure: BIOPSY BREAST NEEDLE LOCALIZATION;  WIRE LOCALIZED EXCISIONAL BIOPSY  RIGHT BREAST MASS;  Surgeon: Jeni Amin MD;  Location: [facility] OR     CHOLECYSTECTOMY       COLONOSCOPY  11/15/2004    screening     COLONOSCOPY  2014     EGD with biopsy  ,     GERD     laminectomy      L4 L5 laminectomy; back pain     RELEASE CARPAL TUNNEL Right 2018    Procedure: RELEASE CARPAL TUNNEL;  RIGHT CARPAL TUNNEL RELEASE;  Surgeon: Haider Carlos MD;  Location: [facility] OR       Social History     Tobacco Use     Smoking status: Former Smoker     Types: Cigarettes     Last attempt to quit: 3/28/1997     Years since quittin.8     Smokeless tobacco: Never Used     Tobacco comment: no passive exposure   Substance Use Topics     Alcohol use: No     Alcohol/week: 0.0 standard drinks     Comment: former. quit 1984       Family History   Problem Relation Age of Onset     Diabetes Mother      Rheumatoid Arthritis Mother      Other - See Comments Mother         fibromyalgia     Osteoarthritis Mother      Thyroid Disease Mother         thyroid disorder     Unknown/Adopted Father         cause of death unknown     Rheumatoid Arthritis Father      Diabetes Sister      Myocardial Infarction Sister         cause of death     Lupus Sister         cause of death     C.A.D. Maternal Grandmother      Cerebrovascular Disease Maternal Grandmother      Diabetes Maternal Grandmother      Thyroid Disease Maternal Grandmother         thyrodi disorder; goitor removed     Cancer Maternal Grandfather      Hypertension Brother      Other - See Comments Brother         sleep apnea     Other - See Comments Sister         sleep apnea       Prior to Admission medications    Medication Sig Start  Date End Date Taking? Authorizing Provider   ALFALFA PO Take 5 tablets by mouth 2 times daily.   Yes Reported, Patient   ALLEGRA-D ALLERGY & CONGESTION  MG 12 hr tablet TAKE 1 TABLET BY MOUTH TWICE DAILY 12/23/19  Yes BARRETT Gurera MD   Ascorbic Acid (VITAMIN C) 500 MG CAPS Take 1 tablet by mouth daily    Yes Reported, Patient   Calcium-Vitamin D-Vitamin K (CALCIUM + D) 500-1000-40 MG-UNT-MCG CHEW Take 3 tablets by mouth daily    Yes Reported, Patient   Cholecalciferol (VITAMIN D) 1000 UNITS capsule Take 2 capsules by mouth daily    Yes Reported, Patient   citalopram (CELEXA) 40 MG tablet TAKE 1 TABLET BY MOUTH DAILY 12/23/19  Yes Jimbo Martinez MD   diclofenac (VOLTAREN XR) 100 MG 24 hr tablet TAKE 1 TABLET BY MOUTH DAILY 12/27/19  Yes Jimbo Martinez MD   Flaxseed, Linseed, (FLAX SEED OIL) 1000 MG capsule Take 2 capsules by mouth daily   Yes Reported, Patient   fluticasone (FLONASE) 50 MCG/ACT nasal spray Spray 1 spray into both nostrils daily   Yes Reported, Patient   folic acid (FOLVITE) 1 MG tablet Take 1 mg by mouth 3 times daily.   Yes Reported, Patient   gabapentin (NEURONTIN) 300 MG capsule TAKE 3 CAPSULES BY MOUTH IN THE MORNING, 2 CAPSULES AT NOON AND 2 CAPSULES IN THE EVENING 12/27/19  Yes Jimbo Martinez MD   Garlic 400 MG TBEC Take 1 tablet by mouth daily    Yes Reported, Patient   HYDROcodone-acetaminophen (NORCO) 7.5-325 MG per tablet Take 1 tablet by mouth as needed for severe pain Every 4-6 hours as needed.   Yes Reported, Patient   hydroxychloroquine (PLAQUENIL) 200 MG tablet Take 200 mg by mouth 2 times daily    Yes Reported, Patient   Methotrexate, Anti-Rheumatic, (METHOTREXATE, PF, SC) Inject 0.6 mLs Subcutaneous 25 mg vial- patient draws up 0.6 ml.    Yes Reported, Patient   MILK THISTLE PO Take 1,000 mg by mouth daily.   Yes Reported, Patient   montelukast (SINGULAIR) 10 MG tablet Take 1 tablet (10 mg) by mouth At Bedtime 4/16/19  Yes Romeo Krishna MD   Omega-3 1000 MG  capsule Take 1.5 g by mouth daily    Yes Reported, Patient   order for DME Equipment being ordered: Nebulizer 6/19/18  Yes Susana Aldridge APRN CNP   pantoprazole (PROTONIX) 40 MG EC tablet  8/8/19  Yes Reported, Patient   polyvinyl alcohol-povidone (REFRESH) 1.4-0.6 % ophthalmic solution 1-2 drops as needed   Yes Reported, Patient   PREBIOTIC PRODUCT PO Take 1 tablet by mouth daily   Yes Reported, Patient   Probiotic Product (PROBIOTIC PO) Take 1 capsule by mouth daily   Yes Reported, Patient   RESTASIS 0.05 % ophthalmic emulsion USE 1 DROP IN BOTH EYES 2 TIMES A DAY 1/3/20  Yes Jimbo Martinez MD   Simethicone (SM GAS RELIEF ANTIFLATUENT) 180 MG CAPS TAKE ONE CAPSULE BY MOUTH AS NEEDED AFTER A MEAL. MAX 2 CAPS/DAY 8/29/18  Yes Jimbo Martinez MD   traZODone (DESYREL) 50 MG tablet TAKE 2 TO 3 TABLETS BY MOUTH DAILY AT BEDTIME 12/9/19  Yes Jimbo Martinez MD   traZODone (DESYREL) 50 MG tablet Take 150 mg by mouth At Bedtime   Yes Reported, Patient   UNABLE TO FIND Take 1,000 mg by mouth daily MEDICATION NAME: Quinol/tumeric   Yes Reported, Patient   ADVAIR DISKUS 250-50 MCG/DOSE inhaler INHALE 1 PUFF INTO THE LUNGS EVERY 12 HOURS 10/21/19   Romeo Krishna MD   albuterol (PROAIR HFA/PROVENTIL HFA/VENTOLIN HFA) 108 (90 Base) MCG/ACT inhaler INHALE 2 PUFFS INTO THE LUNGS EVERY 6 HOURS AS NEEDED FOR SHORTNESS OF BREATH OR WHEEZING 10/2/19   Jimbo Martinez MD   albuterol (PROVENTIL) (2.5 MG/3ML) 0.083% neb solution NEBULIZE THE CONTENTS OF 1 VIAL EVERY 6 HOURS AS NEEDED FOR SHORTNESSOF BREATH/DYSPNEA/WHEEZING 12/23/19   Jimbo Martinez MD   amoxicillin-clavulanate (AUGMENTIN) 875-125 MG tablet Take 1 tablet by mouth 2 times daily for 10 days 11/23/19 12/3/19  Gia Huizar CNP   benzonatate (TESSALON) 200 MG capsule Take 1 capsule (200 mg) by mouth 3 times daily as needed for cough 5/6/19   Romeo Krishna MD   DEXILANT 60 MG CPDR CR capsule  11/22/19   Reported, Patient       Allergies  "  Allergen Reactions     Adhesive Tape      Glue and nicotine patches     Benzoin Compound      Tin-Co-Javier     Mold      Seasonal Allergies      Soap      Any cleanser/soap/disinfectant that is used right before surgery.  Makes patient break out horribly. Chart was looked at and Chloraprep was used.        REVIEW OF SYSTEMS:   5 point ROS negative except as noted above in HPI, including Gen., Resp., CV, GI &  system review.    PHYSICAL EXAM:   /78   Pulse 75   Temp 98  F (36.7  C) (Oral)   SpO2 93%  Estimated body mass index is 33.61 kg/m  as calculated from the following:    Height as of 9/30/19: 1.651 m (5' 5\").    Weight as of 9/30/19: 91.6 kg (202 lb).   GENERAL APPEARANCE: alert, and oriented  MENTAL STATUS: alert  AIRWAY EXAM: Mallampatti Class II (visualization of the soft palate, fauces, and uvula)  RESP: lungs clear to auscultation - no rales, rhonchi or wheezes  CV: regular rates and rhythm  DIAGNOSTICS:    Not indicated    IMPRESSION   ASA Class 2 - Mild systemic disease    PLAN:   Plan for Colonoscopy with possible biopsy, possible polypectomy. We discussed the risks, benefits and alternatives and the patient wished to proceed.    The above has been forwarded to the consulting provider.      Signed Electronically by: Saul Jiménez MD  January 17, 2020          "

## 2020-01-17 NOTE — OR NURSING
Patient and responsible adult given discharge instructions with no questions regarding instructions. Claire score 18. Pain level 0/10.  Discharged from unit via ambulation. Patient discharged to home accompanied by her ..

## 2020-01-17 NOTE — OP NOTE
Procedure Date: 01/17/2020      PRIMARY CARE PHYSICIAN:  Jimbo Martinez MD      PROCEDURE:   1.  Colonoscopy.   2.  EGD with multiple biopsies.      PREPROCEDURE DIAGNOSES:   1.  Gastroesophageal reflux disease.   2.  Dyspepsia.   3.  History of polyps.      HISTORY OF PRESENT ILLNESS:  Thank you for referral.  Please refer to H and P for further details.      PHYSICAL EXAMINATION:  Cardiopulmonary examination unchanged from previous exam.  Please refer to H and P for further details.      ANESTHESIA:  MAC per Anesthesia Service.  Monitored parameters were normal limits throughout.      DESCRIPTION OF PROCEDURE:  After informed consent was obtained, the patient was placed in the left lateral decubitus position.  An adult video gastroscope was advanced all the way to the second portion of duodenum.  The duodenal mucosa had some erosive duodenopathy consistent with peptic duodenopathy.  Turning attention to the stomach, I noted severe erosions with multiple small superficial ulcers around the antrum consistent with peptic duodenopathy.  Multiple biopsies were obtained from the stomach.  A retroflexion view revealed a moderately sized hiatal hernia.  The distal esophagus had LA class B esophagitis.  There was a widely patent Schatzki.  The patient had ridging as well as linear erosions in the mid esophagus with several scattered plaques such as those seen with eosinophilic esophagitis.  Multiple biopsies were obtained from the midesophagus.  The air was then desufflated and the scope was withdrawn fully and completely without any complications.      We then started colonoscopy.  An adult video colonoscope was advanced all the way to the terminal ileum.  The terminal ileum mucosa was normal.  Copious irrigation and suctioning improved an otherwise good prep.  There were no polyps, masses, or any other abnormalities found during the slow withdrawal.  A retroflexion view in the rectum revealed small internal  hemorrhoids, none of which were bleeding.  The air was then desufflated, endoscope was withdrawn fully and completely without any complications.      POSTPROCEDURE DIAGNOSES:   1.  Gastroesophageal reflux disease with a hiatal hernia.   2.  Rule out eosinophilic esophagitis.   3.  Peptic gastric duodenopathy.   4.  Internal hemorrhoids.         RECOMMENDATION:     1.  Pantoprazole 40 mg p.o. day.   2.  High-fiber diet.   3.  Repeat colonoscopy in 5 years.   4.  Repeat EGD next year with a dilation.   5.  Follow the biopsies.      Thank you for allowing us to participate in her management.         ASHWIN LUGO MD             D: 2020   T: 2020   MT: SHERMAN      Name:     TK URIAS   MRN:      [id number]        Account:        [id number]   :      1950           Procedure Date: 2020      Document: [id number]       cc: Jimbo Martinez MD

## 2020-01-17 NOTE — PROGRESS NOTES
Subjective     Melvi Cleveland is a 69 year old female who presents to clinic today for the following health issues:    HPI   Chronic/Recurring Back Pain Follow Up      Where is your back pain located? (Select all that apply) low back bilateral    How would you describe your back pain?  burning, shooting and stabbing    Where does your back pain spread? the right buttock    Since your last clinic visit for back pain, how has your pain changed? gradually worsening    Does your back pain interfere with your job? Not applicable    Since your last visit, have you tried any new treatment? No    Neck Pain      Duration: years    Description:  Location: neck and bilateral shoulders  Radiation: into the right neck, into the right shoulder, into the left neck and nto the left shoulder    Intensity:  moderate    Accompanying signs and symptoms: none    History (similar episodes/previous evaluation): None    Precipitating or alleviating factors: None    Therapies tried and outcome: Physical Therapy pain control   \  Patient Active Problem List   Diagnosis     Neck pain, chronic     Low back pain, chronic     Crohn's disease of large intestine (H)     Dyslipidemia     Sicca syndrome (H)     Polyneuropathy in other diseases classified elsewhere (H)     RA (rheumatoid arthritis) (H)     Comprehensive Medical Examination     Seasonal allergic rhinitis     Fibrocystic breast disease (FCBD)     ACP (advance care planning)     Need for hepatitis C screening test     Cough variant asthma     Past Surgical History:   Procedure Laterality Date     BACK SURGERY  05/1995    back surgery     BIOPSY      breast bx X2     BIOPSY BREAST      LT     BIOPSY BREAST NEEDLE LOCALIZATION Right 6/12/2017    Procedure: BIOPSY BREAST NEEDLE LOCALIZATION;  WIRE LOCALIZED EXCISIONAL BIOPSY  RIGHT BREAST MASS;  Surgeon: Jeni Amin MD;  Location: [facility]     CHOLECYSTECTOMY  2004     COLONOSCOPY  11/15/2004    screening     COLONOSCOPY  9/24/2014      EGD with biopsy  2011    GERD     ENDOSCOPY UPPER, COLONOSCOPY, COMBINED N/A 2020    Procedure: UPPER ENDOSCOPY WITH BIOPSIES  AND COLONOSCOPY;  Surgeon: Saul Jiménez MD;  Location: [facility]     laminectomy      L4 L5 laminectomy; back pain     RELEASE CARPAL TUNNEL Right 2018    Procedure: RELEASE CARPAL TUNNEL;  RIGHT CARPAL TUNNEL RELEASE;  Surgeon: Haider Carlos MD;  Location: [facility]       Social History     Tobacco Use     Smoking status: Former Smoker     Types: Cigarettes     Last attempt to quit: 3/28/1997     Years since quittin.8     Smokeless tobacco: Never Used     Tobacco comment: no passive exposure   Substance Use Topics     Alcohol use: No     Alcohol/week: 0.0 standard drinks     Comment: former. quit      Family History   Problem Relation Age of Onset     Diabetes Mother      Rheumatoid Arthritis Mother      Other - See Comments Mother         fibromyalgia     Osteoarthritis Mother      Thyroid Disease Mother         thyroid disorder     Unknown/Adopted Father         cause of death unknown     Rheumatoid Arthritis Father      Diabetes Sister      Myocardial Infarction Sister         cause of death     Lupus Sister         cause of death     C.A.D. Maternal Grandmother      Cerebrovascular Disease Maternal Grandmother      Diabetes Maternal Grandmother      Thyroid Disease Maternal Grandmother         thyrodi disorder; goitor removed     Cancer Maternal Grandfather      Hypertension Brother      Other - See Comments Brother         sleep apnea     Other - See Comments Sister         sleep apnea         Current Outpatient Medications   Medication Sig Dispense Refill     ADVAIR DISKUS 250-50 MCG/DOSE inhaler INHALE 1 PUFF INTO THE LUNGS EVERY 12 HOURS 60 Inhaler 0     albuterol (PROAIR HFA/PROVENTIL HFA/VENTOLIN HFA) 108 (90 Base) MCG/ACT inhaler INHALE 2 PUFFS INTO THE LUNGS EVERY 6 HOURS AS NEEDED FOR SHORTNESS OF BREATH OR WHEEZING 8.5 g 3     albuterol (PROVENTIL) (2.5  MG/3ML) 0.083% neb solution NEBULIZE THE CONTENTS OF 1 VIAL EVERY 6 HOURS AS NEEDED FOR SHORTNESSOF BREATH/DYSPNEA/WHEEZING 75 mL 0     ALFALFA PO Take 5 tablets by mouth 2 times daily.       ALLEGRA-D ALLERGY & CONGESTION  MG 12 hr tablet TAKE 1 TABLET BY MOUTH TWICE DAILY 60 tablet 1     Ascorbic Acid (VITAMIN C) 500 MG CAPS Take 1 tablet by mouth daily        benzonatate (TESSALON) 200 MG capsule Take 1 capsule (200 mg) by mouth 3 times daily as needed for cough 30 capsule 1     Calcium-Vitamin D-Vitamin K (CALCIUM + D) 500-1000-40 MG-UNT-MCG CHEW Take 3 tablets by mouth daily        Cholecalciferol (VITAMIN D) 1000 UNITS capsule Take 2 capsules by mouth daily        citalopram (CELEXA) 40 MG tablet TAKE 1 TABLET BY MOUTH DAILY 90 tablet 1     DEXILANT 60 MG CPDR CR capsule        diclofenac (VOLTAREN XR) 100 MG 24 hr tablet TAKE 1 TABLET BY MOUTH DAILY 30 tablet 1     Flaxseed, Linseed, (FLAX SEED OIL) 1000 MG capsule Take 2 capsules by mouth daily       fluticasone (FLONASE) 50 MCG/ACT nasal spray Spray 1 spray into both nostrils daily       folic acid (FOLVITE) 1 MG tablet Take 1 mg by mouth 3 times daily.       gabapentin (NEURONTIN) 300 MG capsule TAKE 3 CAPSULES BY MOUTH IN THE MORNING, 2 CAPSULES AT NOON AND 2 CAPSULES IN THE EVENING 210 capsule 0     Garlic 400 MG TBEC Take 1 tablet by mouth daily        HYDROcodone-acetaminophen (NORCO) 7.5-325 MG per tablet Take 1 tablet by mouth as needed for severe pain Every 4-6 hours as needed.       hydroxychloroquine (PLAQUENIL) 200 MG tablet Take 200 mg by mouth 2 times daily        Methotrexate, Anti-Rheumatic, (METHOTREXATE, PF, SC) Inject 0.6 mLs Subcutaneous 25 mg vial- patient draws up 0.6 ml.        MILK THISTLE PO Take 1,000 mg by mouth daily.       montelukast (SINGULAIR) 10 MG tablet Take 1 tablet (10 mg) by mouth At Bedtime 90 tablet 3     Omega-3 1000 MG capsule Take 1.5 g by mouth daily        order for DME Equipment being ordered: Nebulizer 1  Device 0     pantoprazole (PROTONIX) 40 MG EC tablet        polyvinyl alcohol-povidone (REFRESH) 1.4-0.6 % ophthalmic solution 1-2 drops as needed       PREBIOTIC PRODUCT PO Take 1 tablet by mouth daily       Probiotic Product (PROBIOTIC PO) Take 1 capsule by mouth daily       RESTASIS 0.05 % ophthalmic emulsion USE 1 DROP IN BOTH EYES 2 TIMES A DAY 60 each 0     Simethicone (SM GAS RELIEF ANTIFLATUENT) 180 MG CAPS TAKE ONE CAPSULE BY MOUTH AS NEEDED AFTER A MEAL. MAX 2 CAPS/DAY 60 capsule 11     traZODone (DESYREL) 50 MG tablet TAKE 2 TO 3 TABLETS BY MOUTH DAILY AT BEDTIME 270 tablet 1     UNABLE TO FIND Take 1,000 mg by mouth daily MEDICATION NAME: Quinol/tumeric       Allergies   Allergen Reactions     Adhesive Tape      Glue and nicotine patches     Benzoin Compound      Tin-Co-Javier     Mold      Seasonal Allergies      Soap      Any cleanser/soap/disinfectant that is used right before surgery.  Makes patient break out horribly. Chart was looked at and Chloraprep was used.     BP Readings from Last 3 Encounters:   01/20/20 120/60   01/17/20 121/64   11/29/19 116/62    Wt Readings from Last 3 Encounters:   01/20/20 91.3 kg (201 lb 3.2 oz)   09/30/19 91.6 kg (202 lb)   06/25/19 93 kg (205 lb)            Reviewed and updated as needed this visit by Provider         Review of Systems   ROS COMP: Constitutional, HEENT, cardiovascular, pulmonary, gi and gu systems are negative, except as otherwise noted.      Objective    /60 (BP Location: Left arm, Patient Position: Chair, Cuff Size: Adult Large)   Pulse 79   Temp 98.2  F (36.8  C) (Tympanic)   Wt 91.3 kg (201 lb 3.2 oz)   SpO2 97%   BMI 33.48 kg/m    Body mass index is 33.48 kg/m .  Physical Exam  Vitals signs and nursing note reviewed.   Constitutional:       General: She is not in acute distress.     Appearance: She is well-developed.   Neurological:      Mental Status: She is alert and oriented to person, place, and time.   Psychiatric:         Mood and  Affect: Mood normal.         Behavior: Behavior normal.         Thought Content: Thought content normal.          Diagnostic Test Results:  Labs reviewed in Epic        Assessment & Plan     1. Chronic bilateral low back pain with bilateral sciatica  Continue pain control.  MRI lumbar spine scheduled.  Discussed Interventional Radiology consultation  - HYDROcodone-acetaminophen (NORCO) 7.5-325 MG per tablet; Take 1 tablet by mouth as needed for severe pain Every 4-6 hours as needed.  Dispense: 60 tablet; Refill: 0  - MR Lumbar Spine w/o Contrast; Future    2. Hip pain, left  X rays reviewed and negative.  - XR Pelvis including Hip Left 2-3 Views(Clinic Performed); Future    3. Neck pain  MRI cervical spine ordered.  Continue pain control  - MR Cervical Spine w/o Contrast; Future       See Patient Instructions    No follow-ups on file.    Jimbo Martinez MD  [facility] - AIDE

## 2020-01-17 NOTE — ANESTHESIA POSTPROCEDURE EVALUATION
Patient: Melvi Cleveland    Procedure(s):  UPPER ENDOSCOPY WITH BIOPSIES  AND COLONOSCOPY    Diagnosis:Hx of colonic polyps [Z86.010]  Diagnosis Additional Information: No value filed.    Anesthesia Type:  MAC    Note:  Anesthesia Post Evaluation    Patient location during evaluation: Phase 2  Patient participation: Able to fully participate in evaluation  Level of consciousness: awake and alert  Pain management: adequate  Airway patency: patent  Cardiovascular status: acceptable  Respiratory status: acceptable  Hydration status: acceptable  PONV: none             Last vitals:  Vitals:    01/17/20 1100 01/17/20 1105 01/17/20 1110   BP: 120/69 129/67 121/64   Pulse: 68 71 80   Resp: 16 16 16   Temp:      SpO2: 94% 94% 94%         Electronically Signed By: KENDRA Finch CRNA  January 17, 2020  11:47 AM

## 2020-01-20 ENCOUNTER — ANCILLARY PROCEDURE (OUTPATIENT)
Dept: GENERAL RADIOLOGY | Facility: OTHER | Age: 70
End: 2020-01-20
Attending: FAMILY MEDICINE
Payer: MEDICARE

## 2020-01-20 ENCOUNTER — OFFICE VISIT (OUTPATIENT)
Dept: FAMILY MEDICINE | Facility: OTHER | Age: 70
End: 2020-01-20
Attending: FAMILY MEDICINE
Payer: MEDICARE

## 2020-01-20 ENCOUNTER — ANCILLARY PROCEDURE (OUTPATIENT)
Dept: MAMMOGRAPHY | Facility: OTHER | Age: 70
End: 2020-01-20
Attending: FAMILY MEDICINE
Payer: MEDICARE

## 2020-01-20 VITALS
DIASTOLIC BLOOD PRESSURE: 60 MMHG | SYSTOLIC BLOOD PRESSURE: 120 MMHG | WEIGHT: 201.2 LBS | HEART RATE: 79 BPM | OXYGEN SATURATION: 97 % | TEMPERATURE: 98.2 F | BODY MASS INDEX: 33.48 KG/M2

## 2020-01-20 DIAGNOSIS — M54.42 CHRONIC BILATERAL LOW BACK PAIN WITH BILATERAL SCIATICA: Primary | ICD-10-CM

## 2020-01-20 DIAGNOSIS — G89.29 CHRONIC BILATERAL LOW BACK PAIN WITH BILATERAL SCIATICA: Primary | ICD-10-CM

## 2020-01-20 DIAGNOSIS — M25.552 HIP PAIN, LEFT: ICD-10-CM

## 2020-01-20 DIAGNOSIS — M54.2 NECK PAIN: ICD-10-CM

## 2020-01-20 DIAGNOSIS — Z12.31 VISIT FOR SCREENING MAMMOGRAM: ICD-10-CM

## 2020-01-20 DIAGNOSIS — M54.41 CHRONIC BILATERAL LOW BACK PAIN WITH BILATERAL SCIATICA: Primary | ICD-10-CM

## 2020-01-20 LAB — COPATH REPORT: NORMAL

## 2020-01-20 PROCEDURE — 73502 X-RAY EXAM HIP UNI 2-3 VIEWS: CPT | Mod: TC

## 2020-01-20 PROCEDURE — G0463 HOSPITAL OUTPT CLINIC VISIT: HCPCS | Mod: 25

## 2020-01-20 PROCEDURE — 99214 OFFICE O/P EST MOD 30 MIN: CPT | Performed by: FAMILY MEDICINE

## 2020-01-20 PROCEDURE — G0463 HOSPITAL OUTPT CLINIC VISIT: HCPCS

## 2020-01-20 PROCEDURE — 77067 SCR MAMMO BI INCL CAD: CPT | Mod: TC

## 2020-01-20 RX ORDER — HYDROCODONE BITARTRATE AND ACETAMINOPHEN 7.5; 325 MG/1; MG/1
1 TABLET ORAL PRN
Qty: 60 TABLET | Refills: 0 | Status: SHIPPED | OUTPATIENT
Start: 2020-01-20 | End: 2020-05-19

## 2020-01-20 ASSESSMENT — PATIENT HEALTH QUESTIONNAIRE - PHQ9: SUM OF ALL RESPONSES TO PHQ QUESTIONS 1-9: 3

## 2020-01-20 ASSESSMENT — PAIN SCALES - GENERAL: PAINLEVEL: MILD PAIN (3)

## 2020-01-28 ENCOUNTER — HOSPITAL ENCOUNTER (OUTPATIENT)
Dept: MRI IMAGING | Facility: HOSPITAL | Age: 70
End: 2020-01-28
Attending: FAMILY MEDICINE
Payer: MEDICARE

## 2020-01-28 ENCOUNTER — HOSPITAL ENCOUNTER (OUTPATIENT)
Dept: MRI IMAGING | Facility: HOSPITAL | Age: 70
Discharge: HOME | End: 2020-01-28
Attending: FAMILY MEDICINE | Admitting: FAMILY MEDICINE
Payer: MEDICARE

## 2020-01-28 DIAGNOSIS — M54.2 NECK PAIN: ICD-10-CM

## 2020-01-28 DIAGNOSIS — M54.41 CHRONIC BILATERAL LOW BACK PAIN WITH BILATERAL SCIATICA: ICD-10-CM

## 2020-01-28 DIAGNOSIS — M54.42 CHRONIC BILATERAL LOW BACK PAIN WITH BILATERAL SCIATICA: ICD-10-CM

## 2020-01-28 DIAGNOSIS — G89.29 CHRONIC BILATERAL LOW BACK PAIN WITH BILATERAL SCIATICA: ICD-10-CM

## 2020-01-28 PROCEDURE — 72141 MRI NECK SPINE W/O DYE: CPT | Mod: TC

## 2020-01-28 PROCEDURE — 72148 MRI LUMBAR SPINE W/O DYE: CPT | Mod: TC

## 2020-01-29 DIAGNOSIS — G62.9 PERIPHERAL POLYNEUROPATHY: ICD-10-CM

## 2020-01-31 RX ORDER — GABAPENTIN 300 MG/1
CAPSULE ORAL
Qty: 210 CAPSULE | Refills: 0 | Status: SHIPPED | OUTPATIENT
Start: 2020-01-31 | End: 2020-03-06

## 2020-01-31 NOTE — TELEPHONE ENCOUNTER
Gabapentin       Last Written Prescription Date:  12/27/2019  Last Fill Quantity: 210,   # refills: 0  Last Office Visit: 1/20/2020  Future Office visit:

## 2020-02-03 ENCOUNTER — MYC MEDICAL ADVICE (OUTPATIENT)
Dept: FAMILY MEDICINE | Facility: OTHER | Age: 70
End: 2020-02-03

## 2020-02-03 DIAGNOSIS — G89.29 CHRONIC BILATERAL LOW BACK PAIN WITH BILATERAL SCIATICA: Primary | ICD-10-CM

## 2020-02-03 DIAGNOSIS — M54.41 CHRONIC BILATERAL LOW BACK PAIN WITH BILATERAL SCIATICA: Primary | ICD-10-CM

## 2020-02-03 DIAGNOSIS — M54.42 CHRONIC BILATERAL LOW BACK PAIN WITH BILATERAL SCIATICA: Primary | ICD-10-CM

## 2020-02-13 ENCOUNTER — HOSPITAL ENCOUNTER (OUTPATIENT)
Facility: HOSPITAL | Age: 70
Discharge: HOME | End: 2020-02-13
Attending: FAMILY MEDICINE | Admitting: FAMILY MEDICINE
Payer: MEDICARE

## 2020-02-13 ENCOUNTER — HOSPITAL ENCOUNTER (OUTPATIENT)
Dept: INTERVENTIONAL RADIOLOGY/VASCULAR | Facility: HOSPITAL | Age: 70
End: 2020-02-13
Attending: FAMILY MEDICINE
Payer: MEDICARE

## 2020-02-13 DIAGNOSIS — M54.41 CHRONIC BILATERAL LOW BACK PAIN WITH BILATERAL SCIATICA: ICD-10-CM

## 2020-02-13 DIAGNOSIS — G89.29 CHRONIC BILATERAL LOW BACK PAIN WITH BILATERAL SCIATICA: ICD-10-CM

## 2020-02-13 DIAGNOSIS — M54.10 RADICULOPATHY: ICD-10-CM

## 2020-02-13 DIAGNOSIS — M54.42 CHRONIC BILATERAL LOW BACK PAIN WITH BILATERAL SCIATICA: ICD-10-CM

## 2020-02-13 PROCEDURE — G0463 HOSPITAL OUTPT CLINIC VISIT: HCPCS | Mod: TC

## 2020-03-04 DIAGNOSIS — R09.02 HYPOXEMIA: Primary | ICD-10-CM

## 2020-03-05 DIAGNOSIS — M54.41 BILATERAL LOW BACK PAIN WITH BILATERAL SCIATICA, UNSPECIFIED CHRONICITY: ICD-10-CM

## 2020-03-05 DIAGNOSIS — M54.42 BILATERAL LOW BACK PAIN WITH BILATERAL SCIATICA, UNSPECIFIED CHRONICITY: ICD-10-CM

## 2020-03-05 DIAGNOSIS — G62.9 PERIPHERAL POLYNEUROPATHY: ICD-10-CM

## 2020-03-05 NOTE — TELEPHONE ENCOUNTER
Diclofenac 100 MG 24 hour      Last Written Prescription Date:  12-27-19  Last Fill Quantity: 30,   # refills: 1  Last Office Visit: 1-20-20  Future Office visit:       Routing refill request to provider for review/approval because:

## 2020-03-05 NOTE — TELEPHONE ENCOUNTER
Gabapentin 300 MG      Last Written Prescription Date:  1-  Last Fill Quantity: 210,   # refills: 0  Last Office Visit: 1-  Future Office visit:       Routing refill request to provider for review/approval because:

## 2020-03-06 ENCOUNTER — TELEPHONE (OUTPATIENT)
Dept: SLEEP MEDICINE | Facility: HOSPITAL | Age: 70
End: 2020-03-06

## 2020-03-06 RX ORDER — GABAPENTIN 300 MG/1
CAPSULE ORAL
Qty: 210 CAPSULE | Refills: 0 | Status: SHIPPED | OUTPATIENT
Start: 2020-03-06 | End: 2020-04-06

## 2020-03-10 RX ORDER — DICLOFENAC SODIUM 100 MG/1
TABLET, FILM COATED, EXTENDED RELEASE ORAL
Qty: 30 TABLET | Refills: 0 | Status: SHIPPED | OUTPATIENT
Start: 2020-03-10 | End: 2020-04-06

## 2020-03-11 ENCOUNTER — HOSPITAL ENCOUNTER (OUTPATIENT)
Facility: HOSPITAL | Age: 70
Discharge: HOME | End: 2020-03-11
Attending: RADIOLOGY | Admitting: RADIOLOGY
Payer: MEDICARE

## 2020-03-11 ENCOUNTER — HOSPITAL ENCOUNTER (OUTPATIENT)
Dept: GENERAL RADIOLOGY | Facility: HOSPITAL | Age: 70
End: 2020-03-11
Attending: FAMILY MEDICINE
Payer: MEDICARE

## 2020-03-11 DIAGNOSIS — M47.816 LUMBAR SPONDYLOSIS: ICD-10-CM

## 2020-03-11 PROCEDURE — 25000128 H RX IP 250 OP 636: Performed by: RADIOLOGY

## 2020-03-11 PROCEDURE — C1751 CATH, INF, PER/CENT/MIDLINE: HCPCS | Mod: TC

## 2020-03-11 PROCEDURE — 62323 NJX INTERLAMINAR LMBR/SAC: CPT | Mod: TC

## 2020-03-11 RX ORDER — IOPAMIDOL 612 MG/ML
15 INJECTION, SOLUTION INTRATHECAL ONCE
Status: COMPLETED | OUTPATIENT
Start: 2020-03-11 | End: 2020-03-11

## 2020-03-11 RX ORDER — METHYLPREDNISOLONE ACETATE 80 MG/ML
INJECTION, SUSPENSION INTRA-ARTICULAR; INTRALESIONAL; INTRAMUSCULAR; SOFT TISSUE
Status: DISCONTINUED
Start: 2020-03-11 | End: 2020-03-11 | Stop reason: HOSPADM

## 2020-03-11 RX ORDER — METHYLPREDNISOLONE ACETATE 80 MG/ML
80 INJECTION, SUSPENSION INTRA-ARTICULAR; INTRALESIONAL; INTRAMUSCULAR; SOFT TISSUE ONCE
Status: COMPLETED | OUTPATIENT
Start: 2020-03-11 | End: 2020-03-11

## 2020-03-11 RX ADMIN — IOPAMIDOL 6 ML: 612 INJECTION, SOLUTION INTRATHECAL at 11:07

## 2020-03-11 RX ADMIN — METHYLPREDNISOLONE ACETATE 80 MG: 80 INJECTION, SUSPENSION INTRA-ARTICULAR; INTRALESIONAL; INTRAMUSCULAR; SOFT TISSUE at 11:08

## 2020-03-11 NOTE — IP AVS SNAPSHOT
[address] [address]  Phone:  [phone]                                    After Visit Summary   3/11/2020    Melvi Cleveland    MRN: [id number]           After Visit Summary Signature Page    I have received my discharge instructions, and my questions have been answered. I have discussed any challenges I see with this plan with the nurse or doctor.    ..........................................................................................................................................  Patient/Patient Representative Signature      ..........................................................................................................................................  Patient Representative Print Name and Relationship to Patient    ..................................................               ................................................  Date                                   Time    ..........................................................................................................................................  Reviewed by Signature/Title    ...................................................              ..............................................  Date                                               Time          22EPIC Rev 08/18

## 2020-03-11 NOTE — DISCHARGE INSTRUCTIONS
Discharge Instructions for an Radiology Injection    Home number on file [phone] (home)  Is it ok to leave a message at this number(s) Yes    Dr. Jones completed your procedure on 3/11/2020.    Current Pain Level (0-10 Scale): 4/10  Post Pain Level (0-10):  0/10      Activity Level:     Do not do any heavy activity or exercise for 24 hours.   Hip Injections:  Do not drive for 4 hours after your injection.  Diet:   Return to your normal diet.  Medications:   If you have stopped taking your Aspirin, Coumadin/Warfarin, Ibuprofen, or any   other blood thinner for this procedure you may resume in the morning unless   your primary care provider has given you other instructions.    Diabetics may see an increase in blood sugar after steroid injections. If you are concerned about your blood sugar, please contact your family doctor.    Site Care:  Remove the bandage and bathe or shower the morning after the procedure.      This is a Pain Management procedure.  You will be contacted in two weeks for follow up.  Call your Primary Care Provider if you have the following (if your primary care provider is not available please seek emergency care):   Nausea with vomiting   Severe headache   Drowsiness or confusion   Redness or drainage at the injection or puncture site   Temperature over 101 degrees F   Other concerns   Increasing joint pain

## 2020-03-12 ENCOUNTER — OFFICE VISIT (OUTPATIENT)
Dept: SLEEP MEDICINE | Facility: HOSPITAL | Age: 70
End: 2020-03-12
Attending: INTERNAL MEDICINE
Payer: MEDICARE

## 2020-03-12 DIAGNOSIS — R09.02 HYPOXIA: Primary | ICD-10-CM

## 2020-03-12 PROCEDURE — 99207 ZZC NO CHARGE NURSE ONLY: CPT

## 2020-03-12 NOTE — NURSING NOTE
Patient picked up over night oximeter number SL-3. Patient was instructed on use of device. Patient verbalized understanding.     Patient will return device tomorrow by:noon

## 2020-03-13 ENCOUNTER — DOCUMENTATION ONLY (OUTPATIENT)
Dept: SLEEP MEDICINE | Facility: HOSPITAL | Age: 70
End: 2020-03-13
Attending: INTERNAL MEDICINE
Payer: MEDICARE

## 2020-03-13 PROCEDURE — 99207 ZZC NO CHARGE NURSE ONLY: CPT

## 2020-03-13 NOTE — NURSING NOTE
patient returned the oximeter and the data was downloaded and the report was sent to Dr. Lomeli and to be scanned

## 2020-03-25 ENCOUNTER — TELEPHONE (OUTPATIENT)
Dept: INTERVENTIONAL RADIOLOGY/VASCULAR | Facility: HOSPITAL | Age: 70
End: 2020-03-25

## 2020-03-25 NOTE — TELEPHONE ENCOUNTER
CONSULT PATIENT  PAIN INJECTION POST CALL    Procedure: Epidural TL L4-5  Radiologist(s): Dr. Paxton Jones  Date of Procedure: 3-11-20    I responded to the patient's questions/concerns.    Pre-procedure pain score was: 4 (See pre-procedure score)  Post-procedure pain score as of today is: 3 as the day goes on. Pain level is higher in the morning when just waking up.  What % relief? 33%      Where is the pain? Prior to injection patient was having pain in lower back, hips, and down both legs. Currently patient states the pain in hips and legs is gone but she is still experiencing pain in her lower back and neck. Patient would like an injection for her lower back and also for her neck when able to do so.    Is this new pain? No    Patient would like to pursue another injection.      Luly Snyder

## 2020-04-03 ENCOUNTER — TELEPHONE (OUTPATIENT)
Dept: GENERAL RADIOLOGY | Facility: HOSPITAL | Age: 70
End: 2020-04-03

## 2020-04-03 NOTE — TELEPHONE ENCOUNTER
Patient was called to let her know that Dr. Jones recommended a repeat ADEEL injection of her lumbar spine.  Order is placed and patient was informed that we are not doing injections at this time due to COVID-19.  She was informed as soon as we start up, scheduling will be calling to scheduled.    As for her cervical spine, her consult was for the lumbar spine. She was informed of this and needs to touch base with Dr. Martinez about her cervical spine as Dr. Jones only had the order for her lumbar spine consult.  She was going to get in touch with Dr. Martinez.     Again, I informed her that when we are able to start up our injections again, scheduling will call her to schedule the lumbar injection.     She had no questions.

## 2020-04-06 DIAGNOSIS — G62.9 PERIPHERAL POLYNEUROPATHY: ICD-10-CM

## 2020-04-06 DIAGNOSIS — M54.42 BILATERAL LOW BACK PAIN WITH BILATERAL SCIATICA, UNSPECIFIED CHRONICITY: ICD-10-CM

## 2020-04-06 DIAGNOSIS — M54.41 BILATERAL LOW BACK PAIN WITH BILATERAL SCIATICA, UNSPECIFIED CHRONICITY: ICD-10-CM

## 2020-04-06 RX ORDER — DICLOFENAC SODIUM 100 MG/1
TABLET, FILM COATED, EXTENDED RELEASE ORAL
Qty: 30 TABLET | Refills: 0 | Status: SHIPPED | OUTPATIENT
Start: 2020-04-06 | End: 2020-05-05

## 2020-04-06 RX ORDER — GABAPENTIN 300 MG/1
CAPSULE ORAL
Qty: 210 CAPSULE | Refills: 0 | Status: SHIPPED | OUTPATIENT
Start: 2020-04-06 | End: 2020-05-05

## 2020-04-06 NOTE — TELEPHONE ENCOUNTER
Gabapentin      Last Written Prescription Date:  3/6/2020  Last Fill Quantity: 210,   # refills: 0  Last Office Visit: 1/20/2020  Future Office visit:       Routing refill request to provider for review/approval because:  Drug not on the [facility], [facility] or [facility] refill protocol or controlled substance    voltaren      Last Written Prescription Date:  3/10/2020  Last Fill Quantity: 30,   # refills: 0  Last Office Visit: 1/20/2020  Future Office visit:       Routing refill request to provider for review/approval because:  Drug not on the [facility], [facility] or [facility] refill protocol or controlled substance

## 2020-04-21 DIAGNOSIS — J30.2 SEASONAL ALLERGIC RHINITIS, UNSPECIFIED TRIGGER: ICD-10-CM

## 2020-04-21 DIAGNOSIS — H04.123 BILATERAL DRY EYES: ICD-10-CM

## 2020-04-21 RX ORDER — FEXOFENADINE HYDROCHLORIDE AND PSEUDOEPHEDRINE HYDROCHLORIDE 60; 120 MG/1; MG/1
TABLET, FILM COATED, EXTENDED RELEASE ORAL
Qty: 30 TABLET | Refills: 0 | Status: SHIPPED | OUTPATIENT
Start: 2020-04-21 | End: 2020-05-19

## 2020-04-21 RX ORDER — CYCLOSPORINE 0.5 MG/ML
EMULSION OPHTHALMIC
Qty: 60 EACH | Refills: 0 | Status: SHIPPED | OUTPATIENT
Start: 2020-04-21 | End: 2020-10-30

## 2020-04-21 NOTE — TELEPHONE ENCOUNTER
Allegra-D      Last Written Prescription Date:  12/23/19   Last Fill Quantity: 60,   # refills: 1  Last Office Visit: 01/20/20  Future Office visit:       Routing refill request to provider for review/approval because:  Drug not on the [facility], [facility] or [facility] refill protocol or controlled substance

## 2020-04-30 ENCOUNTER — MYC MEDICAL ADVICE (OUTPATIENT)
Dept: FAMILY MEDICINE | Facility: OTHER | Age: 70
End: 2020-04-30

## 2020-04-30 DIAGNOSIS — M50.30 DDD (DEGENERATIVE DISC DISEASE), CERVICAL: Primary | ICD-10-CM

## 2020-05-04 DIAGNOSIS — M54.41 BILATERAL LOW BACK PAIN WITH BILATERAL SCIATICA, UNSPECIFIED CHRONICITY: ICD-10-CM

## 2020-05-04 DIAGNOSIS — M54.42 BILATERAL LOW BACK PAIN WITH BILATERAL SCIATICA, UNSPECIFIED CHRONICITY: ICD-10-CM

## 2020-05-04 DIAGNOSIS — G62.9 PERIPHERAL POLYNEUROPATHY: ICD-10-CM

## 2020-05-05 RX ORDER — GABAPENTIN 300 MG/1
CAPSULE ORAL
Qty: 210 CAPSULE | Refills: 0 | Status: SHIPPED | OUTPATIENT
Start: 2020-05-05 | End: 2020-06-01

## 2020-05-05 RX ORDER — DICLOFENAC SODIUM 100 MG/1
TABLET, FILM COATED, EXTENDED RELEASE ORAL
Qty: 30 TABLET | Refills: 0 | Status: SHIPPED | OUTPATIENT
Start: 2020-05-05 | End: 2020-06-03

## 2020-05-05 NOTE — TELEPHONE ENCOUNTER
Failed protocol    Creatinine   Date Value Ref Range Status   11/04/2019 1.15 (H) 0.52 - 1.04 mg/dL Final     diclofenac (VOLTAREN XR) 100 MG 24 hr tablet       Last Written Prescription Date:  4/6/20  Last Fill Quantity: 30,   # refills: 0  Last Office Visit: 1/20/20  Future Office visit:       Routing refill request to provider for review/approval because:  Drug not on the [facility], [facility] or  [facility] refill protocol or controlled substance      gabapentin (NEURONTIN) 300 MG capsule      Last Written Prescription Date:  4/6/20  Last Fill Quantity: 210,   # refills: 0  Last Office Visit: 1/20/20  Future Office visit:       Routing refill request to provider for review/approval because:  Drug not on the [facility], [facility] or [facility] refill protocol or controlled substance

## 2020-05-18 DIAGNOSIS — M54.41 CHRONIC BILATERAL LOW BACK PAIN WITH BILATERAL SCIATICA: ICD-10-CM

## 2020-05-18 DIAGNOSIS — Z79.899 ENCOUNTER FOR LONG-TERM (CURRENT) USE OF OTHER MEDICATIONS: ICD-10-CM

## 2020-05-18 DIAGNOSIS — M06.09 RHEUMATOID ARTHRITIS OF MULTIPLE SITES WITHOUT RHEUMATOID FACTOR (H): Primary | ICD-10-CM

## 2020-05-18 DIAGNOSIS — R69 DIAGNOSIS UNKNOWN: ICD-10-CM

## 2020-05-18 DIAGNOSIS — J30.2 SEASONAL ALLERGIC RHINITIS, UNSPECIFIED TRIGGER: ICD-10-CM

## 2020-05-18 DIAGNOSIS — G89.29 CHRONIC BILATERAL LOW BACK PAIN WITH BILATERAL SCIATICA: ICD-10-CM

## 2020-05-18 DIAGNOSIS — M54.42 CHRONIC BILATERAL LOW BACK PAIN WITH BILATERAL SCIATICA: ICD-10-CM

## 2020-05-18 LAB
ALBUMIN SERPL-MCNC: 3.3 G/DL (ref 3.4–5)
ALT SERPL W P-5'-P-CCNC: 37 U/L (ref 0–50)
BASOPHILS # BLD AUTO: 0.1 10E9/L (ref 0–0.2)
BASOPHILS NFR BLD AUTO: 1 %
CREAT SERPL-MCNC: 1.08 MG/DL (ref 0.52–1.04)
CRP SERPL-MCNC: 3.6 MG/L (ref 0–8)
DIFFERENTIAL METHOD BLD: ABNORMAL
EOSINOPHIL # BLD AUTO: 0.3 10E9/L (ref 0–0.7)
EOSINOPHIL NFR BLD AUTO: 6.2 %
ERYTHROCYTE [DISTWIDTH] IN BLOOD BY AUTOMATED COUNT: 13.5 % (ref 10–15)
ERYTHROCYTE [SEDIMENTATION RATE] IN BLOOD BY WESTERGREN METHOD: 15 MM/H (ref 0–30)
GFR SERPL CREATININE-BSD FRML MDRD: 53 ML/MIN/{1.73_M2}
HCT VFR BLD AUTO: 35.9 % (ref 35–47)
HGB BLD-MCNC: 11.6 G/DL (ref 11.7–15.7)
IMM GRANULOCYTES # BLD: 0 10E9/L (ref 0–0.4)
IMM GRANULOCYTES NFR BLD: 0.2 %
LYMPHOCYTES # BLD AUTO: 1.9 10E9/L (ref 0.8–5.3)
LYMPHOCYTES NFR BLD AUTO: 36.9 %
MCH RBC QN AUTO: 29 PG (ref 26.5–33)
MCHC RBC AUTO-ENTMCNC: 32.3 G/DL (ref 31.5–36.5)
MCV RBC AUTO: 90 FL (ref 78–100)
MONOCYTES # BLD AUTO: 0.4 10E9/L (ref 0–1.3)
MONOCYTES NFR BLD AUTO: 7.8 %
NEUTROPHILS # BLD AUTO: 2.4 10E9/L (ref 1.6–8.3)
NEUTROPHILS NFR BLD AUTO: 47.9 %
NRBC # BLD AUTO: 0 10*3/UL
NRBC BLD AUTO-RTO: 0 /100
PLATELET # BLD AUTO: 211 10E9/L (ref 150–450)
RBC # BLD AUTO: 4 10E12/L (ref 3.8–5.2)
WBC # BLD AUTO: 5 10E9/L (ref 4–11)

## 2020-05-18 PROCEDURE — 85025 COMPLETE CBC W/AUTO DIFF WBC: CPT | Mod: ZL | Performed by: NURSE PRACTITIONER

## 2020-05-18 PROCEDURE — 86140 C-REACTIVE PROTEIN: CPT | Mod: ZL | Performed by: NURSE PRACTITIONER

## 2020-05-18 PROCEDURE — 84460 ALANINE AMINO (ALT) (SGPT): CPT | Mod: ZL | Performed by: NURSE PRACTITIONER

## 2020-05-18 PROCEDURE — 36415 COLL VENOUS BLD VENIPUNCTURE: CPT | Mod: ZL | Performed by: NURSE PRACTITIONER

## 2020-05-18 PROCEDURE — 82565 ASSAY OF CREATININE: CPT | Mod: ZL | Performed by: NURSE PRACTITIONER

## 2020-05-18 PROCEDURE — 85652 RBC SED RATE AUTOMATED: CPT | Mod: ZL | Performed by: NURSE PRACTITIONER

## 2020-05-18 PROCEDURE — 82040 ASSAY OF SERUM ALBUMIN: CPT | Mod: ZL | Performed by: NURSE PRACTITIONER

## 2020-05-19 RX ORDER — HYDROCODONE BITARTRATE AND ACETAMINOPHEN 7.5; 325 MG/1; MG/1
TABLET ORAL
Qty: 42 TABLET | Refills: 0 | Status: SHIPPED | OUTPATIENT
Start: 2020-05-19 | End: 2020-08-25

## 2020-05-19 RX ORDER — FEXOFENADINE HYDROCHLORIDE AND PSEUDOEPHEDRINE HYDROCHLORIDE 60; 120 MG/1; MG/1
TABLET, FILM COATED, EXTENDED RELEASE ORAL
Qty: 30 TABLET | Refills: 0 | Status: SHIPPED | OUTPATIENT
Start: 2020-05-19 | End: 2020-06-12

## 2020-05-19 NOTE — TELEPHONE ENCOUNTER
ALLEGRA-D ALLERGY & CONGESTION  MG 12 hr tablet       Last Written Prescription Date:  4/21/20  Last Fill Quantity: 30,   # refills: 0  Last Office Visit: 1/20/20  Future Office visit:       Routing refill request to provider for review/approval because:  Drug not on the [facility], [facility] or [facility] refill protocol or controlled substance

## 2020-05-19 NOTE — TELEPHONE ENCOUNTER
HYDROcodone-acetaminophen (NORCO) 7.5-325 MG per tablet      Last Written Prescription Date:  1/20/20  Last Fill Quantity: 60,   # refills: 0  Last Office Visit: 1/20/20  Future Office visit:       Routing refill request to provider for review/approval because:  Drug not on the [facility], [facility] or [facility] refill protocol or controlled substance

## 2020-05-20 ENCOUNTER — TRANSFERRED RECORDS (OUTPATIENT)
Dept: HEALTH INFORMATION MANAGEMENT | Facility: CLINIC | Age: 70
End: 2020-05-20

## 2020-05-20 RX ORDER — SIMETHICONE 180 MG
CAPSULE ORAL
Qty: 60 CAPSULE | Refills: 0 | Status: SHIPPED | OUTPATIENT
Start: 2020-05-20 | End: 2020-12-02

## 2020-05-21 DIAGNOSIS — Z79.899 ENCOUNTER FOR LONG-TERM (CURRENT) USE OF OTHER MEDICATIONS: ICD-10-CM

## 2020-05-21 DIAGNOSIS — M06.09 RHEUMATOID ARTHRITIS OF MULTIPLE SITES WITHOUT RHEUMATOID FACTOR (H): Primary | ICD-10-CM

## 2020-06-01 DIAGNOSIS — M54.41 BILATERAL LOW BACK PAIN WITH BILATERAL SCIATICA, UNSPECIFIED CHRONICITY: ICD-10-CM

## 2020-06-01 DIAGNOSIS — F51.01 PRIMARY INSOMNIA: ICD-10-CM

## 2020-06-01 DIAGNOSIS — G62.9 PERIPHERAL POLYNEUROPATHY: ICD-10-CM

## 2020-06-01 DIAGNOSIS — M54.42 BILATERAL LOW BACK PAIN WITH BILATERAL SCIATICA, UNSPECIFIED CHRONICITY: ICD-10-CM

## 2020-06-01 NOTE — TELEPHONE ENCOUNTER
gabapentin (NEURONTIN) 300 MG capsule      Last Written Prescription Date:  5/5/20  Last Fill Quantity: 210,   # refills: 0  Last Office Visit: 1/20/20  Future Office visit:       Routing refill request to provider for review/approval because:  Drug not on the [facility], [facility] or [facility] refill protocol or controlled substance

## 2020-06-02 RX ORDER — GABAPENTIN 300 MG/1
CAPSULE ORAL
Qty: 210 CAPSULE | Refills: 0 | Status: SHIPPED | OUTPATIENT
Start: 2020-06-02 | End: 2020-07-02

## 2020-06-03 RX ORDER — TRAZODONE HYDROCHLORIDE 50 MG/1
TABLET, FILM COATED ORAL
Qty: 270 TABLET | Refills: 0 | Status: SHIPPED | OUTPATIENT
Start: 2020-06-03 | End: 2020-09-01

## 2020-06-03 RX ORDER — DICLOFENAC SODIUM 100 MG/1
TABLET, FILM COATED, EXTENDED RELEASE ORAL
Qty: 30 TABLET | Refills: 0 | Status: SHIPPED | OUTPATIENT
Start: 2020-06-03 | End: 2020-07-02

## 2020-06-03 NOTE — TELEPHONE ENCOUNTER
Diclofenac 100mg      Last Written Prescription Date:  5/5/2020  Last Fill Quantity: 30,   # refills: 0  Last Office Visit: 1/20/2020  Future Office visit:       Routing refill request to provider for review/approval because:   Normal AST on file in past 12 months Protocol Details    Patient is age 6-64 years     Normal CBC on file in past 12 months     Normal serum creatinine on file in past 12 months

## 2020-06-12 DIAGNOSIS — J30.2 SEASONAL ALLERGIC RHINITIS, UNSPECIFIED TRIGGER: ICD-10-CM

## 2020-06-12 RX ORDER — FEXOFENADINE HYDROCHLORIDE AND PSEUDOEPHEDRINE HYDROCHLORIDE 60; 120 MG/1; MG/1
TABLET, FILM COATED, EXTENDED RELEASE ORAL
Qty: 30 TABLET | Refills: 0 | Status: SHIPPED | OUTPATIENT
Start: 2020-06-12 | End: 2020-07-21

## 2020-06-12 NOTE — TELEPHONE ENCOUNTER
Allegra-mayco      Last Written Prescription Date:  5/19/2020  Last Fill Quantity: 30,   # refills: 0  Last Office Visit: 1/20/2020

## 2020-06-15 DIAGNOSIS — F41.0 PANIC DISORDER WITHOUT AGORAPHOBIA: ICD-10-CM

## 2020-06-15 NOTE — TELEPHONE ENCOUNTER
Tom      Last Written Prescription Date: 12/23/2019  Last Fill Quantity: 90,   # refills: 0  Last Office Visit: 1/20/2020

## 2020-06-16 RX ORDER — CITALOPRAM HYDROBROMIDE 20 MG/1
TABLET ORAL
Qty: 180 TABLET | Refills: 0 | Status: SHIPPED | OUTPATIENT
Start: 2020-06-16 | End: 2020-09-11

## 2020-06-18 ENCOUNTER — HOSPITAL ENCOUNTER (OUTPATIENT)
Dept: INTERVENTIONAL RADIOLOGY/VASCULAR | Facility: HOSPITAL | Age: 70
Discharge: HOME | End: 2020-06-18
Attending: FAMILY MEDICINE | Admitting: FAMILY MEDICINE
Payer: MEDICARE

## 2020-06-18 DIAGNOSIS — M50.30 DDD (DEGENERATIVE DISC DISEASE), CERVICAL: ICD-10-CM

## 2020-06-18 PROCEDURE — G0463 HOSPITAL OUTPT CLINIC VISIT: HCPCS | Mod: TC

## 2020-06-22 DIAGNOSIS — F41.0 PANIC DISORDER WITHOUT AGORAPHOBIA: ICD-10-CM

## 2020-06-23 RX ORDER — CITALOPRAM HYDROBROMIDE 40 MG/1
TABLET ORAL
Qty: 90 TABLET | Refills: 0 | Status: SHIPPED | OUTPATIENT
Start: 2020-06-23 | End: 2020-12-09

## 2020-06-29 ENCOUNTER — TELEPHONE (OUTPATIENT)
Dept: INTERVENTIONAL RADIOLOGY/VASCULAR | Facility: HOSPITAL | Age: 70
End: 2020-06-29

## 2020-07-01 DIAGNOSIS — M54.42 BILATERAL LOW BACK PAIN WITH BILATERAL SCIATICA, UNSPECIFIED CHRONICITY: ICD-10-CM

## 2020-07-01 DIAGNOSIS — G62.9 PERIPHERAL POLYNEUROPATHY: ICD-10-CM

## 2020-07-01 DIAGNOSIS — M54.41 BILATERAL LOW BACK PAIN WITH BILATERAL SCIATICA, UNSPECIFIED CHRONICITY: ICD-10-CM

## 2020-07-01 NOTE — TELEPHONE ENCOUNTER
Voltaren      Last Written Prescription Date:  6.3.2020  Last Fill Quantity: 30,   # refills: 0  Last Office Visit: 01.20.2020    neurontin      Last Written Prescription Date:  6.2.2020  Last Fill Quantity: 210,   # refills: 0  Last Office Visit: 01.20.2020

## 2020-07-02 RX ORDER — GABAPENTIN 300 MG/1
CAPSULE ORAL
Qty: 210 CAPSULE | Refills: 0 | Status: SHIPPED | OUTPATIENT
Start: 2020-07-02 | End: 2020-08-03

## 2020-07-02 RX ORDER — DICLOFENAC SODIUM 100 MG/1
TABLET, FILM COATED, EXTENDED RELEASE ORAL
Qty: 30 TABLET | Refills: 0 | Status: SHIPPED | OUTPATIENT
Start: 2020-07-02 | End: 2020-08-03

## 2020-07-02 NOTE — TELEPHONE ENCOUNTER
Not on refill protocol     Failed protocol due to    Normal AST on file in past 12 months Protocol Details    Patient is age 6-64 years     Normal CBC on file in past 12 months     Normal serum creatinine on file in past 12 months      AST   Date Value Ref Range Status   05/29/2019 16 0 - 45 U/L Final      Creatinine   Date Value Ref Range Status   05/18/2020 1.08 (H) 0.52 - 1.04 mg/dL Final     Lab Results   Component Value Date    WBC 5.0 05/18/2020     Lab Results   Component Value Date    RBC 4.00 05/18/2020     Lab Results   Component Value Date    HGB 11.6 05/18/2020     Lab Results   Component Value Date    HCT 35.9 05/18/2020     No components found for: MCT  Lab Results   Component Value Date    MCV 90 05/18/2020     Lab Results   Component Value Date    MCH 29.0 05/18/2020     Lab Results   Component Value Date    MCHC 32.3 05/18/2020     Lab Results   Component Value Date    RDW 13.5 05/18/2020     Lab Results   Component Value Date     05/18/2020

## 2020-07-07 ENCOUNTER — OFFICE VISIT (OUTPATIENT)
Dept: FAMILY MEDICINE | Facility: OTHER | Age: 70
End: 2020-07-07
Attending: FAMILY MEDICINE
Payer: MEDICARE

## 2020-07-07 DIAGNOSIS — Z01.818 PRE-OP TESTING: Primary | ICD-10-CM

## 2020-07-07 PROCEDURE — U0003 INFECTIOUS AGENT DETECTION BY NUCLEIC ACID (DNA OR RNA); SEVERE ACUTE RESPIRATORY SYNDROME CORONAVIRUS 2 (SARS-COV-2) (CORONAVIRUS DISEASE [COVID-19]), AMPLIFIED PROBE TECHNIQUE, MAKING USE OF HIGH THROUGHPUT TECHNOLOGIES AS DESCRIBED BY CMS-2020-01-R: HCPCS | Mod: ZL | Performed by: RADIOLOGY

## 2020-07-08 LAB
SARS-COV-2 RNA SPEC QL NAA+PROBE: NOT DETECTED
SPECIMEN SOURCE: NORMAL

## 2020-07-10 ENCOUNTER — HOSPITAL ENCOUNTER (OUTPATIENT)
Dept: INTERVENTIONAL RADIOLOGY/VASCULAR | Facility: HOSPITAL | Age: 70
End: 2020-07-10
Attending: FAMILY MEDICINE
Payer: MEDICARE

## 2020-07-10 ENCOUNTER — HOSPITAL ENCOUNTER (OUTPATIENT)
Facility: HOSPITAL | Age: 70
Discharge: HOME | End: 2020-07-10
Attending: RADIOLOGY | Admitting: RADIOLOGY
Payer: MEDICARE

## 2020-07-10 DIAGNOSIS — M47.816 LUMBAR SPONDYLOSIS: ICD-10-CM

## 2020-07-10 PROCEDURE — 25000128 H RX IP 250 OP 636: Performed by: RADIOLOGY

## 2020-07-10 PROCEDURE — 62323 NJX INTERLAMINAR LMBR/SAC: CPT | Mod: TC

## 2020-07-10 RX ORDER — IOPAMIDOL 612 MG/ML
15 INJECTION, SOLUTION INTRATHECAL ONCE
Status: COMPLETED | OUTPATIENT
Start: 2020-07-10 | End: 2020-07-10

## 2020-07-10 RX ORDER — METHYLPREDNISOLONE ACETATE 80 MG/ML
80 INJECTION, SUSPENSION INTRA-ARTICULAR; INTRALESIONAL; INTRAMUSCULAR; SOFT TISSUE ONCE
Status: COMPLETED | OUTPATIENT
Start: 2020-07-10 | End: 2020-07-10

## 2020-07-10 RX ORDER — METHYLPREDNISOLONE ACETATE 80 MG/ML
INJECTION, SUSPENSION INTRA-ARTICULAR; INTRALESIONAL; INTRAMUSCULAR; SOFT TISSUE
Status: DISPENSED
Start: 2020-07-10 | End: 2020-07-10

## 2020-07-10 RX ADMIN — METHYLPREDNISOLONE ACETATE 80 MG: 80 INJECTION, SUSPENSION INTRA-ARTICULAR; INTRALESIONAL; INTRAMUSCULAR; SOFT TISSUE at 11:56

## 2020-07-10 RX ADMIN — IOPAMIDOL 6 ML: 612 INJECTION, SOLUTION INTRATHECAL at 11:56

## 2020-07-10 NOTE — DISCHARGE INSTRUCTIONS
Cell number on file:    Telephone Information:   Mobile [phone]     Is it ok to leave a message at this number(s)? Yes    Dr. Jones completed your procedure on 7/10/2020.    Current Pain Level (0-10 Scale): 4/10  Post Pain Level (0-10):  0/10    Radiology Discharge instructions for Steroid Injection    Activity Level:     Do not do any heavy activity or exercise for 24 hours.   Do not drive for 4 hours after your injection.  Diet:   Return to your normal diet.  Medications:   If you have stopped taking your Aspirin, Coumadin/Warfarin, Ibuprofen, or any   other blood thinner for this procedure you may resume in the morning unless   your primary care provider has given you other instructions.    Diabetics may see an increase in blood sugar after steroid injections. If you are concerned about your blood sugar, please contact your family doctor.    Site Care:  Remove the bandage and bathe or shower the morning after the procedure.      This is a Pain Management procedure.  You will be contacted in two weeks for follow up.    Call your Primary Care Provider if you have the following (if your primary care provider is not available please seek emergency care):   Nausea with vomiting   Severe headache   Drowsiness or confusion   Redness or drainage at the injection or puncture site   Temperature over 101 degrees F   Other concerns   Worsening back pain   Stiff neck

## 2020-07-10 NOTE — PROGRESS NOTES
"Pt states now legs \"just a little tingling\"  Able to stand with just Stand by assist, able to march in place. Pt wheeled out to car in wheelchair and pt able to get into car byself.  "

## 2020-07-10 NOTE — PROGRESS NOTES
Pts legs feeling weak, with numbness and tingling, post lumbar injection. Will monitor until pt able to ambulate safely.

## 2020-07-17 ENCOUNTER — TELEPHONE (OUTPATIENT)
Dept: INTERVENTIONAL RADIOLOGY/VASCULAR | Facility: HOSPITAL | Age: 70
End: 2020-07-17

## 2020-07-17 DIAGNOSIS — R09.02 HYPOXIA: Primary | ICD-10-CM

## 2020-07-20 ENCOUNTER — OFFICE VISIT (OUTPATIENT)
Dept: SLEEP MEDICINE | Facility: HOSPITAL | Age: 70
End: 2020-07-20
Attending: INTERNAL MEDICINE
Payer: MEDICARE

## 2020-07-20 DIAGNOSIS — J30.2 SEASONAL ALLERGIC RHINITIS, UNSPECIFIED TRIGGER: ICD-10-CM

## 2020-07-20 DIAGNOSIS — R09.02 HYPOXIA: Primary | ICD-10-CM

## 2020-07-20 PROCEDURE — 99207 ZZC NO CHARGE NURSE ONLY: CPT

## 2020-07-20 NOTE — TELEPHONE ENCOUNTER
Lula KIRBY      Last Written Prescription Date:  6/12/20  Last Fill Quantity: 30,   # refills: 0  Last Office Visit: 7/7/20  Future Office visit:    Next 5 appointments (look out 90 days)    Jul 24, 2020 11:45 AM CDT  (Arrive by 11:30 AM)  SHORT with  [facility]  [facility] [place] ([facility] [place] ) [address]  [place] [address]  [phone]           Routing refill request to provider for review/approval because:  Drug not on the [facility], [facility] or  [facility] refill protocol or controlled substance

## 2020-07-20 NOTE — NURSING NOTE
Patient picked up over night oximeter number SL-2. Patient was instructed on use of device. Patient verbalized understanding.     Patient will return device tomorrow by: noon

## 2020-07-21 ENCOUNTER — DOCUMENTATION ONLY (OUTPATIENT)
Dept: SLEEP MEDICINE | Facility: HOSPITAL | Age: 70
End: 2020-07-21
Attending: INTERNAL MEDICINE
Payer: MEDICARE

## 2020-07-21 PROCEDURE — 99207 ZZC NO CHARGE NURSE ONLY: CPT

## 2020-07-21 RX ORDER — FEXOFENADINE HYDROCHLORIDE AND PSEUDOEPHEDRINE HYDROCHLORIDE 60; 120 MG/1; MG/1
TABLET, FILM COATED, EXTENDED RELEASE ORAL
Qty: 30 TABLET | Refills: 0 | Status: SHIPPED | OUTPATIENT
Start: 2020-07-21 | End: 2020-08-25

## 2020-07-21 NOTE — PROGRESS NOTES
Patient returned the oximeter the data was downloaded and the report given to patient and send by fax to Dr. Lomeli. Also called and left a message at his office to make sure he got it as it was not normal

## 2020-07-22 ENCOUNTER — TRANSFERRED RECORDS (OUTPATIENT)
Dept: HEALTH INFORMATION MANAGEMENT | Facility: CLINIC | Age: 70
End: 2020-07-22

## 2020-07-24 ENCOUNTER — TELEPHONE (OUTPATIENT)
Dept: INTERVENTIONAL RADIOLOGY/VASCULAR | Facility: HOSPITAL | Age: 70
End: 2020-07-24

## 2020-07-24 ENCOUNTER — OFFICE VISIT (OUTPATIENT)
Dept: FAMILY MEDICINE | Facility: OTHER | Age: 70
End: 2020-07-24
Attending: FAMILY MEDICINE
Payer: MEDICARE

## 2020-07-24 DIAGNOSIS — Z01.818 PREOP TESTING: Primary | ICD-10-CM

## 2020-07-24 PROCEDURE — U0003 INFECTIOUS AGENT DETECTION BY NUCLEIC ACID (DNA OR RNA); SEVERE ACUTE RESPIRATORY SYNDROME CORONAVIRUS 2 (SARS-COV-2) (CORONAVIRUS DISEASE [COVID-19]), AMPLIFIED PROBE TECHNIQUE, MAKING USE OF HIGH THROUGHPUT TECHNOLOGIES AS DESCRIBED BY CMS-2020-01-R: HCPCS | Mod: ZL | Performed by: RADIOLOGY

## 2020-07-24 NOTE — LETTER
July 27, 2020        Melvi Cleveland  [address]   [place] [address]    COVID-19 Virus PCR to [facility] - Result   Date Value Ref Range Status   07/24/2020 Not Detected  Final     Comment:     Collection of multiple specimens from the same patient may be necessary to   detect the virus. The possibility of a false negative should be considered if   the patient's recent exposure or clinical presentation suggests 2019 nCOV   infection and diagnostic tests for other causes of illness are negative.   Repeat testing may be considered in this setting.  Viral RNA was extracted via a validated method and subsequently underwent   single step reverse transcriptase-real time polymerase chain reaction using   primers to the CDC specified N1,N2 gene targets of CoV2 and human RNP as an   internal control.  A negative result does not rule out the presence of real-time PCR inhibitors   in the specimen or COVID-19 RNA in concentrations below the limit of detection   of the assay. The possibility of a false negative should be considered if the   patients recent exposure or clinical presentation suggests COVID-19.   Additional testing or repeat testing requires consultation with the   laboratory.  Nasopharyngeal specimen is the preferred choice for swab-based SARS CoV2   testing. When collection of a nasopharyngeal swab is not possible the   following are acceptable alternatives:  an oropharyngeal (OP) specimen collected by a healthcare professional, or a   nasal mid-turbinate (NMT) swab collected by a healthcare professional or by   onsite self-collection (using a flocked tapered swab), or an anterior nares   specimen collected by a healthcare professional or by onsite self-collection   (using a round foam swab). (Centers for Disease Control)  Testing performed by [facility], Room 1-210, [address] [place] [id number]. This test was developed and its   performance  characteristics determined by the [facility]   [facility]. It has not been cleared or approved by the FDA.  The laboratory is regulated under the Clinical Laboratory Improvement   Amendments of 1988 (CLIA-88) as qualified to perform high-complexity testing.   This test is used for clinical purposes. It should not be regarded as   investigational or for research.         No results found for: SARSCOVRES    This letter provides a written record that you were tested for COVID-19.      Your result was negative. This means that we didn t find the virus that causes COVID-19 in your sample. A test may show negative when you do actually have the virus. This can happen when the virus is in the early stages of infection, before you feel illness symptoms.    If you have symptoms   Stay home and away from others (self-isolate) until you meet ALL of the guidelines below:    You ve had no fever--and no medicine that reduces fever--for 3 full days (72 hours). And      Your other symptoms have gotten better. For example, your cough or breathing has improved. And     At least 10 days have passed since your symptoms started.    During this time:    Stay home. Don t go to work, school or anywhere else.     Stay in your own room, including for meals. Use your own bathroom if you can.    Stay away from others in your home. No hugging, kissing or shaking hands. No visitors.    Clean  high touch  surfaces often (doorknobs, counters, handles, etc.). Use a household cleaning spray or wipes. You can find a full list on the EPA website at www.epa.gov/pesticide-registration/list-n-disinfectants-use-against-sars-cov-2.    Cover your mouth and nose with a mask, tissue or washcloth to avoid spreading germs.    Wash your hands and face often with soap and water.    Going back to work  Check with your employer for any guidelines to follow for going back to work.    Employers: This document serves as formal notice that your  employee tested negative for COVID-19, as of the testing date shown above.

## 2020-07-24 NOTE — TELEPHONE ENCOUNTER
CONSULT PATIENT  PAIN INJECTION POST CALL    Procedure: Epidural TL L4-5  Radiologist(s): Dr. Paxton Jones  Date of Procedure: 7/10/20    I responded to the patient's questions/concerns.    Pre-procedure pain score was: 4 (See pre-procedure score)  Post-procedure pain score as of today is: 2  What % relief? 50%    Would you say this injection has been beneficial? Yes   If yes, for how long? Patient states she no longer has pain radiating into legs. This injection relieved that pain. Pain continues to radiate from low back to hips and pain is almost gone when sitting.     Was there one injection that worked better than the other? No    Where is the pain? Pain across the lower back radiating into hips. Patient went to see the chiropractor today and the chiropractor worked on other areas of her back not the lower back. Standing too long brings on the pain.  Can you describe the pain? Patient describes the pain to be stabbing.  Does the pain radiate anywhere?Yes  If yes, where does it radiate and where does the pain stop? Radiates from low back to the hips.     Is this new pain? No    Patient would like to pursue another injection. Or open to any recommendations.      Luly Snyder

## 2020-07-26 LAB
SARS-COV-2 RNA SPEC QL NAA+PROBE: NOT DETECTED
SPECIMEN SOURCE: NORMAL

## 2020-07-27 ENCOUNTER — HOSPITAL ENCOUNTER (OUTPATIENT)
Dept: INTERVENTIONAL RADIOLOGY/VASCULAR | Facility: HOSPITAL | Age: 70
End: 2020-07-27
Attending: FAMILY MEDICINE
Payer: MEDICARE

## 2020-07-27 ENCOUNTER — HOSPITAL ENCOUNTER (OUTPATIENT)
Facility: HOSPITAL | Age: 70
Discharge: HOME | End: 2020-07-27
Attending: RADIOLOGY | Admitting: RADIOLOGY
Payer: MEDICARE

## 2020-07-27 ENCOUNTER — MYC MEDICAL ADVICE (OUTPATIENT)
Dept: FAMILY MEDICINE | Facility: OTHER | Age: 70
End: 2020-07-27

## 2020-07-27 DIAGNOSIS — M47.812 CERVICAL SPONDYLOSIS: ICD-10-CM

## 2020-07-27 PROCEDURE — C1751 CATH, INF, PER/CENT/MIDLINE: HCPCS | Mod: TC

## 2020-07-27 PROCEDURE — 25000128 H RX IP 250 OP 636: Performed by: RADIOLOGY

## 2020-07-27 RX ORDER — IOPAMIDOL 612 MG/ML
15 INJECTION, SOLUTION INTRATHECAL ONCE
Status: COMPLETED | OUTPATIENT
Start: 2020-07-27 | End: 2020-07-27

## 2020-07-27 RX ORDER — METHYLPREDNISOLONE ACETATE 80 MG/ML
80 INJECTION, SUSPENSION INTRA-ARTICULAR; INTRALESIONAL; INTRAMUSCULAR; SOFT TISSUE ONCE
Status: COMPLETED | OUTPATIENT
Start: 2020-07-27 | End: 2020-07-27

## 2020-07-27 RX ORDER — METHYLPREDNISOLONE ACETATE 80 MG/ML
INJECTION, SUSPENSION INTRA-ARTICULAR; INTRALESIONAL; INTRAMUSCULAR; SOFT TISSUE
Status: DISCONTINUED
Start: 2020-07-27 | End: 2020-07-27 | Stop reason: HOSPADM

## 2020-07-27 RX ADMIN — IOPAMIDOL 6 ML: 612 INJECTION, SOLUTION INTRATHECAL at 11:53

## 2020-07-27 RX ADMIN — METHYLPREDNISOLONE ACETATE 80 MG: 80 INJECTION, SUSPENSION INTRA-ARTICULAR; INTRALESIONAL; INTRAMUSCULAR; SOFT TISSUE at 11:53

## 2020-07-27 NOTE — DISCHARGE INSTRUCTIONS
Cell number on file:    Telephone Information:   Mobile [phone]     Is it ok to leave a message at this number(s)? Yes    Dr. Jones completed your procedure on 7/27/2020.    Current Pain Level (0-10 Scale): 3/10  Post Pain Level (0-10):  3/10    Radiology Discharge instructions for Steroid Injection    Activity Level:     Do not do any heavy activity or exercise for 24 hours.   Do not drive for 4 hours after your injection.  Diet:   Return to your normal diet.  Medications:   If you have stopped taking your Aspirin, Coumadin/Warfarin, Ibuprofen, or any   other blood thinner for this procedure you may resume in the morning unless   your primary care provider has given you other instructions.    Diabetics may see an increase in blood sugar after steroid injections. If you are concerned about your blood sugar, please contact your family doctor.    Site Care:  Remove the bandage and bathe or shower the morning after the procedure.      This is a Pain Management procedure.  You will be contacted in two weeks for follow up.    Call your Primary Care Provider if you have the following (if your primary care provider is not available please seek emergency care):   Nausea with vomiting   Severe headache   Drowsiness or confusion   Redness or drainage at the injection or puncture site   Temperature over 101 degrees F   Other concerns   Worsening back pain   Stiff neck

## 2020-07-27 NOTE — IP AVS SNAPSHOT
[facility]  [address] [address]  Phone:  [phone]  Fax:  [phone]                                    After Visit Summary   7/27/2020    Melvi Cleveland    MRN: [id number]           After Visit Summary Signature Page    I have received my discharge instructions, and my questions have been answered. I have discussed any challenges I see with this plan with the nurse or doctor.    ..........................................................................................................................................  Patient/Patient Representative Signature      ..........................................................................................................................................  Patient Representative Print Name and Relationship to Patient    ..................................................               ................................................  Date                                   Time    ..........................................................................................................................................  Reviewed by Signature/Title    ...................................................              ..............................................  Date                                               Time          22EPIC Rev 08/18

## 2020-07-31 DIAGNOSIS — G62.9 PERIPHERAL POLYNEUROPATHY: ICD-10-CM

## 2020-07-31 DIAGNOSIS — M54.41 BILATERAL LOW BACK PAIN WITH BILATERAL SCIATICA, UNSPECIFIED CHRONICITY: ICD-10-CM

## 2020-07-31 DIAGNOSIS — M54.42 BILATERAL LOW BACK PAIN WITH BILATERAL SCIATICA, UNSPECIFIED CHRONICITY: ICD-10-CM

## 2020-08-03 RX ORDER — GABAPENTIN 300 MG/1
CAPSULE ORAL
Qty: 210 CAPSULE | Refills: 0 | Status: SHIPPED | OUTPATIENT
Start: 2020-08-03 | End: 2020-09-01

## 2020-08-03 RX ORDER — DICLOFENAC SODIUM 100 MG/1
TABLET, FILM COATED, EXTENDED RELEASE ORAL
Qty: 30 TABLET | Refills: 0 | Status: SHIPPED | OUTPATIENT
Start: 2020-08-03 | End: 2020-09-01

## 2020-08-11 ENCOUNTER — TELEPHONE (OUTPATIENT)
Dept: INTERVENTIONAL RADIOLOGY/VASCULAR | Facility: HOSPITAL | Age: 70
End: 2020-08-11

## 2020-08-11 NOTE — TELEPHONE ENCOUNTER
Gabapentin      Last Written Prescription Date: 8/2/17  Last Quantity: 210, # refills: 0  Last Office Visit with G, P or [facility] prescribing provider: 6/7/17  Next 5 appointments (look out 90 days)     Sep 01, 2017  9:20 AM CDT   (Arrive by 9:05 AM)   SHORT with Jimbo Martinez MD   [facility] [place] ([facility] )    [address]  [place] [address]   [phone]                   Creatinine   Date Value Ref Range Status   06/21/2017 1.13 (H) 0.52 - 1.04 mg/dL Final     Lab Results   Component Value Date    AST 25 11/22/2016     Lab Results   Component Value Date    ALT 28 06/21/2017     BP Readings from Last 3 Encounters:   08/08/17 118/70   07/05/17 102/70   06/28/17 130/72        4

## 2020-08-11 NOTE — TELEPHONE ENCOUNTER
CONSULT PATIENT  PAIN INJECTION POST CALL    Procedure: Epidural TL C7-T1  Radiologist(s): Dr. Paxton Jones  Date of Procedure: 7-27-20    The patient was not available by telephone. Left message for patient to call [phone].        Luly Snyder

## 2020-08-17 ENCOUNTER — TELEPHONE (OUTPATIENT)
Dept: INTERVENTIONAL RADIOLOGY/VASCULAR | Facility: HOSPITAL | Age: 70
End: 2020-08-17

## 2020-08-17 NOTE — TELEPHONE ENCOUNTER
CONSULT PATIENT  PAIN INJECTION POST CALL    Procedure: Epidural TL C7-T1  Radiologist(s): Dr. Paxton Jones  Date of Procedure: 7-27-20    The patient had no questions or concerns.    Pre-procedure pain score was: 3 (See pre-procedure score)  Post-procedure pain score as of today is: 0  What % relief?100%    Would you say this injection has been beneficial? Yes  If yes, for how long? Patient states this injection is currently helping alot    Was there one injection that worked better than the other?No    Where is the pain? Patient has a little pain in upper back but not what she came in for the injection for and also she states the higher up discomfort is something that is always there.  Can you describe the pain? Twinges once in a while  Does the pain radiate anywhere?N/A  If yes, where does it radiate and where does the pain stop?N/A    Is this new pain? No    Patient would not like to pursue another injection at this time.  The patient will contact IR at [phone] if that changes.      Luly Snyder

## 2020-08-21 ENCOUNTER — TELEPHONE (OUTPATIENT)
Dept: INTERVENTIONAL RADIOLOGY/VASCULAR | Facility: HOSPITAL | Age: 70
End: 2020-08-21

## 2020-08-21 DIAGNOSIS — M54.42 CHRONIC BILATERAL LOW BACK PAIN WITH BILATERAL SCIATICA: ICD-10-CM

## 2020-08-21 DIAGNOSIS — G89.29 CHRONIC BILATERAL LOW BACK PAIN WITH BILATERAL SCIATICA: ICD-10-CM

## 2020-08-21 DIAGNOSIS — M54.41 CHRONIC BILATERAL LOW BACK PAIN WITH BILATERAL SCIATICA: ICD-10-CM

## 2020-08-21 DIAGNOSIS — J30.2 SEASONAL ALLERGIC RHINITIS, UNSPECIFIED TRIGGER: ICD-10-CM

## 2020-08-21 NOTE — TELEPHONE ENCOUNTER
Allegra-D      Last Written Prescription Date:  7/21/2020  Last Fill Quantity: 30,   # refills: 0    Norco       Last Written Prescription Date:  5/19/2020  Last Fill Quantity: 42,   # refills: 0  Last Office Visit: 7/24/2020  Future Office visit:       Routing refill request to provider for review/approval because:  Drug not on the [facility], [facility] or [facility] refill protocol or controlled substance

## 2020-08-21 NOTE — TELEPHONE ENCOUNTER
Patient called and states her pain has returned since the 2 week follow up call on 8/17/20. Pain level is a 6/10, located across the top of the butt in a straight line into both hips. Today pain is radiating down the outside of the right leg stopping at the knee. She was wanting to see if the radiologist has a different recommendation that will give her longer relief. She has had 2 ADEEL TL L4-5 injections that gave her % relief for a little more than 2 weeks than pain returns. She states she remembers the radiologist talking to her about burning the nerves. Patient was explained the radiologists recommendation     Recommendations:  1.  The L4-5 translaminar epidural steroid injections have been  helpful in addressing radicular symptoms. If the symptoms worsen or  return, a repeat L4-5 translaminar injection may be helpful.  2.  Low back pain continues to be an issue and may be related to the  lower lumbar facet joints. MRI lumbar spine 8/22/2018 demonstrates  moderate to severe degenerative changes at the L2-3, L3-4 and L5-S1  facet joints. Medial branch blocks targeting these facet joints may be  helpful in assessing whether or not they play a role in the patient's  low back pain.     JEN GOMEZ MD    Patient aware IR will talk to the radiologist to see what he decides to do first. Will mail patient a pamphlet with information regarding a rhizotomy. Patient happy with this.

## 2020-08-25 ENCOUNTER — HOSPITAL ENCOUNTER (OUTPATIENT)
Facility: HOSPITAL | Age: 70
End: 2020-08-25
Attending: RADIOLOGY | Admitting: RADIOLOGY
Payer: MEDICARE

## 2020-08-25 RX ORDER — HYDROCODONE BITARTRATE AND ACETAMINOPHEN 7.5; 325 MG/1; MG/1
TABLET ORAL
Qty: 42 TABLET | Refills: 0 | Status: SHIPPED | OUTPATIENT
Start: 2020-08-25 | End: 2020-12-02

## 2020-08-25 RX ORDER — FEXOFENADINE HYDROCHLORIDE AND PSEUDOEPHEDRINE HYDROCHLORIDE 60; 120 MG/1; MG/1
TABLET, FILM COATED, EXTENDED RELEASE ORAL
Qty: 30 TABLET | Refills: 0 | Status: SHIPPED | OUTPATIENT
Start: 2020-08-25 | End: 2020-09-16

## 2020-08-25 NOTE — TELEPHONE ENCOUNTER
HYDROCODONE-APAP 7.5-325MG       Routing refill request to provider for review/approval because:    Drug not on the [facility], [facility] or [facility] refill protocol or controlled substance    ALLEGRA-D 12 HR     No protocol provided

## 2020-09-09 DIAGNOSIS — F41.0 PANIC DISORDER WITHOUT AGORAPHOBIA: ICD-10-CM

## 2020-09-09 NOTE — TELEPHONE ENCOUNTER
CELEXA      Last Written Prescription Date:  6-  Last Fill Quantity: 180,   # refills: 0  Last Office Visit: 7-  Future Office visit:    Next 5 appointments (look out 90 days)    Sep 13, 2020  9:30 AM CDT  (Arrive by 9:15 AM)  SHORT with [facility]  [facility] [place] ([facility] [place] ) [address]  [place] [address]  [phone]   Sep 20, 2020  9:30 AM CDT  (Arrive by 9:15 AM)  SHORT with [facility]  [facility] [place] ([facility] [place] ) [address]  [place] [address]  [phone]   Sep 28, 2020  9:00 AM CDT  (Arrive by 8:45 AM)  SHORT with Jimbo Martinez MD  [facility] [place] ([facility] [place] ) [address]  [place] [address]  [phone]   Oct 11, 2020  9:30 AM CDT  (Arrive by 9:15 AM)  SHORT with [facility]  [facility] [place] ([facility] [place] ) [address]  [place] [address]  [phone]

## 2020-09-11 RX ORDER — CITALOPRAM HYDROBROMIDE 20 MG/1
TABLET ORAL
Qty: 180 TABLET | Refills: 0 | Status: SHIPPED | OUTPATIENT
Start: 2020-09-11 | End: 2020-09-28

## 2020-09-13 ENCOUNTER — OFFICE VISIT (OUTPATIENT)
Dept: FAMILY MEDICINE | Facility: OTHER | Age: 70
End: 2020-09-13
Attending: FAMILY MEDICINE
Payer: MEDICARE

## 2020-09-13 DIAGNOSIS — Z20.822 COVID-19 RULED OUT: Primary | ICD-10-CM

## 2020-09-13 LAB
SARS-COV-2 RNA SPEC QL NAA+PROBE: NORMAL
SPECIMEN SOURCE: NORMAL

## 2020-09-13 PROCEDURE — U0003 INFECTIOUS AGENT DETECTION BY NUCLEIC ACID (DNA OR RNA); SEVERE ACUTE RESPIRATORY SYNDROME CORONAVIRUS 2 (SARS-COV-2) (CORONAVIRUS DISEASE [COVID-19]), AMPLIFIED PROBE TECHNIQUE, MAKING USE OF HIGH THROUGHPUT TECHNOLOGIES AS DESCRIBED BY CMS-2020-01-R: HCPCS | Mod: ZL | Performed by: FAMILY MEDICINE

## 2020-09-14 LAB
LABORATORY COMMENT REPORT: NORMAL
SARS-COV-2 RNA SPEC QL NAA+PROBE: NEGATIVE
SPECIMEN SOURCE: NORMAL

## 2020-09-16 ENCOUNTER — TELEPHONE (OUTPATIENT)
Dept: INTERVENTIONAL RADIOLOGY/VASCULAR | Facility: HOSPITAL | Age: 70
End: 2020-09-16

## 2020-09-16 DIAGNOSIS — J30.2 SEASONAL ALLERGIC RHINITIS, UNSPECIFIED TRIGGER: ICD-10-CM

## 2020-09-16 RX ORDER — FEXOFENADINE HYDROCHLORIDE AND PSEUDOEPHEDRINE HYDROCHLORIDE 60; 120 MG/1; MG/1
TABLET, FILM COATED, EXTENDED RELEASE ORAL
Qty: 30 TABLET | Refills: 0 | Status: SHIPPED | OUTPATIENT
Start: 2020-09-16 | End: 2020-10-15

## 2020-09-16 NOTE — TELEPHONE ENCOUNTER
Patient has a pain level of a 5 and will be able to attend appointment tomorrow. Patient aware to have a  and answered no to all pre screening covid questions.

## 2020-09-16 NOTE — TELEPHONE ENCOUNTER
ALLEGRA-D 12 HR      Last Written Prescription Date:  8/25/20  Last Fill Quantity: 30,   # refills: 0  Last Office Visit: 1/20/20  Future Office visit:    Next 5 appointments (look out 90 days)    Sep 20, 2020  9:30 AM CDT  (Arrive by 9:15 AM)  SHORT with HC [facility] [place] ([facility] [place] ) [address]  [place] [address]  [phone]   Sep 28, 2020  9:00 AM CDT  (Arrive by 8:45 AM)  SHORT with Jimbo Martinez MD  [facility] [place] ([facility] [place] ) [address]  [place] [address]  [phone]   Oct 11, 2020  9:30 AM CDT  (Arrive by 9:15 AM)  SHORT with [facility] [place] ([facility] [place] ) [address]  [place] [address]  [phone]           Routing refill request to provider for review/approval because:    Drug not on the [facility], [facility] or [facility] refill protocol or controlled substance      PCP: Dr. Martinez (out of the office pm - 9/16/20)

## 2020-09-17 ENCOUNTER — HOSPITAL ENCOUNTER (OUTPATIENT)
Dept: INTERVENTIONAL RADIOLOGY/VASCULAR | Facility: HOSPITAL | Age: 70
End: 2020-09-17
Attending: FAMILY MEDICINE
Payer: MEDICARE

## 2020-09-17 ENCOUNTER — HOSPITAL ENCOUNTER (OUTPATIENT)
Facility: HOSPITAL | Age: 70
Discharge: HOME | End: 2020-09-17
Attending: RADIOLOGY | Admitting: RADIOLOGY
Payer: MEDICARE

## 2020-09-17 DIAGNOSIS — M47.816 LUMBAR SPONDYLOSIS: ICD-10-CM

## 2020-09-17 PROCEDURE — 64494 INJ PARAVERT F JNT L/S 2 LEV: CPT | Mod: TC,50

## 2020-09-17 PROCEDURE — 25000128 H RX IP 250 OP 636: Performed by: RADIOLOGY

## 2020-09-17 PROCEDURE — 25000125 ZZHC RX 250: Performed by: RADIOLOGY

## 2020-09-17 RX ORDER — ROPIVACAINE HYDROCHLORIDE 10 MG/ML
10 INJECTION EPIDURAL; INFILTRATION; PERINEURAL ONCE
Status: COMPLETED | OUTPATIENT
Start: 2020-09-17 | End: 2020-09-17

## 2020-09-17 RX ORDER — LIDOCAINE HYDROCHLORIDE 10 MG/ML
INJECTION, SOLUTION EPIDURAL; INFILTRATION; INTRACAUDAL; PERINEURAL
Status: DISPENSED
Start: 2020-09-17 | End: 2020-09-17

## 2020-09-17 RX ORDER — ROPIVACAINE HYDROCHLORIDE 10 MG/ML
INJECTION EPIDURAL; INFILTRATION; PERINEURAL
Status: DISPENSED
Start: 2020-09-17 | End: 2020-09-17

## 2020-09-17 RX ADMIN — ROPIVACAINE HYDROCHLORIDE 7.5 ML: 10 INJECTION, SOLUTION EPIDURAL at 11:24

## 2020-09-17 RX ADMIN — LIDOCAINE HYDROCHLORIDE 7.5 ML: 10 INJECTION, SOLUTION EPIDURAL; INFILTRATION; INTRACAUDAL; PERINEURAL at 11:24

## 2020-09-17 NOTE — PROGRESS NOTES
Medial Branch Blocks Workup Discharge Instructions    To obtain authorization for a Rhizotomy (Radiofrequency Ablation), we are required to document the percent of relief of the Medial Branch Block injection after one hour.  Therefore, please call and report this to us an hour after your procedure.  If no one is available to answer, it will go to voicemail:  Please leave your name, phone number, and the relief you have obtained from your injection.    Report your pain level between 1 and 10 with 10 being the worst pain ever.  Please do not leave a range of relief; we require a specific number between 1 and 10.  Without this information, your injection pain relief will not be documented as required for your insurance company. Calling is a crucial step of the Medial Branch Block injection and Rhizotomy workup.  Please call [phone] at 12:25 PM    Thank you,  The Interventional Care Team

## 2020-09-18 ENCOUNTER — HOSPITAL ENCOUNTER (EMERGENCY)
Facility: HOSPITAL | Age: 70
Discharge: HOME | End: 2020-09-18
Attending: PHYSICIAN ASSISTANT | Admitting: PHYSICIAN ASSISTANT
Payer: MEDICARE

## 2020-09-18 ENCOUNTER — TELEPHONE (OUTPATIENT)
Dept: INTERVENTIONAL RADIOLOGY/VASCULAR | Facility: HOSPITAL | Age: 70
End: 2020-09-18

## 2020-09-18 ENCOUNTER — APPOINTMENT (OUTPATIENT)
Dept: GENERAL RADIOLOGY | Facility: HOSPITAL | Age: 70
End: 2020-09-18
Attending: PHYSICIAN ASSISTANT
Payer: MEDICARE

## 2020-09-18 VITALS
OXYGEN SATURATION: 97 % | SYSTOLIC BLOOD PRESSURE: 127 MMHG | HEART RATE: 80 BPM | RESPIRATION RATE: 16 BRPM | DIASTOLIC BLOOD PRESSURE: 75 MMHG | TEMPERATURE: 97.7 F

## 2020-09-18 DIAGNOSIS — R06.02 SHORTNESS OF BREATH: ICD-10-CM

## 2020-09-18 DIAGNOSIS — Z87.891 PERSONAL HISTORY OF TOBACCO USE, PRESENTING HAZARDS TO HEALTH: ICD-10-CM

## 2020-09-18 DIAGNOSIS — J40 BRONCHITIS: ICD-10-CM

## 2020-09-18 PROCEDURE — 99213 OFFICE O/P EST LOW 20 MIN: CPT | Mod: Z6 | Performed by: PHYSICIAN ASSISTANT

## 2020-09-18 PROCEDURE — G0463 HOSPITAL OUTPT CLINIC VISIT: HCPCS | Mod: 25

## 2020-09-18 PROCEDURE — 71045 X-RAY EXAM CHEST 1 VIEW: CPT | Mod: TC

## 2020-09-18 RX ORDER — DOXYCYCLINE 100 MG/1
100 CAPSULE ORAL 2 TIMES DAILY
Qty: 10 CAPSULE | Refills: 0 | Status: SHIPPED | OUTPATIENT
Start: 2020-09-18 | End: 2020-09-23

## 2020-09-18 NOTE — ED AVS SNAPSHOT
[facility]  [address] [address]  Phone:  [phone]                                    Melvi Cleveland   MRN: [id number]    Department:  [facility]   Date of Visit:  9/18/2020           After Visit Summary Signature Page    I have received my discharge instructions, and my questions have been answered. I have discussed any challenges I see with this plan with the nurse or doctor.    ..........................................................................................................................................  Patient/Patient Representative Signature      ..........................................................................................................................................  Patient Representative Print Name and Relationship to Patient    ..................................................               ................................................  Date                                   Time    ..........................................................................................................................................  Reviewed by Signature/Title    ...................................................              ..............................................  Date                                               Time          22EPIC Rev 08/18

## 2020-09-18 NOTE — TELEPHONE ENCOUNTER
Called to inform patient that a Rhizotomy is not recommended being she had 60% relief from the medial branch block. I let her know that I did inform PCP of these results and that physical therapy vs chiropractic care was a recommendation back when we did the consultation. Patient understands and will f/u with primary.

## 2020-09-18 NOTE — ED PROVIDER NOTES
History     Chief Complaint   Patient presents with     Cough     Sinusitis     HPI  Melvi Cleveland is a 70 year old female who presents with complaints of cough for the past week with increasing green sputum production and chest pressure with shortness of breath similar to prior exacerbations of bronchitis.  Patient reports having severe pneumonia last year and since then has been using oxygen at night.  Denies fever.  Had been having sinus congestion and ear pressure but that has improved.  Has had pretty severe seasonal allergies.  Has been taking OTC medications with minimal relief.      Allergies:  Allergies   Allergen Reactions     Adhesive Tape      Glue and nicotine patches     Benzoin Compound      Tin-Co-Javier     Mold      Seasonal Allergies      Soap      Any cleanser/soap/disinfectant that is used right before surgery.  Makes patient break out horribly. Chart was looked at and Chloraprep was used.       Problem List:    Patient Active Problem List    Diagnosis Date Noted     Cough variant asthma 04/16/2019     Priority: Medium     Need for hepatitis C screening test 11/13/2018     Priority: Medium     ACP (advance care planning) 09/28/2016     Priority: Medium     Advance Care Planning 9/28/2016: ACP Review of Chart / Resources Provided:  Reviewed chart for advance care plan.  Melvi Cleveland has been provided information and resources to begin or update their advance care plan.  Added by Kathy Galvan             RA (rheumatoid arthritis) (H) 11/25/2015     Priority: Medium     Comprehensive Medical Examination 11/25/2015     Priority: Medium     Seasonal allergic rhinitis 11/25/2015     Priority: Medium     Fibrocystic breast disease (FCBD) 11/25/2015     Priority: Medium     Crohn's disease of large intestine (H) 03/01/2011     Priority: Medium     Dyslipidemia 03/01/2011     Priority: Medium     Sicca syndrome (H) 03/01/2011     Priority: Medium     Polyneuropathy in other diseases classified  elsewhere (H) 2011     Priority: Medium     Neck pain, chronic 2005     Priority: Medium     2015 MRI:  IMPRESSION:  BROAD-BASED DISK PROTRUSION AT C5-C6 ON THE LEFT, MILDLY  IMPINGING ON THE LEFT C6 NERVE ROOT       Low back pain, chronic 2000     Priority: Medium     2016 MRI:  MULTILEVEL DEGENERATIVE CHANGES OF THE LUMBAR SPINE,  SIMILAR IN APPEARANCE TO THE PRIOR STUDY.  A RIGHT FORAMINAL DISK  PROTRUSION AT L3-L4 AGAIN IMPINGES THE EXITING RIGHT L3 NERVE ROOT.  CORRELATE FOR A RELATED RADICULOPATHY.          Past Medical History:    Past Medical History:   Diagnosis Date     Allergic rhinitis, seasonal 3/1/2011     Arthritis      Chronic/Recurrent Back Pain 2000     Chronic/Recurrent Neck Pain 2005     Cough variant asthma 2019     Crohn's Disease 3/1/2011     CTS (carpal tunnel syndrome) 2011     Depressive disorder      Dyslipidemia 3/1/2011     Fibrocystic Breast Disease 3/1/2011     Mixed hyperlipidemia 2011     Wilson's neuroma 2011     Parotiditis 2011     Peripheral Neuriopathy 3/1/2011     Pneumonia of left lower lobe due to infectious organism 2019     Rheumatoid arthritis(714.0) 1999     Seborrhea capitis 10/6/2011     Sjogrens Syndrome 3/1/2011     Urethral stricture 2008       Social History:  Marital Status:   [2]  Social History     Tobacco Use     Smoking status: Former Smoker     Types: Cigarettes     Last attempt to quit: 3/28/1997     Years since quittin.4     Smokeless tobacco: Never Used     Tobacco comment: no passive exposure   Substance Use Topics     Alcohol use: No     Alcohol/week: 0.0 standard drinks     Comment: former. quit      Drug use: No        Medications:    ALLEGRA-D ALLERGY & CONGESTION  MG 12 hr tablet  citalopram (CELEXA) 20 MG tablet  citalopram (CELEXA) 40 MG tablet  diclofenac (VOLTAREN XR) 100 MG 24 hr tablet  doxycycline hyclate (VIBRAMYCIN) 100 MG capsule  gabapentin  (NEURONTIN) 300 MG capsule  montelukast (SINGULAIR) 10 MG tablet  RESTASIS 0.05 % ophthalmic emulsion  traZODone (DESYREL) 50 MG tablet  ADVAIR DISKUS 250-50 MCG/DOSE inhaler  albuterol (PROAIR HFA/PROVENTIL HFA/VENTOLIN HFA) 108 (90 Base) MCG/ACT inhaler  albuterol (PROVENTIL) (2.5 MG/3ML) 0.083% neb solution  ALFALFA PO  Ascorbic Acid (VITAMIN C) 500 MG CAPS  benzonatate (TESSALON) 200 MG capsule  Calcium-Vitamin D-Vitamin K (CALCIUM + D) 500-1000-40 MG-UNT-MCG CHEW  Cholecalciferol (VITAMIN D) 1000 UNITS capsule  DEXILANT 60 MG CPDR CR capsule  Flaxseed, Linseed, (FLAX SEED OIL) 1000 MG capsule  fluticasone (FLONASE) 50 MCG/ACT nasal spray  folic acid (FOLVITE) 1 MG tablet  Garlic 400 MG TBEC  HYDROcodone-acetaminophen (NORCO) 7.5-325 MG per tablet  hydroxychloroquine (PLAQUENIL) 200 MG tablet  Methotrexate, Anti-Rheumatic, (METHOTREXATE, PF, SC)  MILK THISTLE PO  Omega-3 1000 MG capsule  order for DME  pantoprazole (PROTONIX) 40 MG EC tablet  polyvinyl alcohol-povidone (REFRESH) 1.4-0.6 % ophthalmic solution  PREBIOTIC PRODUCT PO  Probiotic Product (PROBIOTIC PO)  Simethicone 180 MG CAPS  UNABLE TO FIND          Review of Systems :  Constitutional: Negative for fever.   HENT: Negative for congestion and sinus pressure.   Respiratory: Positive for cough. Pos for shortness of breath and sputum changes.      Physical Exam   BP: 127/75  Pulse: 80  Temp: 97.7  F (36.5  C)  Resp: 16  SpO2: 97 %(does use oxygen at night 2nd to previous pneumonia 1 year ago.)      Physical Exam   Constitutional: Well-developed and well-nourished.   Head: Normocephalic and atraumatic.   Eyes: Conjunctivae and EOM are normal.  PAVEL.  Ears: TMs normal bilaterally  Nose: Congested with no sinus tenderness  Throat: no erythema or significant tonsillar swelling  Neck: Normal range of motion. No palpable lymphadenopathy  Cardiovascular: Normal rate and regular rhythm.   Pulmonary/Chest: Effort normal and CTAB.  Skin: Skin is warm and dry. No  rash noted.     ED Course               Results for orders placed or performed during the hospital encounter of 09/18/20 (from the past 24 hour(s))   XR Chest Port 1 View    Narrative    PROCEDURE:  XR CHEST PORT 1 VW    HISTORY:  chest tightness and cough.     COMPARISON:  June 2019    FINDINGS:   The cardiac silhouette is normal in size. The pulmonary vasculature is  normal.  The lungs are clear. No pleural effusion or pneumothorax.  There is elevation of the right hemidiaphragm      Impression    IMPRESSION:  No acute cardiopulmonary disease.      SUE CANCINO MD       Medications - No data to display    Assessments & Plan (with Medical Decision Making)     I have reviewed the nursing notes.    I have reviewed the findings, diagnosis, plan and need for follow up with the patient.  Discussed use of OTC cough suppressants and symptoms of severe pneumonia that would indicate need for follow-up with PCP or in ED.  Will treat with doxy given recent history of complicated pneumonia and nightly treatment with oxygen.  Return to ED if worsening symptoms.        Medication List      Started    doxycycline hyclate 100 MG capsule  Commonly known as:  VIBRAMYCIN  100 mg, Oral, 2 TIMES DAILY            Final diagnoses:   Bronchitis   Shortness of breath       GIOVANNY Clarke on 9/18/2020 at 1:22 PM   9/18/2020   [facility]          Haim Hill PA  09/18/20 [id number]

## 2020-09-28 ENCOUNTER — OFFICE VISIT (OUTPATIENT)
Dept: FAMILY MEDICINE | Facility: OTHER | Age: 70
End: 2020-09-28
Attending: FAMILY MEDICINE
Payer: COMMERCIAL

## 2020-09-28 VITALS
SYSTOLIC BLOOD PRESSURE: 118 MMHG | HEART RATE: 90 BPM | TEMPERATURE: 96.4 F | OXYGEN SATURATION: 95 % | RESPIRATION RATE: 18 BRPM | DIASTOLIC BLOOD PRESSURE: 70 MMHG

## 2020-09-28 DIAGNOSIS — H93.11 TINNITUS OF RIGHT EAR: Primary | ICD-10-CM

## 2020-09-28 PROCEDURE — G0463 HOSPITAL OUTPT CLINIC VISIT: HCPCS

## 2020-09-28 PROCEDURE — 99213 OFFICE O/P EST LOW 20 MIN: CPT | Performed by: FAMILY MEDICINE

## 2020-09-28 RX ORDER — METHOTREXATE 25 MG/ML
INJECTION, SOLUTION INTRA-ARTERIAL; INTRAMUSCULAR; INTRAVENOUS
COMMUNITY
Start: 2020-09-09 | End: 2020-10-06

## 2020-09-28 ASSESSMENT — ANXIETY QUESTIONNAIRES
2. NOT BEING ABLE TO STOP OR CONTROL WORRYING: NOT AT ALL
5. BEING SO RESTLESS THAT IT IS HARD TO SIT STILL: NOT AT ALL
4. TROUBLE RELAXING: NOT AT ALL
3. WORRYING TOO MUCH ABOUT DIFFERENT THINGS: NOT AT ALL
1. FEELING NERVOUS, ANXIOUS, OR ON EDGE: NOT AT ALL
6. BECOMING EASILY ANNOYED OR IRRITABLE: NOT AT ALL
GAD7 TOTAL SCORE: 0
7. FEELING AFRAID AS IF SOMETHING AWFUL MIGHT HAPPEN: NOT AT ALL

## 2020-09-28 ASSESSMENT — ASTHMA QUESTIONNAIRES
QUESTION_3 LAST FOUR WEEKS HOW OFTEN DID YOUR ASTHMA SYMPTOMS (WHEEZING, COUGHING, SHORTNESS OF BREATH, CHEST TIGHTNESS OR PAIN) WAKE YOU UP AT NIGHT OR EARLIER THAN USUAL IN THE MORNING: ONCE OR TWICE
QUESTION_4 LAST FOUR WEEKS HOW OFTEN HAVE YOU USED YOUR RESCUE INHALER OR NEBULIZER MEDICATION (SUCH AS ALBUTEROL): NOT AT ALL
ACT_TOTALSCORE: 18
QUESTION_5 LAST FOUR WEEKS HOW WOULD YOU RATE YOUR ASTHMA CONTROL: SOMEWHAT CONTROLLED
QUESTION_1 LAST FOUR WEEKS HOW MUCH OF THE TIME DID YOUR ASTHMA KEEP YOU FROM GETTING AS MUCH DONE AT WORK, SCHOOL OR AT HOME: SOME OF THE TIME
QUESTION_2 LAST FOUR WEEKS HOW OFTEN HAVE YOU HAD SHORTNESS OF BREATH: THREE TO SIX TIMES A WEEK

## 2020-09-28 ASSESSMENT — PAIN SCALES - GENERAL: PAINLEVEL: MILD PAIN (3)

## 2020-09-28 ASSESSMENT — PATIENT HEALTH QUESTIONNAIRE - PHQ9: SUM OF ALL RESPONSES TO PHQ QUESTIONS 1-9: 2

## 2020-09-28 NOTE — LETTER
My Asthma Action Plan    Name: Melvi Cleveland   YOB: 1950  Date: 9/28/2020   My doctor: Jimbo Martinez MD   [facility] [place]        My Rescue Medicine:   Albuterol inhaler (Proair/Ventolin/Proventil HFA)  2-4 puffs EVERY 4 HOURS as needed. Use a spacer if recommended by your provider.   My Asthma Severity:   cough variant asthma  Know your asthma triggers: Patient is unaware of triggers             GREEN ZONE   Good Control    I feel good    No cough or wheeze    Can work, sleep and play without asthma symptoms       Take your asthma control medicine every day.     1. If exercise triggers your asthma, take your rescue medication    15 minutes before exercise or sports, and    During exercise if you have asthma symptoms  2. Spacer to use with inhaler: If you have a spacer, make sure to use it with your inhaler             YELLOW ZONE Getting Worse  I have ANY of these:    I do not feel good    Cough or wheeze    Chest feels tight    Wake up at night   1. Keep taking your Green Zone medications  2. Start taking your rescue medicine:    every 20 minutes for up to 1 hour. Then every 4 hours for 24-48 hours.  3. If you stay in the Yellow Zone for more than 12-24 hours, contact your doctor.  4. If you do not return to the Green Zone in 12-24 hours or you get worse, start taking your oral steroid medicine if prescribed by your provider.           RED ZONE Medical Alert - Get Help  I have ANY of these:    I feel awful    Medicine is not helping    Breathing getting harder    Trouble walking or talking    Nose opens wide to breathe       1. Take your rescue medicine NOW  2. If your provider has prescribed an oral steroid medicine, start taking it NOW  3. Call your doctor NOW  4. If you are still in the Red Zone after 20 minutes and you have not reached your doctor:    Take your rescue medicine again and    Call 911 or go to the emergency room right away    See your regular  doctor within 2 weeks of an Emergency Room or Urgent Care visit for follow-up treatment.          Annual Reminders:  Meet with Asthma Educator,  Flu Shot in the Fall, consider Pneumonia Vaccination for patients with asthma (aged 19 and older).    Pharmacy: [facility] - [place], [place] - [address]    Electronically signed by Jimbo Martinez MD   Date: 09/28/20                    Asthma Triggers  How To Control Things That Make Your Asthma Worse    Triggers are things that make your asthma worse.  Look at the list below to help you find your triggers and   what you can do about them. You can help prevent asthma flare-ups by staying away from your triggers.      Trigger                                                          What you can do   Cigarette Smoke  Tobacco smoke can make asthma worse. Do not allow smoking in your home, car or around you.  Be sure no one smokes at a child s day care or school.  If you smoke, ask your health care provider for ways to help you quit.  Ask family members to quit too.  Ask your health care provider for a referral to Quit Plan to help you quit smoking, or call 5-070-536-PLAN.     Colds, Flu, Bronchitis  These are common triggers of asthma. Wash your hands often.  Don t touch your eyes, nose or mouth.  Get a flu shot every year.     Dust Mites  These are tiny bugs that live in cloth or carpet. They are too small to see. Wash sheets and blankets in hot water every week.   Encase pillows and mattress in dust mite proof covers.  Avoid having carpet if you can. If you have carpet, vacuum weekly.   Use a dust mask and HEPA vacuum.   Pollen and Outdoor Mold  Some people are allergic to trees, grass, or weed pollen, or molds. Try to keep your windows closed.  Limit time out doors when pollen count is high.   Ask you health care provider about taking medicine during allergy season.     Animal Dander  Some people are allergic to skin flakes, urine or saliva from pets with  fur or feathers. Keep pets with fur or feathers out of your home.    If you can t keep the pet outdoors, then keep the pet out of your bedroom.  Keep the bedroom door closed.  Keep pets off cloth furniture and away from stuffed toys.     Mice, Rats, and Cockroaches  Some people are allergic to the waste from these pests.   Cover food and garbage.  Clean up spills and food crumbs.  Store grease in the refrigerator.   Keep food out of the bedroom.   Indoor Mold  This can be a trigger if your home has high moisture. Fix leaking faucets, pipes, or other sources of water.   Clean moldy surfaces.  Dehumidify basement if it is damp and smelly.   Smoke, Strong Odors, and Sprays  These can reduce air quality. Stay away from strong odors and sprays, such as perfume, powder, hair spray, paints, smoke incense, paint, cleaning products, candles and new carpet.   Exercise or Sports  Some people with asthma have this trigger. Be active!  Ask your doctor about taking medicine before sports or exercise to prevent symptoms.    Warm up for 5-10 minutes before and after sports or exercise.     Other Triggers of Asthma  Cold air:  Cover your nose and mouth with a scarf.  Sometimes laughing or crying can be a trigger.  Some medicines and food can trigger asthma.

## 2020-09-28 NOTE — PROGRESS NOTES
Subjective     Melvi Cleveland is a 70 year old female who presents to clinic today for the following health issues:    HPI       Concern - Ear pain  Onset: 4 months  Description: hearing ticking in right ear and can be painful on outside of ear  Intensity: mild  Progression of Symptoms:  same  Accompanying Signs & Symptoms: none  Previous history of similar problem: none  Precipitating factors:        Worsened by: nothing  Alleviating factors:        Improved by: nothing  Therapies tried and outcome:  none       Review of Systems   Constitutional, HEENT, cardiovascular, pulmonary, gi and gu systems are negative, except as otherwise noted.      Objective    /70 (BP Location: Right arm, Patient Position: Sitting, Cuff Size: Adult Large)   Pulse 90   Temp 96.4  F (35.8  C) (Tympanic)   Resp 18   SpO2 95%   There is no height or weight on file to calculate BMI.  Physical Exam  Vitals signs and nursing note reviewed.   Constitutional:       General: She is not in acute distress.     Appearance: She is well-developed.   HENT:      Head: Normocephalic and atraumatic.      Right Ear: External ear normal.      Left Ear: External ear normal.   Eyes:      Conjunctiva/sclera: Conjunctivae normal.      Pupils: Pupils are equal, round, and reactive to light.   Neck:      Musculoskeletal: Neck supple.   Pulmonary:      Effort: Pulmonary effort is normal.      Breath sounds: Normal breath sounds.   Lymphadenopathy:      Cervical: No cervical adenopathy.   Skin:     General: Skin is warm and dry.   Neurological:      Mental Status: She is alert and oriented to person, place, and time.        Other exam not repeated    Office Visit on 09/13/2020   Component Date Value Ref Range Status     COVID-19 Virus PCR to [facility] - So* 09/13/2020 Nasopharyngeal   Final     COVID-19 Virus PCR to [facility] - Re* 09/13/2020 Test received-See reflex to [facility]DL test SARS CoV2 (COVID-19) Virus RT-PCR   Final     SARS-CoV-2 Virus Specimen Source  09/13/2020 Nasopharyngeal   Final     SARS-CoV-2 PCR Result 09/13/2020 NEGATIVE   Final    SARS-CoV2 (COVID-19) RNA not detected, presumed negative.     SARS-CoV-2 PCR Comment 09/13/2020    Final                    Value:Testing was performed using the Aptima SARS-CoV-2 Assay on the [employer] Instrument System.   Additional information about this Emergency Use Authorization (EUA) assay can be found via   the Lab Guide.      Comment: This test should be ordered for the detection of SARS-CoV-2 in individuals who   meet SARS-CoV-2 clinical and/or epidemiological criteria. Test performance is   unknown in asymptomatic patients.  This test is for in vitro diagnostic use under the FDA EUA for laboratories   certified under CLIA to perform high complexity testing. This test has not   been FDA cleared or approved.  A negative result does not rule out the presence of PCR inhibitors in the   specimen or target RNA in concentration below the limit of detection for the   assay. The possibility of a false negative should be considered if the   patient's recent exposure or clinical presentation suggests COVID-19.  This test was validated by the [facility]   Diagnostic Laboratory. This laboratory is certified under the Clinical   Laboratory Improvement Amendments of 1988 (CLIA-88) as qualified to perform   high complexity laboratory testing.               Assessment & Plan     Tinnitus of right ear  Discussed potential etiologies.  Appointment with ENT scheduled for evaluation and recommendations for further management.   - OTOLARYNGOLOGY REFERRAL        See Patient Instructions    No follow-ups on file.    Jimbo Martinez MD  [facility] [place]

## 2020-09-28 NOTE — NURSING NOTE
"Chief Complaint   Patient presents with     Otalgia       Initial /70 (BP Location: Right arm, Patient Position: Sitting, Cuff Size: Adult Large)   Pulse 90   Temp 96.4  F (35.8  C) (Tympanic)   Resp 18   SpO2 95%  Estimated body mass index is 33.48 kg/m  as calculated from the following:    Height as of 9/30/19: 1.651 m (5' 5\").    Weight as of 1/20/20: 91.3 kg (201 lb 3.2 oz).  Medication Reconciliation: complete  Devika Gutierres LPN  "

## 2020-09-29 DIAGNOSIS — H91.93 DECREASED HEARING OF BOTH EARS: Primary | ICD-10-CM

## 2020-09-29 ASSESSMENT — ANXIETY QUESTIONNAIRES: GAD7 TOTAL SCORE: 0

## 2020-09-29 ASSESSMENT — ASTHMA QUESTIONNAIRES: ACT_TOTALSCORE: 18

## 2020-10-03 DIAGNOSIS — M54.41 BILATERAL LOW BACK PAIN WITH BILATERAL SCIATICA, UNSPECIFIED CHRONICITY: ICD-10-CM

## 2020-10-03 DIAGNOSIS — G62.9 PERIPHERAL POLYNEUROPATHY: ICD-10-CM

## 2020-10-03 DIAGNOSIS — M54.42 BILATERAL LOW BACK PAIN WITH BILATERAL SCIATICA, UNSPECIFIED CHRONICITY: ICD-10-CM

## 2020-10-05 NOTE — TELEPHONE ENCOUNTER
Voltaren       Last Written Prescription Date:  9/1/2020  Last Fill Quantity: 30,   # refills: 0    Gabapentin       Last Written Prescription Date:  9/1/2020  Last Fill Quantity: 210,   # refills: 0  Last Office Visit: 9/28/2020  Future Office visit:

## 2020-10-06 ENCOUNTER — OFFICE VISIT (OUTPATIENT)
Dept: AUDIOLOGY | Facility: OTHER | Age: 70
End: 2020-10-06
Attending: AUDIOLOGIST
Payer: MEDICARE

## 2020-10-06 ENCOUNTER — OFFICE VISIT (OUTPATIENT)
Dept: OTOLARYNGOLOGY | Facility: OTHER | Age: 70
End: 2020-10-06
Attending: AUDIOLOGIST
Payer: MEDICARE

## 2020-10-06 VITALS
BODY MASS INDEX: 33.28 KG/M2 | SYSTOLIC BLOOD PRESSURE: 100 MMHG | WEIGHT: 200 LBS | OXYGEN SATURATION: 92 % | DIASTOLIC BLOOD PRESSURE: 58 MMHG | HEART RATE: 82 BPM | TEMPERATURE: 97.7 F

## 2020-10-06 DIAGNOSIS — M26.609 TMJ (TEMPOROMANDIBULAR JOINT SYNDROME): ICD-10-CM

## 2020-10-06 DIAGNOSIS — H93.11 TINNITUS OF RIGHT EAR: Primary | ICD-10-CM

## 2020-10-06 DIAGNOSIS — H93.13 TINNITUS, BILATERAL: Primary | ICD-10-CM

## 2020-10-06 DIAGNOSIS — H90.3 SENSORINEURAL HEARING LOSS (SNHL) OF BOTH EARS: ICD-10-CM

## 2020-10-06 DIAGNOSIS — H91.93 DECREASED HEARING OF BOTH EARS: ICD-10-CM

## 2020-10-06 PROCEDURE — 99213 OFFICE O/P EST LOW 20 MIN: CPT | Performed by: NURSE PRACTITIONER

## 2020-10-06 PROCEDURE — G0463 HOSPITAL OUTPT CLINIC VISIT: HCPCS

## 2020-10-06 PROCEDURE — 92550 TYMPANOMETRY & REFLEX THRESH: CPT | Performed by: AUDIOLOGIST

## 2020-10-06 PROCEDURE — 92557 COMPREHENSIVE HEARING TEST: CPT | Performed by: AUDIOLOGIST

## 2020-10-06 RX ORDER — DICLOFENAC SODIUM 100 MG/1
TABLET, FILM COATED, EXTENDED RELEASE ORAL
Qty: 30 TABLET | Refills: 0 | Status: SHIPPED | OUTPATIENT
Start: 2020-10-06 | End: 2020-10-30

## 2020-10-06 RX ORDER — GABAPENTIN 300 MG/1
CAPSULE ORAL
Qty: 210 CAPSULE | Refills: 0 | Status: SHIPPED | OUTPATIENT
Start: 2020-10-06 | End: 2020-10-30

## 2020-10-06 ASSESSMENT — PAIN SCALES - GENERAL: PAINLEVEL: MILD PAIN (3)

## 2020-10-06 NOTE — NURSING NOTE
"Chief Complaint   Patient presents with     Hearing Problem     HEA     Consult     TMJ       Initial /58   Pulse 82   Temp 97.7  F (36.5  C) (Tympanic)   Wt 90.7 kg (200 lb)   SpO2 92%   BMI 33.28 kg/m   Estimated body mass index is 33.28 kg/m  as calculated from the following:    Height as of 9/30/19: 1.651 m (5' 5\").    Weight as of this encounter: 90.7 kg (200 lb).  Medication Reconciliation: complete  Huyen Munoz LPN  "

## 2020-10-06 NOTE — PROGRESS NOTES
Otolaryngology Note         Chief Complaint:     Patient presents with:  Hearing Problem: HEA  Consult: TMJ           History of Present Illness:     Melvi Cleveland is a 70 year old female seen today for hearing loss.    She reports she hears a woodpecker in her right ear when she is laying on her right side, this is non-pulsatile.        She also reports that she has a history of TMJ with right sided pain, otalgia, right sided nasal congestion.  She also feels like her bite is off.  She treats with warm packs and massage.  She has been having symptoms for several months with some improvement.    She has pain in the right maxillary and right jaw area.    She does report she has had salivary stones expressed from the right iain's duct.  She reports that when her face gets swollen she massages her right cheek and has purulent drainage from the right Anup's duct.  She has not had stones from this area.  Last swelling was noted about 1 month ago.    She denies concerns for hearing.   No vertigo, flux hearing, facial numbness and tingling    She has some slight disequilibrium with moving from sitting to standing position, lasts seconds.  No falls.   She has never attended PT for TMJ    Caffeine - has some chocolate, no other source of caffeine  Alcohol - none  Tobacco - former smoker - quit 23 year ago  Salt - minimal  Stress - not feeling stressed    No history of otological surgery  No history of COM or frequent OM  She does report history of frequent sinus infections, she reports they have gotten better throughout the years.  She has been rinsing with improvement.      minimal noise exposure.    + family history of hearing loss, mom at elderly age    No previous prior audiograms  She has history of nasal allergies     Audiogram completed 10/6/2020:   Tympanograms are Type A for both ears suggesting normal eardrum mobility.  Acoustic Reflex Thresholds at 1000 Hz are present ipsi right/contra left and absent  ipsi left/contra right.  Thresholds are normal range both ears.  Speech reception thresholds are in good agreement with pure tone average.  Word discrimination scores are excellent at supra-thresholds level.    Previous imaging:   No previous brain imaging on file         Medications:     Current Outpatient Rx   Medication Sig Dispense Refill     ADVAIR DISKUS 250-50 MCG/DOSE inhaler INHALE 1 PUFF INTO THE LUNGS EVERY 12 HOURS 60 Inhaler 0     albuterol (PROAIR HFA/PROVENTIL HFA/VENTOLIN HFA) 108 (90 Base) MCG/ACT inhaler INHALE 2 PUFFS INTO THE LUNGS EVERY 6 HOURS AS NEEDED FOR SHORTNESS OF BREATH OR WHEEZING 8.5 g 3     albuterol (PROVENTIL) (2.5 MG/3ML) 0.083% neb solution NEBULIZE THE CONTENTS OF 1 VIAL EVERY 6 HOURS AS NEEDED FOR SHORTNESSOF BREATH/DYSPNEA/WHEEZING 75 mL 0     ALFALFA PO Take 5 tablets by mouth 2 times daily.       ALLEGRA-D ALLERGY & CONGESTION  MG 12 hr tablet TAKE 1 TABLET BY MOUTH TWICE DAILY 30 tablet 0     Ascorbic Acid (VITAMIN C) 500 MG CAPS Take 1 tablet by mouth daily        benzonatate (TESSALON) 200 MG capsule Take 1 capsule (200 mg) by mouth 3 times daily as needed for cough 30 capsule 1     Calcium-Vitamin D-Vitamin K (CALCIUM + D) 500-1000-40 MG-UNT-MCG CHEW Take 3 tablets by mouth daily        Cholecalciferol (VITAMIN D) 1000 UNITS capsule Take 2 capsules by mouth daily        citalopram (CELEXA) 40 MG tablet TAKE 1 TABLET BY MOUTH DAILY 90 tablet 0     diclofenac (VOLTAREN XR) 100 MG 24 hr tablet TAKE 1 TABLET BY MOUTH DAILY 30 tablet 0     Flaxseed, Linseed, (FLAX SEED OIL) 1000 MG capsule Take 2 capsules by mouth daily       fluticasone (FLONASE) 50 MCG/ACT nasal spray Spray 1 spray into both nostrils daily       folic acid (FOLVITE) 1 MG tablet Take 1 mg by mouth 3 times daily.       gabapentin (NEURONTIN) 300 MG capsule TAKE 3 CAPSULES BY MOUTH IN THE MORNING, 2 CAPSULES AT NOON AND 2 CAPSULES IN THE EVENING 210 capsule 0     Garlic 400 MG TBEC Take 1 tablet by mouth  daily        HYDROcodone-acetaminophen (NORCO) 7.5-325 MG per tablet TAKE 1 TABLET BY MOUTH EVERY 4 TO 6 HOURS AS NEEDED FOR SEVERE PAIN 42 tablet 0     hydroxychloroquine (PLAQUENIL) 200 MG tablet Take 200 mg by mouth 2 times daily        Methotrexate, Anti-Rheumatic, (METHOTREXATE, PF, SC) Inject 0.6 mLs Subcutaneous 25 mg vial- patient draws up 0.6 ml.        MILK THISTLE PO Take 1,000 mg by mouth daily.       montelukast (SINGULAIR) 10 MG tablet TAKE 1 TABLET BY MOUTH AT BEDTIME 90 tablet 1     Omega-3 1000 MG capsule Take 1.5 g by mouth daily        order for DME Equipment being ordered: Nebulizer 1 Device 0     pantoprazole (PROTONIX) 40 MG EC tablet        polyvinyl alcohol-povidone (REFRESH) 1.4-0.6 % ophthalmic solution 1-2 drops as needed       PREBIOTIC PRODUCT PO Take 1 tablet by mouth daily       Probiotic Product (PROBIOTIC PO) Take 1 capsule by mouth daily       RESTASIS 0.05 % ophthalmic emulsion USE 1 DROP IN BOTH EYES 2 TIMES A DAY 60 each 0     Simethicone 180 MG CAPS TAKE ONE CAPSULE BY MOUTH AS NEEDED AFTER A MEAL. MAX 2 CAPS/DAY 60 capsule 0     traZODone (DESYREL) 50 MG tablet TAKE 2 TO 3 TABLETS BY MOUTH DAILY AT BEDTIME 270 tablet 0     UNABLE TO FIND Take 1,000 mg by mouth daily MEDICATION NAME: Quinol/tumeric              Allergies:     Allergies: Adhesive tape, Benzoin compound, Mold, Seasonal allergies, and Soap          Past Medical History:     Past Medical History:   Diagnosis Date     Allergic rhinitis, seasonal 3/1/2011     Arthritis      Chronic/Recurrent Back Pain 12/13/2000     Chronic/Recurrent Neck Pain 7/5/2005     Cough variant asthma 4/16/2019     Crohn's Disease 3/1/2011     CTS (carpal tunnel syndrome) 1/1/2011     Depressive disorder      Dyslipidemia 3/1/2011     Fibrocystic Breast Disease 3/1/2011     Mixed hyperlipidemia 1/1/2011     Wilson's neuroma 1/1/2011     Parotiditis 5/9/2011     Peripheral Neuriopathy 3/1/2011     Pneumonia of left lower lobe due to infectious  organism 2019     Rheumatoid arthritis(714.0) 1999     Seborrhea capitis 10/6/2011     Sjogrens Syndrome 3/1/2011     Urethral stricture 2008            Past Surgical History:     Past Surgical History:   Procedure Laterality Date     BACK SURGERY  1995    back surgery     BIOPSY      breast bx X2     BIOPSY BREAST      LT     BIOPSY BREAST NEEDLE LOCALIZATION Right 2017    Procedure: BIOPSY BREAST NEEDLE LOCALIZATION;  WIRE LOCALIZED EXCISIONAL BIOPSY  RIGHT BREAST MASS;  Surgeon: Jeni Amin MD;  Location: [facility]     CHOLECYSTECTOMY       COLONOSCOPY  11/15/2004    screening     COLONOSCOPY  2014     EGD with biopsy  ,     GERD     ENDOSCOPY UPPER, COLONOSCOPY, COMBINED N/A 2020    Procedure: UPPER ENDOSCOPY WITH BIOPSIES  AND COLONOSCOPY;  Surgeon: Saul Jiménez MD;  Location: [facility]     IR CONSULTATION FOR IR EXAM  2020     IR CONSULTATION FOR IR EXAM  2020     laminectomy      L4 L5 laminectomy; back pain     RELEASE CARPAL TUNNEL Right 2018    Procedure: RELEASE CARPAL TUNNEL;  RIGHT CARPAL TUNNEL RELEASE;  Surgeon: Haider Carlos MD;  Location: [facility]            Social History:     Social History     Tobacco Use     Smoking status: Former Smoker     Types: Cigarettes     Quit date: 10/6/1997     Years since quittin.0     Smokeless tobacco: Never Used     Tobacco comment: no passive exposure   Substance Use Topics     Alcohol use: No     Alcohol/week: 0.0 standard drinks     Comment: former. quit 1984     Drug use: No            Review of Systems:     ROS: See HPI         Physical Exam:     /58   Pulse 82   Temp 97.7  F (36.5  C) (Tympanic)   Wt 90.7 kg (200 lb)   SpO2 92%   BMI 33.28 kg/m       General - The patient is well nourished and well developed, and appears to have good nutritional status.  Alert and oriented to person and place, answers questions and cooperates with examination appropriately.   Head and Face -  Normocephalic and atraumatic, with no gross asymmetry noted.  The facial nerve is intact, with strong symmetric movements.  Voice and Breathing - The patient was breathing comfortably without the use of accessory muscles. There was no wheezing, stridor. The patients voice was clear and strong, and had appropriate pitch and quality.  Ears - External ear normal. Canals are patent. Right tympanic membrane is intact without effusion, retraction or mass. Left tympanic membrane is intact without effusion, retraction or mass.  Eyes - Extraocular movements intact, sclera were not icteric or injected, conjunctiva were pink and moist.  Mouth - Examination of the oral cavity showed pink, healthy oral mucosa. Dentition in good condition. No lesions or ulcerations noted. The tongue was mobile and midline.   Clear saliva noted from iain's and meghna's duct bilaterally.  No facial swelling.  She has some masseter muscle tenderness with palpation, no significant click, pop noted with opening and closing jaw, but she states she can feel it click  Throat - The walls of the oropharynx were smooth, pink, moist, symmetric, and had no lesions or ulcerations.  The tonsillar pillars and soft palate were symmetric. The uvula was midline on elevation.    Neck - Normal midline excursion of the laryngotracheal complex during swallowing.  Full range of motion on passive movement.  Palpation of the occipital, submental, submandibular, internal jugular chain, and supraclavicular nodes did not demonstrate any abnormal lymph nodes or masses.  Palpation of the thyroid was soft and smooth, with no nodules or goiter appreciated.  The trachea was mobile and midline.  Nose - External contour is symmetric, no gross deflection or scars.  Nasal mucosa is pink and moist with no abnormal mucus.  The septum and turbinates were evaluated: grossly normal.  No polyps, masses, or purulence noted on examination.         Assessment and Plan:       ICD-10-CM     1. Tinnitus of right ear  H93.11      Follow up with dentist  When you have swelling in your right cheek drink plenty of water, add lemon to water, follow up if it does not resolve with home management    I will refer you to PT for TMJ, I don't feel significant click/pop but she does have masseter muscle tenderness and reports history of significant TMJ pain.  Requests PT referral.     Rest the muscles of the jaw by eating soft foods, avoid chewing gum, avoid clenching or tensing the jaw  Apply moist warm heat to the affected jaw for 30 minutes at least twice daily  Try tylenol as needed for pain   Oral splints or mouth guards as sometimes necessary  If these steps are not helpful an oral surgery consult may be needed    The current leading theory on tinnitus is that it originates from the central nervous system itself, and is usually associated with hearing loss and injury to cochlear hair cells.   Advancing age can accompany hearing loss and tinnitus.  If a person is younger without hearing loss, loud noise usually is the leading cause of tinnitus.  Delayed damage to hearing is often seen as well.  Ear protection from noise exposure is important to protect your hearing and decrease the risk for tinnitus.  You can also take steps to mask the noise, such as a low volume de-tuned radio, a fan in the background, a sound abdoul, or hearing aids if indicated.  Ways to help prevent or decrease the symptoms of tinnitus include a low salt diet, control of hypertension, avoiding stimulants such as caffeine, tobacco or alcohol and proper sleep, exercise and stress management.  Tinnitus can also be worsened by stress, anxiety, depression, and high blood pressure.  I caution you against numerous expensive non-pharmaceutical options that are advertised, and have no proven benefit.      Please follow up as needed for worsening symptoms, changes in symptoms, or signs of infection.  I recommended yearly audiograms, and  consideration of a hearing aid evaluation if indicated.  If there is any dizziness or facial weakness, call for a recheck.     I have provided you with a tinnitus education material

## 2020-10-06 NOTE — LETTER
10/6/2020         RE: Melvi Cleveland  [address] [facility] [address]  [place] [address]        Dear Colleague,    Thank you for referring your patient, Melvi Cleveland, to the [facility]. Please see a copy of my visit note below.      Otolaryngology Note         Chief Complaint:     Patient presents with:  Hearing Problem: HEA  Consult: TMJ           History of Present Illness:     Melvi Cleveland is a 70 year old female seen today for hearing loss.    She reports she hears a woodpecker in her right ear when she is laying on her right side, this is non-pulsatile.        She also reports that she has a history of TMJ with right sided pain, otalgia, right sided nasal congestion.  She also feels like her bite is off.  She treats with warm packs and massage.  She has been having symptoms for several months with some improvement.    She has pain in the right maxillary and right jaw area.    She does report she has had salivary stones expressed from the right iain's duct.  She reports that when her face gets swollen she massages her right cheek and has purulent drainage from the right Anup's duct.  She has not had stones from this area.  Last swelling was noted about 1 month ago.    She denies concerns for hearing.   No vertigo, flux hearing, facial numbness and tingling    She has some slight disequilibrium with moving from sitting to standing position, lasts seconds.  No falls.   She has never attended PT for TMJ    Caffeine - has some chocolate, no other source of caffeine  Alcohol - none  Tobacco - former smoker - quit 23 year ago  Salt - minimal  Stress - not feeling stressed    No history of otological surgery  No history of COM or frequent OM  She does report history of frequent sinus infections, she reports they have gotten better throughout the years.  She has been rinsing with improvement.      minimal noise exposure.    + family history of hearing loss, mom at elderly  age    No previous prior audiograms  She has history of nasal allergies     Audiogram completed 10/6/2020:   Tympanograms are Type A for both ears suggesting normal eardrum mobility.  Acoustic Reflex Thresholds at 1000 Hz are present ipsi right/contra left and absent ipsi left/contra right.  Thresholds are normal range both ears.  Speech reception thresholds are in good agreement with pure tone average.  Word discrimination scores are excellent at supra-thresholds level.    Previous imaging:   No previous brain imaging on file         Medications:     Current Outpatient Rx   Medication Sig Dispense Refill     ADVAIR DISKUS 250-50 MCG/DOSE inhaler INHALE 1 PUFF INTO THE LUNGS EVERY 12 HOURS 60 Inhaler 0     albuterol (PROAIR HFA/PROVENTIL HFA/VENTOLIN HFA) 108 (90 Base) MCG/ACT inhaler INHALE 2 PUFFS INTO THE LUNGS EVERY 6 HOURS AS NEEDED FOR SHORTNESS OF BREATH OR WHEEZING 8.5 g 3     albuterol (PROVENTIL) (2.5 MG/3ML) 0.083% neb solution NEBULIZE THE CONTENTS OF 1 VIAL EVERY 6 HOURS AS NEEDED FOR SHORTNESSOF BREATH/DYSPNEA/WHEEZING 75 mL 0     ALFALFA PO Take 5 tablets by mouth 2 times daily.       ALLEGRA-D ALLERGY & CONGESTION  MG 12 hr tablet TAKE 1 TABLET BY MOUTH TWICE DAILY 30 tablet 0     Ascorbic Acid (VITAMIN C) 500 MG CAPS Take 1 tablet by mouth daily        benzonatate (TESSALON) 200 MG capsule Take 1 capsule (200 mg) by mouth 3 times daily as needed for cough 30 capsule 1     Calcium-Vitamin D-Vitamin K (CALCIUM + D) 500-1000-40 MG-UNT-MCG CHEW Take 3 tablets by mouth daily        Cholecalciferol (VITAMIN D) 1000 UNITS capsule Take 2 capsules by mouth daily        citalopram (CELEXA) 40 MG tablet TAKE 1 TABLET BY MOUTH DAILY 90 tablet 0     diclofenac (VOLTAREN XR) 100 MG 24 hr tablet TAKE 1 TABLET BY MOUTH DAILY 30 tablet 0     Flaxseed, Linseed, (FLAX SEED OIL) 1000 MG capsule Take 2 capsules by mouth daily       fluticasone (FLONASE) 50 MCG/ACT nasal spray Spray 1 spray into both nostrils daily        folic acid (FOLVITE) 1 MG tablet Take 1 mg by mouth 3 times daily.       gabapentin (NEURONTIN) 300 MG capsule TAKE 3 CAPSULES BY MOUTH IN THE MORNING, 2 CAPSULES AT NOON AND 2 CAPSULES IN THE EVENING 210 capsule 0     Garlic 400 MG TBEC Take 1 tablet by mouth daily        HYDROcodone-acetaminophen (NORCO) 7.5-325 MG per tablet TAKE 1 TABLET BY MOUTH EVERY 4 TO 6 HOURS AS NEEDED FOR SEVERE PAIN 42 tablet 0     hydroxychloroquine (PLAQUENIL) 200 MG tablet Take 200 mg by mouth 2 times daily        Methotrexate, Anti-Rheumatic, (METHOTREXATE, PF, SC) Inject 0.6 mLs Subcutaneous 25 mg vial- patient draws up 0.6 ml.        MILK THISTLE PO Take 1,000 mg by mouth daily.       montelukast (SINGULAIR) 10 MG tablet TAKE 1 TABLET BY MOUTH AT BEDTIME 90 tablet 1     Omega-3 1000 MG capsule Take 1.5 g by mouth daily        order for DME Equipment being ordered: Nebulizer 1 Device 0     pantoprazole (PROTONIX) 40 MG EC tablet        polyvinyl alcohol-povidone (REFRESH) 1.4-0.6 % ophthalmic solution 1-2 drops as needed       PREBIOTIC PRODUCT PO Take 1 tablet by mouth daily       Probiotic Product (PROBIOTIC PO) Take 1 capsule by mouth daily       RESTASIS 0.05 % ophthalmic emulsion USE 1 DROP IN BOTH EYES 2 TIMES A DAY 60 each 0     Simethicone 180 MG CAPS TAKE ONE CAPSULE BY MOUTH AS NEEDED AFTER A MEAL. MAX 2 CAPS/DAY 60 capsule 0     traZODone (DESYREL) 50 MG tablet TAKE 2 TO 3 TABLETS BY MOUTH DAILY AT BEDTIME 270 tablet 0     UNABLE TO FIND Take 1,000 mg by mouth daily MEDICATION NAME: Quinol/tumeric              Allergies:     Allergies: Adhesive tape, Benzoin compound, Mold, Seasonal allergies, and Soap          Past Medical History:     Past Medical History:   Diagnosis Date     Allergic rhinitis, seasonal 3/1/2011     Arthritis      Chronic/Recurrent Back Pain 12/13/2000     Chronic/Recurrent Neck Pain 7/5/2005     Cough variant asthma 4/16/2019     Crohn's Disease 3/1/2011     CTS (carpal tunnel syndrome) 1/1/2011      Depressive disorder      Dyslipidemia 3/1/2011     Fibrocystic Breast Disease 3/1/2011     Mixed hyperlipidemia 2011     Wilson's neuroma 2011     Parotiditis 2011     Peripheral Neuriopathy 3/1/2011     Pneumonia of left lower lobe due to infectious organism 2019     Rheumatoid arthritis(714.0) 1999     Seborrhea capitis 10/6/2011     Sjogrens Syndrome 3/1/2011     Urethral stricture 2008            Past Surgical History:     Past Surgical History:   Procedure Laterality Date     BACK SURGERY  1995    back surgery     BIOPSY      breast bx X2     BIOPSY BREAST      LT     BIOPSY BREAST NEEDLE LOCALIZATION Right 2017    Procedure: BIOPSY BREAST NEEDLE LOCALIZATION;  WIRE LOCALIZED EXCISIONAL BIOPSY  RIGHT BREAST MASS;  Surgeon: Jeni Amin MD;  Location: [facility]     CHOLECYSTECTOMY       COLONOSCOPY  11/15/2004    screening     COLONOSCOPY  2014     EGD with biopsy  ,     GERD     ENDOSCOPY UPPER, COLONOSCOPY, COMBINED N/A 2020    Procedure: UPPER ENDOSCOPY WITH BIOPSIES  AND COLONOSCOPY;  Surgeon: Saul Jiménez MD;  Location: [facility]     IR CONSULTATION FOR IR EXAM  2020     IR CONSULTATION FOR IR EXAM  2020     laminectomy      L4 L5 laminectomy; back pain     RELEASE CARPAL TUNNEL Right 2018    Procedure: RELEASE CARPAL TUNNEL;  RIGHT CARPAL TUNNEL RELEASE;  Surgeon: Haider Carlos MD;  Location: [facility]            Social History:     Social History     Tobacco Use     Smoking status: Former Smoker     Types: Cigarettes     Quit date: 10/6/1997     Years since quittin.0     Smokeless tobacco: Never Used     Tobacco comment: no passive exposure   Substance Use Topics     Alcohol use: No     Alcohol/week: 0.0 standard drinks     Comment: former. quit      Drug use: No            Review of Systems:     ROS: See HPI         Physical Exam:     /58   Pulse 82   Temp 97.7  F (36.5  C) (Tympanic)   Wt 90.7 kg (200 lb)   SpO2  92%   BMI 33.28 kg/m       General - The patient is well nourished and well developed, and appears to have good nutritional status.  Alert and oriented to person and place, answers questions and cooperates with examination appropriately.   Head and Face - Normocephalic and atraumatic, with no gross asymmetry noted.  The facial nerve is intact, with strong symmetric movements.  Voice and Breathing - The patient was breathing comfortably without the use of accessory muscles. There was no wheezing, stridor. The patients voice was clear and strong, and had appropriate pitch and quality.  Ears - External ear normal. Canals are patent. Right tympanic membrane is intact without effusion, retraction or mass. Left tympanic membrane is intact without effusion, retraction or mass.  Eyes - Extraocular movements intact, sclera were not icteric or injected, conjunctiva were pink and moist.  Mouth - Examination of the oral cavity showed pink, healthy oral mucosa. Dentition in good condition. No lesions or ulcerations noted. The tongue was mobile and midline.   Clear saliva noted from iain's and meghna's duct bilaterally.  No facial swelling.  She has some masseter muscle tenderness with palpation, no significant click, pop noted with opening and closing jaw, but she states she can feel it click  Throat - The walls of the oropharynx were smooth, pink, moist, symmetric, and had no lesions or ulcerations.  The tonsillar pillars and soft palate were symmetric. The uvula was midline on elevation.    Neck - Normal midline excursion of the laryngotracheal complex during swallowing.  Full range of motion on passive movement.  Palpation of the occipital, submental, submandibular, internal jugular chain, and supraclavicular nodes did not demonstrate any abnormal lymph nodes or masses.  Palpation of the thyroid was soft and smooth, with no nodules or goiter appreciated.  The trachea was mobile and midline.  Nose - External contour is  symmetric, no gross deflection or scars.  Nasal mucosa is pink and moist with no abnormal mucus.  The septum and turbinates were evaluated: grossly normal.  No polyps, masses, or purulence noted on examination.         Assessment and Plan:       ICD-10-CM    1. Tinnitus of right ear  H93.11      Follow up with dentist  When you have swelling in your right cheek drink plenty of water, add lemon to water, follow up if it does not resolve with home management    I will refer you to PT for TMJ, I don't feel significant click/pop but she does have masseter muscle tenderness and reports history of significant TMJ pain.  Requests PT referral.     Rest the muscles of the jaw by eating soft foods, avoid chewing gum, avoid clenching or tensing the jaw  Apply moist warm heat to the affected jaw for 30 minutes at least twice daily  Try tylenol as needed for pain   Oral splints or mouth guards as sometimes necessary  If these steps are not helpful an oral surgery consult may be needed    The current leading theory on tinnitus is that it originates from the central nervous system itself, and is usually associated with hearing loss and injury to cochlear hair cells.   Advancing age can accompany hearing loss and tinnitus.  If a person is younger without hearing loss, loud noise usually is the leading cause of tinnitus.  Delayed damage to hearing is often seen as well.  Ear protection from noise exposure is important to protect your hearing and decrease the risk for tinnitus.  You can also take steps to mask the noise, such as a low volume de-tuned radio, a fan in the background, a sound abdoul, or hearing aids if indicated.  Ways to help prevent or decrease the symptoms of tinnitus include a low salt diet, control of hypertension, avoiding stimulants such as caffeine, tobacco or alcohol and proper sleep, exercise and stress management.  Tinnitus can also be worsened by stress, anxiety, depression, and high blood pressure.  I caution  you against numerous expensive non-pharmaceutical options that are advertised, and have no proven benefit.      Please follow up as needed for worsening symptoms, changes in symptoms, or signs of infection.  I recommended yearly audiograms, and consideration of a hearing aid evaluation if indicated.  If there is any dizziness or facial weakness, call for a recheck.     I have provided you with a tinnitus education material        Again, thank you for allowing me to participate in the care of your patient.        Sincerely,        Babs Camacho NP

## 2020-10-06 NOTE — PROGRESS NOTES
Audiology Evaluation Completed. Please refer SCANNED AUDIOGRAM and/or TYMPANOGRAM for BACKGROUND, RESULTS, RECOMMENDATIONS.      Klaudia SOSA, [facility]  Audiologist #[id number]

## 2020-10-06 NOTE — PATIENT INSTRUCTIONS
Follow up with dentist  When you have swelling in your right cheek drink plenty of water, add lemon to water, follow up if it does not resolve with home management    I will refer you to PT for TMJ    Rest the muscles of the jaw by eating soft foods, avoid chewing gum, avoid clenching or tensing the jaw  Apply moist warm heat to the affected jaw for 30 minutes at least twice daily  Try tylenol as needed for pain   Oral splints or mouth guards as sometimes necessary  If these steps are not helpful an oral surgery consult may be needed    The current leading theory on tinnitus is that it originates from the central nervous system itself, and is usually associated with hearing loss and injury to cochlear hair cells.   Advancing age can accompany hearing loss and tinnitus.  If a person is younger without hearing loss, loud noise usually is the leading cause of tinnitus.  Delayed damage to hearing is often seen as well.  Ear protection from noise exposure is important to protect your hearing and decrease the risk for tinnitus.  You can also take steps to mask the noise, such as a low volume de-tuned radio, a fan in the background, a sound abdoul, or hearing aids if indicated.  Ways to help prevent or decrease the symptoms of tinnitus include a low salt diet, control of hypertension, avoiding stimulants such as caffeine, tobacco or alcohol and proper sleep, exercise and stress management.  Tinnitus can also be worsened by stress, anxiety, depression, and high blood pressure.  I caution you against numerous expensive non-pharmaceutical options that are advertised, and have no proven benefit.      Please follow up as needed for worsening symptoms, changes in symptoms, or signs of infection.  I recommended yearly audiograms, and consideration of a hearing aid evaluation if indicated.  If there is any dizziness or facial weakness, call for a recheck.     I have provided you with a tinnitus education material      Thank you for  allowing Babs MEZA and our ENT team to participate in your care.  If your medications are too expensive, please call my nurse at the number listed below.  We can possibly change this medication.    If you have a scheduling or an appointment question please contact our Health Unit Coordinator at their direct line [phone].   ALL nursing questions or concerns can be directed to your ENT nurses at: [phone] or [phone].

## 2020-10-13 DIAGNOSIS — M06.09 RHEUMATOID ARTHRITIS OF MULTIPLE SITES WITHOUT RHEUMATOID FACTOR (H): ICD-10-CM

## 2020-10-13 DIAGNOSIS — Z79.899 ENCOUNTER FOR LONG-TERM (CURRENT) USE OF OTHER MEDICATIONS: ICD-10-CM

## 2020-10-13 LAB
ALBUMIN SERPL-MCNC: 3.1 G/DL (ref 3.4–5)
ALT SERPL W P-5'-P-CCNC: 29 U/L (ref 0–50)
BASOPHILS # BLD AUTO: 0.1 10E9/L (ref 0–0.2)
BASOPHILS NFR BLD AUTO: 1 %
CREAT SERPL-MCNC: 1.03 MG/DL (ref 0.52–1.04)
CRP SERPL-MCNC: <2.9 MG/L (ref 0–8)
DIFFERENTIAL METHOD BLD: ABNORMAL
EOSINOPHIL # BLD AUTO: 0.3 10E9/L (ref 0–0.7)
EOSINOPHIL NFR BLD AUTO: 6.3 %
ERYTHROCYTE [DISTWIDTH] IN BLOOD BY AUTOMATED COUNT: 13.5 % (ref 10–15)
ERYTHROCYTE [SEDIMENTATION RATE] IN BLOOD BY WESTERGREN METHOD: 15 MM/H (ref 0–30)
GFR SERPL CREATININE-BSD FRML MDRD: 56 ML/MIN/{1.73_M2}
HCT VFR BLD AUTO: 35.2 % (ref 35–47)
HGB BLD-MCNC: 11.5 G/DL (ref 11.7–15.7)
IMM GRANULOCYTES # BLD: 0 10E9/L (ref 0–0.4)
IMM GRANULOCYTES NFR BLD: 0.2 %
LYMPHOCYTES # BLD AUTO: 1.8 10E9/L (ref 0.8–5.3)
LYMPHOCYTES NFR BLD AUTO: 34.8 %
MCH RBC QN AUTO: 29.2 PG (ref 26.5–33)
MCHC RBC AUTO-ENTMCNC: 32.7 G/DL (ref 31.5–36.5)
MCV RBC AUTO: 89 FL (ref 78–100)
MONOCYTES # BLD AUTO: 0.6 10E9/L (ref 0–1.3)
MONOCYTES NFR BLD AUTO: 11.6 %
NEUTROPHILS # BLD AUTO: 2.3 10E9/L (ref 1.6–8.3)
NEUTROPHILS NFR BLD AUTO: 46.1 %
NRBC # BLD AUTO: 0 10*3/UL
NRBC BLD AUTO-RTO: 0 /100
PLATELET # BLD AUTO: 210 10E9/L (ref 150–450)
RBC # BLD AUTO: 3.94 10E12/L (ref 3.8–5.2)
WBC # BLD AUTO: 5.1 10E9/L (ref 4–11)

## 2020-10-13 PROCEDURE — 86140 C-REACTIVE PROTEIN: CPT | Mod: ZL | Performed by: NURSE PRACTITIONER

## 2020-10-13 PROCEDURE — 82040 ASSAY OF SERUM ALBUMIN: CPT | Mod: ZL | Performed by: NURSE PRACTITIONER

## 2020-10-13 PROCEDURE — 36415 COLL VENOUS BLD VENIPUNCTURE: CPT | Mod: ZL | Performed by: NURSE PRACTITIONER

## 2020-10-13 PROCEDURE — 82565 ASSAY OF CREATININE: CPT | Mod: ZL | Performed by: NURSE PRACTITIONER

## 2020-10-13 PROCEDURE — 84460 ALANINE AMINO (ALT) (SGPT): CPT | Mod: ZL | Performed by: NURSE PRACTITIONER

## 2020-10-13 PROCEDURE — 85652 RBC SED RATE AUTOMATED: CPT | Mod: ZL | Performed by: NURSE PRACTITIONER

## 2020-10-13 PROCEDURE — 85025 COMPLETE CBC W/AUTO DIFF WBC: CPT | Mod: ZL | Performed by: NURSE PRACTITIONER

## 2020-10-14 DIAGNOSIS — J30.2 SEASONAL ALLERGIC RHINITIS, UNSPECIFIED TRIGGER: ICD-10-CM

## 2020-10-14 NOTE — TELEPHONE ENCOUNTER
allegra      Last Written Prescription Date:  9/16/2020  Last Fill Quantity: 30,   # refills: 0  Last Office Visit: 10/6/2020  Future Office visit:

## 2020-10-15 RX ORDER — FEXOFENADINE HYDROCHLORIDE AND PSEUDOEPHEDRINE HYDROCHLORIDE 60; 120 MG/1; MG/1
TABLET, FILM COATED, EXTENDED RELEASE ORAL
Qty: 30 TABLET | Refills: 0 | Status: SHIPPED | OUTPATIENT
Start: 2020-10-15 | End: 2020-11-24

## 2020-10-16 ENCOUNTER — MYC MEDICAL ADVICE (OUTPATIENT)
Dept: FAMILY MEDICINE | Facility: OTHER | Age: 70
End: 2020-10-16

## 2020-10-16 ENCOUNTER — NURSE TRIAGE (OUTPATIENT)
Dept: FAMILY MEDICINE | Facility: OTHER | Age: 70
End: 2020-10-16

## 2020-10-16 DIAGNOSIS — Z20.822 COVID-19 RULED OUT: Primary | ICD-10-CM

## 2020-10-16 NOTE — TELEPHONE ENCOUNTER
"    Reason for Disposition    [1] COVID-19 EXPOSURE (Close Contact) AND [2] within last 14 days BUT [3] NO symptoms    Answer Assessment - Initial Assessment Questions  1. CLOSE CONTACT: \"Who is the person with the confirmed or suspected COVID-19 infection that you were exposed to?\"      student  2. PLACE of CONTACT: \"Where were you when you were exposed to COVID-19?\" (e.g., home, school, medical waiting room; which city?)      school  3. TYPE of CONTACT: \"How much contact was there?\" (e.g., sitting next to, live in same house, work in same office, same building)      sittting with  4. DURATION of CONTACT: \"How long were you in contact with the COVID-19 patient?\" (e.g., a few seconds, passed by person, a few minutes, live with the patient)      All day  5. DATE of CONTACT: \"When did you have contact with a COVID-19 patient?\" (e.g., how many days ago)      10/12  6. TRAVEL: \"Have you traveled out of the country recently?\" If so, \"When and where?\"      * Also ask about out-of-state travel, since the CDC has identified some high-risk cities for community spread in the .      * Note: Travel becomes less relevant if there is widespread community transmission where the patient lives.      no  7. COMMUNITY SPREAD: \"Are there lots of cases of COVID-19 (community spread) where you live?\" (See public health department website, if unsure)        yes  8. SYMPTOMS: \"Do you have any symptoms?\" (e.g., fever, cough, breathing difficulty)      no  9. PREGNANCY OR POSTPARTUM: \"Is there any chance you are pregnant?\" \"When was your last menstrual period?\" \"Did you deliver in the last 2 weeks?\"      no  10. HIGH RISK: \"Do you have any heart or lung problems? Do you have a weak immune system?\" (e.g., CHF, COPD, asthma, HIV positive, chemotherapy, renal failure, diabetes mellitus, sickle cell anemia)        Asthma ra sjogrens    Protocols used: CORONAVIRUS (COVID-19) EXPOSURE-A- 8.4.20      "

## 2020-10-17 ENCOUNTER — OFFICE VISIT (OUTPATIENT)
Dept: FAMILY MEDICINE | Facility: OTHER | Age: 70
End: 2020-10-17
Attending: FAMILY MEDICINE
Payer: MEDICARE

## 2020-10-17 DIAGNOSIS — Z20.822 COVID-19 RULED OUT: ICD-10-CM

## 2020-10-17 PROCEDURE — U0003 INFECTIOUS AGENT DETECTION BY NUCLEIC ACID (DNA OR RNA); SEVERE ACUTE RESPIRATORY SYNDROME CORONAVIRUS 2 (SARS-COV-2) (CORONAVIRUS DISEASE [COVID-19]), AMPLIFIED PROBE TECHNIQUE, MAKING USE OF HIGH THROUGHPUT TECHNOLOGIES AS DESCRIBED BY CMS-2020-01-R: HCPCS | Mod: ZL | Performed by: FAMILY MEDICINE

## 2020-10-18 LAB
SARS-COV-2 RNA SPEC QL NAA+PROBE: NOT DETECTED
SPECIMEN SOURCE: NORMAL

## 2020-10-27 DIAGNOSIS — G62.9 PERIPHERAL POLYNEUROPATHY: ICD-10-CM

## 2020-10-27 DIAGNOSIS — J30.1 SEASONAL ALLERGIC RHINITIS DUE TO POLLEN: ICD-10-CM

## 2020-10-27 DIAGNOSIS — M54.41 BILATERAL LOW BACK PAIN WITH BILATERAL SCIATICA, UNSPECIFIED CHRONICITY: ICD-10-CM

## 2020-10-27 DIAGNOSIS — J45.991 COUGH VARIANT ASTHMA: ICD-10-CM

## 2020-10-27 DIAGNOSIS — H04.123 BILATERAL DRY EYES: ICD-10-CM

## 2020-10-27 DIAGNOSIS — M54.42 BILATERAL LOW BACK PAIN WITH BILATERAL SCIATICA, UNSPECIFIED CHRONICITY: ICD-10-CM

## 2020-10-28 NOTE — TELEPHONE ENCOUNTER
Montelukast 10 mg      Last Written Prescription Date:  2-  Last Fill Quantity: 90,   # refills: 1  Last Office Visit: 9-

## 2020-10-29 RX ORDER — MONTELUKAST SODIUM 10 MG/1
TABLET ORAL
Qty: 90 TABLET | Refills: 0 | Status: SHIPPED | OUTPATIENT
Start: 2020-10-29 | End: 2021-01-21

## 2020-10-29 NOTE — TELEPHONE ENCOUNTER
voltaren 100 mg      Last Written Prescription Date:  10/06/2020  Last Fill Quantity: 30,   # refills: 0  Last Office Visit: 09/28/2020  Future Office visit:       Gabapentin 300 mg      Last Written Prescription Date:  10/06/2020  Last Fill Quantity: 210,   # refills: 0    restasis 0.05%      Last Written Prescription Date:  04/21/2020  Last Fill Quantity: 60,   # refills: 0

## 2020-10-30 RX ORDER — DICLOFENAC SODIUM 100 MG/1
TABLET, FILM COATED, EXTENDED RELEASE ORAL
Qty: 30 TABLET | Refills: 0 | Status: SHIPPED | OUTPATIENT
Start: 2020-10-30 | End: 2020-12-02

## 2020-10-30 RX ORDER — CYCLOSPORINE 0.5 MG/ML
EMULSION OPHTHALMIC
Qty: 60 EACH | Refills: 0 | Status: SHIPPED | OUTPATIENT
Start: 2020-10-30 | End: 2021-04-12

## 2020-10-30 RX ORDER — GABAPENTIN 300 MG/1
CAPSULE ORAL
Qty: 210 CAPSULE | Refills: 0 | Status: SHIPPED | OUTPATIENT
Start: 2020-10-30 | End: 2020-12-08

## 2020-11-14 ENCOUNTER — HEALTH MAINTENANCE LETTER (OUTPATIENT)
Age: 70
End: 2020-11-14

## 2020-11-18 ENCOUNTER — TRANSFERRED RECORDS (OUTPATIENT)
Dept: HEALTH INFORMATION MANAGEMENT | Facility: CLINIC | Age: 70
End: 2020-11-18

## 2020-11-23 DIAGNOSIS — J30.2 SEASONAL ALLERGIC RHINITIS, UNSPECIFIED TRIGGER: ICD-10-CM

## 2020-11-24 RX ORDER — FEXOFENADINE HYDROCHLORIDE AND PSEUDOEPHEDRINE HYDROCHLORIDE 60; 120 MG/1; MG/1
TABLET, FILM COATED, EXTENDED RELEASE ORAL
Qty: 30 TABLET | Refills: 0 | Status: SHIPPED | OUTPATIENT
Start: 2020-11-24 | End: 2020-12-02

## 2020-11-24 NOTE — TELEPHONE ENCOUNTER
ALLEGRA-D      Last Written Prescription Date:  10/15/20  Last Fill Quantity: 30,   # refills: 0  Last Office Visit: 09/28/2020

## 2020-12-01 DIAGNOSIS — G89.29 CHRONIC BILATERAL LOW BACK PAIN WITH BILATERAL SCIATICA: ICD-10-CM

## 2020-12-01 DIAGNOSIS — M54.42 BILATERAL LOW BACK PAIN WITH BILATERAL SCIATICA, UNSPECIFIED CHRONICITY: ICD-10-CM

## 2020-12-01 DIAGNOSIS — M54.41 BILATERAL LOW BACK PAIN WITH BILATERAL SCIATICA, UNSPECIFIED CHRONICITY: ICD-10-CM

## 2020-12-01 DIAGNOSIS — R69 DIAGNOSIS UNKNOWN: ICD-10-CM

## 2020-12-01 DIAGNOSIS — F51.01 PRIMARY INSOMNIA: ICD-10-CM

## 2020-12-01 DIAGNOSIS — M54.42 CHRONIC BILATERAL LOW BACK PAIN WITH BILATERAL SCIATICA: ICD-10-CM

## 2020-12-01 DIAGNOSIS — M54.41 CHRONIC BILATERAL LOW BACK PAIN WITH BILATERAL SCIATICA: ICD-10-CM

## 2020-12-01 DIAGNOSIS — J30.2 SEASONAL ALLERGIC RHINITIS, UNSPECIFIED TRIGGER: ICD-10-CM

## 2020-12-01 NOTE — TELEPHONE ENCOUNTER
ALLEGRA-D ALLERGY & CONGESTION  MG 12 hr tablet      Last Written Prescription Date:  11/24/20  Last Fill Quantity: 30   # refills: 0  Last Office Visit: 10/17/20  Future Office visit:       Routing refill request to provider for review/approval because:

## 2020-12-01 NOTE — TELEPHONE ENCOUNTER
simethicone  180 mg    Last Written Prescription Date:  05/20/2020  Last Fill Quantity: 60,   # refills: 0  Last Office Visit: 09/28/2020  Future Office visit:       Hydrocodone-acetaminophen 7.5-325 mg      Last Written Prescription Date:  08/25/2020  Last Fill Quantity: 42,   # refills: 0    Trazodone 50 mg      Last Written Prescription Date:  09/01/2020  Last Fill Quantity: 270,   # refills: 0    Diclofenac 100 mg      Last Written Prescription Date:  10/30/2020  Last Fill Quantity: 30,   # refills: 0

## 2020-12-02 RX ORDER — SIMETHICONE 180 MG
CAPSULE ORAL
Qty: 60 CAPSULE | Refills: 0 | Status: SHIPPED | OUTPATIENT
Start: 2020-12-02 | End: 2023-11-15

## 2020-12-02 RX ORDER — DICLOFENAC SODIUM 100 MG/1
TABLET, FILM COATED, EXTENDED RELEASE ORAL
Qty: 30 TABLET | Refills: 0 | Status: SHIPPED | OUTPATIENT
Start: 2020-12-02 | End: 2021-01-08

## 2020-12-02 RX ORDER — HYDROCODONE BITARTRATE AND ACETAMINOPHEN 7.5; 325 MG/1; MG/1
TABLET ORAL
Qty: 42 TABLET | Refills: 0 | Status: SHIPPED | OUTPATIENT
Start: 2020-12-02 | End: 2021-04-12

## 2020-12-02 RX ORDER — TRAZODONE HYDROCHLORIDE 50 MG/1
TABLET, FILM COATED ORAL
Qty: 270 TABLET | Refills: 0 | Status: SHIPPED | OUTPATIENT
Start: 2020-12-02 | End: 2021-02-22

## 2020-12-02 RX ORDER — FEXOFENADINE HYDROCHLORIDE AND PSEUDOEPHEDRINE HYDROCHLORIDE 60; 120 MG/1; MG/1
TABLET, FILM COATED, EXTENDED RELEASE ORAL
Qty: 30 TABLET | Refills: 0 | Status: SHIPPED | OUTPATIENT
Start: 2020-12-02 | End: 2021-01-08

## 2020-12-08 ENCOUNTER — TELEPHONE (OUTPATIENT)
Dept: FAMILY MEDICINE | Facility: OTHER | Age: 70
End: 2020-12-08
Payer: COMMERCIAL

## 2020-12-08 DIAGNOSIS — G62.9 PERIPHERAL POLYNEUROPATHY: ICD-10-CM

## 2020-12-08 DIAGNOSIS — F41.0 PANIC DISORDER WITHOUT AGORAPHOBIA: ICD-10-CM

## 2020-12-08 RX ORDER — CITALOPRAM HYDROBROMIDE 20 MG/1
TABLET ORAL
Qty: 180 TABLET | Refills: 0 | Status: SHIPPED | OUTPATIENT
Start: 2020-12-08 | End: 2021-03-02

## 2020-12-08 RX ORDER — GABAPENTIN 300 MG/1
CAPSULE ORAL
Qty: 210 CAPSULE | Refills: 0 | Status: SHIPPED | OUTPATIENT
Start: 2020-12-08 | End: 2021-01-08

## 2020-12-08 NOTE — TELEPHONE ENCOUNTER
Please schedule patient for date/time: no available appointments tomorrow, may see another provider if avaiable or go to .    Have patient go to ER/Urgent Care Center. Urgent Care hours are 9:30 am to 8 pm, open 7 days a week. Yes.    Provider will call patient.No.    Other:

## 2020-12-08 NOTE — TELEPHONE ENCOUNTER
gabapentin      Last Written Prescription Date:  10/30/2020  Last Fill Quantity: 210,   # refills: 0  Last Office Visit: 09/28/2020  Future Office visit:       citalopram      Last Written Prescription Date:  06/23/2020  Last Fill Quantity: 90,   # refills: 0

## 2020-12-08 NOTE — TELEPHONE ENCOUNTER
12:23 PM  To: covering provider for Michelle     Reason for Call: future OVERBOOK    Patient is having the following symptoms: patient fell downstairs and may have a broke right toe    The patient is requesting an appointment for tomorrow WED 12/09 with Dr. Martinez.    Was an appointment offered for this call? No  If yes : Appointment type              Date    Preferred method for responding to this message: Telephone Call     What is your phone number [phone]    If we cannot reach you directly, may we leave a detailed response at the number you provided? Yes    Can this message wait until your PCP/provider returns, if unavailable today? No, patient would like a return call today 12/08/2020    Lillie Laird

## 2020-12-09 ENCOUNTER — OFFICE VISIT (OUTPATIENT)
Dept: FAMILY MEDICINE | Facility: OTHER | Age: 70
End: 2020-12-09
Attending: NURSE PRACTITIONER
Payer: MEDICARE

## 2020-12-09 ENCOUNTER — ANCILLARY PROCEDURE (OUTPATIENT)
Dept: GENERAL RADIOLOGY | Facility: OTHER | Age: 70
End: 2020-12-09
Attending: NURSE PRACTITIONER
Payer: MEDICARE

## 2020-12-09 VITALS
DIASTOLIC BLOOD PRESSURE: 64 MMHG | WEIGHT: 209 LBS | BODY MASS INDEX: 34.78 KG/M2 | TEMPERATURE: 97.4 F | OXYGEN SATURATION: 98 % | SYSTOLIC BLOOD PRESSURE: 106 MMHG | HEART RATE: 83 BPM

## 2020-12-09 DIAGNOSIS — M79.674 PAIN OF TOE OF RIGHT FOOT: ICD-10-CM

## 2020-12-09 DIAGNOSIS — S92.514A CLOSED NONDISPLACED FRACTURE OF PROXIMAL PHALANX OF LESSER TOE OF RIGHT FOOT, INITIAL ENCOUNTER: ICD-10-CM

## 2020-12-09 DIAGNOSIS — M79.674 PAIN OF TOE OF RIGHT FOOT: Primary | ICD-10-CM

## 2020-12-09 PROCEDURE — 73630 X-RAY EXAM OF FOOT: CPT | Mod: TC,RT

## 2020-12-09 PROCEDURE — 99213 OFFICE O/P EST LOW 20 MIN: CPT | Performed by: NURSE PRACTITIONER

## 2020-12-09 PROCEDURE — G0463 HOSPITAL OUTPT CLINIC VISIT: HCPCS | Mod: 25

## 2020-12-09 PROCEDURE — G0463 HOSPITAL OUTPT CLINIC VISIT: HCPCS

## 2020-12-09 ASSESSMENT — PAIN SCALES - GENERAL: PAINLEVEL: MILD PAIN (2)

## 2020-12-09 NOTE — NURSING NOTE
"Chief Complaint   Patient presents with     Toe Injury     little toe right foot injury on 12-        Initial /64 (BP Location: Left arm, Patient Position: Chair, Cuff Size: Adult Large)   Pulse 83   Temp 97.4  F (36.3  C) (Tympanic)   Wt 94.8 kg (209 lb)   SpO2 98%   BMI 34.78 kg/m   Estimated body mass index is 34.78 kg/m  as calculated from the following:    Height as of 9/30/19: 1.651 m (5' 5\").    Weight as of this encounter: 94.8 kg (209 lb).  Medication Reconciliation: complete  Ashley Dooley LPN  "

## 2020-12-09 NOTE — PROGRESS NOTES
Subjective     Melvi Cleveland is a 70 year old female who presents to clinic today for the following health issues:    HPI         Musculoskeletal problem/pain  Onset/Duration: 3 days   Description  Location: right little toe  Joint Swelling: YES  Redness: bruising   Pain: YES  Warmth: no  Intensity:  2/10  Progression of Symptoms:  improving  Accompanying signs and symptoms:   Fevers: no  Numbness/tingling/weakness: no  History  Trauma to the area: YES; missed the last step, slipped and her right foot and jammed against a wooden table; she did fall on her butt did not hit her head, also had some low back pain, but this has improved after seeing the chiropractor    Recent illness:  no  Previous similar problem: no  Previous evaluation:  no  Precipitating or alleviating factors:  Aggravating factors include: walking  Therapies tried and outcome: nothing    She does have RA, on methotrexate and Plaquenil; also takes Norco and diclofenac as needed for the pain.       Review of Systems   As noted in the HPI.       Objective    /64 (BP Location: Left arm, Patient Position: Chair, Cuff Size: Adult Large)   Pulse 83   Temp 97.4  F (36.3  C) (Tympanic)   Wt 94.8 kg (209 lb)   SpO2 98%   BMI 34.78 kg/m    Body mass index is 34.78 kg/m .  Physical Exam   GENERAL: healthy, alert and no distress  NEURO: Normal strength and tone, mentation intact and speech normal  PSYCH: mentation appears normal, affect normal/bright  RIGHT FOOT: Skin intact. No erythema or edema. She does have some ecchymosis just proximal from pinky toe to middle toe. She also has pain with palpation just proximal from pinky toe. She also has pain when pinky toe is adducted and abducted. Sensation intact. Dorsalis pedis and posterior tibial pulses diminished, but present.  No pain along plantar aspect of foot. No ankle pain. No calf pain or erythema. Achilles tendon intact.     PROCEDURE:  XR FOOT RT G/E 3 VW     HISTORY: right pinky toe pain,  fell and right foot jammed against  wooden table; Pain of toe of right foot     COMPARISON:  None.     TECHNIQUE:  3 views of the right foot were obtained.     FINDINGS:  There is a fracture of the proximal phalanx of the fifth  toe. Fracture is nondisplaced The joint spaces are preserved.  There  is bony spurring at the insertion of the plantar fascia into the  calcaneus.                                                                      IMPRESSION: Nondisplaced proximal phalanx fracture fifth toe     SUE CANCINO MD        Assessment & Plan     Pain of toe of right foot  Closed nondisplaced fracture of proximal phalanx of lesser toe of right foot, initial encounter  XR does show nondisplaced proximal fracture of pinky toe. Will place in walking boot and have her begin using a walker to decrease the weight being placed on her foot (does not feel comfortable using crutches.) Rest, ice, and NSAIDs as needed was encouraged. Will then see PCP in one week with XR prior for recheck. Will be seen sooner with new or worsening symptoms.           Kaylee Rivas NP  [facility] - AIDE

## 2020-12-09 NOTE — TELEPHONE ENCOUNTER
Spoke with Sherrie - she is asking if she can get an xray to ensure that if she broke her toe that it's just that are due to pain

## 2020-12-09 NOTE — TELEPHONE ENCOUNTER
Scheduled w/ Kaylee for today; Dr. Martinez would like patient to be seen versus just ordering an XRAY. Kaylee would like patient to check in early; patient is aware.

## 2020-12-10 NOTE — PROGRESS NOTES
Subjective     Melvi Cleveland is a 70 year old female who presents to clinic today for the following health issues:    HPI         Musculoskeletal problem/pain.  Patient is here to F/U on fracture of right little toe    Sherrie was seen with right foot pain previously and underwent xray which showed closed nondisplaced fracture proximal phalanx right fifth toe.  She was placed in an immobilizer boot.  Her pain is resolved    Review of Systems   Constitutional, HEENT, cardiovascular, pulmonary, gi and gu systems are negative, except as otherwise noted.      Objective    /60 (BP Location: Left arm, Patient Position: Chair, Cuff Size: Adult Large)   Pulse 93   Temp 98.2  F (36.8  C) (Tympanic)   SpO2 97%   There is no height or weight on file to calculate BMI.  Physical Exam  Vitals signs and nursing note reviewed.   Constitutional:       General: She is not in acute distress.     Appearance: She is well-developed.   Neurological:      Mental Status: She is alert and oriented to person, place, and time.   Psychiatric:         Mood and Affect: Mood normal.         Behavior: Behavior normal.         Thought Content: Thought content normal.          Office Visit on 10/17/2020   Component Date Value Ref Range Status     COVID-19 Virus PCR to [facility] - So* 10/17/2020 Nasopharyngeal   Final     COVID-19 Virus PCR to [facility] - Re* 10/17/2020 Not Detected   Final    Comment: Collection of multiple specimens from the same patient may be necessary to   detect the virus. The possibility of a false negative should be considered if   the patient's recent exposure or clinical presentation suggests 2019 nCOV   infection and diagnostic tests for other causes of illness are negative.   Repeat testing may be considered in this setting.  Patient sample was heat inactivated and amplified using the HDPCR SARS-CoV-2   assay ([employer] The HDPCRTM SARS-CoV-2 assay is a reverse   transcription real-time polymerase chain  reaction (qRT-PCR) test intended for   the qualitative detection of nucleic acid  from SARS-CoV-2 in human nasopharyngeal swabs, oropharyngeal swabs, anterior   nasal swabs, mid-turbinate nasal swabs as well as nasal aspirate, nasal wash,   and bronchoalveolar lavage (BAL) specimens from individuals who are suspected   of COVID-19 by their healthcare provider.  A negative result does not rule out the presence of real-time PCR inhibitors   in the sp                           ecimen or COVID-19 RNA in concentrations below the limit of detection   of the assay. The possibility of a false negative should be considered if the   patients recent exposure or clinical presentation suggests COVID-19.   Additional testing or repeat testing requires consultation with the   laboratory.  Nasopharyngeal specimen is the preferred choice for swab-based SARS CoV2   testing. When collection of a nasopharyngeal swab is not possible the   following are acceptable alternatives:  an oropharyngeal (OP) specimen collected by a healthcare professional, or a   nasal mid-turbinate (NMT) swab collected by a healthcare professional or by   onsite self-collection (using a flocked tapered swab), or an anterior nares   specimen collected by a healthcare professional or by onsite self-collection   (using a round foam swab). (Centers for Disease Control)  Testing performed by [facility] ([facility]) [address] [place] [address]  The test performance characteristics were determined by [facility]. It has not been   cleared or approved by the FDA.  The laboratory is regulated under the Clinical Laboratory Improvement   Amendments of 1988 (CLIA-88) as qualified to perform high-complexity testing.   This test is used for clinical purposes. It should not be regarded as   investigational or for research.               Assessment & Plan     Closed nondisplaced  fracture of proximal phalanx of lesser toe of right foot, initial encounter  Improved.  Discussed weaning out of Cam walker.  Follow up with persistent syptoms.    Stage 3a chronic kidney disease  Stable.  Follow Creatinine GFR every 6 months.          See Patient Instructions    No follow-ups on file.    Jimbo Martinez MD  [facility] [place]

## 2020-12-18 ENCOUNTER — APPOINTMENT (OUTPATIENT)
Dept: GENERAL RADIOLOGY | Facility: OTHER | Age: 70
End: 2020-12-18
Attending: NURSE PRACTITIONER
Payer: MEDICARE

## 2020-12-18 ENCOUNTER — OFFICE VISIT (OUTPATIENT)
Dept: FAMILY MEDICINE | Facility: OTHER | Age: 70
End: 2020-12-18
Attending: FAMILY MEDICINE
Payer: MEDICARE

## 2020-12-18 VITALS
HEART RATE: 93 BPM | DIASTOLIC BLOOD PRESSURE: 60 MMHG | TEMPERATURE: 98.2 F | SYSTOLIC BLOOD PRESSURE: 106 MMHG | OXYGEN SATURATION: 97 %

## 2020-12-18 DIAGNOSIS — S92.514A CLOSED NONDISPLACED FRACTURE OF PROXIMAL PHALANX OF LESSER TOE OF RIGHT FOOT, INITIAL ENCOUNTER: ICD-10-CM

## 2020-12-18 DIAGNOSIS — S92.514A CLOSED NONDISPLACED FRACTURE OF PROXIMAL PHALANX OF LESSER TOE OF RIGHT FOOT, INITIAL ENCOUNTER: Primary | ICD-10-CM

## 2020-12-18 DIAGNOSIS — N18.31 STAGE 3A CHRONIC KIDNEY DISEASE (H): ICD-10-CM

## 2020-12-18 PROCEDURE — 99213 OFFICE O/P EST LOW 20 MIN: CPT | Performed by: FAMILY MEDICINE

## 2020-12-18 PROCEDURE — 73630 X-RAY EXAM OF FOOT: CPT | Mod: TC,RT

## 2020-12-18 PROCEDURE — G0463 HOSPITAL OUTPT CLINIC VISIT: HCPCS

## 2020-12-18 ASSESSMENT — PAIN SCALES - GENERAL: PAINLEVEL: MILD PAIN (2)

## 2020-12-18 NOTE — NURSING NOTE
"Chief Complaint   Patient presents with     Musculoskeletal Problem       Initial /60 (BP Location: Left arm, Patient Position: Chair, Cuff Size: Adult Large)   Pulse 93   Temp 98.2  F (36.8  C) (Tympanic)   SpO2 97%  Estimated body mass index is 34.78 kg/m  as calculated from the following:    Height as of 9/30/19: 1.651 m (5' 5\").    Weight as of 12/9/20: 94.8 kg (209 lb).  Medication Reconciliation: complete  Pj Garner LPN  "

## 2020-12-23 DIAGNOSIS — M06.09 RHEUMATOID ARTHRITIS OF MULTIPLE SITES WITHOUT RHEUMATOID FACTOR (H): ICD-10-CM

## 2020-12-23 DIAGNOSIS — Z79.899 ENCOUNTER FOR LONG-TERM (CURRENT) USE OF OTHER MEDICATIONS: ICD-10-CM

## 2020-12-23 LAB
ALBUMIN SERPL-MCNC: 3.3 G/DL (ref 3.4–5)
ALT SERPL W P-5'-P-CCNC: 37 U/L (ref 0–50)
BASOPHILS # BLD AUTO: 0 10E9/L (ref 0–0.2)
BASOPHILS NFR BLD AUTO: 0.7 %
CREAT SERPL-MCNC: 1.07 MG/DL (ref 0.52–1.04)
CRP SERPL-MCNC: 5.4 MG/L (ref 0–8)
DIFFERENTIAL METHOD BLD: NORMAL
EOSINOPHIL # BLD AUTO: 0.2 10E9/L (ref 0–0.7)
EOSINOPHIL NFR BLD AUTO: 4.9 %
ERYTHROCYTE [DISTWIDTH] IN BLOOD BY AUTOMATED COUNT: 13.7 % (ref 10–15)
ERYTHROCYTE [SEDIMENTATION RATE] IN BLOOD BY WESTERGREN METHOD: 14 MM/H (ref 0–30)
GFR SERPL CREATININE-BSD FRML MDRD: 53 ML/MIN/{1.73_M2}
HCT VFR BLD AUTO: 37.5 % (ref 35–47)
HGB BLD-MCNC: 12.3 G/DL (ref 11.7–15.7)
IMM GRANULOCYTES # BLD: 0 10E9/L (ref 0–0.4)
IMM GRANULOCYTES NFR BLD: 0.2 %
LYMPHOCYTES # BLD AUTO: 1.2 10E9/L (ref 0.8–5.3)
LYMPHOCYTES NFR BLD AUTO: 26.1 %
MCH RBC QN AUTO: 29.4 PG (ref 26.5–33)
MCHC RBC AUTO-ENTMCNC: 32.8 G/DL (ref 31.5–36.5)
MCV RBC AUTO: 90 FL (ref 78–100)
MONOCYTES # BLD AUTO: 0.4 10E9/L (ref 0–1.3)
MONOCYTES NFR BLD AUTO: 9.4 %
NEUTROPHILS # BLD AUTO: 2.6 10E9/L (ref 1.6–8.3)
NEUTROPHILS NFR BLD AUTO: 58.7 %
NRBC # BLD AUTO: 0 10*3/UL
NRBC BLD AUTO-RTO: 0 /100
PLATELET # BLD AUTO: 214 10E9/L (ref 150–450)
RBC # BLD AUTO: 4.19 10E12/L (ref 3.8–5.2)
WBC # BLD AUTO: 4.5 10E9/L (ref 4–11)

## 2020-12-23 PROCEDURE — 82040 ASSAY OF SERUM ALBUMIN: CPT | Mod: ZL | Performed by: NURSE PRACTITIONER

## 2020-12-23 PROCEDURE — 86140 C-REACTIVE PROTEIN: CPT | Mod: ZL | Performed by: NURSE PRACTITIONER

## 2020-12-23 PROCEDURE — 84460 ALANINE AMINO (ALT) (SGPT): CPT | Mod: ZL | Performed by: NURSE PRACTITIONER

## 2020-12-23 PROCEDURE — 85025 COMPLETE CBC W/AUTO DIFF WBC: CPT | Mod: ZL | Performed by: NURSE PRACTITIONER

## 2020-12-23 PROCEDURE — 85652 RBC SED RATE AUTOMATED: CPT | Mod: ZL | Performed by: NURSE PRACTITIONER

## 2020-12-23 PROCEDURE — 36415 COLL VENOUS BLD VENIPUNCTURE: CPT | Mod: ZL | Performed by: NURSE PRACTITIONER

## 2020-12-23 PROCEDURE — 82565 ASSAY OF CREATININE: CPT | Mod: ZL | Performed by: NURSE PRACTITIONER

## 2021-01-05 DIAGNOSIS — J30.2 SEASONAL ALLERGIC RHINITIS, UNSPECIFIED TRIGGER: ICD-10-CM

## 2021-01-05 DIAGNOSIS — M54.42 BILATERAL LOW BACK PAIN WITH BILATERAL SCIATICA, UNSPECIFIED CHRONICITY: ICD-10-CM

## 2021-01-05 DIAGNOSIS — M54.41 BILATERAL LOW BACK PAIN WITH BILATERAL SCIATICA, UNSPECIFIED CHRONICITY: ICD-10-CM

## 2021-01-05 DIAGNOSIS — G62.9 PERIPHERAL POLYNEUROPATHY: ICD-10-CM

## 2021-01-06 NOTE — TELEPHONE ENCOUNTER
Gabapentin 300 mg      Last Written Prescription Date:  12-8-2020  Last Fill Quantity: 210,   # refills: 0  Last Office Visit: 12-    Allegra-D allergy and congestion  mg      Last Written Prescription Date:  12-2-2020  Last Fill Quantity: 30,   # refills: 0  Last Office Visit: 12-    Diclofenac 100 mg 24 hr      Last Written Prescription Date:  12-2-2020  Last Fill Quantity: 30,   # refills: 0  Last Office Visit: 12-

## 2021-01-08 RX ORDER — FEXOFENADINE HYDROCHLORIDE AND PSEUDOEPHEDRINE HYDROCHLORIDE 60; 120 MG/1; MG/1
TABLET, FILM COATED, EXTENDED RELEASE ORAL
Qty: 30 TABLET | Refills: 0 | Status: SHIPPED | OUTPATIENT
Start: 2021-01-08 | End: 2021-02-11

## 2021-01-08 RX ORDER — GABAPENTIN 300 MG/1
CAPSULE ORAL
Qty: 210 CAPSULE | Refills: 0 | Status: SHIPPED | OUTPATIENT
Start: 2021-01-08 | End: 2021-02-11

## 2021-01-08 RX ORDER — DICLOFENAC SODIUM 100 MG/1
TABLET, FILM COATED, EXTENDED RELEASE ORAL
Qty: 30 TABLET | Refills: 0 | Status: SHIPPED | OUTPATIENT
Start: 2021-01-08 | End: 2021-02-02

## 2021-01-13 NOTE — ED AVS SNAPSHOT
[facility]  [address] [address]  Phone:  [phone]                                    Melvi Cleveland   MRN: [id number]    Department:  [facility]   Date of Visit:  11/23/2019           After Visit Summary Signature Page    I have received my discharge instructions, and my questions have been answered. I have discussed any challenges I see with this plan with the nurse or doctor.    ..........................................................................................................................................  Patient/Patient Representative Signature      ..........................................................................................................................................  Patient Representative Print Name and Relationship to Patient    ..................................................               ................................................  Date                                   Time    ..........................................................................................................................................  Reviewed by Signature/Title    ...................................................              ..............................................  Date                                               Time          22EPIC Rev 08/18        Yes - the patient is able to be screened

## 2021-01-15 ENCOUNTER — HEALTH MAINTENANCE LETTER (OUTPATIENT)
Age: 71
End: 2021-01-15

## 2021-01-16 PROBLEM — N18.30 CHRONIC KIDNEY DISEASE, STAGE 3 (H): Status: ACTIVE | Noted: 2021-01-16

## 2021-01-20 DIAGNOSIS — J30.1 SEASONAL ALLERGIC RHINITIS DUE TO POLLEN: ICD-10-CM

## 2021-01-20 DIAGNOSIS — J45.991 COUGH VARIANT ASTHMA: ICD-10-CM

## 2021-01-21 RX ORDER — MONTELUKAST SODIUM 10 MG/1
TABLET ORAL
Qty: 90 TABLET | Refills: 0 | Status: SHIPPED | OUTPATIENT
Start: 2021-01-21 | End: 2021-05-27

## 2021-01-22 ENCOUNTER — TRANSFERRED RECORDS (OUTPATIENT)
Dept: HEALTH INFORMATION MANAGEMENT | Facility: CLINIC | Age: 71
End: 2021-01-22

## 2021-01-27 ENCOUNTER — ANCILLARY PROCEDURE (OUTPATIENT)
Dept: MAMMOGRAPHY | Facility: OTHER | Age: 71
End: 2021-01-27
Attending: FAMILY MEDICINE
Payer: MEDICARE

## 2021-01-27 DIAGNOSIS — Z12.31 VISIT FOR SCREENING MAMMOGRAM: ICD-10-CM

## 2021-01-27 PROCEDURE — 77063 BREAST TOMOSYNTHESIS BI: CPT | Mod: TC

## 2021-02-01 DIAGNOSIS — M54.42 BILATERAL LOW BACK PAIN WITH BILATERAL SCIATICA, UNSPECIFIED CHRONICITY: ICD-10-CM

## 2021-02-01 DIAGNOSIS — M54.41 BILATERAL LOW BACK PAIN WITH BILATERAL SCIATICA, UNSPECIFIED CHRONICITY: ICD-10-CM

## 2021-02-02 RX ORDER — DICLOFENAC SODIUM 100 MG/1
TABLET, FILM COATED, EXTENDED RELEASE ORAL
Qty: 30 TABLET | Refills: 0 | Status: SHIPPED | OUTPATIENT
Start: 2021-02-02 | End: 2021-03-02

## 2021-02-02 NOTE — TELEPHONE ENCOUNTER
diclofenac (VOLTAREN XR) 100 MG 24 hr tablet      Last Written Prescription Date:  1/8/21  Last Fill Quantity: 30,   # refills: 0  Last Office Visit: 12/18/20  Future Office visit:       Routing refill request to provider for review/approval

## 2021-02-03 ENCOUNTER — TELEPHONE (OUTPATIENT)
Dept: FAMILY MEDICINE | Facility: OTHER | Age: 71
End: 2021-02-03

## 2021-02-03 NOTE — TELEPHONE ENCOUNTER
11:48 AM    Reason for Call: OVERBOOK    Patient is having the following symptoms: open blister on stomach/bleeding  for 2 weeks.    The patient is requesting an appointment for asap/ with Dr. Martinez/or any other avail provider .    Was an appointment offered for this call? No  If yes : Appointment type              Date    Preferred method for responding to this message: Telephone Call  What is your phone number ? [phone]    If we cannot reach you directly, may we leave a detailed response at the number you provided? Yes    Can this message wait until your PCP/provider returns, if unavailable today? Mechelle Robins

## 2021-02-04 ENCOUNTER — OFFICE VISIT (OUTPATIENT)
Dept: FAMILY MEDICINE | Facility: OTHER | Age: 71
End: 2021-02-04
Attending: PHYSICIAN ASSISTANT
Payer: COMMERCIAL

## 2021-02-04 VITALS
HEART RATE: 91 BPM | WEIGHT: 210 LBS | HEIGHT: 66 IN | DIASTOLIC BLOOD PRESSURE: 66 MMHG | TEMPERATURE: 97.1 F | OXYGEN SATURATION: 98 % | BODY MASS INDEX: 33.75 KG/M2 | SYSTOLIC BLOOD PRESSURE: 102 MMHG

## 2021-02-04 DIAGNOSIS — R23.4 LOCALIZED SKIN DESQUAMATION: Primary | ICD-10-CM

## 2021-02-04 PROCEDURE — G0463 HOSPITAL OUTPT CLINIC VISIT: HCPCS

## 2021-02-04 PROCEDURE — 99213 OFFICE O/P EST LOW 20 MIN: CPT | Performed by: PHYSICIAN ASSISTANT

## 2021-02-04 RX ORDER — BACITRACIN ZINC 500 [USP'U]/G
OINTMENT TOPICAL 2 TIMES DAILY
Qty: 425 G | Refills: 1 | Status: SHIPPED | OUTPATIENT
Start: 2021-02-04 | End: 2021-04-05

## 2021-02-04 ASSESSMENT — PAIN SCALES - GENERAL: PAINLEVEL: NO PAIN (0)

## 2021-02-04 ASSESSMENT — MIFFLIN-ST. JEOR: SCORE: 1491.36

## 2021-02-04 NOTE — NURSING NOTE
"Chief Complaint   Patient presents with     open blister on stomach       Initial /66 (BP Location: Right arm, Patient Position: Sitting, Cuff Size: Adult Regular)   Pulse 91   Temp 97.1  F (36.2  C) (Tympanic)   Ht 1.664 m (5' 5.5\")   Wt 95.3 kg (210 lb)   LMP  (Approximate)   SpO2 98%   BMI 34.41 kg/m   Estimated body mass index is 34.41 kg/m  as calculated from the following:    Height as of this encounter: 1.664 m (5' 5.5\").    Weight as of this encounter: 95.3 kg (210 lb).  Medication Reconciliation: complete  Ammy Solorzano LPN  "

## 2021-02-04 NOTE — PROGRESS NOTES
Subjective     Melvi Cleveland is a 70 year old female who presents to clinic today for the following health issues:    HPI         Concern - Open blister on stomach  Onset: GED on January 22nd  Description: took her methotraxate shot on Jan 22. After procedure she felt burning all over (no actually signs) on Jan 23 or 24 she felt a bruise form on her stomach. Eventually she saw a white spot on her stomach she started cleaning with proxide and a bandaid. She went to remove the bandaid and it started to bleed profusly but eventually stopped. Today she went to remove the bandaid and it was still bleeding, she is waiting for it to be removed by provider.   Intensity: moderate  Progression of Symptoms:  same  Accompanying Signs & Symptoms: none  Previous history of similar problem: none  Precipitating factors:        Worsened by: none  Alleviating factors:        Improved by: none  Therapies tried and outcome: None        Patient Active Problem List   Diagnosis     Neck pain, chronic     Low back pain, chronic     Crohn's disease of large intestine (H)     Dyslipidemia     Sicca syndrome (H)     Polyneuropathy in other diseases classified elsewhere (H)     RA (rheumatoid arthritis) (H)     Comprehensive Medical Examination     Seasonal allergic rhinitis     Fibrocystic breast disease (FCBD)     ACP (advance care planning)     Need for hepatitis C screening test     Cough variant asthma     Chronic kidney disease, stage 3     Past Surgical History:   Procedure Laterality Date     BACK SURGERY  05/1995    back surgery     BIOPSY      breast bx X2     BIOPSY BREAST      LT     BIOPSY BREAST NEEDLE LOCALIZATION Right 6/12/2017    Procedure: BIOPSY BREAST NEEDLE LOCALIZATION;  WIRE LOCALIZED EXCISIONAL BIOPSY  RIGHT BREAST MASS;  Surgeon: Jeni Amin MD;  Location: [facility]     CHOLECYSTECTOMY  2004     COLONOSCOPY  11/15/2004    screening     COLONOSCOPY  9/24/2014     EGD with biopsy  2010, 2011    GERD     ENDOSCOPY  UPPER, COLONOSCOPY, COMBINED N/A 2020    Procedure: UPPER ENDOSCOPY WITH BIOPSIES  AND COLONOSCOPY;  Surgeon: Saul Jiménez MD;  Location: [facility]     IR CONSULTATION FOR IR EXAM  2020     IR CONSULTATION FOR IR EXAM  2020     laminectomy      L4 L5 laminectomy; back pain     RELEASE CARPAL TUNNEL Right 2018    Procedure: RELEASE CARPAL TUNNEL;  RIGHT CARPAL TUNNEL RELEASE;  Surgeon: Haider Carlos MD;  Location: [facility]       Social History     Tobacco Use     Smoking status: Former Smoker     Types: Cigarettes     Quit date: 10/6/1997     Years since quittin.3     Smokeless tobacco: Never Used     Tobacco comment: no passive exposure   Substance Use Topics     Alcohol use: No     Alcohol/week: 0.0 standard drinks     Comment: former. quit      Family History   Problem Relation Age of Onset     Diabetes Mother      Rheumatoid Arthritis Mother      Other - See Comments Mother         fibromyalgia     Osteoarthritis Mother      Thyroid Disease Mother         thyroid disorder     Unknown/Adopted Father         cause of death unknown     Rheumatoid Arthritis Father      Diabetes Sister      Myocardial Infarction Sister         cause of death     Lupus Sister         cause of death     C.A.D. Maternal Grandmother      Cerebrovascular Disease Maternal Grandmother      Diabetes Maternal Grandmother      Thyroid Disease Maternal Grandmother         thyrodi disorder; goitor removed     Cancer Maternal Grandfather      Hypertension Brother      Other - See Comments Brother         sleep apnea     Other - See Comments Sister         sleep apnea         Current Outpatient Medications   Medication Sig Dispense Refill     ADVAIR DISKUS 250-50 MCG/DOSE inhaler INHALE 1 PUFF INTO THE LUNGS EVERY 12 HOURS 60 Inhaler 0     albuterol (PROAIR HFA/PROVENTIL HFA/VENTOLIN HFA) 108 (90 Base) MCG/ACT inhaler INHALE 2 PUFFS INTO THE LUNGS EVERY 6 HOURS AS NEEDED FOR SHORTNESS OF BREATH OR WHEEZING 8.5 g 3      albuterol (PROVENTIL) (2.5 MG/3ML) 0.083% neb solution NEBULIZE THE CONTENTS OF 1 VIAL EVERY 6 HOURS AS NEEDED FOR SHORTNESSOF BREATH/DYSPNEA/WHEEZING 75 mL 0     ALFALFA PO Take 5 tablets by mouth 2 times daily.       ALLEGRA-D ALLERGY & CONGESTION  MG 12 hr tablet TAKE 1 TABLET BY MOUTH TWICE DAILY 30 tablet 0     Ascorbic Acid (VITAMIN C) 500 MG CAPS Take 1 tablet by mouth daily        bacitracin 500 UNIT/GM external ointment Apply topically 2 times daily 425 g 1     benzonatate (TESSALON) 200 MG capsule Take 1 capsule (200 mg) by mouth 3 times daily as needed for cough 30 capsule 1     Calcium-Vitamin D-Vitamin K (CALCIUM + D) 500-1000-40 MG-UNT-MCG CHEW Take 2 tablets by mouth daily        Cholecalciferol (VITAMIN D) 1000 UNITS capsule Take 1 capsule by mouth daily        citalopram (CELEXA) 20 MG tablet TAKE 2 TABLETS BY MOUTH DAILY 180 tablet 0     diclofenac (VOLTAREN XR) 100 MG 24 hr tablet TAKE 1 TABLET BY MOUTH DAILY 30 tablet 0     Flaxseed, Linseed, (FLAX SEED OIL) 1000 MG capsule Take 2 capsules by mouth daily       fluticasone (FLONASE) 50 MCG/ACT nasal spray Spray 1 spray into both nostrils daily       folic acid (FOLVITE) 1 MG tablet Take 1 mg by mouth 3 times daily.       gabapentin (NEURONTIN) 300 MG capsule TAKE 3 CAPSULES BY MOUTH IN THE MORNING, 2 CAPSULES AT NOON AND 2 CAPSULES IN THE EVENING 210 capsule 0     Garlic 400 MG TBEC Take 1 tablet by mouth daily        HYDROcodone-acetaminophen (NORCO) 7.5-325 MG per tablet TAKE 1 TABLET BY MOUTH EVERY 4 TO 6 HOURS AS NEEDED FOR SEVERE PAIN 42 tablet 0     hydroxychloroquine (PLAQUENIL) 200 MG tablet Take 200 mg by mouth 2 times daily        Methotrexate, Anti-Rheumatic, (METHOTREXATE, PF, SC) Inject 0.8 mLs Subcutaneous 25 mg vial- patient draws up 0.6 ml.        MILK THISTLE PO Take 1,000 mg by mouth daily.       montelukast (SINGULAIR) 10 MG tablet TAKE 1 TABLET BY MOUTH AT BEDTIME 90 tablet 0     Omega-3 1000 MG capsule Take 1.5 g by  mouth daily        order for DME Equipment being ordered: Nebulizer 1 Device 0     pantoprazole (PROTONIX) 40 MG EC tablet 2 times daily        polyvinyl alcohol-povidone (REFRESH) 1.4-0.6 % ophthalmic solution 1-2 drops as needed       PREBIOTIC PRODUCT PO Take 1 tablet by mouth daily       Probiotic Product (PROBIOTIC PO) Take 1 capsule by mouth daily       RESTASIS 0.05 % ophthalmic emulsion USE 1 DROP IN BOTH EYES 2 TIMES A DAY 60 each 0     Simethicone 180 MG CAPS TAKE ONE CAPSULE BY MOUTH AS NEEDED AFTER A MEAL. MAX 2 CAPS/DAY 60 capsule 0     traZODone (DESYREL) 50 MG tablet TAKE 2 TO 3 TABLETS BY MOUTH DAILY AT BEDTIME 270 tablet 0     UNABLE TO FIND Take 1,000 mg by mouth daily MEDICATION NAME: Quinol/tumeric       Allergies   Allergen Reactions     Adhesive Tape      Glue and nicotine patches     Benzoin Compound      Tin-Co-Javier     Mold      Seasonal Allergies      Soap      Any cleanser/soap/disinfectant that is used right before surgery.  Makes patient break out horribly. Chart was looked at and Chloraprep was used.     Recent Labs   Lab Test 12/23/20  1018 10/13/20  1420 05/18/20  1314 11/29/19  0939 06/04/19  0537 06/04/19  0537 06/02/19  0539 11/14/18  0915 11/14/18  0915 11/15/17  0912 11/15/17  0912   LDL  --   --   --  101*  --   --   --   --  104*  --  107*   HDL  --   --   --  72  --   --   --   --  72  --  71   TRIG  --   --   --  189*  --   --   --   --  100  --  125   ALT 37 29 37  --    < >  --   --    < > 35   < > 35   CR 1.07* 1.03 1.08*  --    < > 0.89 0.84   < > 1.04   < > 1.11*   GFRESTIMATED 53* 56* 53*  --    < > 67 72   < > 53*   < > 49*   GFRESTBLACK 61 64 61  --    < > 77 84   < > 64   < > 60*   POTASSIUM  --   --   --   --   --  3.6 3.6   < > 3.7  --  4.3   TSH  --   --   --   --   --   --   --   --  1.58  --  2.23    < > = values in this interval not displayed.      BP Readings from Last 3 Encounters:   02/04/21 102/66   12/18/20 106/60   12/09/20 106/64    Wt Readings from Last  "3 Encounters:   02/04/21 95.3 kg (210 lb)   12/09/20 94.8 kg (209 lb)   10/06/20 90.7 kg (200 lb)                    Reviewed and updated as needed this visit by Provider                 Review of Systems   Constitutional, HEENT, cardiovascular, pulmonary, gi and gu systems are negative, except as otherwise noted.      Objective    /66 (BP Location: Right arm, Patient Position: Sitting, Cuff Size: Adult Regular)   Pulse 91   Temp 97.1  F (36.2  C) (Tympanic)   Ht 1.664 m (5' 5.5\")   Wt 95.3 kg (210 lb)   LMP  (Approximate)   SpO2 98%   BMI 34.41 kg/m    Body mass index is 34.41 kg/m .  Physical Exam   GENERAL: healthy, alert and no distress  ABDOMEN: small area of oozing and opening shaped in an irregular pattern. Very cherry red and shows serous drainage around the skin.  Most of the top layer of skin is removed. Appears to be a burn from her medication with skin desquamation. .I did pour sterile saline over this to remove the Telfa she had in place. Some skin came with this no bleeding right now.  Looks to be healing and trying to granulate.   No odor. No sign of infection. Appears more of a reaction localized on the skin to the injection .     MS: no gross musculoskeletal defects noted, no edema    Diagnostic Test Results:  Labs reviewed in Epic  none         Assessment & Plan     Localized skin desquamation  This is where she gave her methotrexate injection. Concerning to me as it has been two weeks and suddenly the skin is now reacting. I feel she should talk to her rheumatologist. Every thing I try to look up states won't see this after that long of a period of time. I will use bacitracin with Zinc and seems to be a burn like reaction with skin slufing. Very shallow and not deep at all.   - bacitracin 500 UNIT/GM external ointment; Apply topically 2 times daily.  Reassured looks very clean and looks very superficial. Did not take a culture as there was not any discharge to culture.      BMI: " "  Estimated body mass index is 34.41 kg/m  as calculated from the following:    Height as of this encounter: 1.664 m (5' 5.5\").    Weight as of this encounter: 95.3 kg (210 lb).   Weight management plan: Discussed healthy diet and exercise guidelines         See Patient Instructions    No follow-ups on file.    GIOVANNY Valentino  [facility] [place]  "

## 2021-02-10 DIAGNOSIS — G62.9 PERIPHERAL POLYNEUROPATHY: ICD-10-CM

## 2021-02-10 DIAGNOSIS — J30.2 SEASONAL ALLERGIC RHINITIS, UNSPECIFIED TRIGGER: ICD-10-CM

## 2021-02-10 NOTE — TELEPHONE ENCOUNTER
Gabapentin 300mg      Last Written Prescription Date:  1/8/2021  Last Fill Quantity: 210,   # refills: 0  Last Office Visit: 2/4/2021  Future Office visit:       Routing refill request to provider for review/approval because:    Allegra-D allergy and congestion       Last Written Prescription Date:  1/8/2021  Last Fill Quantity: 30,   # refills: 0  Last Office Visit: 2/4/2021  Future Office visit:       Routing refill request to provider for review/approval because:

## 2021-02-11 RX ORDER — GABAPENTIN 300 MG/1
CAPSULE ORAL
Qty: 210 CAPSULE | Refills: 0 | Status: SHIPPED | OUTPATIENT
Start: 2021-02-11 | End: 2021-03-15

## 2021-02-11 RX ORDER — FEXOFENADINE HYDROCHLORIDE AND PSEUDOEPHEDRINE HYDROCHLORIDE 60; 120 MG/1; MG/1
TABLET, FILM COATED, EXTENDED RELEASE ORAL
Qty: 30 TABLET | Refills: 0 | Status: SHIPPED | OUTPATIENT
Start: 2021-02-11 | End: 2021-03-02

## 2021-02-17 ENCOUNTER — TRANSFERRED RECORDS (OUTPATIENT)
Dept: HEALTH INFORMATION MANAGEMENT | Facility: CLINIC | Age: 71
End: 2021-02-17

## 2021-02-22 DIAGNOSIS — F51.01 PRIMARY INSOMNIA: ICD-10-CM

## 2021-02-22 RX ORDER — TRAZODONE HYDROCHLORIDE 50 MG/1
TABLET, FILM COATED ORAL
Qty: 270 TABLET | Refills: 0 | Status: SHIPPED | OUTPATIENT
Start: 2021-02-22 | End: 2021-05-27

## 2021-02-22 NOTE — TELEPHONE ENCOUNTER
traZODone (DESYREL) 50 MG tablet   Last Written Prescription Date:  12/2/20  Last Fill Quantity: 270,   # refills: 0  Last Office Visit: 2/4/21  Future Office visit:       Routing refill request to provider for review/approval

## 2021-03-01 DIAGNOSIS — M54.42 BILATERAL LOW BACK PAIN WITH BILATERAL SCIATICA, UNSPECIFIED CHRONICITY: ICD-10-CM

## 2021-03-01 DIAGNOSIS — F41.0 PANIC DISORDER WITHOUT AGORAPHOBIA: ICD-10-CM

## 2021-03-01 DIAGNOSIS — M54.41 BILATERAL LOW BACK PAIN WITH BILATERAL SCIATICA, UNSPECIFIED CHRONICITY: ICD-10-CM

## 2021-03-01 DIAGNOSIS — J30.2 SEASONAL ALLERGIC RHINITIS, UNSPECIFIED TRIGGER: ICD-10-CM

## 2021-03-01 NOTE — TELEPHONE ENCOUNTER
Voltaren  Last Written Prescription Date: 2/2/21  Last Fill Quantity: 30 # of Refills: 0  Last Office Visit: 2/4/21    Celexa  Last Written Prescription Date: 12/8/20  Last Fill Quantity: 180 # of Refills: 0  Last Office Visit: 2/4/21    Allegra-D  Last Written Prescription Date: 2/11/21  Last Fill Quantity: 30 # of Refills: 0  Last Office Visit: 2/4/21

## 2021-03-02 RX ORDER — FEXOFENADINE HYDROCHLORIDE AND PSEUDOEPHEDRINE HYDROCHLORIDE 60; 120 MG/1; MG/1
TABLET, FILM COATED, EXTENDED RELEASE ORAL
Qty: 30 TABLET | Refills: 0 | Status: SHIPPED | OUTPATIENT
Start: 2021-03-02 | End: 2021-04-12

## 2021-03-02 RX ORDER — DICLOFENAC SODIUM 100 MG/1
TABLET, FILM COATED, EXTENDED RELEASE ORAL
Qty: 30 TABLET | Refills: 0 | Status: SHIPPED | OUTPATIENT
Start: 2021-03-02 | End: 2021-03-28

## 2021-03-02 RX ORDER — CITALOPRAM HYDROBROMIDE 20 MG/1
TABLET ORAL
Qty: 180 TABLET | Refills: 0 | Status: SHIPPED | OUTPATIENT
Start: 2021-03-02 | End: 2021-04-29

## 2021-03-15 DIAGNOSIS — G62.9 PERIPHERAL POLYNEUROPATHY: ICD-10-CM

## 2021-03-15 RX ORDER — GABAPENTIN 300 MG/1
CAPSULE ORAL
Qty: 210 CAPSULE | Refills: 0 | Status: SHIPPED | OUTPATIENT
Start: 2021-03-15 | End: 2021-04-12

## 2021-03-15 NOTE — TELEPHONE ENCOUNTER
gabapentin      Last Written Prescription Date:  02/11/2021  Last Fill Quantity: 210,   # refills: 0  Last Office Visit: 02/04/2021  Future Office visit:

## 2021-03-16 DIAGNOSIS — F41.0 PANIC DISORDER WITHOUT AGORAPHOBIA: ICD-10-CM

## 2021-03-16 RX ORDER — CITALOPRAM HYDROBROMIDE 20 MG/1
TABLET ORAL
Qty: 180 TABLET | Refills: 0 | OUTPATIENT
Start: 2021-03-16

## 2021-03-24 DIAGNOSIS — Z79.899 ENCOUNTER FOR LONG-TERM (CURRENT) USE OF OTHER MEDICATIONS: ICD-10-CM

## 2021-03-24 DIAGNOSIS — M06.09 RHEUMATOID ARTHRITIS OF MULTIPLE SITES WITHOUT RHEUMATOID FACTOR (H): ICD-10-CM

## 2021-03-24 LAB
ALBUMIN SERPL-MCNC: 3.3 G/DL (ref 3.4–5)
ALT SERPL W P-5'-P-CCNC: 31 U/L (ref 0–50)
BASOPHILS # BLD AUTO: 0 10E9/L (ref 0–0.2)
BASOPHILS NFR BLD AUTO: 0.4 %
CREAT SERPL-MCNC: 1.06 MG/DL (ref 0.52–1.04)
CRP SERPL-MCNC: <2.9 MG/L (ref 0–8)
DIFFERENTIAL METHOD BLD: NORMAL
EOSINOPHIL # BLD AUTO: 0.4 10E9/L (ref 0–0.7)
EOSINOPHIL NFR BLD AUTO: 7.3 %
ERYTHROCYTE [DISTWIDTH] IN BLOOD BY AUTOMATED COUNT: 13.2 % (ref 10–15)
ERYTHROCYTE [SEDIMENTATION RATE] IN BLOOD BY WESTERGREN METHOD: 16 MM/H (ref 0–30)
GFR SERPL CREATININE-BSD FRML MDRD: 54 ML/MIN/{1.73_M2}
HCT VFR BLD AUTO: 37.2 % (ref 35–47)
HGB BLD-MCNC: 12.1 G/DL (ref 11.7–15.7)
IMM GRANULOCYTES # BLD: 0 10E9/L (ref 0–0.4)
IMM GRANULOCYTES NFR BLD: 0.2 %
LYMPHOCYTES # BLD AUTO: 1.9 10E9/L (ref 0.8–5.3)
LYMPHOCYTES NFR BLD AUTO: 36.6 %
MCH RBC QN AUTO: 29.2 PG (ref 26.5–33)
MCHC RBC AUTO-ENTMCNC: 32.5 G/DL (ref 31.5–36.5)
MCV RBC AUTO: 90 FL (ref 78–100)
MONOCYTES # BLD AUTO: 0.5 10E9/L (ref 0–1.3)
MONOCYTES NFR BLD AUTO: 9.4 %
NEUTROPHILS # BLD AUTO: 2.4 10E9/L (ref 1.6–8.3)
NEUTROPHILS NFR BLD AUTO: 46.1 %
NRBC # BLD AUTO: 0 10*3/UL
NRBC BLD AUTO-RTO: 0 /100
PLATELET # BLD AUTO: 207 10E9/L (ref 150–450)
RBC # BLD AUTO: 4.14 10E12/L (ref 3.8–5.2)
WBC # BLD AUTO: 5.2 10E9/L (ref 4–11)

## 2021-03-24 PROCEDURE — 84460 ALANINE AMINO (ALT) (SGPT): CPT | Mod: ZL | Performed by: NURSE PRACTITIONER

## 2021-03-24 PROCEDURE — 82040 ASSAY OF SERUM ALBUMIN: CPT | Mod: ZL | Performed by: NURSE PRACTITIONER

## 2021-03-24 PROCEDURE — 86140 C-REACTIVE PROTEIN: CPT | Mod: ZL | Performed by: NURSE PRACTITIONER

## 2021-03-24 PROCEDURE — 36415 COLL VENOUS BLD VENIPUNCTURE: CPT | Mod: ZL | Performed by: NURSE PRACTITIONER

## 2021-03-24 PROCEDURE — 85652 RBC SED RATE AUTOMATED: CPT | Mod: ZL | Performed by: NURSE PRACTITIONER

## 2021-03-24 PROCEDURE — 82565 ASSAY OF CREATININE: CPT | Mod: ZL | Performed by: NURSE PRACTITIONER

## 2021-03-24 PROCEDURE — 85025 COMPLETE CBC W/AUTO DIFF WBC: CPT | Mod: ZL | Performed by: NURSE PRACTITIONER

## 2021-03-27 DIAGNOSIS — M54.41 BILATERAL LOW BACK PAIN WITH BILATERAL SCIATICA, UNSPECIFIED CHRONICITY: ICD-10-CM

## 2021-03-27 DIAGNOSIS — M54.42 BILATERAL LOW BACK PAIN WITH BILATERAL SCIATICA, UNSPECIFIED CHRONICITY: ICD-10-CM

## 2021-03-28 RX ORDER — DICLOFENAC SODIUM 100 MG/1
TABLET, FILM COATED, EXTENDED RELEASE ORAL
Qty: 30 TABLET | Refills: 0 | Status: SHIPPED | OUTPATIENT
Start: 2021-03-28 | End: 2021-05-05

## 2021-03-28 NOTE — TELEPHONE ENCOUNTER
Voltaren       Last Written Prescription Date:  3/2/2021  Last Fill Quantity: 30,   # refills: 0  Last Office Visit: 2/4/2021  Future Office visit:

## 2021-03-29 ENCOUNTER — TELEPHONE (OUTPATIENT)
Dept: FAMILY MEDICINE | Facility: OTHER | Age: 71
End: 2021-03-29

## 2021-03-29 NOTE — TELEPHONE ENCOUNTER
8:26 AM    Reason for Call: OVERBOOK    Patient is having the following symptoms: Yeast infection on her skin for a few months and some other issues she wants to discuss with her she feels more comfortable with Tania.     The patient is requesting an appointment for this week with TAYLOR Garcia. FYI: patient knows that Tania Martinez is out of the office until Wednesday of this week.    Was an appointment offered for this call? Yes, April 15, 2021 with Tania Martinez  If yes : Appointment type              Date    Preferred method for responding to this message: Telephone Call  What is your phone number ? [phone]    If we cannot reach you directly, may we leave a detailed response at the number you provided? Yes    Can this message wait until your PCP/provider returns, if unavailable today? No,     Adrianna Hess

## 2021-04-04 NOTE — PROGRESS NOTES
Subjective     Melvi Cleveland is a 71 year old female who presents to clinic today for the following health issues:    HPI         Vaginal Symptoms  Onset/Duration: ongoing for a long time  Description:  Vaginal Discharge: none   Itching (Pruritis): no  Burning sensation:  YES- gets in her folds and in groin area often   Odor: YES in the belly folds  Accompanying Signs & Symptoms:  Urinary symptoms: no  Abdominal pain: no  Fever: no  History:   Sexually active: no  New Partner: no  Possibility of Pregnancy:  no  Recent antibiotic use: no  Previous vaginitis issues: multiple years ago she did  Precipitating or alleviating factors: None  Therapies tried and outcome: not sure, she gets repeated yeast infections in belly folds and groin and she is unsure why this is happening so often.           Patient Active Problem List   Diagnosis     Neck pain, chronic     Low back pain, chronic     Crohn's disease of large intestine (H)     Dyslipidemia     Sicca syndrome (H)     Polyneuropathy in other diseases classified elsewhere (H)     RA (rheumatoid arthritis) (H)     Comprehensive Medical Examination     Seasonal allergic rhinitis     Fibrocystic breast disease (FCBD)     ACP (advance care planning)     Need for hepatitis C screening test     Cough variant asthma     Chronic kidney disease, stage 3     Past Surgical History:   Procedure Laterality Date     BACK SURGERY  05/1995    back surgery     BIOPSY      breast bx X2     BIOPSY BREAST      LT     BIOPSY BREAST NEEDLE LOCALIZATION Right 06/12/2017    Procedure: BIOPSY BREAST NEEDLE LOCALIZATION;  WIRE LOCALIZED EXCISIONAL BIOPSY  RIGHT BREAST MASS;  Surgeon: Jeni Amin MD;  Location: [facility]     CHOLECYSTECTOMY  2004     COLONOSCOPY  11/15/2004    screening     COLONOSCOPY  09/24/2014     COLONOSCOPY - HIM SCAN N/A 11/01/2017    ([facility]) no abnormality - right, transverse and left colon biopsies, repeat in 2 years     EGD with biopsy  2010, 2011    GERD      ENDOSCOPY UPPER, COLONOSCOPY, COMBINED N/A 2020    Procedure: UPPER ENDOSCOPY WITH BIOPSIES  AND COLONOSCOPY;  Surgeon: Saul Jiménez MD;  Location: [facility]     IR CONSULTATION FOR IR EXAM  2020     IR CONSULTATION FOR IR EXAM  2020     laminectomy      L4 L5 laminectomy; back pain     RELEASE CARPAL TUNNEL Right 2018    Procedure: RELEASE CARPAL TUNNEL;  RIGHT CARPAL TUNNEL RELEASE;  Surgeon: Haider Carlos MD;  Location: [facility]       Social History     Tobacco Use     Smoking status: Former Smoker     Types: Cigarettes     Quit date: 10/6/1997     Years since quittin.5     Smokeless tobacco: Never Used     Tobacco comment: no passive exposure   Substance Use Topics     Alcohol use: No     Alcohol/week: 0.0 standard drinks     Comment: former. quit      Family History   Problem Relation Age of Onset     Diabetes Mother      Rheumatoid Arthritis Mother      Other - See Comments Mother         fibromyalgia     Osteoarthritis Mother      Thyroid Disease Mother         thyroid disorder     Unknown/Adopted Father         cause of death unknown     Rheumatoid Arthritis Father      Diabetes Sister      Myocardial Infarction Sister         cause of death     Lupus Sister         cause of death     C.A.D. Maternal Grandmother      Cerebrovascular Disease Maternal Grandmother      Diabetes Maternal Grandmother      Thyroid Disease Maternal Grandmother         thyrodi disorder; goitor removed     Cancer Maternal Grandfather      Hypertension Brother      Other - See Comments Brother         sleep apnea     Other - See Comments Sister         sleep apnea         Current Outpatient Medications   Medication Sig Dispense Refill     ALFALFA PO Take 5 tablets by mouth 2 times daily.       ALLEGRA-D ALLERGY & CONGESTION  MG 12 hr tablet TAKE 1 TABLET BY MOUTH TWICE DAILY 30 tablet 0     Ascorbic Acid (VITAMIN C) 500 MG CAPS Take 1 tablet by mouth daily        Calcium-Vitamin D-Vitamin K (CALCIUM +  D) 500-1000-40 MG-UNT-MCG CHEW Take 2 tablets by mouth daily        Cholecalciferol (VITAMIN D) 1000 UNITS capsule Take 1 capsule by mouth daily        citalopram (CELEXA) 20 MG tablet TAKE 2 TABLETS BY MOUTH DAILY 180 tablet 0     diclofenac (VOLTAREN XR) 100 MG 24 hr tablet TAKE 1 TABLET BY MOUTH DAILY 30 tablet 0     Flaxseed, Linseed, (FLAX SEED OIL) 1000 MG capsule Take 2 capsules by mouth daily       folic acid (FOLVITE) 1 MG tablet Take 1 mg by mouth 3 times daily.       gabapentin (NEURONTIN) 300 MG capsule TAKE 3 CAPSULES BY MOUTH IN THE MORNING, 2 CAPSULES AT NOON AND 2 CAPSULES IN THE EVENING 210 capsule 0     Garlic 400 MG TBEC Take 1 tablet by mouth daily        HYDROcodone-acetaminophen (NORCO) 7.5-325 MG per tablet TAKE 1 TABLET BY MOUTH EVERY 4 TO 6 HOURS AS NEEDED FOR SEVERE PAIN 42 tablet 0     hydroxychloroquine (PLAQUENIL) 200 MG tablet Take 200 mg by mouth 2 times daily        ketoconazole (NIZORAL) 2 % external cream Apply topically 3 times a week .  Lather in and let set for 15 minutes.  Then rinse. 180 g 3     Methotrexate, Anti-Rheumatic, (METHOTREXATE, PF, SC) Inject 0.8 mLs Subcutaneous 25 mg vial- patient draws up 0.6 ml.        MILK THISTLE PO Take 1,000 mg by mouth daily.       montelukast (SINGULAIR) 10 MG tablet TAKE 1 TABLET BY MOUTH AT BEDTIME 90 tablet 0     Omega-3 1000 MG capsule Take 1.5 g by mouth daily        order for DME Equipment being ordered: Nebulizer 1 Device 0     pantoprazole (PROTONIX) 40 MG EC tablet 2 times daily        polyvinyl alcohol-povidone (REFRESH) 1.4-0.6 % ophthalmic solution 1-2 drops as needed       PREBIOTIC PRODUCT PO Take 1 tablet by mouth daily       Probiotic Product (PROBIOTIC PO) Take 1 capsule by mouth daily       RESTASIS 0.05 % ophthalmic emulsion USE 1 DROP IN BOTH EYES 2 TIMES A DAY 60 each 0     Simethicone 180 MG CAPS TAKE ONE CAPSULE BY MOUTH AS NEEDED AFTER A MEAL. MAX 2 CAPS/DAY 60 capsule 0     traZODone (DESYREL) 50 MG tablet TAKE 2  TO 3 TABLETS BY MOUTH DAILY AT BEDTIME 270 tablet 0     UNABLE TO FIND Take 1,000 mg by mouth daily MEDICATION NAME: Quinol/tumeric       ADVAIR DISKUS 250-50 MCG/DOSE inhaler INHALE 1 PUFF INTO THE LUNGS EVERY 12 HOURS (Patient not taking: Reported on 4/5/2021) 60 Inhaler 0     albuterol (PROAIR HFA/PROVENTIL HFA/VENTOLIN HFA) 108 (90 Base) MCG/ACT inhaler INHALE 2 PUFFS INTO THE LUNGS EVERY 6 HOURS AS NEEDED FOR SHORTNESS OF BREATH OR WHEEZING (Patient not taking: Reported on 4/5/2021) 8.5 g 3     albuterol (PROVENTIL) (2.5 MG/3ML) 0.083% neb solution NEBULIZE THE CONTENTS OF 1 VIAL EVERY 6 HOURS AS NEEDED FOR SHORTNESSOF BREATH/DYSPNEA/WHEEZING (Patient not taking: Reported on 4/5/2021) 75 mL 0     benzonatate (TESSALON) 200 MG capsule Take 1 capsule (200 mg) by mouth 3 times daily as needed for cough (Patient not taking: Reported on 4/5/2021) 30 capsule 1     fluticasone (FLONASE) 50 MCG/ACT nasal spray Spray 1 spray into both nostrils daily       Allergies   Allergen Reactions     Adhesive Tape      Glue and nicotine patches     Benzoin Compound      Tin-Co-Javier     Mold      Seasonal Allergies      Soap      Any cleanser/soap/disinfectant that is used right before surgery.  Makes patient break out horribly. Chart was looked at and Chloraprep was used.     Recent Labs   Lab Test 03/24/21  1335 12/23/20  1018 10/13/20  1420 11/29/19  0939 11/29/19  0939 06/04/19  0537 06/04/19  0537 06/02/19  0539 11/14/18  0915 11/14/18  0915 11/15/17  0912 11/15/17  0912   LDL  --   --   --   --  101*  --   --   --   --  104*  --  107*   HDL  --   --   --   --  72  --   --   --   --  72  --  71   TRIG  --   --   --   --  189*  --   --   --   --  100  --  125   ALT 31 37 29   < >  --    < >  --   --    < > 35   < > 35   CR 1.06* 1.07* 1.03   < >  --    < > 0.89 0.84   < > 1.04   < > 1.11*   GFRESTIMATED 54* 53* 56*   < >  --    < > 67 72   < > 53*   < > 49*   GFRESTBLACK 62 61 64   < >  --    < > 77 84   < > 64   < > 60*  "  POTASSIUM  --   --   --   --   --   --  3.6 3.6   < > 3.7  --  4.3   TSH  --   --   --   --   --   --   --   --   --  1.58  --  2.23    < > = values in this interval not displayed.      BP Readings from Last 3 Encounters:   04/05/21 130/68   02/04/21 102/66   12/18/20 106/60    Wt Readings from Last 3 Encounters:   04/05/21 95.3 kg (210 lb)   02/04/21 95.3 kg (210 lb)   12/09/20 94.8 kg (209 lb)                    Reviewed and updated as needed this visit by Provider                 Review of Systems   Constitutional, HEENT, cardiovascular, pulmonary, gi and gu systems are negative, except as otherwise noted.      Objective    /68 (BP Location: Right arm, Patient Position: Sitting, Cuff Size: Adult Regular)   Pulse 87   Temp 98.3  F (36.8  C) (Tympanic)   Ht 1.664 m (5' 5.5\")   Wt 95.3 kg (210 lb)   SpO2 95%   BMI 34.41 kg/m    Body mass index is 34.41 kg/m .  Physical Exam   GENERAL: healthy, alert and no distress  EYES: Eyes grossly normal to inspection, PERRL and conjunctivae and sclerae normal  HENT: ear canals and TM's normal, nose and mouth without ulcers or lesions  NECK: no adenopathy, no asymmetry, masses, or scars and thyroid normal to palpation  RESP: lungs clear to auscultation - no rales, rhonchi or wheezes  CV: regular rate and rhythm, normal S1 S2, no S3 or S4, no murmur, click or rub, no peripheral edema and peripheral pulses strong  ABDOMEN: soft, nontender, no hepatosplenomegaly, no masses and bowel sounds normal  MS: no gross musculoskeletal defects noted, no edema  SKIN: no suspicious lesions or rashes  NEURO: Normal strength and tone, mentation intact and speech normal  PSYCH: mentation appears normal, affect normal/bright  LYMPH: no cervical, supraclavicular, axillary, or inguinal adenopathy            Assessment & Plan     Yeast dermatitis  Try OTC Remedy an antifungal powder otc.  might help under her breast and panus. Did refill her powered if needed. Can also try Ketoconazole. "   - Basic metabolic panel  - ketoconazole (NIZORAL) 2 % external cream; Apply topically 3 times a week .  Lather in and let set for 15 minutes.  Then rinse.    Seborrheic dermatitis  Her scalp is very inflamed and irritated with sores. Has known RA and is on plaquenil and also on methotrexate.  Will try Nizoral. And will see Derm. ? Folliculitis.   - DERMATOLOGY ADULT REFERRAL; Future        See Patient Instructions    No follow-ups on file.    GIOVANNY Valentino  [facility]

## 2021-04-05 ENCOUNTER — OFFICE VISIT (OUTPATIENT)
Dept: FAMILY MEDICINE | Facility: OTHER | Age: 71
End: 2021-04-05
Attending: PHYSICIAN ASSISTANT
Payer: COMMERCIAL

## 2021-04-05 VITALS
DIASTOLIC BLOOD PRESSURE: 68 MMHG | BODY MASS INDEX: 33.75 KG/M2 | TEMPERATURE: 98.3 F | OXYGEN SATURATION: 95 % | HEART RATE: 87 BPM | HEIGHT: 66 IN | WEIGHT: 210 LBS | SYSTOLIC BLOOD PRESSURE: 130 MMHG

## 2021-04-05 DIAGNOSIS — B37.2 YEAST DERMATITIS: Primary | ICD-10-CM

## 2021-04-05 DIAGNOSIS — L21.9 SEBORRHEIC DERMATITIS: ICD-10-CM

## 2021-04-05 PROCEDURE — 99214 OFFICE O/P EST MOD 30 MIN: CPT | Performed by: PHYSICIAN ASSISTANT

## 2021-04-05 PROCEDURE — G0463 HOSPITAL OUTPT CLINIC VISIT: HCPCS

## 2021-04-05 RX ORDER — KETOCONAZOLE 20 MG/G
CREAM TOPICAL
Qty: 180 G | Refills: 3 | Status: SHIPPED | OUTPATIENT
Start: 2021-04-05 | End: 2023-10-04

## 2021-04-05 ASSESSMENT — ASTHMA QUESTIONNAIRES
QUESTION_5 LAST FOUR WEEKS HOW WOULD YOU RATE YOUR ASTHMA CONTROL: COMPLETELY CONTROLLED
QUESTION_3 LAST FOUR WEEKS HOW OFTEN DID YOUR ASTHMA SYMPTOMS (WHEEZING, COUGHING, SHORTNESS OF BREATH, CHEST TIGHTNESS OR PAIN) WAKE YOU UP AT NIGHT OR EARLIER THAN USUAL IN THE MORNING: NOT AT ALL
QUESTION_1 LAST FOUR WEEKS HOW MUCH OF THE TIME DID YOUR ASTHMA KEEP YOU FROM GETTING AS MUCH DONE AT WORK, SCHOOL OR AT HOME: NONE OF THE TIME
QUESTION_2 LAST FOUR WEEKS HOW OFTEN HAVE YOU HAD SHORTNESS OF BREATH: ONCE OR TWICE A WEEK
ACT_TOTALSCORE: 24
QUESTION_4 LAST FOUR WEEKS HOW OFTEN HAVE YOU USED YOUR RESCUE INHALER OR NEBULIZER MEDICATION (SUCH AS ALBUTEROL): NOT AT ALL

## 2021-04-05 ASSESSMENT — MIFFLIN-ST. JEOR: SCORE: 1486.36

## 2021-04-05 ASSESSMENT — PAIN SCALES - GENERAL: PAINLEVEL: NO PAIN (0)

## 2021-04-05 ASSESSMENT — PATIENT HEALTH QUESTIONNAIRE - PHQ9: SUM OF ALL RESPONSES TO PHQ QUESTIONS 1-9: 0

## 2021-04-05 ASSESSMENT — ANXIETY QUESTIONNAIRES
3. WORRYING TOO MUCH ABOUT DIFFERENT THINGS: NOT AT ALL
4. TROUBLE RELAXING: NOT AT ALL
5. BEING SO RESTLESS THAT IT IS HARD TO SIT STILL: NOT AT ALL
1. FEELING NERVOUS, ANXIOUS, OR ON EDGE: NOT AT ALL
2. NOT BEING ABLE TO STOP OR CONTROL WORRYING: NOT AT ALL
GAD7 TOTAL SCORE: 0
7. FEELING AFRAID AS IF SOMETHING AWFUL MIGHT HAPPEN: NOT AT ALL
6. BECOMING EASILY ANNOYED OR IRRITABLE: NOT AT ALL

## 2021-04-05 NOTE — NURSING NOTE
"Chief Complaint   Patient presents with     possible  yeast infection       Initial Blood Pressure 130/68 (BP Location: Right arm, Patient Position: Sitting, Cuff Size: Adult Regular)   Pulse 87   Temperature 98.3  F (36.8  C) (Tympanic)   Height 1.664 m (5' 5.5\")   Weight 95.3 kg (210 lb)   Oxygen Saturation 95%   Body Mass Index 34.41 kg/m   Estimated body mass index is 34.41 kg/m  as calculated from the following:    Height as of this encounter: 1.664 m (5' 5.5\").    Weight as of this encounter: 95.3 kg (210 lb).  Medication Reconciliation: complete  Ammy Solorzano LPN  "

## 2021-04-06 ASSESSMENT — ANXIETY QUESTIONNAIRES: GAD7 TOTAL SCORE: 0

## 2021-04-06 ASSESSMENT — ASTHMA QUESTIONNAIRES: ACT_TOTALSCORE: 24

## 2021-04-07 ENCOUNTER — MEDICAL CORRESPONDENCE (OUTPATIENT)
Dept: HEALTH INFORMATION MANAGEMENT | Facility: CLINIC | Age: 71
End: 2021-04-07

## 2021-04-07 DIAGNOSIS — B37.2 YEAST DERMATITIS: ICD-10-CM

## 2021-04-07 LAB
ANION GAP SERPL CALCULATED.3IONS-SCNC: 3 MMOL/L (ref 3–14)
BUN SERPL-MCNC: 20 MG/DL (ref 7–30)
CALCIUM SERPL-MCNC: 8.6 MG/DL (ref 8.5–10.1)
CHLORIDE SERPL-SCNC: 107 MMOL/L (ref 94–109)
CO2 SERPL-SCNC: 29 MMOL/L (ref 20–32)
CREAT SERPL-MCNC: 1.03 MG/DL (ref 0.52–1.04)
GFR SERPL CREATININE-BSD FRML MDRD: 55 ML/MIN/{1.73_M2}
GLUCOSE SERPL-MCNC: 98 MG/DL (ref 70–99)
POTASSIUM SERPL-SCNC: 3.7 MMOL/L (ref 3.4–5.3)
SODIUM SERPL-SCNC: 139 MMOL/L (ref 133–144)

## 2021-04-07 PROCEDURE — 80048 BASIC METABOLIC PNL TOTAL CA: CPT | Mod: ZL | Performed by: PHYSICIAN ASSISTANT

## 2021-04-07 PROCEDURE — 36415 COLL VENOUS BLD VENIPUNCTURE: CPT | Mod: ZL | Performed by: PHYSICIAN ASSISTANT

## 2021-04-12 DIAGNOSIS — H04.123 BILATERAL DRY EYES: ICD-10-CM

## 2021-04-12 DIAGNOSIS — M54.41 CHRONIC BILATERAL LOW BACK PAIN WITH BILATERAL SCIATICA: ICD-10-CM

## 2021-04-12 DIAGNOSIS — G62.9 PERIPHERAL POLYNEUROPATHY: ICD-10-CM

## 2021-04-12 DIAGNOSIS — J30.2 SEASONAL ALLERGIC RHINITIS, UNSPECIFIED TRIGGER: ICD-10-CM

## 2021-04-12 DIAGNOSIS — M54.42 CHRONIC BILATERAL LOW BACK PAIN WITH BILATERAL SCIATICA: ICD-10-CM

## 2021-04-12 DIAGNOSIS — G89.29 CHRONIC BILATERAL LOW BACK PAIN WITH BILATERAL SCIATICA: ICD-10-CM

## 2021-04-12 RX ORDER — CYCLOSPORINE 0.5 MG/ML
EMULSION OPHTHALMIC
Qty: 60 EACH | Refills: 0 | Status: SHIPPED | OUTPATIENT
Start: 2021-04-12 | End: 2021-10-29

## 2021-04-12 RX ORDER — GABAPENTIN 300 MG/1
CAPSULE ORAL
Qty: 210 CAPSULE | Refills: 0 | Status: SHIPPED | OUTPATIENT
Start: 2021-04-12 | End: 2021-05-09

## 2021-04-12 RX ORDER — FEXOFENADINE HYDROCHLORIDE AND PSEUDOEPHEDRINE HYDROCHLORIDE 60; 120 MG/1; MG/1
TABLET, FILM COATED, EXTENDED RELEASE ORAL
Qty: 30 TABLET | Refills: 0 | Status: SHIPPED | OUTPATIENT
Start: 2021-04-12 | End: 2021-05-09

## 2021-04-12 RX ORDER — HYDROCODONE BITARTRATE AND ACETAMINOPHEN 7.5; 325 MG/1; MG/1
TABLET ORAL
Qty: 42 TABLET | Refills: 0 | Status: SHIPPED | OUTPATIENT
Start: 2021-04-12 | End: 2021-06-21

## 2021-04-12 NOTE — TELEPHONE ENCOUNTER
Allegra-D      Last Written Prescription Date:  03/02/2021  Last Fill Quantity: 30,   # refills: 0  Last Office Visit: 04/05/2021  Future Office visit:    Next 5 appointments (look out 90 days)    Apr 26, 2021  9:45 AM  (Arrive by 9:30 AM)  PHYSICAL with GIOVANNY Vital  [facility] ([facility] ) [address]  [place] [address]  [phone]         Norco 7.5-325 mg      Last Written Prescription Date:  12/02/2020  Last Fill Quantity: 42,   # refills: 0    gabapentin      Last Written Prescription Date:  03/15/2021  Last Fill Quantity: 210,   # refills: 0    restasis      Last Written Prescription Date:  10/30/2020  Last Fill Quantity: 60,   # refills: 0

## 2021-04-19 ENCOUNTER — MYC MEDICAL ADVICE (OUTPATIENT)
Dept: FAMILY MEDICINE | Facility: OTHER | Age: 71
End: 2021-04-19

## 2021-04-19 DIAGNOSIS — G62.9 PERIPHERAL POLYNEUROPATHY: Primary | ICD-10-CM

## 2021-04-19 DIAGNOSIS — M05.79 RHEUMATOID ARTHRITIS INVOLVING MULTIPLE SITES WITH POSITIVE RHEUMATOID FACTOR (H): ICD-10-CM

## 2021-04-19 DIAGNOSIS — E78.2 MIXED HYPERLIPIDEMIA: ICD-10-CM

## 2021-04-19 DIAGNOSIS — N18.31 STAGE 3A CHRONIC KIDNEY DISEASE (H): ICD-10-CM

## 2021-04-19 ASSESSMENT — ACTIVITIES OF DAILY LIVING (ADL): CURRENT_FUNCTION: NO ASSISTANCE NEEDED

## 2021-04-22 NOTE — PATIENT INSTRUCTIONS
Preventive Health Recommendations    See your health care provider every year to    Review health changes.     Discuss preventive care.      Review your medicines if your doctor has prescribed any.      You no longer need a yearly Pap test unless you've had an abnormal Pap test in the past 10 years. If you have vaginal symptoms, such as bleeding or discharge, be sure to talk with your provider about a Pap test.      Every 1 to 2 years, have a mammogram.  If you are over 69, talk with your health care provider about whether or not you want to continue having screening mammograms.      Every 10 years, have a colonoscopy. Or, have a yearly FIT test (stool test). These exams will check for colon cancer.       Have a cholesterol test every 5 years, or more often if your doctor advises it.       Have a diabetes test (fasting glucose) every three years. If you are at risk for diabetes, you should have this test more often.       At age 65, have a bone density scan (DEXA) to check for osteoporosis (brittle bone disease).    Shots:    Get a flu shot each year.    Get a tetanus shot every 10 years.    Talk to your doctor about your pneumonia vaccines. There are now two you should receive - Pneumovax (PPSV 23) and Prevnar (PCV 13).    Talk to your pharmacist about the shingles vaccine.    Talk to your doctor about the hepatitis B vaccine.    Nutrition:     Eat at least 5 servings of fruits and vegetables each day.      Eat whole-grain bread, whole-wheat pasta and brown rice instead of white grains and rice.      Get adequate about Calcium and Vitamin D.     Lifestyle    Exercise at least 150 minutes a week (30 minutes a day, 5 days a week). This will help you control your weight and prevent disease.      Limit alcohol to one drink per day.      No smoking.       Wear sunscreen to prevent skin cancer.       See your dentist twice a year for an exam and cleaning.      See your eye doctor every 1 to 2 years to screen for  conditions such as glaucoma, macular degeneration, cataracts, etc.    Personalized Prevention Plan  You are due for the preventive services outlined below.  Your care team is available to assist you in scheduling these services.  If you have already completed any of these items, please share that information with your care team to update in your medical record.    Health Maintenance Due   Topic Date Due     Zoster (Shingles) Vaccine (1 of 2) Never done     Diptheria Tetanus Pertussis (DTAP/TDAP/TD) Vaccine (2 - Td) 11/04/2019     Annual Wellness Visit  11/29/2020     Cholesterol Lab  11/29/2020

## 2021-04-22 NOTE — PROGRESS NOTES
"  SUBJECTIVE:   Melvi Cleveland is a 71 year old female who presents for Preventive Visit.      Patient has been advised of split billing requirements and indicates understanding: Yes  Are you in the first 12 months of your Medicare Part B coverage?  No  Answers for HPI/ROS submitted by the patient on 2021   Annual Exam:  In general, how would you rate your overall physical health?: good  Frequency of exercise:: None  Do you usually eat at least 4 servings of fruit and vegetables a day, include whole grains & fiber, and avoid regularly eating high fat or \"junk\" foods? : No  Taking medications regularly:: Yes  Medication side effects:: Not applicable  Activities of Daily Living: no assistance needed  Home safety: no safety concerns identified  Hearing Impairment:: no hearing concerns  In the past 6 months, have you been bothered by leaking of urine?: Yes  In general, how would you rate your overall mental or emotional health?: good  Additional concerns today:: No    PHQ-2 Score: (Pended) 0    Do you feel safe in your environment? Yes    Have you ever done Advance Care Planning? (For example, a Health Directive, POLST, or a discussion with a medical provider or your loved ones about your wishes): Yes, advance care planning is on file.    Additional concerns to address?  YES    Fall risk:  Fallen 2 or more times in the past year?: No(fell 1 x and broke toe)    Cognitive Screenin) Repeat 3 items (Leader, Season, Table)    2) Clock draw: NORMAL  3) 3 item recall: Recalls 3 objects  Results: 3 items recalled: COGNITIVE IMPAIRMENT LESS LIKELY    Mini-CogTM Copyright LUZ Rdz. Licensed by the author for use in [facility]; reprinted with permission (jc@.[facility]). All rights reserved.      Do you have sleep apnea, excessive snoring or daytime drowsiness?: yes uses oxygen at night and has nap during the day        Hyperlipidemia Follow-Up      Are you regularly taking any medication or supplement to " lower your cholesterol?   No    Are you having muscle aches or other side effects that you think could be caused by your cholesterol lowering medication?  No    Chronic Kidney Disease Follow-up    Do you take any over the counter pain medicine?: Yes  What over the counter medicine are you taking for your pain?:  Tylenol sometimes      How often do you take this medicine?:  A few times a week      Reviewed and updated as needed this visit by clinical staff  Tobacco  Allergies    Med Hx  Surg Hx  Fam Hx          Reviewed and updated as needed this visit by Provider                Social History     Tobacco Use     Smoking status: Former Smoker     Types: Cigarettes     Quit date: 10/6/1997     Years since quittin.5     Smokeless tobacco: Never Used     Tobacco comment: no passive exposure   Substance Use Topics     Alcohol use: No     Alcohol/week: 0.0 standard drinks     Comment: former. quit                            Current providers sharing in care for this patient include:   Patient Care Team:  Tania Martinez PA as PCP - General (Family Medicine)  Babs Camacho NP as Assigned Surgical Provider  Tania Martinez PA as Assigned PCP    The following health maintenance items are reviewed in Epic and correct as of today:  Health Maintenance   Topic Date Due     DTAP/TDAP/TD IMMUNIZATION (2 - Td) 2019     LIPID  2020     ZOSTER IMMUNIZATION (1 of 2) 2021 (Originally 3/2/2002)     ASTHMA ACTION PLAN  2021     HEATH ASSESSMENT  10/05/2021     ASTHMA CONTROL TEST  10/05/2021     PHQ-9  10/26/2021     FALL RISK ASSESSMENT  2022     MEDICARE ANNUAL WELLNESS VISIT  2022     MAMMO SCREENING  2023     ADVANCE CARE PLANNING  2026     COLORECTAL CANCER SCREENING  2030     DEXA  2031     HEPATITIS C SCREENING  Completed     PHQ-2  Completed     INFLUENZA VACCINE  Completed     Pneumococcal Vaccine: Pediatrics (0 to 5 Years) and At-Risk Patients  (6 to 64 Years)  Completed     Pneumococcal Vaccine: 65+ Years  Completed     COVID-19 Vaccine  Completed     IPV IMMUNIZATION  Aged Out     MENINGITIS IMMUNIZATION  Aged Out     HEPATITIS B IMMUNIZATION  Aged Out     Lab work is in process  Labs reviewed in EPIC  BP Readings from Last 3 Encounters:   04/26/21 126/60   04/05/21 130/68   02/04/21 102/66    Wt Readings from Last 3 Encounters:   04/26/21 95.7 kg (211 lb)   04/05/21 95.3 kg (210 lb)   02/04/21 95.3 kg (210 lb)                  Patient Active Problem List   Diagnosis     Neck pain, chronic     Low back pain, chronic     Crohn's disease of large intestine (H)     Dyslipidemia     Sicca syndrome (H)     Polyneuropathy in other diseases classified elsewhere (H)     RA (rheumatoid arthritis) (H)     Comprehensive Medical Examination     Seasonal allergic rhinitis     Fibrocystic breast disease (FCBD)     ACP (advance care planning)     Need for hepatitis C screening test     Cough variant asthma     Chronic kidney disease, stage 3     Past Surgical History:   Procedure Laterality Date     BACK SURGERY  05/1995    back surgery     BIOPSY      breast bx X2     BIOPSY BREAST      LT     BIOPSY BREAST NEEDLE LOCALIZATION Right 06/12/2017    Procedure: BIOPSY BREAST NEEDLE LOCALIZATION;  WIRE LOCALIZED EXCISIONAL BIOPSY  RIGHT BREAST MASS;  Surgeon: Jeni Amin MD;  Location: [facility] OR     CHOLECYSTECTOMY  2004     COLONOSCOPY  11/15/2004    screening     COLONOSCOPY  09/24/2014     COLONOSCOPY - HIM SCAN N/A 11/01/2017    (Jessy) no abnormality - right, transverse and left colon biopsies, repeat in 2 years     EGD with biopsy  2010, 2011    GERD     ENDOSCOPY UPPER, COLONOSCOPY, COMBINED N/A 01/17/2020    Procedure: UPPER ENDOSCOPY WITH BIOPSIES  AND COLONOSCOPY;  Surgeon: Saul Jiménez MD;  Location: [facility] OR     IR CONSULTATION FOR IR EXAM  02/13/2020     IR CONSULTATION FOR IR EXAM  06/18/2020     laminectomy      L4 L5 laminectomy; back pain     RELEASE  CARPAL TUNNEL Right 2018    Procedure: RELEASE CARPAL TUNNEL;  RIGHT CARPAL TUNNEL RELEASE;  Surgeon: Haider Carlos MD;  Location: [facility] OR       Social History     Tobacco Use     Smoking status: Former Smoker     Types: Cigarettes     Quit date: 10/6/1997     Years since quittin.5     Smokeless tobacco: Never Used     Tobacco comment: no passive exposure   Substance Use Topics     Alcohol use: No     Alcohol/week: 0.0 standard drinks     Comment: former. quit 1984     Family History   Problem Relation Age of Onset     Diabetes Mother      Rheumatoid Arthritis Mother      Other - See Comments Mother         fibromyalgia     Osteoarthritis Mother      Thyroid Disease Mother         thyroid disorder     Unknown/Adopted Father         cause of death unknown     Rheumatoid Arthritis Father      Diabetes Sister      Myocardial Infarction Sister         cause of death     Lupus Sister         cause of death     C.A.D. Maternal Grandmother      Cerebrovascular Disease Maternal Grandmother      Diabetes Maternal Grandmother      Thyroid Disease Maternal Grandmother         thyrodi disorder; goitor removed     Cancer Maternal Grandfather      Hypertension Brother      Other - See Comments Brother         sleep apnea     Other - See Comments Sister         sleep apnea         Current Outpatient Medications   Medication Sig Dispense Refill     ADVAIR DISKUS 250-50 MCG/DOSE inhaler INHALE 1 PUFF INTO THE LUNGS EVERY 12 HOURS 60 Inhaler 0     albuterol (PROAIR HFA/PROVENTIL HFA/VENTOLIN HFA) 108 (90 Base) MCG/ACT inhaler INHALE 2 PUFFS INTO THE LUNGS EVERY 6 HOURS AS NEEDED FOR SHORTNESS OF BREATH OR WHEEZING 8.5 g 3     albuterol (PROVENTIL) (2.5 MG/3ML) 0.083% neb solution NEBULIZE THE CONTENTS OF 1 VIAL EVERY 6 HOURS AS NEEDED FOR SHORTNESSOF BREATH/DYSPNEA/WHEEZING 75 mL 0     ALFALFA PO Take 5 tablets by mouth 2 times daily.       ALLEGRA-D ALLERGY & CONGESTION  MG 12 hr tablet TAKE 1 TABLET BY MOUTH TWICE  DAILY 30 tablet 0     Ascorbic Acid (VITAMIN C) 500 MG CAPS Take 1 tablet by mouth daily        benzonatate (TESSALON) 200 MG capsule Take 1 capsule (200 mg) by mouth 3 times daily as needed for cough 30 capsule 1     Calcium-Vitamin D-Vitamin K (CALCIUM + D) 500-1000-40 MG-UNT-MCG CHEW Take 2 tablets by mouth daily        Cholecalciferol (VITAMIN D) 1000 UNITS capsule Take 1 capsule by mouth daily        citalopram (CELEXA) 20 MG tablet TAKE 2 TABLETS BY MOUTH DAILY 180 tablet 0     diclofenac (VOLTAREN XR) 100 MG 24 hr tablet TAKE 1 TABLET BY MOUTH DAILY 30 tablet 0     Flaxseed, Linseed, (FLAX SEED OIL) 1000 MG capsule Take 2 capsules by mouth daily       fluticasone (FLONASE) 50 MCG/ACT nasal spray Spray 1 spray into both nostrils daily       folic acid (FOLVITE) 1 MG tablet Take 1 mg by mouth 3 times daily.       gabapentin (NEURONTIN) 300 MG capsule TAKE 3 CAPSULES BY MOUTH IN THE MORNING, 2 CAPSULES AT NOON AND 2 CAPSULES IN THE EVENING 210 capsule 0     Garlic 400 MG TBEC Take 1 tablet by mouth daily        HYDROcodone-acetaminophen (NORCO) 7.5-325 MG per tablet TAKE 1 TABLET BY MOUTH EVERY 4 TO 6 HOURS AS NEEDED FOR SEVERE PAIN 42 tablet 0     hydroxychloroquine (PLAQUENIL) 200 MG tablet Take 200 mg by mouth 2 times daily        ketoconazole (NIZORAL) 2 % external cream Apply topically 3 times a week .  Lather in and let set for 15 minutes.  Then rinse. 180 g 3     Methotrexate, Anti-Rheumatic, (METHOTREXATE, PF, SC) Inject 0.8 mLs Subcutaneous 25 mg vial- patient draws up 0.6 ml.        MILK THISTLE PO Take 1,000 mg by mouth daily.       montelukast (SINGULAIR) 10 MG tablet TAKE 1 TABLET BY MOUTH AT BEDTIME 90 tablet 0     Omega-3 1000 MG capsule Take 1.5 g by mouth daily        order for DME Equipment being ordered: Nebulizer 1 Device 0     pantoprazole (PROTONIX) 40 MG EC tablet 2 times daily        polyvinyl alcohol-povidone (REFRESH) 1.4-0.6 % ophthalmic solution 1-2 drops as needed       PREBIOTIC  PRODUCT PO Take 1 tablet by mouth daily       Probiotic Product (PROBIOTIC PO) Take 1 capsule by mouth daily       RESTASIS 0.05 % ophthalmic emulsion USE 1 DROP IN BOTH EYES 2 TIMES A DAY 60 each 0     Simethicone 180 MG CAPS TAKE ONE CAPSULE BY MOUTH AS NEEDED AFTER A MEAL. MAX 2 CAPS/DAY 60 capsule 0     traZODone (DESYREL) 50 MG tablet TAKE 2 TO 3 TABLETS BY MOUTH DAILY AT BEDTIME 270 tablet 0     UNABLE TO FIND Take 1,000 mg by mouth daily MEDICATION NAME: Quinol/tumeric       Allergies   Allergen Reactions     Adhesive Tape      Glue and nicotine patches     Benzoin Compound      Tin-Co-Javier     Mold      Seasonal Allergies      Soap      Any cleanser/soap/disinfectant that is used right before surgery.  Makes patient break out horribly. Chart was looked at and Chloraprep was used.     Recent Labs   Lab Test 04/07/21  1018 03/24/21  1335 12/23/20  1018 10/13/20  1420 11/29/19  0939 11/29/19  0939 06/04/19  0537 06/04/19  0537 11/14/18  0915 11/14/18  0915 11/15/17  0912 11/15/17  0912   LDL  --   --   --   --   --  101*  --   --   --  104*  --  107*   HDL  --   --   --   --   --  72  --   --   --  72  --  71   TRIG  --   --   --   --   --  189*  --   --   --  100  --  125   ALT  --  31 37 29   < >  --    < >  --    < > 35   < > 35   CR 1.03 1.06* 1.07* 1.03   < >  --    < > 0.89   < > 1.04   < > 1.11*   GFRESTIMATED 55* 54* 53* 56*   < >  --    < > 67   < > 53*   < > 49*   GFRESTBLACK 64 62 61 64   < >  --    < > 77   < > 64   < > 60*   POTASSIUM 3.7  --   --   --   --   --   --  3.6   < > 3.7  --  4.3   TSH  --   --   --   --   --   --   --   --   --  1.58  --  2.23    < > = values in this interval not displayed.      Pneumonia Vaccine:Adults age 65+ who received Pneumovax (PPSV23) at 65 years or older: Should be given PCV13 > 1 year after their most recent PPSV23  Last 3 Pap and HPV Results:      ROS:  CONSTITUTIONAL: NEGATIVE for fever, chills, change in weight  INTEGUMENTARY/SKIN: NEGATIVE for worrisome  "rashes, moles or lesions  EYES: NEGATIVE for vision changes or irritation  EYES: due to plaquenil has more frequent exams   ENT/MOUTH: NEGATIVE for ear, mouth and throat problems  RESP: NEGATIVE for significant cough or SOB  BREAST: NEGATIVE for masses, tenderness or discharge  CV: NEGATIVE for chest pain, palpitations or peripheral edema  GI: NEGATIVE for nausea, abdominal pain, heartburn, or change in bowel habits  : NEGATIVE for frequency, dysuria, or hematuria  MUSCULOSKELETAL: NEGATIVE for significant arthralgias or myalgia  MUSCULOSKELETAL:has RA seeing specialist.   NEURO: NEGATIVE for weakness, dizziness or paresthesias  NEURO: dizziness/lightheadedness medications are the cause.   ENDOCRINE: NEGATIVE for temperature intolerance, skin/hair changes  HEME: NEGATIVE for bleeding problems  PSYCHIATRIC: NEGATIVE for changes in mood or affect    OBJECTIVE:   /60 (BP Location: Left arm, Patient Position: Sitting, Cuff Size: Adult Regular)   Pulse 78   Temp 97.8  F (36.6  C) (Tympanic)   Ht 1.661 m (5' 5.4\")   Wt 95.7 kg (211 lb)   SpO2 96%   BMI 34.68 kg/m   Estimated body mass index is 34.68 kg/m  as calculated from the following:    Height as of this encounter: 1.661 m (5' 5.4\").    Weight as of this encounter: 95.7 kg (211 lb).  EXAM:   GENERAL: healthy, alert and no distress  EYES: Eyes grossly normal to inspection, PERRL and conjunctivae and sclerae normal  HENT: ear canals and TM's normal, nose and mouth without ulcers or lesions  NECK: no adenopathy, no asymmetry, masses, or scars and thyroid normal to palpation  RESP: lungs clear to auscultation - no rales, rhonchi or wheezes  BREAST: normal without masses, tenderness or nipple discharge and no palpable axillary masses or adenopathy  CV: regular rate and rhythm, normal S1 S2, no S3 or S4, no murmur, click or rub, no peripheral edema and peripheral pulses strong  ABDOMEN: soft, nontender, no hepatosplenomegaly, no masses and bowel sounds " normal  MS: no gross musculoskeletal defects noted, no edema  SKIN: no suspicious lesions or rashes  NEURO: Normal strength and tone, mentation intact and speech normal  PSYCH: mentation appears normal, affect normal/bright    Diagnostic Test Results:  Labs reviewed in Epic  Lab Results   Component Value Date    WBC 5.2 03/24/2021     Lab Results   Component Value Date    RBC 4.14 03/24/2021     Lab Results   Component Value Date    HGB 12.1 03/24/2021     Lab Results   Component Value Date    HCT 37.2 03/24/2021     No components found for: MCT  Lab Results   Component Value Date    MCV 90 03/24/2021     Lab Results   Component Value Date    MCH 29.2 03/24/2021     Lab Results   Component Value Date    MCHC 32.5 03/24/2021     Lab Results   Component Value Date    RDW 13.2 03/24/2021     Lab Results   Component Value Date     03/24/2021     Last Comprehensive Metabolic Panel:  Sodium   Date Value Ref Range Status   04/07/2021 139 133 - 144 mmol/L Final     Potassium   Date Value Ref Range Status   04/07/2021 3.7 3.4 - 5.3 mmol/L Final     Chloride   Date Value Ref Range Status   04/07/2021 107 94 - 109 mmol/L Final     Carbon Dioxide   Date Value Ref Range Status   04/07/2021 29 20 - 32 mmol/L Final     Anion Gap   Date Value Ref Range Status   04/07/2021 3 3 - 14 mmol/L Final     Glucose   Date Value Ref Range Status   04/07/2021 98 70 - 99 mg/dL Final     Urea Nitrogen   Date Value Ref Range Status   04/07/2021 20 7 - 30 mg/dL Final     Creatinine   Date Value Ref Range Status   04/07/2021 1.03 0.52 - 1.04 mg/dL Final     GFR Estimate   Date Value Ref Range Status   04/07/2021 55 (L) >60 mL/min/[1.73_m2] Final     Comment:     Non  GFR Calc  Starting 12/18/2018, serum creatinine based estimated GFR (eGFR) will be   calculated using the Chronic Kidney Disease Epidemiology Collaboration   (CKD-EPI) equation.       Calcium   Date Value Ref Range Status   04/07/2021 8.6 8.5 - 10.1 mg/dL Final  "    Bilirubin Total   Date Value Ref Range Status   05/29/2019 0.4 0.2 - 1.3 mg/dL Final     Alkaline Phosphatase   Date Value Ref Range Status   05/29/2019 62 40 - 150 U/L Final     ALT   Date Value Ref Range Status   03/24/2021 31 0 - 50 U/L Final     AST   Date Value Ref Range Status   05/29/2019 16 0 - 45 U/L Final             Lab Results   Component Value Date    ALT 31 03/24/2021     TSH   Date Value Ref Range Status   11/14/2018 1.58 0.40 - 4.00 mU/L Final   11/15/2017 2.23 0.40 - 4.00 mU/L Final   11/14/2014 1.71 0.40 - 4.00 mU/L Final     Comment:     Effective 7/30/2014, the reference range for this assay has changed to reflect   new instrumentation/methodology.     11/13/2013 1.99 0.34 - 4.82 mU/L Final     Recent Labs   Lab Test 11/29/19  0939 11/14/18  0915 11/16/15  0746 11/16/15  0746 11/14/14  0815   CHOL 211* 196   < > 213* 237*   HDL 72 72   < > 66 70   * 104*   < > 128 147*   TRIG 189* 100   < > 93 98   CHOLHDLRATIO  --   --   --  3.2 3.4    < > = values in this interval not displayed.         ASSESSMENT / PLAN:   1. Routine general medical examination at a health care facility  This is her medicare exam.  She is in need of vaccines will not take ones that are not reimbursed.  She is feeling well. Chronic disease addressed at other visit.  Safe in home no concern on cognition. She is diligent at her health and seeing and following up.        Patient has been advised of split billing requirements and indicates understanding: Yes    COUNSELING:  Reviewed preventive health counseling, as reflected in patient instructions       Regular exercise       Healthy diet/nutrition       Vision screening       Dental care       Bladder control       Fall risk prevention       Immunizations    Vaccinated for: Pneumococcal             Advanced Planning     Estimated body mass index is 34.68 kg/m  as calculated from the following:    Height as of this encounter: 1.661 m (5' 5.4\").    Weight as of this " encounter: 95.7 kg (211 lb).    Weight management plan: Discussed healthy diet and exercise guidelines    She reports that she quit smoking about 23 years ago. Her smoking use included cigarettes. She has never used smokeless tobacco.    Appropriate preventive services were discussed with this patient, including applicable screening as appropriate for cardiovascular disease, diabetes, osteopenia/osteoporosis, and glaucoma.  As appropriate for age/gender, discussed screening for colorectal cancer, prostate cancer, breast cancer, and cervical cancer. Checklist reviewing preventive services available has been given to the patient.    Reviewed patients plan of care and provided an AVS. The Basic Care Plan (routine screening as documented in Health Maintenance) for Melvi meets the Care Plan requirement. This Care Plan has been established and reviewed with the Patient.    Counseling Resources:  ATP IV Guidelines  Pooled Cohorts Equation Calculator  Breast Cancer Risk Calculator  BRCA-Related Cancer Risk Assessment: FHS-7 Tool  FRAX Risk Assessment  ICSI Preventive Guidelines  Dietary Guidelines for Americans, 2010  USDA's MyPlate  ASA Prophylaxis  Lung CA Screening    GIOVANNY Valentino  [facility] - AIDE

## 2021-04-26 ENCOUNTER — OFFICE VISIT (OUTPATIENT)
Dept: FAMILY MEDICINE | Facility: OTHER | Age: 71
End: 2021-04-26
Attending: PHYSICIAN ASSISTANT
Payer: COMMERCIAL

## 2021-04-26 VITALS
HEART RATE: 78 BPM | TEMPERATURE: 97.8 F | HEIGHT: 65 IN | BODY MASS INDEX: 35.16 KG/M2 | WEIGHT: 211 LBS | OXYGEN SATURATION: 96 % | DIASTOLIC BLOOD PRESSURE: 60 MMHG | SYSTOLIC BLOOD PRESSURE: 126 MMHG

## 2021-04-26 DIAGNOSIS — Z23 NEED FOR VACCINATION: ICD-10-CM

## 2021-04-26 DIAGNOSIS — Z00.00 ROUTINE GENERAL MEDICAL EXAMINATION AT A HEALTH CARE FACILITY: Primary | ICD-10-CM

## 2021-04-26 PROCEDURE — G0463 HOSPITAL OUTPT CLINIC VISIT: HCPCS

## 2021-04-26 PROCEDURE — G0009 ADMIN PNEUMOCOCCAL VACCINE: HCPCS | Performed by: PHYSICIAN ASSISTANT

## 2021-04-26 PROCEDURE — G0438 PPPS, INITIAL VISIT: HCPCS | Performed by: PHYSICIAN ASSISTANT

## 2021-04-26 PROCEDURE — G0009 ADMIN PNEUMOCOCCAL VACCINE: HCPCS

## 2021-04-26 ASSESSMENT — ANXIETY QUESTIONNAIRES
7. FEELING AFRAID AS IF SOMETHING AWFUL MIGHT HAPPEN: NOT AT ALL
1. FEELING NERVOUS, ANXIOUS, OR ON EDGE: NOT AT ALL
5. BEING SO RESTLESS THAT IT IS HARD TO SIT STILL: NOT AT ALL
4. TROUBLE RELAXING: NOT AT ALL
6. BECOMING EASILY ANNOYED OR IRRITABLE: NOT AT ALL
2. NOT BEING ABLE TO STOP OR CONTROL WORRYING: NOT AT ALL
3. WORRYING TOO MUCH ABOUT DIFFERENT THINGS: NOT AT ALL
GAD7 TOTAL SCORE: 0

## 2021-04-26 ASSESSMENT — PAIN SCALES - GENERAL: PAINLEVEL: NO PAIN (0)

## 2021-04-26 ASSESSMENT — PATIENT HEALTH QUESTIONNAIRE - PHQ9: SUM OF ALL RESPONSES TO PHQ QUESTIONS 1-9: 0

## 2021-04-26 ASSESSMENT — MIFFLIN-ST. JEOR: SCORE: 1489.32

## 2021-04-26 NOTE — NURSING NOTE
"Chief Complaint   Patient presents with     Physical       Initial Blood Pressure 126/60 (BP Location: Left arm, Patient Position: Sitting, Cuff Size: Adult Regular)   Pulse 78   Temperature 97.8  F (36.6  C) (Tympanic)   Height 1.661 m (5' 5.4\")   Weight 95.7 kg (211 lb)   Oxygen Saturation 96%   Body Mass Index 34.68 kg/m   Estimated body mass index is 34.68 kg/m  as calculated from the following:    Height as of this encounter: 1.661 m (5' 5.4\").    Weight as of this encounter: 95.7 kg (211 lb).  Medication Reconciliation: complete  Ammy Solorzano LPN  "

## 2021-04-27 DIAGNOSIS — E78.2 MIXED HYPERLIPIDEMIA: ICD-10-CM

## 2021-04-27 DIAGNOSIS — G62.9 PERIPHERAL POLYNEUROPATHY: ICD-10-CM

## 2021-04-27 DIAGNOSIS — M05.79 RHEUMATOID ARTHRITIS INVOLVING MULTIPLE SITES WITH POSITIVE RHEUMATOID FACTOR (H): ICD-10-CM

## 2021-04-27 DIAGNOSIS — N18.31 STAGE 3A CHRONIC KIDNEY DISEASE (H): ICD-10-CM

## 2021-04-27 LAB
ALBUMIN UR-MCNC: 10 MG/DL
APPEARANCE UR: CLEAR
BACTERIA #/AREA URNS HPF: ABNORMAL /HPF
BILIRUB UR QL STRIP: NEGATIVE
CHOLEST SERPL-MCNC: 222 MG/DL
COLOR UR AUTO: ABNORMAL
GLUCOSE UR STRIP-MCNC: NEGATIVE MG/DL
HDLC SERPL-MCNC: 79 MG/DL
HGB UR QL STRIP: NEGATIVE
KETONES UR STRIP-MCNC: NEGATIVE MG/DL
LDLC SERPL CALC-MCNC: 126 MG/DL
LEUKOCYTE ESTERASE UR QL STRIP: NEGATIVE
MUCOUS THREADS #/AREA URNS LPF: PRESENT /LPF
NITRATE UR QL: NEGATIVE
NONHDLC SERPL-MCNC: 143 MG/DL
PH UR STRIP: 7 PH (ref 4.7–8)
RBC #/AREA URNS AUTO: 1 /HPF (ref 0–2)
SOURCE: ABNORMAL
SP GR UR STRIP: 1.02 (ref 1–1.03)
SQUAMOUS #/AREA URNS AUTO: 0 /HPF (ref 0–1)
TRIGL SERPL-MCNC: 87 MG/DL
UROBILINOGEN UR STRIP-MCNC: NORMAL MG/DL (ref 0–2)
WBC #/AREA URNS AUTO: <1 /HPF (ref 0–5)

## 2021-04-27 PROCEDURE — 81001 URINALYSIS AUTO W/SCOPE: CPT | Mod: ZL | Performed by: PHYSICIAN ASSISTANT

## 2021-04-27 PROCEDURE — 80061 LIPID PANEL: CPT | Mod: ZL | Performed by: PHYSICIAN ASSISTANT

## 2021-04-27 PROCEDURE — 36415 COLL VENOUS BLD VENIPUNCTURE: CPT | Mod: ZL | Performed by: PHYSICIAN ASSISTANT

## 2021-04-27 ASSESSMENT — ANXIETY QUESTIONNAIRES: GAD7 TOTAL SCORE: 0

## 2021-04-28 ENCOUNTER — TRANSFERRED RECORDS (OUTPATIENT)
Dept: HEALTH INFORMATION MANAGEMENT | Facility: CLINIC | Age: 71
End: 2021-04-28

## 2021-04-29 DIAGNOSIS — F41.0 PANIC DISORDER WITHOUT AGORAPHOBIA: ICD-10-CM

## 2021-04-29 RX ORDER — CITALOPRAM HYDROBROMIDE 20 MG/1
TABLET ORAL
Qty: 180 TABLET | Refills: 0 | Status: SHIPPED | OUTPATIENT
Start: 2021-04-29 | End: 2021-09-17

## 2021-04-29 NOTE — TELEPHONE ENCOUNTER
Tom      Last Written Prescription Date:  3/2/21  Last Fill Quantity: 180,   # refills: 0  Last Office Visit: 4/26/21  Future Office visit:       Routing refill request to provider for review/approval because:

## 2021-05-04 DIAGNOSIS — M54.42 BILATERAL LOW BACK PAIN WITH BILATERAL SCIATICA, UNSPECIFIED CHRONICITY: ICD-10-CM

## 2021-05-04 DIAGNOSIS — M54.41 BILATERAL LOW BACK PAIN WITH BILATERAL SCIATICA, UNSPECIFIED CHRONICITY: ICD-10-CM

## 2021-05-04 NOTE — TELEPHONE ENCOUNTER
diclofenac (VOLTAREN XR) 100 MG 24 hr tablet      Last Written Prescription Date:  3/2/2021  Last Fill Quantity: 30,   # refills: 0  Last Office Visit: 4/26/2021  Future Office visit:

## 2021-05-05 RX ORDER — DICLOFENAC SODIUM 100 MG/1
TABLET, FILM COATED, EXTENDED RELEASE ORAL
Qty: 30 TABLET | Refills: 0 | Status: SHIPPED | OUTPATIENT
Start: 2021-05-05 | End: 2021-06-03

## 2021-05-06 DIAGNOSIS — J30.2 SEASONAL ALLERGIC RHINITIS, UNSPECIFIED TRIGGER: ICD-10-CM

## 2021-05-06 DIAGNOSIS — G62.9 PERIPHERAL POLYNEUROPATHY: ICD-10-CM

## 2021-05-09 RX ORDER — FEXOFENADINE HYDROCHLORIDE AND PSEUDOEPHEDRINE HYDROCHLORIDE 60; 120 MG/1; MG/1
TABLET, FILM COATED, EXTENDED RELEASE ORAL
Qty: 30 TABLET | Refills: 0 | Status: SHIPPED | OUTPATIENT
Start: 2021-05-09 | End: 2021-06-21

## 2021-05-09 RX ORDER — GABAPENTIN 300 MG/1
CAPSULE ORAL
Qty: 210 CAPSULE | Refills: 0 | Status: SHIPPED | OUTPATIENT
Start: 2021-05-09 | End: 2021-06-21

## 2021-05-09 NOTE — TELEPHONE ENCOUNTER
Allegra-D      Last Written Prescription Date:  4/12/2021  Last Fill Quantity: 30,   # refills: 0    Gabapentin       Last Written Prescription Date:  4/12/2021  Last Fill Quantity: 210,   # refills: 0  Last Office Visit: 4/26/2021  Future Office visit:

## 2021-05-17 PROBLEM — F11.90 CHRONIC, CONTINUOUS USE OF OPIOIDS: Chronic | Status: ACTIVE | Noted: 2021-05-17

## 2021-05-26 DIAGNOSIS — J45.991 COUGH VARIANT ASTHMA: ICD-10-CM

## 2021-05-26 DIAGNOSIS — J30.1 SEASONAL ALLERGIC RHINITIS DUE TO POLLEN: ICD-10-CM

## 2021-05-26 DIAGNOSIS — F51.01 PRIMARY INSOMNIA: ICD-10-CM

## 2021-05-26 NOTE — TELEPHONE ENCOUNTER
trazodone      Last Written Prescription Date:  02/22/2021  Last Fill Quantity: 270,   # refills: 0  Last Office Visit: 04/26/2021  Future Office visit:

## 2021-05-26 NOTE — TELEPHONE ENCOUNTER
Singulair      Last Written Prescription Date:  1.21.21  Last Fill Quantity: 90,   # refills: 0  Last Office Visit: 4.26.21

## 2021-05-27 RX ORDER — TRAZODONE HYDROCHLORIDE 50 MG/1
TABLET, FILM COATED ORAL
Qty: 270 TABLET | Refills: 0 | Status: SHIPPED | OUTPATIENT
Start: 2021-05-27 | End: 2021-09-01

## 2021-05-27 RX ORDER — MONTELUKAST SODIUM 10 MG/1
TABLET ORAL
Qty: 90 TABLET | Refills: 0 | Status: SHIPPED | OUTPATIENT
Start: 2021-05-27 | End: 2021-08-18

## 2021-06-02 ENCOUNTER — OFFICE VISIT (OUTPATIENT)
Dept: DERMATOLOGY | Facility: OTHER | Age: 71
End: 2021-06-02
Attending: PHYSICIAN ASSISTANT
Payer: COMMERCIAL

## 2021-06-02 VITALS
WEIGHT: 210 LBS | HEART RATE: 76 BPM | TEMPERATURE: 97.6 F | SYSTOLIC BLOOD PRESSURE: 130 MMHG | DIASTOLIC BLOOD PRESSURE: 60 MMHG | OXYGEN SATURATION: 93 % | BODY MASS INDEX: 33.75 KG/M2 | HEIGHT: 66 IN

## 2021-06-02 DIAGNOSIS — M54.41 BILATERAL LOW BACK PAIN WITH BILATERAL SCIATICA, UNSPECIFIED CHRONICITY: ICD-10-CM

## 2021-06-02 DIAGNOSIS — L21.9 SEBORRHEIC DERMATITIS: ICD-10-CM

## 2021-06-02 DIAGNOSIS — M54.42 BILATERAL LOW BACK PAIN WITH BILATERAL SCIATICA, UNSPECIFIED CHRONICITY: ICD-10-CM

## 2021-06-02 DIAGNOSIS — L21.9 DERMATITIS, SEBORRHEIC: Primary | ICD-10-CM

## 2021-06-02 PROCEDURE — G0463 HOSPITAL OUTPT CLINIC VISIT: HCPCS

## 2021-06-02 PROCEDURE — 99202 OFFICE O/P NEW SF 15 MIN: CPT | Performed by: DERMATOLOGY

## 2021-06-02 RX ORDER — TRIAMCINOLONE ACETONIDE 1 MG/G
CREAM TOPICAL
Qty: 45 G | Refills: 3 | Status: SHIPPED | OUTPATIENT
Start: 2021-06-02 | End: 2023-11-15

## 2021-06-02 RX ORDER — GLUCOSAMINE/D3/BOSWELLIA SERRA 1500MG-400
5000 TABLET ORAL
COMMUNITY

## 2021-06-02 RX ORDER — KETOCONAZOLE 20 MG/ML
SHAMPOO TOPICAL
Qty: 120 ML | Refills: 3 | Status: SHIPPED | OUTPATIENT
Start: 2021-06-02 | End: 2022-09-22

## 2021-06-02 ASSESSMENT — MIFFLIN-ST. JEOR: SCORE: 1486.36

## 2021-06-02 ASSESSMENT — PAIN SCALES - GENERAL: PAINLEVEL: NO PAIN (0)

## 2021-06-02 NOTE — NURSING NOTE
"Chief Complaint   Patient presents with     Rash       Initial /60 (BP Location: Left arm, Patient Position: Chair, Cuff Size: Adult Regular)   Pulse 76   Temp 97.6  F (36.4  C) (Tympanic)   Ht 1.664 m (5' 5.5\")   Wt 95.3 kg (210 lb)   SpO2 93%   BMI 34.41 kg/m   Estimated body mass index is 34.41 kg/m  as calculated from the following:    Height as of this encounter: 1.664 m (5' 5.5\").    Weight as of this encounter: 95.3 kg (210 lb).  Medication Reconciliation: complete  REAL HADLEY LPN    "

## 2021-06-02 NOTE — LETTER
2021       RE: Tk Cleveland  [address] [facility] [address]  [place] [address]     Dear Colleague,    Thank you for referring your patient, Tk Cleveland, to the [facility]. Please see a copy of my visit note below.    Visit Date: 2021    SUBJECTIVE:  First visit for Tk, who states that she has had great deal of difficulty with her scalp being scaly, and irritated and this has been going on for years.  She has been thought to have eczema.  She has been using ketoconazole cream with some success.  She also had an episode in February of this year where her eyes and scalp began to become painful after injection that apparently received for her rheumatoid arthritis for which she receives  methotrexate injections.    OBJECTIVE:  Healthy lady in no distress.  Her scalp looks quite normal.  No scale.  There was some  Eczema like changes  postauricularly on the one left side.  She does not show any dermatitis except for a lesion behind her left knee that looks likes a nummular eczema lesion.   It would appear that she has episodic seborrheic dermatitis from her description and has several other medical problems as listed.    PLAN:  Triamcinolone 0.1 cream to the left leg lesion for 10 days. For the scalp, instead of the ketoconazole cream use ketoconazole shampoo. Also recommended 0.1 triamcinolone cream that she could use on her leg as well as use on her scalp by diluting it with a bit of water and applying as a lotion leaving it on overnight.    Advised I was unsure exactly what was going on with her scalp as it looked quite good and she had no other rashes other than the one behind her leg.  Return p.r.n.concerns.    H Shaheen Carlos MD        D: 2021   T: 2021   MT: [id number]    Name:     TK CLEVELAND  MRN:      [id number]        Account:    [id number]   :      1950           Visit Date:  06/02/2021     Document: [id number]        Again, thank you for allowing me to participate in the care of your patient.      Sincerely,    JENELLE Carlos MD

## 2021-06-02 NOTE — PROGRESS NOTES
Visit Date: 2021    SUBJECTIVE:  First visit for Tk, who states that she has had great deal of difficulty with her scalp being scaly, and irritated and this has been going on for years.  She has been thought to have eczema.  She has been using ketoconazole cream with some success.  She also had an episode in February of this year where her eyes and scalp began to become painful after injection that apparently received for her rheumatoid arthritis for which she receives  methotrexate injections.    OBJECTIVE:  Healthy lady in no distress.  Her scalp looks quite normal.  No scale.  There was some  Eczema like changes  postauricularly on the one left side.  She does not show any dermatitis except for a lesion behind her left knee that looks likes a nummular eczema lesion.   It would appear that she has episodic seborrheic dermatitis from her description and has several other medical problems as listed.    PLAN:  Triamcinolone 0.1 cream to the left leg lesion for 10 days. For the scalp, instead of the ketoconazole cream use ketoconazole shampoo. Also recommended 0.1 triamcinolone cream that she could use on her leg as well as use on her scalp by diluting it with a bit of water and applying as a lotion leaving it on overnight.    Advised I was unsure exactly what was going on with her scalp as it looked quite good and she had no other rashes other than the one behind her leg.  Return p.r.n.concerns.    H Shaheen Carlos MD        D: 2021   T: 2021   MT: [id number]    Name:     TK URIAS  MRN:      [id number]        Account:    [id number]   :      1950           Visit Date: 2021     Document: [id number]

## 2021-06-03 RX ORDER — DICLOFENAC SODIUM 100 MG/1
TABLET, FILM COATED, EXTENDED RELEASE ORAL
Qty: 30 TABLET | Refills: 0 | Status: SHIPPED | OUTPATIENT
Start: 2021-06-03 | End: 2021-07-06

## 2021-06-03 NOTE — TELEPHONE ENCOUNTER
Voltaren  100 mg   Last Written Prescription Date:  5-5-20210  Last Fill Quantity: 30,   # refills: 0  Last Office Visit: 4-  Future Office visit:

## 2021-06-08 ENCOUNTER — APPOINTMENT (OUTPATIENT)
Dept: GENERAL RADIOLOGY | Facility: HOSPITAL | Age: 71
End: 2021-06-08
Attending: NURSE PRACTITIONER
Payer: MEDICARE

## 2021-06-08 ENCOUNTER — HOSPITAL ENCOUNTER (EMERGENCY)
Facility: HOSPITAL | Age: 71
Discharge: HOME | End: 2021-06-08
Attending: NURSE PRACTITIONER | Admitting: NURSE PRACTITIONER
Payer: MEDICARE

## 2021-06-08 VITALS
HEART RATE: 80 BPM | OXYGEN SATURATION: 94 % | SYSTOLIC BLOOD PRESSURE: 106 MMHG | RESPIRATION RATE: 14 BRPM | TEMPERATURE: 97.2 F | DIASTOLIC BLOOD PRESSURE: 68 MMHG

## 2021-06-08 DIAGNOSIS — M79.672 LEFT FOOT PAIN: Primary | ICD-10-CM

## 2021-06-08 PROCEDURE — G0463 HOSPITAL OUTPT CLINIC VISIT: HCPCS

## 2021-06-08 PROCEDURE — 99213 OFFICE O/P EST LOW 20 MIN: CPT | Performed by: NURSE PRACTITIONER

## 2021-06-08 PROCEDURE — 73630 X-RAY EXAM OF FOOT: CPT | Mod: LT

## 2021-06-08 ASSESSMENT — ENCOUNTER SYMPTOMS
CHILLS: 0
ARTHRALGIAS: 1
FEVER: 0
DIZZINESS: 0
SHORTNESS OF BREATH: 0
WOUND: 0
PSYCHIATRIC NEGATIVE: 1
HEADACHES: 0
MYALGIAS: 1
DIARRHEA: 0
NAUSEA: 0
VOMITING: 0

## 2021-06-08 NOTE — DISCHARGE INSTRUCTIONS
- Musculoskeletal injuries can take 6-8 weeks to fully heal  - Continue with conservative measures including Rest, Ice, Compression, Elevation, Tylenol and/or Ibuprofen per package instructions for discomfort  (You can alternate Tylenol (acetaminophen) with Advil (ibuprofen).  Example: Ibuprofen 8 AM, Tylenol 12 PM, ibuprofen 4 PM, Tylenol 8 PM, ibuprofen 10 PM, etc.).  Take ibuprofen with food.  - After initial injury you can try heat for comfort if this feels better than ice  - Topical anesthetic such as Biofreeze, Bengay, Icy Hot, Lidocaine, others.   - Avoid overusing or exerting which could delay healing and cause further injury  - If no improvement or worsening symptoms return for further evaluation    Follow up with primary care provider or return to urgent care/ED with any worsening in condition or additional concerns.

## 2021-06-08 NOTE — ED TRIAGE NOTES
Pt si here with left foot pain from falling down a few stairs this am   Pain on tops of foot and left pinky toe

## 2021-06-08 NOTE — ED PROVIDER NOTES
History     Chief Complaint   Patient presents with     Foot Pain     lt foot pain, notes injury this am     HPI  Melvi Cleveland is a 71 year old female who presents to urgent care today (ambulatory) accompanied by spouse for complaints of left foot pain.  Patient fell down 3-4 stairs this morning and hurt foot.  Denies any ankle pain.  Denies previous injuries, fractures surgeries to left foot. Pain 2/10, norco for discomfort as patient takes the pain medication for her back as well.  Pain can go to 5/10 with ambulation .  Denies any fever, chills, nausea, vomiting, diarrhea, SOB or hcest pain.  Patient denies hitting head, LOC or any other injuries.  No other concerns.      Allergies:  Allergies   Allergen Reactions     Adhesive Tape      Glue and nicotine patches     Benzoin Compound      Tin-Co-Javier     Mold      Seasonal Allergies      Soap      Any cleanser/soap/disinfectant that is used right before surgery.  Makes patient break out horribly. Chart was looked at and Chloraprep was used.       Problem List:    Patient Active Problem List    Diagnosis Date Noted     Chronic, continuous use of opioids 05/17/2021     Priority: Medium     Chronic kidney disease, stage 3 01/16/2021     Priority: Medium     Cough variant asthma 04/16/2019     Priority: Medium     Need for hepatitis C screening test 11/13/2018     Priority: Medium     ACP (advance care planning) 09/28/2016     Priority: Medium     Advance Care Planning 9/28/2016: ACP Review of Chart / Resources Provided:  Reviewed chart for advance care plan.  Melvi Cleveland has been provided information and resources to begin or update their advance care plan.  Added by Kathy Galvan             RA (rheumatoid arthritis) (H) 11/25/2015     Priority: Medium     Comprehensive Medical Examination 11/25/2015     Priority: Medium     Seasonal allergic rhinitis 11/25/2015     Priority: Medium     Fibrocystic breast disease (FCBD) 11/25/2015     Priority: Medium      Crohn's disease of large intestine (H) 03/01/2011     Priority: Medium     Dyslipidemia 03/01/2011     Priority: Medium     Sicca syndrome (H) 03/01/2011     Priority: Medium     Polyneuropathy in other diseases classified elsewhere (H) 03/01/2011     Priority: Medium     Neck pain, chronic 07/05/2005     Priority: Medium     05/2015 MRI:  IMPRESSION:  BROAD-BASED DISK PROTRUSION AT C5-C6 ON THE LEFT, MILDLY  IMPINGING ON THE LEFT C6 NERVE ROOT       Low back pain, chronic 12/13/2000     Priority: Medium     03/2016 MRI:  MULTILEVEL DEGENERATIVE CHANGES OF THE LUMBAR SPINE,  SIMILAR IN APPEARANCE TO THE PRIOR STUDY.  A RIGHT FORAMINAL DISK  PROTRUSION AT L3-L4 AGAIN IMPINGES THE EXITING RIGHT L3 NERVE ROOT.  CORRELATE FOR A RELATED RADICULOPATHY.          Past Medical History:    Past Medical History:   Diagnosis Date     Allergic rhinitis, seasonal 3/1/2011     Arthritis      Chronic/Recurrent Back Pain 12/13/2000     Chronic/Recurrent Neck Pain 7/5/2005     Cough variant asthma 4/16/2019     Crohn's Disease 3/1/2011     CTS (carpal tunnel syndrome) 1/1/2011     Depressive disorder      Dyslipidemia 3/1/2011     Fibrocystic Breast Disease 3/1/2011     Mixed hyperlipidemia 1/1/2011     Wilson's neuroma 1/1/2011     Parotiditis 5/9/2011     Peripheral Neuriopathy 3/1/2011     Pneumonia of left lower lobe due to infectious organism 4/16/2019     Rheumatoid arthritis(714.0) 12/13/1999     Seborrhea capitis 10/6/2011     Sjogrens Syndrome 3/1/2011     Urethral stricture 4/30/2008       Past Surgical History:    Past Surgical History:   Procedure Laterality Date     BACK SURGERY  05/1995    back surgery     BIOPSY      breast bx X2     BIOPSY BREAST      LT     BIOPSY BREAST NEEDLE LOCALIZATION Right 06/12/2017    Procedure: BIOPSY BREAST NEEDLE LOCALIZATION;  WIRE LOCALIZED EXCISIONAL BIOPSY  RIGHT BREAST MASS;  Surgeon: Jeni Amin MD;  Location: [facility]     CHOLECYSTECTOMY  2004     COLONOSCOPY  11/15/2004     screening     COLONOSCOPY  2014     COLONOSCOPY - HIM SCAN N/A 2017    (Jessy) no abnormality - right, transverse and left colon biopsies, repeat in 2 years     EGD with biopsy  ,     GERD     ENDOSCOPY UPPER, COLONOSCOPY, COMBINED N/A 2020    Procedure: UPPER ENDOSCOPY WITH BIOPSIES  AND COLONOSCOPY;  Surgeon: Saul Jiménez MD;  Location: [facility] OR     IR CONSULTATION FOR IR EXAM  2020     IR CONSULTATION FOR IR EXAM  2020     laminectomy      L4 L5 laminectomy; back pain     RELEASE CARPAL TUNNEL Right 2018    Procedure: RELEASE CARPAL TUNNEL;  RIGHT CARPAL TUNNEL RELEASE;  Surgeon: Haider Carlos MD;  Location: [facility]       Family History:    Family History   Problem Relation Age of Onset     Diabetes Mother      Rheumatoid Arthritis Mother      Other - See Comments Mother         fibromyalgia     Osteoarthritis Mother      Thyroid Disease Mother         thyroid disorder     Unknown/Adopted Father         cause of death unknown     Rheumatoid Arthritis Father      Diabetes Sister      Myocardial Infarction Sister         cause of death     Lupus Sister         cause of death     C.A.D. Maternal Grandmother      Cerebrovascular Disease Maternal Grandmother      Diabetes Maternal Grandmother      Thyroid Disease Maternal Grandmother         thyrodi disorder; goitor removed     Cancer Maternal Grandfather      Hypertension Brother      Other - See Comments Brother         sleep apnea     Other - See Comments Sister         sleep apnea       Social History:  Marital Status:   [2]  Social History     Tobacco Use     Smoking status: Former Smoker     Types: Cigarettes     Quit date: 10/6/1997     Years since quittin.6     Smokeless tobacco: Never Used     Tobacco comment: no passive exposure   Substance Use Topics     Alcohol use: No     Alcohol/week: 0.0 standard drinks     Comment: former. quit      Drug use: No        Medications:    ALFALFA PO  ALLEGRA-D  ALLERGY & CONGESTION  MG 12 hr tablet  Ascorbic Acid (VITAMIN C) 500 MG CAPS  Biotin 91695 MCG TABS  Cholecalciferol (VITAMIN D) 1000 UNITS capsule  citalopram (CELEXA) 20 MG tablet  diclofenac (VOLTAREN XR) 100 MG 24 hr tablet  Flaxseed, Linseed, (FLAX SEED OIL) 1000 MG capsule  fluticasone (FLONASE) 50 MCG/ACT nasal spray  folic acid (FOLVITE) 1 MG tablet  gabapentin (NEURONTIN) 300 MG capsule  Garlic 400 MG TBEC  HYDROcodone-acetaminophen (NORCO) 7.5-325 MG per tablet  hydroxychloroquine (PLAQUENIL) 200 MG tablet  Methotrexate, Anti-Rheumatic, (METHOTREXATE, PF, SC)  MILK THISTLE PO  montelukast (SINGULAIR) 10 MG tablet  Omega-3 1000 MG capsule  pantoprazole (PROTONIX) 40 MG EC tablet  PREBIOTIC PRODUCT PO  Probiotic Product (PROBIOTIC PO)  RESTASIS 0.05 % ophthalmic emulsion  traZODone (DESYREL) 50 MG tablet  UNABLE TO FIND  ADVAIR DISKUS 250-50 MCG/DOSE inhaler  albuterol (PROAIR HFA/PROVENTIL HFA/VENTOLIN HFA) 108 (90 Base) MCG/ACT inhaler  albuterol (PROVENTIL) (2.5 MG/3ML) 0.083% neb solution  benzonatate (TESSALON) 200 MG capsule  Calcium-Vitamin D-Vitamin K (CALCIUM + D) 500-1000-40 MG-UNT-MCG CHEW  ketoconazole (NIZORAL) 2 % external cream  ketoconazole (NIZORAL) 2 % external shampoo  order for DME  polyvinyl alcohol-povidone (REFRESH) 1.4-0.6 % ophthalmic solution  Simethicone 180 MG CAPS  triamcinolone (KENALOG) 0.1 % external cream      Review of Systems   Constitutional: Negative for chills and fever.   Respiratory: Negative for shortness of breath.    Cardiovascular: Negative for chest pain.   Gastrointestinal: Negative for diarrhea, nausea and vomiting.   Musculoskeletal: Positive for arthralgias and myalgias. Negative for gait problem.   Skin: Negative for wound.   Neurological: Negative for dizziness and headaches.   Psychiatric/Behavioral: Negative.      Physical Exam   BP: 106/68  Pulse: 80  Temp: 97.2  F (36.2  C)  Resp: 14  SpO2: 94 %    Physical Exam  Vitals signs and nursing note  reviewed.   Constitutional:       General: She is not in acute distress.     Appearance: She is not ill-appearing.   Cardiovascular:      Rate and Rhythm: Normal rate and regular rhythm.      Pulses: Normal pulses.      Heart sounds: Normal heart sounds.   Pulmonary:      Effort: Pulmonary effort is normal.      Breath sounds: Normal breath sounds.   Musculoskeletal:      Left ankle: Normal.      Left foot: Normal range of motion and normal capillary refill. Tenderness present. No swelling, deformity or laceration.   Skin:     General: Skin is warm and dry.      Capillary Refill: Capillary refill takes less than 2 seconds.   Neurological:      Mental Status: She is alert.   Psychiatric:         Mood and Affect: Mood normal.       ED Course     Results for orders placed or performed during the hospital encounter of 06/08/21 (from the past 24 hour(s))   Foot XR, G/E 3 views, left    Narrative    PROCEDURE:  XR FOOT LEFT G/E 3 VIEWS    HISTORY: fell down a couple strairs this am, pain on top of foot, pain  on pinky toe    COMPARISON:  None.    TECHNIQUE:  3 views of the left foot were obtained.    FINDINGS:  No fracture or dislocation is identified. The joint spaces  are preserved. There is bony spurring at the insertion of the plantar  fascia into the calcaneus.       Impression    IMPRESSION: No left foot fracture or destructive lesion    SUE CANCINO MD       Medications - No data to display    Assessments & Plan (with Medical Decision Making)     I have reviewed the nursing notes.    I have reviewed the findings, diagnosis, plan and need for follow up with the patient.  (M85.355) Left foot pain  (primary encounter diagnosis)  Plan:   X-ray of left foot completed and impression shows no left foot fracture or destructive lesion. Patient arrived to urgent care she wanted to make sure it was not fractured. Pain tolerable.  Patient Education:  - Musculoskeletal injuries can take 6-8 weeks to fully heal  - Continue  with conservative measures including Rest, Ice, Compression, Elevation, Tylenol and/or Ibuprofen per package instructions for discomfort  (You can alternate Tylenol (acetaminophen) with Advil (ibuprofen).  Example: Ibuprofen 8 AM, Tylenol 12 PM, ibuprofen 4 PM, Tylenol 8 PM, ibuprofen 10 PM, etc.).  Take ibuprofen with food.  - After initial injury you can try heat for comfort if this feels better than ice  - Topical anesthetic such as Biofreeze, Bengay, Icy Hot, Lidocaine, others.   - Avoid overusing or exerting which could delay healing and cause further injury  - If no improvement or worsening symptoms return for further evaluation    Follow up with primary care provider or return to urgent care/ED with any worsening in condition or additional concerns.     Discharge Medication List as of 6/8/2021 12:51 PM        Final diagnoses:   Left foot pain     6/8/2021   [facility]     Maddi Henriquez NP  06/09/21 1132

## 2021-06-18 ENCOUNTER — MYC MEDICAL ADVICE (OUTPATIENT)
Dept: FAMILY MEDICINE | Facility: OTHER | Age: 71
End: 2021-06-18

## 2021-06-18 DIAGNOSIS — M54.42 BILATERAL LOW BACK PAIN WITH LEFT-SIDED SCIATICA, UNSPECIFIED CHRONICITY: Primary | ICD-10-CM

## 2021-06-21 DIAGNOSIS — G62.9 PERIPHERAL POLYNEUROPATHY: ICD-10-CM

## 2021-06-21 DIAGNOSIS — M54.42 CHRONIC BILATERAL LOW BACK PAIN WITH BILATERAL SCIATICA: ICD-10-CM

## 2021-06-21 DIAGNOSIS — G89.29 CHRONIC BILATERAL LOW BACK PAIN WITH BILATERAL SCIATICA: ICD-10-CM

## 2021-06-21 DIAGNOSIS — M54.41 CHRONIC BILATERAL LOW BACK PAIN WITH BILATERAL SCIATICA: ICD-10-CM

## 2021-06-21 DIAGNOSIS — J30.2 SEASONAL ALLERGIC RHINITIS, UNSPECIFIED TRIGGER: ICD-10-CM

## 2021-06-21 RX ORDER — FEXOFENADINE HYDROCHLORIDE AND PSEUDOEPHEDRINE HYDROCHLORIDE 60; 120 MG/1; MG/1
TABLET, FILM COATED, EXTENDED RELEASE ORAL
Qty: 30 TABLET | Refills: 0 | Status: SHIPPED | OUTPATIENT
Start: 2021-06-21 | End: 2021-07-15

## 2021-06-21 RX ORDER — GABAPENTIN 300 MG/1
CAPSULE ORAL
Qty: 210 CAPSULE | Refills: 0 | Status: SHIPPED | OUTPATIENT
Start: 2021-06-21 | End: 2021-07-15

## 2021-06-21 RX ORDER — HYDROCODONE BITARTRATE AND ACETAMINOPHEN 7.5; 325 MG/1; MG/1
TABLET ORAL
Qty: 42 TABLET | Refills: 0 | Status: SHIPPED | OUTPATIENT
Start: 2021-06-21 | End: 2022-03-16

## 2021-06-21 NOTE — TELEPHONE ENCOUNTER
hydrocodone      Last Written Prescription Date:  4/12/21  Last Fill Quantity: 42,   # refills: 0  Last Office Visit: 4/26/21  Future Office visit:       Routing refill request to provider for review/approval because:  Drug not on the [facility] or [facility] refill protocol or controlled substance  gabapentin      Last Written Prescription Date:  5/9/21  Last Fill Quantity: 210,   # refills: 0    allegra      Last Written Prescription Date:  5/9/21  Last Fill Quantity: 30,   # refills: 0

## 2021-06-25 ENCOUNTER — HOSPITAL ENCOUNTER (OUTPATIENT)
Dept: MRI IMAGING | Facility: HOSPITAL | Age: 71
Discharge: HOME | End: 2021-06-25
Attending: PHYSICIAN ASSISTANT | Admitting: PHYSICIAN ASSISTANT
Payer: MEDICARE

## 2021-06-25 DIAGNOSIS — M54.42 BILATERAL LOW BACK PAIN WITH LEFT-SIDED SCIATICA, UNSPECIFIED CHRONICITY: ICD-10-CM

## 2021-06-25 PROCEDURE — 72148 MRI LUMBAR SPINE W/O DYE: CPT

## 2021-06-28 DIAGNOSIS — M54.9 CHRONIC BILATERAL BACK PAIN, UNSPECIFIED BACK LOCATION: Primary | ICD-10-CM

## 2021-06-28 DIAGNOSIS — G89.29 CHRONIC BILATERAL BACK PAIN, UNSPECIFIED BACK LOCATION: Primary | ICD-10-CM

## 2021-06-30 DIAGNOSIS — M06.09 RHEUMATOID ARTHRITIS OF MULTIPLE SITES WITHOUT RHEUMATOID FACTOR (H): Primary | ICD-10-CM

## 2021-06-30 DIAGNOSIS — Z79.899 DRUG THERAPY: ICD-10-CM

## 2021-07-01 ENCOUNTER — HOSPITAL ENCOUNTER (OUTPATIENT)
Dept: INTERVENTIONAL RADIOLOGY/VASCULAR | Facility: HOSPITAL | Age: 71
End: 2021-07-01
Attending: PHYSICIAN ASSISTANT
Payer: MEDICARE

## 2021-07-01 DIAGNOSIS — M54.42 BILATERAL LOW BACK PAIN WITH LEFT-SIDED SCIATICA, UNSPECIFIED CHRONICITY: ICD-10-CM

## 2021-07-01 DIAGNOSIS — Z79.899 DRUG THERAPY: ICD-10-CM

## 2021-07-01 DIAGNOSIS — M06.09 RHEUMATOID ARTHRITIS OF MULTIPLE SITES WITHOUT RHEUMATOID FACTOR (H): ICD-10-CM

## 2021-07-01 LAB
ALBUMIN SERPL-MCNC: 3.2 G/DL (ref 3.4–5)
ALP SERPL-CCNC: 71 U/L (ref 40–150)
ALT SERPL W P-5'-P-CCNC: 35 U/L (ref 0–50)
ANION GAP SERPL CALCULATED.3IONS-SCNC: 2 MMOL/L (ref 3–14)
AST SERPL W P-5'-P-CCNC: 25 U/L (ref 0–45)
BASOPHILS # BLD AUTO: 0 10E9/L (ref 0–0.2)
BASOPHILS NFR BLD AUTO: 0.6 %
BILIRUB SERPL-MCNC: 0.3 MG/DL (ref 0.2–1.3)
BUN SERPL-MCNC: 17 MG/DL (ref 7–30)
CALCIUM SERPL-MCNC: 8.7 MG/DL (ref 8.5–10.1)
CHLORIDE SERPL-SCNC: 107 MMOL/L (ref 94–109)
CO2 SERPL-SCNC: 30 MMOL/L (ref 20–32)
CREAT SERPL-MCNC: 1.03 MG/DL (ref 0.52–1.04)
CRP SERPL-MCNC: 4.6 MG/L (ref 0–8)
DIFFERENTIAL METHOD BLD: NORMAL
EOSINOPHIL # BLD AUTO: 0.2 10E9/L (ref 0–0.7)
EOSINOPHIL NFR BLD AUTO: 4.9 %
ERYTHROCYTE [DISTWIDTH] IN BLOOD BY AUTOMATED COUNT: 13.9 % (ref 10–15)
ERYTHROCYTE [SEDIMENTATION RATE] IN BLOOD BY WESTERGREN METHOD: 15 MM/H (ref 0–30)
GFR SERPL CREATININE-BSD FRML MDRD: 55 ML/MIN/{1.73_M2}
GLUCOSE SERPL-MCNC: 101 MG/DL (ref 70–99)
HCT VFR BLD AUTO: 38.4 % (ref 35–47)
HGB BLD-MCNC: 12.3 G/DL (ref 11.7–15.7)
IMM GRANULOCYTES # BLD: 0 10E9/L (ref 0–0.4)
IMM GRANULOCYTES NFR BLD: 0.2 %
LYMPHOCYTES # BLD AUTO: 1.4 10E9/L (ref 0.8–5.3)
LYMPHOCYTES NFR BLD AUTO: 30.1 %
MCH RBC QN AUTO: 28.9 PG (ref 26.5–33)
MCHC RBC AUTO-ENTMCNC: 32 G/DL (ref 31.5–36.5)
MCV RBC AUTO: 90 FL (ref 78–100)
MONOCYTES # BLD AUTO: 0.4 10E9/L (ref 0–1.3)
MONOCYTES NFR BLD AUTO: 8.7 %
NEUTROPHILS # BLD AUTO: 2.6 10E9/L (ref 1.6–8.3)
NEUTROPHILS NFR BLD AUTO: 55.5 %
NRBC # BLD AUTO: 0 10*3/UL
NRBC BLD AUTO-RTO: 0 /100
PLATELET # BLD AUTO: 219 10E9/L (ref 150–450)
POTASSIUM SERPL-SCNC: 4 MMOL/L (ref 3.4–5.3)
PROT SERPL-MCNC: 7 G/DL (ref 6.8–8.8)
RBC # BLD AUTO: 4.26 10E12/L (ref 3.8–5.2)
SODIUM SERPL-SCNC: 139 MMOL/L (ref 133–144)
WBC # BLD AUTO: 4.7 10E9/L (ref 4–11)

## 2021-07-01 PROCEDURE — 86140 C-REACTIVE PROTEIN: CPT | Mod: ZL | Performed by: INTERNAL MEDICINE

## 2021-07-01 PROCEDURE — G0463 HOSPITAL OUTPT CLINIC VISIT: HCPCS

## 2021-07-01 PROCEDURE — 36415 COLL VENOUS BLD VENIPUNCTURE: CPT | Mod: ZL | Performed by: INTERNAL MEDICINE

## 2021-07-01 PROCEDURE — 80053 COMPREHEN METABOLIC PANEL: CPT | Mod: ZL | Performed by: INTERNAL MEDICINE

## 2021-07-01 PROCEDURE — 85652 RBC SED RATE AUTOMATED: CPT | Mod: ZL | Performed by: INTERNAL MEDICINE

## 2021-07-01 PROCEDURE — 85025 COMPLETE CBC W/AUTO DIFF WBC: CPT | Mod: ZL | Performed by: INTERNAL MEDICINE

## 2021-07-03 DIAGNOSIS — M54.42 BILATERAL LOW BACK PAIN WITH BILATERAL SCIATICA, UNSPECIFIED CHRONICITY: ICD-10-CM

## 2021-07-03 DIAGNOSIS — M54.41 BILATERAL LOW BACK PAIN WITH BILATERAL SCIATICA, UNSPECIFIED CHRONICITY: ICD-10-CM

## 2021-07-06 RX ORDER — DICLOFENAC SODIUM 100 MG/1
TABLET, FILM COATED, EXTENDED RELEASE ORAL
Qty: 30 TABLET | Refills: 0 | Status: SHIPPED | OUTPATIENT
Start: 2021-07-06 | End: 2021-07-29

## 2021-07-06 NOTE — TELEPHONE ENCOUNTER
Diclofenac 100mg  Last Written Prescription Date:  6/30/2021  Last Fill Quantity: 30 tablet,   # refills: 0  Last Office Visit: 4/26/2021  Future Office visit:

## 2021-07-13 ENCOUNTER — OFFICE VISIT (OUTPATIENT)
Dept: SLEEP MEDICINE | Facility: HOSPITAL | Age: 71
End: 2021-07-13
Attending: INTERNAL MEDICINE
Payer: COMMERCIAL

## 2021-07-13 DIAGNOSIS — R09.02 HYPOXIA: Primary | ICD-10-CM

## 2021-07-13 NOTE — PROGRESS NOTES
Patient picked up over night oximeter number SL 1. Patient was instructed on use of device. Patient verbalized understanding.     Patient will return device tomorrow by: 9:00AM

## 2021-07-14 ENCOUNTER — DOCUMENTATION ONLY (OUTPATIENT)
Dept: SLEEP MEDICINE | Facility: HOSPITAL | Age: 71
End: 2021-07-14
Attending: INTERNAL MEDICINE
Payer: COMMERCIAL

## 2021-07-14 DIAGNOSIS — R09.02 HYPOXIA: ICD-10-CM

## 2021-07-14 NOTE — PROGRESS NOTES
Patient returned the oximeter the data was downloaded and the report sent to Dr Lomeli and the patient

## 2021-07-15 DIAGNOSIS — J30.2 SEASONAL ALLERGIC RHINITIS, UNSPECIFIED TRIGGER: ICD-10-CM

## 2021-07-15 DIAGNOSIS — G62.9 PERIPHERAL POLYNEUROPATHY: ICD-10-CM

## 2021-07-15 RX ORDER — FEXOFENADINE HYDROCHLORIDE AND PSEUDOEPHEDRINE HYDROCHLORIDE 60; 120 MG/1; MG/1
TABLET, FILM COATED, EXTENDED RELEASE ORAL
Qty: 30 TABLET | Refills: 0 | Status: SHIPPED | OUTPATIENT
Start: 2021-07-15 | End: 2021-08-18

## 2021-07-15 RX ORDER — GABAPENTIN 300 MG/1
CAPSULE ORAL
Qty: 210 CAPSULE | Refills: 0 | Status: SHIPPED | OUTPATIENT
Start: 2021-07-15 | End: 2021-08-18

## 2021-07-15 NOTE — TELEPHONE ENCOUNTER
Allegra      Last Written Prescription Date:  6/21/21  Last Fill Quantity: 30,   # refills: 0  Last Office Visit: 4/26/21  Future Office visit:       Routing refill request to provider for review/approval because:      Neurontin      Last Written Prescription Date:  6/21/21  Last Fill Quantity: 210,   # refills: 0  Last Office Visit: 4/26/21  Future Office visit:       Routing refill request to provider for review/approval because:

## 2021-07-17 NOTE — ED TRIAGE NOTES
Patient presents with concerns that she had a recent sinus infection that has now gone into her chest.  Notes that she is bringing up thick green sputum.  Reports she had a covid test done on Sunday and it was negative.    
Pt states she has been fighting with allergies all summer. Thinks she has a sinus infection. States she has chest pain, and pressure in right ear and is coughing up green chunks. Pt states it stated last week, had a covid test 5 days go, came back negative.  
denies pain/discomfort

## 2021-07-22 ENCOUNTER — TRANSFERRED RECORDS (OUTPATIENT)
Dept: HEALTH INFORMATION MANAGEMENT | Facility: CLINIC | Age: 71
End: 2021-07-22

## 2021-07-29 DIAGNOSIS — M54.41 BILATERAL LOW BACK PAIN WITH BILATERAL SCIATICA, UNSPECIFIED CHRONICITY: ICD-10-CM

## 2021-07-29 DIAGNOSIS — M54.42 BILATERAL LOW BACK PAIN WITH BILATERAL SCIATICA, UNSPECIFIED CHRONICITY: ICD-10-CM

## 2021-07-29 RX ORDER — DICLOFENAC SODIUM 100 MG/1
TABLET, FILM COATED, EXTENDED RELEASE ORAL
Qty: 30 TABLET | Refills: 0 | Status: SHIPPED | OUTPATIENT
Start: 2021-07-29 | End: 2021-08-26

## 2021-07-29 NOTE — TELEPHONE ENCOUNTER
Voltaren       Last Written Prescription Date:  7/6/2021  Last Fill Quantity: 30,   # refills: 0  Last Office Visit: 4/26/2021  Future Office visit:

## 2021-08-16 ENCOUNTER — HOSPITAL ENCOUNTER (OUTPATIENT)
Dept: INTERVENTIONAL RADIOLOGY/VASCULAR | Facility: HOSPITAL | Age: 71
End: 2021-08-16
Attending: PHYSICIAN ASSISTANT | Admitting: RADIOLOGY
Payer: MEDICARE

## 2021-08-16 ENCOUNTER — HOSPITAL ENCOUNTER (OUTPATIENT)
Facility: HOSPITAL | Age: 71
Discharge: HOME | End: 2021-08-16
Attending: RADIOLOGY | Admitting: RADIOLOGY
Payer: MEDICARE

## 2021-08-16 DIAGNOSIS — M47.812 CERVICAL SPONDYLOSIS: ICD-10-CM

## 2021-08-16 DIAGNOSIS — M47.816 LUMBAR SPONDYLOSIS: ICD-10-CM

## 2021-08-16 PROCEDURE — C1751 CATH, INF, PER/CENT/MIDLINE: HCPCS

## 2021-08-16 PROCEDURE — 250N000011 HC RX IP 250 OP 636: Performed by: RADIOLOGY

## 2021-08-16 RX ORDER — IOPAMIDOL 612 MG/ML
15 INJECTION, SOLUTION INTRATHECAL ONCE
Status: COMPLETED | OUTPATIENT
Start: 2021-08-16 | End: 2021-08-16

## 2021-08-16 RX ORDER — METHYLPREDNISOLONE ACETATE 80 MG/ML
80 INJECTION, SUSPENSION INTRA-ARTICULAR; INTRALESIONAL; INTRAMUSCULAR; SOFT TISSUE ONCE
Status: CANCELLED | OUTPATIENT
Start: 2021-08-16 | End: 2021-08-16

## 2021-08-16 RX ORDER — LIDOCAINE HYDROCHLORIDE 10 MG/ML
5 INJECTION, SOLUTION EPIDURAL; INFILTRATION; INTRACAUDAL; PERINEURAL ONCE
Status: CANCELLED | OUTPATIENT
Start: 2021-08-16 | End: 2021-08-16

## 2021-08-16 RX ORDER — METHYLPREDNISOLONE ACETATE 80 MG/ML
80 INJECTION, SUSPENSION INTRA-ARTICULAR; INTRALESIONAL; INTRAMUSCULAR; SOFT TISSUE ONCE
Status: COMPLETED | OUTPATIENT
Start: 2021-08-16 | End: 2021-08-16

## 2021-08-16 RX ORDER — IOPAMIDOL 612 MG/ML
15 INJECTION, SOLUTION INTRATHECAL ONCE
Status: CANCELLED | OUTPATIENT
Start: 2021-08-16 | End: 2021-08-16

## 2021-08-16 RX ADMIN — IOPAMIDOL 6 ML: 612 INJECTION, SOLUTION INTRATHECAL at 11:57

## 2021-08-16 RX ADMIN — METHYLPREDNISOLONE ACETATE 80 MG: 80 INJECTION, SUSPENSION INTRA-ARTICULAR; INTRALESIONAL; INTRAMUSCULAR; SOFT TISSUE at 11:56

## 2021-08-16 NOTE — DISCHARGE INSTRUCTIONS
Home number on file [phone] (home)  Is it ok to leave a message at this number(s)? Yes    Dr. Jones completed your procedure on 8/16/2021.    Current Pain Level (0-10 Scale): 3/10  Post Pain Level (0-10):  4/10    Radiology Discharge instructions for Steroid Injection    Activity Level:     Do not do any heavy activity or exercise for 24 hours.   Do not drive for 4 hours after your injection.  Diet:   Return to your normal diet.  Medications:   If you have stopped taking your Aspirin, Coumadin/Warfarin, Ibuprofen, or any   other blood thinner for this procedure you may resume in the morning unless   your primary care provider has given you other instructions.    Diabetics may see an increase in blood sugar after steroid injections. If you are concerned about your blood sugar, please contact your family doctor.    Site Care:  Remove the bandage and bathe or shower the morning after the procedure.      This is a Pain Management procedure.  You will be contacted in two weeks for follow up.    Call your Primary Care Provider if you have the following (if your primary care provider is not available please seek emergency care):   Nausea with vomiting   Severe headache   Drowsiness or confusion   Redness or drainage at the injection or puncture site   Temperature over 101 degrees F   Other concerns   Worsening back pain   Stiff neck

## 2021-08-17 DIAGNOSIS — J30.2 SEASONAL ALLERGIC RHINITIS, UNSPECIFIED TRIGGER: ICD-10-CM

## 2021-08-17 DIAGNOSIS — J45.991 COUGH VARIANT ASTHMA: ICD-10-CM

## 2021-08-17 DIAGNOSIS — J30.1 SEASONAL ALLERGIC RHINITIS DUE TO POLLEN: ICD-10-CM

## 2021-08-17 DIAGNOSIS — G62.9 PERIPHERAL POLYNEUROPATHY: ICD-10-CM

## 2021-08-18 ENCOUNTER — TRANSFERRED RECORDS (OUTPATIENT)
Dept: HEALTH INFORMATION MANAGEMENT | Facility: CLINIC | Age: 71
End: 2021-08-18

## 2021-08-18 RX ORDER — MONTELUKAST SODIUM 10 MG/1
TABLET ORAL
Qty: 90 TABLET | Refills: 0 | Status: SHIPPED | OUTPATIENT
Start: 2021-08-18 | End: 2021-11-23

## 2021-08-18 RX ORDER — GABAPENTIN 300 MG/1
CAPSULE ORAL
Qty: 210 CAPSULE | Refills: 0 | Status: SHIPPED | OUTPATIENT
Start: 2021-08-18 | End: 2021-09-22

## 2021-08-18 RX ORDER — FEXOFENADINE HYDROCHLORIDE AND PSEUDOEPHEDRINE HYDROCHLORIDE 60; 120 MG/1; MG/1
TABLET, FILM COATED, EXTENDED RELEASE ORAL
Qty: 30 TABLET | Refills: 0 | Status: SHIPPED | OUTPATIENT
Start: 2021-08-18 | End: 2021-09-17

## 2021-08-18 NOTE — TELEPHONE ENCOUNTER
Allegra       Last Written Prescription Date:  7/15/2021  Last Fill Quantity: 30,   # refills: 0    Gabapentin       Last Written Prescription Date:  7/15/2021  Last Fill Quantity: 210,   # refills: 0  Last Office Visit: 4/26/2021  Future Office visit:

## 2021-08-18 NOTE — TELEPHONE ENCOUNTER
singulair      Last Written Prescription Date:  5/27/21  Last Fill Quantity: 90,   # refills: 0  Last Office Visit: 4/26/21  Future Office visit:

## 2021-08-19 ENCOUNTER — APPOINTMENT (OUTPATIENT)
Dept: GENERAL RADIOLOGY | Facility: OTHER | Age: 71
End: 2021-08-19
Attending: INTERNAL MEDICINE
Payer: MEDICARE

## 2021-08-19 ENCOUNTER — MEDICAL CORRESPONDENCE (OUTPATIENT)
Dept: HEALTH INFORMATION MANAGEMENT | Facility: HOSPITAL | Age: 71
End: 2021-08-19

## 2021-08-19 DIAGNOSIS — M06.9 RA (RHEUMATOID ARTHRITIS) (H): ICD-10-CM

## 2021-08-19 DIAGNOSIS — M81.0 OSTEOPOROSIS: Primary | ICD-10-CM

## 2021-08-19 PROCEDURE — 93010 ELECTROCARDIOGRAM REPORT: CPT | Performed by: INTERNAL MEDICINE

## 2021-08-25 DIAGNOSIS — M54.42 BILATERAL LOW BACK PAIN WITH BILATERAL SCIATICA, UNSPECIFIED CHRONICITY: ICD-10-CM

## 2021-08-25 DIAGNOSIS — M54.41 BILATERAL LOW BACK PAIN WITH BILATERAL SCIATICA, UNSPECIFIED CHRONICITY: ICD-10-CM

## 2021-08-26 RX ORDER — DICLOFENAC SODIUM 100 MG/1
TABLET, FILM COATED, EXTENDED RELEASE ORAL
Qty: 30 TABLET | Refills: 0 | Status: SHIPPED | OUTPATIENT
Start: 2021-08-26 | End: 2021-09-29

## 2021-08-26 NOTE — TELEPHONE ENCOUNTER
iam      Last Written Prescription Date:  7/29/21  Last Fill Quantity: 30,   # refills: 0  Last Office Visit: 4/26/21  Future Office visit:

## 2021-08-30 ENCOUNTER — TELEPHONE (OUTPATIENT)
Dept: INTERVENTIONAL RADIOLOGY/VASCULAR | Facility: HOSPITAL | Age: 71
End: 2021-08-30

## 2021-08-30 NOTE — TELEPHONE ENCOUNTER
CONSULT PATIENT  PAIN INJECTION POST CALL    Procedure: Epidural TL L2-3  Radiologist(s): Dr. Paxton Jones  Date of Procedure: 8/16/21    The patient had no questions or concerns.    Relief of pain from this injection    A = 90%  A- = 85%  B = 80%  B- =75%  C = 70%  C- = 65%  D = 60%  D- = 50%  F = less than 50%       Would you say this injection has been beneficial? Yes  If yes, for how long? Currently relieving 50-60% of pain.    Was there one injection that worked better than the other? no    Where is the pain? Mid lower back, down back of right leg stopping at the calf.  Can you describe the pain? Not constant anymore, dull ache  Does the pain radiate anywhere? Yes sometimes.  If yes, where does it radiate and where does the pain stop?down back of right leg, stopping at back of right calf.    Is this new pain? No    Patient would like to pursue another injection.      Luly Snyder

## 2021-08-31 DIAGNOSIS — F51.01 PRIMARY INSOMNIA: ICD-10-CM

## 2021-09-01 ENCOUNTER — HOSPITAL ENCOUNTER (OUTPATIENT)
Dept: BONE DENSITY | Facility: HOSPITAL | Age: 71
Discharge: HOME | End: 2021-09-01
Attending: INTERNAL MEDICINE | Admitting: INTERNAL MEDICINE
Payer: MEDICARE

## 2021-09-01 DIAGNOSIS — M06.9 RHEUMATOID ARTHRITIS (H): ICD-10-CM

## 2021-09-01 DIAGNOSIS — M81.0 OSTEOPOROSIS: ICD-10-CM

## 2021-09-01 PROCEDURE — 77080 DXA BONE DENSITY AXIAL: CPT

## 2021-09-01 RX ORDER — TRAZODONE HYDROCHLORIDE 50 MG/1
TABLET, FILM COATED ORAL
Qty: 270 TABLET | Refills: 0 | Status: SHIPPED | OUTPATIENT
Start: 2021-09-01 | End: 2021-12-01

## 2021-09-12 ENCOUNTER — HEALTH MAINTENANCE LETTER (OUTPATIENT)
Age: 71
End: 2021-09-12

## 2021-09-14 ENCOUNTER — HOSPITAL ENCOUNTER (OUTPATIENT)
Facility: HOSPITAL | Age: 71
Discharge: HOME | End: 2021-09-14
Attending: RADIOLOGY | Admitting: RADIOLOGY
Payer: MEDICARE

## 2021-09-14 ENCOUNTER — HOSPITAL ENCOUNTER (OUTPATIENT)
Dept: INTERVENTIONAL RADIOLOGY/VASCULAR | Facility: HOSPITAL | Age: 71
End: 2021-09-14
Attending: PHYSICIAN ASSISTANT | Admitting: RADIOLOGY
Payer: MEDICARE

## 2021-09-14 DIAGNOSIS — M47.812 CERVICAL SPONDYLOSIS: ICD-10-CM

## 2021-09-14 PROCEDURE — 250N000011 HC RX IP 250 OP 636: Performed by: RADIOLOGY

## 2021-09-14 PROCEDURE — 62321 NJX INTERLAMINAR CRV/THRC: CPT

## 2021-09-14 PROCEDURE — C1751 CATH, INF, PER/CENT/MIDLINE: HCPCS

## 2021-09-14 RX ORDER — IOPAMIDOL 612 MG/ML
15 INJECTION, SOLUTION INTRATHECAL ONCE
Status: COMPLETED | OUTPATIENT
Start: 2021-09-14 | End: 2021-09-14

## 2021-09-14 RX ORDER — METHYLPREDNISOLONE ACETATE 80 MG/ML
80 INJECTION, SUSPENSION INTRA-ARTICULAR; INTRALESIONAL; INTRAMUSCULAR; SOFT TISSUE ONCE
Status: COMPLETED | OUTPATIENT
Start: 2021-09-14 | End: 2021-09-14

## 2021-09-14 RX ORDER — LIDOCAINE HYDROCHLORIDE 10 MG/ML
5 INJECTION, SOLUTION EPIDURAL; INFILTRATION; INTRACAUDAL; PERINEURAL ONCE
Status: DISCONTINUED | OUTPATIENT
Start: 2021-09-14 | End: 2021-09-14 | Stop reason: CLARIF

## 2021-09-14 RX ADMIN — METHYLPREDNISOLONE ACETATE 80 MG: 80 INJECTION, SUSPENSION INTRA-ARTICULAR; INTRALESIONAL; INTRAMUSCULAR; SOFT TISSUE at 14:12

## 2021-09-14 RX ADMIN — IOPAMIDOL 6 ML: 612 INJECTION, SOLUTION INTRATHECAL at 14:12

## 2021-09-14 NOTE — DISCHARGE INSTRUCTIONS
Cell number on file:    Telephone Information:   Mobile [phone]     Is it ok to leave a message at this number(s)? Yes    Dr. Jones completed your procedure on 9/14/2021.    Current Pain Level (0-10 Scale): 6/10  Post Pain Level (0-10):  3/10    Radiology Discharge instructions for Steroid Injection    Activity Level:     Do not do any heavy activity or exercise for 24 hours.   Do not drive for 4 hours after your injection.  Diet:   Return to your normal diet.  Medications:   If you have stopped taking your Aspirin, Coumadin/Warfarin, Ibuprofen, or any   other blood thinner for this procedure you may resume in the morning unless   your primary care provider has given you other instructions.    Diabetics may see an increase in blood sugar after steroid injections. If you are concerned about your blood sugar, please contact your family doctor.    Site Care:  Remove the bandage and bathe or shower the morning after the procedure.      Please allow two weeks to experience improvement in your pain.  If you have any further issues, please contact your provider.    Call your Primary Care Provider if you have the following (if your primary care provider is not available please seek emergency care):   Nausea with vomiting   Severe headache   Drowsiness or confusion   Redness or drainage at the injection or puncture site   Temperature over 101 degrees F   Other concerns   Worsening back pain   Stiff neck

## 2021-09-15 DIAGNOSIS — F41.0 PANIC DISORDER WITHOUT AGORAPHOBIA: ICD-10-CM

## 2021-09-15 DIAGNOSIS — J30.2 SEASONAL ALLERGIC RHINITIS, UNSPECIFIED TRIGGER: ICD-10-CM

## 2021-09-17 RX ORDER — CITALOPRAM HYDROBROMIDE 20 MG/1
TABLET ORAL
Qty: 180 TABLET | Refills: 0 | Status: SHIPPED | OUTPATIENT
Start: 2021-09-17 | End: 2021-12-13

## 2021-09-17 RX ORDER — FEXOFENADINE HYDROCHLORIDE AND PSEUDOEPHEDRINE HYDROCHLORIDE 60; 120 MG/1; MG/1
TABLET, FILM COATED, EXTENDED RELEASE ORAL
Qty: 30 TABLET | Refills: 0 | Status: SHIPPED | OUTPATIENT
Start: 2021-09-17 | End: 2021-10-20

## 2021-09-17 NOTE — TELEPHONE ENCOUNTER
allegra      Last Written Prescription Date:  8/18/21  Last Fill Quantity: 30,   # refills: 0  Last Office Visit: 4/26/21  Future Office visit:       celexa 4/29/21 #180

## 2021-09-21 DIAGNOSIS — G62.9 PERIPHERAL POLYNEUROPATHY: ICD-10-CM

## 2021-09-22 RX ORDER — GABAPENTIN 300 MG/1
CAPSULE ORAL
Qty: 210 CAPSULE | Refills: 0 | Status: SHIPPED | OUTPATIENT
Start: 2021-09-22 | End: 2021-10-23

## 2021-09-22 NOTE — TELEPHONE ENCOUNTER
Gabapentin      Last Written Prescription Date:  8.18.21  Last Fill Quantity: #210,   # refills: 0  Last Office Visit: 4.26.21  Future Office visit:       Routing refill request to provider for review/approval because:  Drug not on the [facility] P or [facility] refill protocol or controlled substance

## 2021-09-28 ENCOUNTER — TELEPHONE (OUTPATIENT)
Dept: INTERVENTIONAL RADIOLOGY/VASCULAR | Facility: HOSPITAL | Age: 71
End: 2021-09-28

## 2021-09-28 DIAGNOSIS — M54.41 BILATERAL LOW BACK PAIN WITH BILATERAL SCIATICA, UNSPECIFIED CHRONICITY: ICD-10-CM

## 2021-09-28 DIAGNOSIS — M54.42 BILATERAL LOW BACK PAIN WITH BILATERAL SCIATICA, UNSPECIFIED CHRONICITY: ICD-10-CM

## 2021-09-28 NOTE — TELEPHONE ENCOUNTER
INJECTION POST CALL    Procedure: Epidural TL C7-T1  Radiologist(s): Dr. Paxton Jones  Date of Procedure:  9/14/21    Relief of pain from this injection    A = 90%  A- = 85%  B = 80%  B- =75%  C = 70%  C- = 65%  D = 60%  D- = 50%  F = less than 50%    Do you feel this injection was beneficial? Yes  If yes, how long did it last? Currently relieving 100% of arm pain and 75% of neck discomfort. Patient states she has been seeing the chiropractor for her neck and that also helps relieve a lot of the pain.    Where is the pain located?  Slight pain in neck area   Can you describe the pain? Dull ache, not bad    Does the pain radiate anywhere? no  If yes, where does the pain stop?n/a    Is this new pain? No      The patient had no questions or concerns.    Instruct patient to follow up with their provider for any further care they may need. (as Dr. Jones will no longer be making recommendations)    Luly Snyder

## 2021-09-29 RX ORDER — DICLOFENAC SODIUM 100 MG/1
TABLET, FILM COATED, EXTENDED RELEASE ORAL
Qty: 30 TABLET | Refills: 0 | Status: SHIPPED | OUTPATIENT
Start: 2021-09-29 | End: 2021-10-27

## 2021-09-29 NOTE — TELEPHONE ENCOUNTER
voltarin      Last Written Prescription Date:  8/26/21  Last Fill Quantity: 30,   # refills: 0  Last Office Visit: 4/26/21  Future Office visit:

## 2021-09-30 ENCOUNTER — HOSPITAL ENCOUNTER (OUTPATIENT)
Facility: HOSPITAL | Age: 71
Discharge: HOME | End: 2021-09-30
Attending: RADIOLOGY | Admitting: RADIOLOGY
Payer: MEDICARE

## 2021-09-30 ENCOUNTER — HOSPITAL ENCOUNTER (OUTPATIENT)
Dept: INTERVENTIONAL RADIOLOGY/VASCULAR | Facility: HOSPITAL | Age: 71
End: 2021-09-30
Attending: PHYSICIAN ASSISTANT | Admitting: RADIOLOGY
Payer: MEDICARE

## 2021-09-30 DIAGNOSIS — M47.816 LUMBAR SPONDYLOSIS: ICD-10-CM

## 2021-09-30 PROCEDURE — 64483 NJX AA&/STRD TFRM EPI L/S 1: CPT

## 2021-09-30 PROCEDURE — 250N000011 HC RX IP 250 OP 636: Performed by: RADIOLOGY

## 2021-09-30 RX ORDER — DEXAMETHASONE SODIUM PHOSPHATE 10 MG/ML
10 INJECTION, SOLUTION INTRAMUSCULAR; INTRAVENOUS ONCE
Status: COMPLETED | OUTPATIENT
Start: 2021-09-30 | End: 2021-09-30

## 2021-09-30 RX ORDER — IOPAMIDOL 612 MG/ML
15 INJECTION, SOLUTION INTRATHECAL ONCE
Status: COMPLETED | OUTPATIENT
Start: 2021-09-30 | End: 2021-09-30

## 2021-09-30 RX ORDER — LIDOCAINE HYDROCHLORIDE 10 MG/ML
5 INJECTION, SOLUTION EPIDURAL; INFILTRATION; INTRACAUDAL; PERINEURAL ONCE
Status: DISCONTINUED | OUTPATIENT
Start: 2021-09-30 | End: 2021-10-01 | Stop reason: HOSPADM

## 2021-09-30 RX ADMIN — IOPAMIDOL 2 ML: 612 INJECTION, SOLUTION INTRATHECAL at 11:53

## 2021-09-30 RX ADMIN — DEXAMETHASONE SODIUM PHOSPHATE 10 MG: 10 INJECTION, SOLUTION INTRAMUSCULAR; INTRAVENOUS at 11:53

## 2021-09-30 NOTE — DISCHARGE INSTRUCTIONS
Cell number on file:    Telephone Information:   Mobile [phone]     Is it ok to leave a message at this number(s)? Yes    Dr. Jones completed your procedure on 9/30/2021.    Current Pain Level (0-10 Scale): 4/10  Post Pain Level (0-10):  0/10    Radiology Discharge instructions for Steroid Injection    Activity Level:     Do not do any heavy activity or exercise for 24 hours.   Do not drive for 4 hours after your injection.  Diet:   Return to your normal diet.  Medications:   If you have stopped taking your Aspirin, Coumadin/Warfarin, Ibuprofen, or any   other blood thinner for this procedure you may resume in the morning unless   your primary care provider has given you other instructions.    Diabetics may see an increase in blood sugar after steroid injections. If you are concerned about your blood sugar, please contact your family doctor.    Site Care:  Remove the bandage and bathe or shower the morning after the procedure.      This is a Pain Management procedure.  You will be contacted in two weeks for follow up.    Call your Primary Care Provider if you have the following (if your primary care provider is not available please seek emergency care):   Nausea with vomiting   Severe headache   Drowsiness or confusion   Redness or drainage at the injection or puncture site   Temperature over 101 degrees F   Other concerns   Worsening back pain   Stiff neck

## 2021-10-04 ENCOUNTER — LAB (OUTPATIENT)
Dept: LAB | Facility: OTHER | Age: 71
End: 2021-10-04
Payer: MEDICARE

## 2021-10-04 DIAGNOSIS — M06.09 RHEUMATOID ARTHRITIS OF MULTIPLE SITES WITHOUT RHEUMATOID FACTOR (H): ICD-10-CM

## 2021-10-04 DIAGNOSIS — Z79.899 DRUG THERAPY: ICD-10-CM

## 2021-10-04 LAB
ALBUMIN SERPL-MCNC: 3 G/DL (ref 3.4–5)
ALP SERPL-CCNC: 72 U/L (ref 40–150)
ALT SERPL W P-5'-P-CCNC: 26 U/L (ref 0–50)
ANION GAP SERPL CALCULATED.3IONS-SCNC: 1 MMOL/L (ref 3–14)
AST SERPL W P-5'-P-CCNC: 15 U/L (ref 0–45)
BASOPHILS # BLD AUTO: 0 10E3/UL (ref 0–0.2)
BASOPHILS NFR BLD AUTO: 1 %
BILIRUB SERPL-MCNC: 0.4 MG/DL (ref 0.2–1.3)
BUN SERPL-MCNC: 22 MG/DL (ref 7–30)
CALCIUM SERPL-MCNC: 9 MG/DL (ref 8.5–10.1)
CHLORIDE BLD-SCNC: 109 MMOL/L (ref 94–109)
CO2 SERPL-SCNC: 31 MMOL/L (ref 20–32)
CREAT SERPL-MCNC: 1 MG/DL (ref 0.52–1.04)
CRP SERPL-MCNC: 3.7 MG/L (ref 0–8)
EOSINOPHIL # BLD AUTO: 0.3 10E3/UL (ref 0–0.7)
EOSINOPHIL NFR BLD AUTO: 5 %
ERYTHROCYTE [DISTWIDTH] IN BLOOD BY AUTOMATED COUNT: 14 % (ref 10–15)
ERYTHROCYTE [SEDIMENTATION RATE] IN BLOOD BY WESTERGREN METHOD: 16 MM/HR (ref 0–30)
GFR SERPL CREATININE-BSD FRML MDRD: 58 ML/MIN/1.73M2
GLUCOSE BLD-MCNC: 92 MG/DL (ref 70–99)
HCT VFR BLD AUTO: 38.9 % (ref 35–47)
HGB BLD-MCNC: 12.7 G/DL (ref 11.7–15.7)
IMM GRANULOCYTES # BLD: 0 10E3/UL
IMM GRANULOCYTES NFR BLD: 0 %
LYMPHOCYTES # BLD AUTO: 1.4 10E3/UL (ref 0.8–5.3)
LYMPHOCYTES NFR BLD AUTO: 23 %
MCH RBC QN AUTO: 29.6 PG (ref 26.5–33)
MCHC RBC AUTO-ENTMCNC: 32.6 G/DL (ref 31.5–36.5)
MCV RBC AUTO: 91 FL (ref 78–100)
MONOCYTES # BLD AUTO: 0.5 10E3/UL (ref 0–1.3)
MONOCYTES NFR BLD AUTO: 8 %
NEUTROPHILS # BLD AUTO: 4 10E3/UL (ref 1.6–8.3)
NEUTROPHILS NFR BLD AUTO: 63 %
NRBC # BLD AUTO: 0 10E3/UL
NRBC BLD AUTO-RTO: 0 /100
PLATELET # BLD AUTO: 239 10E3/UL (ref 150–450)
POTASSIUM BLD-SCNC: 4.2 MMOL/L (ref 3.4–5.3)
PROT SERPL-MCNC: 6.5 G/DL (ref 6.8–8.8)
RBC # BLD AUTO: 4.29 10E6/UL (ref 3.8–5.2)
SODIUM SERPL-SCNC: 141 MMOL/L (ref 133–144)
WBC # BLD AUTO: 6.2 10E3/UL (ref 4–11)

## 2021-10-04 PROCEDURE — 36415 COLL VENOUS BLD VENIPUNCTURE: CPT | Mod: ZL

## 2021-10-04 PROCEDURE — 86140 C-REACTIVE PROTEIN: CPT | Mod: ZL

## 2021-10-04 PROCEDURE — 82040 ASSAY OF SERUM ALBUMIN: CPT | Mod: ZL

## 2021-10-04 PROCEDURE — 85004 AUTOMATED DIFF WBC COUNT: CPT | Mod: ZL

## 2021-10-04 PROCEDURE — 85652 RBC SED RATE AUTOMATED: CPT | Mod: ZL

## 2021-10-08 ENCOUNTER — HOSPITAL ENCOUNTER (OUTPATIENT)
Facility: HOSPITAL | Age: 71
End: 2021-10-08
Attending: RADIOLOGY | Admitting: RADIOLOGY
Payer: MEDICARE

## 2021-10-15 ENCOUNTER — TELEPHONE (OUTPATIENT)
Dept: INTERVENTIONAL RADIOLOGY/VASCULAR | Facility: HOSPITAL | Age: 71
End: 2021-10-15

## 2021-10-15 NOTE — TELEPHONE ENCOUNTER
While following up with patients low back pain, she states she is not having much pain in her neck area now so she wants to reschedule appointment for Monday, October 18th. Patient requested to keep MBB on 11/3 and schedule other MBB on 11/17 in case pain begins to come back. Patient will call before 11/3 to update on pain level.

## 2021-10-15 NOTE — TELEPHONE ENCOUNTER
CONSULT PATIENT  PAIN INJECTION POST CALL    Procedure: Epidural TF right L4-5  Radiologist(s): Dr. Paxton Jones  Date of Procedure: 9/30/21    I responded to the patient's questions/concerns.    Relief of pain from this injection    A = 90%  A- = 85%  B = 80%  B- =75%  C = 70%  C- = 65%  D = 60%  D- = 50%  F = less than 50%       Would you say this injection has been beneficial? Yes  If yes, for how long? Currently relieving about 65% of pain     Was there one injection that worked better than the other? no    Where is the pain? Some pain still across the low back but is seeing the chiropractor too which seems to be also helping, now only on occasion will feel pain down right leg.   Can you describe the pain? Ache, not as severe now  Does the pain radiate anywhere? no  If yes, where does it radiate and where does the pain stop?n/a    Is this new pain? No    Patient would not like to pursue another injection at this time.  The patient will contact IR at [phone] if that changes.          Luly Snyder

## 2021-10-18 DIAGNOSIS — J30.2 SEASONAL ALLERGIC RHINITIS, UNSPECIFIED TRIGGER: ICD-10-CM

## 2021-10-20 RX ORDER — FEXOFENADINE HYDROCHLORIDE AND PSEUDOEPHEDRINE HYDROCHLORIDE 60; 120 MG/1; MG/1
TABLET, FILM COATED, EXTENDED RELEASE ORAL
Qty: 30 TABLET | Refills: 0 | Status: SHIPPED | OUTPATIENT
Start: 2021-10-20 | End: 2021-10-21

## 2021-10-20 NOTE — TELEPHONE ENCOUNTER
allegra      Last Written Prescription Date:  9/17/21  Last Fill Quantity: 30,   # refills: 0  Last Office Visit: 4/26/21

## 2021-10-21 RX ORDER — FEXOFENADINE HCL AND PSEUDOEPHEDRINE HCI 60; 120 MG/1; MG/1
1 TABLET, EXTENDED RELEASE ORAL 2 TIMES DAILY
Qty: 60 TABLET | Refills: 3 | Status: SHIPPED | OUTPATIENT
Start: 2021-10-21 | End: 2021-10-23

## 2021-10-21 NOTE — TELEPHONE ENCOUNTER
Pharmacy did not receive medication. Transmission to pharmacy failed. Medication pended for 60 tablets due to patient BID. Please advise, thank you.    E-Prescribing Status: Transmission to pharmacy failed (10/20/2021  6:59 PM CDT)

## 2021-10-22 DIAGNOSIS — J30.2 SEASONAL ALLERGIC RHINITIS, UNSPECIFIED TRIGGER: ICD-10-CM

## 2021-10-22 DIAGNOSIS — G62.9 PERIPHERAL POLYNEUROPATHY: ICD-10-CM

## 2021-10-22 NOTE — TELEPHONE ENCOUNTER
Lula KIRBY  Last Written Prescription Date: 10/21/21  Last Fill Quantity: 60 # of Refills: 3  Last Office Visit: 4/26/21    Gabapentin  Last Written Prescription Date: 9/22/21  Last Fill Quantity: 210 # of Refills: 0  Last Office Visit: 4/26/21

## 2021-10-23 RX ORDER — GABAPENTIN 300 MG/1
CAPSULE ORAL
Qty: 210 CAPSULE | Refills: 0 | Status: SHIPPED | OUTPATIENT
Start: 2021-10-23 | End: 2021-11-23

## 2021-10-23 RX ORDER — FEXOFENADINE HYDROCHLORIDE AND PSEUDOEPHEDRINE HYDROCHLORIDE 60; 120 MG/1; MG/1
TABLET, FILM COATED, EXTENDED RELEASE ORAL
Qty: 30 TABLET | Refills: 0 | Status: SHIPPED | OUTPATIENT
Start: 2021-10-23 | End: 2021-12-13

## 2021-10-23 RX ORDER — FEXOFENADINE HCL AND PSEUDOEPHEDRINE HCI 60; 120 MG/1; MG/1
1 TABLET, EXTENDED RELEASE ORAL 2 TIMES DAILY
Qty: 60 TABLET | Refills: 3 | Status: SHIPPED | OUTPATIENT
Start: 2021-10-23 | End: 2022-02-03

## 2021-10-25 DIAGNOSIS — M54.41 BILATERAL LOW BACK PAIN WITH BILATERAL SCIATICA, UNSPECIFIED CHRONICITY: ICD-10-CM

## 2021-10-25 DIAGNOSIS — M54.42 BILATERAL LOW BACK PAIN WITH BILATERAL SCIATICA, UNSPECIFIED CHRONICITY: ICD-10-CM

## 2021-10-25 NOTE — TELEPHONE ENCOUNTER
Voltaren      Last Written Prescription Date:  09/29/21  Last Fill Quantity: 30,   # refills: 0  Last Office Visit: 04/26/21  Future Office visit:

## 2021-10-26 ENCOUNTER — ALLIED HEALTH/NURSE VISIT (OUTPATIENT)
Dept: FAMILY MEDICINE | Facility: OTHER | Age: 71
End: 2021-10-26
Attending: PHYSICIAN ASSISTANT
Payer: MEDICARE

## 2021-10-26 DIAGNOSIS — Z23 NEED FOR PROPHYLACTIC VACCINATION AND INOCULATION AGAINST INFLUENZA: Primary | ICD-10-CM

## 2021-10-26 PROCEDURE — 90662 IIV NO PRSV INCREASED AG IM: CPT

## 2021-10-26 PROCEDURE — G0008 ADMIN INFLUENZA VIRUS VAC: HCPCS

## 2021-10-27 RX ORDER — DICLOFENAC SODIUM 100 MG/1
TABLET, FILM COATED, EXTENDED RELEASE ORAL
Qty: 30 TABLET | Refills: 0 | Status: SHIPPED | OUTPATIENT
Start: 2021-10-27 | End: 2021-11-23

## 2021-10-29 DIAGNOSIS — H04.123 BILATERAL DRY EYES: ICD-10-CM

## 2021-10-29 RX ORDER — CYCLOSPORINE 0.5 MG/ML
EMULSION OPHTHALMIC
Qty: 60 EACH | Refills: 0 | Status: SHIPPED | OUTPATIENT
Start: 2021-10-29 | End: 2022-03-31

## 2021-10-29 NOTE — TELEPHONE ENCOUNTER
RESTASIS      Last Written Prescription Date:  4-  Last Fill Quantity: 60,   # refills: 0  Last Office Visit: 4-  Future Office visit:       Routing refill request to provider for review/approval because:    '

## 2021-11-01 ENCOUNTER — TELEPHONE (OUTPATIENT)
Dept: INTERVENTIONAL RADIOLOGY/VASCULAR | Facility: HOSPITAL | Age: 71
End: 2021-11-01

## 2021-11-01 NOTE — TELEPHONE ENCOUNTER
Patient is scheduled for Left cervical medial branch blocks and requested to cancel these upcoming appointments due to pain level being a 4/10 and does not feel her pain is too unbearable to have this done at this time. She does state she is now having pain on the right and the left of her neck but the left is worse. Patient would like to reschedule appointments out to December because her pain does seem like it is starting to worsen so she wants to wait it out till December. She also would like to see if the radiologist would recommend doing the right side as well. Will talk to radiologist then call patient back with dates/times.

## 2021-11-09 ENCOUNTER — IMMUNIZATION (OUTPATIENT)
Dept: FAMILY MEDICINE | Facility: OTHER | Age: 71
End: 2021-11-09
Attending: PHYSICIAN ASSISTANT
Payer: MEDICARE

## 2021-11-09 PROCEDURE — 91300 PR COVID VAC PFIZER DIL RECON 30 MCG/0.3 ML IM: CPT

## 2021-11-19 DIAGNOSIS — M54.41 BILATERAL LOW BACK PAIN WITH BILATERAL SCIATICA, UNSPECIFIED CHRONICITY: ICD-10-CM

## 2021-11-19 DIAGNOSIS — J45.991 COUGH VARIANT ASTHMA: ICD-10-CM

## 2021-11-19 DIAGNOSIS — J30.1 SEASONAL ALLERGIC RHINITIS DUE TO POLLEN: ICD-10-CM

## 2021-11-19 DIAGNOSIS — M54.42 BILATERAL LOW BACK PAIN WITH BILATERAL SCIATICA, UNSPECIFIED CHRONICITY: ICD-10-CM

## 2021-11-19 DIAGNOSIS — G62.9 PERIPHERAL POLYNEUROPATHY: ICD-10-CM

## 2021-11-23 RX ORDER — MONTELUKAST SODIUM 10 MG/1
TABLET ORAL
Qty: 90 TABLET | Refills: 0 | Status: SHIPPED | OUTPATIENT
Start: 2021-11-23 | End: 2022-02-22

## 2021-11-23 RX ORDER — GABAPENTIN 300 MG/1
CAPSULE ORAL
Qty: 210 CAPSULE | Refills: 0 | Status: SHIPPED | OUTPATIENT
Start: 2021-11-23 | End: 2021-12-21

## 2021-11-23 RX ORDER — DICLOFENAC SODIUM 100 MG/1
TABLET, FILM COATED, EXTENDED RELEASE ORAL
Qty: 30 TABLET | Refills: 0 | Status: SHIPPED | OUTPATIENT
Start: 2021-11-23 | End: 2021-12-21

## 2021-11-23 NOTE — TELEPHONE ENCOUNTER
Gabapentin, Singulair, Voltaren      Last Written Prescription Date:  10.25.21, 8.18.21, 10.27.21  Last Fill Quantity: #210, #90, #30,   # refills: 0  Last Office Visit: 4.26.21  Future Office visit:       Routing refill request to provider for review/approval because:  Drug not on the [facility], [facility] or [facility] refill protocol or controlled substance

## 2021-11-29 DIAGNOSIS — F51.01 PRIMARY INSOMNIA: ICD-10-CM

## 2021-12-01 RX ORDER — TRAZODONE HYDROCHLORIDE 50 MG/1
TABLET, FILM COATED ORAL
Qty: 270 TABLET | Refills: 0 | Status: SHIPPED | OUTPATIENT
Start: 2021-12-01 | End: 2022-03-02

## 2021-12-01 NOTE — TELEPHONE ENCOUNTER
Trazodone      Last Written Prescription Date:  9/1/21  Last Fill Quantity: 270,   # refills: 0  Last Office Visit: 4/26/21  Future Office visit:    Next 5 appointments (look out 90 days)    Jan 05, 2022  2:00 PM  (Arrive by 1:45 PM)  Rufina Arias with GIOVANNY Vital  [facility] [place] ([facility] [place] ) [address]  [place] [address]  [phone]           Routing refill request to provider for review/approval because:

## 2021-12-10 DIAGNOSIS — J30.2 SEASONAL ALLERGIC RHINITIS, UNSPECIFIED TRIGGER: ICD-10-CM

## 2021-12-10 DIAGNOSIS — F41.0 PANIC DISORDER WITHOUT AGORAPHOBIA: ICD-10-CM

## 2021-12-11 NOTE — TELEPHONE ENCOUNTER
Lula mao      Last Written Prescription Date:  10-  Last Fill Quantity: 30,   # refills: 0  Last Office Visit: 4-26-21  Future Office visit:    Next 5 appointments (look out 90 days)    Jan 05, 2022  2:00 PM  (Arrive by 1:45 PM)  Rufina Arias with GIOVANNY Vital  [facility] [place] ([facility] [place] ) [address]  [place] [address]  [phone]           Routing refill request to provider for review/approval because:  Drug not on the [facility], [facility] or [facility] refill protocol or controlled substance      CELEXA      Last Written Prescription Date:  9-  Last Fill Quantity: 180,   # refills: 0  Last Office Visit: 4-26-21  Future Office visit:    Next 5 appointments (look out 90 days)    Jan 05, 2022  2:00 PM  (Arrive by 1:45 PM)  Rufina Arias with GIOVANNY Vital  [facility] [place] ([facility] [place] ) [address] [place] [address]  [phone]           Routing refill request to provider for review/approval because:  Drug not on the [facility], [facility] or [facility] refill protocol or controlled substance

## 2021-12-13 RX ORDER — FEXOFENADINE HYDROCHLORIDE AND PSEUDOEPHEDRINE HYDROCHLORIDE 60; 120 MG/1; MG/1
TABLET, FILM COATED, EXTENDED RELEASE ORAL
Qty: 60 TABLET | Refills: 0 | Status: SHIPPED | OUTPATIENT
Start: 2021-12-13 | End: 2022-02-22

## 2021-12-13 RX ORDER — CITALOPRAM HYDROBROMIDE 20 MG/1
TABLET ORAL
Qty: 180 TABLET | Refills: 0 | Status: SHIPPED | OUTPATIENT
Start: 2021-12-13 | End: 2022-03-17

## 2021-12-20 DIAGNOSIS — G62.9 PERIPHERAL POLYNEUROPATHY: ICD-10-CM

## 2021-12-20 DIAGNOSIS — M54.42 BILATERAL LOW BACK PAIN WITH BILATERAL SCIATICA, UNSPECIFIED CHRONICITY: ICD-10-CM

## 2021-12-20 DIAGNOSIS — M54.41 BILATERAL LOW BACK PAIN WITH BILATERAL SCIATICA, UNSPECIFIED CHRONICITY: ICD-10-CM

## 2021-12-21 RX ORDER — GABAPENTIN 300 MG/1
CAPSULE ORAL
Qty: 210 CAPSULE | Refills: 0 | Status: SHIPPED | OUTPATIENT
Start: 2021-12-21 | End: 2022-01-31

## 2021-12-21 RX ORDER — DICLOFENAC SODIUM 100 MG/1
TABLET, FILM COATED, EXTENDED RELEASE ORAL
Qty: 30 TABLET | Refills: 0 | Status: SHIPPED | OUTPATIENT
Start: 2021-12-21 | End: 2022-01-25

## 2021-12-21 NOTE — TELEPHONE ENCOUNTER
Gabapentin      Last Written Prescription Date:  11/23/21  Last Fill Quantity: 210,   # refills: 0  Last Office Visit: 04/26/21  Future Office visit:    Next 5 appointments (look out 90 days)    Jan 05, 2022  2:00 PM  (Arrive by 1:45 PM)  Rufina Arais with GIOVANNY Vital  [facility] [place] ([facility] [place] ) [address]  [place] [address]  [phone]             Voltaren      Last Written Prescription Date:  11/23/21  Last Fill Quantity: 30,   # refills: 0  Last Office Visit: 04/26/21  Future Office visit:    Next 5 appointments (look out 90 days)    Jan 05, 2022  2:00 PM  (Arrive by 1:45 PM)  Rufina Arias with GIOVANNY Vital  [facility] [place] ([facility] [place] ) [address]  [place] [address]  [phone]

## 2022-01-02 RX ORDER — METHOTREXATE 25 MG/ML
INJECTION, SOLUTION INTRA-ARTERIAL; INTRAMUSCULAR; INTRAVENOUS
COMMUNITY
Start: 2021-11-19 | End: 2022-04-13

## 2022-01-02 RX ORDER — DEXLANSOPRAZOLE 60 MG/1
60 CAPSULE, DELAYED RELEASE ORAL DAILY
COMMUNITY
Start: 2021-04-28

## 2022-01-02 RX ORDER — NEEDLES, SAFETY 22GX1 1/2"
NEEDLE, DISPOSABLE MISCELLANEOUS
COMMUNITY
Start: 2021-09-15 | End: 2023-10-04

## 2022-01-02 NOTE — PROGRESS NOTES
"  Assessment & Plan     Dermatitis  Seems to have cleared up significantly in last 3 weeks.  Kenalog did help this and so certainly is some variant of inflammation .    Was quiet itchy and now is better started with a bite in her her garden and has been present since summer on and off.     Elevated glucose  More related to her injections of steroids is my guess. Has had back injections joint injections, has RA and had other injections.  Then her sugar bounces up and down. Will check A1C last steroid was September.   - Hemoglobin A1c; Future    Family history of thyroid disease  Recurrent issues with cold, fatigue, weakness no issues with hair loss. Skin is dry and scaling.  Will check her TSH   - TSH with free T4 reflex; Future    Review of external notes as documented elsewhere in note  Ordering of each unique test  Prescription drug management  5  minutes spent on the date of the encounter doing chart review, review of test results, patient visit, documentation and discussion with family        BMI:   Estimated body mass index is 34.32 kg/m  as calculated from the following:    Height as of this encounter: 1.664 m (5' 5.5\").    Weight as of this encounter: 95 kg (209 lb 6.4 oz).   Weight management plan: Discussed healthy diet and exercise guidelines    See Patient Instructions    No follow-ups on file.    GIOVANNY Valentino  [facility] [place] is a 71 year old who presents for the following health issues     HPI     Rash  Onset/Duration: Last June 2021, rash on ankle has not cleared up  Description  Location: Rt back of ankle  Character: blotchy, red  Itching: mild  Intensity:  mild  Progression of Symptoms:  improving  Accompanying signs and symptoms:   Fever: no  Body aches or joint pain: no  Sore throat symptoms: no  Recent cold symptoms: no  History:           Previous episodes of similar rash: None  New exposures:  None  Recent travel: no  Exposure to similar rash: " "no  Precipitating or alleviating factors: none  Therapies tried and outcome: Triamcinolone Acetonide        Review of Systems   Constitutional, HEENT, cardiovascular, pulmonary, gi and gu systems are negative, except as otherwise noted.      Objective    /68 (BP Location: Right arm, Patient Position: Sitting, Cuff Size: Adult Regular)   Pulse 78   Temp 97.6  F (36.4  C) (Tympanic)   Resp 16   Ht 1.664 m (5' 5.5\")   Wt 95 kg (209 lb 6.4 oz)   SpO2 93%   BMI 34.32 kg/m    Body mass index is 34.32 kg/m .  Physical Exam   GENERAL: healthy, alert and no distress  EYES: Eyes grossly normal to inspection, PERRL and conjunctivae and sclerae normal  NECK: no adenopathy, no asymmetry, masses, or scars and thyroid normal to palpation  RESP: lungs clear to auscultation - no rales, rhonchi or wheezes  CV: regular rate and rhythm, normal S1 S2, no S3 or S4, no murmur, click or rub, no peripheral edema and peripheral pulses strong  ABDOMEN: soft, nontender, no hepatosplenomegaly, no masses and bowel sounds normal  MS: no gross musculoskeletal defects noted, no edema  SKIN: small area on right posterior leg dry and scaling but looks well healed.   NEURO: Normal strength and tone, mentation intact and speech normal  PSYCH: mentation appears normal, affect normal/bright    Lab on 01/05/2022   Component Date Value Ref Range Status     Erythrocyte Sedimentation Rate 01/05/2022 13  0 - 30 mm/hr Final     CRP Inflammation 01/05/2022 4.6  0.0 - 8.0 mg/L Final     Sodium 01/05/2022 140  133 - 144 mmol/L Final     Potassium 01/05/2022 4.4  3.4 - 5.3 mmol/L Final     Chloride 01/05/2022 108  94 - 109 mmol/L Final     Carbon Dioxide (CO2) 01/05/2022 30  20 - 32 mmol/L Final     Anion Gap 01/05/2022 2* 3 - 14 mmol/L Final     Urea Nitrogen 01/05/2022 21  7 - 30 mg/dL Final     Creatinine 01/05/2022 1.10* 0.52 - 1.04 mg/dL Final     Calcium 01/05/2022 9.1  8.5 - 10.1 mg/dL Final     Glucose 01/05/2022 93  70 - 99 mg/dL Final     " Alkaline Phosphatase 01/05/2022 60  40 - 150 U/L Final     AST 01/05/2022 39  0 - 45 U/L Final     ALT 01/05/2022 45  0 - 50 U/L Final     Protein Total 01/05/2022 7.0  6.8 - 8.8 g/dL Final     Albumin 01/05/2022 3.5  3.4 - 5.0 g/dL Final     Bilirubin Total 01/05/2022 0.4  0.2 - 1.3 mg/dL Final     GFR Estimate 01/05/2022 54* >60 mL/min/1.73m2 Final    Effective December 21, 2021 eGFRcr in adults is calculated using the 2021 CKD-EPI creatinine equation which includes age and gender (Svetlana et al., [place], DOI: 10.1056/JMNCla1923472)     WBC Count 01/05/2022 5.7  4.0 - 11.0 10e3/uL Final     RBC Count 01/05/2022 4.48  3.80 - 5.20 10e6/uL Final     Hemoglobin 01/05/2022 12.7  11.7 - 15.7 g/dL Final     Hematocrit 01/05/2022 39.7  35.0 - 47.0 % Final     MCV 01/05/2022 89  78 - 100 fL Final     MCH 01/05/2022 28.3  26.5 - 33.0 pg Final     MCHC 01/05/2022 32.0  31.5 - 36.5 g/dL Final     RDW 01/05/2022 13.9  10.0 - 15.0 % Final     Platelet Count 01/05/2022 222  150 - 450 10e3/uL Final     % Neutrophils 01/05/2022 57  % Final     % Lymphocytes 01/05/2022 31  % Final     % Monocytes 01/05/2022 8  % Final     % Eosinophils 01/05/2022 3  % Final     % Basophils 01/05/2022 1  % Final     % Immature Granulocytes 01/05/2022 0  % Final     NRBCs per 100 WBC 01/05/2022 0  <1 /100 Final     Absolute Neutrophils 01/05/2022 3.3  1.6 - 8.3 10e3/uL Final     Absolute Lymphocytes 01/05/2022 1.8  0.8 - 5.3 10e3/uL Final     Absolute Monocytes 01/05/2022 0.4  0.0 - 1.3 10e3/uL Final     Absolute Eosinophils 01/05/2022 0.2  0.0 - 0.7 10e3/uL Final     Absolute Basophils 01/05/2022 0.0  0.0 - 0.2 10e3/uL Final     Absolute Immature Granulocytes 01/05/2022 0.0  <=0.4 10e3/uL Final     Absolute NRBCs 01/05/2022 0.0  10e3/uL Final

## 2022-01-05 ENCOUNTER — LAB (OUTPATIENT)
Dept: LAB | Facility: OTHER | Age: 72
End: 2022-01-05
Attending: PHYSICIAN ASSISTANT
Payer: COMMERCIAL

## 2022-01-05 ENCOUNTER — OFFICE VISIT (OUTPATIENT)
Dept: FAMILY MEDICINE | Facility: OTHER | Age: 72
End: 2022-01-05
Attending: PHYSICIAN ASSISTANT
Payer: MEDICARE

## 2022-01-05 VITALS
WEIGHT: 209.4 LBS | DIASTOLIC BLOOD PRESSURE: 68 MMHG | SYSTOLIC BLOOD PRESSURE: 118 MMHG | BODY MASS INDEX: 33.65 KG/M2 | TEMPERATURE: 97.6 F | RESPIRATION RATE: 16 BRPM | HEIGHT: 66 IN | HEART RATE: 78 BPM | OXYGEN SATURATION: 93 %

## 2022-01-05 DIAGNOSIS — G63 POLYNEUROPATHY IN OTHER DISEASES CLASSIFIED ELSEWHERE (H): ICD-10-CM

## 2022-01-05 DIAGNOSIS — L30.9 DERMATITIS: Primary | ICD-10-CM

## 2022-01-05 DIAGNOSIS — Z83.49 FAMILY HISTORY OF THYROID DISEASE: ICD-10-CM

## 2022-01-05 DIAGNOSIS — R73.09 ELEVATED GLUCOSE: ICD-10-CM

## 2022-01-05 DIAGNOSIS — M06.09 RHEUMATOID ARTHRITIS OF MULTIPLE SITES WITHOUT RHEUMATOID FACTOR (H): ICD-10-CM

## 2022-01-05 DIAGNOSIS — Z79.899 DRUG THERAPY: ICD-10-CM

## 2022-01-05 LAB
ALBUMIN SERPL-MCNC: 3.5 G/DL (ref 3.4–5)
ALP SERPL-CCNC: 60 U/L (ref 40–150)
ALT SERPL W P-5'-P-CCNC: 45 U/L (ref 0–50)
ANION GAP SERPL CALCULATED.3IONS-SCNC: 2 MMOL/L (ref 3–14)
AST SERPL W P-5'-P-CCNC: 39 U/L (ref 0–45)
BASOPHILS # BLD AUTO: 0 10E3/UL (ref 0–0.2)
BASOPHILS NFR BLD AUTO: 1 %
BILIRUB SERPL-MCNC: 0.4 MG/DL (ref 0.2–1.3)
BUN SERPL-MCNC: 21 MG/DL (ref 7–30)
CALCIUM SERPL-MCNC: 9.1 MG/DL (ref 8.5–10.1)
CHLORIDE BLD-SCNC: 108 MMOL/L (ref 94–109)
CO2 SERPL-SCNC: 30 MMOL/L (ref 20–32)
CREAT SERPL-MCNC: 1.1 MG/DL (ref 0.52–1.04)
CRP SERPL-MCNC: 4.6 MG/L (ref 0–8)
EOSINOPHIL # BLD AUTO: 0.2 10E3/UL (ref 0–0.7)
EOSINOPHIL NFR BLD AUTO: 3 %
ERYTHROCYTE [DISTWIDTH] IN BLOOD BY AUTOMATED COUNT: 13.9 % (ref 10–15)
ERYTHROCYTE [SEDIMENTATION RATE] IN BLOOD BY WESTERGREN METHOD: 13 MM/HR (ref 0–30)
GFR SERPL CREATININE-BSD FRML MDRD: 54 ML/MIN/1.73M2
GLUCOSE BLD-MCNC: 93 MG/DL (ref 70–99)
HCT VFR BLD AUTO: 39.7 % (ref 35–47)
HGB BLD-MCNC: 12.7 G/DL (ref 11.7–15.7)
IMM GRANULOCYTES # BLD: 0 10E3/UL
IMM GRANULOCYTES NFR BLD: 0 %
LYMPHOCYTES # BLD AUTO: 1.8 10E3/UL (ref 0.8–5.3)
LYMPHOCYTES NFR BLD AUTO: 31 %
MCH RBC QN AUTO: 28.3 PG (ref 26.5–33)
MCHC RBC AUTO-ENTMCNC: 32 G/DL (ref 31.5–36.5)
MCV RBC AUTO: 89 FL (ref 78–100)
MONOCYTES # BLD AUTO: 0.4 10E3/UL (ref 0–1.3)
MONOCYTES NFR BLD AUTO: 8 %
NEUTROPHILS # BLD AUTO: 3.3 10E3/UL (ref 1.6–8.3)
NEUTROPHILS NFR BLD AUTO: 57 %
NRBC # BLD AUTO: 0 10E3/UL
NRBC BLD AUTO-RTO: 0 /100
PLATELET # BLD AUTO: 222 10E3/UL (ref 150–450)
POTASSIUM BLD-SCNC: 4.4 MMOL/L (ref 3.4–5.3)
PROT SERPL-MCNC: 7 G/DL (ref 6.8–8.8)
RBC # BLD AUTO: 4.48 10E6/UL (ref 3.8–5.2)
SODIUM SERPL-SCNC: 140 MMOL/L (ref 133–144)
TSH SERPL DL<=0.005 MIU/L-ACNC: 1.62 MU/L (ref 0.4–4)
WBC # BLD AUTO: 5.7 10E3/UL (ref 4–11)

## 2022-01-05 PROCEDURE — G0463 HOSPITAL OUTPT CLINIC VISIT: HCPCS

## 2022-01-05 PROCEDURE — 99214 OFFICE O/P EST MOD 30 MIN: CPT | Performed by: PHYSICIAN ASSISTANT

## 2022-01-05 PROCEDURE — 86140 C-REACTIVE PROTEIN: CPT | Mod: ZL

## 2022-01-05 PROCEDURE — 36415 COLL VENOUS BLD VENIPUNCTURE: CPT | Mod: ZL

## 2022-01-05 PROCEDURE — 82040 ASSAY OF SERUM ALBUMIN: CPT | Mod: ZL

## 2022-01-05 PROCEDURE — 84443 ASSAY THYROID STIM HORMONE: CPT | Mod: ZL

## 2022-01-05 PROCEDURE — 85025 COMPLETE CBC W/AUTO DIFF WBC: CPT | Mod: ZL

## 2022-01-05 PROCEDURE — G0463 HOSPITAL OUTPT CLINIC VISIT: HCPCS | Mod: 25

## 2022-01-05 PROCEDURE — 80053 COMPREHEN METABOLIC PANEL: CPT | Mod: ZL

## 2022-01-05 PROCEDURE — 85652 RBC SED RATE AUTOMATED: CPT | Mod: ZL

## 2022-01-05 ASSESSMENT — ASTHMA QUESTIONNAIRES
QUESTION_4 LAST FOUR WEEKS HOW OFTEN HAVE YOU USED YOUR RESCUE INHALER OR NEBULIZER MEDICATION (SUCH AS ALBUTEROL): NOT AT ALL
QUESTION_2 LAST FOUR WEEKS HOW OFTEN HAVE YOU HAD SHORTNESS OF BREATH: ONCE OR TWICE A WEEK
QUESTION_3 LAST FOUR WEEKS HOW OFTEN DID YOUR ASTHMA SYMPTOMS (WHEEZING, COUGHING, SHORTNESS OF BREATH, CHEST TIGHTNESS OR PAIN) WAKE YOU UP AT NIGHT OR EARLIER THAN USUAL IN THE MORNING: NOT AT ALL
ACT_TOTALSCORE: 24
QUESTION_5 LAST FOUR WEEKS HOW WOULD YOU RATE YOUR ASTHMA CONTROL: COMPLETELY CONTROLLED
QUESTION_1 LAST FOUR WEEKS HOW MUCH OF THE TIME DID YOUR ASTHMA KEEP YOU FROM GETTING AS MUCH DONE AT WORK, SCHOOL OR AT HOME: NONE OF THE TIME

## 2022-01-05 ASSESSMENT — ANXIETY QUESTIONNAIRES
2. NOT BEING ABLE TO STOP OR CONTROL WORRYING: NOT AT ALL
IF YOU CHECKED OFF ANY PROBLEMS ON THIS QUESTIONNAIRE, HOW DIFFICULT HAVE THESE PROBLEMS MADE IT FOR YOU TO DO YOUR WORK, TAKE CARE OF THINGS AT HOME, OR GET ALONG WITH OTHER PEOPLE: NOT DIFFICULT AT ALL
1. FEELING NERVOUS, ANXIOUS, OR ON EDGE: NOT AT ALL
6. BECOMING EASILY ANNOYED OR IRRITABLE: NOT AT ALL
5. BEING SO RESTLESS THAT IT IS HARD TO SIT STILL: NOT AT ALL
4. TROUBLE RELAXING: NOT AT ALL
GAD7 TOTAL SCORE: 0
7. FEELING AFRAID AS IF SOMETHING AWFUL MIGHT HAPPEN: NOT AT ALL
3. WORRYING TOO MUCH ABOUT DIFFERENT THINGS: NOT AT ALL

## 2022-01-05 ASSESSMENT — MIFFLIN-ST. JEOR: SCORE: 1483.64

## 2022-01-05 ASSESSMENT — PATIENT HEALTH QUESTIONNAIRE - PHQ9: SUM OF ALL RESPONSES TO PHQ QUESTIONS 1-9: 4

## 2022-01-05 ASSESSMENT — PAIN SCALES - GENERAL: PAINLEVEL: NO PAIN (0)

## 2022-01-05 NOTE — LETTER
My Asthma Action Plan    Name: Melvi Cleveland   YOB: 1950  Date: 1/5/2022   My doctor: GIOVANNY Valentino   My [facility]        My Rescue Medicine:   Albuterol inhaler (Proair/Ventolin/Proventil HFA)  2-4 puffs EVERY 4 HOURS as needed. Use a spacer if recommended by your provider.   My Asthma Severity:   Intermittent / Exercise Induced  Know your asthma triggers: upper respiratory infections             GREEN ZONE   Good Control    I feel good    No cough or wheeze    Can work, sleep and play without asthma symptoms       Take your asthma control medicine every day.     1. If exercise triggers your asthma, take your rescue medication    15 minutes before exercise or sports, and    During exercise if you have asthma symptoms  2. Spacer to use with inhaler: If you have a spacer, make sure to use it with your inhaler             YELLOW ZONE Getting Worse  I have ANY of these:    I do not feel good    Cough or wheeze    Chest feels tight    Wake up at night   1. Keep taking your Green Zone medications  2. Start taking your rescue medicine:    every 20 minutes for up to 1 hour. Then every 4 hours for 24-48 hours.  3. If you stay in the Yellow Zone for more than 12-24 hours, contact your doctor.  4. If you do not return to the Green Zone in 12-24 hours or you get worse, start taking your oral steroid medicine if prescribed by your provider.           RED ZONE Medical Alert - Get Help  I have ANY of these:    I feel awful    Medicine is not helping    Breathing getting harder    Trouble walking or talking    Nose opens wide to breathe       1. Take your rescue medicine NOW  2. If your provider has prescribed an oral steroid medicine, start taking it NOW  3. Call your doctor NOW  4. If you are still in the Red Zone after 20 minutes and you have not reached your doctor:    Take your rescue medicine again and    Call 911 or go to the emergency room right away    See your  regular doctor within 2 weeks of an Emergency Room or Urgent Care visit for follow-up treatment.          Annual Reminders:  Meet with Asthma Educator,  Flu Shot in the Fall, consider Pneumonia Vaccination for patients with asthma (aged 19 and older).    Pharmacy: [facility] [place] - [address]    Electronically signed by GIOVANNY Valentino   Date: 01/05/22                    Asthma Triggers  How To Control Things That Make Your Asthma Worse    Triggers are things that make your asthma worse.  Look at the list below to help you find your triggers and   what you can do about them. You can help prevent asthma flare-ups by staying away from your triggers.      Trigger                                                          What you can do   Cigarette Smoke  Tobacco smoke can make asthma worse. Do not allow smoking in your home, car or around you.  Be sure no one smokes at a child s day care or school.  If you smoke, ask your health care provider for ways to help you quit.  Ask family members to quit too.  Ask your health care provider for a referral to Quit Plan to help you quit smoking, or call 0-323-580-PLAN.     Colds, Flu, Bronchitis  These are common triggers of asthma. Wash your hands often.  Don t touch your eyes, nose or mouth.  Get a flu shot every year.     Dust Mites  These are tiny bugs that live in cloth or carpet. They are too small to see. Wash sheets and blankets in hot water every week.   Encase pillows and mattress in dust mite proof covers.  Avoid having carpet if you can. If you have carpet, vacuum weekly.   Use a dust mask and HEPA vacuum.   Pollen and Outdoor Mold  Some people are allergic to trees, grass, or weed pollen, or molds. Try to keep your windows closed.  Limit time out doors when pollen count is high.   Ask you health care provider about taking medicine during allergy season.     Animal Dander  Some people are allergic to skin flakes, urine or saliva from  pets with fur or feathers. Keep pets with fur or feathers out of your home.    If you can t keep the pet outdoors, then keep the pet out of your bedroom.  Keep the bedroom door closed.  Keep pets off cloth furniture and away from stuffed toys.     Mice, Rats, and Cockroaches  Some people are allergic to the waste from these pests.   Cover food and garbage.  Clean up spills and food crumbs.  Store grease in the refrigerator.   Keep food out of the bedroom.   Indoor Mold  This can be a trigger if your home has high moisture. Fix leaking faucets, pipes, or other sources of water.   Clean moldy surfaces.  Dehumidify basement if it is damp and smelly.   Smoke, Strong Odors, and Sprays  These can reduce air quality. Stay away from strong odors and sprays, such as perfume, powder, hair spray, paints, smoke incense, paint, cleaning products, candles and new carpet.   Exercise or Sports  Some people with asthma have this trigger. Be active!  Ask your doctor about taking medicine before sports or exercise to prevent symptoms.    Warm up for 5-10 minutes before and after sports or exercise.     Other Triggers of Asthma  Cold air:  Cover your nose and mouth with a scarf.  Sometimes laughing or crying can be a trigger.  Some medicines and food can trigger asthma.

## 2022-01-05 NOTE — NURSING NOTE
"Chief Complaint   Patient presents with     Derm Problem       Initial /68 (BP Location: Right arm, Patient Position: Sitting, Cuff Size: Adult Regular)   Pulse 78   Temp 97.6  F (36.4  C) (Tympanic)   Resp 16   Ht 1.664 m (5' 5.5\")   Wt 95 kg (209 lb 6.4 oz)   SpO2 93%   BMI 34.32 kg/m   Estimated body mass index is 34.32 kg/m  as calculated from the following:    Height as of this encounter: 1.664 m (5' 5.5\").    Weight as of this encounter: 95 kg (209 lb 6.4 oz).  Medication Reconciliation: complete  Obdulia Taylor MA  "

## 2022-01-05 NOTE — PATIENT INSTRUCTIONS
Thank you for choosing [facility].   I have office hours 8:00 am to 4:30 pm on Monday's, Wednesday's, Thursday's and Friday's. My nurse and I are out of the office every Tuesday.    Following your visit, when your labs and diagnostic testing have returned, I will review then and you will be contacted by my nurse.  If you are on My Chart, you can also view results there.    For refills, notify your pharmacy regarding what you need and the pharmacy will generate a refill request. Do not call my nurse as she is unable to process refill request. Please plan ahead and allow 3-5 days for refill requests.    You will generally receive a reminder call the day prior to your appointment.  If you cannot attend your appointment, please cancel your appointment with as much notice as possible.  If there is a pattern of failure to present for your appointments, I cannot provide consistent, meaningful, ongoing care for you. It is very important to me that you come in for your care, so we can best assist you with your health care needs.    IMPORTANT:  Please note that it is my standard of practice to NOT participate in prescribing ongoing requested Narcotic Analgesic therapy, and/or participate in the prescribing of other controlled substances.  My nurse and I am happy to assist you with the process of referral for alternative pain management as needed, and other treatment modalities including but not limited to:  Physical Therapy, Physical Medicine and Rehab, Counseling, Chiropractic Care, Orthopedic Care, and non-narcotic medication management.     In the event that you need to be seen for emergent concerns and I am out of office,  please see one of my colleagues for acute concerns.  You may also present to  or ER.  I appreciate the opportunity to serve you and look forward to supporting your healthcare needs in the future. Please contact me with any questions or concerns that you may  have.    Sincerely,      Tania Martinez RN, PA-C

## 2022-01-06 ASSESSMENT — ANXIETY QUESTIONNAIRES: GAD7 TOTAL SCORE: 0

## 2022-01-06 ASSESSMENT — ASTHMA QUESTIONNAIRES: ACT_TOTALSCORE: 24

## 2022-01-24 DIAGNOSIS — M54.42 BILATERAL LOW BACK PAIN WITH BILATERAL SCIATICA, UNSPECIFIED CHRONICITY: ICD-10-CM

## 2022-01-24 DIAGNOSIS — M54.41 BILATERAL LOW BACK PAIN WITH BILATERAL SCIATICA, UNSPECIFIED CHRONICITY: ICD-10-CM

## 2022-01-25 RX ORDER — DICLOFENAC SODIUM 100 MG/1
TABLET, FILM COATED, EXTENDED RELEASE ORAL
Qty: 30 TABLET | Refills: 0 | Status: SHIPPED | OUTPATIENT
Start: 2022-01-25 | End: 2022-04-13

## 2022-01-25 NOTE — TELEPHONE ENCOUNTER
Voltaren      Last Written Prescription Date:  12.21.21  Last Fill Quantity: #30,   # refills: 0  Last Office Visit: 1.5.22  Future Office visit:    Next 5 appointments (look out 90 days)    Apr 06, 2022 10:45 AM  (Arrive by 10:30 AM)  SHORT with GIOVANNY Vital  [facility] [place] ([facility] [place] ) [address]  [place] [address]  [phone]           Routing refill request to provider for review/approval because:

## 2022-01-28 DIAGNOSIS — G62.9 PERIPHERAL POLYNEUROPATHY: ICD-10-CM

## 2022-01-31 RX ORDER — GABAPENTIN 300 MG/1
CAPSULE ORAL
Qty: 210 CAPSULE | Refills: 0 | Status: SHIPPED | OUTPATIENT
Start: 2022-01-31 | End: 2022-03-16

## 2022-01-31 NOTE — TELEPHONE ENCOUNTER
Gabapentin      Last Written Prescription Date:  12.21.21  Last Fill Quantity: #210,   # refills: 0  Last Office Visit: 1.5.22  Future Office visit:    Next 5 appointments (look out 90 days)    Apr 06, 2022 10:45 AM  (Arrive by 10:30 AM)  SHORT with GIOVANNY Vital  [facility] ([facility] [place] ) [address]  [place] [address]  [phone]           Routing refill request to provider for review/approval because:  Drug not on the [facility], [facility] or [facility] refill protocol or controlled substance

## 2022-02-02 NOTE — PROGRESS NOTES
Assessment & Plan     Eye irritation  Patient does have a history of Sjogren's syndrome  She is already on Restasis to treat her chronic dry eye and also using refresh.   Can try systane for severe dry eye to see if it works better than refresh  PE reassuring with no obvious ulceration of the cornea or redness to suggest possible scleritis or uveitis, and no palpebral edema or redness to suggest blepharitis, eyelashes are clean with no crusting.  Eyes appear grossly normal to inspection.    Advised to continue self-help with lubricating eye drops as often as needed  Will also Rx eye drops for pruritis  Patient to reach out if this is not helping, might benefit from consultation with eye specialist  Patient not taking allegra, this might be helpful  I do not think this is related to her Sebhorreic dermatitis  - ketotifen (ZADITOR) 0.025 % ophthalmic solution; Place 1 drop into both eyes 2 times daily    Preventative health care  Historically elevated glucose levels, likely 2/2 steroids  Will repeat  - Hemoglobin A1c; Future       Dunia Connolly MD  [facility] - AIDE Balderas is a 71 year old who presents for evaluation of itchy and burning eyes.  Patient has a history of Sjogren's.  Often has dry mouth and dry eyes.  For the past week or so, her eyes have been particularly irritated and itchy.   She also endorses sensitivity to light and pain with blinking.  Eye discomfort has been keeping her up at night as well.  Has been using artificial tears and cool compresses with some effect.  Patient is requesting oral steroids to help relieve her symptoms.      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          HPI        Chronic Kidney Disease Follow-up    Would like to discuss DX . Never knew she was diagnosed with CKD.     Do you take any over the counter pain medicine?: Yes  What over the counter medicine  "are you taking for your pain?:  Tylenol      How often do you take this medicine?:  A few times a month    Chronic/Recurring Back Pain Follow Up      Where is your back pain located? (Select all that apply) low back bilateral, middle of back bilateral, upper back bilateral and neck bilateral    How would you describe your back pain?  Sharp constant pain goes into her shoulders     Where does your back pain spread? the right and left shoulder and the right and left side of  neck    Since your last clinic visit for back pain, how has your pain changed? gradually worsening    Does your back pain interfere with your job? Not applicable      Since your last visit, have you tried any new treatment? No      Review of Systems   Constitutional, HEENT, cardiovascular, pulmonary, gi and gu systems are negative, except as otherwise noted.      Objective    /62 (BP Location: Left arm, Patient Position: Chair, Cuff Size: Adult Large)   Pulse 80   Temp 97.5  F (36.4  C) (Tympanic)   Ht 1.664 m (5' 5.5\")   Wt 92.1 kg (203 lb)   SpO2 95%   BMI 33.27 kg/m    Body mass index is 33.27 kg/m .  Physical Exam   GENERAL: healthy, alert and no distress  EYES: Eyes grossly normal to inspection, PERRL and conjunctivae and sclerae normal  HENT: ear canals and TM's normal, nose and mouth without ulcers or lesions  NECK: no adenopathy, no asymmetry, masses, or scars and thyroid normal to palpation  RESP: lungs clear to auscultation - no rales, rhonchi or wheezes  BREAST: normal without masses, tenderness or nipple discharge and no palpable axillary masses or adenopathy  CV: regular rate and rhythm, normal S1 S2, no S3 or S4, no murmur, click or rub, no peripheral edema and peripheral pulses strong  ABDOMEN: soft, nontender, no hepatosplenomegaly, no masses and bowel sounds normal  MS: no gross musculoskeletal defects noted, no edema  SKIN: no suspicious lesions or rashes  NEURO: Normal strength and tone, mentation intact and speech " normal  PSYCH: mentation appears normal, affect normal/bright

## 2022-02-03 ENCOUNTER — OFFICE VISIT (OUTPATIENT)
Dept: FAMILY MEDICINE | Facility: OTHER | Age: 72
End: 2022-02-03
Attending: STUDENT IN AN ORGANIZED HEALTH CARE EDUCATION/TRAINING PROGRAM
Payer: COMMERCIAL

## 2022-02-03 VITALS
HEART RATE: 80 BPM | BODY MASS INDEX: 32.62 KG/M2 | DIASTOLIC BLOOD PRESSURE: 62 MMHG | SYSTOLIC BLOOD PRESSURE: 106 MMHG | TEMPERATURE: 97.5 F | HEIGHT: 66 IN | OXYGEN SATURATION: 95 % | WEIGHT: 203 LBS

## 2022-02-03 DIAGNOSIS — H57.89 EYE IRRITATION: Primary | ICD-10-CM

## 2022-02-03 DIAGNOSIS — Z00.00 PREVENTATIVE HEALTH CARE: ICD-10-CM

## 2022-02-03 PROCEDURE — G0463 HOSPITAL OUTPT CLINIC VISIT: HCPCS

## 2022-02-03 PROCEDURE — 99213 OFFICE O/P EST LOW 20 MIN: CPT | Performed by: STUDENT IN AN ORGANIZED HEALTH CARE EDUCATION/TRAINING PROGRAM

## 2022-02-03 PROCEDURE — G0463 HOSPITAL OUTPT CLINIC VISIT: HCPCS | Mod: 25

## 2022-02-03 ASSESSMENT — PAIN SCALES - GENERAL: PAINLEVEL: SEVERE PAIN (6)

## 2022-02-03 ASSESSMENT — MIFFLIN-ST. JEOR: SCORE: 1454.61

## 2022-02-03 NOTE — LETTER
Opioid / Opioid Plus Controlled Substance Agreement    This is an agreement between you and your provider about the safe and appropriate use of controlled substance/opioids prescribed by your care team. Controlled substances are medicines that can cause physical and mental dependence (abuse).    There are strict laws about having and using these medicines. We here at [facility] are committing to working with you in your efforts to get better. To support you in this work, we ll help you schedule regular office appointments for medicine refills. If we must cancel or change your appointment for any reason, we ll make sure you have enough medicine to last until your next appointment.     As a Provider, I will:    Listen carefully to your concerns and treat you with respect.     Recommend a treatment plan that I believe is in your best interest. This plan may involve therapies other than opioid pain medication.     Talk with you often about the possible benefits, and the risk of harm of any medicine that we prescribe for you.     Provide a plan on how to taper (discontinue or go off) using this medicine if the decision is made to stop its use.    As a Patient, I understand that opioid(s):     Are a controlled substance prescribed by my care team to help me function or work and manage my condition(s).     Are strong medicines and can cause serious side effects such as:    Drowsiness, which can seriously affect my driving ability    A lower breathing rate, enough to cause death    Harm to my thinking ability     Depression     Abuse of and addiction to this medicine    Need to be taken exactly as prescribed. Combining opioids with certain medicines or chemicals (such as illegal drugs, sedatives, sleeping pills, and benzodiazepines) can be dangerous or even fatal. If I stop opioids suddenly, I may have severe withdrawal symptoms.    Do not work for all types of pain nor for all patients. If they re not helpful, I may  be asked to stop them.        The risks, benefits and side effects of these medicine(s) were explained to me. I agree that:  1. I will take part in other treatments as advised by my care team. This may be psychiatry or counseling, physical therapy, behavioral therapy, group treatment or a referral to a specialist.     2. I will keep all my appointments. I understand that this is part of the monitoring of opioids. My care team may require an office visit for EVERY opioid/controlled substance refill. If I miss appointments or don t follow instructions, my care team may stop my medicine.    3. I will take my medicines as prescribed. I will not change the dose or schedule unless my care team tells me to. There will be no refills if I run out early.     4. I may be asked to come to the clinic and complete a urine drug test or complete a pill count at any time. If I don t give a urine sample or participate in a pill count, the care team may stop my medicine.    5. I will only receive prescriptions from this clinic for chronic pain. If I am treated by another provider for acute pain issues, I will tell them that I am taking opioid pain medication for chronic pain and that I have a treatment agreement with this provider. I will inform my [facility] care team within one business day if I am given a prescription for any pain medication by another healthcare provider. My [facility] care team can contact other providers and pharmacists about my use of any medicines.    6. It is up to me to make sure that I don t run out of my medicines on weekends or holidays. If my care team is willing to refill my opioid prescription without a visit, I must request refills only during office hours. Refills may take up to 3 business days to process. I will use one pharmacy to fill all my opioid and other controlled substance prescriptions. I will notify the clinic about any changes to my insurance or medication  availability.    7. I am responsible for my prescriptions. If the medicine/prescription is lost, stolen or destroyed, it will not be replaced. I also agree not to share controlled substance medicines with anyone.    8. I am aware I should not use any illegal or recreational drugs. I agree not to drink alcohol unless my care team says I can.       9. If I enroll in the [place] Medical Cannabis program, I will tell my care team prior to my next refill.     10. I will tell my care team right away if I become pregnant, have a new medical problem treated outside of my regular clinic, or have a change in my medications.    11. I understand that this medicine can affect my thinking, judgment and reaction time. Alcohol and drugs affect the brain and body, which can affect the safety of my driving. Being under the influence of alcohol or drugs can affect my decision-making, behaviors, personal safety, and the safety of others. Driving while impaired (DWI) can occur if a person is driving, operating, or in physical control of a car, motorcycle, boat, snowmobile, ATV, motorbike, off-road vehicle, or any other motor vehicle ([place] Statute 169A.20). I understand the risk if I choose to drive or operate any vehicle or machinery.    I understand that if I do not follow any of the conditions above, my prescriptions or treatment may be stopped or changed.          Opioids  What You Need to Know    What are opioids?   Opioids are pain medicines that must be prescribed by a doctor. They are also known as narcotics.     Examples are:   1. morphine (MS Contin, Amy)  2. oxycodone (Oxycontin)  3. oxycodone and acetaminophen (Percocet)  4. hydrocodone and acetaminophen (Vicodin, Norco)   5. fentanyl patch (Duragesic)   6. hydromorphone (Dilaudid)   7. methadone  8. codeine (Tylenol #3)     What do opioids do well?   Opioids are best for severe short-term pain such as after a surgery or injury. They may work well for cancer pain. They may  help some people with long-lasting (chronic) pain.     What do opioids NOT do well?   Opioids never get rid of pain entirely, and they don t work well for most patients with chronic pain. Opioids don t reduce swelling, one of the causes of pain.                                    Other ways to manage chronic pain and improve function include:       Treat the health problem that may be causing pain    Anti-inflammation medicines, which reduce swelling and tenderness, such as ibuprofen (Advil, Motrin) or naproxen (Aleve)    Acetaminophen (Tylenol)    Antidepressants and anti-seizure medicines, especially for nerve pain    Topical treatments such as patches or creams    Injections or nerve blocks    Chiropractic or osteopathic treatment    Acupuncture, massage, deep breathing, meditation, visual imagery, aromatherapy    Use heat or ice at the pain site    Physical therapy     Exercise    Stop smoking    Take part in therapy       Risks and side effects     Talk to your doctor before you start or decide to keep taking opioids. Possible side effects include:      Lowering your breathing rate enough to cause death    Overdose, including death, especially if taking higher than prescribed doses    Worse depression symptoms; less pleasure in things you usually enjoy    Feeling tired or sluggish    Slower thoughts or cloudy thinking    Being more sensitive to pain over time; pain is harder to control    Trouble sleeping or restless sleep    Changes in hormone levels (for example, less testosterone)    Changes in sex drive or ability to have sex    Constipation    Unsafe driving    Itching and sweating    Dizziness    Nausea, throwing up and dry mouth    What else should I know about opioids?    Opioids may lead to dependence, tolerance, or addiction.      Dependence means that if you stop or reduce the medicine too quickly, you will have withdrawal symptoms. These include loose poop (diarrhea), jitters, flu-like symptoms,  nervousness and tremors. Dependence is not the same as addiction.                       Tolerance means needing higher doses over time to get the same effect. This may increase the chance of serious side effects.      Addiction is when people improperly use a substance that harms their body, their mind or their relations with others. Use of opiates can cause a relapse of addiction if you have a history of drug or alcohol abuse.      People who have used opioids for a long time may have a lower quality of life, worse depression, higher levels of pain and more visits to doctors.    You can overdose on opioids. Take these steps to lower your risk of overdose:    1. Recognize the signs:  Signs of overdose include decrease or loss of consciousness (blackout), slowed breathing, trouble waking up and blue lips. If someone is worried about overdose, they should call 911.    2. Talk to your doctor about Narcan (naloxone).   If you are at risk for overdose, you may be given a prescription for Narcan. This medicine very quickly reverses the effects of opioids.   If you overdose, a friend or family member can give you Narcan while waiting for the ambulance. They need to know the signs of overdose and how to give Narcan.     3. Don't use alcohol or street drugs.   Taking them with opioids can cause death.    4. Do not take any of these medicines unless your doctor says it s OK. Taking these with opioids can cause death:    Benzodiazepines, such as lorazepam (Ativan), alprazolam (Xanax) or diazepam (Valium)    Muscle relaxers, such as cyclobenzaprine (Flexeril)    Sleeping pills like zolpidem (Ambien)     Other opioids      How to keep you and other people safe while taking opioids:    1. Never share your opioids with others.  Opioid medicines are regulated by the Drug Enforcement Agency (LUIS). Selling or sharing medications is a criminal act.    2. Be sure to store opioids in a secure place, locked up if possible. Young children  can easily swallow them and overdose.    3. When you are traveling with your medicines, keep them in the original bottles. If you use a pill box, be sure you also carry a copy of your medicine list from your clinic or pharmacy.    4. Safe disposal of opioids    Most pharmacies have places to get rid of medicine, called disposal kiosks. Medicine disposal options are also available in every [facility]. Search your county and  medication disposal  to find more options. You can find more details at:  [email or web address]-[email or web address]-[email or web address]-medications     I agree that my provider, clinic care team, and pharmacy may work with any city, state or federal law enforcement agency that investigates the misuse, sale, or other diversion of my controlled medicine. I will allow my provider to discuss my care with, or share a copy of, this agreement with any other treating provider, pharmacy or emergency room where I receive care.    I have read this agreement and have asked questions about anything I did not understand.    _______________________________________________________  Patient Signature - Melvi Cleveland _____________________                   Date     _______________________________________________________  Provider Signature - Dunia Connolly MD   _____________________                   Date     _______________________________________________________  Witness Signature (required if provider not present while patient signing)   _____________________                   Date

## 2022-02-03 NOTE — NURSING NOTE
"Chief Complaint   Patient presents with     Establish Care       Initial /62 (BP Location: Left arm, Patient Position: Chair, Cuff Size: Adult Large)   Pulse 80   Temp 97.5  F (36.4  C) (Tympanic)   Ht 1.664 m (5' 5.5\")   Wt 92.1 kg (203 lb)   SpO2 95%   BMI 33.27 kg/m   Estimated body mass index is 33.27 kg/m  as calculated from the following:    Height as of this encounter: 1.664 m (5' 5.5\").    Weight as of this encounter: 92.1 kg (203 lb).  Medication Reconciliation: complete  Ashley Dooley LPN  "

## 2022-02-16 ENCOUNTER — TRANSFERRED RECORDS (OUTPATIENT)
Dept: HEALTH INFORMATION MANAGEMENT | Facility: CLINIC | Age: 72
End: 2022-02-16

## 2022-02-17 DIAGNOSIS — M06.09 RHEUMATOID ARTHRITIS OF MULTIPLE SITES WITHOUT RHEUMATOID FACTOR (H): ICD-10-CM

## 2022-02-17 DIAGNOSIS — M35.00 SJOGREN'S DISEASE (H): Primary | ICD-10-CM

## 2022-02-18 DIAGNOSIS — J30.2 SEASONAL ALLERGIC RHINITIS, UNSPECIFIED TRIGGER: ICD-10-CM

## 2022-02-18 DIAGNOSIS — J45.991 COUGH VARIANT ASTHMA: ICD-10-CM

## 2022-02-18 DIAGNOSIS — J30.1 SEASONAL ALLERGIC RHINITIS DUE TO POLLEN: ICD-10-CM

## 2022-02-19 NOTE — TELEPHONE ENCOUNTER
Allegra      Last Written Prescription Date:  12/13/21  Last Fill Quantity: 60,   # refills: 0  Last Office Visit: 2/3/22  Future Office visit:    Next 5 appointments (look out 90 days)    Apr 06, 2022 10:45 AM  (Arrive by 10:30 AM)  SHORT with GIOVANNY Vital  [facility] [place] ([facility] [place] ) [address]  [place] [address]  [phone]           Routing refill request to provider for review/approval because:      
No indicators present

## 2022-02-19 NOTE — TELEPHONE ENCOUNTER
Singulair      Last Written Prescription Date:  11/23/21  Last Fill Quantity: 90,   # refills: 0  Last Office Visit: 2/3/22  Future Office visit:    Next 5 appointments (look out 90 days)    Apr 06, 2022 10:45 AM  (Arrive by 10:30 AM)  SHORT with GIOVANNY Vital  [facility] [place] ([facility] [place] ) [address]  [place] [address]  [phone]           Routing refill request to provider for review/approval because:

## 2022-02-22 ENCOUNTER — ANCILLARY PROCEDURE (OUTPATIENT)
Dept: MAMMOGRAPHY | Facility: OTHER | Age: 72
End: 2022-02-22
Attending: PHYSICIAN ASSISTANT
Payer: MEDICARE

## 2022-02-22 ENCOUNTER — TELEPHONE (OUTPATIENT)
Dept: MAMMOGRAPHY | Facility: OTHER | Age: 72
End: 2022-02-22
Payer: COMMERCIAL

## 2022-02-22 DIAGNOSIS — Z12.31 VISIT FOR SCREENING MAMMOGRAM: ICD-10-CM

## 2022-02-22 PROCEDURE — 77067 SCR MAMMO BI INCL CAD: CPT | Mod: TC

## 2022-02-22 RX ORDER — FEXOFENADINE HYDROCHLORIDE AND PSEUDOEPHEDRINE HYDROCHLORIDE 60; 120 MG/1; MG/1
TABLET, FILM COATED, EXTENDED RELEASE ORAL
Qty: 60 TABLET | Refills: 0 | Status: SHIPPED | OUTPATIENT
Start: 2022-02-22 | End: 2022-04-19

## 2022-02-22 RX ORDER — MONTELUKAST SODIUM 10 MG/1
TABLET ORAL
Qty: 90 TABLET | Refills: 0 | Status: SHIPPED | OUTPATIENT
Start: 2022-02-22 | End: 2022-05-20

## 2022-02-28 DIAGNOSIS — F51.01 PRIMARY INSOMNIA: ICD-10-CM

## 2022-03-02 RX ORDER — TRAZODONE HYDROCHLORIDE 50 MG/1
TABLET, FILM COATED ORAL
Qty: 270 TABLET | Refills: 1 | Status: SHIPPED | OUTPATIENT
Start: 2022-03-02 | End: 2022-08-31

## 2022-03-16 DIAGNOSIS — G89.29 CHRONIC BILATERAL LOW BACK PAIN WITH BILATERAL SCIATICA: ICD-10-CM

## 2022-03-16 DIAGNOSIS — M54.42 CHRONIC BILATERAL LOW BACK PAIN WITH BILATERAL SCIATICA: ICD-10-CM

## 2022-03-16 DIAGNOSIS — F41.0 PANIC DISORDER WITHOUT AGORAPHOBIA: ICD-10-CM

## 2022-03-16 DIAGNOSIS — G62.9 PERIPHERAL POLYNEUROPATHY: ICD-10-CM

## 2022-03-16 DIAGNOSIS — M54.41 CHRONIC BILATERAL LOW BACK PAIN WITH BILATERAL SCIATICA: ICD-10-CM

## 2022-03-16 RX ORDER — GABAPENTIN 300 MG/1
CAPSULE ORAL
Qty: 210 CAPSULE | Refills: 0 | Status: SHIPPED | OUTPATIENT
Start: 2022-03-16 | End: 2022-04-13

## 2022-03-16 RX ORDER — HYDROCODONE BITARTRATE AND ACETAMINOPHEN 7.5; 325 MG/1; MG/1
TABLET ORAL
Qty: 42 TABLET | Refills: 0 | Status: SHIPPED | OUTPATIENT
Start: 2022-03-16 | End: 2022-10-24

## 2022-03-16 NOTE — TELEPHONE ENCOUNTER
NEURONTIN      Last Written Prescription Date:  1-  Last Fill Quantity: 210,   # refills: 0  Last Office Visit: 2-3-2022  Future Office visit:    Next 5 appointments (look out 90 days)    Apr 13, 2022  9:45 AM  (Arrive by 9:30 AM)  SHORT with GIOVANNY Vital  [facility] ([facility] [place] ) [address]  [place] [address]  [phone]           Routing refill request to provider for review/approval because:  Drug not on the [facility], [facility] or [facility] refill protocol or controlled substance

## 2022-03-16 NOTE — TELEPHONE ENCOUNTER
NORCO      Last Written Prescription Date:  6-  Last Fill Quantity: 42,   # refills: 0  Last Office Visit: 1-5-2022  Future Office visit:    Next 5 appointments (look out 90 days)    Apr 13, 2022  9:45 AM  (Arrive by 9:30 AM)  SHORT with GIOVANNY Vital  [facility] ([facility] [place] ) [address]  [place] [address]  [phone]           Routing refill request to provider for review/approval because:  Drug not on the [facility], [facility] or [facility] refill protocol or controlled substance

## 2022-03-17 RX ORDER — CITALOPRAM HYDROBROMIDE 20 MG/1
TABLET ORAL
Qty: 180 TABLET | Refills: 0 | Status: SHIPPED | OUTPATIENT
Start: 2022-03-17 | End: 2022-06-17

## 2022-03-17 NOTE — TELEPHONE ENCOUNTER
Celexa      Last Written Prescription Date:  12/13/21  Last Fill Quantity: 180,   # refills: 0  Last Office Visit: 01/05/22  Future Office visit:    Next 5 appointments (look out 90 days)    Apr 13, 2022  9:45 AM  (Arrive by 9:30 AM)  SHORT with GIOVANNY Vital  [facility] [place] ([facility] [place] ) [address]  [place] [address]  [phone]

## 2022-03-29 DIAGNOSIS — H04.123 BILATERAL DRY EYES: ICD-10-CM

## 2022-03-30 NOTE — TELEPHONE ENCOUNTER
restasis      Last Written Prescription Date:  10/29/21  Last Fill Quantity: 60,   # refills: 0  Last Office Visit: 2/3/22  Future Office visit:    Next 5 appointments (look out 90 days)    Apr 13, 2022  9:45 AM  (Arrive by 9:30 AM)  SHORT with GIOVANNY Vital  [facility] [place] ([facility] [place] ) [address]  [place] [address]  [phone]

## 2022-03-31 RX ORDER — CYCLOSPORINE 0.5 MG/ML
EMULSION OPHTHALMIC
Qty: 60 EACH | Refills: 0 | Status: SHIPPED | OUTPATIENT
Start: 2022-03-31

## 2022-04-04 ENCOUNTER — LAB (OUTPATIENT)
Dept: LAB | Facility: OTHER | Age: 72
End: 2022-04-04
Payer: MEDICARE

## 2022-04-04 DIAGNOSIS — Z79.899 DRUG THERAPY: ICD-10-CM

## 2022-04-04 DIAGNOSIS — R73.09 ELEVATED GLUCOSE: ICD-10-CM

## 2022-04-04 DIAGNOSIS — M06.09 RHEUMATOID ARTHRITIS OF MULTIPLE SITES WITHOUT RHEUMATOID FACTOR (H): ICD-10-CM

## 2022-04-04 DIAGNOSIS — M35.00 SJOGREN'S DISEASE (H): ICD-10-CM

## 2022-04-04 LAB
ALBUMIN SERPL-MCNC: 3.3 G/DL (ref 3.4–5)
ALP SERPL-CCNC: 61 U/L (ref 40–150)
ALT SERPL W P-5'-P-CCNC: 29 U/L (ref 0–50)
ANION GAP SERPL CALCULATED.3IONS-SCNC: 3 MMOL/L (ref 3–14)
AST SERPL W P-5'-P-CCNC: 22 U/L (ref 0–45)
BASOPHILS # BLD AUTO: 0 10E3/UL (ref 0–0.2)
BASOPHILS NFR BLD AUTO: 1 %
BILIRUB SERPL-MCNC: 0.3 MG/DL (ref 0.2–1.3)
BUN SERPL-MCNC: 15 MG/DL (ref 7–30)
CALCIUM SERPL-MCNC: 8.9 MG/DL (ref 8.5–10.1)
CHLORIDE BLD-SCNC: 109 MMOL/L (ref 94–109)
CO2 SERPL-SCNC: 28 MMOL/L (ref 20–32)
CREAT SERPL-MCNC: 0.92 MG/DL (ref 0.52–1.04)
CRP SERPL-MCNC: 3.6 MG/L (ref 0–8)
EOSINOPHIL # BLD AUTO: 0.1 10E3/UL (ref 0–0.7)
EOSINOPHIL NFR BLD AUTO: 3 %
ERYTHROCYTE [DISTWIDTH] IN BLOOD BY AUTOMATED COUNT: 14.5 % (ref 10–15)
ERYTHROCYTE [SEDIMENTATION RATE] IN BLOOD BY WESTERGREN METHOD: 14 MM/HR (ref 0–30)
EST. AVERAGE GLUCOSE BLD GHB EST-MCNC: 111 MG/DL
GFR SERPL CREATININE-BSD FRML MDRD: 67 ML/MIN/1.73M2
GLUCOSE BLD-MCNC: 97 MG/DL (ref 70–99)
HBA1C MFR BLD: 5.5 % (ref 0–5.6)
HCT VFR BLD AUTO: 39.9 % (ref 35–47)
HGB BLD-MCNC: 12.9 G/DL (ref 11.7–15.7)
IMM GRANULOCYTES # BLD: 0 10E3/UL
IMM GRANULOCYTES NFR BLD: 0 %
LYMPHOCYTES # BLD AUTO: 1.3 10E3/UL (ref 0.8–5.3)
LYMPHOCYTES NFR BLD AUTO: 33 %
MCH RBC QN AUTO: 29.1 PG (ref 26.5–33)
MCHC RBC AUTO-ENTMCNC: 32.3 G/DL (ref 31.5–36.5)
MCV RBC AUTO: 90 FL (ref 78–100)
MONOCYTES # BLD AUTO: 0.4 10E3/UL (ref 0–1.3)
MONOCYTES NFR BLD AUTO: 10 %
NEUTROPHILS # BLD AUTO: 2.1 10E3/UL (ref 1.6–8.3)
NEUTROPHILS NFR BLD AUTO: 53 %
NRBC # BLD AUTO: 0 10E3/UL
NRBC BLD AUTO-RTO: 0 /100
PLATELET # BLD AUTO: 232 10E3/UL (ref 150–450)
POTASSIUM BLD-SCNC: 3.9 MMOL/L (ref 3.4–5.3)
PROT SERPL-MCNC: 6.9 G/DL (ref 6.8–8.8)
RBC # BLD AUTO: 4.43 10E6/UL (ref 3.8–5.2)
SODIUM SERPL-SCNC: 140 MMOL/L (ref 133–144)
WBC # BLD AUTO: 4.1 10E3/UL (ref 4–11)

## 2022-04-04 PROCEDURE — 86140 C-REACTIVE PROTEIN: CPT | Mod: ZL

## 2022-04-04 PROCEDURE — 80053 COMPREHEN METABOLIC PANEL: CPT | Mod: ZL

## 2022-04-04 PROCEDURE — 86235 NUCLEAR ANTIGEN ANTIBODY: CPT | Mod: ZL

## 2022-04-04 PROCEDURE — 83036 HEMOGLOBIN GLYCOSYLATED A1C: CPT | Mod: ZL

## 2022-04-04 PROCEDURE — 36415 COLL VENOUS BLD VENIPUNCTURE: CPT | Mod: ZL

## 2022-04-04 PROCEDURE — 85652 RBC SED RATE AUTOMATED: CPT | Mod: ZL

## 2022-04-04 PROCEDURE — 85025 COMPLETE CBC W/AUTO DIFF WBC: CPT | Mod: ZL

## 2022-04-07 LAB
ENA SS-A AB SER IA-ACNC: <0.5 U/ML
ENA SS-A AB SER IA-ACNC: NEGATIVE
ENA SS-B IGG SER IA-ACNC: <0.6 U/ML
ENA SS-B IGG SER IA-ACNC: NEGATIVE

## 2022-04-11 DIAGNOSIS — F11.90 CHRONIC, CONTINUOUS USE OF OPIOIDS: Primary | ICD-10-CM

## 2022-04-11 DIAGNOSIS — M79.18 MYALGIA, OTHER SITE: ICD-10-CM

## 2022-04-11 DIAGNOSIS — Z79.899 OTHER LONG TERM (CURRENT) DRUG THERAPY: ICD-10-CM

## 2022-04-11 DIAGNOSIS — G62.9 PERIPHERAL POLYNEUROPATHY: ICD-10-CM

## 2022-04-12 NOTE — PROGRESS NOTES
Assessment & Plan     Sicca syndrome (H)  Seeing Rheumatology . No changes in her plan and also helps her joints.   On Methotrexate now and seems to help her.    Crohn's disease of large intestine without complication (H)  Sees Dr. Jiménez and he does her her scopes.  No further intervention is needed. On Methotrexate now for her Rheumatology and seems to have helped her gut quiet a bit.     Stage 3a chronic kidney disease (H)  Went off Voltaren.  Numbers returned to normal. Reviewed reasons. Has topical but not using at this point. tylenol is better for her at this point.      Chronic pain syndrome   She is was given a refill of her pain medications using less than 30 per year.       Review of external notes as documented elsewhere in note  Ordering of each unique test  Prescription drug management  10 minutes spent on the date of the encounter doing chart review, patient visit and documentation        See Patient Instructions    No follow-ups on file.    GIOVANNY Valentino  [facility] - AIDE Balderas is a 72 year old who presents for the following health issues     HPI     Pain History:  When did you first notice your pain? - More than 6 weeks   Have you seen this provider for your pain in the past?   Yes   Where in your body do you have pain? Neck low back  Are you seeing anyone else for your pain? Yes - Chiropractor     PHQ-9 SCORE 4/5/2021 4/26/2021 1/5/2022   PHQ-9 Total Score - - -   PHQ-9 Total Score 0 0 4       HEATH-7 SCORE 4/5/2021 4/26/2021 1/5/2022   Total Score 0 0 0           PEG Score 4/13/2022   PEG Total Score 3       Chronic Pain Follow Up:    Location of pain: Neck low back  Analgesia/pain control:    - Recent changes:  No    - Overall control: Comfortably manageable    - Current treatments: Chiropractor, injections, opiods   Adherence:     - Do you ever take more pain medicine than prescribed? No    - When did you take your last dose of pain medicine? Sunday night  tylenolo   Adverse effects: No   PDMP Review     None        Last CSA Agreement:   CSA -- Patient Level:    Controlled Substance Agreement - Opioid - Scan on 2/7/2022 11:31 AM: opiod/opiod plus controlled substance agreement       Last UDS:         PEG Pain Assessment 4/13/2022   What number best describes your pain on average in the past week? 5   What number best describes how, during the past week, pain has interfered with your enjoyment of life? 0   What number best describes how, during the past week, pain has interfered with your general activity? 4   PEG Total Score 3           Hypertension Follow-up      Do you check your blood pressure regularly outside of the clinic? Yes     Are you following a low salt diet? Yes    Are your blood pressures ever more than 140 on the top number (systolic) OR more   than 90 on the bottom number (diastolic), for example 140/90? Yes    Asthma Follow-Up    Was ACT completed today?  No      Do you have a cough?  YES    Are you experiencing any wheezing in your chest?  No    Do you have any shortness of breath?  No     How often are you using a short-acting (rescue) inhaler or nebulizer, such as Albuterol?  Never    How many days per week do you miss taking your asthma controller medication?  I do not have an asthma controller medication    Please describe any recent triggers for your asthma: None    Have you had any Emergency Room Visits, Urgent Care Visits, or Hospital Admissions since your last office visit?  No            Review of Systems   Constitutional, HEENT, cardiovascular, pulmonary, gi and gu systems are negative, except as otherwise noted.      Objective    There were no vitals taken for this visit.  There is no height or weight on file to calculate BMI.  Physical Exam   GENERAL: healthy, alert and no distress  EYES: Eyes grossly normal to inspection, PERRL and conjunctivae and sclerae normal  HENT: ear canals and TM's normal, nose and mouth without ulcers or  lesions  NECK: no adenopathy, no asymmetry, masses, or scars and thyroid normal to palpation  RESP: lungs clear to auscultation - no rales, rhonchi or wheezes  CV: regular rate and rhythm, normal S1 S2, no S3 or S4, no murmur, click or rub, no peripheral edema and peripheral pulses strong  ABDOMEN: soft, nontender, no hepatosplenomegaly, no masses and bowel sounds normal  MS: has been working on her pain with Dr. Tristan who is a Rheumatologist. Moving well.   SKIN: no suspicious lesions or rashes  NEURO: Normal strength and tone, mentation intact and speech normal    Results for orders placed or performed in visit on 04/13/22   Urine Drugs of Abuse Screen (Tox13)     Status: Normal   Result Value Ref Range    Cannabinoids (88-dlq-3-carboxy-9-THC) Not Detected Not Detected, Indeterminate    Phencyclidine Not Detected Not Detected, Indeterminate    Cocaine (Benzoylecgonine) Not Detected Not Detected, Indeterminate    Methamphetamine (d-Methamphetamine) Not Detected Not Detected, Indeterminate    Opiates (Morphine) Not Detected Not Detected, Indeterminate    Amphetamine (d-Amphetamine) Not Detected Not Detected, Indeterminate    Benzodiazepines (Nordiazepam) Not Detected Not Detected, Indeterminate    Tricyclic Antidepressants (Desipramine) Not Detected Not Detected, Indeterminate    Methadone Not Detected Not Detected, Indeterminate    Barbiturates (Butalbital) Not Detected Not Detected, Indeterminate    Oxycodone Not Detected Not Detected, Indeterminate    Propoxyphene (Norpropoxyphene) Not Detected Not Detected, Indeterminate    Buprenorphine Not Detected Not Detected, Indeterminate   Urine Creatinine for Drug Screen Panel     Status: None   Result Value Ref Range    Creatinine Urine for Drug Screen 129 mg/dL   Drug Confirmation Panel Urine with Creat     Status: None (In process)    Narrative    The following orders were created for panel order Drug Confirmation Panel Urine with Creat.  Procedure                                Abnormality         Status                     ---------                               -----------         ------                     Urine Drug Confirmation ...[[id number]]                      In process                 Urine Creatinine for Rylan...[[id number]]                      Final result                 Please view results for these tests on the individual orders.

## 2022-04-13 ENCOUNTER — OFFICE VISIT (OUTPATIENT)
Dept: FAMILY MEDICINE | Facility: OTHER | Age: 72
End: 2022-04-13
Attending: PHYSICIAN ASSISTANT
Payer: MEDICARE

## 2022-04-13 ENCOUNTER — LAB (OUTPATIENT)
Dept: LAB | Facility: OTHER | Age: 72
End: 2022-04-13
Payer: MEDICARE

## 2022-04-13 VITALS
RESPIRATION RATE: 18 BRPM | TEMPERATURE: 97.4 F | SYSTOLIC BLOOD PRESSURE: 98 MMHG | HEART RATE: 82 BPM | OXYGEN SATURATION: 95 % | HEIGHT: 66 IN | WEIGHT: 200.2 LBS | BODY MASS INDEX: 32.17 KG/M2 | DIASTOLIC BLOOD PRESSURE: 58 MMHG

## 2022-04-13 DIAGNOSIS — M79.18 MYALGIA, OTHER SITE: ICD-10-CM

## 2022-04-13 DIAGNOSIS — Z79.899 OTHER LONG TERM (CURRENT) DRUG THERAPY: ICD-10-CM

## 2022-04-13 DIAGNOSIS — N18.31 STAGE 3A CHRONIC KIDNEY DISEASE (H): ICD-10-CM

## 2022-04-13 DIAGNOSIS — G89.4 CHRONIC PAIN SYNDROME: ICD-10-CM

## 2022-04-13 DIAGNOSIS — M35.00 SICCA SYNDROME (H): Primary | ICD-10-CM

## 2022-04-13 DIAGNOSIS — J45.991 COUGH VARIANT ASTHMA: ICD-10-CM

## 2022-04-13 DIAGNOSIS — F11.90 CHRONIC, CONTINUOUS USE OF OPIOIDS: ICD-10-CM

## 2022-04-13 DIAGNOSIS — K50.10 CROHN'S DISEASE OF LARGE INTESTINE WITHOUT COMPLICATION (H): ICD-10-CM

## 2022-04-13 LAB
AMPHETAMINES UR QL: NOT DETECTED
BARBITURATES UR QL SCN: NOT DETECTED
BENZODIAZ UR QL SCN: NOT DETECTED
BUPRENORPHINE UR QL: NOT DETECTED
CANNABINOIDS UR QL: NOT DETECTED
COCAINE UR QL SCN: NOT DETECTED
CREAT UR-MCNC: 129 MG/DL
D-METHAMPHET UR QL: NOT DETECTED
METHADONE UR QL SCN: NOT DETECTED
OPIATES UR QL SCN: NOT DETECTED
OXYCODONE UR QL SCN: NOT DETECTED
PCP UR QL SCN: NOT DETECTED
PROPOXYPH UR QL: NOT DETECTED
TRICYCLICS UR QL SCN: NOT DETECTED

## 2022-04-13 PROCEDURE — G0463 HOSPITAL OUTPT CLINIC VISIT: HCPCS | Mod: 25

## 2022-04-13 PROCEDURE — 99214 OFFICE O/P EST MOD 30 MIN: CPT | Performed by: PHYSICIAN ASSISTANT

## 2022-04-13 PROCEDURE — G0463 HOSPITAL OUTPT CLINIC VISIT: HCPCS

## 2022-04-13 PROCEDURE — 80307 DRUG TEST PRSMV CHEM ANLYZR: CPT | Mod: ZL

## 2022-04-13 PROCEDURE — 80306 DRUG TEST PRSMV INSTRMNT: CPT | Mod: ZL

## 2022-04-13 RX ORDER — GABAPENTIN 300 MG/1
CAPSULE ORAL
Qty: 210 CAPSULE | Refills: 0 | Status: SHIPPED | OUTPATIENT
Start: 2022-04-13 | End: 2022-05-20

## 2022-04-13 RX ORDER — ZINC GLUCONATE 50 MG
50 TABLET ORAL DAILY
COMMUNITY

## 2022-04-13 ASSESSMENT — PAIN SCALES - GENERAL: PAINLEVEL: MILD PAIN (3)

## 2022-04-13 NOTE — NURSING NOTE
"Chief Complaint   Patient presents with     Musculoskeletal Problem       Initial BP 98/58 (BP Location: Left arm, Patient Position: Sitting, Cuff Size: Adult Large)   Pulse 82   Temp 97.4  F (36.3  C) (Tympanic)   Resp 18   Ht 1.664 m (5' 5.5\")   Wt 90.8 kg (200 lb 3.2 oz)   SpO2 95%   BMI 32.81 kg/m   Estimated body mass index is 32.81 kg/m  as calculated from the following:    Height as of this encounter: 1.664 m (5' 5.5\").    Weight as of this encounter: 90.8 kg (200 lb 3.2 oz).  Medication Reconciliation: complete  Gia Jamison LPN  "

## 2022-04-13 NOTE — TELEPHONE ENCOUNTER
Gabapentin  Last Written Prescription Date: 3/16/22  Last Fill Quantity: 210 # of Refills: 0  Last Office Visit: 2/3/22

## 2022-04-16 ENCOUNTER — OFFICE VISIT (OUTPATIENT)
Dept: FAMILY MEDICINE | Facility: OTHER | Age: 72
End: 2022-04-16
Attending: FAMILY MEDICINE
Payer: MEDICARE

## 2022-04-16 DIAGNOSIS — Z20.822 CLOSE EXPOSURE TO 2019 NOVEL CORONAVIRUS: ICD-10-CM

## 2022-04-16 PROCEDURE — U0005 INFEC AGEN DETEC AMPLI PROBE: HCPCS | Mod: ZL

## 2022-04-17 LAB
GABAPENTIN UR QL CFM: PRESENT
SARS-COV-2 RNA RESP QL NAA+PROBE: NEGATIVE

## 2022-05-18 DIAGNOSIS — J45.991 COUGH VARIANT ASTHMA: ICD-10-CM

## 2022-05-18 DIAGNOSIS — J30.1 SEASONAL ALLERGIC RHINITIS DUE TO POLLEN: ICD-10-CM

## 2022-05-18 DIAGNOSIS — G62.9 PERIPHERAL POLYNEUROPATHY: ICD-10-CM

## 2022-05-20 RX ORDER — GABAPENTIN 300 MG/1
CAPSULE ORAL
Qty: 210 CAPSULE | Refills: 0 | Status: SHIPPED | OUTPATIENT
Start: 2022-05-20 | End: 2022-06-17

## 2022-05-20 RX ORDER — MONTELUKAST SODIUM 10 MG/1
TABLET ORAL
Qty: 90 TABLET | Refills: 0 | Status: SHIPPED | OUTPATIENT
Start: 2022-05-20 | End: 2022-08-22

## 2022-05-20 NOTE — TELEPHONE ENCOUNTER
gabapentin      Last Written Prescription Date:  4/13/22  Last Fill Quantity: 210,   # refills: 0  Last Office Visit: 4/13/22  Future Office visit:       singulair      Last Written Prescription Date:  2/22/22  Last Fill Quantity: 90,   # refills: 0  Last Office Visit: 4/13/22  Future Office visit:

## 2022-06-15 ENCOUNTER — TRANSFERRED RECORDS (OUTPATIENT)
Dept: HEALTH INFORMATION MANAGEMENT | Facility: CLINIC | Age: 72
End: 2022-06-15

## 2022-06-15 DIAGNOSIS — G62.9 PERIPHERAL POLYNEUROPATHY: ICD-10-CM

## 2022-06-15 DIAGNOSIS — F41.0 PANIC DISORDER WITHOUT AGORAPHOBIA: ICD-10-CM

## 2022-06-17 RX ORDER — GABAPENTIN 300 MG/1
CAPSULE ORAL
Qty: 210 CAPSULE | Refills: 5 | Status: SHIPPED | OUTPATIENT
Start: 2022-06-17 | End: 2022-12-23

## 2022-06-17 RX ORDER — CITALOPRAM HYDROBROMIDE 20 MG/1
TABLET ORAL
Qty: 180 TABLET | Refills: 1 | Status: SHIPPED | OUTPATIENT
Start: 2022-06-17 | End: 2023-03-16

## 2022-06-17 NOTE — TELEPHONE ENCOUNTER
Gabapentin       Last Written Prescription Date:  5/20/22  Last Fill Quantity: 210,   # refills: 0  Last Office Visit: 4/13/22  Future Office visit:       Routing refill request to provider for review/approval because:  Drug not on the [facility], [facility] or [facility] refill protocol or controlled substance

## 2022-06-19 ENCOUNTER — HEALTH MAINTENANCE LETTER (OUTPATIENT)
Age: 72
End: 2022-06-19

## 2022-07-12 ENCOUNTER — LAB (OUTPATIENT)
Dept: LAB | Facility: OTHER | Age: 72
End: 2022-07-12
Payer: MEDICARE

## 2022-07-12 DIAGNOSIS — M06.09 RHEUMATOID ARTHRITIS OF MULTIPLE SITES WITHOUT RHEUMATOID FACTOR (H): ICD-10-CM

## 2022-07-12 DIAGNOSIS — Z79.899 DRUG THERAPY: ICD-10-CM

## 2022-07-12 LAB
ALBUMIN SERPL-MCNC: 3.2 G/DL (ref 3.4–5)
ALP SERPL-CCNC: 66 U/L (ref 40–150)
ALT SERPL W P-5'-P-CCNC: 27 U/L (ref 0–50)
ANION GAP SERPL CALCULATED.3IONS-SCNC: 2 MMOL/L (ref 3–14)
AST SERPL W P-5'-P-CCNC: 25 U/L (ref 0–45)
BASOPHILS # BLD AUTO: 0 10E3/UL (ref 0–0.2)
BASOPHILS NFR BLD AUTO: 1 %
BILIRUB SERPL-MCNC: 0.4 MG/DL (ref 0.2–1.3)
BUN SERPL-MCNC: 12 MG/DL (ref 7–30)
CALCIUM SERPL-MCNC: 8.4 MG/DL (ref 8.5–10.1)
CHLORIDE BLD-SCNC: 108 MMOL/L (ref 94–109)
CO2 SERPL-SCNC: 29 MMOL/L (ref 20–32)
CREAT SERPL-MCNC: 1.12 MG/DL (ref 0.52–1.04)
CRP SERPL-MCNC: 5.6 MG/L (ref 0–8)
EOSINOPHIL # BLD AUTO: 0.2 10E3/UL (ref 0–0.7)
EOSINOPHIL NFR BLD AUTO: 4 %
ERYTHROCYTE [DISTWIDTH] IN BLOOD BY AUTOMATED COUNT: 14.2 % (ref 10–15)
ERYTHROCYTE [SEDIMENTATION RATE] IN BLOOD BY WESTERGREN METHOD: 17 MM/HR (ref 0–30)
GFR SERPL CREATININE-BSD FRML MDRD: 53 ML/MIN/1.73M2
GLUCOSE BLD-MCNC: 99 MG/DL (ref 70–99)
HCT VFR BLD AUTO: 39.4 % (ref 35–47)
HGB BLD-MCNC: 12.8 G/DL (ref 11.7–15.7)
IMM GRANULOCYTES # BLD: 0 10E3/UL
IMM GRANULOCYTES NFR BLD: 0 %
LYMPHOCYTES # BLD AUTO: 1.3 10E3/UL (ref 0.8–5.3)
LYMPHOCYTES NFR BLD AUTO: 32 %
MCH RBC QN AUTO: 29.2 PG (ref 26.5–33)
MCHC RBC AUTO-ENTMCNC: 32.5 G/DL (ref 31.5–36.5)
MCV RBC AUTO: 90 FL (ref 78–100)
MONOCYTES # BLD AUTO: 0.4 10E3/UL (ref 0–1.3)
MONOCYTES NFR BLD AUTO: 10 %
NEUTROPHILS # BLD AUTO: 2.2 10E3/UL (ref 1.6–8.3)
NEUTROPHILS NFR BLD AUTO: 53 %
NRBC # BLD AUTO: 0 10E3/UL
NRBC BLD AUTO-RTO: 0 /100
PLATELET # BLD AUTO: 213 10E3/UL (ref 150–450)
POTASSIUM BLD-SCNC: 3.9 MMOL/L (ref 3.4–5.3)
PROT SERPL-MCNC: 7 G/DL (ref 6.8–8.8)
RBC # BLD AUTO: 4.39 10E6/UL (ref 3.8–5.2)
SODIUM SERPL-SCNC: 139 MMOL/L (ref 133–144)
WBC # BLD AUTO: 4.2 10E3/UL (ref 4–11)

## 2022-07-12 PROCEDURE — 85652 RBC SED RATE AUTOMATED: CPT | Mod: ZL

## 2022-07-12 PROCEDURE — 82040 ASSAY OF SERUM ALBUMIN: CPT | Mod: ZL

## 2022-07-12 PROCEDURE — 85025 COMPLETE CBC W/AUTO DIFF WBC: CPT | Mod: ZL

## 2022-07-12 PROCEDURE — 80053 COMPREHEN METABOLIC PANEL: CPT | Mod: ZL

## 2022-07-12 PROCEDURE — 36415 COLL VENOUS BLD VENIPUNCTURE: CPT | Mod: ZL

## 2022-07-12 PROCEDURE — 86140 C-REACTIVE PROTEIN: CPT | Mod: ZL

## 2022-07-13 DIAGNOSIS — M06.09 RHEUMATOID ARTHRITIS OF MULTIPLE SITES WITHOUT RHEUMATOID FACTOR (H): Primary | ICD-10-CM

## 2022-07-13 DIAGNOSIS — Z79.899 ENCOUNTER FOR LONG-TERM (CURRENT) USE OF MEDICATIONS: ICD-10-CM

## 2022-07-18 ENCOUNTER — IMMUNIZATION (OUTPATIENT)
Dept: FAMILY MEDICINE | Facility: OTHER | Age: 72
End: 2022-07-18
Attending: PHYSICIAN ASSISTANT
Payer: MEDICARE

## 2022-07-18 PROCEDURE — 0054A COVID-19,PF,PFIZER (12+ YRS): CPT

## 2022-08-19 DIAGNOSIS — J45.991 COUGH VARIANT ASTHMA: ICD-10-CM

## 2022-08-19 DIAGNOSIS — J30.1 SEASONAL ALLERGIC RHINITIS DUE TO POLLEN: ICD-10-CM

## 2022-08-22 RX ORDER — MONTELUKAST SODIUM 10 MG/1
TABLET ORAL
Qty: 90 TABLET | Refills: 1 | Status: SHIPPED | OUTPATIENT
Start: 2022-08-22 | End: 2023-02-08

## 2022-08-22 NOTE — TELEPHONE ENCOUNTER
Montelukast      Last Written Prescription Date:  5/20/22  Last Fill Quantity: 90,   # refills: 0  Last Office Visit: 4/13/22  Future Office visit:    Next 5 appointments (look out 90 days)    Oct 10, 2022 10:15 AM  (Arrive by 10:00 AM)  SHORT with GIOVANNY Vital  [facility] - [place] ([facility] - [place] ) [address]  [place] [address]  [phone]

## 2022-08-31 DIAGNOSIS — F51.01 PRIMARY INSOMNIA: ICD-10-CM

## 2022-08-31 RX ORDER — TRAZODONE HYDROCHLORIDE 50 MG/1
TABLET, FILM COATED ORAL
Qty: 270 TABLET | Refills: 0 | Status: SHIPPED | OUTPATIENT
Start: 2022-08-31 | End: 2022-11-30

## 2022-08-31 NOTE — TELEPHONE ENCOUNTER
Trazodone       Last Written Prescription Date:  3/2/22  Last Fill Quantity: 270,   # refills: 1  Last Office Visit: 4/13/22  Future Office visit:    Next 5 appointments (look out 90 days)    Oct 10, 2022 10:15 AM  (Arrive by 10:00 AM)  SHORT with GIOVANNY Vital  [facility] - [place] ([facility] - [place] ) [address]  [place] [address]  [phone]

## 2022-09-20 DIAGNOSIS — L21.9 DERMATITIS, SEBORRHEIC: ICD-10-CM

## 2022-09-20 DIAGNOSIS — L21.9 SEBORRHEIC DERMATITIS: ICD-10-CM

## 2022-09-21 NOTE — TELEPHONE ENCOUNTER
Office Visit    6/2/2021  [facility] - JENELLE Barnes MD    Dermatology  Dermatitis, seborrheic +1 more    Dx  Rash; Referred by Tania Martinez PA    Reason for Visit       Progress Notes  JENELLE Carlos MD (Physician)     Dermatology  Visit Date: 06/02/2021     SUBJECTIVE:  First visit for Melvi, who states that she has had great deal of difficulty with her scalp being scaly, and irritated and this has been going on for years.  She has been thought to have eczema.  She has been using ketoconazole cream with some success.  She also had an episode in February of this year where her eyes and scalp began to become painful after injection that apparently received for her rheumatoid arthritis for which she receives  methotrexate injections.     OBJECTIVE:  Healthy lady in no distress.  Her scalp looks quite normal.  No scale.  There was some  Eczema like changes  postauricularly on the one left side.  She does not show any dermatitis except for a lesion behind her left knee that looks likes a nummular eczema lesion.   It would appear that she has episodic seborrheic dermatitis from her description and has several other medical problems as listed.     PLAN:  Triamcinolone 0.1 cream to the left leg lesion for 10 days. For the scalp, instead of the ketoconazole cream use ketoconazole shampoo. Also recommended 0.1 triamcinolone cream that she could use on her leg as well as use on her scalp by diluting it with a bit of water and applying as a lotion leaving it on overnight.     Advised I was unsure exactly what was going on with her scalp as it looked quite good and she had no other rashes other than the one behind her leg.  Return p.r.n.mckinley.     JENELLE Carlos MD

## 2022-09-22 RX ORDER — KETOCONAZOLE 20 MG/ML
SHAMPOO TOPICAL
Qty: 120 ML | Refills: 0 | Status: SHIPPED | OUTPATIENT
Start: 2022-09-22 | End: 2023-05-18

## 2022-10-11 ENCOUNTER — LAB (OUTPATIENT)
Dept: LAB | Facility: OTHER | Age: 72
End: 2022-10-11
Payer: MEDICARE

## 2022-10-11 DIAGNOSIS — M06.09 RHEUMATOID ARTHRITIS OF MULTIPLE SITES WITHOUT RHEUMATOID FACTOR (H): ICD-10-CM

## 2022-10-11 DIAGNOSIS — Z79.899 ENCOUNTER FOR LONG-TERM (CURRENT) USE OF MEDICATIONS: ICD-10-CM

## 2022-10-11 LAB
ALBUMIN SERPL-MCNC: 3.1 G/DL (ref 3.4–5)
ALP SERPL-CCNC: 61 U/L (ref 40–150)
ALT SERPL W P-5'-P-CCNC: 28 U/L (ref 0–50)
ANION GAP SERPL CALCULATED.3IONS-SCNC: 5 MMOL/L (ref 3–14)
AST SERPL W P-5'-P-CCNC: 21 U/L (ref 0–45)
BASOPHILS # BLD AUTO: 0 10E3/UL (ref 0–0.2)
BASOPHILS NFR BLD AUTO: 1 %
BILIRUB SERPL-MCNC: 0.3 MG/DL (ref 0.2–1.3)
BUN SERPL-MCNC: 11 MG/DL (ref 7–30)
CALCIUM SERPL-MCNC: 8.8 MG/DL (ref 8.5–10.1)
CHLORIDE BLD-SCNC: 106 MMOL/L (ref 94–109)
CO2 SERPL-SCNC: 29 MMOL/L (ref 20–32)
CREAT SERPL-MCNC: 1.03 MG/DL (ref 0.52–1.04)
CRP SERPL-MCNC: 5 MG/L (ref 0–8)
EOSINOPHIL # BLD AUTO: 0.1 10E3/UL (ref 0–0.7)
EOSINOPHIL NFR BLD AUTO: 3 %
ERYTHROCYTE [DISTWIDTH] IN BLOOD BY AUTOMATED COUNT: 13.9 % (ref 10–15)
ERYTHROCYTE [SEDIMENTATION RATE] IN BLOOD BY WESTERGREN METHOD: 18 MM/HR (ref 0–30)
GFR SERPL CREATININE-BSD FRML MDRD: 58 ML/MIN/1.73M2
GLUCOSE BLD-MCNC: 98 MG/DL (ref 70–99)
HCT VFR BLD AUTO: 38.5 % (ref 35–47)
HGB BLD-MCNC: 12.6 G/DL (ref 11.7–15.7)
IMM GRANULOCYTES # BLD: 0 10E3/UL
IMM GRANULOCYTES NFR BLD: 0 %
LYMPHOCYTES # BLD AUTO: 1.3 10E3/UL (ref 0.8–5.3)
LYMPHOCYTES NFR BLD AUTO: 31 %
MCH RBC QN AUTO: 28.9 PG (ref 26.5–33)
MCHC RBC AUTO-ENTMCNC: 32.7 G/DL (ref 31.5–36.5)
MCV RBC AUTO: 88 FL (ref 78–100)
MONOCYTES # BLD AUTO: 0.4 10E3/UL (ref 0–1.3)
MONOCYTES NFR BLD AUTO: 9 %
NEUTROPHILS # BLD AUTO: 2.3 10E3/UL (ref 1.6–8.3)
NEUTROPHILS NFR BLD AUTO: 56 %
NRBC # BLD AUTO: 0 10E3/UL
NRBC BLD AUTO-RTO: 0 /100
PLATELET # BLD AUTO: 212 10E3/UL (ref 150–450)
POTASSIUM BLD-SCNC: 3.7 MMOL/L (ref 3.4–5.3)
PROT SERPL-MCNC: 6.9 G/DL (ref 6.8–8.8)
RBC # BLD AUTO: 4.36 10E6/UL (ref 3.8–5.2)
SODIUM SERPL-SCNC: 140 MMOL/L (ref 133–144)
WBC # BLD AUTO: 4.1 10E3/UL (ref 4–11)

## 2022-10-11 PROCEDURE — 86140 C-REACTIVE PROTEIN: CPT | Mod: ZL

## 2022-10-11 PROCEDURE — 85025 COMPLETE CBC W/AUTO DIFF WBC: CPT | Mod: ZL

## 2022-10-11 PROCEDURE — 85652 RBC SED RATE AUTOMATED: CPT | Mod: ZL

## 2022-10-11 PROCEDURE — 36415 COLL VENOUS BLD VENIPUNCTURE: CPT | Mod: ZL

## 2022-10-11 PROCEDURE — 80053 COMPREHEN METABOLIC PANEL: CPT | Mod: ZL

## 2022-10-24 ENCOUNTER — OFFICE VISIT (OUTPATIENT)
Dept: FAMILY MEDICINE | Facility: OTHER | Age: 72
End: 2022-10-24
Attending: PHYSICIAN ASSISTANT
Payer: COMMERCIAL

## 2022-10-24 VITALS
WEIGHT: 194.6 LBS | OXYGEN SATURATION: 94 % | SYSTOLIC BLOOD PRESSURE: 126 MMHG | HEART RATE: 73 BPM | RESPIRATION RATE: 18 BRPM | BODY MASS INDEX: 31.89 KG/M2 | DIASTOLIC BLOOD PRESSURE: 74 MMHG | TEMPERATURE: 98.5 F

## 2022-10-24 DIAGNOSIS — J45.991 COUGH VARIANT ASTHMA: ICD-10-CM

## 2022-10-24 DIAGNOSIS — G89.4 CHRONIC PAIN SYNDROME: ICD-10-CM

## 2022-10-24 DIAGNOSIS — F41.0 PANIC DISORDER WITHOUT AGORAPHOBIA: ICD-10-CM

## 2022-10-24 DIAGNOSIS — N28.9 RENAL INSUFFICIENCY: Primary | ICD-10-CM

## 2022-10-24 PROCEDURE — 99214 OFFICE O/P EST MOD 30 MIN: CPT | Performed by: PHYSICIAN ASSISTANT

## 2022-10-24 PROCEDURE — G0463 HOSPITAL OUTPT CLINIC VISIT: HCPCS

## 2022-10-24 RX ORDER — MULTIVITAMIN,THERAPEUTIC
2 TABLET ORAL DAILY
COMMUNITY

## 2022-10-24 RX ORDER — METHOTREXATE 25 MG/ML
INJECTION, SOLUTION INTRA-ARTERIAL; INTRAMUSCULAR; INTRAVENOUS
COMMUNITY
Start: 2022-09-20 | End: 2022-10-24

## 2022-10-24 RX ORDER — LISINOPRIL 2.5 MG/1
2.5 TABLET ORAL DAILY
Qty: 30 TABLET | Refills: 3 | Status: SHIPPED | OUTPATIENT
Start: 2022-10-24 | End: 2023-02-14

## 2022-10-24 ASSESSMENT — ANXIETY QUESTIONNAIRES
6. BECOMING EASILY ANNOYED OR IRRITABLE: SEVERAL DAYS
4. TROUBLE RELAXING: NOT AT ALL
2. NOT BEING ABLE TO STOP OR CONTROL WORRYING: NOT AT ALL
7. FEELING AFRAID AS IF SOMETHING AWFUL MIGHT HAPPEN: NOT AT ALL
IF YOU CHECKED OFF ANY PROBLEMS ON THIS QUESTIONNAIRE, HOW DIFFICULT HAVE THESE PROBLEMS MADE IT FOR YOU TO DO YOUR WORK, TAKE CARE OF THINGS AT HOME, OR GET ALONG WITH OTHER PEOPLE: NOT DIFFICULT AT ALL
5. BEING SO RESTLESS THAT IT IS HARD TO SIT STILL: NOT AT ALL
GAD7 TOTAL SCORE: 1
3. WORRYING TOO MUCH ABOUT DIFFERENT THINGS: NOT AT ALL
1. FEELING NERVOUS, ANXIOUS, OR ON EDGE: NOT AT ALL
GAD7 TOTAL SCORE: 1

## 2022-10-24 ASSESSMENT — ASTHMA QUESTIONNAIRES
QUESTION_5 LAST FOUR WEEKS HOW WOULD YOU RATE YOUR ASTHMA CONTROL: COMPLETELY CONTROLLED
QUESTION_2 LAST FOUR WEEKS HOW OFTEN HAVE YOU HAD SHORTNESS OF BREATH: NOT AT ALL
ACT_TOTALSCORE: 25
ACT_TOTALSCORE: 25
QUESTION_1 LAST FOUR WEEKS HOW MUCH OF THE TIME DID YOUR ASTHMA KEEP YOU FROM GETTING AS MUCH DONE AT WORK, SCHOOL OR AT HOME: NONE OF THE TIME
QUESTION_3 LAST FOUR WEEKS HOW OFTEN DID YOUR ASTHMA SYMPTOMS (WHEEZING, COUGHING, SHORTNESS OF BREATH, CHEST TIGHTNESS OR PAIN) WAKE YOU UP AT NIGHT OR EARLIER THAN USUAL IN THE MORNING: NOT AT ALL
QUESTION_4 LAST FOUR WEEKS HOW OFTEN HAVE YOU USED YOUR RESCUE INHALER OR NEBULIZER MEDICATION (SUCH AS ALBUTEROL): NOT AT ALL

## 2022-10-24 ASSESSMENT — PATIENT HEALTH QUESTIONNAIRE - PHQ9: SUM OF ALL RESPONSES TO PHQ QUESTIONS 1-9: 2

## 2022-10-24 ASSESSMENT — PAIN SCALES - GENERAL: PAINLEVEL: NO PAIN (0)

## 2022-10-24 NOTE — PROGRESS NOTES
"  Assessment & Plan     Renal insufficiency  She is showing mild changes to her kidneys GFR is mid 50's to 58.  Creatine is 1.1 to 1.4.  We will start Lisinopril.  Recheck every 3 months.    - lisinopril (ZESTRIL) 2.5 MG tablet; Take 1 tablet (2.5 mg) by mouth daily    2. Panic disorder without agoraphobia  She is going to taper on her Celexa , she feels she doesn't really need it.      3. Chronic pain syndrome   Working with her Rheumatologist.  Has had a flare last spring.  No longer on any narcotic.     4. Cough variant asthma  Doing very well, refill is not needed and not having to use her rescue at all.       Review of external notes as documented elsewhere in note  Ordering of each unique test  Prescription drug management  10  minutes spent on the date of the encounter doing chart review, history and exam, documentation and further activities per the note       BMI:   Estimated body mass index is 31.89 kg/m  as calculated from the following:    Height as of 4/13/22: 1.664 m (5' 5.5\").    Weight as of this encounter: 88.3 kg (194 lb 9.6 oz).       See Patient Instructions    No follow-ups on file.    GIOVANNY Valentino  [facility] - AIDE Balderas is a 72 year old, presenting for the following health issues:  Recheck Medication      HPI     Medication Followup of  Current medications    Taking Medication as prescribed: yes    Side Effects:  None    Medication Helping Symptoms:  yes    Hyperlipidemia Follow-Up      Are you regularly taking any medication or supplement to lower your cholesterol?   No    Are you having muscle aches or other side effects that you think could be caused by your cholesterol lowering medication?  No    Anxiety Follow-Up    How are you doing with your anxiety since your last visit? No change    Are you having other symptoms that might be associated with anxiety? No    Have you had a significant life event? No     Are you feeling depressed? No    Do you " have any concerns with your use of alcohol or other drugs? No    Social History     Tobacco Use     Smoking status: Former     Types: Cigarettes     Quit date: 10/6/1997     Years since quittin.0     Smokeless tobacco: Never     Tobacco comments:     no passive exposure   Substance Use Topics     Alcohol use: No     Alcohol/week: 0.0 standard drinks     Comment: former. quit      Drug use: No     HEATH-7 SCORE 2021 2022 10/24/2022   Total Score 0 0 1     PHQ 2021 2022 10/24/2022   PHQ-9 Total Score 0 4 2   Q9: Thoughts of better off dead/self-harm past 2 weeks Not at all Not at all Not at all     Last PHQ-9 10/24/2022   1.  Little interest or pleasure in doing things 0   2.  Feeling down, depressed, or hopeless 0   3.  Trouble falling or staying asleep, or sleeping too much 1   4.  Feeling tired or having little energy 1   5.  Poor appetite or overeating 0   6.  Feeling bad about yourself 0   7.  Trouble concentrating 0   8.  Moving slowly or restless 0   Q9: Thoughts of better off dead/self-harm past 2 weeks 0   PHQ-9 Total Score 2   Difficulty at work, home, or with people Not difficult at all     HEATH-7  10/24/2022   1. Feeling nervous, anxious, or on edge 0   2. Not being able to stop or control worrying 0   3. Worrying too much about different things 0   4. Trouble relaxing 0   5. Being so restless that it is hard to sit still 0   6. Becoming easily annoyed or irritable 1   7. Feeling afraid, as if something awful might happen 0   HEATH-7 Total Score 1   If you checked any problems, how difficult have they made it for you to do your work, take care of things at home, or get along with other people? Not difficult at all       Chronic Kidney Disease Follow-up    Do you take any over the counter pain medicine?: Yes  What over the counter medicine are you taking for your pain?:  Tylenol to help her sleep. Has RA and is on Methotrexate. Watching her liver.       How often do you take this  medicine?:  A few times a week      How many servings of fruits and vegetables do you eat daily?  2-3    On average, how many sweetened beverages do you drink each day (Examples: soda, juice, sweet tea, etc.  Do NOT count diet or artificially sweetened beverages)?   0    How many days per week do you exercise enough to make your heart beat faster? 7    How many minutes a day do you exercise enough to make your heart beat faster? 20 - 29    How many days per week do you miss taking your medication? 0      Review of Systems   Constitutional, HEENT, cardiovascular, pulmonary, gi and gu systems are negative, except as otherwise noted.      Objective    /74   Pulse 73   Temp 98.5  F (36.9  C) (Tympanic)   Resp 18   Wt 88.3 kg (194 lb 9.6 oz)   SpO2 94%   BMI 31.89 kg/m    Body mass index is 31.89 kg/m .  Physical Exam   GENERAL: healthy, alert and no distress  EYES: Eyes grossly normal to inspection, PERRL and conjunctivae and sclerae normal  HENT: ear canals and TM's normal, nose and mouth without ulcers or lesions  NECK: no adenopathy, no asymmetry, masses, or scars and thyroid normal to palpation  RESP: lungs clear to auscultation - no rales, rhonchi or wheezes  CV: regular rate and rhythm, normal S1 S2, no S3 or S4, no murmur, click or rub, no peripheral edema and peripheral pulses strong  ABDOMEN: soft, nontender, no hepatosplenomegaly, no masses and bowel sounds normal  MS: no gross musculoskeletal defects noted, no edema  SKIN: no suspicious lesions or rashes  NEURO: Normal strength and tone, mentation intact and speech normal  BACK: no CVA tenderness, no paralumbar tenderness  PSYCH: mentation appears normal, affect normal/bright  LYMPH: no cervical, supraclavicular, axillary, or inguinal adenopathy    Lab on 10/11/2022   Component Date Value Ref Range Status     Erythrocyte Sedimentation Rate 10/11/2022 18  0 - 30 mm/hr Final     CRP Inflammation 10/11/2022 5.0  0.0 - 8.0 mg/L Final     Sodium  10/11/2022 140  133 - 144 mmol/L Final     Potassium 10/11/2022 3.7  3.4 - 5.3 mmol/L Final     Chloride 10/11/2022 106  94 - 109 mmol/L Final     Carbon Dioxide (CO2) 10/11/2022 29  20 - 32 mmol/L Final     Anion Gap 10/11/2022 5  3 - 14 mmol/L Final     Urea Nitrogen 10/11/2022 11  7 - 30 mg/dL Final     Creatinine 10/11/2022 1.03  0.52 - 1.04 mg/dL Final     Calcium 10/11/2022 8.8  8.5 - 10.1 mg/dL Final     Glucose 10/11/2022 98  70 - 99 mg/dL Final     Alkaline Phosphatase 10/11/2022 61  40 - 150 U/L Final     AST 10/11/2022 21  0 - 45 U/L Final     ALT 10/11/2022 28  0 - 50 U/L Final     Protein Total 10/11/2022 6.9  6.8 - 8.8 g/dL Final     Albumin 10/11/2022 3.1 (L)  3.4 - 5.0 g/dL Final     Bilirubin Total 10/11/2022 0.3  0.2 - 1.3 mg/dL Final     GFR Estimate 10/11/2022 58 (L)  >60 mL/min/1.73m2 Final    Effective December 21, 2021 eGFRcr in adults is calculated using the 2021 CKD-EPI creatinine equation which includes age and gender (Svetlana et al., [place], DOI: 10.1056/WDGThi7190502)     WBC Count 10/11/2022 4.1  4.0 - 11.0 10e3/uL Final     RBC Count 10/11/2022 4.36  3.80 - 5.20 10e6/uL Final     Hemoglobin 10/11/2022 12.6  11.7 - 15.7 g/dL Final     Hematocrit 10/11/2022 38.5  35.0 - 47.0 % Final     MCV 10/11/2022 88  78 - 100 fL Final     MCH 10/11/2022 28.9  26.5 - 33.0 pg Final     MCHC 10/11/2022 32.7  31.5 - 36.5 g/dL Final     RDW 10/11/2022 13.9  10.0 - 15.0 % Final     Platelet Count 10/11/2022 212  150 - 450 10e3/uL Final     % Neutrophils 10/11/2022 56  % Final     % Lymphocytes 10/11/2022 31  % Final     % Monocytes 10/11/2022 9  % Final     % Eosinophils 10/11/2022 3  % Final     % Basophils 10/11/2022 1  % Final     % Immature Granulocytes 10/11/2022 0  % Final     NRBCs per 100 WBC 10/11/2022 0  <1 /100 Final     Absolute Neutrophils 10/11/2022 2.3  1.6 - 8.3 10e3/uL Final     Absolute Lymphocytes 10/11/2022 1.3  0.8 - 5.3 10e3/uL Final     Absolute Monocytes 10/11/2022 0.4  0.0 - 1.3  10e3/uL Final     Absolute Eosinophils 10/11/2022 0.1  0.0 - 0.7 10e3/uL Final     Absolute Basophils 10/11/2022 0.0  0.0 - 0.2 10e3/uL Final     Absolute Immature Granulocytes 10/11/2022 0.0  <=0.4 10e3/uL Final     Absolute NRBCs 10/11/2022 0.0  10e3/uL Final

## 2022-10-24 NOTE — NURSING NOTE
"Chief Complaint   Patient presents with     Recheck Medication       Initial /74   Pulse 73   Temp 98.5  F (36.9  C) (Tympanic)   Resp 18   Wt 88.3 kg (194 lb 9.6 oz)   SpO2 94%   BMI 31.89 kg/m   Estimated body mass index is 31.89 kg/m  as calculated from the following:    Height as of 4/13/22: 1.664 m (5' 5.5\").    Weight as of this encounter: 88.3 kg (194 lb 9.6 oz).  Medication Reconciliation: complete  Arnulfo Vidal  "

## 2022-10-25 DIAGNOSIS — J30.2 SEASONAL ALLERGIC RHINITIS, UNSPECIFIED TRIGGER: ICD-10-CM

## 2022-10-27 RX ORDER — FEXOFENADINE HYDROCHLORIDE AND PSEUDOEPHEDRINE HYDROCHLORIDE 60; 120 MG/1; MG/1
TABLET, FILM COATED, EXTENDED RELEASE ORAL
Qty: 60 TABLET | Refills: 0 | Status: SHIPPED | OUTPATIENT
Start: 2022-10-27 | End: 2022-12-23

## 2022-10-27 NOTE — TELEPHONE ENCOUNTER
Lula KIRBY      Last Written Prescription Date:  9.9.22  Last Fill Quantity: #60,   # refills: 0  Last Office Visit: 10.24.22  Future Office visit:       Routing refill request to provider for review/approval because:  Drug not on the [facility], [facility] or [facility] refill protocol or controlled substance

## 2022-11-21 DIAGNOSIS — H57.89 EYE IRRITATION: ICD-10-CM

## 2022-11-29 DIAGNOSIS — F51.01 PRIMARY INSOMNIA: ICD-10-CM

## 2022-11-30 RX ORDER — TRAZODONE HYDROCHLORIDE 50 MG/1
TABLET, FILM COATED ORAL
Qty: 270 TABLET | Refills: 0 | Status: SHIPPED | OUTPATIENT
Start: 2022-11-30 | End: 2023-03-01

## 2022-11-30 NOTE — TELEPHONE ENCOUNTER
Trazodone       Last Written Prescription Date:  08/31/2022  Last Fill Quantity: 270,   # refills: 0  Last Office Visit: 10/24/2022  Future Office visit:       Routing refill request to provider for review/approval because:

## 2022-12-19 ENCOUNTER — LAB (OUTPATIENT)
Dept: LAB | Facility: OTHER | Age: 72
End: 2022-12-19
Payer: MEDICARE

## 2022-12-19 DIAGNOSIS — Z79.899 ENCOUNTER FOR LONG-TERM (CURRENT) USE OF MEDICATIONS: ICD-10-CM

## 2022-12-19 DIAGNOSIS — M06.09 RHEUMATOID ARTHRITIS OF MULTIPLE SITES WITHOUT RHEUMATOID FACTOR (H): ICD-10-CM

## 2022-12-19 DIAGNOSIS — Z00.00 PREVENTATIVE HEALTH CARE: ICD-10-CM

## 2022-12-19 LAB
ALBUMIN SERPL BCG-MCNC: 3.9 G/DL (ref 3.5–5.2)
ALP SERPL-CCNC: 64 U/L (ref 35–104)
ALT SERPL W P-5'-P-CCNC: 23 U/L (ref 10–35)
ANION GAP SERPL CALCULATED.3IONS-SCNC: 7 MMOL/L (ref 7–15)
AST SERPL W P-5'-P-CCNC: 27 U/L (ref 10–35)
BASOPHILS # BLD AUTO: 0 10E3/UL (ref 0–0.2)
BASOPHILS NFR BLD AUTO: 1 %
BILIRUB SERPL-MCNC: 0.3 MG/DL
BUN SERPL-MCNC: 11.6 MG/DL (ref 8–23)
CALCIUM SERPL-MCNC: 9.3 MG/DL (ref 8.8–10.2)
CHLORIDE SERPL-SCNC: 105 MMOL/L (ref 98–107)
CREAT SERPL-MCNC: 1.06 MG/DL (ref 0.51–0.95)
CRP SERPL-MCNC: <3 MG/L
DEPRECATED HCO3 PLAS-SCNC: 29 MMOL/L (ref 22–29)
EOSINOPHIL # BLD AUTO: 0.2 10E3/UL (ref 0–0.7)
EOSINOPHIL NFR BLD AUTO: 4 %
ERYTHROCYTE [DISTWIDTH] IN BLOOD BY AUTOMATED COUNT: 14 % (ref 10–15)
ERYTHROCYTE [SEDIMENTATION RATE] IN BLOOD BY WESTERGREN METHOD: 16 MM/HR (ref 0–30)
EST. AVERAGE GLUCOSE BLD GHB EST-MCNC: 117 MG/DL
GFR SERPL CREATININE-BSD FRML MDRD: 56 ML/MIN/1.73M2
GLUCOSE SERPL-MCNC: 96 MG/DL (ref 70–99)
HBA1C MFR BLD: 5.7 %
HCT VFR BLD AUTO: 39.4 % (ref 35–47)
HGB BLD-MCNC: 13.2 G/DL (ref 11.7–15.7)
IMM GRANULOCYTES # BLD: 0 10E3/UL
IMM GRANULOCYTES NFR BLD: 0 %
LYMPHOCYTES # BLD AUTO: 1.6 10E3/UL (ref 0.8–5.3)
LYMPHOCYTES NFR BLD AUTO: 39 %
MCH RBC QN AUTO: 29.4 PG (ref 26.5–33)
MCHC RBC AUTO-ENTMCNC: 33.5 G/DL (ref 31.5–36.5)
MCV RBC AUTO: 88 FL (ref 78–100)
MONOCYTES # BLD AUTO: 0.4 10E3/UL (ref 0–1.3)
MONOCYTES NFR BLD AUTO: 9 %
NEUTROPHILS # BLD AUTO: 1.9 10E3/UL (ref 1.6–8.3)
NEUTROPHILS NFR BLD AUTO: 47 %
NRBC # BLD AUTO: 0 10E3/UL
NRBC BLD AUTO-RTO: 0 /100
PLATELET # BLD AUTO: 213 10E3/UL (ref 150–450)
POTASSIUM SERPL-SCNC: 4.2 MMOL/L (ref 3.4–5.3)
PROT SERPL-MCNC: 6.8 G/DL (ref 6.4–8.3)
RBC # BLD AUTO: 4.49 10E6/UL (ref 3.8–5.2)
SODIUM SERPL-SCNC: 141 MMOL/L (ref 136–145)
WBC # BLD AUTO: 4.1 10E3/UL (ref 4–11)

## 2022-12-19 PROCEDURE — 85652 RBC SED RATE AUTOMATED: CPT | Mod: ZL

## 2022-12-19 PROCEDURE — 85004 AUTOMATED DIFF WBC COUNT: CPT | Mod: ZL

## 2022-12-19 PROCEDURE — 80053 COMPREHEN METABOLIC PANEL: CPT | Mod: ZL

## 2022-12-19 PROCEDURE — 86140 C-REACTIVE PROTEIN: CPT | Mod: ZL

## 2022-12-19 PROCEDURE — 36415 COLL VENOUS BLD VENIPUNCTURE: CPT | Mod: ZL

## 2022-12-19 PROCEDURE — 83036 HEMOGLOBIN GLYCOSYLATED A1C: CPT | Mod: ZL

## 2022-12-21 ENCOUNTER — TRANSFERRED RECORDS (OUTPATIENT)
Dept: HEALTH INFORMATION MANAGEMENT | Facility: CLINIC | Age: 72
End: 2022-12-21

## 2022-12-21 DIAGNOSIS — J30.2 SEASONAL ALLERGIC RHINITIS, UNSPECIFIED TRIGGER: ICD-10-CM

## 2022-12-21 DIAGNOSIS — G62.9 PERIPHERAL POLYNEUROPATHY: ICD-10-CM

## 2022-12-23 RX ORDER — GABAPENTIN 300 MG/1
CAPSULE ORAL
Qty: 210 CAPSULE | Refills: 0 | Status: SHIPPED | OUTPATIENT
Start: 2022-12-23 | End: 2023-01-18

## 2022-12-23 RX ORDER — FEXOFENADINE HYDROCHLORIDE AND PSEUDOEPHEDRINE HYDROCHLORIDE 60; 120 MG/1; MG/1
TABLET, FILM COATED, EXTENDED RELEASE ORAL
Qty: 60 TABLET | Refills: 0 | Status: SHIPPED | OUTPATIENT
Start: 2022-12-23 | End: 2023-01-18

## 2022-12-23 NOTE — TELEPHONE ENCOUNTER
Neurontin      Last Written Prescription Date:  06/17/22  Last Fill Quantity: 210,   # refills: 5  Last Office Visit: 10/24/22  Future Office visit:         Lula KIRBY      Last Written Prescription Date:  10/27/22  Last Fill Quantity: 60,   # refills: 0  Last Office Visit: 10/24/22  Future Office visit:

## 2022-12-27 DIAGNOSIS — Z79.899 ENCOUNTER FOR LONG-TERM (CURRENT) USE OF MEDICATIONS: ICD-10-CM

## 2022-12-27 DIAGNOSIS — M06.09 RHEUMATOID ARTHRITIS OF MULTIPLE SITES WITHOUT RHEUMATOID FACTOR (H): Primary | ICD-10-CM

## 2023-01-12 ENCOUNTER — TRANSFERRED RECORDS (OUTPATIENT)
Dept: HEALTH INFORMATION MANAGEMENT | Facility: CLINIC | Age: 73
End: 2023-01-12

## 2023-01-17 DIAGNOSIS — G62.9 PERIPHERAL POLYNEUROPATHY: ICD-10-CM

## 2023-01-17 DIAGNOSIS — J30.2 SEASONAL ALLERGIC RHINITIS, UNSPECIFIED TRIGGER: ICD-10-CM

## 2023-01-17 NOTE — TELEPHONE ENCOUNTER
[facility]      Last Written Prescription Date:  12/23/2022  Last Fill Quantity: 60,   # refills: 0  Last Office Visit: 10/24/2022  Future Office visit:    Next 5 appointments (look out 90 days)    Mar 16, 2023  2:00 PM  (Arrive by 1:45 PM)  SHORT with GIOVANNY Vital  [facility] [place] ([facility] [place] ) [address] [place] [address]  [phone]           Routing refill request to provider for review/approval because:      Gabapentin      Last Written Prescription Date:  12/23/2022  Last Fill Quantity: 210,   # refills: 0  Last Office Visit: 10/24/2022  Future Office visit:    Next 5 appointments (look out 90 days)    Mar 16, 2023  2:00 PM  (Arrive by 1:45 PM)  SHORT with GIOVANNY Vital  [facility] [place] ([facility] [place] ) [address] [facility] [place] [address]  [phone]           Routing refill request to provider for review/approval because:

## 2023-01-18 RX ORDER — FEXOFENADINE HYDROCHLORIDE AND PSEUDOEPHEDRINE HYDROCHLORIDE 60; 120 MG/1; MG/1
TABLET, FILM COATED, EXTENDED RELEASE ORAL
Qty: 60 TABLET | Refills: 0 | Status: SHIPPED | OUTPATIENT
Start: 2023-01-18 | End: 2023-02-15

## 2023-01-18 RX ORDER — GABAPENTIN 300 MG/1
CAPSULE ORAL
Qty: 210 CAPSULE | Refills: 0 | Status: SHIPPED | OUTPATIENT
Start: 2023-01-18 | End: 2023-03-06

## 2023-01-31 NOTE — IP AVS SNAPSHOT
[facility]  [address] [address]  Phone:  [phone]  Fax:  [phone]                                    After Visit Summary   7/10/2020    Melvi Cleveland    MRN: [id number]           After Visit Summary Signature Page    I have received my discharge instructions, and my questions have been answered. I have discussed any challenges I see with this plan with the nurse or doctor.    ..........................................................................................................................................  Patient/Patient Representative Signature      ..........................................................................................................................................  Patient Representative Print Name and Relationship to Patient    ..................................................               ................................................  Date                                   Time    ..........................................................................................................................................  Reviewed by Signature/Title    ...................................................              ..............................................  Date                                               Time          22EPIC Rev 08/18        Patient called and stated that he spoke to PER Miller before and he tried to reach out to her back ,so he said that he needs Karla Fam to call him back regarding to Barbara asked him to provide more information and he said your office can help him with that. Please call the patient on [phone]

## 2023-02-06 DIAGNOSIS — J30.1 SEASONAL ALLERGIC RHINITIS DUE TO POLLEN: ICD-10-CM

## 2023-02-06 DIAGNOSIS — J45.991 COUGH VARIANT ASTHMA: ICD-10-CM

## 2023-02-08 RX ORDER — MONTELUKAST SODIUM 10 MG/1
TABLET ORAL
Qty: 90 TABLET | Refills: 1 | Status: SHIPPED | OUTPATIENT
Start: 2023-02-08 | End: 2023-02-15

## 2023-02-15 ENCOUNTER — OFFICE VISIT (OUTPATIENT)
Dept: FAMILY MEDICINE | Facility: OTHER | Age: 73
End: 2023-02-15
Attending: PHYSICIAN ASSISTANT
Payer: COMMERCIAL

## 2023-02-15 VITALS
SYSTOLIC BLOOD PRESSURE: 120 MMHG | DIASTOLIC BLOOD PRESSURE: 62 MMHG | BODY MASS INDEX: 30.48 KG/M2 | RESPIRATION RATE: 16 BRPM | TEMPERATURE: 97.4 F | OXYGEN SATURATION: 98 % | HEART RATE: 77 BPM | WEIGHT: 186 LBS

## 2023-02-15 DIAGNOSIS — J01.01 ACUTE RECURRENT MAXILLARY SINUSITIS: Primary | ICD-10-CM

## 2023-02-15 DIAGNOSIS — J30.2 SEASONAL ALLERGIC RHINITIS, UNSPECIFIED TRIGGER: ICD-10-CM

## 2023-02-15 DIAGNOSIS — J30.1 SEASONAL ALLERGIC RHINITIS DUE TO POLLEN: ICD-10-CM

## 2023-02-15 DIAGNOSIS — J45.991 COUGH VARIANT ASTHMA: ICD-10-CM

## 2023-02-15 PROCEDURE — 99213 OFFICE O/P EST LOW 20 MIN: CPT | Performed by: PHYSICIAN ASSISTANT

## 2023-02-15 PROCEDURE — G0463 HOSPITAL OUTPT CLINIC VISIT: HCPCS

## 2023-02-15 RX ORDER — MONTELUKAST SODIUM 10 MG/1
1 TABLET ORAL AT BEDTIME
Qty: 90 TABLET | Refills: 1 | Status: SHIPPED | OUTPATIENT
Start: 2023-02-15 | End: 2023-11-13

## 2023-02-15 RX ORDER — FEXOFENADINE HCL AND PSEUDOEPHEDRINE HCI 60; 120 MG/1; MG/1
1 TABLET, EXTENDED RELEASE ORAL 2 TIMES DAILY
Qty: 60 TABLET | Refills: 0 | Status: SHIPPED | OUTPATIENT
Start: 2023-02-15 | End: 2023-06-28

## 2023-02-15 ASSESSMENT — ANXIETY QUESTIONNAIRES
3. WORRYING TOO MUCH ABOUT DIFFERENT THINGS: NOT AT ALL
4. TROUBLE RELAXING: NOT AT ALL
GAD7 TOTAL SCORE: 0
1. FEELING NERVOUS, ANXIOUS, OR ON EDGE: NOT AT ALL
GAD7 TOTAL SCORE: 0
7. FEELING AFRAID AS IF SOMETHING AWFUL MIGHT HAPPEN: NOT AT ALL
6. BECOMING EASILY ANNOYED OR IRRITABLE: NOT AT ALL
5. BEING SO RESTLESS THAT IT IS HARD TO SIT STILL: NOT AT ALL
2. NOT BEING ABLE TO STOP OR CONTROL WORRYING: NOT AT ALL

## 2023-02-15 ASSESSMENT — PAIN SCALES - GENERAL: PAINLEVEL: MODERATE PAIN (4)

## 2023-02-15 NOTE — PROGRESS NOTES
Assessment & Plan     Seasonal allergic rhinitis, unspecified trigger  Is taking allergie medications and was working on panels with a lot of dust.  Thought it triggered her sinus region.     Seasonal allergic rhinitis due to pollen  As above.     Cough variant asthma  Using inh later as needed.  Has a lot of nasal rip.     Acute recurrent maxillary sinusitis  Hasn't had her infection for a while but is on Methotrexate. Will skip next week and give a 10 day run of medications.  If not better we need to order her aCT scan in her temporal region and sinus .    - amoxicillin-clavulanate (AUGMENTIN) 875-125 MG tablet; Take 1 tablet by mouth 2 times daily    Review of external notes as documented elsewhere in note  Ordering of each unique test  Prescription drug management  10  minutes spent on the date of the encounter doing chart review, history and exam, documentation and further activities per the note       See Patient Instructions    No follow-ups on file.    GIOVANNY Valentino  [facility] - AIDE Balderas is a 72 year old, presenting for the following health issues:  Sinus Problem      HPI     Concern - Sinus problem  Onset: Friday or Saturday of last week  Description: started out with burning in the right nostril and moved higher and now has pressure in the right ear and headache and jaw pain  Intensity: before medication it is moderate to severe and becomes mild to moderate after tylenol.  Progression of Symptoms:  worsening  Accompanying Signs & Symptoms: sinus congestion and right sided headache worse with movement. To pain on palpation of temporal region.   Previous history of similar problem: nose has been dripping steady for a while  Precipitating factors:        Worsened by: not really sure   Alleviating factors:        Improved by: tylenol seems to help   Therapies tried and outcome: tylenol helps with the pain, mucinex but did not seem to help, started benadryl  today.        Review of Systems   CONSTITUTIONAL:POSITIVE  for arthralgias, chills and fatigue  EYES: tearing and watering.   RESP:NEGATIVE for significant cough or SOB  PSYCHIATRIC: NEGATIVE for changes in mood or affect      Objective    /62 (BP Location: Right arm, Patient Position: Sitting, Cuff Size: Adult Large)   Pulse 77   Temp 97.4  F (36.3  C) (Tympanic)   Resp 16   Wt 84.4 kg (186 lb)   SpO2 98%   BMI 30.48 kg/m    Body mass index is 30.48 kg/m .  Physical Exam   GENERAL: healthy, alert and no distress  NECK: no adenopathy, no asymmetry, masses, or scars and thyroid normal to palpation  RESP: lungs clear to auscultation - no rales, rhonchi or wheezes  CV: regular rate and rhythm, normal S1 S2, no S3 or S4, no murmur, click or rub, no peripheral edema and peripheral pulses strong  ABDOMEN: soft, nontender, no hepatosplenomegaly, no masses and bowel sounds normal  MS: no gross musculoskeletal defects noted, no edema  SKIN: no suspicious lesions or rashes    Lab on 12/19/2022   Component Date Value Ref Range Status     Erythrocyte Sedimentation Rate 12/19/2022 16  0 - 30 mm/hr Final     CRP Inflammation 12/19/2022 <3.00  <5.00 mg/L Final     Sodium 12/19/2022 141  136 - 145 mmol/L Final     Potassium 12/19/2022 4.2  3.4 - 5.3 mmol/L Final     Chloride 12/19/2022 105  98 - 107 mmol/L Final     Carbon Dioxide (CO2) 12/19/2022 29  22 - 29 mmol/L Final     Anion Gap 12/19/2022 7  7 - 15 mmol/L Final     Urea Nitrogen 12/19/2022 11.6  8.0 - 23.0 mg/dL Final     Creatinine 12/19/2022 1.06 (H)  0.51 - 0.95 mg/dL Final     Calcium 12/19/2022 9.3  8.8 - 10.2 mg/dL Final     Glucose 12/19/2022 96  70 - 99 mg/dL Final     Alkaline Phosphatase 12/19/2022 64  35 - 104 U/L Final     AST 12/19/2022 27  10 - 35 U/L Final     ALT 12/19/2022 23  10 - 35 U/L Final     Protein Total 12/19/2022 6.8  6.4 - 8.3 g/dL Final     Albumin 12/19/2022 3.9  3.5 - 5.2 g/dL Final     Bilirubin Total 12/19/2022 0.3  <=1.2 mg/dL  Final     GFR Estimate 12/19/2022 56 (L)  >60 mL/min/1.73m2 Final    Effective December 21, 2021 eGFRcr in adults is calculated using the 2021 CKD-EPI creatinine equation which includes age and gender (Svetlana et al., [place], DOI: 10.1056/QZFGiw9666625)     Estimated Average Glucose 12/19/2022 117  mg/dL Final     Hemoglobin A1C 12/19/2022 5.7 (H)  <5.7 % Final    Normal <5.7%   Prediabetes 5.7-6.4%    Diabetes 6.5% or higher     Note: Adopted from ADA consensus guidelines.     WBC Count 12/19/2022 4.1  4.0 - 11.0 10e3/uL Final     RBC Count 12/19/2022 4.49  3.80 - 5.20 10e6/uL Final     Hemoglobin 12/19/2022 13.2  11.7 - 15.7 g/dL Final     Hematocrit 12/19/2022 39.4  35.0 - 47.0 % Final     MCV 12/19/2022 88  78 - 100 fL Final     MCH 12/19/2022 29.4  26.5 - 33.0 pg Final     MCHC 12/19/2022 33.5  31.5 - 36.5 g/dL Final     RDW 12/19/2022 14.0  10.0 - 15.0 % Final     Platelet Count 12/19/2022 213  150 - 450 10e3/uL Final     % Neutrophils 12/19/2022 47  % Final     % Lymphocytes 12/19/2022 39  % Final     % Monocytes 12/19/2022 9  % Final     % Eosinophils 12/19/2022 4  % Final     % Basophils 12/19/2022 1  % Final     % Immature Granulocytes 12/19/2022 0  % Final     NRBCs per 100 WBC 12/19/2022 0  <1 /100 Final     Absolute Neutrophils 12/19/2022 1.9  1.6 - 8.3 10e3/uL Final     Absolute Lymphocytes 12/19/2022 1.6  0.8 - 5.3 10e3/uL Final     Absolute Monocytes 12/19/2022 0.4  0.0 - 1.3 10e3/uL Final     Absolute Eosinophils 12/19/2022 0.2  0.0 - 0.7 10e3/uL Final     Absolute Basophils 12/19/2022 0.0  0.0 - 0.2 10e3/uL Final     Absolute Immature Granulocytes 12/19/2022 0.0  <=0.4 10e3/uL Final     Absolute NRBCs 12/19/2022 0.0  10e3/uL Final

## 2023-02-16 ENCOUNTER — LAB (OUTPATIENT)
Dept: LAB | Facility: OTHER | Age: 73
End: 2023-02-16
Payer: MEDICARE

## 2023-02-16 DIAGNOSIS — J01.01 ACUTE RECURRENT MAXILLARY SINUSITIS: ICD-10-CM

## 2023-02-16 LAB
BASOPHILS # BLD AUTO: 0.1 10E3/UL (ref 0–0.2)
BASOPHILS NFR BLD AUTO: 1 %
CRP SERPL-MCNC: 9.13 MG/L
EOSINOPHIL # BLD AUTO: 0.1 10E3/UL (ref 0–0.7)
EOSINOPHIL NFR BLD AUTO: 3 %
ERYTHROCYTE [DISTWIDTH] IN BLOOD BY AUTOMATED COUNT: 14.1 % (ref 10–15)
HCT VFR BLD AUTO: 38.2 % (ref 35–47)
HGB BLD-MCNC: 12.8 G/DL (ref 11.7–15.7)
IMM GRANULOCYTES # BLD: 0 10E3/UL
IMM GRANULOCYTES NFR BLD: 0 %
LYMPHOCYTES # BLD AUTO: 2.1 10E3/UL (ref 0.8–5.3)
LYMPHOCYTES NFR BLD AUTO: 42 %
MCH RBC QN AUTO: 29.6 PG (ref 26.5–33)
MCHC RBC AUTO-ENTMCNC: 33.5 G/DL (ref 31.5–36.5)
MCV RBC AUTO: 88 FL (ref 78–100)
MONOCYTES # BLD AUTO: 0.5 10E3/UL (ref 0–1.3)
MONOCYTES NFR BLD AUTO: 9 %
NEUTROPHILS # BLD AUTO: 2.3 10E3/UL (ref 1.6–8.3)
NEUTROPHILS NFR BLD AUTO: 45 %
NRBC # BLD AUTO: 0 10E3/UL
NRBC BLD AUTO-RTO: 0 /100
PLATELET # BLD AUTO: 231 10E3/UL (ref 150–450)
RBC # BLD AUTO: 4.33 10E6/UL (ref 3.8–5.2)
WBC # BLD AUTO: 5.1 10E3/UL (ref 4–11)

## 2023-02-16 PROCEDURE — 86140 C-REACTIVE PROTEIN: CPT | Mod: ZL

## 2023-02-16 PROCEDURE — 85025 COMPLETE CBC W/AUTO DIFF WBC: CPT | Mod: ZL

## 2023-02-16 PROCEDURE — 36415 COLL VENOUS BLD VENIPUNCTURE: CPT | Mod: ZL

## 2023-02-22 DIAGNOSIS — F51.01 PRIMARY INSOMNIA: ICD-10-CM

## 2023-02-27 NOTE — TELEPHONE ENCOUNTER
traZODone (DESYREL) 50 MG tablet  Last Written Prescription Date:  11/30/2022  Last Fill Quantity: 270,   # refills: 0  Last Office Visit: 2/15/2023  Future Office visit:    Next 5 appointments (look out 90 days)    Mar 16, 2023  2:00 PM  (Arrive by 1:45 PM)  SHORT with GIOVANNY Vital  [facility] [place] ([facility] [place] ) [address]  [place] [address]  [phone]

## 2023-03-01 RX ORDER — TRAZODONE HYDROCHLORIDE 50 MG/1
TABLET, FILM COATED ORAL
Qty: 270 TABLET | Refills: 1 | Status: SHIPPED | OUTPATIENT
Start: 2023-03-01 | End: 2023-08-28

## 2023-03-04 DIAGNOSIS — G62.9 PERIPHERAL POLYNEUROPATHY: ICD-10-CM

## 2023-03-06 RX ORDER — GABAPENTIN 300 MG/1
CAPSULE ORAL
Qty: 210 CAPSULE | Refills: 0 | Status: SHIPPED | OUTPATIENT
Start: 2023-03-06 | End: 2023-04-06

## 2023-03-06 NOTE — TELEPHONE ENCOUNTER
Gabapentin      Last Written Prescription Date:  2.2.23  Last Fill Quantity: #210,   # refills: 0  Last Office Visit: 2.15.23  Future Office visit:    Next 5 appointments (look out 90 days)    Mar 14, 2023 10:45 AM  Lab visit with  LAB  [facility] ([facility] ) [address] [facility] [address]  [phone]   Mar 16, 2023  2:00 PM  (Arrive by 1:45 PM)  SHORT with GIOVANNY Vital  [facility] ([facility] ) [address] [facility] [address]  [phone]           Routing refill request to provider for review/approval because:  Drug not on the [facility], P or [facility] refill protocol or controlled substance

## 2023-03-14 ENCOUNTER — LAB (OUTPATIENT)
Dept: LAB | Facility: OTHER | Age: 73
End: 2023-03-14
Payer: MEDICARE

## 2023-03-14 DIAGNOSIS — E78.2 MIXED HYPERLIPIDEMIA: ICD-10-CM

## 2023-03-14 DIAGNOSIS — Z79.899 ENCOUNTER FOR LONG-TERM (CURRENT) USE OF MEDICATIONS: ICD-10-CM

## 2023-03-14 DIAGNOSIS — M06.09 RHEUMATOID ARTHRITIS OF MULTIPLE SITES WITHOUT RHEUMATOID FACTOR (H): ICD-10-CM

## 2023-03-14 LAB
ALBUMIN SERPL BCG-MCNC: 3.9 G/DL (ref 3.5–5.2)
ALP SERPL-CCNC: 60 U/L (ref 35–104)
ALT SERPL W P-5'-P-CCNC: 23 U/L (ref 10–35)
ANION GAP SERPL CALCULATED.3IONS-SCNC: 11 MMOL/L (ref 7–15)
AST SERPL W P-5'-P-CCNC: 28 U/L (ref 10–35)
BASOPHILS # BLD AUTO: 0 10E3/UL (ref 0–0.2)
BASOPHILS NFR BLD AUTO: 1 %
BILIRUB SERPL-MCNC: 0.4 MG/DL
BUN SERPL-MCNC: 12.1 MG/DL (ref 8–23)
CALCIUM SERPL-MCNC: 9.7 MG/DL (ref 8.8–10.2)
CHLORIDE SERPL-SCNC: 101 MMOL/L (ref 98–107)
CREAT SERPL-MCNC: 1.02 MG/DL (ref 0.51–0.95)
CRP SERPL-MCNC: <3 MG/L
DEPRECATED HCO3 PLAS-SCNC: 26 MMOL/L (ref 22–29)
EOSINOPHIL # BLD AUTO: 0.1 10E3/UL (ref 0–0.7)
EOSINOPHIL NFR BLD AUTO: 3 %
ERYTHROCYTE [DISTWIDTH] IN BLOOD BY AUTOMATED COUNT: 13.4 % (ref 10–15)
ERYTHROCYTE [SEDIMENTATION RATE] IN BLOOD BY WESTERGREN METHOD: 14 MM/HR (ref 0–30)
GFR SERPL CREATININE-BSD FRML MDRD: 59 ML/MIN/1.73M2
GLUCOSE SERPL-MCNC: 89 MG/DL (ref 70–99)
HCT VFR BLD AUTO: 39.4 % (ref 35–47)
HGB BLD-MCNC: 12.9 G/DL (ref 11.7–15.7)
IMM GRANULOCYTES # BLD: 0 10E3/UL
IMM GRANULOCYTES NFR BLD: 0 %
LYMPHOCYTES # BLD AUTO: 1.4 10E3/UL (ref 0.8–5.3)
LYMPHOCYTES NFR BLD AUTO: 34 %
MCH RBC QN AUTO: 28.7 PG (ref 26.5–33)
MCHC RBC AUTO-ENTMCNC: 32.7 G/DL (ref 31.5–36.5)
MCV RBC AUTO: 88 FL (ref 78–100)
MONOCYTES # BLD AUTO: 0.4 10E3/UL (ref 0–1.3)
MONOCYTES NFR BLD AUTO: 10 %
NEUTROPHILS # BLD AUTO: 2.2 10E3/UL (ref 1.6–8.3)
NEUTROPHILS NFR BLD AUTO: 52 %
NRBC # BLD AUTO: 0 10E3/UL
NRBC BLD AUTO-RTO: 0 /100
PLATELET # BLD AUTO: 195 10E3/UL (ref 150–450)
POTASSIUM SERPL-SCNC: 4.1 MMOL/L (ref 3.4–5.3)
PROT SERPL-MCNC: 6.7 G/DL (ref 6.4–8.3)
RBC # BLD AUTO: 4.5 10E6/UL (ref 3.8–5.2)
SODIUM SERPL-SCNC: 138 MMOL/L (ref 136–145)
WBC # BLD AUTO: 4.2 10E3/UL (ref 4–11)

## 2023-03-14 PROCEDURE — 80053 COMPREHEN METABOLIC PANEL: CPT | Mod: ZL

## 2023-03-14 PROCEDURE — 36415 COLL VENOUS BLD VENIPUNCTURE: CPT | Mod: ZL

## 2023-03-14 PROCEDURE — 85652 RBC SED RATE AUTOMATED: CPT | Mod: ZL

## 2023-03-14 PROCEDURE — 86140 C-REACTIVE PROTEIN: CPT | Mod: ZL

## 2023-03-14 PROCEDURE — 85025 COMPLETE CBC W/AUTO DIFF WBC: CPT | Mod: ZL

## 2023-03-14 PROCEDURE — 80061 LIPID PANEL: CPT | Mod: ZL

## 2023-03-16 ENCOUNTER — OFFICE VISIT (OUTPATIENT)
Dept: FAMILY MEDICINE | Facility: OTHER | Age: 73
End: 2023-03-16
Attending: PHYSICIAN ASSISTANT
Payer: COMMERCIAL

## 2023-03-16 VITALS
HEIGHT: 66 IN | WEIGHT: 186 LBS | DIASTOLIC BLOOD PRESSURE: 58 MMHG | SYSTOLIC BLOOD PRESSURE: 132 MMHG | HEART RATE: 87 BPM | TEMPERATURE: 98.1 F | BODY MASS INDEX: 29.89 KG/M2 | OXYGEN SATURATION: 93 %

## 2023-03-16 DIAGNOSIS — J45.991 COUGH VARIANT ASTHMA: ICD-10-CM

## 2023-03-16 DIAGNOSIS — N18.31 STAGE 3A CHRONIC KIDNEY DISEASE (H): ICD-10-CM

## 2023-03-16 DIAGNOSIS — M05.79 RHEUMATOID ARTHRITIS INVOLVING MULTIPLE SITES WITH POSITIVE RHEUMATOID FACTOR (H): ICD-10-CM

## 2023-03-16 DIAGNOSIS — E78.2 MIXED HYPERLIPIDEMIA: ICD-10-CM

## 2023-03-16 DIAGNOSIS — N28.9 RENAL INSUFFICIENCY: Primary | ICD-10-CM

## 2023-03-16 DIAGNOSIS — K50.10 CROHN'S DISEASE OF LARGE INTESTINE WITHOUT COMPLICATION (H): ICD-10-CM

## 2023-03-16 LAB
CHOLEST SERPL-MCNC: 221 MG/DL
HDLC SERPL-MCNC: 75 MG/DL
LDLC SERPL CALC-MCNC: 122 MG/DL
NONHDLC SERPL-MCNC: 146 MG/DL
TRIGL SERPL-MCNC: 121 MG/DL

## 2023-03-16 PROCEDURE — 99214 OFFICE O/P EST MOD 30 MIN: CPT | Performed by: PHYSICIAN ASSISTANT

## 2023-03-16 PROCEDURE — G0463 HOSPITAL OUTPT CLINIC VISIT: HCPCS | Performed by: PHYSICIAN ASSISTANT

## 2023-03-16 ASSESSMENT — ANXIETY QUESTIONNAIRES
7. FEELING AFRAID AS IF SOMETHING AWFUL MIGHT HAPPEN: NOT AT ALL
4. TROUBLE RELAXING: NOT AT ALL
GAD7 TOTAL SCORE: 1
3. WORRYING TOO MUCH ABOUT DIFFERENT THINGS: NOT AT ALL
6. BECOMING EASILY ANNOYED OR IRRITABLE: SEVERAL DAYS
GAD7 TOTAL SCORE: 1
5. BEING SO RESTLESS THAT IT IS HARD TO SIT STILL: NOT AT ALL
2. NOT BEING ABLE TO STOP OR CONTROL WORRYING: NOT AT ALL
1. FEELING NERVOUS, ANXIOUS, OR ON EDGE: NOT AT ALL

## 2023-03-16 ASSESSMENT — ASTHMA QUESTIONNAIRES: ACT_TOTALSCORE: 24

## 2023-03-16 ASSESSMENT — PAIN SCALES - GENERAL: PAINLEVEL: MILD PAIN (3)

## 2023-03-16 NOTE — PROGRESS NOTES
Assessment & Plan     Renal insufficiency  Discussion on her treatment of this and classification. Reassured her kidneys are really good.   Continue with water daily. Has a bottle with her today.     RA (rheumatoid arthritis) (H)  Has pain in knees and hands. Taking tylenol over and above. Plaquenil for this. Methotrexate is added in for this. Has been helpful. Hands are much better but knees get a bit more stiff.     Dyslipidemia  Much improved.  Labs are reviewed.     Cough variant asthma  Her asthma score is 24. No flairs.  Has a dry cough on occassion. This is evaluated and treated by GI and has has dilatation of her upper airway and helps her asthma as well.     Crohn's disease of large intestine without complication (H)  She has her colon screenings in [place] with Dr. Jiménez. We will get this documented na get the labs here.      Stage 3a chronic kidney disease (H)  GFR is 59 and her Creatine is 102.  Normal BUN  Stage 3a.  See us back in 6 months. Good blood pressure control.       Review of external notes as documented elsewhere in note  Ordering of each unique test  Prescription drug management  10  minutes spent on the date of the encounter doing chart review, history and exam, documentation and further activities per the note       See Patient Instructions    No follow-ups on file.    GIOVANNY Valentino  [facility] - AIDE Balderas is a 73 year old accompanied by her self, presenting for the following health issues:  Recheck Medication      HPI     Asthma Follow-Up    Was ACT completed today?    Yes    ACT Total Scores 3/16/2023   ACT TOTAL SCORE (Goal Greater than or Equal to 20) 24   In the past 12 months, how many times did you visit the emergency room for your asthma without being admitted to the hospital? 0   In the past 12 months, how many times were you hospitalized overnight because of your asthma? 0          How many days per week do you miss taking your asthma  controller medication?  hasn't had to use it in a long time, only uses it if she gets sick.     Please describe any recent triggers for your asthma: upper respiratory infections    Have you had any Emergency Room Visits, Urgent Care Visits, or Hospital Admissions since your last office visit?  No    Chronic Kidney Disease Follow-up    Do you take any over the counter pain medicine?: Yes  What over the counter medicine are you taking for your pain?:  Tylenol       How often do you take this medicine?:  One time daily    Pain History:  When did you first notice your pain? - Chronic Pain   Have you seen this provider for your pain in the past?   Yes   Where in your body do you have pain? Low back and up in the middle back   Are you seeing anyone else for your pain? Yes - chiropractor     PHQ-9 SCORE 4/26/2021 1/5/2022 10/24/2022   PHQ-9 Total Score - - -   PHQ-9 Total Score 0 4 2       HEATH-7 SCORE 10/24/2022 2/15/2023 3/16/2023   Total Score 1 0 1               Chronic Pain Follow Up:    Location of pain: low back   Analgesia/pain control:    - Recent changes:  None     - Overall control: Tolerable with discomfort    - Current treatments: none    Adherence:     - Do you ever take more pain medicine than prescribed? No    - When did you take your last dose of pain medicine?  Months    Adverse effects: No   PDMP Review     None        Last CSA Agreement:   CSA -- Patient Level:     [Media Unavailable] Controlled Substance Agreement - Opioid - Scan on 2/7/2022 11:31 AM: opiod/opiod plus controlled substance agreement       Last UDS: 4/17/2022        Chronic/Recurring Back Pain Follow Up      Where is your back pain located? (Select all that apply) low back both    How would you describe your back pain?  dull ache    Where does your back pain spread? the right and left buttock, the right and left shoulder and the right and left neck    Since your last clinic visit for back pain, how has your pain changed? unchanged    Does  "your back pain interfere with your job? No    Since your last visit, have you tried any new treatment? No          Review of Systems   Constitutional, HEENT, cardiovascular, pulmonary, gi and gu systems are negative, except as otherwise noted.      Objective    /58 (BP Location: Left arm, Patient Position: Sitting, Cuff Size: Adult Regular)   Pulse 87   Temp 98.1  F (36.7  C) (Tympanic)   Ht 1.664 m (5' 5.5\")   Wt 84.4 kg (186 lb)   SpO2 93%   BMI 30.48 kg/m    Body mass index is 30.48 kg/m .  Physical Exam   GENERAL: healthy, alert and no distress  EYES: Eyes grossly normal to inspection, PERRL and conjunctivae and sclerae normal  HENT: ear canals and TM's normal, nose and mouth without ulcers or lesions  NECK: no adenopathy, no asymmetry, masses, or scars and thyroid normal to palpation  RESP: lungs clear to auscultation - no rales, rhonchi or wheezes  CV: regular rate and rhythm, normal S1 S2, no S3 or S4, no murmur, click or rub, no peripheral edema and peripheral pulses strong  ABDOMEN: soft, nontender, no hepatosplenomegaly, no masses and bowel sounds normal  MS: no gross musculoskeletal defects noted, no edema  SKIN: no suspicious lesions or rashes  NEURO: Normal strength and tone, mentation intact and speech normal  BACK: no CVA tenderness, no paralumbar tenderness  PSYCH: mentation appears normal, affect normal/bright  LYMPH: no cervical, supraclavicular, axillary, or inguinal adenopathy    Lab on 03/14/2023   Component Date Value Ref Range Status     Erythrocyte Sedimentation Rate 03/14/2023 14  0 - 30 mm/hr Final     CRP Inflammation 03/14/2023 <3.00  <5.00 mg/L Final     Sodium 03/14/2023 138  136 - 145 mmol/L Final     Potassium 03/14/2023 4.1  3.4 - 5.3 mmol/L Final     Chloride 03/14/2023 101  98 - 107 mmol/L Final     Carbon Dioxide (CO2) 03/14/2023 26  22 - 29 mmol/L Final     Anion Gap 03/14/2023 11  7 - 15 mmol/L Final     Urea Nitrogen 03/14/2023 12.1  8.0 - 23.0 mg/dL Final     " Creatinine 03/14/2023 1.02 (H)  0.51 - 0.95 mg/dL Final     Calcium 03/14/2023 9.7  8.8 - 10.2 mg/dL Final     Glucose 03/14/2023 89  70 - 99 mg/dL Final     Alkaline Phosphatase 03/14/2023 60  35 - 104 U/L Final     AST 03/14/2023 28  10 - 35 U/L Final     ALT 03/14/2023 23  10 - 35 U/L Final     Protein Total 03/14/2023 6.7  6.4 - 8.3 g/dL Final     Albumin 03/14/2023 3.9  3.5 - 5.2 g/dL Final     Bilirubin Total 03/14/2023 0.4  <=1.2 mg/dL Final     GFR Estimate 03/14/2023 59 (L)  >60 mL/min/1.73m2 Final    eGFR calculated using 2021 CKD-EPI equation.     WBC Count 03/14/2023 4.2  4.0 - 11.0 10e3/uL Final     RBC Count 03/14/2023 4.50  3.80 - 5.20 10e6/uL Final     Hemoglobin 03/14/2023 12.9  11.7 - 15.7 g/dL Final     Hematocrit 03/14/2023 39.4  35.0 - 47.0 % Final     MCV 03/14/2023 88  78 - 100 fL Final     MCH 03/14/2023 28.7  26.5 - 33.0 pg Final     MCHC 03/14/2023 32.7  31.5 - 36.5 g/dL Final     RDW 03/14/2023 13.4  10.0 - 15.0 % Final     Platelet Count 03/14/2023 195  150 - 450 10e3/uL Final     % Neutrophils 03/14/2023 52  % Final     % Lymphocytes 03/14/2023 34  % Final     % Monocytes 03/14/2023 10  % Final     % Eosinophils 03/14/2023 3  % Final     % Basophils 03/14/2023 1  % Final     % Immature Granulocytes 03/14/2023 0  % Final     NRBCs per 100 WBC 03/14/2023 0  <1 /100 Final     Absolute Neutrophils 03/14/2023 2.2  1.6 - 8.3 10e3/uL Final     Absolute Lymphocytes 03/14/2023 1.4  0.8 - 5.3 10e3/uL Final     Absolute Monocytes 03/14/2023 0.4  0.0 - 1.3 10e3/uL Final     Absolute Eosinophils 03/14/2023 0.1  0.0 - 0.7 10e3/uL Final     Absolute Basophils 03/14/2023 0.0  0.0 - 0.2 10e3/uL Final     Absolute Immature Granulocytes 03/14/2023 0.0  <=0.4 10e3/uL Final     Absolute NRBCs 03/14/2023 0.0  10e3/uL Final

## 2023-04-10 ENCOUNTER — ANCILLARY PROCEDURE (OUTPATIENT)
Dept: MAMMOGRAPHY | Facility: OTHER | Age: 73
End: 2023-04-10
Attending: PHYSICIAN ASSISTANT
Payer: MEDICARE

## 2023-04-10 ENCOUNTER — TELEPHONE (OUTPATIENT)
Dept: MAMMOGRAPHY | Facility: OTHER | Age: 73
End: 2023-04-10

## 2023-04-10 DIAGNOSIS — Z12.31 VISIT FOR SCREENING MAMMOGRAM: ICD-10-CM

## 2023-04-10 PROCEDURE — 77067 SCR MAMMO BI INCL CAD: CPT | Mod: TC

## 2023-05-10 NOTE — IP AVS SNAPSHOT
[facility] Preop/Phase II    [address] [address]    Phone:  [phone]                                       After Visit Summary   1/16/2018    Melvi Cleveland    MRN: [id number]           After Visit Summary Signature Page     I have received my discharge instructions, and my questions have been answered. I have discussed any challenges I see with this plan with the nurse or doctor.    ..........................................................................................................................................  Patient/Patient Representative Signature      ..........................................................................................................................................  Patient Representative Print Name and Relationship to Patient    ..................................................               ................................................  Date                                            Time    ..........................................................................................................................................  Reviewed by Signature/Title    ...................................................              ..............................................  Date                                                            Time           10-May-2023 07:43

## 2023-05-15 DIAGNOSIS — L21.9 DERMATITIS, SEBORRHEIC: ICD-10-CM

## 2023-05-15 DIAGNOSIS — G62.9 PERIPHERAL POLYNEUROPATHY: ICD-10-CM

## 2023-05-15 DIAGNOSIS — L21.9 SEBORRHEIC DERMATITIS: ICD-10-CM

## 2023-05-17 RX ORDER — GABAPENTIN 300 MG/1
CAPSULE ORAL
Qty: 210 CAPSULE | Refills: 0 | Status: SHIPPED | OUTPATIENT
Start: 2023-05-17 | End: 2023-06-13

## 2023-05-17 NOTE — TELEPHONE ENCOUNTER
Gabapentin      Last Written Prescription Date:  4.6.23  Last Fill Quantity: #210,   # refills: 0  Last Office Visit: 3.16.23  Future Office visit:       Routing refill request to provider for review/approval because:  Drug not on the [facility], P or [facility] refill protocol or controlled substance

## 2023-05-18 RX ORDER — KETOCONAZOLE 20 MG/ML
SHAMPOO TOPICAL
Qty: 120 ML | Refills: 0 | Status: SHIPPED | OUTPATIENT
Start: 2023-05-18

## 2023-05-18 NOTE — TELEPHONE ENCOUNTER
ketoconazole (NIZORAL) 2 % external shampoo 120 mL 0 9/22/2022     Patient has not seen Dr Carlos since 2021. Would you be willing to fill? If not he would need to see Dr Carlos before a refill would be given.

## 2023-05-30 DIAGNOSIS — F33.0 MILD EPISODE OF RECURRENT MAJOR DEPRESSIVE DISORDER (H): Primary | ICD-10-CM

## 2023-05-30 RX ORDER — CITALOPRAM HYDROBROMIDE 10 MG/1
10 TABLET ORAL DAILY
Qty: 30 TABLET | Refills: 3 | Status: SHIPPED | OUTPATIENT
Start: 2023-05-30 | End: 2023-07-23

## 2023-06-07 ENCOUNTER — TRANSFERRED RECORDS (OUTPATIENT)
Dept: HEALTH INFORMATION MANAGEMENT | Facility: CLINIC | Age: 73
End: 2023-06-07

## 2023-06-12 DIAGNOSIS — N28.9 RENAL INSUFFICIENCY: ICD-10-CM

## 2023-06-12 DIAGNOSIS — G62.9 PERIPHERAL POLYNEUROPATHY: ICD-10-CM

## 2023-06-13 ENCOUNTER — LAB (OUTPATIENT)
Dept: LAB | Facility: OTHER | Age: 73
End: 2023-06-13
Payer: MEDICARE

## 2023-06-13 DIAGNOSIS — M06.09 RHEUMATOID ARTHRITIS OF MULTIPLE SITES WITHOUT RHEUMATOID FACTOR (H): ICD-10-CM

## 2023-06-13 DIAGNOSIS — Z79.899 ENCOUNTER FOR LONG-TERM (CURRENT) USE OF MEDICATIONS: ICD-10-CM

## 2023-06-13 LAB
ALBUMIN SERPL BCG-MCNC: 3.6 G/DL (ref 3.5–5.2)
ALP SERPL-CCNC: 59 U/L (ref 35–104)
ALT SERPL W P-5'-P-CCNC: 23 U/L (ref 0–50)
ANION GAP SERPL CALCULATED.3IONS-SCNC: 5 MMOL/L (ref 7–15)
AST SERPL W P-5'-P-CCNC: 25 U/L (ref 0–45)
BASOPHILS # BLD AUTO: 0 10E3/UL (ref 0–0.2)
BASOPHILS NFR BLD AUTO: 1 %
BILIRUB SERPL-MCNC: 0.2 MG/DL
BUN SERPL-MCNC: 10.1 MG/DL (ref 8–23)
CALCIUM SERPL-MCNC: 9.4 MG/DL (ref 8.8–10.2)
CHLORIDE SERPL-SCNC: 105 MMOL/L (ref 98–107)
CREAT SERPL-MCNC: 1.01 MG/DL (ref 0.51–0.95)
CRP SERPL-MCNC: <3 MG/L
DEPRECATED HCO3 PLAS-SCNC: 30 MMOL/L (ref 22–29)
EOSINOPHIL # BLD AUTO: 0.3 10E3/UL (ref 0–0.7)
EOSINOPHIL NFR BLD AUTO: 6 %
ERYTHROCYTE [DISTWIDTH] IN BLOOD BY AUTOMATED COUNT: 14.2 % (ref 10–15)
ERYTHROCYTE [SEDIMENTATION RATE] IN BLOOD BY WESTERGREN METHOD: 13 MM/HR (ref 0–30)
GFR SERPL CREATININE-BSD FRML MDRD: 59 ML/MIN/1.73M2
GLUCOSE SERPL-MCNC: 105 MG/DL (ref 70–99)
HCT VFR BLD AUTO: 37.5 % (ref 35–47)
HGB BLD-MCNC: 12.2 G/DL (ref 11.7–15.7)
IMM GRANULOCYTES # BLD: 0 10E3/UL
IMM GRANULOCYTES NFR BLD: 0 %
LYMPHOCYTES # BLD AUTO: 1.7 10E3/UL (ref 0.8–5.3)
LYMPHOCYTES NFR BLD AUTO: 35 %
MCH RBC QN AUTO: 28.6 PG (ref 26.5–33)
MCHC RBC AUTO-ENTMCNC: 32.5 G/DL (ref 31.5–36.5)
MCV RBC AUTO: 88 FL (ref 78–100)
MONOCYTES # BLD AUTO: 0.5 10E3/UL (ref 0–1.3)
MONOCYTES NFR BLD AUTO: 10 %
NEUTROPHILS # BLD AUTO: 2.3 10E3/UL (ref 1.6–8.3)
NEUTROPHILS NFR BLD AUTO: 48 %
NRBC # BLD AUTO: 0 10E3/UL
NRBC BLD AUTO-RTO: 0 /100
PLATELET # BLD AUTO: 212 10E3/UL (ref 150–450)
POTASSIUM SERPL-SCNC: 4.5 MMOL/L (ref 3.4–5.3)
PROT SERPL-MCNC: 6.3 G/DL (ref 6.4–8.3)
RBC # BLD AUTO: 4.26 10E6/UL (ref 3.8–5.2)
SODIUM SERPL-SCNC: 140 MMOL/L (ref 136–145)
WBC # BLD AUTO: 4.9 10E3/UL (ref 4–11)

## 2023-06-13 PROCEDURE — 80053 COMPREHEN METABOLIC PANEL: CPT | Mod: ZL

## 2023-06-13 PROCEDURE — 86140 C-REACTIVE PROTEIN: CPT | Mod: ZL

## 2023-06-13 PROCEDURE — 36415 COLL VENOUS BLD VENIPUNCTURE: CPT | Mod: ZL

## 2023-06-13 PROCEDURE — 85652 RBC SED RATE AUTOMATED: CPT | Mod: ZL

## 2023-06-13 PROCEDURE — 85004 AUTOMATED DIFF WBC COUNT: CPT | Mod: ZL

## 2023-06-13 RX ORDER — LISINOPRIL 2.5 MG/1
TABLET ORAL
Qty: 30 TABLET | Refills: 0 | Status: SHIPPED | OUTPATIENT
Start: 2023-06-13 | End: 2023-07-11

## 2023-06-13 RX ORDER — GABAPENTIN 300 MG/1
CAPSULE ORAL
Qty: 210 CAPSULE | Refills: 0 | Status: SHIPPED | OUTPATIENT
Start: 2023-06-13 | End: 2023-07-11

## 2023-06-13 NOTE — TELEPHONE ENCOUNTER
Gabapentin, Lisinopril      Last Written Prescription Date:  5.18.23, 5.15.23  Last Fill Quantity: #210, #30,   # refills: 0  Last Office Visit: 3.16.23  Future Office visit:       Routing refill request to provider for review/approval because:  Drug not on the [facility], P or [facility] refill protocol or controlled substance

## 2023-06-21 ENCOUNTER — TRANSFERRED RECORDS (OUTPATIENT)
Dept: HEALTH INFORMATION MANAGEMENT | Facility: HOSPITAL | Age: 73
End: 2023-06-21

## 2023-07-01 ENCOUNTER — HEALTH MAINTENANCE LETTER (OUTPATIENT)
Age: 73
End: 2023-07-01

## 2023-07-10 DIAGNOSIS — G62.9 PERIPHERAL POLYNEUROPATHY: ICD-10-CM

## 2023-07-10 DIAGNOSIS — N28.9 RENAL INSUFFICIENCY: ICD-10-CM

## 2023-07-11 RX ORDER — GABAPENTIN 300 MG/1
CAPSULE ORAL
Qty: 210 CAPSULE | Refills: 0 | Status: SHIPPED | OUTPATIENT
Start: 2023-07-11 | End: 2023-08-15

## 2023-07-11 RX ORDER — LISINOPRIL 2.5 MG/1
TABLET ORAL
Qty: 30 TABLET | Refills: 0 | Status: SHIPPED | OUTPATIENT
Start: 2023-07-11 | End: 2023-08-15

## 2023-07-11 NOTE — TELEPHONE ENCOUNTER
Gabapentin, Lisinopril      Last Written Prescription Date:  6.14.23  Last Fill Quantity: #210, #30,   # refills: 0  Last Office Visit: 3.16.23  Future Office visit:       Routing refill request to provider for review/approval because:  Drug not on the [facility], P or [facility] refill protocol or controlled substance

## 2023-07-23 DIAGNOSIS — F33.0 MILD EPISODE OF RECURRENT MAJOR DEPRESSIVE DISORDER (H): ICD-10-CM

## 2023-07-23 RX ORDER — CITALOPRAM HYDROBROMIDE 20 MG/1
20 TABLET ORAL 2 TIMES DAILY
Qty: 60 TABLET | Refills: 3 | Status: SHIPPED | OUTPATIENT
Start: 2023-07-23 | End: 2023-08-30

## 2023-08-03 ENCOUNTER — TRANSFERRED RECORDS (OUTPATIENT)
Dept: HEALTH INFORMATION MANAGEMENT | Facility: CLINIC | Age: 73
End: 2023-08-03

## 2023-08-12 DIAGNOSIS — G62.9 PERIPHERAL POLYNEUROPATHY: ICD-10-CM

## 2023-08-12 DIAGNOSIS — N28.9 RENAL INSUFFICIENCY: ICD-10-CM

## 2023-08-14 NOTE — TELEPHONE ENCOUNTER
Lisinopril      Last Written Prescription Date:  7/11/23  Last Fill Quantity: 30,   # refills: 0  Last Office Visit: 3/16/23  Future Office visit:    Next 5 appointments (look out 90 days)      Aug 30, 2023  2:30 PM  (Arrive by 2:15 PM)  SHORT with GIOVANNY Vital  [facility] [place] ([facility] [place] ) [address]  [place] [address]  [phone]             Routing refill request to provider for review/approval because:      Gabapentin      Last Written Prescription Date:  7/11/23  Last Fill Quantity: 210,   # refills: 0  Last Office Visit: 3/16/23  Future Office visit:    Next 5 appointments (look out 90 days)      Aug 30, 2023  2:30 PM  (Arrive by 2:15 PM)  SHORT with GIOVANNY Viatl  [facility] [place] ([facility] [place] ) [address]  [place] [address]  [phone]             Routing refill request to provider for review/approval because:

## 2023-08-15 RX ORDER — GABAPENTIN 300 MG/1
CAPSULE ORAL
Qty: 210 CAPSULE | Refills: 0 | Status: SHIPPED | OUTPATIENT
Start: 2023-08-15 | End: 2023-09-20

## 2023-08-15 RX ORDER — LISINOPRIL 2.5 MG/1
TABLET ORAL
Qty: 30 TABLET | Refills: 0 | Status: SHIPPED | OUTPATIENT
Start: 2023-08-15 | End: 2023-10-04

## 2023-08-25 DIAGNOSIS — F51.01 PRIMARY INSOMNIA: ICD-10-CM

## 2023-08-25 NOTE — TELEPHONE ENCOUNTER
traZODone (DESYREL) 50 MG tablet       Last Written Prescription Date:  3/1/2023  Last Fill Quantity: 270,   # refills: 1  Last Office Visit: 3/16/2023  Future Office visit:    Next 5 appointments (look out 90 days)      Aug 30, 2023  2:30 PM  (Arrive by 2:15 PM)  SHORT with GIOVANNY Vital  [facility] [place] ([facility] [place] ) [address]  [place] [address]  [phone]

## 2023-08-28 RX ORDER — TRAZODONE HYDROCHLORIDE 50 MG/1
TABLET, FILM COATED ORAL
Qty: 270 TABLET | Refills: 1 | Status: SHIPPED | OUTPATIENT
Start: 2023-08-28 | End: 2024-01-04

## 2023-08-30 ENCOUNTER — OFFICE VISIT (OUTPATIENT)
Dept: FAMILY MEDICINE | Facility: OTHER | Age: 73
End: 2023-08-30
Attending: PHYSICIAN ASSISTANT
Payer: COMMERCIAL

## 2023-08-30 VITALS
DIASTOLIC BLOOD PRESSURE: 70 MMHG | WEIGHT: 189.4 LBS | SYSTOLIC BLOOD PRESSURE: 105 MMHG | TEMPERATURE: 97.3 F | OXYGEN SATURATION: 94 % | BODY MASS INDEX: 31.04 KG/M2 | HEART RATE: 75 BPM | RESPIRATION RATE: 16 BRPM

## 2023-08-30 DIAGNOSIS — K22.70 BARRETT'S ESOPHAGUS WITHOUT DYSPLASIA: ICD-10-CM

## 2023-08-30 DIAGNOSIS — F33.1 MODERATE EPISODE OF RECURRENT MAJOR DEPRESSIVE DISORDER (H): Primary | ICD-10-CM

## 2023-08-30 DIAGNOSIS — C76.0 MALIGNANT NEOPLASM OF HEAD, FACE, AND NECK (H): ICD-10-CM

## 2023-08-30 DIAGNOSIS — R73.03 PREDIABETES: ICD-10-CM

## 2023-08-30 DIAGNOSIS — N28.9 RENAL INSUFFICIENCY: ICD-10-CM

## 2023-08-30 PROCEDURE — G0463 HOSPITAL OUTPT CLINIC VISIT: HCPCS

## 2023-08-30 PROCEDURE — 99214 OFFICE O/P EST MOD 30 MIN: CPT | Performed by: PHYSICIAN ASSISTANT

## 2023-08-30 RX ORDER — CITALOPRAM HYDROBROMIDE 40 MG/1
40 TABLET ORAL DAILY
Qty: 90 TABLET | Refills: 1 | Status: SHIPPED | OUTPATIENT
Start: 2023-08-30 | End: 2023-08-30

## 2023-08-30 RX ORDER — DULOXETIN HYDROCHLORIDE 30 MG/1
30 CAPSULE, DELAYED RELEASE ORAL 2 TIMES DAILY
Qty: 60 CAPSULE | Refills: 1 | Status: SHIPPED | OUTPATIENT
Start: 2023-08-30 | End: 2023-10-24

## 2023-08-30 RX ORDER — FLUOCINONIDE TOPICAL SOLUTION USP, 0.05% 0.5 MG/ML
SOLUTION TOPICAL
COMMUNITY
Start: 2023-08-01

## 2023-08-30 RX ORDER — METHOTREXATE 2.5 MG/1
TABLET ORAL
COMMUNITY
Start: 2023-06-13

## 2023-08-30 RX ORDER — METHOTREXATE 25 MG/ML
INJECTION, SOLUTION INTRA-ARTERIAL; INTRAMUSCULAR; INTRAVENOUS
COMMUNITY
Start: 2023-02-02 | End: 2023-10-04

## 2023-08-30 ASSESSMENT — ANXIETY QUESTIONNAIRES
3. WORRYING TOO MUCH ABOUT DIFFERENT THINGS: NOT AT ALL
7. FEELING AFRAID AS IF SOMETHING AWFUL MIGHT HAPPEN: NOT AT ALL
1. FEELING NERVOUS, ANXIOUS, OR ON EDGE: NOT AT ALL
6. BECOMING EASILY ANNOYED OR IRRITABLE: SEVERAL DAYS
IF YOU CHECKED OFF ANY PROBLEMS ON THIS QUESTIONNAIRE, HOW DIFFICULT HAVE THESE PROBLEMS MADE IT FOR YOU TO DO YOUR WORK, TAKE CARE OF THINGS AT HOME, OR GET ALONG WITH OTHER PEOPLE: NOT DIFFICULT AT ALL
GAD7 TOTAL SCORE: 1
2. NOT BEING ABLE TO STOP OR CONTROL WORRYING: NOT AT ALL
4. TROUBLE RELAXING: NOT AT ALL
5. BEING SO RESTLESS THAT IT IS HARD TO SIT STILL: NOT AT ALL
GAD7 TOTAL SCORE: 1

## 2023-08-30 ASSESSMENT — PAIN SCALES - GENERAL: PAINLEVEL: MILD PAIN (2)

## 2023-08-30 ASSESSMENT — PATIENT HEALTH QUESTIONNAIRE - PHQ9: SUM OF ALL RESPONSES TO PHQ QUESTIONS 1-9: 3

## 2023-08-30 NOTE — LETTER
Opioid / Opioid Plus Controlled Substance Agreement    This is an agreement between you and your provider about the safe and appropriate use of controlled substance/opioids prescribed by your care team. Controlled substances are medicines that can cause physical and mental dependence (abuse).    There are strict laws about having and using these medicines. We here at [facility] are committing to working with you in your efforts to get better. To support you in this work, we ll help you schedule regular office appointments for medicine refills. If we must cancel or change your appointment for any reason, we ll make sure you have enough medicine to last until your next appointment.     As a Provider, I will:  Listen carefully to your concerns and treat you with respect.   Recommend a treatment plan that I believe is in your best interest. This plan may involve therapies other than opioid pain medication.   Talk with you often about the possible benefits, and the risk of harm of any medicine that we prescribe for you.   Provide a plan on how to taper (discontinue or go off) using this medicine if the decision is made to stop its use.    As a Patient, I understand that opioid(s):   Are a controlled substance prescribed by my care team to help me function or work and manage my condition(s).   Are strong medicines and can cause serious side effects such as:  Drowsiness, which can seriously affect my driving ability  A lower breathing rate, enough to cause death  Harm to my thinking ability   Depression   Abuse of and addiction to this medicine  Need to be taken exactly as prescribed. Combining opioids with certain medicines or chemicals (such as illegal drugs, sedatives, sleeping pills, and benzodiazepines) can be dangerous or even fatal. If I stop opioids suddenly, I may have severe withdrawal symptoms.  Do not work for all types of pain nor for all patients. If they re not helpful, I may be asked to stop  them.        The risks, benefits and side effects of these medicine(s) were explained to me. I agree that:  I will take part in other treatments as advised by my care team. This may be psychiatry or counseling, physical therapy, behavioral therapy, group treatment or a referral to a specialist.     I will keep all my appointments. I understand that this is part of the monitoring of opioids. My care team may require an office visit for EVERY opioid/controlled substance refill. If I miss appointments or don t follow instructions, my care team may stop my medicine.    I will take my medicines as prescribed. I will not change the dose or schedule unless my care team tells me to. There will be no refills if I run out early.     I may be asked to come to the clinic and complete a urine drug test or complete a pill count at any time. If I don t give a urine sample or participate in a pill count, the care team may stop my medicine.    I will only receive prescriptions from this clinic for chronic pain. If I am treated by another provider for acute pain issues, I will tell them that I am taking opioid pain medication for chronic pain and that I have a treatment agreement with this provider. I will inform my [facility] care team within one business day if I am given a prescription for any pain medication by another healthcare provider. My [facility] care team can contact other providers and pharmacists about my use of any medicines.    It is up to me to make sure that I don t run out of my medicines on weekends or holidays. If my care team is willing to refill my opioid prescription without a visit, I must request refills only during office hours. Refills may take up to 3 business days to process. I will use one pharmacy to fill all my opioid and other controlled substance prescriptions. I will notify the clinic about any changes to my insurance or medication availability.    I am responsible for my  prescriptions. If the medicine/prescription is lost, stolen or destroyed, it will not be replaced. I also agree not to share controlled substance medicines with anyone.    I am aware I should not use any illegal or recreational drugs. I agree not to drink alcohol unless my care team says I can.       If I enroll in the [place] [facility] program, I will tell my care team prior to my next refill.     I will tell my care team right away if I become pregnant, have a new medical problem treated outside of my regular clinic, or have a change in my medications.    I understand that this medicine can affect my thinking, judgment and reaction time. Alcohol and drugs affect the brain and body, which can affect the safety of my driving. Being under the influence of alcohol or drugs can affect my decision-making, behaviors, personal safety, and the safety of others. Driving while impaired (DWI) can occur if a person is driving, operating, or in physical control of a car, motorcycle, boat, snowmobile, ATV, motorbike, off-road vehicle, or any other motor vehicle ([place] Statute 169A.20). I understand the risk if I choose to drive or operate any vehicle or machinery.    I understand that if I do not follow any of the conditions above, my prescriptions or treatment may be stopped or changed.          Opioids  What You Need to Know    What are opioids?   Opioids are pain medicines that must be prescribed by a doctor. They are also known as narcotics.     Examples are:   morphine (MS Contin, Amy)  oxycodone (Oxycontin)  oxycodone and acetaminophen (Percocet)  hydrocodone and acetaminophen (Vicodin, Norco)   fentanyl patch (Duragesic)   hydromorphone (Dilaudid)   methadone  codeine (Tylenol #3)     What do opioids do well?   Opioids are best for severe short-term pain such as after a surgery or injury. They may work well for cancer pain. They may help some people with long-lasting (chronic) pain.     What do opioids NOT do  well?   Opioids never get rid of pain entirely, and they don t work well for most patients with chronic pain. Opioids don t reduce swelling, one of the causes of pain.                                    Other ways to manage chronic pain and improve function include:     Treat the health problem that may be causing pain  Anti-inflammation medicines, which reduce swelling and tenderness, such as ibuprofen (Advil, Motrin) or naproxen (Aleve)  Acetaminophen (Tylenol)  Antidepressants and anti-seizure medicines, especially for nerve pain  Topical treatments such as patches or creams  Injections or nerve blocks  Chiropractic or osteopathic treatment  Acupuncture, massage, deep breathing, meditation, visual imagery, aromatherapy  Use heat or ice at the pain site  Physical therapy   Exercise  Stop smoking  Take part in therapy       Risks and side effects     Talk to your doctor before you start or decide to keep taking opioids. Possible side effects include:    Lowering your breathing rate enough to cause death  Overdose, including death, especially if taking higher than prescribed doses  Worse depression symptoms; less pleasure in things you usually enjoy  Feeling tired or sluggish  Slower thoughts or cloudy thinking  Being more sensitive to pain over time; pain is harder to control  Trouble sleeping or restless sleep  Changes in hormone levels (for example, less testosterone)  Changes in sex drive or ability to have sex  Constipation  Unsafe driving  Itching and sweating  Dizziness  Nausea, throwing up and dry mouth    What else should I know about opioids?    Opioids may lead to dependence, tolerance, or addiction.    Dependence means that if you stop or reduce the medicine too quickly, you will have withdrawal symptoms. These include loose poop (diarrhea), jitters, flu-like symptoms, nervousness and tremors. Dependence is not the same as addiction.                     Tolerance means needing higher doses over time to  get the same effect. This may increase the chance of serious side effects.    Addiction is when people improperly use a substance that harms their body, their mind or their relations with others. Use of opiates can cause a relapse of addiction if you have a history of drug or alcohol abuse.    People who have used opioids for a long time may have a lower quality of life, worse depression, higher levels of pain and more visits to doctors.    You can overdose on opioids. Take these steps to lower your risk of overdose:    Recognize the signs:  Signs of overdose include decrease or loss of consciousness (blackout), slowed breathing, trouble waking up and blue lips. If someone is worried about overdose, they should call 911.    Talk to your doctor about Narcan (naloxone).   If you are at risk for overdose, you may be given a prescription for Narcan. This medicine very quickly reverses the effects of opioids.   If you overdose, a friend or family member can give you Narcan while waiting for the ambulance. They need to know the signs of overdose and how to give Narcan.     Don't use alcohol or street drugs.   Taking them with opioids can cause death.    Do not take any of these medicines unless your doctor says it s OK. Taking these with opioids can cause death:  Benzodiazepines, such as lorazepam (Ativan), alprazolam (Xanax) or diazepam (Valium)  Muscle relaxers, such as cyclobenzaprine (Flexeril)  Sleeping pills like zolpidem (Ambien)   Other opioids      How to keep you and other people safe while taking opioids:    Never share your opioids with others.  Opioid medicines are regulated by the Drug Enforcement Agency (LUIS). Selling or sharing medications is a criminal act.    2. Be sure to store opioids in a secure place, locked up if possible. Young children can easily swallow them and overdose.    3. When you are traveling with your medicines, keep them in the original bottles. If you use a pill box, be sure you also  carry a copy of your medicine list from your clinic or pharmacy.    4. Safe disposal of opioids    Most pharmacies have places to get rid of medicine, called disposal kiosks. Medicine disposal options are also available in every [facility]. Search your county and  medication disposal  to find more options. You can find more details at:  [email or web address]-medications     I agree that my provider, clinic care team, and pharmacy may work with any city, state or federal law enforcement agency that investigates the misuse, sale, or other diversion of my controlled medicine. I will allow my provider to discuss my care with, or share a copy of, this agreement with any other treating provider, pharmacy or emergency room where I receive care.    I have read this agreement and have asked questions about anything I did not understand.    _______________________________________________________  Patient Signature - Melvi Cleveland _____________________                   Date     _______________________________________________________  Provider Signature - GIOVANNY Valentino   _____________________                   Date     _______________________________________________________  Witness Signature (required if provider not present while patient signing)   _____________________                   Date

## 2023-08-30 NOTE — PROGRESS NOTES
"  Assessment & Plan     Moderate episode of recurrent major depressive disorder (H)  Change to Cymbalta.  Helps her pain in the past.  Always has back pain from her arthritis.  More issues with RA and Psoriatic.  Spine however is affected.   - DULoxetine (CYMBALTA) 30 MG capsule; Take 1 capsule (30 mg) by mouth 2 times daily    Goss's esophagus without dysplasia  New dx on last EGD. Will be having a follow up scope with Dr. Jiménez.     Malignant neoplasm of head, face, and neck (H)  She is not good at using this new cream too much burning.     Renal insufficiency  She is going to be given recheck next month on this. Has mild insuffiencey Stage 4.   - Basic metabolic panel; Future    Prediabetes  Needing follow up. Her A1C has been in 5.7 range last few times.   - Hemoglobin A1c; Future    Review of external notes as documented elsewhere in note  Ordering of each unique test  Prescription drug management  10  minutes spent by me on the date of the encounter doing chart review, history and exam, documentation and further activities per the note       BMI:   Estimated body mass index is 31.04 kg/m  as calculated from the following:    Height as of 3/16/23: 1.664 m (5' 5.5\").    Weight as of this encounter: 85.9 kg (189 lb 6.4 oz).   Weight management plan: Discussed healthy diet and exercise guidelines    See Patient Instructions    No follow-ups on file.    GIOVANNY Valentino  [facility] - AIDE Balderas is a 73 year old, presenting for the following health issues:  Recheck Medication        8/30/2023     2:26 PM   Additional Questions   Roomed by Jet Eduardo LPN   Accompanied by selfq         8/30/2023     2:26 PM   Patient Reported Additional Medications   Patient reports taking the following new medications none       HPI     Depression Followup  How are you doing with your depression since your last visit? Had worsened while not taking celexa and has started to improve once " back on the celexa  Are you having other symptoms that might be associated with depression? Yes:  sometimes having angry mad thoughts  Have you had a significant life event?  No   Are you feeling anxious or having panic attacks?   No  Do you have any concerns with your use of alcohol or other drugs? No    Social History     Tobacco Use    Smoking status: Former     Packs/day: 1.00     Types: Cigarettes     Quit date: 10/6/1997     Years since quittin.9    Smokeless tobacco: Never    Tobacco comments:     no passive exposure   Vaping Use    Vaping Use: Never used   Substance Use Topics    Alcohol use: No     Comment: former. quit     Drug use: No         2022     1:00 PM 10/24/2022    11:00 AM 2023     2:38 PM   PHQ   PHQ-9 Total Score 4 2 3   Q9: Thoughts of better off dead/self-harm past 2 weeks Not at all Not at all Not at all         2/15/2023     3:59 PM 3/16/2023     1:49 PM 2023     2:39 PM   HEATH-7 SCORE   Total Score 0 1 1         2023     2:38 PM   Last PHQ-9   1.  Little interest or pleasure in doing things 0   2.  Feeling down, depressed, or hopeless 0   3.  Trouble falling or staying asleep, or sleeping too much 2   4.  Feeling tired or having little energy 1   5.  Poor appetite or overeating 0   6.  Feeling bad about yourself 0   7.  Trouble concentrating 0   8.  Moving slowly or restless 0   Q9: Thoughts of better off dead/self-harm past 2 weeks 0   PHQ-9 Total Score 3   Difficulty at work, home, or with people Somewhat difficult         2023     2:39 PM   HEATH-7    1. Feeling nervous, anxious, or on edge 0   2. Not being able to stop or control worrying 0   3. Worrying too much about different things 0   4. Trouble relaxing 0   5. Being so restless that it is hard to sit still 0   6. Becoming easily annoyed or irritable 1   7. Feeling afraid, as if something awful might happen 0   HEATH-7 Total Score 1   If you checked any problems, how difficult have they made it for you  to do your work, take care of things at home, or get along with other people? Not difficult at all       Suicide Assessment Five-step Evaluation and Treatment (SAFE-T)            Review of Systems   Constitutional, HEENT, cardiovascular, pulmonary, gi and gu systems are negative, except as otherwise noted.      Objective    /70 (BP Location: Right arm, Patient Position: Sitting, Cuff Size: Adult Large)   Pulse 75   Temp 97.3  F (36.3  C) (Tympanic)   Resp 16   Wt 85.9 kg (189 lb 6.4 oz)   SpO2 94%   BMI 31.04 kg/m    Body mass index is 31.04 kg/m .  Physical Exam   GENERAL: healthy, alert and no distress  NECK: no adenopathy, no asymmetry, masses, or scars and thyroid normal to palpation  RESP: lungs clear to auscultation - no rales, rhonchi or wheezes  CV: regular rate and rhythm, normal S1 S2, no S3 or S4, no murmur, click or rub, no peripheral edema and peripheral pulses strong  MS: lower back pain is present and has been using tylenol as needed.    SKIN: was placed on fluiorcel and that was giving her sun damage and blistering.   PSYCH: mentation appears normal, affect normal/bright    Lab on 06/13/2023   Component Date Value Ref Range Status    CRP Inflammation 06/13/2023 <3.00  <5.00 mg/L Final    Sodium 06/13/2023 140  136 - 145 mmol/L Final    Potassium 06/13/2023 4.5  3.4 - 5.3 mmol/L Final    Chloride 06/13/2023 105  98 - 107 mmol/L Final    Carbon Dioxide (CO2) 06/13/2023 30 (H)  22 - 29 mmol/L Final    Anion Gap 06/13/2023 5 (L)  7 - 15 mmol/L Final    Urea Nitrogen 06/13/2023 10.1  8.0 - 23.0 mg/dL Final    Creatinine 06/13/2023 1.01 (H)  0.51 - 0.95 mg/dL Final    Calcium 06/13/2023 9.4  8.8 - 10.2 mg/dL Final    Glucose 06/13/2023 105 (H)  70 - 99 mg/dL Final    Alkaline Phosphatase 06/13/2023 59  35 - 104 U/L Final    AST 06/13/2023 25  0 - 45 U/L Final    Reference intervals for this test were updated on 6/12/2023 to more accurately reflect our healthy population. There may be  differences in the flagging of prior results with similar values performed with this method. Interpretation of those prior results can be made in the context of the updated reference intervals.    ALT 06/13/2023 23  0 - 50 U/L Final    Reference intervals for this test were updated on 6/12/2023 to more accurately reflect our healthy population. There may be differences in the flagging of prior results with similar values performed with this method. Interpretation of those prior results can be made in the context of the updated reference intervals.      Protein Total 06/13/2023 6.3 (L)  6.4 - 8.3 g/dL Final    Albumin 06/13/2023 3.6  3.5 - 5.2 g/dL Final    Bilirubin Total 06/13/2023 0.2  <=1.2 mg/dL Final    GFR Estimate 06/13/2023 59 (L)  >60 mL/min/1.73m2 Final    eGFR calculated using 2021 CKD-EPI equation.    Erythrocyte Sedimentation Rate 06/13/2023 13  0 - 30 mm/hr Final    WBC Count 06/13/2023 4.9  4.0 - 11.0 10e3/uL Final    RBC Count 06/13/2023 4.26  3.80 - 5.20 10e6/uL Final    Hemoglobin 06/13/2023 12.2  11.7 - 15.7 g/dL Final    Hematocrit 06/13/2023 37.5  35.0 - 47.0 % Final    MCV 06/13/2023 88  78 - 100 fL Final    MCH 06/13/2023 28.6  26.5 - 33.0 pg Final    MCHC 06/13/2023 32.5  31.5 - 36.5 g/dL Final    RDW 06/13/2023 14.2  10.0 - 15.0 % Final    Platelet Count 06/13/2023 212  150 - 450 10e3/uL Final    % Neutrophils 06/13/2023 48  % Final    % Lymphocytes 06/13/2023 35  % Final    % Monocytes 06/13/2023 10  % Final    % Eosinophils 06/13/2023 6  % Final    % Basophils 06/13/2023 1  % Final    % Immature Granulocytes 06/13/2023 0  % Final    NRBCs per 100 WBC 06/13/2023 0  <1 /100 Final    Absolute Neutrophils 06/13/2023 2.3  1.6 - 8.3 10e3/uL Final    Absolute Lymphocytes 06/13/2023 1.7  0.8 - 5.3 10e3/uL Final    Absolute Monocytes 06/13/2023 0.5  0.0 - 1.3 10e3/uL Final    Absolute Eosinophils 06/13/2023 0.3  0.0 - 0.7 10e3/uL Final    Absolute Basophils 06/13/2023 0.0  0.0 - 0.2 10e3/uL Final     Absolute Immature Granulocytes 06/13/2023 0.0  <=0.4 10e3/uL Final    Absolute NRBCs 06/13/2023 0.0  10e3/uL Final

## 2023-09-11 ENCOUNTER — MYC MEDICAL ADVICE (OUTPATIENT)
Dept: FAMILY MEDICINE | Facility: OTHER | Age: 73
End: 2023-09-11

## 2023-09-18 DIAGNOSIS — G62.9 PERIPHERAL POLYNEUROPATHY: ICD-10-CM

## 2023-09-20 RX ORDER — GABAPENTIN 300 MG/1
CAPSULE ORAL
Qty: 210 CAPSULE | Refills: 0 | Status: SHIPPED | OUTPATIENT
Start: 2023-09-20 | End: 2023-09-28

## 2023-09-20 NOTE — TELEPHONE ENCOUNTER
Gabapentin      Last Written Prescription Date:  8.15.23  Last Fill Quantity: #210,   # refills: 0  Last Office Visit: 8.30.23  Future Office visit:    Next 5 appointments (look out 90 days)      Oct 04, 2023  2:30 PM  (Arrive by 2:15 PM)  Office Visit with GIOVANNY Vital  [facility] [place] ([facility] [place] ) [address]  [place] [address]  [phone]             Routing refill request to provider for review/approval because:  Drug not on the [facility], P or  Health refill protocol or controlled substance

## 2023-09-25 DIAGNOSIS — G62.9 PERIPHERAL POLYNEUROPATHY: ICD-10-CM

## 2023-09-26 ENCOUNTER — LAB (OUTPATIENT)
Dept: LAB | Facility: OTHER | Age: 73
End: 2023-09-26
Payer: MEDICARE

## 2023-09-26 DIAGNOSIS — M06.09 RHEUMATOID ARTHRITIS OF MULTIPLE SITES WITHOUT RHEUMATOID FACTOR (H): ICD-10-CM

## 2023-09-26 DIAGNOSIS — Z79.899 ENCOUNTER FOR LONG-TERM (CURRENT) USE OF MEDICATIONS: ICD-10-CM

## 2023-09-26 DIAGNOSIS — R73.03 PREDIABETES: ICD-10-CM

## 2023-09-26 DIAGNOSIS — N28.9 RENAL INSUFFICIENCY: ICD-10-CM

## 2023-09-26 LAB
ALBUMIN SERPL BCG-MCNC: 3.8 G/DL (ref 3.5–5.2)
ALP SERPL-CCNC: 58 U/L (ref 35–104)
ALT SERPL W P-5'-P-CCNC: 26 U/L (ref 0–50)
ANION GAP SERPL CALCULATED.3IONS-SCNC: 8 MMOL/L (ref 7–15)
AST SERPL W P-5'-P-CCNC: 27 U/L (ref 0–45)
BASOPHILS # BLD AUTO: 0 10E3/UL (ref 0–0.2)
BASOPHILS NFR BLD AUTO: 1 %
BILIRUB SERPL-MCNC: 0.3 MG/DL
BUN SERPL-MCNC: 11.3 MG/DL (ref 8–23)
CALCIUM SERPL-MCNC: 9.4 MG/DL (ref 8.8–10.2)
CHLORIDE SERPL-SCNC: 103 MMOL/L (ref 98–107)
CREAT SERPL-MCNC: 1.01 MG/DL (ref 0.51–0.95)
CRP SERPL-MCNC: 3.32 MG/L
DEPRECATED HCO3 PLAS-SCNC: 28 MMOL/L (ref 22–29)
EGFRCR SERPLBLD CKD-EPI 2021: 59 ML/MIN/1.73M2
EOSINOPHIL # BLD AUTO: 0.2 10E3/UL (ref 0–0.7)
EOSINOPHIL NFR BLD AUTO: 4 %
ERYTHROCYTE [DISTWIDTH] IN BLOOD BY AUTOMATED COUNT: 14.1 % (ref 10–15)
ERYTHROCYTE [SEDIMENTATION RATE] IN BLOOD BY WESTERGREN METHOD: 13 MM/HR (ref 0–30)
EST. AVERAGE GLUCOSE BLD GHB EST-MCNC: 120 MG/DL
GLUCOSE SERPL-MCNC: 92 MG/DL (ref 70–99)
HBA1C MFR BLD: 5.8 %
HCT VFR BLD AUTO: 38.9 % (ref 35–47)
HGB BLD-MCNC: 12.5 G/DL (ref 11.7–15.7)
IMM GRANULOCYTES # BLD: 0 10E3/UL
IMM GRANULOCYTES NFR BLD: 0 %
LYMPHOCYTES # BLD AUTO: 1.7 10E3/UL (ref 0.8–5.3)
LYMPHOCYTES NFR BLD AUTO: 40 %
MCH RBC QN AUTO: 28.3 PG (ref 26.5–33)
MCHC RBC AUTO-ENTMCNC: 32.1 G/DL (ref 31.5–36.5)
MCV RBC AUTO: 88 FL (ref 78–100)
MONOCYTES # BLD AUTO: 0.4 10E3/UL (ref 0–1.3)
MONOCYTES NFR BLD AUTO: 10 %
NEUTROPHILS # BLD AUTO: 1.9 10E3/UL (ref 1.6–8.3)
NEUTROPHILS NFR BLD AUTO: 45 %
NRBC # BLD AUTO: 0 10E3/UL
NRBC BLD AUTO-RTO: 0 /100
PLATELET # BLD AUTO: 237 10E3/UL (ref 150–450)
POTASSIUM SERPL-SCNC: 4.4 MMOL/L (ref 3.4–5.3)
PROT SERPL-MCNC: 6.5 G/DL (ref 6.4–8.3)
RBC # BLD AUTO: 4.42 10E6/UL (ref 3.8–5.2)
SODIUM SERPL-SCNC: 139 MMOL/L (ref 135–145)
WBC # BLD AUTO: 4.3 10E3/UL (ref 4–11)

## 2023-09-26 PROCEDURE — 85004 AUTOMATED DIFF WBC COUNT: CPT | Mod: ZL

## 2023-09-26 PROCEDURE — 86140 C-REACTIVE PROTEIN: CPT | Mod: ZL

## 2023-09-26 PROCEDURE — 80053 COMPREHEN METABOLIC PANEL: CPT | Mod: ZL

## 2023-09-26 PROCEDURE — 85652 RBC SED RATE AUTOMATED: CPT | Mod: ZL

## 2023-09-26 PROCEDURE — 83036 HEMOGLOBIN GLYCOSYLATED A1C: CPT | Mod: ZL

## 2023-09-26 PROCEDURE — 36415 COLL VENOUS BLD VENIPUNCTURE: CPT | Mod: ZL

## 2023-09-28 RX ORDER — GABAPENTIN 300 MG/1
CAPSULE ORAL
Qty: 210 CAPSULE | Refills: 0 | Status: SHIPPED | OUTPATIENT
Start: 2023-09-28 | End: 2023-10-25

## 2023-09-28 NOTE — TELEPHONE ENCOUNTER
Gabapentin      Last Written Prescription Date:  8.16.23  Last Fill Quantity: #210,   # refills: 0  Last Office Visit: 8.30.23  Future Office visit:    Next 5 appointments (look out 90 days)      Oct 04, 2023  2:30 PM  (Arrive by 2:15 PM)  Office Visit with GIOVANNY Vital  [facility] [place] ([facility] [place] ) [address]  [place] [address]  [phone]             Routing refill request to provider for review/approval because:  Drug not on the [facility], P or  Health refill protocol or controlled substance

## 2023-10-04 ENCOUNTER — OFFICE VISIT (OUTPATIENT)
Dept: FAMILY MEDICINE | Facility: OTHER | Age: 73
End: 2023-10-04
Attending: PHYSICIAN ASSISTANT
Payer: COMMERCIAL

## 2023-10-04 VITALS
BODY MASS INDEX: 30.97 KG/M2 | TEMPERATURE: 98.1 F | DIASTOLIC BLOOD PRESSURE: 50 MMHG | SYSTOLIC BLOOD PRESSURE: 100 MMHG | WEIGHT: 189 LBS | RESPIRATION RATE: 16 BRPM | OXYGEN SATURATION: 96 % | HEART RATE: 83 BPM

## 2023-10-04 DIAGNOSIS — F33.1 MODERATE EPISODE OF RECURRENT MAJOR DEPRESSIVE DISORDER (H): Primary | ICD-10-CM

## 2023-10-04 PROCEDURE — 99213 OFFICE O/P EST LOW 20 MIN: CPT | Performed by: PHYSICIAN ASSISTANT

## 2023-10-04 PROCEDURE — G0463 HOSPITAL OUTPT CLINIC VISIT: HCPCS

## 2023-10-04 RX ORDER — HYDROXYCHLOROQUINE SULFATE 200 MG/1
TABLET, FILM COATED ORAL SEE ADMIN INSTRUCTIONS
Qty: 0.1 TABLET | Refills: 0 | OUTPATIENT
Start: 2023-10-04

## 2023-10-04 ASSESSMENT — ASTHMA QUESTIONNAIRES
ACT_TOTALSCORE: 25
QUESTION_3 LAST FOUR WEEKS HOW OFTEN DID YOUR ASTHMA SYMPTOMS (WHEEZING, COUGHING, SHORTNESS OF BREATH, CHEST TIGHTNESS OR PAIN) WAKE YOU UP AT NIGHT OR EARLIER THAN USUAL IN THE MORNING: NOT AT ALL
QUESTION_2 LAST FOUR WEEKS HOW OFTEN HAVE YOU HAD SHORTNESS OF BREATH: NOT AT ALL
QUESTION_4 LAST FOUR WEEKS HOW OFTEN HAVE YOU USED YOUR RESCUE INHALER OR NEBULIZER MEDICATION (SUCH AS ALBUTEROL): NOT AT ALL
QUESTION_5 LAST FOUR WEEKS HOW WOULD YOU RATE YOUR ASTHMA CONTROL: COMPLETELY CONTROLLED
QUESTION_1 LAST FOUR WEEKS HOW MUCH OF THE TIME DID YOUR ASTHMA KEEP YOU FROM GETTING AS MUCH DONE AT WORK, SCHOOL OR AT HOME: NONE OF THE TIME
ACUTE_EXACERBATION_TODAY: NO
ACT_TOTALSCORE: 25

## 2023-10-04 ASSESSMENT — ANXIETY QUESTIONNAIRES
2. NOT BEING ABLE TO STOP OR CONTROL WORRYING: NOT AT ALL
3. WORRYING TOO MUCH ABOUT DIFFERENT THINGS: NOT AT ALL
6. BECOMING EASILY ANNOYED OR IRRITABLE: NOT AT ALL
GAD7 TOTAL SCORE: 0
1. FEELING NERVOUS, ANXIOUS, OR ON EDGE: NOT AT ALL
7. FEELING AFRAID AS IF SOMETHING AWFUL MIGHT HAPPEN: NOT AT ALL
GAD7 TOTAL SCORE: 0
5. BEING SO RESTLESS THAT IT IS HARD TO SIT STILL: NOT AT ALL

## 2023-10-04 ASSESSMENT — PATIENT HEALTH QUESTIONNAIRE - PHQ9
5. POOR APPETITE OR OVEREATING: NOT AT ALL
SUM OF ALL RESPONSES TO PHQ QUESTIONS 1-9: 0

## 2023-10-04 ASSESSMENT — PAIN SCALES - GENERAL: PAINLEVEL: MODERATE PAIN (4)

## 2023-10-04 NOTE — TELEPHONE ENCOUNTER
Plaquenil      Last Written Prescription Date:  Patient reported  Last Fill Quantity: NA,   # refills: NA  Last Office Visit: 8/30/23  Future Office visit:    Next 5 appointments (look out 90 days)      Oct 04, 2023  2:30 PM  (Arrive by 2:15 PM)  Office Visit with GIOVANNY Vital  [facility] [place] ([facility] [place] ) [address]  [place] [address]  [phone]             Routing refill request to provider for review/approval because:  Medication is reported/historical

## 2023-10-04 NOTE — PROGRESS NOTES
Assessment & Plan     Moderate episode of recurrent major depressive disorder (H)  He will be seeing us back in 3months.  Doing much better.     Prescription drug management  10  minutes spent by me on the date of the encounter doing chart review, history and exam, documentation and further activities per the note       See Patient Instructions    No follow-ups on file.    GIOVANNY Valentino  [facility] - AIDE Balderas is a 73 year old, presenting for the following health issues:  Follow Up        10/4/2023     2:22 PM   Additional Questions   Roomed by Jet Eduardo LPN   Accompanied by self         10/4/2023     2:22 PM   Patient Reported Additional Medications   Patient reports taking the following new medications none       HPI     Depression Followup  How are you doing with your depression since your last visit? Improved   Are you having other symptoms that might be associated with depression? No  Have you had a significant life event?  No   Are you feeling anxious or having panic attacks?   No  Do you have any concerns with your use of alcohol or other drugs? No    Social History     Tobacco Use    Smoking status: Former     Packs/day: 1.00     Types: Cigarettes     Quit date: 10/6/1997     Years since quittin.0    Smokeless tobacco: Never    Tobacco comments:     no passive exposure   Vaping Use    Vaping Use: Never used   Substance Use Topics    Alcohol use: No     Comment: former. quit     Drug use: No         10/24/2022    11:00 AM 2023     2:38 PM 10/4/2023     2:31 PM   PHQ   PHQ-9 Total Score 2 3 0   Q9: Thoughts of better off dead/self-harm past 2 weeks Not at all Not at all Not at all         3/16/2023     1:49 PM 2023     2:39 PM 10/4/2023     2:31 PM   HEATH-7 SCORE   Total Score 1 1 0         10/4/2023     2:31 PM   Last PHQ-9   1.  Little interest or pleasure in doing things 0   2.  Feeling down, depressed, or hopeless 0   3.  Trouble falling or  staying asleep, or sleeping too much 0   4.  Feeling tired or having little energy 0   5.  Poor appetite or overeating 0   6.  Feeling bad about yourself 0   7.  Trouble concentrating 0   8.  Moving slowly or restless 0   Q9: Thoughts of better off dead/self-harm past 2 weeks 0   PHQ-9 Total Score 0         10/4/2023     2:31 PM   HEATH-7    1. Feeling nervous, anxious, or on edge 0   2. Not being able to stop or control worrying 0   3. Worrying too much about different things 0   4. Trouble relaxing 0   5. Being so restless that it is hard to sit still 0   6. Becoming easily annoyed or irritable 0   7. Feeling afraid, as if something awful might happen 0   HEATH-7 Total Score 0       Suicide Assessment Five-step Evaluation and Treatment (SAFE-T)          Review of Systems   Constitutional, HEENT, cardiovascular, pulmonary, gi and gu systems are negative, except as otherwise noted.      Objective    /50 (BP Location: Left arm, Patient Position: Sitting, Cuff Size: Adult Regular)   Pulse 83   Temp 98.1  F (36.7  C) (Tympanic)   Resp 16   Wt 85.7 kg (189 lb)   SpO2 96%   BMI 30.97 kg/m    Body mass index is 30.97 kg/m .  Physical Exam   GENERAL: healthy, alert and no distress  EYES: Eyes grossly normal to inspection, PERRL and conjunctivae and sclerae normal  HENT: ear canals and TM's normal, nose and mouth without ulcers or lesions  NECK: no adenopathy, no asymmetry, masses, or scars and thyroid normal to palpation  RESP: lungs clear to auscultation - no rales, rhonchi or wheezes  CV: regular rate and rhythm, normal S1 S2, no S3 or S4, no murmur, click or rub, no peripheral edema and peripheral pulses strong  ABDOMEN: soft, nontender, no hepatosplenomegaly, no masses and bowel sounds normal  MS: no gross musculoskeletal defects noted, no edema  SKIN: no suspicious lesions or rashes  BACK: no CVA tenderness, no paralumbar tenderness  PSYCH: mentation appears normal, affect normal/bright  LYMPH: no cervical,  supraclavicular, axillary, or inguinal adenopathy    Lab on 09/26/2023   Component Date Value Ref Range Status    Sodium 09/26/2023 139  135 - 145 mmol/L Final    Reference intervals for this test were updated on 09/26/2023 to more accurately reflect our healthy population. There may be differences in the flagging of prior results with similar values performed with this method. Interpretation of those prior results can be made in the context of the updated reference intervals.     Potassium 09/26/2023 4.4  3.4 - 5.3 mmol/L Final    Carbon Dioxide (CO2) 09/26/2023 28  22 - 29 mmol/L Final    Anion Gap 09/26/2023 8  7 - 15 mmol/L Final    Urea Nitrogen 09/26/2023 11.3  8.0 - 23.0 mg/dL Final    Creatinine 09/26/2023 1.01 (H)  0.51 - 0.95 mg/dL Final    GFR Estimate 09/26/2023 59 (L)  >60 mL/min/1.73m2 Final    Calcium 09/26/2023 9.4  8.8 - 10.2 mg/dL Final    Chloride 09/26/2023 103  98 - 107 mmol/L Final    Glucose 09/26/2023 92  70 - 99 mg/dL Final    Alkaline Phosphatase 09/26/2023 58  35 - 104 U/L Final    AST 09/26/2023 27  0 - 45 U/L Final    Reference intervals for this test were updated on 6/12/2023 to more accurately reflect our healthy population. There may be differences in the flagging of prior results with similar values performed with this method. Interpretation of those prior results can be made in the context of the updated reference intervals.    ALT 09/26/2023 26  0 - 50 U/L Final    Reference intervals for this test were updated on 6/12/2023 to more accurately reflect our healthy population. There may be differences in the flagging of prior results with similar values performed with this method. Interpretation of those prior results can be made in the context of the updated reference intervals.      Protein Total 09/26/2023 6.5  6.4 - 8.3 g/dL Final    Albumin 09/26/2023 3.8  3.5 - 5.2 g/dL Final    Bilirubin Total 09/26/2023 0.3  <=1.2 mg/dL Final    Erythrocyte Sedimentation Rate 09/26/2023 13  0  - 30 mm/hr Final    CRP Inflammation 09/26/2023 3.32  <5.00 mg/L Final    Estimated Average Glucose 09/26/2023 120  mg/dL Final    Hemoglobin A1C 09/26/2023 5.8 (H)  <5.7 % Final    Normal <5.7%   Prediabetes 5.7-6.4%    Diabetes 6.5% or higher     Note: Adopted from ADA consensus guidelines.    WBC Count 09/26/2023 4.3  4.0 - 11.0 10e3/uL Final    RBC Count 09/26/2023 4.42  3.80 - 5.20 10e6/uL Final    Hemoglobin 09/26/2023 12.5  11.7 - 15.7 g/dL Final    Hematocrit 09/26/2023 38.9  35.0 - 47.0 % Final    MCV 09/26/2023 88  78 - 100 fL Final    MCH 09/26/2023 28.3  26.5 - 33.0 pg Final    MCHC 09/26/2023 32.1  31.5 - 36.5 g/dL Final    RDW 09/26/2023 14.1  10.0 - 15.0 % Final    Platelet Count 09/26/2023 237  150 - 450 10e3/uL Final    % Neutrophils 09/26/2023 45  % Final    % Lymphocytes 09/26/2023 40  % Final    % Monocytes 09/26/2023 10  % Final    % Eosinophils 09/26/2023 4  % Final    % Basophils 09/26/2023 1  % Final    % Immature Granulocytes 09/26/2023 0  % Final    NRBCs per 100 WBC 09/26/2023 0  <1 /100 Final    Absolute Neutrophils 09/26/2023 1.9  1.6 - 8.3 10e3/uL Final    Absolute Lymphocytes 09/26/2023 1.7  0.8 - 5.3 10e3/uL Final    Absolute Monocytes 09/26/2023 0.4  0.0 - 1.3 10e3/uL Final    Absolute Eosinophils 09/26/2023 0.2  0.0 - 0.7 10e3/uL Final    Absolute Basophils 09/26/2023 0.0  0.0 - 0.2 10e3/uL Final    Absolute Immature Granulocytes 09/26/2023 0.0  <=0.4 10e3/uL Final    Absolute NRBCs 09/26/2023 0.0  10e3/uL Final

## 2023-10-10 DIAGNOSIS — N28.9 RENAL INSUFFICIENCY: ICD-10-CM

## 2023-10-11 DIAGNOSIS — H57.89 EYE IRRITATION: ICD-10-CM

## 2023-10-11 NOTE — TELEPHONE ENCOUNTER
Plaquenil      Last Written Prescription Date:  Pt reported  Last Fill Quantity: unknown  Last Office Visit: 10/04/23  Future Office visit:    Next 5 appointments (look out 90 days)      Jan 04, 2024  2:00 PM  (Arrive by 1:45 PM)  SHORT with GIOAVNNY Vital  [facility] [place] ([facility] [place] ) [address]  [place] [address]  [phone]             Routing refill request to provider for review/approval because:  Medication is reported/historical

## 2023-10-12 RX ORDER — LISINOPRIL 2.5 MG/1
2.5 TABLET ORAL DAILY
Qty: 30 TABLET | Refills: 0 | Status: SHIPPED | OUTPATIENT
Start: 2023-10-12 | End: 2023-11-10

## 2023-10-12 RX ORDER — HYDROXYCHLOROQUINE SULFATE 200 MG/1
TABLET, FILM COATED ORAL SEE ADMIN INSTRUCTIONS
Qty: 0.1 TABLET | Refills: 0 | OUTPATIENT
Start: 2023-10-12

## 2023-10-12 NOTE — TELEPHONE ENCOUNTER
Lisinopril      Last Written Prescription Date:  9.12.23  Last Fill Quantity: #30,   # refills: 0  Last Office Visit: 10.4.23  Future Office visit:    Next 5 appointments (look out 90 days)      Jan 04, 2024  2:00 PM  (Arrive by 1:45 PM)  SHORT with GIOVANNY Vital  [facility] [place] ([facility] [place] ) [address]  [place] [address]  [phone]             Routing refill request to provider for review/approval because:

## 2023-10-23 DIAGNOSIS — G62.9 PERIPHERAL POLYNEUROPATHY: ICD-10-CM

## 2023-10-23 DIAGNOSIS — F33.1 MODERATE EPISODE OF RECURRENT MAJOR DEPRESSIVE DISORDER (H): ICD-10-CM

## 2023-10-23 NOTE — TELEPHONE ENCOUNTER
Cymbalta       Last Written Prescription Date:  08/30/2023  Last Fill Quantity: 60,   # refills: 1  Last Office Visit: 10/04/2023    Gabapentin       Last Written Prescription Date:  9/28/2023  Last Fill Quantity: 210,   # refills: 0  Last Office Visit: 10/04/2023  Future Office visit:    Next 5 appointments (look out 90 days)      Jan 04, 2024  2:00 PM  (Arrive by 1:45 PM)  SHORT with GIOVANNY Vital  [facility] [place] ([facility] [place] ) [address]  [place] [address]  [phone]

## 2023-10-24 RX ORDER — DULOXETIN HYDROCHLORIDE 30 MG/1
30 CAPSULE, DELAYED RELEASE ORAL 2 TIMES DAILY
Qty: 60 CAPSULE | Refills: 5 | Status: SHIPPED | OUTPATIENT
Start: 2023-10-24 | End: 2024-01-04

## 2023-10-25 RX ORDER — GABAPENTIN 300 MG/1
CAPSULE ORAL
Qty: 210 CAPSULE | Refills: 0 | Status: SHIPPED | OUTPATIENT
Start: 2023-10-25 | End: 2023-11-21

## 2023-10-27 NOTE — PROGRESS NOTES
Attempted to reach patient. Left a voicemail to call back.      Medication sent.   SUBJECTIVE:  Melvi Cleveland, 67 year old, female is seen with headache.  Present over the last weeek.  She notes associated nausea, vomiting, and occasional diarrhea .  No household cnoontacts are ill.  She has been using her usual  pain medications at home with only moderate improvement.  This has now worsened.  She has a history of rheumatoid arthritis and remote history of migraine headaches.    Denies fever, headache, visual disturbance, dizziness, focal weakness, numbness, tingling, paresthesias, tremor, or gait disturbance.      Current Outpatient Prescriptions   Medication Sig Dispense Refill     gabapentin (NEURONTIN) 300 MG capsule TAKE 3 CAPSULES BY MOUTH IN THE MORNING, 2 CAPSULES AT NOON AND 2 CAPSULES IN THE EVENING 210 capsule 0     traZODone (DESYREL) 50 MG tablet TAKE 2-3 TABLETS BY MOUTH DAILY AT BEDTIME 270 tablet 1     ALLEGRA-D ALLERGY & CONGESTION  MG per 12 hr tablet TAKE 1 TABLET BY MOUTH TWICE DAILY 30 tablet 0     PREBIOTIC PRODUCT PO Take 1 tablet by mouth daily       RESTASIS 0.05 % ophthalmic emulsion USE 1 DROP IN BOTH EYES 2 TIMES A DAY 60 each 0     Probiotic Product (SOLUBLE FIBER/PROBIOTICS PO) Take 1 tablet by mouth       Flaxseed, Linseed, (FLAX SEED OIL) 1000 MG capsule Take 2 capsules by mouth daily       fluticasone (FLONASE) 50 MCG/ACT spray 1 spray Reported on 5/11/2017       ketotifen (ZADITOR/REFRESH ANTI-ITCH) 0.025 % SOLN ophthalmic solution 1 drop       citalopram (CELEXA) 40 MG tablet Take 40 mg by mouth       HYDROcodone-acetaminophen (NORCO) 7.5-325 MG per tablet TAKE 1 TABLET BY MOUTH EVERY 4 TO 6 HOURS AS NEEDED FOR PAIN 60 tablet 0     ALBUTEROL 108 (90 BASE) MCG/ACT inhaler INHALE 2 PUFFS INTO THE LUNGS EVERY 6 HOURS AS NEEDED FOR SHORTNESS OF BREATH OR WHEEZING 8.5 g 5     SM GAS RELIEF ANTIFLATUENT 180 MG CAPS TAKE ONE CAPSULE BY MOUTH AS NEEDED AFTER A MEAL. MAX 2 CAPS/DAY 60 capsule 5     pantoprazole (PROTONIX) 40 MG enteric coated tablet Take 1 tablet  (40 mg) by mouth daily (Patient taking differently: Take 40 mg by mouth 2 times daily ) 90 tablet 3     DICLOFENAC PO Take 100 mg by mouth daily        Garlic 400 MG TBEC        polyvinyl alcohol-povidone (REFRESH) 1.4-0.6 % ophthalmic solution 1-2 drops as needed       hydroxychloroquine (PLAQUENIL) 200 MG tablet Take 200 mg by mouth 2 times daily.       folic acid (FOLVITE) 1 MG tablet Take 1 mg by mouth 3 times daily.       METHOTREXATE SODIUM, PF, IJ Inject 0.8 mLs as directed once a week        MULTIPLE VITAMIN PO Take  by mouth 2 times daily.       Ascorbic Acid (VITAMIN C) 500 MG CAPS Take 2 tablets by mouth 2 times daily.       Calcium-Vitamin D-Vitamin K (CALCIUM + D) 500-1000-40 MG-UNT-MCG CHEW Take 3 tablets by mouth daily        ALFALFA PO Take 5 tablets by mouth 2 times daily.       MILK THISTLE PO Take 1,000 mg by mouth daily.       fish oil-omega-3 fatty acids (OMEGA-3) 1000 MG capsule Take 1 g by mouth daily.       Cholecalciferol (VITAMIN D) 1000 UNITS capsule Take 1 capsule by mouth daily.          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     Parotiditis 5/9/2011     Peripheral Neuriopathy 3/1/2011     Rheumatoid arthritis(714.0) 12/13/1999     Seborrhea capitis 10/6/2011     Sjogrens Syndrome 3/1/2011     Urethral stricture 4/30/2008     Past Surgical History:   Procedure Laterality Date     BACK SURGERY  05/1995    back surgery     BIOPSY      breast bx X2     BIOPSY BREAST      LT     BIOPSY BREAST NEEDLE LOCALIZATION Right 6/12/2017    Procedure: BIOPSY BREAST  NEEDLE LOCALIZATION;  WIRE LOCALIZED EXCISIONAL BIOPSY  RIGHT BREAST MASS;  Surgeon: Jeni Amin MD;  Location: [facility] OR     CHOLECYSTECTOMY  2004     COLONOSCOPY  11/15/2004    screening     COLONOSCOPY  9/24/2014     EGD with biopsy  2010, 2011    GERD     laminectomy      L4 L5 laminectomy; back pain     Family History   Problem Relation Age of Onset     DIABETES Mother      Rheumatoid Arthritis Mother      Other - See Comments Mother      fibromyalgia     Osteoarthritis Mother      Thyroid Disease Mother      thyroid disorder     Unknown/Adopted Father      cause of death unknown     Rheumatoid Arthritis Father      DIABETES Sister      Myocardial Infarction Sister      cause of death     Lupus Sister      cause of death     C.A.D. Maternal Grandmother      CEREBROVASCULAR DISEASE Maternal Grandmother      DIABETES Maternal Grandmother      Thyroid Disease Maternal Grandmother      thyrodi disorder; goitor removed     CANCER Maternal Grandfather      Hypertension Brother      Other - See Comments Brother      sleep apnea     Other - See Comments Sister      sleep apnea     Social History     Social History     Marital status:      Spouse name: N/A     Number of children: N/A     Years of education: N/A     Occupational History      [employer]     part time     Social History Main Topics     Smoking status: Former Smoker     Types: Cigarettes     Quit date: 3/28/1997     Smokeless tobacco: Never Used      Comment: no passive exposure     Alcohol use No      Comment: former. quit 1984     Drug use: No     Sexual activity: Not on file     Other Topics Concern     Parent/Sibling W/ Cabg, Mi Or Angioplasty Before 65f 55m? Yes     Blood Transfusions Yes     Caffeine Concern Yes     chocolate rare     Seat Belt Yes     Social History Narrative         Review Of Systems  Constitutional, HEENT, cardiovascular, pulmonary, gi and gu systems are negative, except as otherwise noted.      OBJECTIVE:/68  Pulse 99  " Temp 99.1  F (37.3  C) (Tympanic)  Resp 19  Ht 5' 6\" (1.676 m)  Wt 208 lb (94.3 kg)  SpO2 93%  BMI 33.57 kg/m2      Exam:  Physical Exam   Constitutional: She is oriented to person, place, and time. She appears well-developed and well-nourished. She appears distressed.   Obviously uncomfortable female   HENT:   Head: Atraumatic.   Right Ear: External ear normal.   Left Ear: External ear normal.   Oropharynx somewhat dry   Eyes: Conjunctivae are normal. Pupils are equal, round, and reactive to light.   Neck: Neck supple.   Abdominal: Soft. Bowel sounds are normal. She exhibits no distension. There is no tenderness.   Lymphadenopathy:     She has no cervical adenopathy.   Neurological: She is alert and oriented to person, place, and time.   Skin: Skin is warm and dry. No rash noted.   Psychiatric: She has a normal mood and affect.     Other exam not repeated    Labs:  Orders Only on 06/21/2017   Component Date Value Ref Range Status     WBC 06/21/2017 6.9  4.0 - 11.0 10e9/L Final     RBC Count 06/21/2017 4.30  3.8 - 5.2 10e12/L Final     Hemoglobin 06/21/2017 12.7  11.7 - 15.7 g/dL Final     Hematocrit 06/21/2017 38.6  35.0 - 47.0 % Final     MCV 06/21/2017 90  78 - 100 fl Final     MCH 06/21/2017 29.5  26.5 - 33.0 pg Final     MCHC 06/21/2017 32.9  31.5 - 36.5 g/dL Final     RDW 06/21/2017 14.9  10.0 - 15.0 % Final     Platelet Count 06/21/2017 270  150 - 450 10e9/L Final     Diff Method 06/21/2017 Automated Method   Final     % Neutrophils 06/21/2017 51.2  % Final     % Lymphocytes 06/21/2017 34.7  % Final     % Monocytes 06/21/2017 9.7  % Final     % Eosinophils 06/21/2017 3.5  % Final     % Basophils 06/21/2017 0.6  % Final     % Immature Granulocytes 06/21/2017 0.3  % Final     Nucleated RBCs 06/21/2017 0  0 /100 Final     Absolute Neutrophil 06/21/2017 3.5  1.6 - 8.3 10e9/L Final     Absolute Lymphocytes 06/21/2017 2.4  0.8 - 5.3 10e9/L Final     Absolute Monocytes 06/21/2017 0.7  0.0 - 1.3 10e9/L Final "     Absolute Eosinophils 06/21/2017 0.2  0.0 - 0.7 10e9/L Final     Absolute Basophils 06/21/2017 0.0  0.0 - 0.2 10e9/L Final     Abs Immature Granulocytes 06/21/2017 0.0  0 - 0.4 10e9/L Final     Absolute Nucleated RBC 06/21/2017 0.0   Final     CRP Inflammation 06/21/2017 <2.9  0.0 - 8.0 mg/L Final     Sed Rate 06/21/2017 11  0 - 30 mm/h Final     Creatinine 06/21/2017 1.13* 0.52 - 1.04 mg/dL Final     GFR Estimate 06/21/2017 48* >60 mL/min/1.7m2 Final    Non  GFR Calc     GFR Estimate If Black 06/21/2017 58* >60 mL/min/1.7m2 Final    African American GFR Calc     ALT 06/21/2017 28  0 - 50 U/L Final       ASSESSMENT/PLAN:  Intractable episodic tension-type headache  Because of the nature of headache CT performed urgently and was negative.  WBC mildly elevated.  Continue pain control. Zofran as written.  Follow up with persistent symptoms.  - CBC with platelets differential  - Basic metabolic panel  - CT Head w/o Contrast  - ondansetron (ZOFRAN ODT) 4 MG ODT tab; Take 1-2 tablets (4-8 mg) by mouth every 8 hours as needed for nausea    Dehydration  Discussed fluids.  She was sent to infusion therapy to receive 1 liter of D5NS and then discharged.    Rheumatoid arthritis involving multiple sites with positive rheumatoid factor (H)  Continue pain control.  - HYDROcodone-acetaminophen (NORCO) 7.5-325 MG per tablet; TAKE 1 TABLET BY MOUTH EVERY 4 TO 6 HOURS AS NEEDED FOR PAIN            Jimbo Martinez MD

## 2023-11-03 DIAGNOSIS — J30.2 SEASONAL ALLERGIC RHINITIS, UNSPECIFIED TRIGGER: ICD-10-CM

## 2023-11-03 NOTE — TELEPHONE ENCOUNTER
Lula KIRBY      Last Written Prescription Date:  6/28/23  Last Fill Quantity: 60,   # refills: 4  Last Office Visit: 10/4/23  Future Office visit:    Next 5 appointments (look out 90 days)      Jan 04, 2024  2:00 PM  (Arrive by 1:45 PM)  SHORT with GIOVANNY Vital  [facility] - [place] ([facility] [place] ) [address]  [place] [address]  [phone]             Routing refill request to provider for review/approval because:

## 2023-11-06 RX ORDER — FEXOFENADINE HYDROCHLORIDE AND PSEUDOEPHEDRINE HYDROCHLORIDE 60; 120 MG/1; MG/1
1 TABLET, FILM COATED, EXTENDED RELEASE ORAL 2 TIMES DAILY
Qty: 60 TABLET | Refills: 0 | Status: SHIPPED | OUTPATIENT
Start: 2023-11-06 | End: 2023-12-06

## 2023-11-06 NOTE — PLAN OF CARE
Kelly ZURITA NP aware of no IV access at this time. Ok to leave out. If pt condition changes will reassess.   Pt to [facility] for lab draw, Doctor visit, lumbar puncture with sedation for IT Chemotherapy and for IV Chemotherapy.  Afebrile.  VSS.  Awake and alert in no acute distress.  Port accessed with 22G 3/4inch with 1attempt. Labs drawn from the port without difficulty.  Pt tolerated well.      Visit with Dr. Faye completed. Okay to proceed with chemotherapy and LP today.    Labs reviewed and pre-medications given per MD orders, see MAR.     LP completed at 1351.       See MAR for medication adminsitration.  No unexpected events.      Chemotherapy dosage calculated independently by Yue Poole RN and Tammie Cartagena RN and compared to road map for protocol COG [id number].  Calculations within 10% of written order.     Chemotherapy given as ordered, see MAR.  Blood return verified prior to, during, and after chemotherapy infusion.  See Chemotherapy flowsheet.  Pt tolerated well.  No side effects or complications noted.     Pt remained supine for one hour post LP.  Pt tolerated regular diet and ambulated independently. Port flushed per orders (see MAR) and de-accessed.  Discharged home with sister/mother once discharge criteria met. Next appt 11/21/23.

## 2023-11-08 ENCOUNTER — HOSPITAL ENCOUNTER (EMERGENCY)
Facility: HOSPITAL | Age: 73
Discharge: HOME | End: 2023-11-08
Attending: STUDENT IN AN ORGANIZED HEALTH CARE EDUCATION/TRAINING PROGRAM | Admitting: STUDENT IN AN ORGANIZED HEALTH CARE EDUCATION/TRAINING PROGRAM
Payer: MEDICARE

## 2023-11-08 ENCOUNTER — APPOINTMENT (OUTPATIENT)
Dept: CT IMAGING | Facility: HOSPITAL | Age: 73
End: 2023-11-08
Attending: STUDENT IN AN ORGANIZED HEALTH CARE EDUCATION/TRAINING PROGRAM
Payer: MEDICARE

## 2023-11-08 VITALS
TEMPERATURE: 97.3 F | HEIGHT: 65 IN | WEIGHT: 189 LBS | BODY MASS INDEX: 31.49 KG/M2 | SYSTOLIC BLOOD PRESSURE: 121 MMHG | RESPIRATION RATE: 16 BRPM | DIASTOLIC BLOOD PRESSURE: 72 MMHG | OXYGEN SATURATION: 92 % | HEART RATE: 73 BPM

## 2023-11-08 DIAGNOSIS — S19.9XXA INJURY OF NECK, INITIAL ENCOUNTER: ICD-10-CM

## 2023-11-08 DIAGNOSIS — R55 SYNCOPE, UNSPECIFIED SYNCOPE TYPE: ICD-10-CM

## 2023-11-08 DIAGNOSIS — S09.90XA INJURY OF HEAD, INITIAL ENCOUNTER: ICD-10-CM

## 2023-11-08 LAB
ANION GAP SERPL CALCULATED.3IONS-SCNC: 8 MMOL/L (ref 7–15)
BUN SERPL-MCNC: 10.6 MG/DL (ref 8–23)
CALCIUM SERPL-MCNC: 9.3 MG/DL (ref 8.8–10.2)
CHLORIDE SERPL-SCNC: 104 MMOL/L (ref 98–107)
CREAT SERPL-MCNC: 0.92 MG/DL (ref 0.51–0.95)
DEPRECATED HCO3 PLAS-SCNC: 27 MMOL/L (ref 22–29)
EGFRCR SERPLBLD CKD-EPI 2021: 66 ML/MIN/1.73M2
ERYTHROCYTE [DISTWIDTH] IN BLOOD BY AUTOMATED COUNT: 14.4 % (ref 10–15)
GLUCOSE SERPL-MCNC: 104 MG/DL (ref 70–99)
HCT VFR BLD AUTO: 37.5 % (ref 35–47)
HGB BLD-MCNC: 12.1 G/DL (ref 11.7–15.7)
HOLD SPECIMEN: NORMAL
MCH RBC QN AUTO: 28.8 PG (ref 26.5–33)
MCHC RBC AUTO-ENTMCNC: 32.3 G/DL (ref 31.5–36.5)
MCV RBC AUTO: 89 FL (ref 78–100)
PLATELET # BLD AUTO: 199 10E3/UL (ref 150–450)
POTASSIUM SERPL-SCNC: 4.2 MMOL/L (ref 3.4–5.3)
RBC # BLD AUTO: 4.2 10E6/UL (ref 3.8–5.2)
SODIUM SERPL-SCNC: 139 MMOL/L (ref 135–145)
TROPONIN T SERPL HS-MCNC: 8 NG/L
WBC # BLD AUTO: 4.7 10E3/UL (ref 4–11)

## 2023-11-08 PROCEDURE — 85027 COMPLETE CBC AUTOMATED: CPT | Performed by: STUDENT IN AN ORGANIZED HEALTH CARE EDUCATION/TRAINING PROGRAM

## 2023-11-08 PROCEDURE — G1010 CDSM STANSON: HCPCS

## 2023-11-08 PROCEDURE — 99284 EMERGENCY DEPT VISIT MOD MDM: CPT | Performed by: STUDENT IN AN ORGANIZED HEALTH CARE EDUCATION/TRAINING PROGRAM

## 2023-11-08 PROCEDURE — 93010 ELECTROCARDIOGRAM REPORT: CPT | Performed by: INTERNAL MEDICINE

## 2023-11-08 PROCEDURE — 84484 ASSAY OF TROPONIN QUANT: CPT | Performed by: STUDENT IN AN ORGANIZED HEALTH CARE EDUCATION/TRAINING PROGRAM

## 2023-11-08 PROCEDURE — 99285 EMERGENCY DEPT VISIT HI MDM: CPT | Mod: 25

## 2023-11-08 PROCEDURE — 82310 ASSAY OF CALCIUM: CPT | Performed by: STUDENT IN AN ORGANIZED HEALTH CARE EDUCATION/TRAINING PROGRAM

## 2023-11-08 PROCEDURE — 36415 COLL VENOUS BLD VENIPUNCTURE: CPT | Performed by: STUDENT IN AN ORGANIZED HEALTH CARE EDUCATION/TRAINING PROGRAM

## 2023-11-08 PROCEDURE — 93005 ELECTROCARDIOGRAM TRACING: CPT

## 2023-11-08 ASSESSMENT — ACTIVITIES OF DAILY LIVING (ADL): ADLS_ACUITY_SCORE: 35

## 2023-11-08 NOTE — ED NOTES
OMID Robert CNP assessed patient in triage and determined patient not Urgent Care appropriate. Will be seen in Emergency Department.

## 2023-11-08 NOTE — ED TRIAGE NOTES
Reports Sunday over night she passed out while sitting on her chair she thinks due to abdominal pain. Was getting a breakfast bar to eat due to stomach acid. Reports she has been having medial and lateral neck pain as well as headache and left eye started bruising this afternoon. No blood thinners.

## 2023-11-08 NOTE — ED PROVIDER NOTES
History     Chief Complaint   Patient presents with    Fall     HPI  Melvi Cleveland is a 73 year old female with PMH crohn's disease, sicca syndrome, rheumatoid arthritis, chronic stomach pain, chronic back pain, presenting after a syncopal episode 2 days ago and hit head/neck and has headache and neck pain. Went to see chiropractor today and was sent here instead. Had a  on  and was feeling very stressed. She developed worsening abdominal pain with this is common for her and stressful situations.   night into Monday morning she woke up with abdominal pain and she states that usually gets better when she eats something so she went to the kitchen to get a bar.  However, she believes she passed out from the abdominal pain.  She denies any chest pain or shortness of breath.  The abdominal pain subsided later in the day and she has not had any abdominal pain for 2 days.  However, her head and neck have continued to hurt.  She is not on blood thinners.  She has no chest pain and no shortness of breath.  No recent fevers or cough.  No leg swelling or history of CHF.    Allergies:  Allergies   Allergen Reactions    Adhesive Tape      Glue and nicotine patches    Benzoin Compound      Tin-Co-Javier    Mold     Seasonal Allergies     Soap      Any cleanser/soap/disinfectant that is used right before surgery.  Makes patient break out horribly. Chart was looked at and Chloraprep was used.       Problem List:    Patient Active Problem List    Diagnosis Date Noted    Goss's esophagus without dysplasia 2023     Priority: Medium    Moderate episode of recurrent major depressive disorder (H) 2023     Priority: Medium    Malignant neoplasm of head, face, and neck (H) 2023     Priority: Medium    Chronic, continuous use of opioids 2021     Priority: Medium    Chronic kidney disease, stage 3 (H) 2021     Priority: Medium    Cough variant asthma 2019     Priority: Medium     Need for hepatitis C screening test 11/13/2018     Priority: Medium    ACP (advance care planning) 09/28/2016     Priority: Medium     Advance Care Planning 9/28/2016: ACP Review of Chart / Resources Provided:  Reviewed chart for advance care plan.  Melvi CHRISTOPHER [place] has been provided information and resources to begin or update their advance care plan.  Added by Kathy Galvan            RA (rheumatoid arthritis) (H) 11/25/2015     Priority: Medium    Comprehensive Medical Examination 11/25/2015     Priority: Medium    Seasonal allergic rhinitis 11/25/2015     Priority: Medium    Fibrocystic breast disease (FCBD) 11/25/2015     Priority: Medium    Crohn's disease of large intestine (H) 03/01/2011     Priority: Medium    Dyslipidemia 03/01/2011     Priority: Medium    Sicca syndrome (H24) 03/01/2011     Priority: Medium    Neck pain, chronic 07/05/2005     Priority: Medium     05/2015 MRI:  IMPRESSION:  BROAD-BASED DISK PROTRUSION AT C5-C6 ON THE LEFT, MILDLY  IMPINGING ON THE LEFT C6 NERVE ROOT      Low back pain, chronic 12/13/2000     Priority: Medium     03/2016 MRI:  MULTILEVEL DEGENERATIVE CHANGES OF THE LUMBAR SPINE,  SIMILAR IN APPEARANCE TO THE PRIOR STUDY.  A RIGHT FORAMINAL DISK  PROTRUSION AT L3-L4 AGAIN IMPINGES THE EXITING RIGHT L3 NERVE ROOT.  CORRELATE FOR A RELATED RADICULOPATHY.          Past Medical History:    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    Parotiditis 05/09/2011    Peripheral Neuriopathy 03/01/2011    Pneumonia of left lower lobe due to infectious organism 04/16/2019     Rheumatoid arthritis(714.0) 12/13/1999    Seborrhea capitis 10/06/2011    Sjogrens Syndrome 03/01/2011    Urethral stricture 04/30/2008       Past Surgical History:    Past Surgical History:   Procedure Laterality Date    BACK SURGERY  05/1995    back surgery    BIOPSY      breast bx X2    BIOPSY BREAST      LT x 3 benign    BIOPSY BREAST NEEDLE LOCALIZATION Right 06/12/2017    Procedure: BIOPSY BREAST NEEDLE LOCALIZATION;  WIRE LOCALIZED EXCISIONAL BIOPSY  RIGHT BREAST MASS;  Surgeon: Jeni Amin MD;  Location: [facility]    CHOLECYSTECTOMY  2004    COLONOSCOPY  11/15/2004    screening    COLONOSCOPY  09/24/2014    COLONOSCOPY - HIM SCAN N/A 11/01/2017    (Jessy) no abnormality - right, transverse and left colon biopsies, repeat in 2 years    EGD with biopsy  2010, 2011    GERD    ENDOSCOPY UPPER, COLONOSCOPY, COMBINED N/A 01/17/2020    Procedure: UPPER ENDOSCOPY WITH BIOPSIES  AND COLONOSCOPY;  Surgeon: Saul Jiménez MD;  Location: [facility] OR    IR CONSULTATION FOR IR EXAM  02/13/2020    IR CONSULTATION FOR IR EXAM  06/18/2020    IR CONSULTATION FOR IR EXAM  07/01/2021    laminectomy      L4 L5 laminectomy; back pain    LUMPECTOMY BREAST      RELEASE CARPAL TUNNEL Right 01/16/2018    Procedure: RELEASE CARPAL TUNNEL;  RIGHT CARPAL TUNNEL RELEASE;  Surgeon: Haider Carlos MD;  Location: [facility]       Family History:    Family History   Problem Relation Age of Onset    Coronary Artery Disease Mother         Bad valvue    Diabetes Mother     Rheumatoid Arthritis Mother     Other - See Comments Mother         fibromyalgia    Osteoarthritis Mother     Thyroid Disease Mother         thyroid disorder    Unknown/Adopted Father         cause of death unknown    Rheumatoid Arthritis Father     Diabetes Sister     Myocardial Infarction Sister         cause of death    Lupus Sister         cause of death    Other - See Comments Sister         sleep apnea    Hypertension Brother     Other - See Comments Brother         sleep  apnea    C.A.D. Maternal Grandmother     Cerebrovascular Disease Maternal Grandmother     Diabetes Maternal Grandmother     Thyroid Disease Maternal Grandmother         thyrodi disorder; goitor removed    Cancer Maternal Grandfather     Diabetes Daughter     Anxiety Disorder Daughter        Social History:  Marital Status:   [2]  Social History     Tobacco Use    Smoking status: Former     Packs/day: 1     Types: Cigarettes     Quit date: 10/6/1997     Years since quittin.1    Smokeless tobacco: Never    Tobacco comments:     no passive exposure   Vaping Use    Vaping Use: Never used   Substance Use Topics    Alcohol use: No     Comment: former. quit     Drug use: No        Medications:    albuterol (PROAIR HFA/PROVENTIL HFA/VENTOLIN HFA) 108 (90 Base) MCG/ACT inhaler  ALFALFA PO  ALLEGRA-D ALLERGY & CONGESTION  MG 12 hr tablet  Ascorbic Acid (VITAMIN C) 500 MG CAPS  Biotin 72285 MCG TABS  Calcium-Vitamin D-Vitamin K 500-1000-40 MG-UNT-MCG CHEW  Cholecalciferol (VITAMIN D) 1000 UNITS capsule  DEXILANT 60 MG CPDR CR capsule  diclofenac (VOLTAREN) 1 % topical gel  DULoxetine (CYMBALTA) 30 MG capsule  Flaxseed, Linseed, (FLAX SEED OIL) 1000 MG capsule  fluocinonide (LIDEX) 0.05 % external solution  folic acid (FOLVITE) 1 MG tablet  gabapentin (NEURONTIN) 300 MG capsule  Garlic 400 MG TBEC  hydroxychloroquine (PLAQUENIL) 200 MG tablet  ketoconazole (NIZORAL) 2 % external shampoo  ketotifen fumarate 0.035%, ketotifen 0.025%, (ZADITOR) 0.025 % ophthalmic solution  lisinopril (ZESTRIL) 2.5 MG tablet  methotrexate sodium 2.5 MG TABS  MILK THISTLE PO  montelukast (SINGULAIR) 10 MG tablet  multivitamin, therapeutic (THERA-VIT) TABS tablet  Omega-3 1000 MG capsule  polyvinyl alcohol-povidone (HYPOTEARS) 1.4-0.6 % ophthalmic solution  PREBIOTIC PRODUCT PO  Probiotic Product (PROBIOTIC PO)  RESTASIS 0.05 % ophthalmic emulsion  Simethicone 180 MG CAPS  traZODone (DESYREL) 50 MG tablet  triamcinolone  "(KENALOG) 0.1 % external cream  UNABLE TO FIND  vitamin B complex with vitamin C (STRESS TAB) tablet  zinc gluconate 50 MG tablet          Review of Systems   All other systems reviewed and are negative.      Physical Exam   BP: 134/66  Pulse: 73  Temp: 97.3  F (36.3  C)  Resp: 16  Height: 165.1 cm (5' 5\")  Weight: 85.7 kg (189 lb)  SpO2: 96 %      Physical Exam  Vitals and nursing note reviewed.   Constitutional:       General: She is not in acute distress.     Appearance: Normal appearance. She is not ill-appearing, toxic-appearing or diaphoretic.   HENT:      Head: Normocephalic.      Comments: Abrasion to bridge of nose. No septal hematoma. Abrasion to left forehead.     Mouth/Throat:      Mouth: Mucous membranes are moist.   Eyes:      Extraocular Movements: Extraocular movements intact.      Pupils: Pupils are equal, round, and reactive to light.   Neck:      Comments: Diffuse pain throughout the neck without focal pain. No stepoffs or deformities.  Cardiovascular:      Rate and Rhythm: Normal rate and regular rhythm.      Pulses: Normal pulses.      Heart sounds: Normal heart sounds.   Pulmonary:      Effort: Pulmonary effort is normal.      Breath sounds: Normal breath sounds.   Abdominal:      General: Abdomen is flat.      Palpations: Abdomen is soft.      Tenderness: There is no abdominal tenderness.   Musculoskeletal:         General: No tenderness. Normal range of motion.      Cervical back: Normal range of motion.   Skin:     General: Skin is warm and dry.      Capillary Refill: Capillary refill takes less than 2 seconds.   Neurological:      General: No focal deficit present.      Mental Status: She is alert and oriented to person, place, and time.   Psychiatric:         Mood and Affect: Mood normal.         ED Course        ED Course as of 11/08/23 1611   Wed Nov 08, 2023   1609 Work-up negative for intracrnial hemorrhage or C-spine injury. Normal neurologic exam. Okay for discharge with " tylenol/ibuprofen. No etiology identified for syncopal event around 2-3 days ago. Okay for discharge with PCP follow-up.   1609 Findings were discussed with the patient including non-emergent imaging/lab results. Additional verbal instructions were discussed with the patient as well. Instructed to follow up with a primary care provider within 4-5 days. Also discussed specific warning signs and instructed to return to the ED if there are any concerns. Patient voiced understanding of instructions, questions were answered and the patient was discharged home in stable condition.     Procedures            EKG Interpretation:      Interpreted by LEIGHA LOJA MD  Time reviewed: 1525  Symptoms at time of EKG: Syncope 2 days ago   Rhythm: normal sinus   Rate: normal  Axis: normal  Ectopy: none  Conduction: normal  ST Segments/ T Waves: No ST-T wave changes  Q Waves: none  Comparison to prior: No old EKG available    Clinical Impression: normal EKG    Results for orders placed or performed during the hospital encounter of 11/08/23 (from the past 24 hour(s))   EKG 12 lead   Result Value Ref Range    Systolic Blood Pressure  mmHg    Diastolic Blood Pressure  mmHg    Ventricular Rate 66 BPM    Atrial Rate 66 BPM    CT Interval 182 ms    QRS Duration 92 ms     ms    QTc 425 ms    P Axis 48 degrees    R AXIS 32 degrees    T Axis 42 degrees    Interpretation ECG       Sinus rhythm  Normal ECG  No previous ECGs available     Troponin T, High Sensitivity   Result Value Ref Range    Troponin T, High Sensitivity 8 <=14 ng/L   CBC with platelets   Result Value Ref Range    WBC Count 4.7 4.0 - 11.0 10e3/uL    RBC Count 4.20 3.80 - 5.20 10e6/uL    Hemoglobin 12.1 11.7 - 15.7 g/dL    Hematocrit 37.5 35.0 - 47.0 %    MCV 89 78 - 100 fL    MCH 28.8 26.5 - 33.0 pg    MCHC 32.3 31.5 - 36.5 g/dL    RDW 14.4 10.0 - 15.0 %    Platelet Count 199 150 - 450 10e3/uL   Basic metabolic panel   Result Value Ref Range    Sodium 139 135 - 145  mmol/L    Potassium 4.2 3.4 - 5.3 mmol/L    Chloride 104 98 - 107 mmol/L    Carbon Dioxide (CO2) 27 22 - 29 mmol/L    Anion Gap 8 7 - 15 mmol/L    Urea Nitrogen 10.6 8.0 - 23.0 mg/dL    Creatinine 0.92 0.51 - 0.95 mg/dL    GFR Estimate 66 >60 mL/min/1.73m2    Calcium 9.3 8.8 - 10.2 mg/dL    Glucose 104 (H) 70 - 99 mg/dL   Extra Tube    Narrative    The following orders were created for panel order Extra Tube.  Procedure                               Abnormality         Status                     ---------                               -----------         ------                     Extra Blue Top Tube[[id number]]                              In process                 Extra Red Top Tube[[id number]]                               In process                 Extra Heparinized Syringe[[id number]]                        In process                   Please view results for these tests on the individual orders.   Head CT w/o contrast    Narrative    EXAM: CT HEAD W/O CONTRAST, 11/8/2023 3:56 PM    HISTORY: Fall, head injury 48 hours ago    COMPARISON: CT head 9/1/2017    TECHNIQUE:   Imaging protocol: Multiplanar CT images of the head without  intravenous contrast.   Acquisition: This CT exam was performed using one or more the  following dose reduction techniques: automated exposure control,  adjustment of the mA and/or kV according to patient size, and/or  iterative reconstruction technique.    FINDINGS:  No intracranial hemorrhage, mass effect, or midline shift. The  ventricles are proportionate to the cerebral sulci. Patchy foci of  hypodensity in the periventricular and supraventricular white matter,  which is nonspecific, likely related to chronic small vessel ischemic  disease given the patient's age. No acute loss of gray-white matter  differentiation. Atherosclerotic arterial calcifications.    No acute osseous abnormality. No paranasal sinus mucosal thickening.  Mastoid air cells are clear. The orbits are grossly  unremarkable.       Impression    IMPRESSION:  No acute intracranial abnormality.      VERN CALVERT MD         SYSTEM ID:  [id number]   CT Cervical Spine w/o Contrast    Narrative    EXAM: CT CERVICAL SPINE W/O CONTRAST 11/8/2023 3:57 PM    PROVIDED HISTORY: Neck pain after fall 2 days ago    COMPARISON: Cervical MR 1/28/2020    TECHNIQUE:   Imaging protocol: Using multidetector thin collimation helical  acquisition technique, axial, coronal and sagittal CT images through  the cervical spine were obtained without intravenous contrast.   Acquisition: This CT exam was performed using one or more the  following dose reduction techniques: automated exposure control,  adjustment of the mA and/or kV according to patient size, and/or  iterative reconstruction technique.    FINDINGS:  The cervical vertebrae are normally aligned. Straightening of the  normal cervical lordotic curvature. No findings of acute fracture or  traumatic subluxation. No prevertebral edema. There is mild disc space  loss C3-C7. Multilevel disc height loss and degenerative endplate  changes with uncinate and facet hypertrophy that is most prominent at  C4-C7. Degenerative changes are associated with varying degrees of  neural foraminal stenosis. Mild spinal canal narrowing at C3-4 and  C5-6.  No high-grade spinal canal stenosis at any level.     No acute abnormality of the paraspinous soft tissues.      Impression    IMPRESSION:   1.  No acute fracture or traumatic subluxation.  2.  Multilevel cervical spondylosis without high-grade spinal canal  narrowing.    VERN CALVERT MD         SYSTEM ID:  [id number]       Medications - No data to display    Assessments & Plan (with Medical Decision Making)     I have reviewed the nursing notes.    Syncopal episode 2 days ago now with ongoing head and neck pain.  Given her age, doctor risks and benefits and she would like to proceed with CT.  Will also obtain labs and EKG even given the syncopal event although  she states this is happened before when she has abdominal pain related to stress/anxiety.  She no longer has abdominal pain, has a nontender abdomen on exam, and is otherwise well-appearing.  If her work-up is entirely negative, she is appropriate for discharge home.    See ED Course.    I have reviewed the findings, diagnosis, plan and need for follow up with the patient.    New Prescriptions    No medications on file       Final diagnoses:   Injury of head, initial encounter   Injury of neck, initial encounter   Syncope, unspecified syncope type       11/8/2023   [facility]       Baltazar Rodriguez MD  11/08/23 3519

## 2023-11-08 NOTE — DISCHARGE INSTRUCTIONS
- Please take Tylenol and Ibuprofen as needed for your symptoms  - Please return to the Emergency Room if you do not improve, feel worse, or have any new or concerning symptoms.  - Please follow up with a primary care physician in 4-5 days

## 2023-11-10 DIAGNOSIS — N28.9 RENAL INSUFFICIENCY: ICD-10-CM

## 2023-11-10 RX ORDER — LISINOPRIL 2.5 MG/1
2.5 TABLET ORAL DAILY
Qty: 30 TABLET | Refills: 11 | Status: SHIPPED | OUTPATIENT
Start: 2023-11-10 | End: 2024-08-20

## 2023-11-10 NOTE — TELEPHONE ENCOUNTER
Lisinopril (ZESTRIL) 2.5 mg tab      Last Written Prescription Date:  10/12/23  Last Fill Quantity: 30,   # refills: 0  Last Office Visit: 10/4/23  Future Office visit:    Next 5 appointments (look out 90 days)      Nov 15, 2023  8:45 AM  (Arrive by 8:30 AM)  SHORT with GIOVANNY Vital  [facility] [place] ([facility] [place] ) [address]  [place] [address]  [phone]     Jan 04, 2024  2:00 PM  (Arrive by 1:45 PM)  SHORT with GIOVANNY Vital  [facility] [place] ([facility] [place] ) [address]  [place] [address]  [phone]

## 2023-11-12 LAB
ATRIAL RATE - MUSE: 66 BPM
DIASTOLIC BLOOD PRESSURE - MUSE: NORMAL MMHG
INTERPRETATION ECG - MUSE: NORMAL
P AXIS - MUSE: 48 DEGREES
PR INTERVAL - MUSE: 182 MS
QRS DURATION - MUSE: 92 MS
QT - MUSE: 406 MS
QTC - MUSE: 425 MS
R AXIS - MUSE: 32 DEGREES
SYSTOLIC BLOOD PRESSURE - MUSE: NORMAL MMHG
T AXIS - MUSE: 42 DEGREES
VENTRICULAR RATE- MUSE: 66 BPM

## 2023-11-13 DIAGNOSIS — J45.991 COUGH VARIANT ASTHMA: ICD-10-CM

## 2023-11-13 DIAGNOSIS — J30.1 SEASONAL ALLERGIC RHINITIS DUE TO POLLEN: ICD-10-CM

## 2023-11-13 RX ORDER — MONTELUKAST SODIUM 10 MG/1
1 TABLET ORAL DAILY
Qty: 90 TABLET | Refills: 1 | Status: SHIPPED | OUTPATIENT
Start: 2023-11-13 | End: 2024-02-24

## 2023-11-13 NOTE — TELEPHONE ENCOUNTER
Singulair      Last Written Prescription Date:  2/15/23  Last Fill Quantity: 90,   # refills: 1  Last Office Visit: 10/4/23  Future Office visit:    Next 5 appointments (look out 90 days)      Nov 15, 2023  8:45 AM  (Arrive by 8:30 AM)  SHORT with GIOVANNY Vital  [facility] [place] ([facility] [place] ) [address]  [place] [address]  [phone]     Jan 04, 2024  2:00 PM  (Arrive by 1:45 PM)  SHORT with GIOVANYN Vital  [facility] [place] ([facility] [place] ) [address]  [place] [address]  [phone]             Routing refill request to provider for review/approval because:

## 2023-11-14 NOTE — PROGRESS NOTES
Assessment & Plan     Fall, subsequent encounter  She had a episode where she had severe abd pain due to poor food intake during the day.  Has a burn on her forehead and nose is healing.  She has fallen more than once in the last year.  Doing things like burning self on oven, has a second degree burn on her left elbow.     - Adult Neurology  Referral    2. Intermittent tremor  With falls, lack of strength and tremor that she has that comes and does of her hands should see neurology.  Had CTS and there was told nothing wrong with her wrists.  For that reason see the neurologist of her choice. Would like [facility] as does most of her specialty care there.    - Adult Neurology  Referral    Review of external notes as documented elsewhere in note  Ordering of each unique test  Prescription drug management  10  minutes spent by me on the date of the encounter doing chart review, history and exam, documentation and further activities per the note     MED REC REQUIRED  Post Medication Reconciliation Status: discharge medications reconciled, continue medications without change  See Patient Instructions    No follow-ups on file.    GIOVANNY Valentino  [facility] - AIDE Balderas is a 73 year old, presenting for the following health issues:  ER F/U        11/15/2023     8:27 AM   Additional Questions   Roomed by Shantell Spring   Accompanied by none         11/15/2023     8:27 AM   Patient Reported Additional Medications   Patient reports taking the following new medications none       HPI     ED/UC Followup:    Facility:  [facility] [place]  Date of visit: 11/8  Reason for visit: Fall, Head Injury  Current Status: states is feeling better, but did have a few rough days following the ER visit        Review of Systems   Constitutional, HEENT, cardiovascular, pulmonary, gi and gu systems are negative, except as otherwise noted.      Objective    /71 (BP Location: Right arm, Patient Position:  Sitting, Cuff Size: Adult Regular)   Pulse 85   Temp 97.4  F (36.3  C) (Tympanic)   SpO2 93%   There is no height or weight on file to calculate BMI.  Physical Exam   GENERAL: healthy, alert and no distress  EYES: Eyes grossly normal to inspection, PERRL and conjunctivae and sclerae normal  HENT: ear canals and TM's normal, nose and mouth without ulcers or lesions  NECK: no adenopathy, no asymmetry, masses, or scars and thyroid normal to palpation  RESP: lungs clear to auscultation - no rales, rhonchi or wheezes  CV: regular rate and rhythm, normal S1 S2, no S3 or S4, no murmur, click or rub, no peripheral edema and peripheral pulses strong  ABDOMEN: soft, nontender, no hepatosplenomegaly, no masses and bowel sounds normal  MS: no gross musculoskeletal defects noted, no edema  SKIN: rug burn on her nose and left frontal region.  Also burn on her left forearm from her oven.   NEURO: mild headache. No focal changes.   PSYCH: mentation appears normal, affect normal/bright    Admission on 11/08/2023, Discharged on 11/08/2023   Component Date Value Ref Range Status    Ventricular Rate 11/08/2023 66  BPM Final    Atrial Rate 11/08/2023 66  BPM Final    NH Interval 11/08/2023 182  ms Final    QRS Duration 11/08/2023 92  ms Final    QT 11/08/2023 406  ms Final    QTc 11/08/2023 425  ms Final    P Axis 11/08/2023 48  degrees Final    R AXIS 11/08/2023 32  degrees Final    T Axis 11/08/2023 42  degrees Final    Interpretation ECG 11/08/2023    Final                    Value:Sinus rhythm  Normal ECG  No previous ECGs available  Confirmed by MD Skip, [facility] Preethi ([id number]) on 11/12/2023 1:56:21 PM      Troponin T, High Sensitivity 11/08/2023 8  <=14 ng/L Final    Either a High Sensitivity Troponin T baseline (0 hours) value = 100 ng/L, or an increase in High Sensitivity Troponin T = 7 ng/L at 2 hours compared to 0 hours (2-0 hours), suggests myocardial injury, and urgent clinical attention is required.    If the 2-0 hours increase  is <7 ng/L, a High Sensitivity Troponin T result above gender-specific reference ranges warrants further evaluation.   Recommendations for further evaluation include correlation with clinical decision-making tool (e.g., HEART), a 3rd High Sensitivity Troponin T test 2 hours after the 2nd (a 20% change from baseline would represent concern), admission for observation, close PCC/cardiology follow-up, or urgent outpatient provocative testing.    WBC Count 11/08/2023 4.7  4.0 - 11.0 10e3/uL Final    RBC Count 11/08/2023 4.20  3.80 - 5.20 10e6/uL Final    Hemoglobin 11/08/2023 12.1  11.7 - 15.7 g/dL Final    Hematocrit 11/08/2023 37.5  35.0 - 47.0 % Final    MCV 11/08/2023 89  78 - 100 fL Final    MCH 11/08/2023 28.8  26.5 - 33.0 pg Final    MCHC 11/08/2023 32.3  31.5 - 36.5 g/dL Final    RDW 11/08/2023 14.4  10.0 - 15.0 % Final    Platelet Count 11/08/2023 199  150 - 450 10e3/uL Final    Sodium 11/08/2023 139  135 - 145 mmol/L Final    Reference intervals for this test were updated on 09/26/2023 to more accurately reflect our healthy population. There may be differences in the flagging of prior results with similar values performed with this method. Interpretation of those prior results can be made in the context of the updated reference intervals.     Potassium 11/08/2023 4.2  3.4 - 5.3 mmol/L Final    Chloride 11/08/2023 104  98 - 107 mmol/L Final    Carbon Dioxide (CO2) 11/08/2023 27  22 - 29 mmol/L Final    Anion Gap 11/08/2023 8  7 - 15 mmol/L Final    Urea Nitrogen 11/08/2023 10.6  8.0 - 23.0 mg/dL Final    Creatinine 11/08/2023 0.92  0.51 - 0.95 mg/dL Final    GFR Estimate 11/08/2023 66  >60 mL/min/1.73m2 Final    Calcium 11/08/2023 9.3  8.8 - 10.2 mg/dL Final    Glucose 11/08/2023 104 (H)  70 - 99 mg/dL Final    Hold Specimen 11/08/2023 [facility]   Final    Hold Specimen 11/08/2023 [facility]   Final    Hold Specimen 11/08/2023 [facility]   Final

## 2023-11-15 ENCOUNTER — APPOINTMENT (OUTPATIENT)
Dept: LAB | Facility: OTHER | Age: 73
End: 2023-11-15
Payer: MEDICARE

## 2023-11-15 ENCOUNTER — OFFICE VISIT (OUTPATIENT)
Dept: FAMILY MEDICINE | Facility: OTHER | Age: 73
End: 2023-11-15
Attending: PHYSICIAN ASSISTANT
Payer: MEDICARE

## 2023-11-15 VITALS
SYSTOLIC BLOOD PRESSURE: 107 MMHG | TEMPERATURE: 97.4 F | DIASTOLIC BLOOD PRESSURE: 71 MMHG | HEART RATE: 85 BPM | OXYGEN SATURATION: 93 %

## 2023-11-15 DIAGNOSIS — R25.1 INTERMITTENT TREMOR: ICD-10-CM

## 2023-11-15 DIAGNOSIS — E55.9 VITAMIN D DEFICIENCY DISEASE: ICD-10-CM
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
## 2023-11-15 LAB — VIT D+METAB SERPL-MCNC: 66 NG/ML (ref 20–50)

## 2023-11-15 PROCEDURE — 82306 VITAMIN D 25 HYDROXY: CPT | Mod: ZL | Performed by: PHYSICIAN ASSISTANT

## 2023-11-15 PROCEDURE — 99213 OFFICE O/P EST LOW 20 MIN: CPT | Performed by: PHYSICIAN ASSISTANT

## 2023-11-15 PROCEDURE — 36415 COLL VENOUS BLD VENIPUNCTURE: CPT | Mod: ZL | Performed by: PHYSICIAN ASSISTANT

## 2023-11-15 PROCEDURE — G0463 HOSPITAL OUTPT CLINIC VISIT: HCPCS

## 2023-11-20 DIAGNOSIS — G62.9 PERIPHERAL POLYNEUROPATHY: ICD-10-CM

## 2023-11-20 NOTE — TELEPHONE ENCOUNTER
Gabapentin       Last Written Prescription Date:  10/25/2023  Last Fill Quantity: 210,   # refills: 0  Last Office Visit: 11/15/2023  Future Office visit:    Next 5 appointments (look out 90 days)      Jan 04, 2024  2:00 PM  (Arrive by 1:45 PM)  SHORT with GIOVANNY Vital  [facility] [place] ([facility] [place] ) [address]  [place] [address]  [phone]

## 2023-11-21 RX ORDER — GABAPENTIN 300 MG/1
CAPSULE ORAL
Qty: 210 CAPSULE | Refills: 0 | Status: SHIPPED | OUTPATIENT
Start: 2023-11-21 | End: 2023-12-20

## 2023-12-06 DIAGNOSIS — J30.2 SEASONAL ALLERGIC RHINITIS, UNSPECIFIED TRIGGER: ICD-10-CM

## 2023-12-06 RX ORDER — FEXOFENADINE HYDROCHLORIDE AND PSEUDOEPHEDRINE HYDROCHLORIDE 60; 120 MG/1; MG/1
1 TABLET, FILM COATED, EXTENDED RELEASE ORAL 2 TIMES DAILY
Qty: 60 TABLET | Refills: 0 | Status: SHIPPED | OUTPATIENT
Start: 2023-12-06 | End: 2024-02-26

## 2023-12-06 NOTE — TELEPHONE ENCOUNTER
Allegra-D       Last Written Prescription Date:  11/06/2023  Last Fill Quantity: 60,   # refills: 0  Last Office Visit: 11/15/2023  Future Office visit:    Next 5 appointments (look out 90 days)      Jan 04, 2024  2:00 PM  (Arrive by 1:45 PM)  SHORT with GIOVANNY Vital  [facility] [place] ([facility] [place] ) [address]  [place] [address]  [phone]             Routing refill request to provider for review/approval because:

## 2023-12-18 DIAGNOSIS — G62.9 PERIPHERAL POLYNEUROPATHY: ICD-10-CM

## 2023-12-18 NOTE — TELEPHONE ENCOUNTER
NEURONTIN      Last Written Prescription Date:  11-21-23  Last Fill Quantity: 210,   # refills: 0  Last Office Visit: 11-15-23  Future Office visit:    Next 5 appointments (look out 90 days)      Dec 19, 2023 10:30 AM  Lab visit with  LAB  [facility] ([facility] ) [address]  [facility] [address]  [phone]     Jan 04, 2024  2:00 PM  (Arrive by 1:45 PM)  SHORT with GIOVANNY Vital  [facility] ([facility] ) [address] [facility] [address]  [phone]             Routing refill request to provider for review/approval because:  Drug not on the [facility], [facility] or [facility] refill protocol or controlled substance

## 2023-12-19 ENCOUNTER — LAB (OUTPATIENT)
Dept: LAB | Facility: OTHER | Age: 73
End: 2023-12-19
Payer: MEDICARE

## 2023-12-19 DIAGNOSIS — Z79.899 ENCOUNTER FOR LONG-TERM (CURRENT) USE OF MEDICATIONS: ICD-10-CM

## 2023-12-19 DIAGNOSIS — M06.09 RHEUMATOID ARTHRITIS OF MULTIPLE SITES WITHOUT RHEUMATOID FACTOR (H): ICD-10-CM

## 2023-12-19 LAB
ALBUMIN SERPL BCG-MCNC: 4 G/DL (ref 3.5–5.2)
ALP SERPL-CCNC: 61 U/L (ref 40–150)
ALT SERPL W P-5'-P-CCNC: 22 U/L (ref 0–50)
ANION GAP SERPL CALCULATED.3IONS-SCNC: 7 MMOL/L (ref 7–15)
AST SERPL W P-5'-P-CCNC: 26 U/L (ref 0–45)
BASOPHILS # BLD AUTO: 0 10E3/UL (ref 0–0.2)
BASOPHILS NFR BLD AUTO: 1 %
BILIRUB SERPL-MCNC: 0.3 MG/DL
BUN SERPL-MCNC: 11.8 MG/DL (ref 8–23)
CALCIUM SERPL-MCNC: 9.1 MG/DL (ref 8.8–10.2)
CHLORIDE SERPL-SCNC: 104 MMOL/L (ref 98–107)
CREAT SERPL-MCNC: 1.03 MG/DL (ref 0.51–0.95)
CRP SERPL-MCNC: <3 MG/L
DEPRECATED HCO3 PLAS-SCNC: 28 MMOL/L (ref 22–29)
EGFRCR SERPLBLD CKD-EPI 2021: 58 ML/MIN/1.73M2
EOSINOPHIL # BLD AUTO: 0.1 10E3/UL (ref 0–0.7)
EOSINOPHIL NFR BLD AUTO: 3 %
ERYTHROCYTE [DISTWIDTH] IN BLOOD BY AUTOMATED COUNT: 14.6 % (ref 10–15)
ERYTHROCYTE [SEDIMENTATION RATE] IN BLOOD BY WESTERGREN METHOD: 13 MM/HR (ref 0–30)
GLUCOSE SERPL-MCNC: 98 MG/DL (ref 70–99)
HCT VFR BLD AUTO: 37.2 % (ref 35–47)
HGB BLD-MCNC: 12.5 G/DL (ref 11.7–15.7)
IMM GRANULOCYTES # BLD: 0 10E3/UL
IMM GRANULOCYTES NFR BLD: 0 %
LYMPHOCYTES # BLD AUTO: 1.6 10E3/UL (ref 0.8–5.3)
LYMPHOCYTES NFR BLD AUTO: 37 %
MCH RBC QN AUTO: 29.3 PG (ref 26.5–33)
MCHC RBC AUTO-ENTMCNC: 33.6 G/DL (ref 31.5–36.5)
MCV RBC AUTO: 87 FL (ref 78–100)
MONOCYTES # BLD AUTO: 0.4 10E3/UL (ref 0–1.3)
MONOCYTES NFR BLD AUTO: 10 %
NEUTROPHILS # BLD AUTO: 2.1 10E3/UL (ref 1.6–8.3)
NEUTROPHILS NFR BLD AUTO: 49 %
NRBC # BLD AUTO: 0 10E3/UL
NRBC BLD AUTO-RTO: 0 /100
PLATELET # BLD AUTO: 232 10E3/UL (ref 150–450)
POTASSIUM SERPL-SCNC: 3.9 MMOL/L (ref 3.4–5.3)
PROT SERPL-MCNC: 6.5 G/DL (ref 6.4–8.3)
RBC # BLD AUTO: 4.27 10E6/UL (ref 3.8–5.2)
SODIUM SERPL-SCNC: 139 MMOL/L (ref 135–145)
WBC # BLD AUTO: 4.3 10E3/UL (ref 4–11)

## 2023-12-19 PROCEDURE — 36415 COLL VENOUS BLD VENIPUNCTURE: CPT | Mod: ZL

## 2023-12-19 PROCEDURE — 80053 COMPREHEN METABOLIC PANEL: CPT | Mod: ZL

## 2023-12-19 PROCEDURE — 86140 C-REACTIVE PROTEIN: CPT | Mod: ZL

## 2023-12-19 PROCEDURE — 85652 RBC SED RATE AUTOMATED: CPT | Mod: ZL

## 2023-12-19 PROCEDURE — 85014 HEMATOCRIT: CPT | Mod: ZL

## 2023-12-20 RX ORDER — GABAPENTIN 300 MG/1
CAPSULE ORAL
Qty: 210 CAPSULE | Refills: 0 | Status: SHIPPED | OUTPATIENT
Start: 2023-12-20 | End: 2024-01-04

## 2024-01-04 ENCOUNTER — OFFICE VISIT (OUTPATIENT)
Dept: FAMILY MEDICINE | Facility: OTHER | Age: 74
End: 2024-01-04
Attending: PHYSICIAN ASSISTANT
Payer: COMMERCIAL

## 2024-01-04 VITALS
OXYGEN SATURATION: 93 % | DIASTOLIC BLOOD PRESSURE: 67 MMHG | TEMPERATURE: 97.5 F | HEART RATE: 80 BPM | SYSTOLIC BLOOD PRESSURE: 105 MMHG

## 2024-01-04 DIAGNOSIS — F51.01 PRIMARY INSOMNIA: ICD-10-CM

## 2024-01-04 DIAGNOSIS — G62.9 PERIPHERAL POLYNEUROPATHY: ICD-10-CM

## 2024-01-04 DIAGNOSIS — F33.1 MODERATE EPISODE OF RECURRENT MAJOR DEPRESSIVE DISORDER (H): Primary | ICD-10-CM

## 2024-01-04 PROCEDURE — G0463 HOSPITAL OUTPT CLINIC VISIT: HCPCS

## 2024-01-04 PROCEDURE — 99213 OFFICE O/P EST LOW 20 MIN: CPT | Performed by: PHYSICIAN ASSISTANT

## 2024-01-04 RX ORDER — TRAZODONE HYDROCHLORIDE 50 MG/1
TABLET, FILM COATED ORAL
Qty: 270 TABLET | Refills: 1 | Status: SHIPPED | OUTPATIENT
Start: 2024-01-04 | End: 2024-08-26

## 2024-01-04 RX ORDER — DULOXETIN HYDROCHLORIDE 30 MG/1
30 CAPSULE, DELAYED RELEASE ORAL 2 TIMES DAILY
Qty: 60 CAPSULE | Refills: 5 | Status: SHIPPED | OUTPATIENT
Start: 2024-01-04 | End: 2024-07-16

## 2024-01-04 RX ORDER — GABAPENTIN 300 MG/1
CAPSULE ORAL
Qty: 210 CAPSULE | Refills: 1 | Status: SHIPPED | OUTPATIENT
Start: 2024-01-04 | End: 2024-03-27

## 2024-01-04 ASSESSMENT — ANXIETY QUESTIONNAIRES
7. FEELING AFRAID AS IF SOMETHING AWFUL MIGHT HAPPEN: NOT AT ALL
5. BEING SO RESTLESS THAT IT IS HARD TO SIT STILL: NOT AT ALL
1. FEELING NERVOUS, ANXIOUS, OR ON EDGE: NOT AT ALL
3. WORRYING TOO MUCH ABOUT DIFFERENT THINGS: NOT AT ALL
4. TROUBLE RELAXING: NOT AT ALL
7. FEELING AFRAID AS IF SOMETHING AWFUL MIGHT HAPPEN: NOT AT ALL
GAD7 TOTAL SCORE: 0
6. BECOMING EASILY ANNOYED OR IRRITABLE: NOT AT ALL
IF YOU CHECKED OFF ANY PROBLEMS ON THIS QUESTIONNAIRE, HOW DIFFICULT HAVE THESE PROBLEMS MADE IT FOR YOU TO DO YOUR WORK, TAKE CARE OF THINGS AT HOME, OR GET ALONG WITH OTHER PEOPLE: NOT DIFFICULT AT ALL
8. IF YOU CHECKED OFF ANY PROBLEMS, HOW DIFFICULT HAVE THESE MADE IT FOR YOU TO DO YOUR WORK, TAKE CARE OF THINGS AT HOME, OR GET ALONG WITH OTHER PEOPLE?: NOT DIFFICULT AT ALL
GAD7 TOTAL SCORE: 0
2. NOT BEING ABLE TO STOP OR CONTROL WORRYING: NOT AT ALL
GAD7 TOTAL SCORE: 0

## 2024-01-04 ASSESSMENT — PATIENT HEALTH QUESTIONNAIRE - PHQ9
SUM OF ALL RESPONSES TO PHQ QUESTIONS 1-9: 2
SUM OF ALL RESPONSES TO PHQ QUESTIONS 1-9: 2
10. IF YOU CHECKED OFF ANY PROBLEMS, HOW DIFFICULT HAVE THESE PROBLEMS MADE IT FOR YOU TO DO YOUR WORK, TAKE CARE OF THINGS AT HOME, OR GET ALONG WITH OTHER PEOPLE: NOT DIFFICULT AT ALL

## 2024-01-04 NOTE — PROGRESS NOTES
Assessment & Plan     Moderate episode of recurrent major depressive disorder (H)  She did remarkably well for the Holidays. Usually she is very depressed. This year she feels like she is adjusting.  Mood is stable. Her depression action plan is up dated. She is doing well on current combination and sleeping well. We will fill this and see her back in 6 months.     Review of external notes as documented elsewhere in note  Ordering of each unique test  Prescription drug management  10  minutes spent by me on the date of the encounter doing chart review, history and exam, documentation and further activities per the note       See Patient Instructions    No follow-ups on file.    GIOVANNY Valentino  [facility] - [facility] Balderas is a 73 year old, presenting for the following health issues:  Depression        2024     1:55 PM   Additional Questions   Roomed by praveena Spring   Accompanied by none         2024     1:55 PM   Patient Reported Additional Medications   Patient reports taking the following new medications none       HPI     Depression Followup  How are you doing with your depression since your last visit? Improved   Are you having other symptoms that might be associated with depression? No  Have you had a significant life event?  No   Are you feeling anxious or having panic attacks?   No  Do you have any concerns with your use of alcohol or other drugs? No    Social History     Tobacco Use    Smoking status: Former     Packs/day: 1     Types: Cigarettes     Quit date: 10/6/1997     Years since quittin.2    Smokeless tobacco: Never    Tobacco comments:     no passive exposure   Vaping Use    Vaping Use: Never used   Substance Use Topics    Alcohol use: No     Comment: former. quit     Drug use: No         2023     2:38 PM 10/4/2023     2:31 PM 2024     1:49 PM   PHQ   PHQ-9 Total Score 3 0 2   Q9: Thoughts of better off dead/self-harm past 2 weeks Not at  all Not at all Not at all         8/30/2023     2:39 PM 10/4/2023     2:31 PM 1/4/2024     1:50 PM   HEATH-7 SCORE   Total Score   0 (minimal anxiety)   Total Score 1 0 0         1/4/2024     1:49 PM   Last PHQ-9   1.  Little interest or pleasure in doing things 0   2.  Feeling down, depressed, or hopeless 0   3.  Trouble falling or staying asleep, or sleeping too much 1   4.  Feeling tired or having little energy 1   5.  Poor appetite or overeating 0   6.  Feeling bad about yourself 0   7.  Trouble concentrating 0   8.  Moving slowly or restless 0   Q9: Thoughts of better off dead/self-harm past 2 weeks 0   PHQ-9 Total Score 2         1/4/2024     1:50 PM   HEATH-7    1. Feeling nervous, anxious, or on edge 0   2. Not being able to stop or control worrying 0   3. Worrying too much about different things 0   4. Trouble relaxing 0   5. Being so restless that it is hard to sit still 0   6. Becoming easily annoyed or irritable 0   7. Feeling afraid, as if something awful might happen 0   HEATH-7 Total Score 0   If you checked any problems, how difficult have they made it for you to do your work, take care of things at home, or get along with other people? Not difficult at all       Suicide Assessment Five-step Evaluation and Treatment (SAFE-T)          Review of Systems   Constitutional, HEENT, cardiovascular, pulmonary, gi and gu systems are negative, except as otherwise noted.      Objective    /67 (BP Location: Left arm, Patient Position: Sitting, Cuff Size: Adult Regular)   Pulse 80   Temp 97.5  F (36.4  C) (Tympanic)   SpO2 93%   There is no height or weight on file to calculate BMI.  Physical Exam   GENERAL: healthy, alert and no distress  NECK: no adenopathy, no asymmetry, masses, or scars and thyroid normal to palpation  RESP: lungs clear to auscultation - no rales, rhonchi or wheezes  CV: regular rate and rhythm, normal S1 S2, no S3 or S4, no murmur, click or rub, no peripheral edema and peripheral pulses  strong  ABDOMEN: soft, nontender, no hepatosplenomegaly, no masses and bowel sounds normal  MS: no gross musculoskeletal defects noted, no edema  NEURO: Normal strength and tone, mentation intact and speech normal  PSYCH: mentation appears normal, affect normal/bright    Lab on 12/19/2023   Component Date Value Ref Range Status    Sodium 12/19/2023 139  135 - 145 mmol/L Final    Reference intervals for this test were updated on 09/26/2023 to more accurately reflect our healthy population. There may be differences in the flagging of prior results with similar values performed with this method. Interpretation of those prior results can be made in the context of the updated reference intervals.     Potassium 12/19/2023 3.9  3.4 - 5.3 mmol/L Final    Carbon Dioxide (CO2) 12/19/2023 28  22 - 29 mmol/L Final    Anion Gap 12/19/2023 7  7 - 15 mmol/L Final    Urea Nitrogen 12/19/2023 11.8  8.0 - 23.0 mg/dL Final    Creatinine 12/19/2023 1.03 (H)  0.51 - 0.95 mg/dL Final    GFR Estimate 12/19/2023 58 (L)  >60 mL/min/1.73m2 Final    Calcium 12/19/2023 9.1  8.8 - 10.2 mg/dL Final    Chloride 12/19/2023 104  98 - 107 mmol/L Final    Glucose 12/19/2023 98  70 - 99 mg/dL Final    Alkaline Phosphatase 12/19/2023 61  40 - 150 U/L Final    Reference intervals for this test were updated on 11/14/2023 to more accurately reflect our healthy population. There may be differences in the flagging of prior results with similar values performed with this method. Interpretation of those prior results can be made in the context of the updated reference intervals.    AST 12/19/2023 26  0 - 45 U/L Final    Reference intervals for this test were updated on 6/12/2023 to more accurately reflect our healthy population. There may be differences in the flagging of prior results with similar values performed with this method. Interpretation of those prior results can be made in the context of the updated reference intervals.    ALT 12/19/2023 22  0 -  50 U/L Final    Reference intervals for this test were updated on 6/12/2023 to more accurately reflect our healthy population. There may be differences in the flagging of prior results with similar values performed with this method. Interpretation of those prior results can be made in the context of the updated reference intervals.      Protein Total 12/19/2023 6.5  6.4 - 8.3 g/dL Final    Albumin 12/19/2023 4.0  3.5 - 5.2 g/dL Final    Bilirubin Total 12/19/2023 0.3  <=1.2 mg/dL Final    Erythrocyte Sedimentation Rate 12/19/2023 13  0 - 30 mm/hr Final    CRP Inflammation 12/19/2023 <3.00  <5.00 mg/L Final    WBC Count 12/19/2023 4.3  4.0 - 11.0 10e3/uL Final    RBC Count 12/19/2023 4.27  3.80 - 5.20 10e6/uL Final    Hemoglobin 12/19/2023 12.5  11.7 - 15.7 g/dL Final    Hematocrit 12/19/2023 37.2  35.0 - 47.0 % Final    MCV 12/19/2023 87  78 - 100 fL Final    MCH 12/19/2023 29.3  26.5 - 33.0 pg Final    MCHC 12/19/2023 33.6  31.5 - 36.5 g/dL Final    RDW 12/19/2023 14.6  10.0 - 15.0 % Final    Platelet Count 12/19/2023 232  150 - 450 10e3/uL Final    % Neutrophils 12/19/2023 49  % Final    % Lymphocytes 12/19/2023 37  % Final    % Monocytes 12/19/2023 10  % Final    % Eosinophils 12/19/2023 3  % Final    % Basophils 12/19/2023 1  % Final    % Immature Granulocytes 12/19/2023 0  % Final    NRBCs per 100 WBC 12/19/2023 0  <1 /100 Final    Absolute Neutrophils 12/19/2023 2.1  1.6 - 8.3 10e3/uL Final    Absolute Lymphocytes 12/19/2023 1.6  0.8 - 5.3 10e3/uL Final    Absolute Monocytes 12/19/2023 0.4  0.0 - 1.3 10e3/uL Final    Absolute Eosinophils 12/19/2023 0.1  0.0 - 0.7 10e3/uL Final    Absolute Basophils 12/19/2023 0.0  0.0 - 0.2 10e3/uL Final    Absolute Immature Granulocytes 12/19/2023 0.0  <=0.4 10e3/uL Final    Absolute NRBCs 12/19/2023 0.0  10e3/uL Final                     Answers submitted by the patient for this visit:  Patient Health Questionnaire (Submitted on 1/4/2024)  If you checked off any problems,  how difficult have these problems made it for you to do your work, take care of things at home, or get along with other people?: Not difficult at all  PHQ9 TOTAL SCORE: 2  HEATH-7 (Submitted on 1/4/2024)  HEATH 7 TOTAL SCORE: 0

## 2024-01-17 ENCOUNTER — TRANSFERRED RECORDS (OUTPATIENT)
Dept: HEALTH INFORMATION MANAGEMENT | Facility: HOSPITAL | Age: 74
End: 2024-01-17

## 2024-02-14 DIAGNOSIS — Z12.31 VISIT FOR SCREENING MAMMOGRAM: Primary | ICD-10-CM

## 2024-02-24 ENCOUNTER — MYC REFILL (OUTPATIENT)
Dept: FAMILY MEDICINE | Facility: OTHER | Age: 74
End: 2024-02-24

## 2024-02-24 DIAGNOSIS — J45.991 COUGH VARIANT ASTHMA: ICD-10-CM

## 2024-02-24 DIAGNOSIS — J30.2 SEASONAL ALLERGIC RHINITIS, UNSPECIFIED TRIGGER: ICD-10-CM

## 2024-02-24 DIAGNOSIS — J30.1 SEASONAL ALLERGIC RHINITIS DUE TO POLLEN: ICD-10-CM

## 2024-02-26 RX ORDER — FEXOFENADINE HCL AND PSEUDOEPHEDRINE HCI 60; 120 MG/1; MG/1
1 TABLET, EXTENDED RELEASE ORAL 2 TIMES DAILY
Qty: 60 TABLET | Refills: 0 | Status: SHIPPED | OUTPATIENT
Start: 2024-02-26 | End: 2024-04-02

## 2024-02-26 RX ORDER — MONTELUKAST SODIUM 10 MG/1
1 TABLET ORAL DAILY
Qty: 90 TABLET | Refills: 3 | Status: SHIPPED | OUTPATIENT
Start: 2024-02-26

## 2024-02-26 NOTE — TELEPHONE ENCOUNTER
I also have to order  my Allegra D and have that sent to [facility]     Pended for covering provider  Last signed 12/6/23 #60, 0 R  1/4/24 last office visit.  Cathy Benitez, KATHERINE  Care Coordination

## 2024-03-11 ENCOUNTER — LAB (OUTPATIENT)
Dept: LAB | Facility: OTHER | Age: 74
End: 2024-03-11
Payer: MEDICARE

## 2024-03-11 DIAGNOSIS — M06.09 RHEUMATOID ARTHRITIS OF MULTIPLE SITES WITHOUT RHEUMATOID FACTOR (H): ICD-10-CM

## 2024-03-11 DIAGNOSIS — Z79.899 ENCOUNTER FOR LONG-TERM (CURRENT) USE OF MEDICATIONS: ICD-10-CM

## 2024-03-11 LAB
ALBUMIN SERPL BCG-MCNC: 3.6 G/DL (ref 3.5–5.2)
ALP SERPL-CCNC: 56 U/L (ref 40–150)
ALT SERPL W P-5'-P-CCNC: 28 U/L (ref 0–50)
ANION GAP SERPL CALCULATED.3IONS-SCNC: 8 MMOL/L (ref 7–15)
AST SERPL W P-5'-P-CCNC: 27 U/L (ref 0–45)
BASOPHILS # BLD AUTO: 0 10E3/UL (ref 0–0.2)
BASOPHILS NFR BLD AUTO: 1 %
BILIRUB SERPL-MCNC: 0.3 MG/DL
BUN SERPL-MCNC: 9.7 MG/DL (ref 8–23)
CALCIUM SERPL-MCNC: 9.1 MG/DL (ref 8.8–10.2)
CHLORIDE SERPL-SCNC: 104 MMOL/L (ref 98–107)
CREAT SERPL-MCNC: 0.97 MG/DL (ref 0.51–0.95)
CRP SERPL-MCNC: <3 MG/L
DEPRECATED HCO3 PLAS-SCNC: 28 MMOL/L (ref 22–29)
EGFRCR SERPLBLD CKD-EPI 2021: 62 ML/MIN/1.73M2
EOSINOPHIL # BLD AUTO: 0.2 10E3/UL (ref 0–0.7)
EOSINOPHIL NFR BLD AUTO: 4 %
ERYTHROCYTE [DISTWIDTH] IN BLOOD BY AUTOMATED COUNT: 14.1 % (ref 10–15)
ERYTHROCYTE [SEDIMENTATION RATE] IN BLOOD BY WESTERGREN METHOD: 11 MM/HR (ref 0–30)
GLUCOSE SERPL-MCNC: 110 MG/DL (ref 70–99)
HCT VFR BLD AUTO: 38.1 % (ref 35–47)
HGB BLD-MCNC: 12.3 G/DL (ref 11.7–15.7)
IMM GRANULOCYTES # BLD: 0 10E3/UL
IMM GRANULOCYTES NFR BLD: 0 %
LYMPHOCYTES # BLD AUTO: 1.4 10E3/UL (ref 0–5.3)
LYMPHOCYTES NFR BLD AUTO: 31 %
MCH RBC QN AUTO: 28.5 PG (ref 26.5–33)
MCHC RBC AUTO-ENTMCNC: 32.3 G/DL (ref 31.5–36.5)
MCV RBC AUTO: 88 FL (ref 78–100)
MONOCYTES # BLD AUTO: 0.4 10E3/UL (ref 0–1.3)
MONOCYTES NFR BLD AUTO: 9 %
NEUTROPHILS # BLD AUTO: 2.5 10E3/UL (ref 1.6–8.3)
NEUTROPHILS NFR BLD AUTO: 55 %
NRBC # BLD AUTO: 0 10E3/UL
NRBC BLD AUTO-RTO: 0 /100
PLATELET # BLD AUTO: 205 10E3/UL (ref 150–450)
POTASSIUM SERPL-SCNC: 4 MMOL/L (ref 3.4–5.3)
PROT SERPL-MCNC: 6.5 G/DL (ref 6.4–8.3)
RBC # BLD AUTO: 4.31 10E6/UL (ref 3.8–5.2)
SODIUM SERPL-SCNC: 140 MMOL/L (ref 135–145)
WBC # BLD AUTO: 4.5 10E3/UL (ref 4–11)

## 2024-03-11 PROCEDURE — 85004 AUTOMATED DIFF WBC COUNT: CPT | Mod: ZL

## 2024-03-11 PROCEDURE — 80053 COMPREHEN METABOLIC PANEL: CPT | Mod: ZL

## 2024-03-11 PROCEDURE — 85652 RBC SED RATE AUTOMATED: CPT | Mod: ZL

## 2024-03-11 PROCEDURE — 36415 COLL VENOUS BLD VENIPUNCTURE: CPT | Mod: ZL

## 2024-03-11 PROCEDURE — 86140 C-REACTIVE PROTEIN: CPT | Mod: ZL

## 2024-03-12 DIAGNOSIS — Z79.899 HIGH RISK MEDICATION USE: ICD-10-CM

## 2024-03-12 DIAGNOSIS — M06.09 RHEUMATOID ARTHRITIS OF MULTIPLE SITES WITHOUT RHEUMATOID FACTOR (H): Primary | ICD-10-CM

## 2024-03-25 DIAGNOSIS — G62.9 PERIPHERAL POLYNEUROPATHY: ICD-10-CM

## 2024-03-26 NOTE — TELEPHONE ENCOUNTER
GABAPENTIN 300MG CAPSULE       Last Written Prescription Date:  1/4/2024  Last Fill Quantity: 210,   # refills: 1  Last Office Visit: 1/4/2024  Future Office visit:       Routing refill request to provider for review/approval because:    Kimberly Boecker, RN

## 2024-03-27 RX ORDER — GABAPENTIN 300 MG/1
CAPSULE ORAL
Qty: 210 CAPSULE | Refills: 1 | Status: SHIPPED | OUTPATIENT
Start: 2024-03-27 | End: 2024-05-13

## 2024-03-30 DIAGNOSIS — J30.2 SEASONAL ALLERGIC RHINITIS, UNSPECIFIED TRIGGER: ICD-10-CM

## 2024-04-01 ENCOUNTER — MYC REFILL (OUTPATIENT)
Dept: DERMATOLOGY | Facility: OTHER | Age: 74
End: 2024-04-01

## 2024-04-01 DIAGNOSIS — L21.9 SEBORRHEIC DERMATITIS: ICD-10-CM

## 2024-04-01 DIAGNOSIS — L21.9 DERMATITIS, SEBORRHEIC: ICD-10-CM

## 2024-04-01 RX ORDER — KETOCONAZOLE 20 MG/ML
SHAMPOO TOPICAL
Qty: 120 ML | Refills: 0 | Status: CANCELLED | OUTPATIENT
Start: 2024-04-01

## 2024-04-01 NOTE — TELEPHONE ENCOUNTER
Lula KIRBY      Last Written Prescription Date:  11/6/23  Last Fill Quantity: 60,   # refills: 0  Last Office Visit: 1/4/24  Future Office visit:       Routing refill request to provider for review/approval because:  Drug not active on patient's medication list

## 2024-04-02 RX ORDER — FEXOFENADINE HYDROCHLORIDE AND PSEUDOEPHEDRINE HYDROCHLORIDE 60; 120 MG/1; MG/1
1 TABLET, FILM COATED, EXTENDED RELEASE ORAL 2 TIMES DAILY
Qty: 60 TABLET | Refills: 0 | Status: SHIPPED | OUTPATIENT
Start: 2024-04-02 | End: 2024-06-25

## 2024-04-08 ENCOUNTER — MYC MEDICAL ADVICE (OUTPATIENT)
Dept: FAMILY MEDICINE | Facility: OTHER | Age: 74
End: 2024-04-08

## 2024-04-09 NOTE — TELEPHONE ENCOUNTER
Pt called and per pt she has the start of a yeast infection but does have some nystatin left.  Pt was offered an appointment with a covering provider and declined.  Pt wanted to be scheduled with her PCP and was scheduled with PCP on 04/18/2024.  Sherrie encouraged to reach out if things should change and she would like to be seen sooner by a covering provider.  Pt stated that she would.

## 2024-04-09 NOTE — TELEPHONE ENCOUNTER
Pt called and is currently unable to talk, getting her hair cut.  Pt asked that we call back in about 30 minutes.

## 2024-04-13 ASSESSMENT — ASTHMA QUESTIONNAIRES
ACT_TOTALSCORE: 25
QUESTION_1 LAST FOUR WEEKS HOW MUCH OF THE TIME DID YOUR ASTHMA KEEP YOU FROM GETTING AS MUCH DONE AT WORK, SCHOOL OR AT HOME: NONE OF THE TIME
QUESTION_2 LAST FOUR WEEKS HOW OFTEN HAVE YOU HAD SHORTNESS OF BREATH: NOT AT ALL
QUESTION_3 LAST FOUR WEEKS HOW OFTEN DID YOUR ASTHMA SYMPTOMS (WHEEZING, COUGHING, SHORTNESS OF BREATH, CHEST TIGHTNESS OR PAIN) WAKE YOU UP AT NIGHT OR EARLIER THAN USUAL IN THE MORNING: NOT AT ALL
ACT_TOTALSCORE: 25
QUESTION_4 LAST FOUR WEEKS HOW OFTEN HAVE YOU USED YOUR RESCUE INHALER OR NEBULIZER MEDICATION (SUCH AS ALBUTEROL): NOT AT ALL
QUESTION_5 LAST FOUR WEEKS HOW WOULD YOU RATE YOUR ASTHMA CONTROL: COMPLETELY CONTROLLED

## 2024-04-18 ENCOUNTER — OFFICE VISIT (OUTPATIENT)
Dept: FAMILY MEDICINE | Facility: OTHER | Age: 74
End: 2024-04-18
Attending: PHYSICIAN ASSISTANT
Payer: COMMERCIAL

## 2024-04-18 ENCOUNTER — TELEPHONE (OUTPATIENT)
Dept: FAMILY MEDICINE | Facility: OTHER | Age: 74
End: 2024-04-18

## 2024-04-18 VITALS
SYSTOLIC BLOOD PRESSURE: 114 MMHG | OXYGEN SATURATION: 94 % | DIASTOLIC BLOOD PRESSURE: 60 MMHG | BODY MASS INDEX: 33.56 KG/M2 | HEART RATE: 93 BPM | WEIGHT: 201.7 LBS | TEMPERATURE: 97.3 F | RESPIRATION RATE: 16 BRPM

## 2024-04-18 DIAGNOSIS — M54.42 BILATERAL LOW BACK PAIN WITH BILATERAL SCIATICA, UNSPECIFIED CHRONICITY: ICD-10-CM

## 2024-04-18 DIAGNOSIS — M05.79 RHEUMATOID ARTHRITIS INVOLVING MULTIPLE SITES WITH POSITIVE RHEUMATOID FACTOR (H): Chronic | ICD-10-CM

## 2024-04-18 DIAGNOSIS — N18.31 STAGE 3A CHRONIC KIDNEY DISEASE (H): ICD-10-CM

## 2024-04-18 DIAGNOSIS — M54.41 ACUTE BILATERAL LOW BACK PAIN WITH RIGHT-SIDED SCIATICA: ICD-10-CM

## 2024-04-18 DIAGNOSIS — K50.10 CROHN'S DISEASE OF LARGE INTESTINE WITHOUT COMPLICATION (H): Primary | ICD-10-CM

## 2024-04-18 DIAGNOSIS — B36.9 FUNGAL DERMATITIS: ICD-10-CM

## 2024-04-18 DIAGNOSIS — M54.41 BILATERAL LOW BACK PAIN WITH BILATERAL SCIATICA, UNSPECIFIED CHRONICITY: ICD-10-CM

## 2024-04-18 PROBLEM — C76.0 MALIGNANT NEOPLASM OF HEAD, FACE, AND NECK (H): Status: RESOLVED | Noted: 2023-08-30 | Resolved: 2024-04-18

## 2024-04-18 PROCEDURE — G0463 HOSPITAL OUTPT CLINIC VISIT: HCPCS

## 2024-04-18 PROCEDURE — 99214 OFFICE O/P EST MOD 30 MIN: CPT | Performed by: PHYSICIAN ASSISTANT

## 2024-04-18 RX ORDER — NYSTATIN 100000 [USP'U]/G
POWDER TOPICAL 2 TIMES DAILY PRN
Qty: 60 G | Refills: 1 | Status: SHIPPED | OUTPATIENT
Start: 2024-04-18

## 2024-04-18 RX ORDER — CYCLOBENZAPRINE HCL 5 MG
5 TABLET ORAL 3 TIMES DAILY PRN
Qty: 15 TABLET | Refills: 0 | Status: SHIPPED | OUTPATIENT
Start: 2024-04-18 | End: 2024-07-21

## 2024-04-18 RX ORDER — METHYLPREDNISOLONE 4 MG
TABLET, DOSE PACK ORAL
Qty: 21 TABLET | Refills: 0 | Status: SHIPPED | OUTPATIENT
Start: 2024-04-18

## 2024-04-18 RX ORDER — HYDROCODONE BITARTRATE AND ACETAMINOPHEN 7.5; 325 MG/1; MG/1
1 TABLET ORAL EVERY 6 HOURS PRN
Qty: 20 TABLET | Refills: 0 | Status: SHIPPED | OUTPATIENT
Start: 2024-04-18

## 2024-04-18 ASSESSMENT — PAIN SCALES - GENERAL: PAINLEVEL: NO PAIN (0)

## 2024-04-18 NOTE — LETTER
My Asthma Action Plan    Name: Melvi Cleveland   YOB: 1950  Date: 4/15/2024   My doctor: GIOVANNY Valentino   [facility]        My Rescue Medicine:   Albuterol inhaler (Proair/Ventolin/Proventil HFA)  2-4 puffs EVERY 4 HOURS as needed. Use a spacer if recommended by your provider.   My Asthma Severity:   Cough variant asthma  Know your asthma triggers:   upper respiratory infections          GREEN ZONE   Good Control  I feel good  No cough or wheeze  Can work, sleep and play without asthma symptoms       Take your asthma control medicine every day.     If exercise triggers your asthma, take your rescue medication  15 minutes before exercise or sports, and  During exercise if you have asthma symptoms  Spacer to use with inhaler: If you have a spacer, make sure to use it with your inhaler             YELLOW ZONE Getting Worse  I have ANY of these:  I do not feel good  Cough or wheeze  Chest feels tight  Wake up at night   Keep taking your Green Zone medications  Start taking your rescue medicine:  every 20 minutes for up to 1 hour. Then every 4 hours for 24-48 hours.  If you stay in the Yellow Zone for more than 12-24 hours, contact your doctor.  If you do not return to the Green Zone in 12-24 hours or you get worse, start taking your oral steroid medicine if prescribed by your provider.           RED ZONE Medical Alert - Get Help  I have ANY of these:  I feel awful  Medicine is not helping  Breathing getting harder  Trouble walking or talking  Nose opens wide to breathe       Take your rescue medicine NOW  If your provider has prescribed an oral steroid medicine, start taking it NOW  Call your doctor NOW  If you are still in the Red Zone after 20 minutes and you have not reached your doctor:  Take your rescue medicine again and  Call 911 or go to the emergency room right away    See your regular doctor within 2 weeks of an Emergency Room or Urgent Care visit for  follow-up treatment.          Annual Reminders:  Meet with Asthma Educator,  Flu Shot in the Fall, consider Pneumonia Vaccination for patients with asthma (aged 19 and older).    Pharmacy: [id number] - [place], [place] - [address]    Electronically signed by GIOVANNY Valentino   Date: 04/15/24                    Asthma Triggers  How To Control Things That Make Your Asthma Worse    Triggers are things that make your asthma worse.  Look at the list below to help you find your triggers and   what you can do about them. You can help prevent asthma flare-ups by staying away from your triggers.      Trigger                                                          What you can do   Cigarette Smoke  Tobacco smoke can make asthma worse. Do not allow smoking in your home, car or around you.  Be sure no one smokes at a child s day care or school.  If you smoke, ask your health care provider for ways to help you quit.  Ask family members to quit too.  Ask your health care provider for a referral to Quit Plan to help you quit smoking, or call 4-777-171-PLAN.     Colds, Flu, Bronchitis  These are common triggers of asthma. Wash your hands often.  Don t touch your eyes, nose or mouth.  Get a flu shot every year.     Dust Mites  These are tiny bugs that live in cloth or carpet. They are too small to see. Wash sheets and blankets in hot water every week.   Encase pillows and mattress in dust mite proof covers.  Avoid having carpet if you can. If you have carpet, vacuum weekly.   Use a dust mask and HEPA vacuum.   Pollen and Outdoor Mold  Some people are allergic to trees, grass, or weed pollen, or molds. Try to keep your windows closed.  Limit time out doors when pollen count is high.   Ask you health care provider about taking medicine during allergy season.     Animal Dander  Some people are allergic to skin flakes, urine or saliva from pets with fur or feathers. Keep pets with fur or feathers out of your  home.    If you can t keep the pet outdoors, then keep the pet out of your bedroom.  Keep the bedroom door closed.  Keep pets off cloth furniture and away from stuffed toys.     Mice, Rats, and Cockroaches  Some people are allergic to the waste from these pests.   Cover food and garbage.  Clean up spills and food crumbs.  Store grease in the refrigerator.   Keep food out of the bedroom.   Indoor Mold  This can be a trigger if your home has high moisture. Fix leaking faucets, pipes, or other sources of water.   Clean moldy surfaces.  Dehumidify basement if it is damp and smelly.   Smoke, Strong Odors, and Sprays  These can reduce air quality. Stay away from strong odors and sprays, such as perfume, powder, hair spray, paints, smoke incense, paint, cleaning products, candles and new carpet.   Exercise or Sports  Some people with asthma have this trigger. Be active!  Ask your doctor about taking medicine before sports or exercise to prevent symptoms.    Warm up for 5-10 minutes before and after sports or exercise.     Other Triggers of Asthma  Cold air:  Cover your nose and mouth with a scarf.  Sometimes laughing or crying can be a trigger.  Some medicines and food can trigger asthma.

## 2024-04-18 NOTE — PROGRESS NOTES
"  Assessment & Plan     Low back pain, chronic  Hx laminectomy of L45 and still gets some aches and pains.   Had a acute on chronic episode this pas month. Needing treatment. Given Medrol and some Lortab for this. Hasn't used much at all over two years.     Fungal dermatitis  She is going to be iven a refill for a rash on her panus.   - nystatin (MYCOSTATIN) 754788 UNIT/GM external powder; Apply topically 2 times daily as needed for other (yeast infection on abdomen)    Acute bilateral low back pain with right-sided sciatica  As above. Doing better.   - methylPREDNISolone (MEDROL DOSEPAK) 4 MG tablet therapy pack; Follow Package Directions  - HYDROcodone-acetaminophen (NORCO) 7.5-325 MG per tablet; Take 1 tablet by mouth every 6 hours as needed for severe pain  - cyclobenzaprine (FLEXERIL) 5 MG tablet; Take 1 tablet (5 mg) by mouth 3 times daily as needed for muscle spasms    Rheumatoid arthritis involving multiple sites with positive rheumatoid factor (H)  Seeing Dr. Burk and sunni Methotrexate. Working with him on her pain in joints and doing very well withthis.     Crohn's disease of large intestine without complication (H)  Well controlled with Methotrexate for her RA.  Had her last colon in 6/23.  Doing well no changes and no inflammation on this.     Stage 3a chronic kidney disease (H)  Renal functions are up to date. Feeling well. Blood pressure is controlled.     Review of external notes as documented elsewhere in note  Ordering of each unique test  Prescription drug management  10 minutes spent by me on the date of the encounter doing chart review, history and exam, documentation and further activities per the note      BMI  Estimated body mass index is 33.56 kg/m  as calculated from the following:    Height as of 11/8/23: 1.651 m (5' 5\").    Weight as of this encounter: 91.5 kg (201 lb 11.2 oz).   Weight management plan: Discussed healthy diet and exercise guidelines      See Patient Instructions    No " follow-ups on file.    Lizeth Balderas is a 74 year old, presenting for the following health issues:  Recheck Medication      4/18/2024     8:34 AM   Additional Questions   Roomed by raya ingram lpn   Accompanied by self         4/18/2024     8:34 AM   Patient Reported Additional Medications   Patient reports taking the following new medications none     History of Present Illness       Reason for visit:  Med check    She eats 0-1 servings of fruits and vegetables daily.She consumes 1 sweetened beverage(s) daily.She exercises with enough effort to increase her heart rate 20 to 29 minutes per day.  She exercises with enough effort to increase her heart rate 4 days per week.   She is taking medications regularly.           Pain History:  When did you first notice your pain? chronic   Have you seen this provider for your pain in the past? Yes   Where in your body do you have pain? Mid back to legs (sciatica)  Are you seeing anyone else for your pain? Yes - Chiropractor         8/30/2023     2:38 PM 10/4/2023     2:31 PM 1/4/2024     1:49 PM   PHQ-9 SCORE   PHQ-9 Total Score MyChart   2 (Minimal depression)   PHQ-9 Total Score 3 0 2           8/30/2023     2:39 PM 10/4/2023     2:31 PM 1/4/2024     1:50 PM   HEATH-7 SCORE   Total Score   0 (minimal anxiety)   Total Score 1 0 0           8/30/2023     2:40 PM 11/15/2023     8:32 AM 4/18/2024     8:42 AM   PEG Score   PEG Total Score 4.67 3.67 1.33           8/30/2023     2:40 PM 11/15/2023     8:32 AM 4/18/2024     8:42 AM   PEG Score   PEG Total Score 4.67 3.67 1.33       Chronic Pain Follow Up:    Location of pain: mid back to legs  Analgesia/pain control:    - Recent changes:  recent flareups     - Overall control: takes the edge off to function     - Current treatments: 1 tab norco every four to six hours   Adherence:     - Do you ever take more pain medicine than prescribed? No    - When did you take your last dose of pain medicine?  Last month   Adverse  effects: No   PDMP Review         Value Time User    State PDMP site checked  Yes 2/26/2024  5:16 PM Jennifer Granda MD          Last CSA Agreement:   CSA -- Patient Level:     [Media Unavailable] Controlled Substance Agreement - Opioid - Scan on 9/1/2023  2:15 PM: OPIOID/OPIOID PLUS CONTROLLED SUBSTANCE AGREEMENT   [Media Unavailable] Controlled Substance Agreement - Opioid - Scan on 2/7/2022 11:31 AM: opiod/opiod plus controlled substance agreement       Last UDS: 4/17/2022             No data to display              Average probability of overdose or serious opioid-induced respiratory depression in the next six months:   Points    Risk   0-4    2%   5-7    5%   8-9    7%   10-17    15%   18-25    30%   26-41    55%   42    83%            Review of Systems  Constitutional, neuro, ENT, endocrine, pulmonary, cardiac, gastrointestinal, genitourinary, musculoskeletal, integument and psychiatric systems are negative, except as otherwise noted.      Objective    /60 (BP Location: Left arm, Patient Position: Sitting, Cuff Size: Adult Regular)   Pulse 93   Temp 97.3  F (36.3  C) (Tympanic)   Resp 16   Wt 91.5 kg (201 lb 11.2 oz)   SpO2 94%   BMI 33.56 kg/m    Body mass index is 33.56 kg/m .  Physical Exam   GENERAL: alert and no distress  EYES: Eyes grossly normal to inspection, PERRL and conjunctivae and sclerae normal  HENT: ear canals and TM's normal, nose and mouth without ulcers or lesions  NECK: no adenopathy, no asymmetry, masses, or scars  RESP: lungs clear to auscultation - no rales, rhonchi or wheezes  CV: regular rate and rhythm, normal S1 S2, no S3 or S4, no murmur, click or rub, no peripheral edema  ABDOMEN: soft, nontender, no hepatosplenomegaly, no masses and bowel sounds normal  MS: no gross musculoskeletal defects noted, no edema  SKIN: no suspicious lesions or rashes  NEURO: Normal strength and tone, mentation intact and speech normal  PSYCH: mentation appears normal, affect  normal/bright    No results found for any visits on 04/18/24.        Signed Electronically by: GIOVANNY Valentino

## 2024-04-18 NOTE — TELEPHONE ENCOUNTER
Received a PA request from [employer] for cyclobenzaprine (FLEXERIL) 5 MG tablet. Submitted on CMM. Waiting for a response.

## 2024-04-18 NOTE — TELEPHONE ENCOUNTER
Received an APPROVAL from [employer] for cyclobenzaprine (FLEXERIL) 5 MG tablet. Effective dates 4/4/24-7/17/24.

## 2024-04-20 NOTE — TELEPHONE ENCOUNTER
simethicone      Last Written Prescription Date:  08/29/2018  Last Fill Quantity: 60,   # refills: 11  Last Office Visit: 01/20/2020  Future Office visit:       
no

## 2024-05-11 DIAGNOSIS — G62.9 PERIPHERAL POLYNEUROPATHY: ICD-10-CM

## 2024-05-13 RX ORDER — GABAPENTIN 300 MG/1
CAPSULE ORAL
Qty: 210 CAPSULE | Refills: 2 | Status: SHIPPED | OUTPATIENT
Start: 2024-05-13 | End: 2024-08-13

## 2024-05-13 NOTE — TELEPHONE ENCOUNTER
Gabapentin      Last Written Prescription Date:  3/27/24  Last Fill Quantity: 210,   # refills: 1  Last Office Visit: 4/18/24  Future Office visit:    Next 5 appointments (look out 90 days)      Jul 08, 2024  2:30 PM  (Arrive by 2:15 PM)  Provider Visit with GIOVANNY Vital  [facility] [place] ([facility] [place] ) [address]  [place] [address]  [phone]             Routing refill request to provider for review/approval because:

## 2024-05-14 ENCOUNTER — TELEPHONE (OUTPATIENT)
Dept: INTERVENTIONAL RADIOLOGY/VASCULAR | Facility: HOSPITAL | Age: 74
End: 2024-05-14

## 2024-05-14 ENCOUNTER — ANCILLARY PROCEDURE (OUTPATIENT)
Dept: MAMMOGRAPHY | Facility: OTHER | Age: 74
End: 2024-05-14
Attending: PHYSICIAN ASSISTANT
Payer: MEDICARE

## 2024-05-14 DIAGNOSIS — Z12.31 VISIT FOR SCREENING MAMMOGRAM: ICD-10-CM

## 2024-05-14 PROCEDURE — 77063 BREAST TOMOSYNTHESIS BI: CPT | Mod: TC

## 2024-06-20 ENCOUNTER — APPOINTMENT (OUTPATIENT)
Dept: ULTRASOUND IMAGING | Facility: HOSPITAL | Age: 74
End: 2024-06-20
Attending: NURSE PRACTITIONER
Payer: MEDICARE

## 2024-06-20 ENCOUNTER — LAB (OUTPATIENT)
Dept: LAB | Facility: OTHER | Age: 74
End: 2024-06-20
Payer: MEDICARE

## 2024-06-20 ENCOUNTER — HOSPITAL ENCOUNTER (EMERGENCY)
Facility: HOSPITAL | Age: 74
Discharge: HOME | End: 2024-06-20
Attending: NURSE PRACTITIONER | Admitting: NURSE PRACTITIONER
Payer: MEDICARE

## 2024-06-20 ENCOUNTER — APPOINTMENT (OUTPATIENT)
Dept: GENERAL RADIOLOGY | Facility: HOSPITAL | Age: 74
End: 2024-06-20
Attending: NURSE PRACTITIONER
Payer: MEDICARE

## 2024-06-20 VITALS
BODY MASS INDEX: 33.82 KG/M2 | WEIGHT: 203 LBS | RESPIRATION RATE: 16 BRPM | SYSTOLIC BLOOD PRESSURE: 109 MMHG | TEMPERATURE: 97.1 F | OXYGEN SATURATION: 95 % | DIASTOLIC BLOOD PRESSURE: 65 MMHG | HEIGHT: 65 IN | HEART RATE: 77 BPM

## 2024-06-20 DIAGNOSIS — M06.09 RHEUMATOID ARTHRITIS OF MULTIPLE SITES WITHOUT RHEUMATOID FACTOR (H): ICD-10-CM

## 2024-06-20 DIAGNOSIS — Z79.899 HIGH RISK MEDICATION USE: ICD-10-CM

## 2024-06-20 DIAGNOSIS — M25.561 RIGHT KNEE PAIN, UNSPECIFIED CHRONICITY: Primary | ICD-10-CM

## 2024-06-20 DIAGNOSIS — M25.561 RIGHT KNEE PAIN: ICD-10-CM

## 2024-06-20 LAB
ALBUMIN SERPL BCG-MCNC: 4.1 G/DL (ref 3.5–5.2)
ALP SERPL-CCNC: 67 U/L (ref 40–150)
ALT SERPL W P-5'-P-CCNC: 25 U/L (ref 0–50)
ANION GAP SERPL CALCULATED.3IONS-SCNC: 10 MMOL/L (ref 7–15)
AST SERPL W P-5'-P-CCNC: 27 U/L (ref 0–45)
BASOPHILS # BLD AUTO: 0 10E3/UL (ref 0–0.2)
BASOPHILS NFR BLD AUTO: 1 %
BILIRUB SERPL-MCNC: 0.3 MG/DL
BUN SERPL-MCNC: 8.7 MG/DL (ref 8–23)
CALCIUM SERPL-MCNC: 9.5 MG/DL (ref 8.8–10.2)
CHLORIDE SERPL-SCNC: 100 MMOL/L (ref 98–107)
CREAT SERPL-MCNC: 1.05 MG/DL (ref 0.51–0.95)
CRP SERPL-MCNC: <3 MG/L
DEPRECATED HCO3 PLAS-SCNC: 27 MMOL/L (ref 22–29)
EGFRCR SERPLBLD CKD-EPI 2021: 56 ML/MIN/1.73M2
EOSINOPHIL # BLD AUTO: 0.2 10E3/UL (ref 0–0.7)
EOSINOPHIL NFR BLD AUTO: 3 %
ERYTHROCYTE [DISTWIDTH] IN BLOOD BY AUTOMATED COUNT: 14.6 % (ref 10–15)
ERYTHROCYTE [SEDIMENTATION RATE] IN BLOOD BY WESTERGREN METHOD: 13 MM/HR (ref 0–30)
GLUCOSE SERPL-MCNC: 94 MG/DL (ref 70–99)
HCT VFR BLD AUTO: 39.6 % (ref 35–47)
HGB BLD-MCNC: 12.7 G/DL (ref 11.7–15.7)
IMM GRANULOCYTES # BLD: 0 10E3/UL
IMM GRANULOCYTES NFR BLD: 0 %
LYMPHOCYTES # BLD AUTO: 1.9 10E3/UL (ref 0.8–5.3)
LYMPHOCYTES NFR BLD AUTO: 32 %
MCH RBC QN AUTO: 28.1 PG (ref 26.5–33)
MCHC RBC AUTO-ENTMCNC: 32.1 G/DL (ref 31.5–36.5)
MCV RBC AUTO: 88 FL (ref 78–100)
MONOCYTES # BLD AUTO: 0.5 10E3/UL (ref 0–1.3)
MONOCYTES NFR BLD AUTO: 9 %
NEUTROPHILS # BLD AUTO: 3.3 10E3/UL (ref 1.6–8.3)
NEUTROPHILS NFR BLD AUTO: 56 %
NRBC # BLD AUTO: 0 10E3/UL
NRBC BLD AUTO-RTO: 0 /100
PLATELET # BLD AUTO: 236 10E3/UL (ref 150–450)
POTASSIUM SERPL-SCNC: 4.1 MMOL/L (ref 3.4–5.3)
PROT SERPL-MCNC: 7 G/DL (ref 6.4–8.3)
RBC # BLD AUTO: 4.52 10E6/UL (ref 3.8–5.2)
SODIUM SERPL-SCNC: 137 MMOL/L (ref 135–145)
WBC # BLD AUTO: 6 10E3/UL (ref 4–11)

## 2024-06-20 PROCEDURE — 99213 OFFICE O/P EST LOW 20 MIN: CPT | Performed by: NURSE PRACTITIONER

## 2024-06-20 PROCEDURE — G0463 HOSPITAL OUTPT CLINIC VISIT: HCPCS | Mod: 25

## 2024-06-20 PROCEDURE — 85025 COMPLETE CBC W/AUTO DIFF WBC: CPT | Mod: ZL

## 2024-06-20 PROCEDURE — 80053 COMPREHEN METABOLIC PANEL: CPT | Mod: ZL

## 2024-06-20 PROCEDURE — 86140 C-REACTIVE PROTEIN: CPT | Mod: ZL

## 2024-06-20 PROCEDURE — 93971 EXTREMITY STUDY: CPT | Mod: RT

## 2024-06-20 PROCEDURE — 36415 COLL VENOUS BLD VENIPUNCTURE: CPT | Mod: ZL

## 2024-06-20 PROCEDURE — 85652 RBC SED RATE AUTOMATED: CPT | Mod: ZL

## 2024-06-20 PROCEDURE — 73562 X-RAY EXAM OF KNEE 3: CPT | Mod: RT

## 2024-06-20 RX ORDER — PREDNISONE 20 MG/1
TABLET ORAL
Qty: 10 TABLET | Refills: 0 | Status: SHIPPED | OUTPATIENT
Start: 2024-06-20 | End: 2024-07-08

## 2024-06-20 ASSESSMENT — ENCOUNTER SYMPTOMS
NAUSEA: 0
SHORTNESS OF BREATH: 0
DIARRHEA: 0
PSYCHIATRIC NEGATIVE: 1
VOMITING: 0
CHILLS: 0
FEVER: 0

## 2024-06-20 ASSESSMENT — ACTIVITIES OF DAILY LIVING (ADL)
ADLS_ACUITY_SCORE: 37
ADLS_ACUITY_SCORE: 37

## 2024-06-20 NOTE — ED PROVIDER NOTES
History     Chief Complaint   Patient presents with    Leg Pain     HPI  Melvi Cleveland is a 74 year old female who presents to urgent care today ambulatory accompanied by spouse with complaints of right knee pain and swelling.  Symptoms started 3 days ago.  Patient has been doing a lot of housework/yard work including painting this past week resulting in going up and down the ladder several times.  When asked where pain primarily is, patient points directly over the patella but states it radiates down to shin at times.  Full ROM of right lower extremity.  No open skin wounds.  Denies any fever, chills, nausea, vomiting, diarrhea, shortness of breath or chest pain.  Denies any fall, injury or trauma.  No history of DVT/PE, not on any blood thinners.  No other concerns.    Allergies:  Allergies   Allergen Reactions    Adhesive Tape      Glue and nicotine patches    Benzoin Compound      Tin-Co-Javier    Mold     Seasonal Allergies     Soap      Any cleanser/soap/disinfectant that is used right before surgery.  Makes patient break out horribly. Chart was looked at and Chloraprep was used.       Problem List:    Patient Active Problem List    Diagnosis Date Noted    Goss's esophagus without dysplasia 08/30/2023     Priority: Medium    Moderate episode of recurrent major depressive disorder (H) 08/30/2023     Priority: Medium    Chronic, continuous use of opioids 05/17/2021     Priority: Medium    Chronic kidney disease, stage 3 (H) 01/16/2021     Priority: Medium    Cough variant asthma 04/16/2019     Priority: Medium    Need for hepatitis C screening test 11/13/2018     Priority: Medium    ACP (advance care planning) 09/28/2016     Priority: Medium     Advance Care Planning 9/28/2016: ACP Review of Chart / Resources Provided:  Reviewed chart for advance care plan.  Melvi Cleveland has been provided information and resources to begin or update their advance care plan.  Added by Kathy Galvan RA  (rheumatoid arthritis) (H) 11/25/2015     Priority: Medium    Comprehensive Medical Examination 11/25/2015     Priority: Medium    Seasonal allergic rhinitis 11/25/2015     Priority: Medium    Fibrocystic breast disease (FCBD) 11/25/2015     Priority: Medium    Crohn's disease of large intestine (H) 03/01/2011     Priority: Medium    Dyslipidemia 03/01/2011     Priority: Medium    Sicca syndrome (H24) 03/01/2011     Priority: Medium    Neck pain, chronic 07/05/2005     Priority: Medium     05/2015 MRI:  IMPRESSION:  BROAD-BASED DISK PROTRUSION AT C5-C6 ON THE LEFT, MILDLY  IMPINGING ON THE LEFT C6 NERVE ROOT      Low back pain, chronic 12/13/2000     Priority: Medium     03/2016 MRI:  MULTILEVEL DEGENERATIVE CHANGES OF THE LUMBAR SPINE,  SIMILAR IN APPEARANCE TO THE PRIOR STUDY.  A RIGHT FORAMINAL DISK  PROTRUSION AT L3-L4 AGAIN IMPINGES THE EXITING RIGHT L3 NERVE ROOT.  CORRELATE FOR A RELATED RADICULOPATHY.          Past Medical History:    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    Parotiditis 05/09/2011    Peripheral Neuriopathy 03/01/2011    Pneumonia of left lower lobe due to infectious organism 04/16/2019    Rheumatoid arthritis(714.0) 12/13/1999    Seborrhea capitis 10/06/2011    Sjogrens Syndrome 03/01/2011    Urethral stricture 04/30/2008       Past Surgical History:    Past Surgical History:   Procedure Laterality Date    BACK SURGERY  05/1995    back surgery    BIOPSY      breast bx X2    BIOPSY BREAST      LT x 3 benign    BIOPSY BREAST NEEDLE LOCALIZATION Right 06/12/2017    Procedure:  BIOPSY BREAST NEEDLE LOCALIZATION;  WIRE LOCALIZED EXCISIONAL BIOPSY  RIGHT BREAST MASS;  Surgeon: Jeni Amin MD;  Location: [facility]    CHOLECYSTECTOMY  2004    COLONOSCOPY  11/15/2004    screening    COLONOSCOPY  09/24/2014    COLONOSCOPY - HIM SCAN N/A 11/01/2017    (Jessy) no abnormality - right, transverse and left colon biopsies, repeat in 2 years    EGD with biopsy  2010, 2011    GERD    ENDOSCOPY UPPER, COLONOSCOPY, COMBINED N/A 01/17/2020    Procedure: UPPER ENDOSCOPY WITH BIOPSIES  AND COLONOSCOPY;  Surgeon: Saul Jiménez MD;  Location: [facility]    IR CONSULTATION FOR IR EXAM  02/13/2020    IR CONSULTATION FOR IR EXAM  06/18/2020    IR CONSULTATION FOR IR EXAM  07/01/2021    laminectomy      L4 L5 laminectomy; back pain    LUMPECTOMY BREAST      RELEASE CARPAL TUNNEL Right 01/16/2018    Procedure: RELEASE CARPAL TUNNEL;  RIGHT CARPAL TUNNEL RELEASE;  Surgeon: Haider Carlos MD;  Location: [facility]       Family History:    Family History   Problem Relation Age of Onset    Coronary Artery Disease Mother         Bad valvue    Diabetes Mother     Rheumatoid Arthritis Mother     Other - See Comments Mother         fibromyalgia    Osteoarthritis Mother     Thyroid Disease Mother         thyroid disorder    Unknown/Adopted Father         cause of death unknown    Rheumatoid Arthritis Father     Diabetes Sister     Myocardial Infarction Sister         cause of death    Lupus Sister         cause of death    Other - See Comments Sister         sleep apnea    Hypertension Brother     Depression Brother     Other - See Comments Brother         sleep apnea    C.A.D. Maternal Grandmother     Cerebrovascular Disease Maternal Grandmother     Diabetes Maternal Grandmother     Thyroid Disease Maternal Grandmother         thyrodi disorder; goitor removed    Cancer Maternal Grandfather     Diabetes Daughter     Anxiety Disorder Daughter        Social History:  Marital Status:   [2]  Social History     Tobacco Use     Smoking status: Former     Current packs/day: 0.00     Types: Cigarettes     Quit date: 10/6/1997     Years since quittin.7    Smokeless tobacco: Never    Tobacco comments:     no passive exposure   Vaping Use    Vaping status: Never Used   Substance Use Topics    Alcohol use: No     Comment: former. quit     Drug use: No        Medications:    predniSONE (DELTASONE) 20 MG tablet  albuterol (PROAIR HFA/PROVENTIL HFA/VENTOLIN HFA) 108 (90 Base) MCG/ACT inhaler  ALFALFA PO  ALLEGRA-D ALLERGY & CONGESTION  MG 12 hr tablet  Ascorbic Acid (VITAMIN C) 500 MG CAPS  Biotin 07112 MCG TABS  Calcium-Vitamin D-Vitamin K 500-1000-40 MG-UNT-MCG CHEW  Cholecalciferol (VITAMIN D) 1000 UNITS capsule  cyclobenzaprine (FLEXERIL) 5 MG tablet  DEXILANT 60 MG CPDR CR capsule  diclofenac (VOLTAREN) 1 % topical gel  DULoxetine (CYMBALTA) 30 MG capsule  Flaxseed, Linseed, (FLAX SEED OIL) 1000 MG capsule  fluocinonide (LIDEX) 0.05 % external solution  folic acid (FOLVITE) 1 MG tablet  gabapentin (NEURONTIN) 300 MG capsule  Garlic 400 MG TBEC  HYDROcodone-acetaminophen (NORCO) 7.5-325 MG per tablet  hydroxychloroquine (PLAQUENIL) 200 MG tablet  ketoconazole (NIZORAL) 2 % external shampoo  ketotifen fumarate 0.035%, ketotifen 0.025%, (ZADITOR) 0.025 % ophthalmic solution  lisinopril (ZESTRIL) 2.5 MG tablet  methotrexate sodium 2.5 MG TABS  methylPREDNISolone (MEDROL DOSEPAK) 4 MG tablet therapy pack  MILK THISTLE PO  montelukast (SINGULAIR) 10 MG tablet  multivitamin, therapeutic (THERA-VIT) TABS tablet  nystatin (MYCOSTATIN) 514057 UNIT/GM external powder  Omega-3 1000 MG capsule  polyvinyl alcohol-povidone (HYPOTEARS) 1.4-0.6 % ophthalmic solution  PREBIOTIC PRODUCT PO  Probiotic Product (PROBIOTIC PO)  RESTASIS 0.05 % ophthalmic emulsion  traZODone (DESYREL) 50 MG tablet  UNABLE TO FIND  vitamin B complex with vitamin C (STRESS TAB) tablet  zinc gluconate 50 MG tablet      Review of Systems   Constitutional:  Negative for  "chills and fever.   Respiratory:  Negative for shortness of breath.    Cardiovascular:  Negative for chest pain.   Gastrointestinal:  Negative for diarrhea, nausea and vomiting.   Musculoskeletal:  Negative for gait problem.        Right knee pain   Skin: Negative.    Psychiatric/Behavioral: Negative.       Physical Exam   BP: 109/65  Pulse: 77  Temp: 97.1  F (36.2  C)  Resp: 16  Height: 165.1 cm (5' 5\")  Weight: 92.1 kg (203 lb)  SpO2: 95 %    Physical Exam  Vitals and nursing note reviewed.   Constitutional:       General: She is not in acute distress.     Appearance: Normal appearance. She is not ill-appearing or toxic-appearing.   Cardiovascular:      Rate and Rhythm: Normal rate and regular rhythm.      Pulses: Normal pulses.      Heart sounds: Normal heart sounds.   Pulmonary:      Effort: Pulmonary effort is normal.      Breath sounds: Normal breath sounds.   Musculoskeletal:      Right upper leg: Normal.      Right knee: Swelling (mild) and bony tenderness present. No deformity, erythema, ecchymosis, lacerations or crepitus. Normal range of motion. No tenderness. Normal pulse.      Right lower leg: Normal.      Right ankle: Normal.   Skin:     General: Skin is warm and dry.      Capillary Refill: Capillary refill takes less than 2 seconds.   Neurological:      Mental Status: She is alert.   Psychiatric:         Mood and Affect: Mood normal.       ED Course     Procedures    No results found for this or any previous visit (from the past 24 hour(s)).      Medications - No data to display    Assessments & Plan (with Medical Decision Making)     I have reviewed the nursing notes.    I have reviewed the findings, diagnosis, plan and need for follow up with the patient.  (M25.561) Right knee pain  Plan:   Patient ambulatory with a nontoxic appearance.  Patient arrived with complaints of knee pain directly over the patella which radiates down to the shin at times.  Denies any fall, injury or trauma.  Patient does " state that she has been doing an increased amount of housework/yard work past 3-5 days and was going up and down the ladder a lot to paint.  No significant history of knee pain.  No open skin wounds, redness or signs of infection.  Full ROM of right lower extremity.  Patient has no history of DVT/PE, someone had told patient that she should rule out a DVT given lower leg pain.  X-ray was completed of right knee today which shows no acute fracture or dislocation.  Mild medial compartment joint space narrowing.  Chondrocalcinosis with possible small knee effusion.  XR and increased activity consistent with current knee pain which is directly over the patella.  Right lower extremity venous duplex completed, did review that symptoms were not consistent with classic presentation of a right lower extremity DVT.  Results show no evidence of acute occlusive right lower extremity DVT.  Probable small volume chronic remodeled nonocclusive thrombus in the right common femoral vein.  Reviewed results with Usha BALTAZAR in the emergency department who stated to attempt compression stockings, does not typically place patient on a blood thinner for current result and to consult with PCP to see if they have any recommendations.  Attempted to call PCP in clinic, left message along with a message for nurse and patient did not want to wait any longer.  After shared decision-making, will not place patient on an anticoagulant at this time, will have patient follow-up closely with PCP.  Will start patient on prednisone for knee pain.  Recommend ambulating with gentle stretching.  Patient to return to urgent care/ED with any worsening in condition or additional concerns.  Patient in agreement treatment plan.      Telephone call to patient on 6/21/2024 at 1600 and patient states that she is feeling much better today with the use of prednisone.  Mild swelling to right knee has gone down and pain has improved.  Patient has a follow-up  appointment with PCP on 6/26/2024.  Patient encouraged to continue to monitor and to return to urgent care/ED with any worsening in condition or additional concerns.        Discharge Medication List as of 6/20/2024  3:43 PM        START taking these medications    Details   predniSONE (DELTASONE) 20 MG tablet Take two tablets (= 40mg) each day for 5 (five) days, Disp-10 tablet, R-0, E-Prescribe           Final diagnoses:   Right knee pain     6/20/2024   [facility]       Maddi Henriquez NP  06/21/24 [id number]

## 2024-06-20 NOTE — DISCHARGE INSTRUCTIONS
Prednisone as ordered    Compression socks    Ambulate and gentle stretching    Follow up with primary care provider or return to urgent care/ED with any worsening in condition or additional concerns.

## 2024-06-20 NOTE — ED TRIAGE NOTES
Pt presents with concerns of warmth and pain in right leg. Pt reports being unable to pull up pant leg. Symptom onset was 3 or 4 days ago. Denies hx blood clots

## 2024-06-21 ENCOUNTER — TELEPHONE (OUTPATIENT)
Dept: FAMILY MEDICINE | Facility: OTHER | Age: 74
End: 2024-06-21

## 2024-06-21 DIAGNOSIS — J30.2 SEASONAL ALLERGIC RHINITIS, UNSPECIFIED TRIGGER: ICD-10-CM

## 2024-06-21 NOTE — TELEPHONE ENCOUNTER
Lula KIRBY      Last Written Prescription Date:  4/2/24  Last Fill Quantity: 60,   # refills: 0  Last Office Visit: 4/18/24  Future Office visit:    Next 5 appointments (look out 90 days)      Jul 08, 2024 2:30 PM  (Arrive by 2:15 PM)  Provider Visit with GIOVANNY Vital  [facility] [place] ([facility] [place] ) [address]  [place] [address]  [phone]             Routing refill request to provider for review/approval because:

## 2024-06-21 NOTE — TELEPHONE ENCOUNTER
6/21/2024 9:00 AM  Pt called regarding ER/UC follow-up. Seen in ER/UC on 6/20/24. Pt reports feeling a little better today. Discharge instructions to see PCP in 7 day(s).     Pt scheduled.    Patient notified if needing immediate medical attention go back to ER/UC immediately, call [phone] if needed. Pt verbalizes understanding and agrees to plan.    KATHERINE Lopez, Rufina Nurse

## 2024-06-21 NOTE — TELEPHONE ENCOUNTER
Symptom or reason needing to speak to RN: Urgent care visit follow up     Best number to return call: [phone]     Best time to return call: Any

## 2024-06-25 RX ORDER — FEXOFENADINE HYDROCHLORIDE AND PSEUDOEPHEDRINE HYDROCHLORIDE 60; 120 MG/1; MG/1
1 TABLET, FILM COATED, EXTENDED RELEASE ORAL 2 TIMES DAILY
Qty: 60 TABLET | Refills: 0 | Status: SHIPPED | OUTPATIENT
Start: 2024-06-25 | End: 2024-08-26

## 2024-06-26 ENCOUNTER — OFFICE VISIT (OUTPATIENT)
Dept: FAMILY MEDICINE | Facility: OTHER | Age: 74
End: 2024-06-26
Attending: PHYSICIAN ASSISTANT
Payer: MEDICARE

## 2024-06-26 VITALS
TEMPERATURE: 97.4 F | HEART RATE: 85 BPM | DIASTOLIC BLOOD PRESSURE: 54 MMHG | OXYGEN SATURATION: 96 % | RESPIRATION RATE: 16 BRPM | SYSTOLIC BLOOD PRESSURE: 100 MMHG

## 2024-06-26 DIAGNOSIS — M11.20 CHONDROCALCINOSIS: ICD-10-CM

## 2024-06-26 DIAGNOSIS — M05.79 RHEUMATOID ARTHRITIS INVOLVING MULTIPLE SITES WITH POSITIVE RHEUMATOID FACTOR (H): Primary | ICD-10-CM

## 2024-06-26 LAB
ANION GAP SERPL CALCULATED.3IONS-SCNC: 9 MMOL/L (ref 7–15)
BUN SERPL-MCNC: 15.7 MG/DL (ref 8–23)
CALCIUM SERPL-MCNC: 9.2 MG/DL (ref 8.8–10.2)
CALCIUM UR-MCNC: 6.4 MG/DL
CALCIUM/CREAT UR: 0.16 G/G CR (ref 0.05–0.27)
CHLORIDE SERPL-SCNC: 102 MMOL/L (ref 98–107)
CREAT SERPL-MCNC: 1.06 MG/DL (ref 0.51–0.95)
CREAT UR-MCNC: 38.9 MG/DL
CRP SERPL-MCNC: 4.36 MG/L
DEPRECATED HCO3 PLAS-SCNC: 27 MMOL/L (ref 22–29)
EGFRCR SERPLBLD CKD-EPI 2021: 56 ML/MIN/1.73M2
ERYTHROCYTE [SEDIMENTATION RATE] IN BLOOD BY WESTERGREN METHOD: 9 MM/HR (ref 0–30)
GLUCOSE SERPL-MCNC: 91 MG/DL (ref 70–99)
MAGNESIUM SERPL-MCNC: 1.8 MG/DL (ref 1.7–2.3)
POTASSIUM SERPL-SCNC: 4 MMOL/L (ref 3.4–5.3)
SODIUM SERPL-SCNC: 138 MMOL/L (ref 135–145)
URATE SERPL-MCNC: 4.5 MG/DL (ref 2.4–5.7)

## 2024-06-26 PROCEDURE — 82340 ASSAY OF CALCIUM IN URINE: CPT | Mod: ZL | Performed by: PHYSICIAN ASSISTANT

## 2024-06-26 PROCEDURE — 84550 ASSAY OF BLOOD/URIC ACID: CPT | Mod: ZL | Performed by: PHYSICIAN ASSISTANT

## 2024-06-26 PROCEDURE — 80048 BASIC METABOLIC PNL TOTAL CA: CPT | Mod: ZL | Performed by: PHYSICIAN ASSISTANT

## 2024-06-26 PROCEDURE — G0463 HOSPITAL OUTPT CLINIC VISIT: HCPCS

## 2024-06-26 PROCEDURE — 36415 COLL VENOUS BLD VENIPUNCTURE: CPT | Mod: ZL | Performed by: PHYSICIAN ASSISTANT

## 2024-06-26 PROCEDURE — 99213 OFFICE O/P EST LOW 20 MIN: CPT | Performed by: PHYSICIAN ASSISTANT

## 2024-06-26 PROCEDURE — 85652 RBC SED RATE AUTOMATED: CPT | Mod: ZL | Performed by: PHYSICIAN ASSISTANT

## 2024-06-26 PROCEDURE — 83735 ASSAY OF MAGNESIUM: CPT | Mod: ZL | Performed by: PHYSICIAN ASSISTANT

## 2024-06-26 PROCEDURE — 86140 C-REACTIVE PROTEIN: CPT | Mod: ZL | Performed by: PHYSICIAN ASSISTANT

## 2024-06-26 RX ORDER — PREDNISONE 20 MG/1
TABLET ORAL
Qty: 20 TABLET | Refills: 0 | Status: SHIPPED | OUTPATIENT
Start: 2024-06-26

## 2024-06-26 ASSESSMENT — ANXIETY QUESTIONNAIRES
2. NOT BEING ABLE TO STOP OR CONTROL WORRYING: NOT AT ALL
1. FEELING NERVOUS, ANXIOUS, OR ON EDGE: NOT AT ALL
8. IF YOU CHECKED OFF ANY PROBLEMS, HOW DIFFICULT HAVE THESE MADE IT FOR YOU TO DO YOUR WORK, TAKE CARE OF THINGS AT HOME, OR GET ALONG WITH OTHER PEOPLE?: NOT DIFFICULT AT ALL
7. FEELING AFRAID AS IF SOMETHING AWFUL MIGHT HAPPEN: NOT AT ALL
7. FEELING AFRAID AS IF SOMETHING AWFUL MIGHT HAPPEN: NOT AT ALL
4. TROUBLE RELAXING: SEVERAL DAYS
6. BECOMING EASILY ANNOYED OR IRRITABLE: NOT AT ALL
3. WORRYING TOO MUCH ABOUT DIFFERENT THINGS: NOT AT ALL
5. BEING SO RESTLESS THAT IT IS HARD TO SIT STILL: SEVERAL DAYS
GAD7 TOTAL SCORE: 2
IF YOU CHECKED OFF ANY PROBLEMS ON THIS QUESTIONNAIRE, HOW DIFFICULT HAVE THESE PROBLEMS MADE IT FOR YOU TO DO YOUR WORK, TAKE CARE OF THINGS AT HOME, OR GET ALONG WITH OTHER PEOPLE: NOT DIFFICULT AT ALL
GAD7 TOTAL SCORE: 2

## 2024-06-26 ASSESSMENT — PAIN SCALES - GENERAL: PAINLEVEL: MODERATE PAIN (5)

## 2024-06-26 NOTE — PROGRESS NOTES
Assessment & Plan     RA (rheumatoid arthritis) (H)  Seeing her Rheumatolgist. Needing to get a few more labs due to below finding on xray.  Has fluid as well in the knee but not hot.  She did over use this. And flare happened a week later. Medrol did nothing so we will do a 60 mg full prednisone taper. She will let me know in a couple of days if better. Sending on to Dr. Burk    Chondrocalcinosis  New finding.  Explained finding, not sure if this is something usually found in fluid on the knee with RA.  She will start using a cane. Explained injection in the joint might be needed. Should tape th fluid but did not feel enough to get from the joint.   - Uric acid; Future  - ESR: Erythrocyte sedimentation rate; Future  - CRP, inflammation; Future  - Magnesium; Future  - Basic metabolic panel; Future  - predniSONE (DELTASONE) 20 MG tablet; Take 3 tabs by mouth daily x 3 days, then 2 tabs daily x 3 days, then 1 tab daily x 3 days, then 1/2 tab daily x 3 days.  - Calcium random urine with Creat Ratio; Future  - Uric acid  - ESR: Erythrocyte sedimentation rate  - CRP, inflammation  - Magnesium  - Basic metabolic panel  - Calcium random urine with Creat Ratio        MED REC REQUIRED  Post Medication Reconciliation Status:       See Patient Instructions    No follow-ups on file.    Lizeth Balderas is a 74 year old, presenting for the following health issues:  ER F/U      6/26/2024     8:34 AM   Additional Questions   Roomed by Jet Eduardo lpn   Accompanied by self         6/26/2024     8:34 AM   Patient Reported Additional Medications   Patient reports taking the following new medications none     HPI     ED/UC Followup:    Facility:  [facility]  Date of visit: 06/20/2024  Reason for visit: right knee pain  Current Status:Still experiencing pain in the right knee and is having bilateral leg pain as well but has improved.        Review of Systems  Constitutional, HEENT, cardiovascular, pulmonary, gi and gu systems are  negative, except as otherwise noted.    Answers submitted by the patient for this visit:  Patient Health Questionnaire (Submitted on 6/25/2024)  If you checked off any problems, how difficult have these problems made it for you to do your work, take care of things at home, or get along with other people?: Not difficult at all  PHQ9 TOTAL SCORE: 2  HEATH-7 (Submitted on 6/26/2024)  HEATH 7 TOTAL SCORE: 2    Objective    /54 (BP Location: Left arm, Patient Position: Sitting, Cuff Size: Adult Large)   Pulse 85   Temp 97.4  F (36.3  C) (Tympanic)   Resp 16   SpO2 96%   There is no height or weight on file to calculate BMI.  Physical Exam   GENERAL: alert and no distress  EYES: Eyes grossly normal to inspection, PERRL and conjunctivae and sclerae normal  HENT: ear canals and TM's normal, nose and mouth without ulcers or lesions  NECK: no adenopathy, no asymmetry, masses, or scars  RESP: lungs clear to auscultation - no rales, rhonchi or wheezes  CV: regular rate and rhythm, normal S1 S2, no S3 or S4, no murmur, click or rub, no peripheral edema  ABDOMEN: soft, nontender, no hepatosplenomegaly, no masses and bowel sounds normal  MS: swelling in knee on right can't even get ups without a significant limp. States is unsteady due to the inability to bear full weight.    PSYCH: mentation appears normal, affect normal/bright    Lab on 06/20/2024   Component Date Value Ref Range Status    Erythrocyte Sedimentation Rate 06/20/2024 13  0 - 30 mm/hr Final    CRP Inflammation 06/20/2024 <3.00  <5.00 mg/L Final    Sodium 06/20/2024 137  135 - 145 mmol/L Final    Reference intervals for this test were updated on 09/26/2023 to more accurately reflect our healthy population. There may be differences in the flagging of prior results with similar values performed with this method. Interpretation of those prior results can be made in the context of the updated reference intervals.     Potassium 06/20/2024 4.1  3.4 - 5.3 mmol/L Final     Carbon Dioxide (CO2) 06/20/2024 27  22 - 29 mmol/L Final    Anion Gap 06/20/2024 10  7 - 15 mmol/L Final    Urea Nitrogen 06/20/2024 8.7  8.0 - 23.0 mg/dL Final    Creatinine 06/20/2024 1.05 (H)  0.51 - 0.95 mg/dL Final    GFR Estimate 06/20/2024 56 (L)  >60 mL/min/1.73m2 Final    eGFR calculated using 2021 CKD-EPI equation.    Calcium 06/20/2024 9.5  8.8 - 10.2 mg/dL Final    Chloride 06/20/2024 100  98 - 107 mmol/L Final    Glucose 06/20/2024 94  70 - 99 mg/dL Final    Alkaline Phosphatase 06/20/2024 67  40 - 150 U/L Final    AST 06/20/2024 27  0 - 45 U/L Final    Reference intervals for this test were updated on 6/12/2023 to more accurately reflect our healthy population. There may be differences in the flagging of prior results with similar values performed with this method. Interpretation of those prior results can be made in the context of the updated reference intervals.    ALT 06/20/2024 25  0 - 50 U/L Final    Reference intervals for this test were updated on 6/12/2023 to more accurately reflect our healthy population. There may be differences in the flagging of prior results with similar values performed with this method. Interpretation of those prior results can be made in the context of the updated reference intervals.      Protein Total 06/20/2024 7.0  6.4 - 8.3 g/dL Final    Albumin 06/20/2024 4.1  3.5 - 5.2 g/dL Final    Bilirubin Total 06/20/2024 0.3  <=1.2 mg/dL Final    WBC Count 06/20/2024 6.0  4.0 - 11.0 10e3/uL Final    RBC Count 06/20/2024 4.52  3.80 - 5.20 10e6/uL Final    Hemoglobin 06/20/2024 12.7  11.7 - 15.7 g/dL Final    Hematocrit 06/20/2024 39.6  35.0 - 47.0 % Final    MCV 06/20/2024 88  78 - 100 fL Final    MCH 06/20/2024 28.1  26.5 - 33.0 pg Final    MCHC 06/20/2024 32.1  31.5 - 36.5 g/dL Final    RDW 06/20/2024 14.6  10.0 - 15.0 % Final    Platelet Count 06/20/2024 236  150 - 450 10e3/uL Final    % Neutrophils 06/20/2024 56  % Final    % Lymphocytes 06/20/2024 32  % Final    %  Monocytes 06/20/2024 9  % Final    % Eosinophils 06/20/2024 3  % Final    % Basophils 06/20/2024 1  % Final    % Immature Granulocytes 06/20/2024 0  % Final    NRBCs per 100 WBC 06/20/2024 0  <1 /100 Final    Absolute Neutrophils 06/20/2024 3.3  1.6 - 8.3 10e3/uL Final    Absolute Lymphocytes 06/20/2024 1.9  0.8 - 5.3 10e3/uL Final    Absolute Monocytes 06/20/2024 0.5  0.0 - 1.3 10e3/uL Final    Absolute Eosinophils 06/20/2024 0.2  0.0 - 0.7 10e3/uL Final    Absolute Basophils 06/20/2024 0.0  0.0 - 0.2 10e3/uL Final    Absolute Immature Granulocytes 06/20/2024 0.0  <=0.4 10e3/uL Final    Absolute NRBCs 06/20/2024 0.0  10e3/uL Final   06/26/24  Results for orders placed or performed in visit on 06/26/24   Uric acid     Status: Normal   Result Value Ref Range    Uric Acid 4.5 2.4 - 5.7 mg/dL   ESR: Erythrocyte sedimentation rate     Status: Normal   Result Value Ref Range    Erythrocyte Sedimentation Rate 9 0 - 30 mm/hr   CRP, inflammation     Status: Normal   Result Value Ref Range    CRP Inflammation 4.36 <5.00 mg/L   Magnesium     Status: Normal   Result Value Ref Range    Magnesium 1.8 1.7 - 2.3 mg/dL   Basic metabolic panel     Status: Abnormal   Result Value Ref Range    Sodium 138 135 - 145 mmol/L    Potassium 4.0 3.4 - 5.3 mmol/L    Chloride 102 98 - 107 mmol/L    Carbon Dioxide (CO2) 27 22 - 29 mmol/L    Anion Gap 9 7 - 15 mmol/L    Urea Nitrogen 15.7 8.0 - 23.0 mg/dL    Creatinine 1.06 (H) 0.51 - 0.95 mg/dL    GFR Estimate 56 (L) >60 mL/min/1.73m2    Calcium 9.2 8.8 - 10.2 mg/dL    Glucose 91 70 - 99 mg/dL           Signed Electronically by: GIOVANNY Valentino

## 2024-06-28 ENCOUNTER — TELEPHONE (OUTPATIENT)
Dept: FAMILY MEDICINE | Facility: OTHER | Age: 74
End: 2024-06-28

## 2024-06-28 NOTE — TELEPHONE ENCOUNTER
Faxed office notes from 6/26/24, xrays, med list & labs to Dr Burk at [facility] per Tania Martinez request.

## 2024-07-06 ASSESSMENT — ASTHMA QUESTIONNAIRES
ACT_TOTALSCORE: 20
ACT_TOTALSCORE: 20
QUESTION_3 LAST FOUR WEEKS HOW OFTEN DID YOUR ASTHMA SYMPTOMS (WHEEZING, COUGHING, SHORTNESS OF BREATH, CHEST TIGHTNESS OR PAIN) WAKE YOU UP AT NIGHT OR EARLIER THAN USUAL IN THE MORNING: NOT AT ALL
QUESTION_1 LAST FOUR WEEKS HOW MUCH OF THE TIME DID YOUR ASTHMA KEEP YOU FROM GETTING AS MUCH DONE AT WORK, SCHOOL OR AT HOME: NONE OF THE TIME
QUESTION_2 LAST FOUR WEEKS HOW OFTEN HAVE YOU HAD SHORTNESS OF BREATH: ONCE OR TWICE A WEEK
QUESTION_4 LAST FOUR WEEKS HOW OFTEN HAVE YOU USED YOUR RESCUE INHALER OR NEBULIZER MEDICATION (SUCH AS ALBUTEROL): ONE OR TWO TIMES PER DAY
QUESTION_5 LAST FOUR WEEKS HOW WOULD YOU RATE YOUR ASTHMA CONTROL: WELL CONTROLLED

## 2024-07-08 ENCOUNTER — ANCILLARY PROCEDURE (OUTPATIENT)
Dept: GENERAL RADIOLOGY | Facility: OTHER | Age: 74
End: 2024-07-08
Attending: PHYSICIAN ASSISTANT
Payer: MEDICARE

## 2024-07-08 ENCOUNTER — OFFICE VISIT (OUTPATIENT)
Dept: FAMILY MEDICINE | Facility: OTHER | Age: 74
End: 2024-07-08
Attending: PHYSICIAN ASSISTANT
Payer: MEDICARE

## 2024-07-08 VITALS
SYSTOLIC BLOOD PRESSURE: 118 MMHG | TEMPERATURE: 98.3 F | BODY MASS INDEX: 34.01 KG/M2 | WEIGHT: 204.1 LBS | DIASTOLIC BLOOD PRESSURE: 62 MMHG | HEART RATE: 80 BPM | OXYGEN SATURATION: 91 % | RESPIRATION RATE: 15 BRPM | HEIGHT: 65 IN

## 2024-07-08 DIAGNOSIS — R20.0 NUMBNESS AND TINGLING OF FOOT: ICD-10-CM

## 2024-07-08 DIAGNOSIS — M79.661 PAIN OF RIGHT LOWER LEG: ICD-10-CM

## 2024-07-08 DIAGNOSIS — R20.2 NUMBNESS AND TINGLING OF FOOT: ICD-10-CM

## 2024-07-08 DIAGNOSIS — M05.79 RHEUMATOID ARTHRITIS INVOLVING MULTIPLE SITES WITH POSITIVE RHEUMATOID FACTOR (H): Primary | ICD-10-CM

## 2024-07-08 PROCEDURE — G0463 HOSPITAL OUTPT CLINIC VISIT: HCPCS

## 2024-07-08 PROCEDURE — 99213 OFFICE O/P EST LOW 20 MIN: CPT | Performed by: PHYSICIAN ASSISTANT

## 2024-07-08 PROCEDURE — 72100 X-RAY EXAM L-S SPINE 2/3 VWS: CPT | Mod: TC

## 2024-07-08 RX ORDER — FOLIC ACID 1 MG/1
1 TABLET ORAL 3 TIMES DAILY
Qty: 90 TABLET | Refills: 3 | Status: SHIPPED | OUTPATIENT
Start: 2024-07-08 | End: 2024-08-20

## 2024-07-08 ASSESSMENT — PATIENT HEALTH QUESTIONNAIRE - PHQ9
10. IF YOU CHECKED OFF ANY PROBLEMS, HOW DIFFICULT HAVE THESE PROBLEMS MADE IT FOR YOU TO DO YOUR WORK, TAKE CARE OF THINGS AT HOME, OR GET ALONG WITH OTHER PEOPLE: NOT DIFFICULT AT ALL
SUM OF ALL RESPONSES TO PHQ QUESTIONS 1-9: 2
SUM OF ALL RESPONSES TO PHQ QUESTIONS 1-9: 2

## 2024-07-08 ASSESSMENT — PAIN SCALES - GENERAL: PAINLEVEL: MODERATE PAIN (4)

## 2024-07-08 NOTE — RESULT ENCOUNTER NOTE
Looks like the back is worsening. Your discs are compressing and spine is curved.  PT and/or chiropractic might really help this. If you are not getting any better after a few weeks we will get an MRI.  Would you like an order for PT. ?

## 2024-07-08 NOTE — PROGRESS NOTES
Assessment & Plan     RA (rheumatoid arthritis) (H)  Working with Dr. Burk.  Refill of her medications.    - folic acid (FOLVITE) 1 MG tablet; Take 1 tablet (1 mg) by mouth 3 times daily    Pain of right lower leg  Had her back worked on by Dr Rogers.  Her pain is better in her knee but pain in the lateral leg a burning and numbness on there right lower leg. Her foot is numb can't really say what side but the whole foot. Can't tip toe and failure on heel walk at first.   Her heel walk improved but weaker on right than left.    - XR LUMBAR SPINE 2/3 VIEWS (Clinic Performed); Future    Numbness and tingling of foot  She is going to be having an xray  has advanced arthritis.  We will get a new picture.  See how this works and she is active with Chiropractor.   - XR LUMBAR SPINE 2/3 VIEWS (Clinic Performed); Future            See Patient Instructions    No follow-ups on file.    Lizeth Balderas is a 74 year old, presenting for the following health issues:  Pain        7/8/2024     2:43 PM   Additional Questions   Roomed by Ashley Rivas   Accompanied by self         7/8/2024     2:43 PM   Patient Reported Additional Medications   Patient reports taking the following new medications vitamin E 400 IU     History of Present Illness       Reason for visit:  The pains ion my knees and legs    She eats 0-1 servings of fruits and vegetables daily.She consumes 1 sweetened beverage(s) daily.She exercises with enough effort to increase her heart rate 20 to 29 minutes per day.  She exercises with enough effort to increase her heart rate 3 or less days per week.   She is taking medications regularly.       Pain History:  When did you first notice your pain? End of May-beginning of June    Have you seen this provider for your pain in the past? Yes   Where in your body do you have pain? Right knee and leg   Are you seeing anyone else for your pain? Yes - chiropractor         1/4/2024     1:49 PM 6/25/2024     1:25 PM 7/8/2024      "2:21 PM   PHQ-9 SCORE   PHQ-9 Total Score MyChart 2 (Minimal depression) 2 (Minimal depression) 2 (Minimal depression)   PHQ-9 Total Score 2 2 2           10/4/2023     2:31 PM 1/4/2024     1:50 PM 6/26/2024     8:32 AM   HEATH-7 SCORE   Total Score  0 (minimal anxiety) 2 (minimal anxiety)   Total Score 0 0 2               Chronic Pain Follow Up:    Location of pain: right knee and left  Analgesia/pain control:    - Recent changes:  knee not as bad     - Overall control: Tolerable with discomfort    - Current treatments: prednisone and sees chiropractor    Adherence:     - Do you ever take more pain medicine than prescribed? No    - When did you take your last dose of pain medicine?  6/27/24 ([facility])   Adverse effects: No   PDMP Review         Value Time User    State PDMP site checked  Yes 7/8/2024  4:00 PM Tania Martinez PA          Last CSA Agreement:   CSA -- Patient Level:     [Media Unavailable] Controlled Substance Agreement - Opioid - Scan on 9/1/2023  2:15 PM: OPIOID/OPIOID PLUS CONTROLLED SUBSTANCE AGREEMENT   [Media Unavailable] Controlled Substance Agreement - Opioid - Scan on 2/7/2022 11:31 AM: opiod/opiod plus controlled substance agreement       Last UDS: 4/17/2022                    Review of Systems  Constitutional, HEENT, cardiovascular, pulmonary, gi and gu systems are negative, except as otherwise noted.      Objective    /62 (BP Location: Left arm, Patient Position: Sitting, Cuff Size: Adult Regular)   Pulse 80   Temp 98.3  F (36.8  C) (Tympanic)   Resp 15   Ht 1.651 m (5' 5\")   Wt 92.6 kg (204 lb 1.6 oz)   SpO2 91%   BMI 33.96 kg/m    Body mass index is 33.96 kg/m .  Physical Exam   GENERAL: alert and no distress  NECK: no adenopathy, no asymmetry, masses, or scars  RESP: lungs clear to auscultation - no rales, rhonchi or wheezes  CV: regular rate and rhythm, normal S1 S2, no S3 or S4, no murmur, click or rub, no peripheral edema  ABDOMEN: soft, nontender, no hepatosplenomegaly, " no masses and bowel sounds normal  MS: no gross musculoskeletal defects noted, no edema  SKIN: no suspicious lesions or rashes  NEURO: Normal strength and tone, mentation intact and speech normal  BACK: no CVA tenderness, no paralumbar tenderness  PSYCH: mentation appears normal, affect normal/bright    Office Visit on 06/26/2024   Component Date Value Ref Range Status    Uric Acid 06/26/2024 4.5  2.4 - 5.7 mg/dL Final    Erythrocyte Sedimentation Rate 06/26/2024 9  0 - 30 mm/hr Final    CRP Inflammation 06/26/2024 4.36  <5.00 mg/L Final    Magnesium 06/26/2024 1.8  1.7 - 2.3 mg/dL Final    Sodium 06/26/2024 138  135 - 145 mmol/L Final    Reference intervals for this test were updated on 09/26/2023 to more accurately reflect our healthy population. There may be differences in the flagging of prior results with similar values performed with this method. Interpretation of those prior results can be made in the context of the updated reference intervals.     Potassium 06/26/2024 4.0  3.4 - 5.3 mmol/L Final    Chloride 06/26/2024 102  98 - 107 mmol/L Final    Carbon Dioxide (CO2) 06/26/2024 27  22 - 29 mmol/L Final    Anion Gap 06/26/2024 9  7 - 15 mmol/L Final    Urea Nitrogen 06/26/2024 15.7  8.0 - 23.0 mg/dL Final    Creatinine 06/26/2024 1.06 (H)  0.51 - 0.95 mg/dL Final    GFR Estimate 06/26/2024 56 (L)  >60 mL/min/1.73m2 Final    eGFR calculated using 2021 CKD-EPI equation.    Calcium 06/26/2024 9.2  8.8 - 10.2 mg/dL Final    Glucose 06/26/2024 91  70 - 99 mg/dL Final    Calcium Urine mg/dL 06/26/2024 6.4  mg/dL Final    The reference ranges have not been established in urine calcium. The results should be integrated into the clinical context for interpretation.    Calcium Urine g/g Cr 06/26/2024 0.16  0.05 - 0.27 g/g Cr Final    Creatinine Urine mg/dL 06/26/2024 38.9  mg/dL Final    The reference ranges have not been established in urine creatinine. The results should be integrated into the clinical context for  interpretation.         Xray of lumbar spine repeated ? MRI if numbness in leg and foot do not get better.    Signed Electronically by: GIOVANNY Valentino

## 2024-07-09 ENCOUNTER — MYC MEDICAL ADVICE (OUTPATIENT)
Dept: FAMILY MEDICINE | Facility: OTHER | Age: 74
End: 2024-07-09

## 2024-07-09 DIAGNOSIS — M47.9 DEGENERATIVE JOINT DISEASE OF LOW BACK: ICD-10-CM

## 2024-07-09 DIAGNOSIS — M54.41 BILATERAL LOW BACK PAIN WITH BILATERAL SCIATICA, UNSPECIFIED CHRONICITY: ICD-10-CM

## 2024-07-09 DIAGNOSIS — M54.42 BILATERAL LOW BACK PAIN WITH BILATERAL SCIATICA, UNSPECIFIED CHRONICITY: ICD-10-CM

## 2024-07-09 DIAGNOSIS — M79.661 PAIN OF RIGHT LOWER LEG: Primary | ICD-10-CM

## 2024-07-09 DIAGNOSIS — M41.9 SCOLIOSIS: ICD-10-CM

## 2024-07-10 NOTE — TELEPHONE ENCOUNTER
Pt called and is okay with the [facility] and would like it for her back and right leg.    Pt referral pended.

## 2024-07-16 ENCOUNTER — MYC MEDICAL ADVICE (OUTPATIENT)
Dept: FAMILY MEDICINE | Facility: OTHER | Age: 74
End: 2024-07-16

## 2024-07-16 DIAGNOSIS — F33.1 MODERATE EPISODE OF RECURRENT MAJOR DEPRESSIVE DISORDER (H): ICD-10-CM

## 2024-07-16 DIAGNOSIS — M54.42 BILATERAL LOW BACK PAIN WITH BILATERAL SCIATICA, UNSPECIFIED CHRONICITY: ICD-10-CM

## 2024-07-16 DIAGNOSIS — R20.2 NUMBNESS AND TINGLING OF FOOT: ICD-10-CM

## 2024-07-16 DIAGNOSIS — M47.9 DEGENERATIVE JOINT DISEASE OF LOW BACK: ICD-10-CM

## 2024-07-16 DIAGNOSIS — M54.2 CERVICALGIA: Primary | ICD-10-CM

## 2024-07-16 DIAGNOSIS — M54.41 BILATERAL LOW BACK PAIN WITH BILATERAL SCIATICA, UNSPECIFIED CHRONICITY: ICD-10-CM

## 2024-07-16 DIAGNOSIS — R20.0 NUMBNESS AND TINGLING OF FOOT: ICD-10-CM

## 2024-07-16 DIAGNOSIS — M41.9 SCOLIOSIS: ICD-10-CM

## 2024-07-16 RX ORDER — DULOXETIN HYDROCHLORIDE 30 MG/1
CAPSULE, DELAYED RELEASE ORAL
Qty: 60 CAPSULE | Refills: 5 | Status: SHIPPED | OUTPATIENT
Start: 2024-07-16 | End: 2024-08-20

## 2024-07-17 NOTE — TELEPHONE ENCOUNTER
7/17/2024 8:38 AM  Pt sent my chart message. PT referral signed as advised by PCP in result note. Refer to encounter dated 7/9/24.    7/17/2024 8:47 AM  Writer called pt regarding my chart message.   Pt is requesting MRI of the full spine. Orders pended as pt requested. PCP to advise on plan.     Jessica Noel RN

## 2024-07-17 NOTE — TELEPHONE ENCOUNTER
7/17/2024 0840  See note below.   Pt sent another my chart message checking on status of PT referral.   PCP already approved PT in imaging result note. Per PCP ok to proceed with PT order signed.  Jessica Noel RN

## 2024-07-19 ENCOUNTER — TRANSFERRED RECORDS (OUTPATIENT)
Dept: HEALTH INFORMATION MANAGEMENT | Facility: CLINIC | Age: 74
End: 2024-07-19

## 2024-07-21 DIAGNOSIS — M05.79 RHEUMATOID ARTHRITIS INVOLVING MULTIPLE SITES WITH POSITIVE RHEUMATOID FACTOR (H): Primary | ICD-10-CM

## 2024-07-21 DIAGNOSIS — M54.42 BILATERAL LOW BACK PAIN WITH BILATERAL SCIATICA, UNSPECIFIED CHRONICITY: ICD-10-CM

## 2024-07-21 DIAGNOSIS — G57.01 LESION OF SCIATIC NERVE, RIGHT LOWER LIMB: ICD-10-CM

## 2024-07-21 DIAGNOSIS — M54.41 BILATERAL LOW BACK PAIN WITH BILATERAL SCIATICA, UNSPECIFIED CHRONICITY: ICD-10-CM

## 2024-07-26 ENCOUNTER — MEDICAL CORRESPONDENCE (OUTPATIENT)
Dept: MRI IMAGING | Facility: HOSPITAL | Age: 74
End: 2024-07-26

## 2024-08-05 ENCOUNTER — HOSPITAL ENCOUNTER (OUTPATIENT)
Dept: MRI IMAGING | Facility: HOSPITAL | Age: 74
Discharge: HOME | End: 2024-08-05
Attending: PHYSICIAN ASSISTANT
Payer: MEDICARE

## 2024-08-05 ENCOUNTER — HOSPITAL ENCOUNTER (OUTPATIENT)
Dept: MRI IMAGING | Facility: HOSPITAL | Age: 74
Discharge: HOME | End: 2024-08-05
Attending: PSYCHIATRY & NEUROLOGY
Payer: MEDICARE

## 2024-08-05 DIAGNOSIS — R55 SYNCOPE AND COLLAPSE: ICD-10-CM

## 2024-08-05 DIAGNOSIS — M05.79 RHEUMATOID ARTHRITIS INVOLVING MULTIPLE SITES WITH POSITIVE RHEUMATOID FACTOR (H): ICD-10-CM

## 2024-08-05 DIAGNOSIS — G57.01 LESION OF SCIATIC NERVE, RIGHT LOWER LIMB: ICD-10-CM

## 2024-08-05 DIAGNOSIS — M54.42 BILATERAL LOW BACK PAIN WITH BILATERAL SCIATICA, UNSPECIFIED CHRONICITY: ICD-10-CM

## 2024-08-05 DIAGNOSIS — M54.41 BILATERAL LOW BACK PAIN WITH BILATERAL SCIATICA, UNSPECIFIED CHRONICITY: ICD-10-CM

## 2024-08-05 PROCEDURE — A9585 GADOBUTROL INJECTION: HCPCS | Performed by: RADIOLOGY

## 2024-08-05 PROCEDURE — 70553 MRI BRAIN STEM W/O & W/DYE: CPT

## 2024-08-05 PROCEDURE — 255N000002 HC RX 255 OP 636: Performed by: RADIOLOGY

## 2024-08-05 PROCEDURE — 72148 MRI LUMBAR SPINE W/O DYE: CPT | Mod: MF

## 2024-08-05 RX ORDER — GADOBUTROL 604.72 MG/ML
10 INJECTION INTRAVENOUS ONCE
Status: COMPLETED | OUTPATIENT
Start: 2024-08-05 | End: 2024-08-05

## 2024-08-05 RX ADMIN — GADOBUTROL 10 ML: 604.72 INJECTION INTRAVENOUS at 08:58

## 2024-08-07 NOTE — RESULT ENCOUNTER NOTE
Your results show Stenosis  or narrowing in canal. No disc herniation or touching the cord just generalized narrowing.  This could be helped by injection most times.  Rhizotomy might help too. Also seeing Neurology is not out of the question. I do not think related to RA.

## 2024-08-11 DIAGNOSIS — M54.16 RADICULOPATHY OF LUMBAR REGION: ICD-10-CM

## 2024-08-11 DIAGNOSIS — M48.061 SPINAL STENOSIS OF LUMBAR REGION WITHOUT NEUROGENIC CLAUDICATION: Primary | ICD-10-CM

## 2024-08-13 DIAGNOSIS — G62.9 PERIPHERAL POLYNEUROPATHY: ICD-10-CM

## 2024-08-13 RX ORDER — GABAPENTIN 300 MG/1
CAPSULE ORAL
Qty: 210 CAPSULE | Refills: 2 | Status: SHIPPED | OUTPATIENT
Start: 2024-08-13

## 2024-08-13 NOTE — TELEPHONE ENCOUNTER
GABAPENTIN 300MG CAPSULE       Last Written Prescription Date:  058/13/2024  Last Fill Quantity: 210,   # refills: 2  Last Office Visit: 07/08/2024  Future Office visit:       Routing refill request to provider for review/approval because:    Kimberly Boecker, RN

## 2024-08-20 DIAGNOSIS — F33.1 MODERATE EPISODE OF RECURRENT MAJOR DEPRESSIVE DISORDER (H): ICD-10-CM

## 2024-08-20 DIAGNOSIS — M05.79 RHEUMATOID ARTHRITIS INVOLVING MULTIPLE SITES WITH POSITIVE RHEUMATOID FACTOR (H): ICD-10-CM

## 2024-08-20 DIAGNOSIS — N28.9 RENAL INSUFFICIENCY: ICD-10-CM

## 2024-08-20 RX ORDER — FOLIC ACID 1 MG/1
1 TABLET ORAL 3 TIMES DAILY
Qty: 270 TABLET | Refills: 3 | Status: SHIPPED | OUTPATIENT
Start: 2024-08-20

## 2024-08-20 RX ORDER — DULOXETIN HYDROCHLORIDE 30 MG/1
30 CAPSULE, DELAYED RELEASE ORAL 2 TIMES DAILY
Qty: 180 CAPSULE | Refills: 3 | Status: SHIPPED | OUTPATIENT
Start: 2024-08-20

## 2024-08-20 NOTE — TELEPHONE ENCOUNTER
Pt enrolled in med [facility], there are not enough refills to do 90 days.  Pharmacy requesting new RXs for 90 day supply.

## 2024-08-20 NOTE — TELEPHONE ENCOUNTER
lisinopril (ZESTRIL) 2.5 MG tablet       Last Written Prescription Date:  11/10/2023  Last Fill Quantity: 30,   # refills: 11  Last Office Visit: 07/08/2024  Future Office visit:       Routing refill request to provider for review/approval because:  ACE Inhibitors (Including Combos) Protocol [id number]/20/2024 07:26 AM   Protocol Details Medication indicated for associated diagnosis       Kimberly Boecker, RN

## 2024-08-21 RX ORDER — LISINOPRIL 2.5 MG/1
2.5 TABLET ORAL DAILY
Qty: 90 TABLET | Refills: 3 | Status: SHIPPED | OUTPATIENT
Start: 2024-08-21

## 2024-08-22 ENCOUNTER — THERAPY VISIT (OUTPATIENT)
Dept: PHYSICAL THERAPY | Facility: HOSPITAL | Age: 74
End: 2024-08-22
Attending: PHYSICIAN ASSISTANT
Payer: MEDICARE

## 2024-08-22 DIAGNOSIS — M54.41 BILATERAL LOW BACK PAIN WITH BILATERAL SCIATICA, UNSPECIFIED CHRONICITY: Primary | ICD-10-CM

## 2024-08-22 DIAGNOSIS — M54.42 BILATERAL LOW BACK PAIN WITH BILATERAL SCIATICA, UNSPECIFIED CHRONICITY: Primary | ICD-10-CM

## 2024-08-22 PROCEDURE — 97161 PT EVAL LOW COMPLEX 20 MIN: CPT | Mod: GP

## 2024-08-22 PROCEDURE — 97110 THERAPEUTIC EXERCISES: CPT | Mod: GP

## 2024-08-22 NOTE — PROGRESS NOTES
"PHYSICAL THERAPY EVALUATION  Type of Visit: Evaluation       Fall Risk Screen:  Fall screen completed by: PT  Have you fallen 2 or more times in the past year?: No  Have you fallen and had an injury in the past year?: Yes  Timed Up and Go score (seconds): 12  Is patient a fall risk?: No; Department fall risk interventions implemented  Fall screen comments: Fall was related to a fainting episode d/t stomach pain.    Subjective       Presenting condition or subjective complaint: pain and swelling in my right knee and  calf  Date of onset: 07/17/24    Relevant medical history: Arthritis; Asthma; Cold or hot arm or leg; Depression; Dizziness; Fibromyalgia; Osteoarthritis; Rheumatoid arthritis   Dates & types of surgery: all at [facility]    Prior diagnostic imaging/testing results: CT scan; X-ray     Prior therapy history for the same diagnosis, illness or injury: Yes I fell in the bathtub while on vacation and injured my knee. I had to have it in a brace for the rest of my vacation. Dr. Martinez sent me to therapy    R knee and leg were hot and swollen back in June 2024. She went to ED, and followed up with her PCP. PCP recommended MRI and PT. Pt states \"\"there is a place in her neck looked like it was out of place on an xray.\" She reports pain with her entire spine, and that annually she gets a shot to \"take the edge off of the pain\".  Mentions pain that goes down the back of her L leg. Does not go down to her ankle.     Prior Level of Function  Transfers: Independent  Ambulation: Independent  ADL: Independent    Living Environment  Social support: With a significant other or spouse   Type of home: House   Stairs to enter the home: Yes 5 Is there a railing: Yes     Ramp: No   Stairs inside the home: Yes 30 Is there a railing: Yes     Help at home: None  Equipment owned: Bath bench     Employment: No    Hobbies/Interests: reading ,gisele, quilting group    Patient goals for therapy: walk for any length of time   "   Objective   KNEE EVALUATION  INTEGUMENTARY (edema, incisions):  Slight increase in swelling of R knee   Circumference:   15.5inch on L tib tub  16 inch on R tib tub    16.5 inch on L pat  17 inch on R pat    19.5 on L 2 inch above knee cap  19.5 on R 2 inch above knee cap  POSTURE: Sitting Posture: Rounded shoulders, Forward head  GAIT:  Assistive Device(s): None  Gait Deviations: Antalgic  BALANCE/PROPRIOCEPTION: Decreased  ROM: WFL B LE  STRENGTH: WFL as pt is able to ambulate, sit<>stand. Fatigues quickly with ambulation  SPECIAL TESTS: No special tests were positive with pt's knee today. Did have positive Piriformis tests B.      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  The following significant findings have been identified: Pain, Decreased ROM/flexibility, Decreased joint mobility, Decreased strength, Impaired balance, Decreased proprioception, Impaired sensation, Edema, Impaired gait, Impaired muscle performance, Decreased activity tolerance, Impaired posture, and Dizziness. These impairments interfere with their ability to perform self care tasks, recreational activities, household chores, driving , household mobility, and community mobility as compared to previous level of function.     Clinical Decision Making (Complexity):  Clinical Presentation: Stable/Uncomplicated  Clinical Presentation Rationale: based on medical and personal factors listed in PT evaluation  Clinical Decision Making (Complexity): Low complexity    PLAN OF CARE  Treatment Interventions:  Modalities: Cryotherapy, Dry Needling,  E-stim, Iontophoresis, Ultrasound  Interventions: Gait Training, Manual Therapy, Neuromuscular Re-education, Therapeutic Activity, Therapeutic Exercise, Self-Care/Home Management, Aquatic Therapy, Community/Work Reintegration    Long Term Goals     PT Goal 1  Goal Identifier: Short Term 1  Goal Description: Pt will be indep in HEP for decreased pain and improved strength and proprioception for decreased pain with functional mobility and decreased risk for additional injury.  Rationale: to maximize safety and independence with performance of ADLs and functional tasks;to maximize safety and independence within the home;to maximize safety and independence within the community;to maximize safety and independence with transportation;to maximize safety and independence with self cares  Target Date: 09/21/24  PT Goal 2  Goal Identifier: Short Term 2  Goal Description: Pt will ambulate for 5 min with SBA and appropriate use of SEC for decreased fall risk  Rationale: to maximize safety and independence with performance of ADLs and functional tasks;to maximize safety and independence within the home;to maximize safety and independence within the community;to maximize safety and independence with self cares  Target Date: 10/21/24  PT Goal 3  Goal Identifier: Long Term 1  Goal Description: Pt will be able to ascend and descend stairs safely with 3/10 or less pain consistently to allow for safe entrance and exit of home, and community ambulation with decreased fall risk.  Rationale: to maximize safety and independence with performance of ADLs and functional tasks;to maximize safety and independence within the home;to maximize safety and independence within the community;to maximize safety and independence with transportation;to maximize safety and independence with self cares  Target Date: 11/13/24  PT Goal 4  Goal Identifier: Long Term 2  Goal Description: The patient will be able to ambulate at 3.2 mph to achieve community  speed for 10 minutes to be able to perform task in the community such as shopping.  Rationale: to maximize safety and independence with performance of ADLs and functional tasks;to maximize safety and independence within the home;to maximize safety and independence within the community;to maximize safety and independence with transportation;to maximize safety and independence with self cares  Target Date: 11/13/24      Frequency of Treatment: 1-2x/week  Duration of Treatment: 12 weeks    Recommended Referrals to Other Professionals:   Education Assessment:   Learner/Method: Patient;Reading;Listening;Demonstration;Pictures/Video;No Barriers to Learning    Risks and benefits of evaluation/treatment have been explained.   Patient/Family/caregiver agrees with Plan of Care.     Evaluation Time:     PT Eval, Low Complexity Minutes (79592): 30       Signing Clinician: Eliseo Hartman PT        [facility]                                                                                   OUTPATIENT PHYSICAL THERAPY      PLAN OF TREATMENT FOR OUTPATIENT REHABILITATION   Patient's Last Name, First Name, Melvi Villegas YOB: 1950   Provider's Name   [facility]   Medical Record No.  [id number]     Onset Date: 07/17/24  Start of Care Date: 08/22/24     Medical Diagnosis:  M79.661 (ICD-10-CM) - Pain of right lower leg  M54.42, M54.41 (ICD-10-CM) - Bilateral low back pain with bilateral sciatica, unspecified chronicity  M41.9 (ICD-10-CM) - Scoliosis  M47.9 (ICD-10-CM) - Degenerative joint disease of low back      PT Treatment Diagnosis:  LBP, sciatica B Plan of Treatment  Frequency/Duration: 1-2x/week/ 12 weeks    Certification date from 08/22/24 to 11/13/24         See note for plan of treatment details and functional goals     Eliseo Hartman, PT                         I CERTIFY THE NEED FOR THESE SERVICES FURNISHED UNDER        THIS PLAN OF TREATMENT  AND WHILE UNDER MY CARE     (Physician attestation of this document indicates review and certification of the therapy plan).              Referring Provider:  Tania Martinez    Initial Assessment  See Epic Evaluation- Start of Care Date: 08/22/24

## 2024-08-24 ENCOUNTER — HEALTH MAINTENANCE LETTER (OUTPATIENT)
Age: 74
End: 2024-08-24

## 2024-08-26 DIAGNOSIS — J30.2 SEASONAL ALLERGIC RHINITIS, UNSPECIFIED TRIGGER: ICD-10-CM

## 2024-08-26 DIAGNOSIS — F51.01 PRIMARY INSOMNIA: ICD-10-CM

## 2024-08-26 RX ORDER — TRAZODONE HYDROCHLORIDE 50 MG/1
TABLET, FILM COATED ORAL
Qty: 270 TABLET | Refills: 1 | Status: SHIPPED | OUTPATIENT
Start: 2024-08-26

## 2024-08-26 RX ORDER — FEXOFENADINE HYDROCHLORIDE AND PSEUDOEPHEDRINE HYDROCHLORIDE 60; 120 MG/1; MG/1
1 TABLET, FILM COATED, EXTENDED RELEASE ORAL 2 TIMES DAILY
Qty: 60 TABLET | Refills: 0 | Status: SHIPPED | OUTPATIENT
Start: 2024-08-26

## 2024-08-26 NOTE — TELEPHONE ENCOUNTER
ALLEGRA-D 12HR ALLER/BOBBY TAB       Last Written Prescription Date:  06/25/2024  Last Fill Quantity: 60,   # refills: 0  Last Office Visit: 07/08/2024  Future Office visit:       Routing refill request to provider for review/approval because:    Kimberly Boecker, RN       Depressed/Anxious

## 2024-08-28 ENCOUNTER — THERAPY VISIT (OUTPATIENT)
Dept: PHYSICAL THERAPY | Facility: HOSPITAL | Age: 74
End: 2024-08-28
Attending: PHYSICIAN ASSISTANT
Payer: MEDICARE

## 2024-08-28 DIAGNOSIS — M54.41 BILATERAL LOW BACK PAIN WITH BILATERAL SCIATICA, UNSPECIFIED CHRONICITY: Primary | ICD-10-CM

## 2024-08-28 DIAGNOSIS — M54.42 BILATERAL LOW BACK PAIN WITH BILATERAL SCIATICA, UNSPECIFIED CHRONICITY: Primary | ICD-10-CM

## 2024-08-28 PROCEDURE — 97116 GAIT TRAINING THERAPY: CPT | Mod: GP

## 2024-08-28 PROCEDURE — 97110 THERAPEUTIC EXERCISES: CPT | Mod: GP

## 2024-08-30 ENCOUNTER — THERAPY VISIT (OUTPATIENT)
Dept: PHYSICAL THERAPY | Facility: HOSPITAL | Age: 74
End: 2024-08-30
Attending: PHYSICIAN ASSISTANT
Payer: MEDICARE

## 2024-08-30 DIAGNOSIS — M54.41 BILATERAL LOW BACK PAIN WITH BILATERAL SCIATICA, UNSPECIFIED CHRONICITY: Primary | ICD-10-CM

## 2024-08-30 DIAGNOSIS — M54.42 BILATERAL LOW BACK PAIN WITH BILATERAL SCIATICA, UNSPECIFIED CHRONICITY: Primary | ICD-10-CM

## 2024-08-30 PROCEDURE — 97110 THERAPEUTIC EXERCISES: CPT | Mod: GP,CQ

## 2024-08-30 PROCEDURE — 97116 GAIT TRAINING THERAPY: CPT | Mod: GP,CQ

## 2024-09-04 ENCOUNTER — THERAPY VISIT (OUTPATIENT)
Dept: PHYSICAL THERAPY | Facility: HOSPITAL | Age: 74
End: 2024-09-04
Attending: PHYSICIAN ASSISTANT
Payer: MEDICARE

## 2024-09-04 DIAGNOSIS — M54.42 BILATERAL LOW BACK PAIN WITH BILATERAL SCIATICA, UNSPECIFIED CHRONICITY: Primary | ICD-10-CM

## 2024-09-04 DIAGNOSIS — M54.41 BILATERAL LOW BACK PAIN WITH BILATERAL SCIATICA, UNSPECIFIED CHRONICITY: Primary | ICD-10-CM

## 2024-09-04 PROCEDURE — 97110 THERAPEUTIC EXERCISES: CPT | Mod: GP

## 2024-09-04 PROCEDURE — 97112 NEUROMUSCULAR REEDUCATION: CPT | Mod: GP

## 2024-09-06 ENCOUNTER — THERAPY VISIT (OUTPATIENT)
Dept: PHYSICAL THERAPY | Facility: HOSPITAL | Age: 74
End: 2024-09-06
Attending: PHYSICIAN ASSISTANT
Payer: MEDICARE

## 2024-09-06 DIAGNOSIS — M54.42 BILATERAL LOW BACK PAIN WITH BILATERAL SCIATICA, UNSPECIFIED CHRONICITY: Primary | ICD-10-CM

## 2024-09-06 DIAGNOSIS — M54.41 BILATERAL LOW BACK PAIN WITH BILATERAL SCIATICA, UNSPECIFIED CHRONICITY: Primary | ICD-10-CM

## 2024-09-06 PROCEDURE — 97110 THERAPEUTIC EXERCISES: CPT | Mod: GP,CQ

## 2024-09-11 ENCOUNTER — MYC MEDICAL ADVICE (OUTPATIENT)
Dept: FAMILY MEDICINE | Facility: OTHER | Age: 74
End: 2024-09-11

## 2024-09-11 ENCOUNTER — THERAPY VISIT (OUTPATIENT)
Dept: PHYSICAL THERAPY | Facility: HOSPITAL | Age: 74
End: 2024-09-11
Attending: PHYSICIAN ASSISTANT
Payer: MEDICARE

## 2024-09-11 DIAGNOSIS — H57.89 EYE IRRITATION: Primary | ICD-10-CM

## 2024-09-11 DIAGNOSIS — M54.42 BILATERAL LOW BACK PAIN WITH BILATERAL SCIATICA, UNSPECIFIED CHRONICITY: Primary | ICD-10-CM

## 2024-09-11 DIAGNOSIS — M54.41 BILATERAL LOW BACK PAIN WITH BILATERAL SCIATICA, UNSPECIFIED CHRONICITY: Primary | ICD-10-CM

## 2024-09-11 PROCEDURE — 97110 THERAPEUTIC EXERCISES: CPT | Mod: GP

## 2024-09-11 PROCEDURE — 97112 NEUROMUSCULAR REEDUCATION: CPT | Mod: GP

## 2024-09-11 NOTE — TELEPHONE ENCOUNTER
ketotifen fumarate 0.035%, ketotifen 0.025%, (ZADITOR) 0.025 % ophthalmic solution       Last Written Prescription Date:  unknown- patient reported  Last Fill Quantity: -,   # refills: -  Last Office Visit: 07/08/24  Future Office visit:       Routing refill request to provider for review/approval because:      Patient wants to know if you want them to schedule for a fill on this. I don't know who originally ordered it

## 2024-09-12 ENCOUNTER — MYC MEDICAL ADVICE (OUTPATIENT)
Dept: FAMILY MEDICINE | Facility: OTHER | Age: 74
End: 2024-09-12

## 2024-09-13 ENCOUNTER — LAB (OUTPATIENT)
Dept: LAB | Facility: OTHER | Age: 74
End: 2024-09-13
Payer: MEDICARE

## 2024-09-13 ENCOUNTER — THERAPY VISIT (OUTPATIENT)
Dept: PHYSICAL THERAPY | Facility: HOSPITAL | Age: 74
End: 2024-09-13
Attending: PHYSICIAN ASSISTANT
Payer: MEDICARE

## 2024-09-13 DIAGNOSIS — M06.09 RHEUMATOID ARTHRITIS OF MULTIPLE SITES WITHOUT RHEUMATOID FACTOR (H): ICD-10-CM

## 2024-09-13 DIAGNOSIS — Z79.899 HIGH RISK MEDICATION USE: ICD-10-CM

## 2024-09-13 DIAGNOSIS — M54.41 BILATERAL LOW BACK PAIN WITH BILATERAL SCIATICA, UNSPECIFIED CHRONICITY: Primary | ICD-10-CM

## 2024-09-13 DIAGNOSIS — M54.42 BILATERAL LOW BACK PAIN WITH BILATERAL SCIATICA, UNSPECIFIED CHRONICITY: Primary | ICD-10-CM

## 2024-09-13 LAB
ALBUMIN SERPL BCG-MCNC: 3.9 G/DL (ref 3.5–5.2)
ALP SERPL-CCNC: 63 U/L (ref 40–150)
ALT SERPL W P-5'-P-CCNC: 25 U/L (ref 0–50)
ANION GAP SERPL CALCULATED.3IONS-SCNC: 8 MMOL/L (ref 7–15)
AST SERPL W P-5'-P-CCNC: 29 U/L (ref 0–45)
BASOPHILS # BLD AUTO: 0 10E3/UL (ref 0–0.2)
BASOPHILS NFR BLD AUTO: 1 %
BILIRUB SERPL-MCNC: 0.3 MG/DL
BUN SERPL-MCNC: 9.8 MG/DL (ref 8–23)
CALCIUM SERPL-MCNC: 9.5 MG/DL (ref 8.8–10.4)
CHLORIDE SERPL-SCNC: 105 MMOL/L (ref 98–107)
CREAT SERPL-MCNC: 1 MG/DL (ref 0.51–0.95)
CRP SERPL-MCNC: 3.11 MG/L
EGFRCR SERPLBLD CKD-EPI 2021: 60 ML/MIN/1.73M2
EOSINOPHIL # BLD AUTO: 0.2 10E3/UL (ref 0–0.7)
EOSINOPHIL NFR BLD AUTO: 4 %
ERYTHROCYTE [DISTWIDTH] IN BLOOD BY AUTOMATED COUNT: 14.6 % (ref 10–15)
ERYTHROCYTE [SEDIMENTATION RATE] IN BLOOD BY WESTERGREN METHOD: 12 MM/HR (ref 0–30)
GLUCOSE SERPL-MCNC: 94 MG/DL (ref 70–99)
HCO3 SERPL-SCNC: 27 MMOL/L (ref 22–29)
HCT VFR BLD AUTO: 39.6 % (ref 35–47)
HGB BLD-MCNC: 12.8 G/DL (ref 11.7–15.7)
IMM GRANULOCYTES # BLD: 0 10E3/UL
IMM GRANULOCYTES NFR BLD: 0 %
LYMPHOCYTES # BLD AUTO: 1.4 10E3/UL (ref 0.8–5.3)
LYMPHOCYTES NFR BLD AUTO: 32 %
MCH RBC QN AUTO: 28.3 PG (ref 26.5–33)
MCHC RBC AUTO-ENTMCNC: 32.3 G/DL (ref 31.5–36.5)
MCV RBC AUTO: 88 FL (ref 78–100)
MONOCYTES # BLD AUTO: 0.5 10E3/UL (ref 0–1.3)
MONOCYTES NFR BLD AUTO: 10 %
NEUTROPHILS # BLD AUTO: 2.3 10E3/UL (ref 1.6–8.3)
NEUTROPHILS NFR BLD AUTO: 53 %
NRBC # BLD AUTO: 0 10E3/UL
NRBC BLD AUTO-RTO: 0 /100
PLATELET # BLD AUTO: 225 10E3/UL (ref 150–450)
POTASSIUM SERPL-SCNC: 4.1 MMOL/L (ref 3.4–5.3)
PROT SERPL-MCNC: 6.7 G/DL (ref 6.4–8.3)
RBC # BLD AUTO: 4.52 10E6/UL (ref 3.8–5.2)
SODIUM SERPL-SCNC: 140 MMOL/L (ref 135–145)
WBC # BLD AUTO: 4.4 10E3/UL (ref 4–11)

## 2024-09-13 PROCEDURE — 85025 COMPLETE CBC W/AUTO DIFF WBC: CPT | Mod: ZL

## 2024-09-13 PROCEDURE — 85652 RBC SED RATE AUTOMATED: CPT | Mod: ZL

## 2024-09-13 PROCEDURE — 86140 C-REACTIVE PROTEIN: CPT | Mod: ZL

## 2024-09-13 PROCEDURE — 36415 COLL VENOUS BLD VENIPUNCTURE: CPT | Mod: ZL

## 2024-09-13 PROCEDURE — 80053 COMPREHEN METABOLIC PANEL: CPT | Mod: ZL

## 2024-09-13 PROCEDURE — 97110 THERAPEUTIC EXERCISES: CPT | Mod: GP,CQ

## 2024-09-13 RX ORDER — KETOTIFEN FUMARATE 0.35 MG/ML
1 SOLUTION/ DROPS OPHTHALMIC 2 TIMES DAILY
Qty: 10 ML | Refills: 2 | Status: SHIPPED | OUTPATIENT
Start: 2024-09-13

## 2024-09-13 NOTE — TELEPHONE ENCOUNTER
The pt stopped by the desk today asking for a medication refill of her eye drops Ketotifen Fumarate, I did let the patient know that she is out of the office.     If a call is needed please call [phone]    Please send to [facility]

## 2024-09-18 ENCOUNTER — THERAPY VISIT (OUTPATIENT)
Dept: PHYSICAL THERAPY | Facility: HOSPITAL | Age: 74
End: 2024-09-18
Attending: PHYSICIAN ASSISTANT
Payer: MEDICARE

## 2024-09-18 DIAGNOSIS — M54.42 BILATERAL LOW BACK PAIN WITH BILATERAL SCIATICA, UNSPECIFIED CHRONICITY: Primary | ICD-10-CM

## 2024-09-18 DIAGNOSIS — M54.41 BILATERAL LOW BACK PAIN WITH BILATERAL SCIATICA, UNSPECIFIED CHRONICITY: Primary | ICD-10-CM

## 2024-09-18 PROCEDURE — 97110 THERAPEUTIC EXERCISES: CPT | Mod: GP,CQ

## 2024-09-20 ENCOUNTER — THERAPY VISIT (OUTPATIENT)
Dept: PHYSICAL THERAPY | Facility: HOSPITAL | Age: 74
End: 2024-09-20
Attending: PHYSICIAN ASSISTANT
Payer: MEDICARE

## 2024-09-20 DIAGNOSIS — M54.41 BILATERAL LOW BACK PAIN WITH BILATERAL SCIATICA, UNSPECIFIED CHRONICITY: Primary | ICD-10-CM

## 2024-09-20 DIAGNOSIS — M54.42 BILATERAL LOW BACK PAIN WITH BILATERAL SCIATICA, UNSPECIFIED CHRONICITY: Primary | ICD-10-CM

## 2024-09-20 PROCEDURE — 97110 THERAPEUTIC EXERCISES: CPT | Mod: GP,CQ

## 2024-09-20 PROCEDURE — 97140 MANUAL THERAPY 1/> REGIONS: CPT | Mod: GP,CQ

## 2024-09-24 ENCOUNTER — THERAPY VISIT (OUTPATIENT)
Dept: PHYSICAL THERAPY | Facility: HOSPITAL | Age: 74
End: 2024-09-24
Attending: PHYSICIAN ASSISTANT
Payer: MEDICARE

## 2024-09-24 DIAGNOSIS — M54.41 BILATERAL LOW BACK PAIN WITH BILATERAL SCIATICA, UNSPECIFIED CHRONICITY: Primary | ICD-10-CM

## 2024-09-24 DIAGNOSIS — M54.42 BILATERAL LOW BACK PAIN WITH BILATERAL SCIATICA, UNSPECIFIED CHRONICITY: Primary | ICD-10-CM

## 2024-09-24 PROCEDURE — 97110 THERAPEUTIC EXERCISES: CPT | Mod: GP

## 2024-09-24 PROCEDURE — 94618 PULMONARY STRESS TESTING: CPT

## 2024-09-25 ENCOUNTER — MYC MEDICAL ADVICE (OUTPATIENT)
Dept: FAMILY MEDICINE | Facility: OTHER | Age: 74
End: 2024-09-25

## 2024-09-25 DIAGNOSIS — M25.561 RIGHT KNEE PAIN: ICD-10-CM

## 2024-09-25 DIAGNOSIS — M79.661 PAIN OF RIGHT LOWER LEG: Primary | ICD-10-CM

## 2024-09-26 NOTE — PROGRESS NOTES
09/24/24 0500   Appointment Info   Signing clinician's name / credentials Eliseo Hartman DPT   Total/Authorized Visits 365   Visits Used 10   Medical Diagnosis M79.661 (ICD-10-CM) - Pain of right lower leg  M54.42, M54.41 (ICD-10-CM) - Bilateral low back pain with bilateral sciatica, unspecified chronicity  M41.9 (ICD-10-CM) - Scoliosis  M47.9 (ICD-10-CM) - Degenerative joint disease of low back   PT Tx Diagnosis LBP, sciatica B   Progress Note/Certification   Start of Care Date 08/22/24   Onset of illness/injury or Date of Surgery 07/17/24   Therapy Frequency 1-2x/week   Predicted Duration 12 weeks   Certification date from 08/22/24   Certification date to 11/13/24   Supervision   PT Assistant Visit Number 5   GOALS   PT Goals 2;3;4   PT Goal 1   Goal Identifier Short Term 1   Goal Description Pt will be indep in HEP for decreased pain and improved strength and proprioception for decreased pain with functional mobility and decreased risk for additional injury.   Rationale to maximize safety and independence with performance of ADLs and functional tasks;to maximize safety and independence within the home;to maximize safety and independence within the community;to maximize safety and independence with transportation;to maximize safety and independence with self cares   Goal Progress Not met - pt reported that she was too busy doing other chores and tasks   Target Date 09/21/24   PT Goal 2   Goal Identifier Short Term 2   Goal Description Pt will ambulate for 5 min with SBA and appropriate use of SEC for decreased fall risk   Rationale to maximize safety and independence with performance of ADLs and functional tasks;to maximize safety and independence within the home;to maximize safety and independence within the community;to maximize safety and independence with self cares   Goal Progress MET - pt ambulated 6min with SEC. No increase in sxs with ambulation   Target Date 10/21/24   Date Met 09/11/24   PT Goal 3   Goal  "Identifier Long Term 1   Goal Description Pt will be able to ascend and descend stairs safely with 3/10 or less pain consistently to allow for safe entrance and exit of home, and community ambulation with decreased fall risk.   Rationale to maximize safety and independence with performance of ADLs and functional tasks;to maximize safety and independence within the home;to maximize safety and independence within the community;to maximize safety and independence with transportation;to maximize safety and independence with self cares   Goal Progress In progress - pt reports this is at a 5/10 today.   Target Date 11/13/24   PT Goal 4   Goal Identifier Long Term 2   Goal Description The patient will be able to ambulate at 3.2 mph to achieve community speed for 10 minutes to be able to perform task in the community such as shopping.   Rationale to maximize safety and independence with performance of ADLs and functional tasks;to maximize safety and independence within the home;to maximize safety and independence within the community;to maximize safety and independence with transportation;to maximize safety and independence with self cares   Goal Progress In progress - pt ambulated for 6 min today without increase in sxs   Target Date 11/13/24   Subjective Report   Subjective Report Pt feels like her knee is not feeling better. She reports that she isn't having severe pain in her back which was there on eval.  Later today though she says she called her chiropractor saying she is having pain in her back and needs to come over. She says she can't do the HEP 3x/day. She expresses that she wants to \"finish out the PT appts she has scheduled and then be done for a while\"   Treatment Interventions (PT)   Interventions Therapeutic Procedure/Exercise;Neuromuscular Re-education   Therapeutic Procedure/Exercise   Therapeutic Procedures: strength, endurance, ROM, flexibility minutes (09724) 30   Ther Proc 1 Strengthening, general ROM "   Ther Proc 1 - Details Lumbar rotation x 30. Hamstring and piriformis stretch 3x 45-60 sec. Supine marching 2x 60 sec with cues for tech and to keep core engaged. Seated AAROM knee ext or end range 2x 10, knee flexion with red 2x 10. seated hamstring curls with tband x20. Provided pt with Red TB.   Skilled Intervention Cues for pace and quality.   Eval/Assessments   Assessments 6 Minute Walk Test   Six Minute Walk Test   Six Minute Walk Test Minutes (32302) 10   Six Minute Walk Test - Details Total distance: 921ft in 6 min = 153.5ft/min = 2.56ft/sec = 0.78m/sec = pt is classified as a Limited Community Ambulator. Pt had 1 LOB to her R   Plan   Home program Continue with current program.   Updates to plan of care Pt requesting to d/c after remaining sessions are completed.   Plan for next session Continue progressing in strength, balance and core stability   Total Session Time   Timed Code Treatment Minutes 30   Total Treatment Time (sum of timed and untimed services) 40         PLAN  Continue therapy per current plan of care.    Beginning/End Dates of Progress Note Reporting Period:    to 09/24/2024    Referring Provider:  Tania Martinez

## 2024-09-27 ENCOUNTER — THERAPY VISIT (OUTPATIENT)
Dept: PHYSICAL THERAPY | Facility: HOSPITAL | Age: 74
End: 2024-09-27
Attending: PHYSICIAN ASSISTANT
Payer: MEDICARE

## 2024-09-27 DIAGNOSIS — M54.42 BILATERAL LOW BACK PAIN WITH BILATERAL SCIATICA, UNSPECIFIED CHRONICITY: Primary | ICD-10-CM

## 2024-09-27 DIAGNOSIS — M54.41 BILATERAL LOW BACK PAIN WITH BILATERAL SCIATICA, UNSPECIFIED CHRONICITY: Primary | ICD-10-CM

## 2024-09-27 PROCEDURE — 97110 THERAPEUTIC EXERCISES: CPT | Mod: GP,CQ

## 2024-10-01 ENCOUNTER — THERAPY VISIT (OUTPATIENT)
Dept: PHYSICAL THERAPY | Facility: HOSPITAL | Age: 74
End: 2024-10-01
Attending: PHYSICIAN ASSISTANT
Payer: MEDICARE

## 2024-10-01 DIAGNOSIS — M54.41 BILATERAL LOW BACK PAIN WITH BILATERAL SCIATICA, UNSPECIFIED CHRONICITY: Primary | ICD-10-CM

## 2024-10-01 DIAGNOSIS — M54.42 BILATERAL LOW BACK PAIN WITH BILATERAL SCIATICA, UNSPECIFIED CHRONICITY: Primary | ICD-10-CM

## 2024-10-01 PROCEDURE — 97110 THERAPEUTIC EXERCISES: CPT | Mod: GP,CQ

## 2024-10-04 ENCOUNTER — THERAPY VISIT (OUTPATIENT)
Dept: PHYSICAL THERAPY | Facility: HOSPITAL | Age: 74
End: 2024-10-04
Attending: PHYSICIAN ASSISTANT
Payer: MEDICARE

## 2024-10-04 DIAGNOSIS — M54.42 BILATERAL LOW BACK PAIN WITH BILATERAL SCIATICA, UNSPECIFIED CHRONICITY: Primary | ICD-10-CM

## 2024-10-04 DIAGNOSIS — M54.41 BILATERAL LOW BACK PAIN WITH BILATERAL SCIATICA, UNSPECIFIED CHRONICITY: Primary | ICD-10-CM

## 2024-10-04 PROCEDURE — 97110 THERAPEUTIC EXERCISES: CPT | Mod: GP,CQ

## 2024-10-17 ENCOUNTER — TELEPHONE (OUTPATIENT)
Dept: INTERVENTIONAL RADIOLOGY/VASCULAR | Facility: HOSPITAL | Age: 74
End: 2024-10-17

## 2024-10-17 NOTE — TELEPHONE ENCOUNTER
Called patient to remind them of their appt on 10/18. Also reminded patient to not take any antibiotics, steroids, or immunizations two weeks before and after this appt.  And they also need a .

## 2024-10-18 ENCOUNTER — HOSPITAL ENCOUNTER (OUTPATIENT)
Facility: HOSPITAL | Age: 74
Discharge: HOME | End: 2024-10-18
Attending: RADIOLOGY | Admitting: RADIOLOGY
Payer: MEDICARE

## 2024-10-18 ENCOUNTER — HOSPITAL ENCOUNTER (OUTPATIENT)
Dept: INTERVENTIONAL RADIOLOGY/VASCULAR | Facility: HOSPITAL | Age: 74
Discharge: HOME | End: 2024-10-18
Attending: PHYSICIAN ASSISTANT
Payer: MEDICARE

## 2024-10-18 DIAGNOSIS — M48.061 SPINAL STENOSIS OF LUMBAR REGION WITHOUT NEUROGENIC CLAUDICATION: ICD-10-CM

## 2024-10-18 DIAGNOSIS — M54.16 RADICULOPATHY OF LUMBAR REGION: ICD-10-CM

## 2024-10-18 PROCEDURE — 272N000472 XR LUMBAR TRANSLAMINAR INJ INCL IMAGING

## 2024-10-18 PROCEDURE — 250N000011 HC RX IP 250 OP 636: Performed by: RADIOLOGY

## 2024-10-18 RX ORDER — DEXAMETHASONE SODIUM PHOSPHATE 10 MG/ML
10 INJECTION, SOLUTION INTRAMUSCULAR; INTRAVENOUS ONCE
Status: COMPLETED | OUTPATIENT
Start: 2024-10-18 | End: 2024-10-18

## 2024-10-18 RX ORDER — IOPAMIDOL 612 MG/ML
15 INJECTION, SOLUTION INTRATHECAL ONCE
Status: COMPLETED | OUTPATIENT
Start: 2024-10-18 | End: 2024-10-18

## 2024-10-18 RX ADMIN — DEXAMETHASONE SODIUM PHOSPHATE 10 MG: 10 INJECTION, SOLUTION INTRAMUSCULAR; INTRAVENOUS at 10:53

## 2024-10-18 RX ADMIN — IOPAMIDOL 4 ML: 612 INJECTION, SOLUTION INTRATHECAL at 10:53

## 2024-10-18 ASSESSMENT — ACTIVITIES OF DAILY LIVING (ADL)
ADLS_ACUITY_SCORE: 37

## 2024-10-18 NOTE — PROGRESS NOTES
Per radiologist, patient experienced a thecal sac puncture with her injection. Patient laying flat for 30 minutes after her injection. Patient denies any numbness or tingling in any of her extremities. /74, HR 60's, O2 95% on RA, temp 97.3 tympanic. Patient denied any issue sitting up and then standing. Able to ambulate out to the car with her can and stand by assist of 1. Given instructions similar to lumbar puncture as there is no thecal sac puncture education available. Encouraged to take it easy today and rest in a recliner. Instructed to monitor the site of injections. Encouraged increased fluid and caffeine intake today.

## 2024-10-18 NOTE — DISCHARGE INSTRUCTIONS
DISCHARGE INSTRUCTIONS  Thecal Sac Puncture        This Information Is About Your Follow Up Care    Call your doctor if you do not get better. Call sooner if you feel worse. You can reach your doctor by calling their clinic phone number.                                    This Information Is About Procedures      At home, please follow these instructions:  Drink plenty of liquids. Drink liquids with caffeine. The caffeine helps replace the CSF more quickly.  Take it easy and rest for the next 6 to 8 hours when you get home. Reclining positions for the rest of the day can help.  Some people get a headache that lasts a day or two after this procedure. If you do get a headache, try lying down flat.    Call your doctor if you have:  a severe headache that will not go away.  redness, swelling or drainage from the puncture site.  neck stiffness.  numbness or weakness below the area of the puncture site.  any new or bothersome symptoms.                        This Information Is About Your Illness and Diagnosis    LOW BACK PAIN  The muscles of the low back are put under a lot of strain in everyday life. Most of us do not keep our backs very strong. A number of medicines and other treatments help with low back symptoms.    Avoid the following:  Bending from your waist.  Lifting heavy objects higher than your waist.  Carrying unbalanced loads and sudden movements.  Activities that arch and strain your back (bending backwards or bending forward touching your toes).  Avoid lifting when twisting, bending forward, and reaching.    Do the following:  Bend from your knees and face the object you lift.  Hold heavy objects close to your body.  Change your positions often.  Work with tools close to your body when mopping, vacuuming, raking, hoeing, etc.  Sit down to put your shoes and socks on.  Wear shoes with low heels and good support.  Reach for things close to your body.  Round your back and bend your knees slightly when you  cough or sneeze.  Kneel when making a bed.  Stand tall with your chin in, back flat, pelvis tucked under and relax your knees.    Call your doctor if you have:  increased pain.  no improvement.  any new problems or concerns.

## 2024-10-18 NOTE — DISCHARGE INSTRUCTIONS
Cell number on file:    Telephone Information:   Mobile [phone]     Is it ok to leave a message at this number(s)? Yes    Dr. Jones completed your procedure on 10/18/2024.    Current Pain Level (0-10 Scale): 3/10  Post Pain Level (0-10):  0/10    Radiology Discharge instructions for Steroid Injection    Activity Level:     Do not do any heavy activity or exercise for 24 hours.   Do not drive for 4 hours after your injection.  Diet:   Return to your normal diet.  Medications:   If you have stopped taking your Aspirin, Coumadin/Warfarin, Ibuprofen, or any   other blood thinner for this procedure you may resume in the morning unless   your primary care provider has given you other instructions.    Diabetics may see an increase in blood sugar after steroid injections. If you are concerned about your blood sugar, please contact your family doctor.    Site Care:  Remove the bandage and bathe or shower the morning after the procedure.      This is a Pain Management procedure.  You will be contacted in two weeks for follow up.    Call your Primary Care Provider if you have the following (if your primary care provider is not available please seek emergency care):   Nausea with vomiting   Severe headache   Drowsiness or confusion   Redness or drainage at the injection or puncture site   Temperature over 101 degrees F   Other concerns   Worsening back pain   Stiff neck

## 2024-10-19 ASSESSMENT — ACTIVITIES OF DAILY LIVING (ADL)
ADLS_ACUITY_SCORE: 37

## 2024-10-20 ASSESSMENT — ACTIVITIES OF DAILY LIVING (ADL)
ADLS_ACUITY_SCORE: 37

## 2024-10-21 ASSESSMENT — ACTIVITIES OF DAILY LIVING (ADL)
ADLS_ACUITY_SCORE: 37

## 2024-10-30 ENCOUNTER — TELEPHONE (OUTPATIENT)
Dept: INTERVENTIONAL RADIOLOGY/VASCULAR | Facility: HOSPITAL | Age: 74
End: 2024-10-30

## 2024-10-30 ENCOUNTER — OFFICE VISIT (OUTPATIENT)
Dept: FAMILY MEDICINE | Facility: OTHER | Age: 74
End: 2024-10-30
Attending: PHYSICIAN ASSISTANT
Payer: MEDICARE

## 2024-10-30 ENCOUNTER — ANCILLARY PROCEDURE (OUTPATIENT)
Dept: GENERAL RADIOLOGY | Facility: OTHER | Age: 74
End: 2024-10-30
Attending: PHYSICIAN ASSISTANT
Payer: MEDICARE

## 2024-10-30 VITALS
SYSTOLIC BLOOD PRESSURE: 118 MMHG | TEMPERATURE: 98.4 F | DIASTOLIC BLOOD PRESSURE: 72 MMHG | BODY MASS INDEX: 33.83 KG/M2 | RESPIRATION RATE: 17 BRPM | WEIGHT: 203.3 LBS | OXYGEN SATURATION: 93 % | HEART RATE: 78 BPM

## 2024-10-30 DIAGNOSIS — M25.561 ACUTE PAIN OF RIGHT KNEE: ICD-10-CM

## 2024-10-30 DIAGNOSIS — J30.2 SEASONAL ALLERGIC RHINITIS, UNSPECIFIED TRIGGER: ICD-10-CM

## 2024-10-30 DIAGNOSIS — M11.20 CHONDROCALCINOSIS: Primary | ICD-10-CM

## 2024-10-30 DIAGNOSIS — M48.061 SPINAL STENOSIS OF LUMBAR REGION WITHOUT NEUROGENIC CLAUDICATION: Primary | ICD-10-CM

## 2024-10-30 DIAGNOSIS — M48.061 SPINAL STENOSIS OF LUMBAR REGION WITHOUT NEUROGENIC CLAUDICATION: ICD-10-CM

## 2024-10-30 PROBLEM — J45.991 COUGH VARIANT ASTHMA: Status: RESOLVED | Noted: 2019-04-16 | Resolved: 2024-10-30

## 2024-10-30 PROCEDURE — 73562 X-RAY EXAM OF KNEE 3: CPT | Mod: TC,RT

## 2024-10-30 PROCEDURE — G0463 HOSPITAL OUTPT CLINIC VISIT: HCPCS

## 2024-10-30 PROCEDURE — 99213 OFFICE O/P EST LOW 20 MIN: CPT | Performed by: PHYSICIAN ASSISTANT

## 2024-10-30 RX ORDER — FEXOFENADINE HCL AND PSEUDOEPHEDRINE HCI 60; 120 MG/1; MG/1
1 TABLET, EXTENDED RELEASE ORAL 2 TIMES DAILY
Qty: 60 TABLET | Refills: 0 | Status: SHIPPED | OUTPATIENT
Start: 2024-10-30

## 2024-10-30 ASSESSMENT — PAIN SCALES - GENERAL: PAINLEVEL_OUTOF10: MILD PAIN (2)

## 2024-10-30 NOTE — TELEPHONE ENCOUNTER
INJECTION POST CALL    Was this an IR Referral? YES    Procedure: Epidural  Radiologist(s): Dr. Paxton Jones  Date of Procedure: 10/18/2024    The patient had no questions or concerns.    Relief of pain from this injection    A = 90%  A- = 85%  B = 80%  B- =75%  C = 70%  C- = 65%  D = 60%  D- = 50%  F = less than 50%       Did you get any relief from this injection in the past 2 weeks? No not really, patient did not get any relief at 0%      If yes, how long did the relief last? No relief    Are you still feeling relief? (2 weeks out)  No  Would you say this injection has been beneficial? No      Was there one injection that worked better than the other? Patient states the injection back on 9/30/21 ADEEL TF RT L4-5 was better.    Where is the pain?  She still has pain located in the lower back below the waistline, bilateral  Can you describe the pain? Shooting  Does the pain radiate anywhere? Yes  If yes, where does it radiate and where does the pain stop? It travels down to both butt cheeks. Then on the left it travels down the backside and on the rt leg it goes down the outside stopping at the knee. It can go father at times but rare.    Is this new? No    Patient would like to pursue another injection.    Instruct patient to follow up with their provider for any further care they may need.    Tegan Mueller

## 2024-10-30 NOTE — PROGRESS NOTES
"  Assessment & Plan     Seasonal allergic rhinitis, unspecified trigger  Given refill.    - fexofenadine-pseudoePHEDrine (ALLEGRA-D ALLERGY & CONGESTION)  MG 12 hr tablet; Take 1 tablet by mouth 2 times daily.    Spinal stenosis of lumbar region without neurogenic claudication  Had a shot two weeks ago. Really minimal help. Has pain if walking down stairs. Sometimes doesn't feel her right leg. Feels like she is going to fall. Her weakness is worse if the leg is numb. She hasn't seen a neurosurgeon in a long time. So trying one more injection might be helpful.   - IR Consultation for IR Exam (Pain Consult); Future  - XR Knee Right 3 Views (Clinic Performed); Future    Acute pain of right knee  She is going to have an xray. Can't move it well.   - XR Knee Right 3 Views (Clinic Performed); Future          BMI  Estimated body mass index is 33.83 kg/m  as calculated from the following:    Height as of 7/8/24: 1.651 m (5' 5\").    Weight as of this encounter: 92.2 kg (203 lb 4.8 oz).   Weight management plan: Discussed healthy diet and exercise guidelines      See Patient Instructions    No follow-ups on file.    Lizeth Balderas is a 74 year old, presenting for the following health issues:  Pain        10/30/2024     1:29 PM   Additional Questions   Roomed by Ashley Rivas   Accompanied by self         10/30/2024     1:29 PM   Patient Reported Additional Medications   Patient reports taking the following new medications VITAMIN e     History of Present Illness       Back Pain:  She presents for follow up of back pain. Patient's back pain is a chronic problem.  Location of back pain:  Right lower back, left lower back, right middle of back, left middle of back, right buttock and left buttock  Description of back pain: burning, shooting and stabbing  Back pain spreads: right buttocks, left buttocks, right thigh, left thigh, right side of neck and left side of neck    Since patient first noticed back pain, pain is: " gradually worsening  Does back pain interfere with her job:  Not applicable       She eats 0-1 servings of fruits and vegetables daily.She consumes 1 sweetened beverage(s) daily.She exercises with enough effort to increase her heart rate 20 to 29 minutes per day.  She exercises with enough effort to increase her heart rate 3 or less days per week.   She is taking medications regularly.       Chronic/Recurring Back Pain Follow Up    Where is your back pain located? (Select all that apply) low back bilateral, middle of back bilateral, and gluteus bilateral  How would you describe your back pain?  burning, shooting, and stabbing  Where does your back pain spread? the right and left buttock and the right and left  thigh  Since your last clinic visit for back pain, how has your pain changed? gradually worsening  Does your back pain interfere with your job? Not applicable  Since your last visit, have you tried any new treatment? Yes -  chiropractor, Physical Therapy, and steroid injection          Review of Systems  Constitutional, HEENT, cardiovascular, pulmonary, gi and gu systems are negative, except as otherwise noted.      Objective    /72 (BP Location: Left arm, Patient Position: Sitting, Cuff Size: Adult Regular)   Pulse 78   Temp 98.4  F (36.9  C) (Tympanic)   Resp 17   Wt 92.2 kg (203 lb 4.8 oz)   SpO2 93%   BMI 33.83 kg/m    Body mass index is 33.83 kg/m .  Physical Exam   GENERAL: alert and no distress  EYES: Eyes grossly normal to inspection, PERRL and conjunctivae and sclerae normal  HENT: ear canals and TM's normal, nose and mouth without ulcers or lesions  NECK: no adenopathy, no asymmetry, masses, or scars  RESP: lungs clear to auscultation - no rales, rhonchi or wheezes  CV: regular rate and rhythm, normal S1 S2, no S3 or S4, no murmur, click or rub, no peripheral edema  ABDOMEN: soft, nontender, no hepatosplenomegaly, no masses and bowel sounds normal  MS: no gross musculoskeletal defects  noted, no edema, flexion is limited. Knee is stiff.  Limited movement and her lower back is painful and her right lateral lower leg is numb.     No results found for any visits on 10/30/24.        Signed Electronically by: GIOVANNY Valentino

## 2024-10-31 NOTE — RESULT ENCOUNTER NOTE
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  We will have you see ortho as the ortho. Prednisone seems to help this process in your knee. There also might be a lose body in the joint of the knee.

## 2024-11-21 ENCOUNTER — OFFICE VISIT (OUTPATIENT)
Dept: ORTHOPEDICS | Facility: OTHER | Age: 74
End: 2024-11-21
Attending: PHYSICIAN ASSISTANT
Payer: MEDICARE

## 2024-11-21 VITALS
DIASTOLIC BLOOD PRESSURE: 52 MMHG | SYSTOLIC BLOOD PRESSURE: 112 MMHG | RESPIRATION RATE: 16 BRPM | OXYGEN SATURATION: 92 % | HEART RATE: 87 BPM

## 2024-11-21 DIAGNOSIS — M17.11 PRIMARY LOCALIZED OSTEOARTHROSIS OF RIGHT LOWER LEG: Primary | ICD-10-CM

## 2024-11-21 DIAGNOSIS — M11.20 CHONDROCALCINOSIS: ICD-10-CM

## 2024-11-21 PROCEDURE — G0463 HOSPITAL OUTPT CLINIC VISIT: HCPCS | Mod: 25

## 2024-11-21 PROCEDURE — 250N000011 HC RX IP 250 OP 636: Performed by: ORTHOPAEDIC SURGERY

## 2024-11-21 PROCEDURE — 99203 OFFICE O/P NEW LOW 30 MIN: CPT | Mod: 25 | Performed by: ORTHOPAEDIC SURGERY

## 2024-11-21 PROCEDURE — 20610 DRAIN/INJ JOINT/BURSA W/O US: CPT | Performed by: ORTHOPAEDIC SURGERY

## 2024-11-21 RX ORDER — BETAMETHASONE SODIUM PHOSPHATE AND BETAMETHASONE ACETATE 3; 3 MG/ML; MG/ML
12 INJECTION, SUSPENSION INTRA-ARTICULAR; INTRALESIONAL; INTRAMUSCULAR; SOFT TISSUE ONCE
Status: COMPLETED | OUTPATIENT
Start: 2024-11-21 | End: 2024-11-21

## 2024-11-21 RX ORDER — LIDOCAINE HYDROCHLORIDE 10 MG/ML
1 INJECTION, SOLUTION EPIDURAL; INFILTRATION; INTRACAUDAL; PERINEURAL ONCE
Status: COMPLETED | OUTPATIENT
Start: 2024-11-21 | End: 2024-11-21

## 2024-11-21 RX ADMIN — LIDOCAINE HYDROCHLORIDE 1 ML: 10 INJECTION, SOLUTION EPIDURAL; INFILTRATION; INTRACAUDAL; PERINEURAL at 12:17

## 2024-11-21 RX ADMIN — BETAMETHASONE SODIUM PHOSPHATE AND BETAMETHASONE ACETATE 12 MG: 3; 3 INJECTION, SUSPENSION INTRA-ARTICULAR; INTRALESIONAL; INTRAMUSCULAR at 12:16

## 2024-11-21 ASSESSMENT — PAIN SCALES - GENERAL: PAINLEVEL_OUTOF10: MILD PAIN (2)

## 2024-11-21 NOTE — PATIENT INSTRUCTIONS
Thank you for allowing Dr. Jg Hughes and his team to participate in your care. Please call our health unit coordinator at [phone] with scheduling questions or the nurse at [phone] with any other questions or concerns.        You may call the Orthopedic nurse at [phone] with any questions.

## 2024-11-25 NOTE — PROGRESS NOTES
ORTHOPEDIC CLINIC CONSULT    Referred by:     Chief Complaint: Melvi Cleveland is a 74 year old female who is being seen for   Chief Complaint   Patient presents with    chondrocalcinosis       History of Present Illness:   Patient is a 74-year-old female with right knee pain.  She notes she is having some discomfort with both knees however right is much worse than left.  Symptoms started back in May approximately and has been having increasing pain and pain along the joint line and more activity driven.  She is also notes she has been getting some injections in her back for some back issues.  Some parts of been getting some swelling issues.  She presents for evaluation she has no 1 trauma or incident as this progressed over time.    Patient's past medical, surgical, social and family histories reviewed.     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    Parotiditis 05/09/2011    Peripheral Neuriopathy 03/01/2011    Pneumonia of left lower lobe due to infectious organism 04/16/2019    Rheumatoid arthritis(714.0) 12/13/1999    Seborrhea capitis 10/06/2011    Sjogrens Syndrome 03/01/2011    Urethral stricture 04/30/2008       Past Surgical History:   Procedure Laterality Date    BACK SURGERY  05/1995    back surgery    BIOPSY      breast bx X2    BIOPSY BREAST      LT x 3 benign    BIOPSY BREAST NEEDLE LOCALIZATION Right 06/12/2017    Procedure: BIOPSY BREAST NEEDLE LOCALIZATION;  WIRE LOCALIZED EXCISIONAL BIOPSY  RIGHT BREAST MASS;  Surgeon: Jeni Amin MD;  Location: [facility]    CHOLECYSTECTOMY  2004     COLONOSCOPY  11/15/2004    screening    COLONOSCOPY  09/24/2014    COLONOSCOPY - HIM SCAN N/A 11/01/2017    (Jessy) no abnormality - right, transverse and left colon biopsies, repeat in 2 years    EGD with biopsy  2010, 2011    GERD    ENDOSCOPY UPPER, COLONOSCOPY, COMBINED N/A 01/17/2020    Procedure: UPPER ENDOSCOPY WITH BIOPSIES  AND COLONOSCOPY;  Surgeon: Saul Jiménez MD;  Location: [facility] OR    IR CONSULTATION FOR IR EXAM  02/13/2020    IR CONSULTATION FOR IR EXAM  06/18/2020    IR CONSULTATION FOR IR EXAM  07/01/2021    laminectomy      L4 L5 laminectomy; back pain    LUMPECTOMY BREAST      RELEASE CARPAL TUNNEL Right 01/16/2018    Procedure: RELEASE CARPAL TUNNEL;  RIGHT CARPAL TUNNEL RELEASE;  Surgeon: Haider Carlos MD;  Location: [facility]       Home Medications:  Prior to Admission medications    Medication Sig Start Date End Date Taking? Authorizing Provider   ALFALFA PO Take 5 tablets by mouth 3 times daily    Yes Reported, Patient   Ascorbic Acid (VITAMIN C) 500 MG CAPS Take 1 tablet by mouth daily    Yes Reported, Patient   Biotin 23776 MCG TABS Take 5,000 mg by mouth   Yes Reported, Patient   Calcium-Vitamin D-Vitamin K 500-1000-40 MG-UNT-MCG CHEW Take 2 tablets by mouth daily    Yes Reported, Patient   Cholecalciferol (VITAMIN D) 1000 UNITS capsule Take 2 capsules by mouth daily    Yes Reported, Patient   DEXILANT 60 MG CPDR CR capsule 60 mg daily 4/28/21  Yes Reported, Patient   diclofenac (VOLTAREN) 1 % topical gel  2/2/23  Yes Reported, Patient   DULoxetine (CYMBALTA) 30 MG capsule Take 1 capsule (30 mg) by mouth 2 times daily 8/20/24  Yes Tania Martinez PA   fexofenadine-pseudoePHEDrine (ALLEGRA-D ALLERGY & CONGESTION)  MG 12 hr tablet Take 1 tablet by mouth 2 times daily. 10/30/24  Yes Tania Martinez PA   Flaxseed, Linseed, (FLAX SEED OIL) 1000 MG capsule Take 2 capsules by mouth daily   Yes Reported, Patient   fluocinonide (LIDEX) 0.05 % external solution  8/1/23  Yes Reported,  Patient   folic acid (FOLVITE) 1 MG tablet Take 1 tablet (1 mg) by mouth 3 times daily 8/20/24  Yes Tania Martinez PA   gabapentin (NEURONTIN) 300 MG capsule TAKE 3 CAPS (900MG) BY MOUTH IN MORNING, TAKE 2 CAPS (600MG) IN THE AFTERNOON AND AT BEDTIME 8/13/24  Yes Tania Martinez PA   Garlic 400 MG TBEC Take 1 tablet by mouth daily    Yes Reported, Patient   HYDROcodone-acetaminophen (NORCO) 7.5-325 MG per tablet Take 1 tablet by mouth every 6 hours as needed for severe pain 4/18/24  Yes Tania Martinez PA   hydroxychloroquine (PLAQUENIL) 200 MG tablet Take 200 mg by mouth 2 times daily    Yes Reported, Patient   ketoconazole (NIZORAL) 2 % external shampoo SHAMPOO SCALP 3 TIMES WEEKLY 5/18/23  Yes Tania Martinez PA   ketotifen fumarate 0.035% 0.035 % SOLN ophthalmic solution Place 1 drop into both eyes 2 times daily. 9/13/24  Yes Dunia Connolly MD   lisinopril (ZESTRIL) 2.5 MG tablet Take 1 tablet (2.5 mg) by mouth daily. 8/21/24  Yes Tania Martinez PA   methotrexate sodium 2.5 MG TABS  6/13/23  Yes Reported, Patient   MILK THISTLE PO Take 1,000 mg by mouth daily.   Yes Reported, Patient   montelukast (SINGULAIR) 10 MG tablet Take 1 tablet (10 mg) by mouth daily 2/26/24  Yes Tania Martinez PA   multivitamin, therapeutic (THERA-VIT) TABS tablet Take 2 tablets by mouth daily Alessandra multivitamin 1 am 1 HS   Yes Reported, Patient   nystatin (MYCOSTATIN) 928641 UNIT/GM external powder Apply topically 2 times daily as needed for other (yeast infection on abdomen) 4/18/24  Yes Tania Martinez PA   Omega-3 1000 MG capsule Take 1.5 g by mouth daily    Yes Reported, Patient   polyvinyl alcohol-povidone (HYPOTEARS) 1.4-0.6 % ophthalmic solution 1-2 drops as needed   Yes Reported, Patient   PREBIOTIC PRODUCT PO Take 1 tablet by mouth daily   Yes Reported, Patient   Probiotic Product (PROBIOTIC PO) Take 1 capsule by mouth daily   Yes Reported, Patient   RESTASIS 0.05 % ophthalmic emulsion USE 1 DROP  IN BOTH EYES 2 TIMES A DAY 3/31/22  Yes Tania Martinez PA   traZODone (DESYREL) 50 MG tablet TAKE 2-3 TABLETS (100-150MG) BY MOUTH EVERY NIGHT AT BEDTIME 24  Yes Tania Martinez PA   UNABLE TO FIND Take 1,000 mg by mouth daily MEDICATION NAME: Quinol/tumeric   Yes Reported, Patient   vitamin B complex with vitamin C (STRESS TAB) tablet Take 1 tablet by mouth daily   Yes Reported, Patient   zinc gluconate 50 MG tablet Take 50 mg by mouth daily   Yes Reported, Patient       Allergies   Allergen Reactions    Adhesive Tape      Glue and nicotine patches    Benzoin Compound      Tin-Co-Javier    Mold     Seasonal Allergies     Soap      Any cleanser/soap/disinfectant that is used right before surgery.  Makes patient break out horribly. Chart was looked at and Chloraprep was used.       Social History     Occupational History     Employer:      Comment: part time   Tobacco Use    Smoking status: Former     Current packs/day: 0.00     Types: Cigarettes     Quit date: 10/6/1997     Years since quittin.1    Smokeless tobacco: Never    Tobacco comments:     no passive exposure   Vaping Use    Vaping status: Never Used   Substance and Sexual Activity    Alcohol use: No     Comment: former. quit     Drug use: No    Sexual activity: Not Currently     Partners: Male     Birth control/protection: None       Family History   Problem Relation Age of Onset    Coronary Artery Disease Mother         Bad valvue    Diabetes Mother     Rheumatoid Arthritis Mother     Other - See Comments Mother         fibromyalgia    Osteoarthritis Mother     Thyroid Disease Mother         thyroid disorder    Unknown/Adopted Father         cause of death unknown    Rheumatoid Arthritis Father     Diabetes Sister     Myocardial Infarction Sister         cause of death    Lupus Sister         cause of death    Other - See Comments Sister         sleep apnea    Hypertension Brother     Depression Brother     Other - See Comments  Brother         sleep apnea    C.A.D. Maternal Grandmother     Cerebrovascular Disease Maternal Grandmother     Diabetes Maternal Grandmother     Thyroid Disease Maternal Grandmother         thyrodi disorder; goitor removed    Cancer Maternal Grandfather     Diabetes Daughter     Anxiety Disorder Daughter        REVIEW OF SYSTEMS            Physical Exam:    Vitals: /52   Pulse 87   Resp 16   SpO2 92%   BMI= There is no height or weight on file to calculate BMI.  On examination is awake alert and oriented and cooperative no apparent distress afebrile vital signs stable.  She has a slight antalgic.  Gait.  She is tender along the joint line but again stable to varus valgus stress anterior drawer and Lachman and negative Kim's.  Some mild swelling.  Radiographs: She had radiographic evaluation which notes osteoarthritic changes with tricompartmental with chondrocalcinosis.  She also has plan films on her back and has multilevel degenerative changes Carlos disc disease and facet arthritis     Independent visualization of the films was made.         Impression:      ICD-10-CM    1. Primary localized osteoarthrosis of right lower leg  M17.11 Large Joint/Bursa injection and/or drainage (Shoulder, Knee)     betamethasone acet & sod phos (CELESTONE) injection 12 mg     lidocaine (PF) (XYLOCAINE) 1 % injection 1 mL      2. Chondrocalcinosis  M11.20 Orthopedic  Referral          Plan: Impression plan right knee advancing osteoarthritis and chondrocalcinosis.  Discussed treatment options both nonoperative and operative potential risk complications outcome in detail and potential risks of the surgical as well as the nonsurgical.  After lengthy discussion she would like to proceed with the injection to see if she can get adequate improvement continue with the otherwise modifying activities.  Feel this is reasonable    Procedure: Right knee steroid injection.  Right knee was prepped with alcohol then  ChloraPrep for sterile technique injection.  A solution of 1 cc of lidocaine and 2 cc of 6 mg/mL Celestone was drawn up under sterile technique.  The knee was then numbed with ethyl chloride and sterile technique injection.  Band-Aid was placed over the injection site    All of the above pertinent physical exam and imaging modalities findings was reviewed with Melvi.    Return to clinic in when symptoms recur weeks.    Further imaging required none at this time    Time spent with evaluation: 30 minutes    Jg Hughes MD  11/24/2024  7:38 PM

## 2024-12-02 ENCOUNTER — TELEPHONE (OUTPATIENT)
Dept: INTERVENTIONAL RADIOLOGY/VASCULAR | Facility: HOSPITAL | Age: 74
End: 2024-12-02

## 2024-12-02 NOTE — TELEPHONE ENCOUNTER
Left a message to remind patient of their abdoul on 12/5. Also reminded patient to not take any antibiotics, steroids, or immunizations two weeks before and after this appt. And they need a .

## 2024-12-03 DIAGNOSIS — G62.9 PERIPHERAL POLYNEUROPATHY: ICD-10-CM

## 2024-12-03 NOTE — TELEPHONE ENCOUNTER
Gabapentin (Neurontin) 300 MG capsule    Last Written Prescription Date:  08/13/2024  Last Fill Quantity: 210,   # refills: 2  Last Office Visit: 10/30/2024  Future Office visit:       Routing refill request to provider for review/approval because:

## 2024-12-04 RX ORDER — GABAPENTIN 300 MG/1
CAPSULE ORAL
Qty: 210 CAPSULE | Refills: 2 | Status: SHIPPED | OUTPATIENT
Start: 2024-12-04

## 2024-12-05 ENCOUNTER — HOSPITAL ENCOUNTER (OUTPATIENT)
Facility: HOSPITAL | Age: 74
Discharge: HOME | End: 2024-12-05
Attending: RADIOLOGY | Admitting: RADIOLOGY
Payer: MEDICARE

## 2024-12-05 ENCOUNTER — HOSPITAL ENCOUNTER (OUTPATIENT)
Dept: INTERVENTIONAL RADIOLOGY/VASCULAR | Facility: HOSPITAL | Age: 74
Discharge: HOME | End: 2024-12-05
Attending: PHYSICIAN ASSISTANT
Payer: MEDICARE

## 2024-12-05 DIAGNOSIS — M48.061 SPINAL STENOSIS OF LUMBAR REGION WITHOUT NEUROGENIC CLAUDICATION: ICD-10-CM

## 2024-12-05 DIAGNOSIS — M54.16 LUMBAR RADICULOPATHY: ICD-10-CM

## 2024-12-05 PROCEDURE — 250N000011 HC RX IP 250 OP 636: Performed by: RADIOLOGY

## 2024-12-05 PROCEDURE — 62323 NJX INTERLAMINAR LMBR/SAC: CPT

## 2024-12-05 PROCEDURE — 272N000472 XR LUMBAR TRANSLAMINAR INJ INCL IMAGING

## 2024-12-05 RX ORDER — DEXAMETHASONE SODIUM PHOSPHATE 10 MG/ML
10 INJECTION, SOLUTION INTRAMUSCULAR; INTRAVENOUS ONCE
Status: COMPLETED | OUTPATIENT
Start: 2024-12-05 | End: 2024-12-05

## 2024-12-05 RX ORDER — IOPAMIDOL 612 MG/ML
15 INJECTION, SOLUTION INTRATHECAL ONCE
Status: COMPLETED | OUTPATIENT
Start: 2024-12-05 | End: 2024-12-05

## 2024-12-05 RX ADMIN — DEXAMETHASONE SODIUM PHOSPHATE 10 MG: 10 INJECTION, SOLUTION INTRAMUSCULAR; INTRAVENOUS at 11:15

## 2024-12-05 RX ADMIN — IOPAMIDOL 4 ML: 612 INJECTION, SOLUTION INTRATHECAL at 11:15

## 2024-12-05 ASSESSMENT — ACTIVITIES OF DAILY LIVING (ADL): ADLS_ACUITY_SCORE: 48

## 2024-12-05 NOTE — DISCHARGE INSTRUCTIONS
Cell number on file:    Telephone Information:   Mobile [phone]     Is it ok to leave a message at this number(s)? Yes    Dr. Jones completed your procedure on 12/5/2024.    Current Pain Level (0-10 Scale): 3/10  Post Pain Level (0-10):  0/10    Radiology Discharge instructions for Steroid Injection    Activity Level:     Do not do any heavy activity or exercise for 24 hours.   Do not drive for 4 hours after your injection.  Diet:   Return to your normal diet.  Medications:   If you have stopped taking your Aspirin, Coumadin/Warfarin, Ibuprofen, or any   other blood thinner for this procedure you may resume in the morning unless   your primary care provider has given you other instructions.    Diabetics may see an increase in blood sugar after steroid injections. If you are concerned about your blood sugar, please contact your family doctor.    Site Care:  Remove the bandage and bathe or shower the morning after the procedure.      This is a Pain Management procedure.  You will be contacted in two weeks for follow up.    Call your Primary Care Provider if you have the following (if your primary care provider is not available please seek emergency care):   Nausea with vomiting   Severe headache   Drowsiness or confusion   Redness or drainage at the injection or puncture site   Temperature over 101 degrees F   Other concerns   Worsening back pain   Stiff neck

## 2024-12-17 ENCOUNTER — LAB (OUTPATIENT)
Dept: LAB | Facility: OTHER | Age: 74
End: 2024-12-17
Payer: MEDICARE

## 2024-12-17 DIAGNOSIS — Z79.899 HIGH RISK MEDICATION USE: ICD-10-CM

## 2024-12-17 DIAGNOSIS — M06.09 RHEUMATOID ARTHRITIS OF MULTIPLE SITES WITHOUT RHEUMATOID FACTOR (H): ICD-10-CM

## 2024-12-17 LAB
ALBUMIN SERPL BCG-MCNC: 4 G/DL (ref 3.5–5.2)
ALP SERPL-CCNC: 69 U/L (ref 40–150)
ALT SERPL W P-5'-P-CCNC: 24 U/L (ref 0–50)
ANION GAP SERPL CALCULATED.3IONS-SCNC: 9 MMOL/L (ref 7–15)
AST SERPL W P-5'-P-CCNC: 28 U/L (ref 0–45)
BASOPHILS # BLD AUTO: 0.1 10E3/UL (ref 0–0.2)
BASOPHILS NFR BLD AUTO: 1 %
BILIRUB SERPL-MCNC: 0.3 MG/DL
BUN SERPL-MCNC: 9.6 MG/DL (ref 8–23)
CALCIUM SERPL-MCNC: 9.6 MG/DL (ref 8.8–10.4)
CHLORIDE SERPL-SCNC: 100 MMOL/L (ref 98–107)
CREAT SERPL-MCNC: 1.03 MG/DL (ref 0.51–0.95)
CRP SERPL-MCNC: 4.83 MG/L
EGFRCR SERPLBLD CKD-EPI 2021: 57 ML/MIN/1.73M2
EOSINOPHIL # BLD AUTO: 0.4 10E3/UL (ref 0–0.7)
EOSINOPHIL NFR BLD AUTO: 8 %
ERYTHROCYTE [DISTWIDTH] IN BLOOD BY AUTOMATED COUNT: 14.1 % (ref 10–15)
ERYTHROCYTE [SEDIMENTATION RATE] IN BLOOD BY WESTERGREN METHOD: 18 MM/HR (ref 0–30)
GLUCOSE SERPL-MCNC: 111 MG/DL (ref 70–99)
HCO3 SERPL-SCNC: 28 MMOL/L (ref 22–29)
HCT VFR BLD AUTO: 38.7 % (ref 35–47)
HGB BLD-MCNC: 12.7 G/DL (ref 11.7–15.7)
IMM GRANULOCYTES # BLD: 0 10E3/UL
IMM GRANULOCYTES NFR BLD: 0 %
LYMPHOCYTES # BLD AUTO: 1.6 10E3/UL (ref 0.8–5.3)
LYMPHOCYTES NFR BLD AUTO: 30 %
MCH RBC QN AUTO: 28.5 PG (ref 26.5–33)
MCHC RBC AUTO-ENTMCNC: 32.8 G/DL (ref 31.5–36.5)
MCV RBC AUTO: 87 FL (ref 78–100)
MONOCYTES # BLD AUTO: 0.6 10E3/UL (ref 0–1.3)
MONOCYTES NFR BLD AUTO: 11 %
NEUTROPHILS # BLD AUTO: 2.7 10E3/UL (ref 1.6–8.3)
NEUTROPHILS NFR BLD AUTO: 50 %
NRBC # BLD AUTO: 0 10E3/UL
NRBC BLD AUTO-RTO: 0 /100
PLATELET # BLD AUTO: 248 10E3/UL (ref 150–450)
POTASSIUM SERPL-SCNC: 4.1 MMOL/L (ref 3.4–5.3)
PROT SERPL-MCNC: 6.8 G/DL (ref 6.4–8.3)
RBC # BLD AUTO: 4.46 10E6/UL (ref 3.8–5.2)
SODIUM SERPL-SCNC: 137 MMOL/L (ref 135–145)
WBC # BLD AUTO: 5.4 10E3/UL (ref 4–11)

## 2024-12-17 PROCEDURE — 86140 C-REACTIVE PROTEIN: CPT | Mod: ZL

## 2024-12-17 PROCEDURE — 85652 RBC SED RATE AUTOMATED: CPT | Mod: ZL

## 2024-12-17 PROCEDURE — 85048 AUTOMATED LEUKOCYTE COUNT: CPT | Mod: ZL

## 2024-12-17 PROCEDURE — 85004 AUTOMATED DIFF WBC COUNT: CPT | Mod: ZL

## 2024-12-17 PROCEDURE — 80053 COMPREHEN METABOLIC PANEL: CPT | Mod: ZL

## 2024-12-17 PROCEDURE — 36415 COLL VENOUS BLD VENIPUNCTURE: CPT | Mod: ZL

## 2024-12-18 ENCOUNTER — TELEPHONE (OUTPATIENT)
Dept: INTERVENTIONAL RADIOLOGY/VASCULAR | Facility: HOSPITAL | Age: 74
End: 2024-12-18

## 2024-12-18 NOTE — TELEPHONE ENCOUNTER
INJECTION POST CALL    Was this an IR Referral? YES    Procedure: Epidural TL L5-S1  Radiologist(s): Dr. Paxton Jones  Date of Procedure: 12/5/24    I responded to the patient's questions/concerns.    Relief of pain from this injection    A = 90%  A- = 85%  B = 80%  B- =75%  C = 70%  C- = 65%  D = 60%  D- = 50%  F = less than 50%       Did you get any relief from this injection in the past 2 weeks? Some, patient is feeling 50% of relief on her rt side. But her left no relief.   If yes, how long did the relief last? Patient is still feeling relief on the rt side but no relief on the left    Are you still feeling relief? (2 weeks out) For the rt yes not for the left.  Would you say this injection has been beneficial? For the rt yes not for the left.      Was there one injection that worked better than the other? Patient states in the past injections have helped her on her lower back. She is unsure why this one did not help her left    Where is the pain? She still has pain on her left butt cheek   Can you describe the pain? Shooting, Sharp   Does the pain radiate anywhere? Yes  If yes, where does it radiate and where does the pain stop? Down the back of the left leg to the knee where it stops    Is this new? No    Patient would not like to pursue another injection at this time.  Instruct patient to follow up with their provider for any further care they may need.    Tegan Mueller

## 2024-12-30 DIAGNOSIS — J30.2 SEASONAL ALLERGIC RHINITIS, UNSPECIFIED TRIGGER: ICD-10-CM

## 2024-12-31 RX ORDER — FEXOFENADINE HYDROCHLORIDE AND PSEUDOEPHEDRINE HYDROCHLORIDE 60; 120 MG/1; MG/1
1 TABLET, FILM COATED, EXTENDED RELEASE ORAL 2 TIMES DAILY
Qty: 60 TABLET | Refills: 0 | Status: SHIPPED | OUTPATIENT
Start: 2024-12-31

## 2024-12-31 NOTE — TELEPHONE ENCOUNTER
ALLEGRA-D 12HR ALLER/BOBBY TAB       Last Written Prescription Date:  10/30/2024  Last Fill Quantity: 60,   # refills: 0  Last Office Visit: 10/30/2024  Future Office visit:       Routing refill request to provider for review/approval because:    Kimberly Boecker, RN

## 2025-01-27 DIAGNOSIS — J45.991 COUGH VARIANT ASTHMA: ICD-10-CM

## 2025-01-27 DIAGNOSIS — J30.1 SEASONAL ALLERGIC RHINITIS DUE TO POLLEN: ICD-10-CM

## 2025-01-27 NOTE — TELEPHONE ENCOUNTER
montelukast (SINGULAIR) 10 MG tablet   Last Written Prescription Date:  2-26-24  Last Fill Quantity: 90,   # refills: 3  Last Office Visit: 10-30-24  Future Office visit:       Routing refill request to provider for review/approval because:  Drug not on the [facility], [facility] or [facility] refill protocol or controlled substance

## 2025-01-28 RX ORDER — MONTELUKAST SODIUM 10 MG/1
1 TABLET ORAL DAILY
Qty: 90 TABLET | Refills: 0 | Status: SHIPPED | OUTPATIENT
Start: 2025-01-28

## 2025-02-18 DIAGNOSIS — J30.2 SEASONAL ALLERGIC RHINITIS, UNSPECIFIED TRIGGER: ICD-10-CM

## 2025-02-19 DIAGNOSIS — F51.01 PRIMARY INSOMNIA: ICD-10-CM

## 2025-02-19 RX ORDER — FEXOFENADINE HYDROCHLORIDE AND PSEUDOEPHEDRINE HYDROCHLORIDE 60; 120 MG/1; MG/1
1 TABLET, FILM COATED, EXTENDED RELEASE ORAL 2 TIMES DAILY
Qty: 60 TABLET | Refills: 0 | Status: SHIPPED | OUTPATIENT
Start: 2025-02-19

## 2025-02-19 RX ORDER — TRAZODONE HYDROCHLORIDE 50 MG/1
TABLET ORAL
Qty: 270 TABLET | Refills: 1 | Status: SHIPPED | OUTPATIENT
Start: 2025-02-19

## 2025-02-19 NOTE — TELEPHONE ENCOUNTER
ALLEGRA-D 12HR ALLER/BOBBY TAB       Last Written Prescription Date:  12/31/2024  Last Fill Quantity: 60,   # refills: 0  Last Office Visit: 10/30/2024  Future Office visit:       Routing refill request to provider for review/approval because:      Kimberly Boecker, RN

## 2025-02-19 NOTE — TELEPHONE ENCOUNTER
TRAZODONE 50MG TABLET       Last Written Prescription Date:  08/26/2024  Last Fill Quantity: 270,   # refills: 1  Last Office Visit: 10/30/2024  Future Office visit:       Routing refill request to provider for review/approval because:    [employer] Buacpy9402/19/2025 01:13 AM   Protocol Details Medication is active on med list and the sig matches. RN to manually verify dose and sig if red X/fail.        Kimberly Boecker, RN

## 2025-03-05 ASSESSMENT — ASTHMA QUESTIONNAIRES
ACT_TOTALSCORE: 25
QUESTION_5 LAST FOUR WEEKS HOW WOULD YOU RATE YOUR ASTHMA CONTROL: COMPLETELY CONTROLLED
QUESTION_4 LAST FOUR WEEKS HOW OFTEN HAVE YOU USED YOUR RESCUE INHALER OR NEBULIZER MEDICATION (SUCH AS ALBUTEROL): NOT AT ALL
QUESTION_3 LAST FOUR WEEKS HOW OFTEN DID YOUR ASTHMA SYMPTOMS (WHEEZING, COUGHING, SHORTNESS OF BREATH, CHEST TIGHTNESS OR PAIN) WAKE YOU UP AT NIGHT OR EARLIER THAN USUAL IN THE MORNING: NOT AT ALL
QUESTION_2 LAST FOUR WEEKS HOW OFTEN HAVE YOU HAD SHORTNESS OF BREATH: NOT AT ALL
ACT_TOTALSCORE: 25
QUESTION_1 LAST FOUR WEEKS HOW MUCH OF THE TIME DID YOUR ASTHMA KEEP YOU FROM GETTING AS MUCH DONE AT WORK, SCHOOL OR AT HOME: NONE OF THE TIME

## 2025-03-10 ENCOUNTER — OFFICE VISIT (OUTPATIENT)
Dept: FAMILY MEDICINE | Facility: OTHER | Age: 75
End: 2025-03-10
Attending: PHYSICIAN ASSISTANT
Payer: COMMERCIAL

## 2025-03-10 VITALS
WEIGHT: 200.2 LBS | RESPIRATION RATE: 17 BRPM | TEMPERATURE: 97.2 F | DIASTOLIC BLOOD PRESSURE: 68 MMHG | OXYGEN SATURATION: 94 % | BODY MASS INDEX: 33.36 KG/M2 | SYSTOLIC BLOOD PRESSURE: 118 MMHG | HEIGHT: 65 IN | HEART RATE: 71 BPM

## 2025-03-10 DIAGNOSIS — Z79.899 HIGH RISK MEDICATION USE: ICD-10-CM

## 2025-03-10 DIAGNOSIS — G62.9 PERIPHERAL POLYNEUROPATHY: ICD-10-CM

## 2025-03-10 DIAGNOSIS — Z12.31 VISIT FOR SCREENING MAMMOGRAM: ICD-10-CM

## 2025-03-10 DIAGNOSIS — F33.1 MODERATE EPISODE OF RECURRENT MAJOR DEPRESSIVE DISORDER (H): ICD-10-CM

## 2025-03-10 DIAGNOSIS — L71.0 PERIORAL DERMATITIS: ICD-10-CM

## 2025-03-10 DIAGNOSIS — M06.09 RHEUMATOID ARTHRITIS OF MULTIPLE SITES WITHOUT RHEUMATOID FACTOR (H): ICD-10-CM

## 2025-03-10 DIAGNOSIS — N18.31 STAGE 3A CHRONIC KIDNEY DISEASE (H): ICD-10-CM

## 2025-03-10 DIAGNOSIS — M06.09 RHEUMATOID ARTHRITIS WITHOUT RHEUMATOID FACTOR, MULTIPLE SITES (H): ICD-10-CM

## 2025-03-10 DIAGNOSIS — K50.10 CROHN'S DISEASE OF LARGE INTESTINE WITHOUT COMPLICATION (H): ICD-10-CM

## 2025-03-10 DIAGNOSIS — M54.41 ACUTE BILATERAL LOW BACK PAIN WITH RIGHT-SIDED SCIATICA: ICD-10-CM

## 2025-03-10 DIAGNOSIS — N28.9 RENAL INSUFFICIENCY: Primary | ICD-10-CM

## 2025-03-10 DIAGNOSIS — R20.0 RIGHT LEG NUMBNESS: ICD-10-CM

## 2025-03-10 LAB
ALBUMIN SERPL BCG-MCNC: 3.9 G/DL (ref 3.5–5.2)
ALBUMIN UR-MCNC: NEGATIVE MG/DL
ALP SERPL-CCNC: 61 U/L (ref 40–150)
ALT SERPL W P-5'-P-CCNC: 23 U/L (ref 0–50)
ANION GAP SERPL CALCULATED.3IONS-SCNC: 7 MMOL/L (ref 7–15)
APPEARANCE UR: CLEAR
AST SERPL W P-5'-P-CCNC: 27 U/L (ref 0–45)
BASOPHILS # BLD AUTO: 0 10E3/UL (ref 0–0.2)
BASOPHILS NFR BLD AUTO: 1 %
BILIRUB SERPL-MCNC: 0.4 MG/DL
BILIRUB UR QL STRIP: NEGATIVE
BUN SERPL-MCNC: 8.5 MG/DL (ref 8–23)
CALCIUM SERPL-MCNC: 9 MG/DL (ref 8.8–10.4)
CHLORIDE SERPL-SCNC: 104 MMOL/L (ref 98–107)
COLOR UR AUTO: YELLOW
CREAT SERPL-MCNC: 0.92 MG/DL (ref 0.51–0.95)
CRP SERPL-MCNC: 4.31 MG/L
EGFRCR SERPLBLD CKD-EPI 2021: 65 ML/MIN/1.73M2
EOSINOPHIL # BLD AUTO: 0.2 10E3/UL (ref 0–0.7)
EOSINOPHIL NFR BLD AUTO: 4 %
ERYTHROCYTE [DISTWIDTH] IN BLOOD BY AUTOMATED COUNT: 14.2 % (ref 10–15)
ERYTHROCYTE [SEDIMENTATION RATE] IN BLOOD BY WESTERGREN METHOD: 15 MM/HR (ref 0–30)
GLUCOSE SERPL-MCNC: 92 MG/DL (ref 70–99)
GLUCOSE UR STRIP-MCNC: NEGATIVE MG/DL
HCO3 SERPL-SCNC: 28 MMOL/L (ref 22–29)
HCT VFR BLD AUTO: 38.1 % (ref 35–47)
HGB BLD-MCNC: 12.4 G/DL (ref 11.7–15.7)
HGB UR QL STRIP: NEGATIVE
IMM GRANULOCYTES # BLD: 0 10E3/UL
IMM GRANULOCYTES NFR BLD: 0 %
KETONES UR STRIP-MCNC: NEGATIVE MG/DL
LEUKOCYTE ESTERASE UR QL STRIP: NEGATIVE
LYMPHOCYTES # BLD AUTO: 1.8 10E3/UL (ref 0.8–5.3)
LYMPHOCYTES NFR BLD AUTO: 34 %
MCH RBC QN AUTO: 28.6 PG (ref 26.5–33)
MCHC RBC AUTO-ENTMCNC: 32.5 G/DL (ref 31.5–36.5)
MCV RBC AUTO: 88 FL (ref 78–100)
MONOCYTES # BLD AUTO: 0.5 10E3/UL (ref 0–1.3)
MONOCYTES NFR BLD AUTO: 9 %
NEUTROPHILS # BLD AUTO: 2.7 10E3/UL (ref 1.6–8.3)
NEUTROPHILS NFR BLD AUTO: 52 %
NITRATE UR QL: NEGATIVE
NRBC # BLD AUTO: 0 10E3/UL
NRBC BLD AUTO-RTO: 0 /100
PH UR STRIP: 7 [PH] (ref 4.7–8)
PLATELET # BLD AUTO: 220 10E3/UL (ref 150–450)
POTASSIUM SERPL-SCNC: 4.1 MMOL/L (ref 3.4–5.3)
PROT SERPL-MCNC: 6.7 G/DL (ref 6.4–8.3)
RBC # BLD AUTO: 4.33 10E6/UL (ref 3.8–5.2)
RBC URINE: 1 /HPF
SODIUM SERPL-SCNC: 139 MMOL/L (ref 135–145)
SP GR UR STRIP: 1.01 (ref 1–1.03)
SQUAMOUS EPITHELIAL: 0 /HPF
UROBILINOGEN UR STRIP-MCNC: NORMAL MG/DL
WBC # BLD AUTO: 5.2 10E3/UL (ref 4–11)
WBC URINE: 0 /HPF

## 2025-03-10 PROCEDURE — 1125F AMNT PAIN NOTED PAIN PRSNT: CPT | Performed by: PHYSICIAN ASSISTANT

## 2025-03-10 PROCEDURE — 85004 AUTOMATED DIFF WBC COUNT: CPT | Mod: ZL | Performed by: PHYSICIAN ASSISTANT

## 2025-03-10 PROCEDURE — 99214 OFFICE O/P EST MOD 30 MIN: CPT | Performed by: PHYSICIAN ASSISTANT

## 2025-03-10 PROCEDURE — 85014 HEMATOCRIT: CPT | Mod: ZL | Performed by: PHYSICIAN ASSISTANT

## 2025-03-10 PROCEDURE — 86140 C-REACTIVE PROTEIN: CPT | Mod: ZL | Performed by: PHYSICIAN ASSISTANT

## 2025-03-10 PROCEDURE — 81003 URINALYSIS AUTO W/O SCOPE: CPT | Mod: ZL | Performed by: PHYSICIAN ASSISTANT

## 2025-03-10 PROCEDURE — 36415 COLL VENOUS BLD VENIPUNCTURE: CPT | Mod: ZL | Performed by: PHYSICIAN ASSISTANT

## 2025-03-10 PROCEDURE — 3074F SYST BP LT 130 MM HG: CPT | Performed by: PHYSICIAN ASSISTANT

## 2025-03-10 PROCEDURE — 85652 RBC SED RATE AUTOMATED: CPT | Mod: ZL | Performed by: PHYSICIAN ASSISTANT

## 2025-03-10 PROCEDURE — G0463 HOSPITAL OUTPT CLINIC VISIT: HCPCS | Mod: 25

## 2025-03-10 PROCEDURE — 82040 ASSAY OF SERUM ALBUMIN: CPT | Mod: ZL | Performed by: PHYSICIAN ASSISTANT

## 2025-03-10 PROCEDURE — 3078F DIAST BP <80 MM HG: CPT | Performed by: PHYSICIAN ASSISTANT

## 2025-03-10 PROCEDURE — 84155 ASSAY OF PROTEIN SERUM: CPT | Mod: ZL | Performed by: PHYSICIAN ASSISTANT

## 2025-03-10 PROCEDURE — G0463 HOSPITAL OUTPT CLINIC VISIT: HCPCS

## 2025-03-10 RX ORDER — GABAPENTIN 300 MG/1
CAPSULE ORAL
Qty: 210 CAPSULE | Refills: 2 | Status: SHIPPED | OUTPATIENT
Start: 2025-03-10

## 2025-03-10 RX ORDER — MUPIROCIN 20 MG/G
OINTMENT TOPICAL 3 TIMES DAILY
Qty: 30 G | Refills: 1 | Status: SHIPPED | OUTPATIENT
Start: 2025-03-10

## 2025-03-10 RX ORDER — CYCLOBENZAPRINE HCL 10 MG
10 TABLET ORAL 3 TIMES DAILY PRN
Qty: 30 TABLET | Refills: 1 | Status: SHIPPED | OUTPATIENT
Start: 2025-03-10

## 2025-03-10 RX ORDER — HYDROCODONE BITARTRATE AND ACETAMINOPHEN 7.5; 325 MG/1; MG/1
1 TABLET ORAL EVERY 6 HOURS PRN
Qty: 20 TABLET | Refills: 0 | Status: SHIPPED | OUTPATIENT
Start: 2025-03-10

## 2025-03-10 ASSESSMENT — PAIN SCALES - GENERAL: PAINLEVEL_OUTOF10: MILD PAIN (2)

## 2025-03-10 ASSESSMENT — ANXIETY QUESTIONNAIRES
GAD7 TOTAL SCORE: 3
GAD7 TOTAL SCORE: 3
2. NOT BEING ABLE TO STOP OR CONTROL WORRYING: NOT AT ALL
6. BECOMING EASILY ANNOYED OR IRRITABLE: SEVERAL DAYS
7. FEELING AFRAID AS IF SOMETHING AWFUL MIGHT HAPPEN: SEVERAL DAYS
3. WORRYING TOO MUCH ABOUT DIFFERENT THINGS: SEVERAL DAYS
5. BEING SO RESTLESS THAT IT IS HARD TO SIT STILL: NOT AT ALL
1. FEELING NERVOUS, ANXIOUS, OR ON EDGE: NOT AT ALL
IF YOU CHECKED OFF ANY PROBLEMS ON THIS QUESTIONNAIRE, HOW DIFFICULT HAVE THESE PROBLEMS MADE IT FOR YOU TO DO YOUR WORK, TAKE CARE OF THINGS AT HOME, OR GET ALONG WITH OTHER PEOPLE: SOMEWHAT DIFFICULT
4. TROUBLE RELAXING: NOT AT ALL

## 2025-03-10 ASSESSMENT — PATIENT HEALTH QUESTIONNAIRE - PHQ9: SUM OF ALL RESPONSES TO PHQ QUESTIONS 1-9: 8

## 2025-03-10 NOTE — LETTER
Opioid / Opioid Plus Controlled Substance Agreement    This is an agreement between you and your provider about the safe and appropriate use of controlled substance/opioids prescribed by your care team. Controlled substances are medicines that can cause physical and mental dependence (abuse).    There are strict laws about having and using these medicines. We here at [facility] are committing to working with you in your efforts to get better. To support you in this work, we ll help you schedule regular office appointments for medicine refills. If we must cancel or change your appointment for any reason, we ll make sure you have enough medicine to last until your next appointment.     As a Provider, I will:  Listen carefully to your concerns and treat you with respect.   Recommend a treatment plan that I believe is in your best interest. This plan may involve therapies other than opioid pain medication.   Talk with you often about the possible benefits, and the risk of harm of any medicine that we prescribe for you.   Provide a plan on how to taper (discontinue or go off) using this medicine if the decision is made to stop its use.    As a Patient, I understand that opioid(s):   Are a controlled substance prescribed by my care team to help me function or work and manage my condition(s).   Are strong medicines and can cause serious side effects such as:  Drowsiness, which can seriously affect my driving ability  A lower breathing rate, enough to cause death  Harm to my thinking ability   Depression   Abuse of and addiction to this medicine  Need to be taken exactly as prescribed. Combining opioids with certain medicines or chemicals (such as illegal drugs, sedatives, sleeping pills, and benzodiazepines) can be dangerous or even fatal. If I stop opioids suddenly, I may have severe withdrawal symptoms.  Do not work for all types of pain nor for all patients. If they re not helpful, I may be asked to stop  them.        The risks, benefits and side effects of these medicine(s) were explained to me. I agree that:  I will take part in other treatments as advised by my care team. This may be psychiatry or counseling, physical therapy, behavioral therapy, group treatment or a referral to a specialist.     I will keep all my appointments. I understand that this is part of the monitoring of opioids. My care team may require an office visit for EVERY opioid/controlled substance refill. If I miss appointments or don t follow instructions, my care team may stop my medicine.    I will take my medicines as prescribed. I will not change the dose or schedule unless my care team tells me to. There will be no refills if I run out early.     I may be asked to come to the clinic and complete a urine drug test or complete a pill count at any time. If I don t give a urine sample or participate in a pill count, the care team may stop my medicine.    I will only receive prescriptions from this clinic for chronic pain. If I am treated by another provider for acute pain issues, I will tell them that I am taking opioid pain medication for chronic pain and that I have a treatment agreement with this provider. I will inform my [facility] care team within one business day if I am given a prescription for any pain medication by another healthcare provider. My [facility] care team can contact other providers and pharmacists about my use of any medicines.    It is up to me to make sure that I don t run out of my medicines on weekends or holidays. If my care team is willing to refill my opioid prescription without a visit, I must request refills only during office hours. Refills may take up to 3 business days to process. I will use one pharmacy to fill all my opioid and other controlled substance prescriptions. I will notify the clinic about any changes to my insurance or medication availability.    I am responsible for my  prescriptions. If the medicine/prescription is lost, stolen or destroyed, it will not be replaced. I also agree not to share controlled substance medicines with anyone.    I am aware I should not use any illegal or recreational drugs. I agree not to drink alcohol unless my care team says I can.       If I enroll in the [place] [facility] program, I will tell my care team prior to my next refill.     I will tell my care team right away if I become pregnant, have a new medical problem treated outside of my regular clinic, or have a change in my medications.    I understand that this medicine can affect my thinking, judgment and reaction time. Alcohol and drugs affect the brain and body, which can affect the safety of my driving. Being under the influence of alcohol or drugs can affect my decision-making, behaviors, personal safety, and the safety of others. Driving while impaired (DWI) can occur if a person is driving, operating, or in physical control of a car, motorcycle, boat, snowmobile, ATV, motorbike, off-road vehicle, or any other motor vehicle ([place] Statute 169A.20). I understand the risk if I choose to drive or operate any vehicle or machinery.    I understand that if I do not follow any of the conditions above, my prescriptions or treatment may be stopped or changed.          Opioids  What You Need to Know    What are opioids?   Opioids are pain medicines that must be prescribed by a doctor. They are also known as narcotics.     Examples are:   morphine (MS Contin, Amy)  oxycodone (Oxycontin)  oxycodone and acetaminophen (Percocet)  hydrocodone and acetaminophen (Vicodin, Norco)   fentanyl patch (Duragesic)   hydromorphone (Dilaudid)   methadone  codeine (Tylenol #3)     What do opioids do well?   Opioids are best for severe short-term pain such as after a surgery or injury. They may work well for cancer pain. They may help some people with long-lasting (chronic) pain.     What do opioids NOT do  well?   Opioids never get rid of pain entirely, and they don t work well for most patients with chronic pain. Opioids don t reduce swelling, one of the causes of pain.                                    Other ways to manage chronic pain and improve function include:     Treat the health problem that may be causing pain  Anti-inflammation medicines, which reduce swelling and tenderness, such as ibuprofen (Advil, Motrin) or naproxen (Aleve)  Acetaminophen (Tylenol)  Antidepressants and anti-seizure medicines, especially for nerve pain  Topical treatments such as patches or creams  Injections or nerve blocks  Chiropractic or osteopathic treatment  Acupuncture, massage, deep breathing, meditation, visual imagery, aromatherapy  Use heat or ice at the pain site  Physical therapy   Exercise  Stop smoking  Take part in therapy       Risks and side effects     Talk to your doctor before you start or decide to keep taking opioids. Possible side effects include:    Lowering your breathing rate enough to cause death  Overdose, including death, especially if taking higher than prescribed doses  Worse depression symptoms; less pleasure in things you usually enjoy  Feeling tired or sluggish  Slower thoughts or cloudy thinking  Being more sensitive to pain over time; pain is harder to control  Trouble sleeping or restless sleep  Changes in hormone levels (for example, less testosterone)  Changes in sex drive or ability to have sex  Constipation  Unsafe driving  Itching and sweating  Dizziness  Nausea, throwing up and dry mouth    What else should I know about opioids?    Opioids may lead to dependence, tolerance, or addiction.    Dependence means that if you stop or reduce the medicine too quickly, you will have withdrawal symptoms. These include loose poop (diarrhea), jitters, flu-like symptoms, nervousness and tremors. Dependence is not the same as addiction.                     Tolerance means needing higher doses over time to  get the same effect. This may increase the chance of serious side effects.    Addiction is when people improperly use a substance that harms their body, their mind or their relations with others. Use of opiates can cause a relapse of addiction if you have a history of drug or alcohol abuse.    People who have used opioids for a long time may have a lower quality of life, worse depression, higher levels of pain and more visits to doctors.    You can overdose on opioids. Take these steps to lower your risk of overdose:    Recognize the signs:  Signs of overdose include decrease or loss of consciousness (blackout), slowed breathing, trouble waking up and blue lips. If someone is worried about overdose, they should call 911.    Talk to your doctor about Narcan (naloxone).   If you are at risk for overdose, you may be given a prescription for Narcan. This medicine very quickly reverses the effects of opioids.   If you overdose, a friend or family member can give you Narcan while waiting for the ambulance. They need to know the signs of overdose and how to give Narcan.     Don't use alcohol or street drugs.   Taking them with opioids can cause death.    Do not take any of these medicines unless your doctor says it s OK. Taking these with opioids can cause death:  Benzodiazepines, such as lorazepam (Ativan), alprazolam (Xanax) or diazepam (Valium)  Muscle relaxers, such as cyclobenzaprine (Flexeril)  Sleeping pills like zolpidem (Ambien)   Other opioids      How to keep you and other people safe while taking opioids:    Never share your opioids with others.  Opioid medicines are regulated by the Drug Enforcement Agency (LUIS). Selling or sharing medications is a criminal act.    2. Be sure to store opioids in a secure place, locked up if possible. Young children can easily swallow them and overdose.    3. When you are traveling with your medicines, keep them in the original bottles. If you use a pill box, be sure you also  carry a copy of your medicine list from your clinic or pharmacy.    4. Safe disposal of opioids    Most pharmacies have places to get rid of medicine, called disposal kiosks. Medicine disposal options are also available in every [facility]. Search your county and  medication disposal  to find more options. You can find more details at:  [email or web address]://[email or web address]./living-green/managing-unwanted-medications     I agree that my provider, clinic care team, and pharmacy may work with any city, state or federal law enforcement agency that investigates the misuse, sale, or other diversion of my controlled medicine. I will allow my provider to discuss my care with, or share a copy of, this agreement with any other treating provider, pharmacy or emergency room where I receive care.    I have read this agreement and have asked questions about anything I did not understand.    _______________________________________________________  Patient Signature - Melvi Cleveland _____________________                   Date     _______________________________________________________  Provider Signature - GIOVANNY Valentino   _____________________                   Date     _______________________________________________________  Witness Signature (required if provider not present while patient signing)   _____________________                   Date

## 2025-03-10 NOTE — PROGRESS NOTES
Assessment & Plan     Peripheral polyneuropathy  ***  - gabapentin (NEURONTIN) 300 MG capsule; TAKE 3 CAPS BY MOUTH IN THEMORNING AND TAKE 2 CAPS IN THE AFTERNOON AND AT BEDTIME  - EMG; Future    Acute bilateral low back pain with right-sided sciatica  ***  - HYDROcodone-acetaminophen (NORCO) 7.5-325 MG per tablet; Take 1 tablet by mouth every 6 hours as needed for severe pain.  - cyclobenzaprine (FLEXERIL) 10 MG tablet; Take 1 tablet (10 mg) by mouth 3 times daily as needed.    Rheumatoid arthritis without rheumatoid factor, multiple sites (H)  ***    Crohn's disease of large intestine without complication (H)  ***    Moderate episode of recurrent major depressive disorder (H)  She is doing well. Mood is stable. Has pain all the time. But seems to tolerate it well.     Stage 3a chronic kidney disease (H)  Rechecking.  We are going to be using Dr. Burk's labs that doesn't really     Renal insufficiency  ***  - UA reflex to Microscopic; Future    Perioral dermatitis  ***  - mupirocin (BACTROBAN) 2 % external ointment; Apply topically 3 times daily.    Visit for screening mammogram  ***  - MA Screen Bilateral w/Prince; Future    Right leg numbness  ***  - EMG; Future            {FOLLOW UP PLANS (Optional) Includes COVID19 Treatment Plan:660671}    No follow-ups on file.    Lizeth Balderas is a 75 year old, presenting for the following health issues:  Musculoskeletal Problem (LEG PAIN FOLLOW UP )        3/10/2025     8:01 AM   Additional Questions   Roomed by Ashley Rivas   Accompanied by self         3/10/2025     8:01 AM   Patient Reported Additional Medications   Patient reports taking the following new medications none     History of Present Illness       Reason for visit:  Right knee & calf,  rash    She eats 0-1 servings of fruits and vegetables daily.She consumes 0 sweetened beverage(s) daily.She exercises with enough effort to increase her heart rate 20 to 29 minutes per day.  She exercises with enough  "effort to increase her heart rate 3 or less days per week.   She is taking medications regularly.        Rash  Onset/Duration: couple months   Description  Location: right side of chin   Character: blotchy, raised, red, sometimes itches  Itching: mild  Intensity:  moderate  Progression of Symptoms:  gets larger and smaller   Accompanying signs and symptoms:   Fever: No  Body aches or joint pain: YES- OA and RA \"normal pains   Sore throat symptoms: No  Recent cold symptoms: No  History:           Previous episodes of similar rash: comes and goes, sometimes clears completely up and comes back   New exposures:  None  Recent travel: No  Exposure to similar rash: No  Precipitating or alleviating factors: none  Therapies tried and outcome: triamcinolone cream helps    Pain History:  When did you first notice your pain? About 1 year   Have you seen this provider for your pain in the past? Yes   Where in your body do you have pain? Right lower leg   Are you seeing anyone else for your pain? No        6/25/2024     1:25 PM 7/8/2024     2:21 PM 3/10/2025     8:06 AM   PHQ-9 SCORE   PHQ-9 Total Score MyChart 2 (Minimal depression) 2 (Minimal depression)    PHQ-9 Total Score 2 2 8           1/4/2024     1:50 PM 6/26/2024     8:32 AM 3/10/2025     8:08 AM   HEATH-7 SCORE   Total Score 0 (minimal anxiety) 2 (minimal anxiety)    Total Score 0 2 3           7/8/2024     3:02 PM 10/30/2024     1:31 PM 3/10/2025     8:09 AM   PEG Score   PEG Total Score 4.67 4.33 5       {After completing assessments, pull in flowsheet scores (Optional) :992718}    Chronic Pain Follow Up:    Location of pain: Right lower leg  Analgesia/pain control:    - Recent changes:  no    - Overall control: Tolerable with discomfort    - Current treatments: heat on back and ice on leg, NORCO infrequently, tylenol     Adherence:     - Do you ever take more pain medicine than prescribed? No    - When did you take your last dose of pain medicine?  Early last week  " "  Adverse effects: No   PDMP Review         Value Time User    State PDMP site checked  Yes 7/8/2024  4:00 PM Tania Martinez PA          Last CSA Agreement:   CSA -- Patient Level:     [Media Unavailable] Controlled Substance Agreement - Opioid - Scan on 9/1/2023  2:15 PM: OPIOID/OPIOID PLUS CONTROLLED SUBSTANCE AGREEMENT   [Media Unavailable] Controlled Substance Agreement - Opioid - Scan on 2/7/2022 11:31 AM: opiod/opiod plus controlled substance agreement       Last UDS: 4/17/2022  Patient declined to complete Opioid Risk Tool today    {Provider  Link to [facility] Assessment  Risk Index for Overdose or Serious Opioid Induced Respiratory Depression. Assess for risk respiratory depression and Narcan order. Completed by Provider prior to initial Opioid Rx; Assess Annually :[id number]}      {Provider  Link to Pain Management SmartSet  Includes non-opioid pharmacological medications and referrals  :[id number]}      Review of Systems  Constitutional, neuro, ENT, endocrine, pulmonary, cardiac, gastrointestinal, genitourinary, musculoskeletal, integument and psychiatric systems are negative, except as otherwise noted.      Objective    /68 (BP Location: Left arm, Patient Position: Sitting, Cuff Size: Adult Regular)   Pulse 71   Temp 97.2  F (36.2  C) (Tympanic)   Resp 17   Ht 1.66 m (5' 5.35\")   Wt 90.8 kg (200 lb 3.2 oz)   SpO2 94%   BMI 32.96 kg/m    Body mass index is 32.96 kg/m .  Physical Exam   GENERAL: alert and no distress  EYES: Eyes grossly normal to inspection, PERRL and conjunctivae and sclerae normal  HENT: ear canals and TM's normal, nose and mouth without ulcers or lesions  NECK: no adenopathy, no asymmetry, masses, or scars  RESP: lungs clear to auscultation - no rales, rhonchi or wheezes  CV: regular rate and rhythm, normal S1 S2, no S3 or S4, no murmur, click or rub, no peripheral edema  ABDOMEN: soft, nontender, no hepatosplenomegaly, no masses and bowel sounds normal  MS: no gross " musculoskeletal defects noted, no edema  SKIN: no suspicious lesions or rashes  NEURO: Normal strength and tone, mentation intact and speech normal  PSYCH: mentation appears normal, affect normal/bright  LYMPH: no cervical, supraclavicular, axillary, or inguinal adenopathy    No results found for any visits on 03/10/25.        Signed Electronically by: GIOVANNY Valentino  {Email feedback regarding this note to [email or web address]   :[id number]}

## 2025-03-13 DIAGNOSIS — M06.09 RHEUMATOID ARTHRITIS OF MULTIPLE SITES WITHOUT RHEUMATOID FACTOR (H): Primary | ICD-10-CM

## 2025-03-13 DIAGNOSIS — Z79.899 HIGH RISK MEDICATION USE: ICD-10-CM

## 2025-03-14 ENCOUNTER — TRANSFERRED RECORDS (OUTPATIENT)
Dept: HEALTH INFORMATION MANAGEMENT | Facility: CLINIC | Age: 75
End: 2025-03-14

## 2025-04-24 ENCOUNTER — TELEPHONE (OUTPATIENT)
Dept: FAMILY MEDICINE | Facility: OTHER | Age: 75
End: 2025-04-24

## 2025-04-24 DIAGNOSIS — R20.0 RIGHT LEG NUMBNESS: Primary | ICD-10-CM

## 2025-05-08 DIAGNOSIS — J30.2 SEASONAL ALLERGIC RHINITIS, UNSPECIFIED TRIGGER: ICD-10-CM

## 2025-05-08 RX ORDER — FEXOFENADINE HYDROCHLORIDE AND PSEUDOEPHEDRINE HYDROCHLORIDE 60; 120 MG/1; MG/1
1 TABLET, FILM COATED, EXTENDED RELEASE ORAL 2 TIMES DAILY
Qty: 60 TABLET | Refills: 0 | Status: SHIPPED | OUTPATIENT
Start: 2025-05-08

## 2025-05-08 NOTE — TELEPHONE ENCOUNTER
Patient schedule to establish with [place] on 5/19/25   Secondary Intention Text (Leave Blank If You Do Not Want): The defect will heal with secondary intention.

## 2025-05-08 NOTE — TELEPHONE ENCOUNTER
Allegra-D Allergy & Congestion 60 - 120 mg 12 hr tablet  Take 1 tablet by mouth 2 times daily    Last Written Prescription Date:  2-19-25  Last Fill Quantity: 60 tablet,   # refills: 0  Last Office Visit: 3-10-25  Future Office visit:    Next 5 appointments (look out 90 days)      May 19, 2025 11:30 AM  (Arrive by 11:15 AM)  Provider Visit with KENDRA Waldron CNP  [facility] [place] ([facility] [place] ) [address]  [place] [address]  [phone]

## 2025-05-16 ENCOUNTER — ANCILLARY PROCEDURE (OUTPATIENT)
Dept: MAMMOGRAPHY | Facility: OTHER | Age: 75
End: 2025-05-16
Attending: PHYSICIAN ASSISTANT
Payer: MEDICARE

## 2025-05-16 DIAGNOSIS — Z12.31 VISIT FOR SCREENING MAMMOGRAM: ICD-10-CM

## 2025-05-16 PROCEDURE — 77063 BREAST TOMOSYNTHESIS BI: CPT | Mod: 26 | Performed by: RADIOLOGY

## 2025-05-16 PROCEDURE — 77063 BREAST TOMOSYNTHESIS BI: CPT | Mod: TC

## 2025-05-16 PROCEDURE — 77067 SCR MAMMO BI INCL CAD: CPT | Mod: 26 | Performed by: RADIOLOGY

## 2025-05-16 NOTE — PROGRESS NOTES
"  Assessment & Plan     Encounter to establish care  Ok to establish care     Peripheral polyneuropathy  Continues to use Gabapentin for neuropathy with some improvement     Bilateral low back pain with bilateral sciatica, unspecified chronicity  Rare use of norco, keeps some on hand for flair ups ok to continue  Reviewed PDMP - very rare use     Rheumatoid arthritis without rheumatoid factor, multiple sites (H)  Continues to follow up with Rheumatology at [facility]  Does feel like symptoms are currently well controlled with methotrexate and plaquenil     Crohn's disease of large intestine without complication (H)  Has not had a flair up of crohn's for a long time  Denies any current symptoms     Moderate episode of recurrent major depressive disorder (H)  Well controlled with Cymbalta  Denies any side effects   Continue current treatment     Stage 3a chronic kidney disease (H)  Currently treated with ACEi  Offered SGLT2 - does not want to take any additional medications at this time   Will continue to monitor   Last creatinine in normal range and GFR 65    Perioral dermatitis  Intermittent flair ups of dermatitis - current treatment with bactroban, does help for awhile     Spinal stenosis of lumbar region without neurogenic claudication  Lumbar radiculopathy  Reviewed note from PM&R - requested for referral to neurosurgery and have MRI pushed to [facility] for evaluation   - Adult Neurosurgery  Referral    Lipid screening  Will have cholesterol checked at next lab draw  - Lipid Profile (Chol, Trig, HDL, LDL calc); Future    Primary insomnia  Takes 150 mg of trazodone at night to sleep  Continues to work well       BMI  Estimated body mass index is 33.42 kg/m  as calculated from the following:    Height as of 3/10/25: 1.66 m (5' 5.35\").    Weight as of this encounter: 92.1 kg (203 lb).   Weight management plan: Discussed healthy diet and exercise guidelines    Ordering of each unique test  I spent a total of " 54 minutes on the day of the visit.   Time spent by me today doing chart review, history and exam, documentation and further activities per the note    Follow-up   Return in about 6 months (around 11/19/2025) for chronic disease management.        Lizeth Balderas is a 75 year old, presenting for the following health issues:  Hypertension and chronic pain        5/19/2025    11:29 AM   Additional Questions   Roomed by RUSLAN Cooney CMA   Accompanied by Self         5/19/2025    11:29 AM   Patient Reported Additional Medications   Patient reports taking the following new medications None     History of Present Illness       Back Pain:  She presents for follow up of back pain. Patient's back pain is a chronic problem.  Location of back pain:  Right lower back, left lower back, right middle of back, left middle of back, right upper back, left upper back, left side of neck, right shoulder, right buttock, left buttock, right hip and left hip  Description of back pain: sharp and shooting  Back pain spreads: right buttocks, left buttocks, right thigh, left thigh, right knee, left knee, right foot, right shoulder and left side of neck    Since patient first noticed back pain, pain is: always present, but gets better and worse  Does back pain interfere with her job:  Not applicable       Reason for visit:  Right leg and knee pain    She eats 0-1 servings of fruits and vegetables daily.She consumes 0 sweetened beverage(s) daily.She exercises with enough effort to increase her heart rate 10 to 19 minutes per day.    She is taking medications regularly.      Chronic right leg and knee pain  Has had shots in back and knee  She had EMGs completed   Reviewed notes from PM&R at [facility]  - suggested MRI pushed and referral to neurosurgeon     RA  Currently taking hydroxychloroquine and methotrexate working well together   Continues to follow up with rheumatology   Does feel like treatment is working well      Does follow up with  Gi Dr Jiménez   Does not need any further colonoscopy  Does have EGD for dilation for food getting stuck  Last colonoscopy and EGD - 3/14/25   History of Crones disease - rare flair up     Hypertension Follow-up  Lisinopril for chronic kidney disease   Do you check your blood pressure regularly outside of the clinic? No   Are you following a low salt diet? No  Are your blood pressures ever more than 140 on the top number (systolic) OR more   than 90 on the bottom number (diastolic), for example 140/90? N/A    BP Readings from Last 2 Encounters:   05/19/25 120/60   03/10/25 118/68       Pain History:  When did you first notice your pain? chronic   Have you seen this provider for your pain in the past? No   Where in your body do your have pain? Low back right leg  Are you seeing anyone else for your pain? Yes   Rheumatology Dr Burk   What makes your pain better? chiropractor  What makes your pain worse? Sitting too long  How has pain affected your ability to work? Not applicable          6/25/2024     1:25 PM 7/8/2024     2:21 PM 3/10/2025     8:06 AM   PHQ-9 SCORE   PHQ-9 Total Score MyChart 2 (Minimal depression) 2 (Minimal depression)    PHQ-9 Total Score 2 2 8           1/4/2024     1:50 PM 6/26/2024     8:32 AM 3/10/2025     8:08 AM   HEATH-7 SCORE   Total Score 0 (minimal anxiety) 2 (minimal anxiety)    Total Score 0 2 3           Chronic Pain - Initial Assessment:    How would you describe your pain? Back pain   Have you had any recent changes to the severity or character of your pain? Worsening numbness and pain in right knee and leg   Is there an underlying cause that has been identified? Chronic   Has your ability to work or do daily activities changed recently because of your pain? Leg pain does limit her vacuuming can be difficult   Which of these pain treatments have you tried? Chiropractor, Pain Clinic, Physical Therapy, and Steroid Injections  Current pain medication with Gabapentin, flexeril, norco at  times   Rhematology prescribes methotrexate and hydroxychloroquine   Previous medication treatments:        Depression   How are you doing with your depression since your last visit? No change  Are you having other symptoms that might be associated with depression? No  Have you had a significant life event?  OTHER: chronic pain    Are you feeling anxious or having panic attacks?   No  Do you have any concerns with your use of alcohol or other drugs? No  Counseling - no   Reviewed PDMP: Gabapentin regularly 210 tablets per month  Did have a rare use of norco 7.5-325 last filled on 3/10/25 for 20 tablets   Current medication  -Trazodone for insomnia 100-150 mg at night   -cymbalta 30 mg 2 times daily for depression   Social History     Tobacco Use    Smoking status: Former     Current packs/day: 0.00     Types: Cigarettes     Quit date: 10/6/1997     Years since quittin.6     Passive exposure: Past    Smokeless tobacco: Never    Tobacco comments:     no passive exposure   Vaping Use    Vaping status: Never Used   Substance Use Topics    Alcohol use: No     Comment: former. quit     Drug use: No         2024     1:25 PM 2024     2:21 PM 3/10/2025     8:06 AM   PHQ   PHQ-9 Total Score 2 2 8   Q9: Thoughts of better off dead/self-harm past 2 weeks Not at all Not at all Not at all         2024     1:50 PM 2024     8:32 AM 3/10/2025     8:08 AM   HEATH-7 SCORE   Total Score 0 (minimal anxiety) 2 (minimal anxiety)    Total Score 0 2 3         Suicide Assessment Five-step Evaluation and Treatment (SAFE-T)          Review of Systems  CONSTITUTIONAL: NEGATIVE for fever, chills, change in weight  INTEGUMENTARY/SKIN: rash near nose and near mouth  EYES: NEGATIVE for vision changes or irritation  ENT/MOUTH: seasonal allergies   RESP:chronic cough/seasonal allergies   CV: NEGATIVE for chest pain, palpitations or peripheral edema  GI: NEGATIVE for nausea, abdominal pain, heartburn, or change in bowel  habits  : denies dysuria - frequently   MUSCULOSKELETAL: chronic back pain   NEURO: neuropathy legs   ENDOCRINE: NEGATIVE for temperature intolerance, skin/hair changes  PSYCHIATRIC: HX anxiety and HX depression      Objective    /60   Pulse 68   Temp 97.4  F (36.3  C) (Tympanic)   Resp 18   Wt 92.1 kg (203 lb)   SpO2 92%   BMI 33.42 kg/m    Body mass index is 33.42 kg/m .  Physical Exam   GENERAL: alert, no distress, and obese  RESP: lungs clear to auscultation - no rales, rhonchi or wheezes  CV: regular rate and rhythm, normal S1 S2, no S3 or S4, no murmur, click or rub, no peripheral edema  ABDOMEN: soft, nontender, no hepatosplenomegaly, no masses and bowel sounds normal  SKIN: erythema patches base of nose and around mouth   PSYCH: mentation appears normal, affect normal/bright    No results found for any visits on 05/19/25.        Signed Electronically by: KENDRA Browning CNP

## 2025-05-19 ENCOUNTER — OFFICE VISIT (OUTPATIENT)
Dept: FAMILY MEDICINE | Facility: OTHER | Age: 75
End: 2025-05-19
Attending: NURSE PRACTITIONER
Payer: MEDICARE

## 2025-05-19 VITALS
WEIGHT: 203 LBS | OXYGEN SATURATION: 92 % | SYSTOLIC BLOOD PRESSURE: 120 MMHG | RESPIRATION RATE: 18 BRPM | HEART RATE: 68 BPM | DIASTOLIC BLOOD PRESSURE: 60 MMHG | BODY MASS INDEX: 33.42 KG/M2 | TEMPERATURE: 97.4 F

## 2025-05-19 DIAGNOSIS — M54.16 LUMBAR RADICULOPATHY: ICD-10-CM

## 2025-05-19 DIAGNOSIS — M54.42 BILATERAL LOW BACK PAIN WITH BILATERAL SCIATICA, UNSPECIFIED CHRONICITY: ICD-10-CM

## 2025-05-19 DIAGNOSIS — F51.01 PRIMARY INSOMNIA: ICD-10-CM

## 2025-05-19 DIAGNOSIS — Z13.220 LIPID SCREENING: ICD-10-CM

## 2025-05-19 DIAGNOSIS — F33.1 MODERATE EPISODE OF RECURRENT MAJOR DEPRESSIVE DISORDER (H): ICD-10-CM

## 2025-05-19 DIAGNOSIS — M54.41 BILATERAL LOW BACK PAIN WITH BILATERAL SCIATICA, UNSPECIFIED CHRONICITY: ICD-10-CM

## 2025-05-19 DIAGNOSIS — K50.10 CROHN'S DISEASE OF LARGE INTESTINE WITHOUT COMPLICATION (H): ICD-10-CM

## 2025-05-19 DIAGNOSIS — M48.061 SPINAL STENOSIS OF LUMBAR REGION WITHOUT NEUROGENIC CLAUDICATION: ICD-10-CM

## 2025-05-19 DIAGNOSIS — G62.9 PERIPHERAL POLYNEUROPATHY: ICD-10-CM

## 2025-05-19 DIAGNOSIS — N18.31 STAGE 3A CHRONIC KIDNEY DISEASE (H): ICD-10-CM

## 2025-05-19 DIAGNOSIS — M06.09 RHEUMATOID ARTHRITIS WITHOUT RHEUMATOID FACTOR, MULTIPLE SITES (H): ICD-10-CM

## 2025-05-19 DIAGNOSIS — Z76.89 ENCOUNTER TO ESTABLISH CARE: Primary | ICD-10-CM

## 2025-05-19 DIAGNOSIS — L71.0 PERIORAL DERMATITIS: ICD-10-CM

## 2025-05-19 PROCEDURE — G0463 HOSPITAL OUTPT CLINIC VISIT: HCPCS

## 2025-05-19 RX ORDER — MULTIVITAMIN,THER AND MINERALS
1 TABLET ORAL
COMMUNITY

## 2025-05-19 RX ORDER — FLUTICASONE PROPIONATE 50 MCG
1 SPRAY, SUSPENSION (ML) NASAL
COMMUNITY

## 2025-05-19 RX ORDER — FEXOFENADINE HCL 60 MG/1
60 TABLET, FILM COATED ORAL DAILY
COMMUNITY
End: 2025-05-19

## 2025-05-19 ASSESSMENT — PAIN SCALES - GENERAL: PAINLEVEL_OUTOF10: MODERATE PAIN (6)

## 2025-05-19 NOTE — PATIENT INSTRUCTIONS
LIFESTYLE CHANGES  Mediterranean style eating/plant based diet     Increase fiber intake 25 grams daily   Protein goal (weight/2.2)  X  0.8-1.2     Eat a Healthy diet  Eat more vegetables/plants in your diet (1/2 your food should be vegetables)  Eat health fats  Peotone oil  avocados  Eat healthy proteins  Poultry without the skin  Fish  Limit red meat  Nuts - limit to 1/4 cup per day   Increase physical activity  Slowly work up to 30 minutes of physical activity most days of the week  Aerobic activity 150 minute a week  2 days of resistance exercising   Move every 30-60 minutes         Try daily yoga and meditation and relaxation breathing

## 2025-05-26 DIAGNOSIS — G62.9 PERIPHERAL POLYNEUROPATHY: ICD-10-CM

## 2025-05-27 RX ORDER — GABAPENTIN 300 MG/1
CAPSULE ORAL
Qty: 210 CAPSULE | Refills: 2 | Status: SHIPPED | OUTPATIENT
Start: 2025-05-27

## 2025-05-27 NOTE — TELEPHONE ENCOUNTER
gabapentin (NEURONTIN) 300 MG capsule       Last Written Prescription Date:  3/10/25  Last Fill Quantity: 210,   # refills: 2  Last Office Visit: 5/19/25  Future Office visit:       Routing refill request to provider for review/approval because:  Drug not on the [facility], [facility] or [facility] refill protocol or controlled substance

## 2025-06-03 ENCOUNTER — TELEPHONE (OUTPATIENT)
Dept: FAMILY MEDICINE | Facility: OTHER | Age: 75
End: 2025-06-03

## 2025-06-03 DIAGNOSIS — L71.0 PERIORAL DERMATITIS: ICD-10-CM

## 2025-06-03 RX ORDER — MUPIROCIN 20 MG/G
OINTMENT TOPICAL 3 TIMES DAILY
Qty: 30 G | Refills: 1 | Status: SHIPPED | OUTPATIENT
Start: 2025-06-03

## 2025-06-03 NOTE — TELEPHONE ENCOUNTER
2:58 PM    Reason for Call: Phone Call    Description: pt called stated that she was told from ZANE Aldridge that if pt ever needed something for her Staph infection to call talk to the nurse. Pt has it on her face.     Was an appointment offered for this call? No  If yes : Appointment type              Date    Preferred method for responding to this message: Telephone Call  What is your phone number ?  [phone]      If we cannot reach you directly, may we leave a detailed response at the number you provided? Yes    Can this message wait until your PCP/provider returns, if available today? Not applicable    Florencia Ocasio

## 2025-06-03 NOTE — TELEPHONE ENCOUNTER
Recurrent infection to her face  History of treatment with Bactroban   Will send in refill     Susana GARDNER FNP-BC  Family Nurse Practitioner

## 2025-06-08 ENCOUNTER — MYC MEDICAL ADVICE (OUTPATIENT)
Dept: FAMILY MEDICINE | Facility: OTHER | Age: 75
End: 2025-06-08

## 2025-06-09 ASSESSMENT — PATIENT HEALTH QUESTIONNAIRE - PHQ9
10. IF YOU CHECKED OFF ANY PROBLEMS, HOW DIFFICULT HAVE THESE PROBLEMS MADE IT FOR YOU TO DO YOUR WORK, TAKE CARE OF THINGS AT HOME, OR GET ALONG WITH OTHER PEOPLE: NOT DIFFICULT AT ALL
SUM OF ALL RESPONSES TO PHQ QUESTIONS 1-9: 4
SUM OF ALL RESPONSES TO PHQ QUESTIONS 1-9: 4

## 2025-06-09 NOTE — TELEPHONE ENCOUNTER
Attempted to call patient to schedule per provider. No answer. LVM requesting she return call to the clinic.

## 2025-06-09 NOTE — TELEPHONE ENCOUNTER
Patient sent a [employer] message to Susana Aldridge and Susana wrote back to her if Sherrie wanted to be seen today Susana will see her and what time can Susana see her this afternoon Sherrie wants to be seen.     Phone [phone]

## 2025-06-10 ENCOUNTER — OFFICE VISIT (OUTPATIENT)
Dept: FAMILY MEDICINE | Facility: OTHER | Age: 75
End: 2025-06-10
Attending: NURSE PRACTITIONER
Payer: MEDICARE

## 2025-06-10 VITALS
HEIGHT: 65 IN | BODY MASS INDEX: 33.94 KG/M2 | SYSTOLIC BLOOD PRESSURE: 118 MMHG | OXYGEN SATURATION: 91 % | TEMPERATURE: 97.3 F | DIASTOLIC BLOOD PRESSURE: 62 MMHG | WEIGHT: 203.7 LBS | RESPIRATION RATE: 20 BRPM | HEART RATE: 91 BPM

## 2025-06-10 DIAGNOSIS — H57.89 EYE IRRITATION: ICD-10-CM

## 2025-06-10 DIAGNOSIS — L08.9 LOCAL INFECTION OF SKIN AND SUBCUTANEOUS TISSUE: Primary | ICD-10-CM

## 2025-06-10 PROCEDURE — G0463 HOSPITAL OUTPT CLINIC VISIT: HCPCS

## 2025-06-10 RX ORDER — SULFAMETHOXAZOLE AND TRIMETHOPRIM 800; 160 MG/1; MG/1
1 TABLET ORAL 2 TIMES DAILY
Qty: 20 TABLET | Refills: 0 | Status: SHIPPED | OUTPATIENT
Start: 2025-06-10 | End: 2025-06-20

## 2025-06-10 RX ORDER — KETOTIFEN FUMARATE 0.35 MG/ML
1 SOLUTION/ DROPS OPHTHALMIC 2 TIMES DAILY
Qty: 10 ML | Refills: 2 | Status: SHIPPED | OUTPATIENT
Start: 2025-06-10

## 2025-06-10 NOTE — PROGRESS NOTES
Assessment & Plan     Eye irritation  Refilled   With recurrent skin infection near eye irritation to eyes   - ketotifen fumarate 0.035% 0.035 % SOLN ophthalmic solution; Place 1 drop into both eyes 2 times daily.    Local infection of skin and subcutaneous tissue  Topical antibiotic not helping   Plan to start oral antibiotic   - sulfamethoxazole-trimethoprim (BACTRIM DS) 800-160 MG tablet; Take 1 tablet by mouth 2 times daily for 10 days.      Follow-up   Return if symptoms worsen or fail to improve.        Lizeth Balderas is a 75 year old, presenting for the following health issues:  sores on face        5/19/2025    11:29 AM   Additional Questions   Roomed by RUSLAN Cooney CMA   Accompanied by Self     History of Present Illness       Reason for visit:  Staff infection    She eats 0-1 servings of fruits and vegetables daily.She consumes 0 sweetened beverage(s) daily.She exercises with enough effort to increase her heart rate 10 to 19 minutes per day.  She exercises with enough effort to increase her heart rate 3 or less days per week.   She is taking medications regularly.        Concern - Possible staph infection    Onset: comes and goes life long   Description: redness around her nose, by right side of face, and up at the bridge of her nose  Intensity: mild  Progression of Symptoms:  intermittent  Accompanying Signs & Symptoms: gets blister and then they subside   Previous history of similar problem: yes   Precipitating factors:        Worsened by: unknown   Alleviating factors:        Improved by: unknown  Therapies tried and outcome: using Bactroban         Review of Systems  CONSTITUTIONAL: NEGATIVE for fever, chills, change in weight  INTEGUMENTARY/SKIN: erythema and blister areas   RESP: NEGATIVE for significant cough or SOB  CV: NEGATIVE for chest pain, palpitations or peripheral edema      Objective    /62 (BP Location: Right arm, Patient Position: Sitting, Cuff Size: Adult Regular)   Pulse  "91   Temp 97.3  F (36.3  C) (Tympanic)   Resp 20   Ht 1.66 m (5' 5.35\")   Wt 92.4 kg (203 lb 11.2 oz)   SpO2 (!) 91%   BMI 33.54 kg/m    Body mass index is 33.54 kg/m .  Physical Exam   GENERAL: alert and no distress  RESP: lungs clear to auscultation - no rales, rhonchi or wheezes  CV: regular rate and rhythm, normal S1 S2, no S3 or S4, no murmur, click or rub, no peripheral edema  ABDOMEN: soft, nontender, no hepatosplenomegaly, no masses and bowel sounds normal  SKIN: erythema and blisters to face around nose and in between eyes     Reviewed labs in EPIC         Signed Electronically by: KENDRA Browning CNP    "

## 2025-06-18 ENCOUNTER — LAB (OUTPATIENT)
Dept: LAB | Facility: OTHER | Age: 75
End: 2025-06-18
Payer: MEDICARE

## 2025-06-18 DIAGNOSIS — M06.09 RHEUMATOID ARTHRITIS OF MULTIPLE SITES WITHOUT RHEUMATOID FACTOR (H): ICD-10-CM

## 2025-06-18 DIAGNOSIS — Z79.899 HIGH RISK MEDICATION USE: ICD-10-CM

## 2025-06-18 DIAGNOSIS — Z13.220 LIPID SCREENING: ICD-10-CM

## 2025-06-18 LAB
ALBUMIN SERPL BCG-MCNC: 3.8 G/DL (ref 3.5–5.2)
ALP SERPL-CCNC: 62 U/L (ref 40–150)
ALT SERPL W P-5'-P-CCNC: 22 U/L (ref 0–50)
ANION GAP SERPL CALCULATED.3IONS-SCNC: 9 MMOL/L (ref 7–15)
AST SERPL W P-5'-P-CCNC: 26 U/L (ref 0–45)
BASOPHILS # BLD AUTO: 0.1 10E3/UL (ref 0–0.2)
BASOPHILS NFR BLD AUTO: 1 %
BILIRUB SERPL-MCNC: 0.2 MG/DL
BUN SERPL-MCNC: 11.9 MG/DL (ref 8–23)
CALCIUM SERPL-MCNC: 9.1 MG/DL (ref 8.8–10.4)
CHLORIDE SERPL-SCNC: 104 MMOL/L (ref 98–107)
CHOLEST SERPL-MCNC: 212 MG/DL
CREAT SERPL-MCNC: 1.17 MG/DL (ref 0.51–0.95)
CRP SERPL-MCNC: <3 MG/L
EGFRCR SERPLBLD CKD-EPI 2021: 49 ML/MIN/1.73M2
EOSINOPHIL # BLD AUTO: 0.2 10E3/UL (ref 0–0.7)
EOSINOPHIL NFR BLD AUTO: 5 %
ERYTHROCYTE [DISTWIDTH] IN BLOOD BY AUTOMATED COUNT: 15.1 % (ref 10–15)
ERYTHROCYTE [SEDIMENTATION RATE] IN BLOOD BY WESTERGREN METHOD: 12 MM/HR (ref 0–30)
FASTING STATUS PATIENT QL REPORTED: NO
FASTING STATUS PATIENT QL REPORTED: NO
GLUCOSE SERPL-MCNC: 104 MG/DL (ref 70–99)
HCO3 SERPL-SCNC: 24 MMOL/L (ref 22–29)
HCT VFR BLD AUTO: 37.8 % (ref 35–47)
HDLC SERPL-MCNC: 71 MG/DL
HGB BLD-MCNC: 12.1 G/DL (ref 11.7–15.7)
IMM GRANULOCYTES # BLD: 0 10E3/UL
IMM GRANULOCYTES NFR BLD: 0 %
LDLC SERPL CALC-MCNC: 114 MG/DL
LYMPHOCYTES # BLD AUTO: 1.3 10E3/UL (ref 0.8–5.3)
LYMPHOCYTES NFR BLD AUTO: 30 %
MCH RBC QN AUTO: 28.1 PG (ref 26.5–33)
MCHC RBC AUTO-ENTMCNC: 32 G/DL (ref 31.5–36.5)
MCV RBC AUTO: 88 FL (ref 78–100)
MONOCYTES # BLD AUTO: 0.4 10E3/UL (ref 0–1.3)
MONOCYTES NFR BLD AUTO: 9 %
NEUTROPHILS # BLD AUTO: 2.3 10E3/UL (ref 1.6–8.3)
NEUTROPHILS NFR BLD AUTO: 55 %
NONHDLC SERPL-MCNC: 141 MG/DL
NRBC # BLD AUTO: 0 10E3/UL
NRBC BLD AUTO-RTO: 0 /100
PLATELET # BLD AUTO: 206 10E3/UL (ref 150–450)
POTASSIUM SERPL-SCNC: 4.4 MMOL/L (ref 3.4–5.3)
PROT SERPL-MCNC: 6.6 G/DL (ref 6.4–8.3)
RBC # BLD AUTO: 4.31 10E6/UL (ref 3.8–5.2)
SODIUM SERPL-SCNC: 137 MMOL/L (ref 135–145)
TRIGL SERPL-MCNC: 134 MG/DL
WBC # BLD AUTO: 4.2 10E3/UL (ref 4–11)

## 2025-06-18 PROCEDURE — 36415 COLL VENOUS BLD VENIPUNCTURE: CPT | Mod: ZL

## 2025-06-18 PROCEDURE — 85004 AUTOMATED DIFF WBC COUNT: CPT | Mod: ZL

## 2025-06-18 PROCEDURE — 82310 ASSAY OF CALCIUM: CPT | Mod: ZL

## 2025-06-18 PROCEDURE — 80061 LIPID PANEL: CPT | Mod: ZL

## 2025-06-18 PROCEDURE — 3049F LDL-C 100-129 MG/DL: CPT

## 2025-06-18 PROCEDURE — 86140 C-REACTIVE PROTEIN: CPT | Mod: ZL

## 2025-06-18 PROCEDURE — 85652 RBC SED RATE AUTOMATED: CPT | Mod: ZL

## 2025-06-19 ENCOUNTER — RESULTS FOLLOW-UP (OUTPATIENT)
Dept: FAMILY MEDICINE | Facility: OTHER | Age: 75
End: 2025-06-19

## 2025-07-07 ENCOUNTER — TELEPHONE (OUTPATIENT)
Dept: INTERVENTIONAL RADIOLOGY/VASCULAR | Facility: HOSPITAL | Age: 75
End: 2025-07-07

## 2025-07-07 NOTE — TELEPHONE ENCOUNTER
Left a message to remind patient of their abdoul on 7/9. Also reminded patient to not take any steroids, or immunizations. Also let patient know that if they need to be on any antibiotics or prednisone to call us and we will reschedule you.. two weeks before and after this appt. And they need a .

## 2025-07-09 ENCOUNTER — HOSPITAL ENCOUNTER (OUTPATIENT)
Facility: HOSPITAL | Age: 75
Discharge: HOME | End: 2025-07-09
Attending: RADIOLOGY | Admitting: RADIOLOGY
Payer: MEDICARE

## 2025-07-09 ENCOUNTER — HOSPITAL ENCOUNTER (OUTPATIENT)
Dept: INTERVENTIONAL RADIOLOGY/VASCULAR | Facility: HOSPITAL | Age: 75
Discharge: HOME | End: 2025-07-09
Attending: NEUROLOGICAL SURGERY | Admitting: RADIOLOGY
Payer: MEDICARE

## 2025-07-09 DIAGNOSIS — M51.26 LUMBAR DISC HERNIATION: ICD-10-CM

## 2025-07-09 DIAGNOSIS — M54.16 LUMBAR RADICULOPATHY, ACUTE: ICD-10-CM

## 2025-07-09 PROCEDURE — 250N000011 HC RX IP 250 OP 636: Performed by: RADIOLOGY

## 2025-07-09 PROCEDURE — 272N000472 XR LUMBAR TRANSLAMINAR INJ INCL IMAGING

## 2025-07-09 RX ORDER — DEXAMETHASONE SODIUM PHOSPHATE 10 MG/ML
10 INJECTION, SOLUTION INTRAMUSCULAR; INTRAVENOUS ONCE
Status: COMPLETED | OUTPATIENT
Start: 2025-07-09 | End: 2025-07-09

## 2025-07-09 RX ORDER — IOPAMIDOL 612 MG/ML
15 INJECTION, SOLUTION INTRATHECAL ONCE
Status: COMPLETED | OUTPATIENT
Start: 2025-07-09 | End: 2025-07-09

## 2025-07-09 RX ADMIN — IOPAMIDOL 5 ML: 612 INJECTION, SOLUTION INTRATHECAL at 11:58

## 2025-07-09 RX ADMIN — DEXAMETHASONE SODIUM PHOSPHATE 10 MG: 10 INJECTION, SOLUTION INTRAMUSCULAR; INTRAVENOUS at 11:58

## 2025-07-09 NOTE — DISCHARGE INSTRUCTIONS
Cell number on file:    Telephone Information:   Mobile [phone]     Is it ok to leave a message at this number(s)? Yes    Dr Hassan completed your procedure on 7/9/2025.    Current Pain Level (0-10 Scale): low back-2/10     right leg-4/10  Post Pain Level (0-10):  low back 0/10   right leg 4/10    Radiology Discharge instructions for Steroid Injection    Activity Level:     Do not do any heavy activity or exercise for 24 hours.   Do not drive for 4 hours after your injection.  Diet:   Return to your normal diet.  Medications:   If you have stopped taking your Aspirin, Coumadin/Warfarin, Ibuprofen, or any   other blood thinner for this procedure you may resume in the morning unless   your primary care provider has given you other instructions.    Diabetics may see an increase in blood sugar after steroid injections. If you are concerned about your blood sugar, please contact your family doctor.    Site Care:  Remove the bandage and bathe or shower the morning after the procedure.      Please allow two weeks to experience improvement in your pain.  If you have any further issues, please contact your provider.    Call your Primary Care Provider if you have the following (if your primary care provider is not available please seek emergency care):   Nausea with vomiting   Severe headache   Drowsiness or confusion   Redness or drainage at the injection or puncture site   Temperature over 101 degrees F   Other concerns   Worsening back pain   Stiff neck

## 2025-07-22 ENCOUNTER — TELEPHONE (OUTPATIENT)
Dept: INTERVENTIONAL RADIOLOGY/VASCULAR | Facility: HOSPITAL | Age: 75
End: 2025-07-22

## 2025-07-22 NOTE — TELEPHONE ENCOUNTER
INJECTION POST CALL    Procedure: Epidural TL L4-5  Radiologist(s): Dr. Barry Hassan  Date of Procedure:  7/9/25    Relief of pain from this injection    A = 90%  A- = 85%  B = 80%  B- =75%  C = 70%  C- = 65%  D = 60%  D- = 50%  F = less than 50%    Do you feel this injection was beneficial? Yes patient got 50% of relief    If yes, how long did it last? Patient is still feeling this relief     Instruct patient to follow up with their provider for any further care they may need. (as Dr. Jones will no longer be making recommendations nor addended the exam with recommmendations)    Tegan Mueller

## 2025-07-23 ENCOUNTER — MYC MEDICAL ADVICE (OUTPATIENT)
Dept: FAMILY MEDICINE | Facility: OTHER | Age: 75
End: 2025-07-23

## 2025-07-23 DIAGNOSIS — L08.9 LOCAL INFECTION OF SKIN AND SUBCUTANEOUS TISSUE: Primary | ICD-10-CM

## 2025-07-23 RX ORDER — SULFAMETHOXAZOLE AND TRIMETHOPRIM 800; 160 MG/1; MG/1
1 TABLET ORAL 2 TIMES DAILY
Qty: 10 TABLET | Refills: 0 | Status: SHIPPED | OUTPATIENT
Start: 2025-07-23 | End: 2025-07-28

## 2025-07-23 NOTE — PROGRESS NOTES
Infection did not completely heal.  Sent in anth 5 days of antibiotic     Susana GARDNER FNP-BC  Family Nurse Practitioner

## 2025-07-23 NOTE — TELEPHONE ENCOUNTER
Patient seen on 6/10 25    Local infection of skin and subcutaneous tissue  Topical antibiotic not helping   Plan to start oral antibiotic   - sulfamethoxazole-trimethoprim (BACTRIM DS) 800-160 MG tablet; Take 1 tablet by mouth 2 times daily for 10 days.       sulfamethoxazole-trimethoprim (BACTRIM DS) 800-160 MG tablet 20 tablet 0 6/10/2025 6/20/2025 No   Sig - Route: Take 1 tablet by mouth 2 times daily for 10 days. - Oral   Sent to pharmacy as: Sulfamethoxazole-Trimethoprim 800-160 MG Oral Tablet (BACTRIM DS)   Class: E-Prescribe   Order: [id number]   E-Prescribing Status: Receipt confirmed by pharmacy (6/10/2025  1:12 PM CDT)

## 2025-07-26 DIAGNOSIS — N28.9 RENAL INSUFFICIENCY: ICD-10-CM

## 2025-07-28 RX ORDER — LISINOPRIL 2.5 MG/1
2.5 TABLET ORAL DAILY
Qty: 90 TABLET | Refills: 3 | Status: SHIPPED | OUTPATIENT
Start: 2025-07-28

## 2025-07-28 NOTE — TELEPHONE ENCOUNTER
lisinopril (ZESTRIL) 2.5 MG tablet         Last Written Prescription Date:  8/21/24  Last Fill Quantity: 90,   # refills: 3  Last Office Visit: 6/10/25  Future Office visit:       Routing refill request to provider for review/approval because:  ACE Inhibitors (Including Combos) Protocol Failed      Medication indicated for associated diagnosis    Medication is associated with one or more of the following diagnoses:                Chronic Kidney Disease (CKD)              Coronary Artery Disease (CAD)              Diabetes              Heart Failure (HF)              Hypertension (HTN)              Nephropathy              History of myocarditis              Tachycardia induced cardiomyopathy              STEMI (ST elevation myocardial infarction)              Spontaneous dissection of coronary artery              Status post percutaneous transluminal coronary angioplasty              Cardiomyopathy

## 2025-07-30 DIAGNOSIS — J30.1 SEASONAL ALLERGIC RHINITIS DUE TO POLLEN: ICD-10-CM

## 2025-07-30 DIAGNOSIS — F51.01 PRIMARY INSOMNIA: ICD-10-CM

## 2025-07-30 DIAGNOSIS — J45.991 COUGH VARIANT ASTHMA: ICD-10-CM

## 2025-07-30 RX ORDER — TRAZODONE HYDROCHLORIDE 50 MG/1
TABLET ORAL
Qty: 270 TABLET | Refills: 1 | Status: SHIPPED | OUTPATIENT
Start: 2025-07-30

## 2025-07-30 RX ORDER — MONTELUKAST SODIUM 10 MG/1
1 TABLET ORAL DAILY
Qty: 90 TABLET | Refills: 0 | Status: SHIPPED | OUTPATIENT
Start: 2025-07-30

## (undated) DEVICE — APPLICATOR-CHLORAPREP 26ML TINTED CHG 2%+ 70% IPA-SURGICAL

## (undated) DEVICE — SWABSTICK-BENZOIN

## (undated) DEVICE — LIGHT HANDLE COVER

## (undated) DEVICE — IRRIGATION-H2O 1000ML

## (undated) DEVICE — BLADE-SCALPEL #15

## (undated) DEVICE — DRSG-XEROFORM 1X8

## (undated) DEVICE — IRRIGATION-NACL 1000ML

## (undated) DEVICE — DRAPE-EXTREMITY SHEET

## (undated) DEVICE — DRSG-KERLIX 6 X 6 3/4 FLUFF

## (undated) DEVICE — SUTURE-VICRYL 3-0 PS-1 J936H

## (undated) DEVICE — TUBING-SUCTION 20FT

## (undated) DEVICE — LABEL-STERILE PREPRINTED FOR OR

## (undated) DEVICE — GOWN-SURG XL LVL 3 REINFORCED

## (undated) DEVICE — PACK-SET UP-CUSTOM

## (undated) DEVICE — BDG-ESMARK 4 INCH X 9 FT

## (undated) DEVICE — TOPICAL SKIN ADHESIVE EXOFIN

## (undated) DEVICE — SYRINGE-ASEPTO IRRIGATION

## (undated) DEVICE — FORCEP-COLON BIOPSY LARGE W/NEEDLE 240CM

## (undated) DEVICE — DRAPE-STERI 45X60CM #1010

## (undated) DEVICE — BDG-ELASTIC 4 INCH

## (undated) DEVICE — CAUTERY PAD-POLYHESIVE II ADULT

## (undated) DEVICE — GLV-8.0 BIOGEL LATEX

## (undated) DEVICE — NDL-25G 1-1/2" NON-SAFETY

## (undated) DEVICE — SUTURE-SILK 2-0 SH K833H

## (undated) DEVICE — DRSG-SPONGE STERILE 4 X 4

## (undated) DEVICE — SCD SLEEVE-KNEE REG.

## (undated) DEVICE — SUTURE-VICRYL 3-0 SH J416H

## (undated) DEVICE — CUFF-DISP STERILE 18IN SINGLE BLADDER

## (undated) DEVICE — DRAPE-THREE QUARTER (LARGE) SHEET

## (undated) DEVICE — CANISTER-SUCTION 2000CC

## (undated) DEVICE — MOUTHPIECE W/GUARD FOR ENDOSCOPY

## (undated) DEVICE — NDL-21G 1-1/2" NON-SAFETY

## (undated) DEVICE — GLV-7.0 PROTEXIS PI CLASSIC LF/PF

## (undated) DEVICE — Device

## (undated) DEVICE — SYRINGE-20CC LUER LOCK

## (undated) DEVICE — TOWEL-OR DISP 4PKS

## (undated) DEVICE — NDL-18G 1 1/2" NON-SAFETY

## (undated) DEVICE — PACK-LAPAROTOMY-CUSTOM

## (undated) DEVICE — BDG-ELASTIC 6 INCH DBL. STERILE

## (undated) DEVICE — CAST PADDING-STERILE 4"

## (undated) DEVICE — SUTURE-ETHILON 4-0 FS-1 1629H

## (undated) DEVICE — CONNECTOR-ERBEFLO 2 PORT

## (undated) RX ORDER — PHENYLEPHRINE HCL IN 0.9% NACL 1 MG/10 ML
SYRINGE (ML) INTRAVENOUS
Status: DISPENSED
Start: 2017-06-12

## (undated) RX ORDER — EPHEDRINE SULFATE 50 MG/ML
INJECTION, SOLUTION INTRAMUSCULAR; INTRAVENOUS; SUBCUTANEOUS
Status: DISPENSED
Start: 2017-06-12

## (undated) RX ORDER — PROPOFOL 10 MG/ML
INJECTION, EMULSION INTRAVENOUS
Status: DISPENSED
Start: 2017-06-12

## (undated) RX ORDER — LIDOCAINE HYDROCHLORIDE 20 MG/ML
INJECTION, SOLUTION EPIDURAL; INFILTRATION; INTRACAUDAL; PERINEURAL
Status: DISPENSED
Start: 2018-01-16

## (undated) RX ORDER — FENTANYL CITRATE 50 UG/ML
INJECTION, SOLUTION INTRAMUSCULAR; INTRAVENOUS
Status: DISPENSED
Start: 2018-01-16

## (undated) RX ORDER — LIDOCAINE HYDROCHLORIDE 20 MG/ML
INJECTION, SOLUTION EPIDURAL; INFILTRATION; INTRACAUDAL; PERINEURAL
Status: DISPENSED
Start: 2020-01-17

## (undated) RX ORDER — ONDANSETRON 2 MG/ML
INJECTION INTRAMUSCULAR; INTRAVENOUS
Status: DISPENSED
Start: 2017-06-12

## (undated) RX ORDER — PROPOFOL 10 MG/ML
INJECTION, EMULSION INTRAVENOUS
Status: DISPENSED
Start: 2020-01-17

## (undated) RX ORDER — PROPOFOL 10 MG/ML
INJECTION, EMULSION INTRAVENOUS
Status: DISPENSED
Start: 2018-01-16

## (undated) RX ORDER — DEXAMETHASONE SODIUM PHOSPHATE 10 MG/ML
INJECTION, SOLUTION INTRAMUSCULAR; INTRAVENOUS
Status: DISPENSED
Start: 2017-06-12

## (undated) RX ORDER — FENTANYL CITRATE 50 UG/ML
INJECTION, SOLUTION INTRAMUSCULAR; INTRAVENOUS
Status: DISPENSED
Start: 2017-06-12

## (undated) RX ORDER — LIDOCAINE HYDROCHLORIDE 20 MG/ML
INJECTION, SOLUTION EPIDURAL; INFILTRATION; INTRACAUDAL; PERINEURAL
Status: DISPENSED
Start: 2017-06-12